# Patient Record
Sex: FEMALE | Race: WHITE | NOT HISPANIC OR LATINO | Employment: OTHER | URBAN - METROPOLITAN AREA
[De-identification: names, ages, dates, MRNs, and addresses within clinical notes are randomized per-mention and may not be internally consistent; named-entity substitution may affect disease eponyms.]

---

## 2017-01-04 ENCOUNTER — GENERIC CONVERSION - ENCOUNTER (OUTPATIENT)
Dept: OTHER | Facility: OTHER | Age: 57
End: 2017-01-04

## 2017-01-05 ENCOUNTER — HOSPITAL ENCOUNTER (OUTPATIENT)
Dept: NON INVASIVE DIAGNOSTICS | Facility: CLINIC | Age: 57
Discharge: HOME | End: 2017-01-05
Payer: COMMERCIAL

## 2017-01-05 ENCOUNTER — GENERIC CONVERSION - ENCOUNTER (OUTPATIENT)
Dept: OTHER | Facility: OTHER | Age: 57
End: 2017-01-05

## 2017-01-05 DIAGNOSIS — R01.1 UNDIAGNOSED CARDIAC MURMURS: ICD-10-CM

## 2017-01-05 PROCEDURE — 93306 TTE W/DOPPLER COMPLETE: CPT

## 2017-01-20 ENCOUNTER — ALLSCRIPTS OFFICE VISIT (OUTPATIENT)
Dept: OTHER | Facility: OTHER | Age: 57
End: 2017-01-20

## 2017-02-03 ENCOUNTER — TRANSCRIBE ORDERS (OUTPATIENT)
Dept: ADMINISTRATIVE | Age: 57
End: 2017-02-03

## 2017-02-03 ENCOUNTER — APPOINTMENT (OUTPATIENT)
Dept: LAB | Age: 57
End: 2017-02-03
Payer: COMMERCIAL

## 2017-02-03 DIAGNOSIS — E78.5 HYPERLIPIDEMIA: ICD-10-CM

## 2017-02-03 DIAGNOSIS — E55.9 VITAMIN D DEFICIENCY: ICD-10-CM

## 2017-02-03 DIAGNOSIS — E66.9 OBESITY: ICD-10-CM

## 2017-02-03 DIAGNOSIS — I10 ESSENTIAL (PRIMARY) HYPERTENSION: ICD-10-CM

## 2017-02-03 DIAGNOSIS — E11.65 TYPE 2 DIABETES MELLITUS WITH HYPERGLYCEMIA (HCC): ICD-10-CM

## 2017-02-03 LAB
ALBUMIN SERPL BCP-MCNC: 4.2 G/DL (ref 3.5–5)
ANION GAP SERPL CALCULATED.3IONS-SCNC: 12 MMOL/L (ref 4–13)
BUN SERPL-MCNC: 21 MG/DL (ref 5–25)
CALCIUM SERPL-MCNC: 9.9 MG/DL (ref 8.3–10.1)
CHLORIDE SERPL-SCNC: 99 MMOL/L (ref 100–108)
CO2 SERPL-SCNC: 26 MMOL/L (ref 21–32)
CREAT SERPL-MCNC: 0.84 MG/DL (ref 0.6–1.3)
CREAT UR-MCNC: 131 MG/DL
EST. AVERAGE GLUCOSE BLD GHB EST-MCNC: 148 MG/DL
GFR SERPL CREATININE-BSD FRML MDRD: >60 ML/MIN/1.73SQ M
GLUCOSE SERPL-MCNC: 96 MG/DL (ref 65–140)
HBA1C MFR BLD: 6.8 % (ref 4.2–6.3)
MICROALBUMIN UR-MCNC: 101 MG/L (ref 0–20)
MICROALBUMIN/CREAT 24H UR: 77 MG/G CREATININE (ref 0–30)
PHOSPHATE SERPL-MCNC: 2.5 MG/DL (ref 2.7–4.5)
POTASSIUM SERPL-SCNC: 4.6 MMOL/L (ref 3.5–5.3)
PTH-INTACT SERPL-MCNC: 26.2 PG/ML (ref 14–72)
SODIUM SERPL-SCNC: 137 MMOL/L (ref 136–145)
T4 FREE SERPL-MCNC: 1.01 NG/DL (ref 0.76–1.46)
TSH SERPL DL<=0.05 MIU/L-ACNC: 1.28 UIU/ML (ref 0.36–3.74)

## 2017-02-03 PROCEDURE — 82043 UR ALBUMIN QUANTITATIVE: CPT

## 2017-02-03 PROCEDURE — 80069 RENAL FUNCTION PANEL: CPT

## 2017-02-03 PROCEDURE — 84439 ASSAY OF FREE THYROXINE: CPT

## 2017-02-03 PROCEDURE — 83036 HEMOGLOBIN GLYCOSYLATED A1C: CPT

## 2017-02-03 PROCEDURE — 83970 ASSAY OF PARATHORMONE: CPT

## 2017-02-03 PROCEDURE — 84443 ASSAY THYROID STIM HORMONE: CPT

## 2017-02-03 PROCEDURE — 82570 ASSAY OF URINE CREATININE: CPT

## 2017-02-03 PROCEDURE — 36415 COLL VENOUS BLD VENIPUNCTURE: CPT

## 2017-02-09 ENCOUNTER — OFFICE VISIT (OUTPATIENT)
Dept: URGENT CARE | Age: 57
End: 2017-02-09
Payer: COMMERCIAL

## 2017-02-09 PROCEDURE — S9083 URGENT CARE CENTER GLOBAL: HCPCS

## 2017-02-09 PROCEDURE — G0382 LEV 3 HOSP TYPE B ED VISIT: HCPCS

## 2017-02-20 ENCOUNTER — TRANSCRIBE ORDERS (OUTPATIENT)
Dept: ADMINISTRATIVE | Facility: HOSPITAL | Age: 57
End: 2017-02-20

## 2017-02-20 ENCOUNTER — ALLSCRIPTS OFFICE VISIT (OUTPATIENT)
Dept: OTHER | Facility: OTHER | Age: 57
End: 2017-02-20

## 2017-02-20 DIAGNOSIS — E55.9 AVITAMINOSIS D: ICD-10-CM

## 2017-02-20 DIAGNOSIS — E78.5 HYPERLIPIDEMIA, UNSPECIFIED HYPERLIPIDEMIA TYPE: Primary | ICD-10-CM

## 2017-02-20 DIAGNOSIS — E04.1 THYROID NODULE: ICD-10-CM

## 2017-02-20 DIAGNOSIS — I10 BENIGN HYPERTENSION: ICD-10-CM

## 2017-03-16 ENCOUNTER — ALLSCRIPTS OFFICE VISIT (OUTPATIENT)
Dept: OTHER | Facility: OTHER | Age: 57
End: 2017-03-16

## 2017-03-27 ENCOUNTER — ALLSCRIPTS OFFICE VISIT (OUTPATIENT)
Dept: OTHER | Facility: OTHER | Age: 57
End: 2017-03-27

## 2017-03-27 LAB
FLUAV AG SPEC QL IA: NEGATIVE
INFLUENZA B AG (HISTORICAL): NEGATIVE

## 2017-03-31 ENCOUNTER — ALLSCRIPTS OFFICE VISIT (OUTPATIENT)
Dept: OTHER | Facility: OTHER | Age: 57
End: 2017-03-31

## 2017-03-31 DIAGNOSIS — M25.571 PAIN IN RIGHT ANKLE: ICD-10-CM

## 2017-03-31 DIAGNOSIS — M54.6 PAIN IN THORACIC SPINE: ICD-10-CM

## 2017-03-31 DIAGNOSIS — R53.83 OTHER FATIGUE: ICD-10-CM

## 2017-03-31 DIAGNOSIS — M21.70 ACQUIRED UNEQUAL LIMB LENGTH: ICD-10-CM

## 2017-04-04 ENCOUNTER — HOSPITAL ENCOUNTER (OUTPATIENT)
Dept: RADIOLOGY | Age: 57
Discharge: HOME | End: 2017-04-04
Payer: COMMERCIAL

## 2017-04-04 ENCOUNTER — GENERIC CONVERSION - ENCOUNTER (OUTPATIENT)
Dept: OTHER | Facility: OTHER | Age: 57
End: 2017-04-04

## 2017-04-04 ENCOUNTER — APPOINTMENT (OUTPATIENT)
Dept: LAB | Age: 57
End: 2017-04-04
Payer: COMMERCIAL

## 2017-04-04 ENCOUNTER — TRANSCRIBE ORDERS (OUTPATIENT)
Dept: ADMINISTRATIVE | Age: 57
End: 2017-04-04

## 2017-04-04 DIAGNOSIS — M54.6 PAIN IN THORACIC SPINE: ICD-10-CM

## 2017-04-04 DIAGNOSIS — M25.571 PAIN IN RIGHT ANKLE: ICD-10-CM

## 2017-04-04 DIAGNOSIS — R53.83 OTHER FATIGUE: ICD-10-CM

## 2017-04-04 LAB — CORTIS AM PEAK SERPL-MCNC: 9.1 UG/ML (ref 4.2–22.4)

## 2017-04-04 PROCEDURE — 73610 X-RAY EXAM OF ANKLE: CPT

## 2017-04-04 PROCEDURE — 36415 COLL VENOUS BLD VENIPUNCTURE: CPT

## 2017-04-04 PROCEDURE — 82533 TOTAL CORTISOL: CPT

## 2017-04-04 PROCEDURE — 72072 X-RAY EXAM THORAC SPINE 3VWS: CPT

## 2017-04-05 ENCOUNTER — GENERIC CONVERSION - ENCOUNTER (OUTPATIENT)
Dept: OTHER | Facility: OTHER | Age: 57
End: 2017-04-05

## 2017-04-10 ENCOUNTER — APPOINTMENT (OUTPATIENT)
Dept: PHYSICAL THERAPY | Facility: CLINIC | Age: 57
End: 2017-04-10
Payer: COMMERCIAL

## 2017-04-10 PROCEDURE — 97161 PT EVAL LOW COMPLEX 20 MIN: CPT

## 2017-04-12 ENCOUNTER — APPOINTMENT (OUTPATIENT)
Dept: PHYSICAL THERAPY | Facility: CLINIC | Age: 57
End: 2017-04-12
Payer: COMMERCIAL

## 2017-04-12 PROCEDURE — 97110 THERAPEUTIC EXERCISES: CPT

## 2017-04-12 PROCEDURE — 97140 MANUAL THERAPY 1/> REGIONS: CPT

## 2017-04-13 ENCOUNTER — APPOINTMENT (OUTPATIENT)
Dept: PHYSICAL THERAPY | Facility: CLINIC | Age: 57
End: 2017-04-13
Payer: COMMERCIAL

## 2017-04-13 PROCEDURE — 97116 GAIT TRAINING THERAPY: CPT

## 2017-04-13 PROCEDURE — 97140 MANUAL THERAPY 1/> REGIONS: CPT

## 2017-04-13 PROCEDURE — 97110 THERAPEUTIC EXERCISES: CPT

## 2017-04-18 ENCOUNTER — APPOINTMENT (OUTPATIENT)
Dept: PHYSICAL THERAPY | Facility: CLINIC | Age: 57
End: 2017-04-18
Payer: COMMERCIAL

## 2017-04-19 ENCOUNTER — APPOINTMENT (OUTPATIENT)
Dept: PHYSICAL THERAPY | Facility: CLINIC | Age: 57
End: 2017-04-19
Payer: COMMERCIAL

## 2017-04-19 PROCEDURE — 97116 GAIT TRAINING THERAPY: CPT

## 2017-04-19 PROCEDURE — 97140 MANUAL THERAPY 1/> REGIONS: CPT

## 2017-04-19 PROCEDURE — 97110 THERAPEUTIC EXERCISES: CPT

## 2017-04-20 ENCOUNTER — ALLSCRIPTS OFFICE VISIT (OUTPATIENT)
Dept: OTHER | Facility: OTHER | Age: 57
End: 2017-04-20

## 2017-04-20 LAB — GLUCOSE SERPL-MCNC: 153 MG/DL

## 2017-04-24 ENCOUNTER — APPOINTMENT (OUTPATIENT)
Dept: PHYSICAL THERAPY | Facility: CLINIC | Age: 57
End: 2017-04-24
Payer: COMMERCIAL

## 2017-04-24 PROCEDURE — 97140 MANUAL THERAPY 1/> REGIONS: CPT

## 2017-04-24 PROCEDURE — 97116 GAIT TRAINING THERAPY: CPT

## 2017-04-24 PROCEDURE — 97110 THERAPEUTIC EXERCISES: CPT

## 2017-04-27 ENCOUNTER — APPOINTMENT (OUTPATIENT)
Dept: PHYSICAL THERAPY | Facility: CLINIC | Age: 57
End: 2017-04-27
Payer: COMMERCIAL

## 2017-05-01 ENCOUNTER — APPOINTMENT (OUTPATIENT)
Dept: PHYSICAL THERAPY | Facility: CLINIC | Age: 57
End: 2017-05-01
Payer: COMMERCIAL

## 2017-05-01 DIAGNOSIS — E55.9 VITAMIN D DEFICIENCY: ICD-10-CM

## 2017-05-01 DIAGNOSIS — I10 ESSENTIAL (PRIMARY) HYPERTENSION: ICD-10-CM

## 2017-05-01 DIAGNOSIS — E04.1 NONTOXIC SINGLE THYROID NODULE: ICD-10-CM

## 2017-05-01 DIAGNOSIS — E11.65 TYPE 2 DIABETES MELLITUS WITH HYPERGLYCEMIA (HCC): ICD-10-CM

## 2017-05-01 DIAGNOSIS — E78.5 HYPERLIPIDEMIA: ICD-10-CM

## 2017-05-01 PROCEDURE — 97140 MANUAL THERAPY 1/> REGIONS: CPT

## 2017-05-01 PROCEDURE — 97116 GAIT TRAINING THERAPY: CPT

## 2017-05-01 PROCEDURE — 97110 THERAPEUTIC EXERCISES: CPT

## 2017-05-04 ENCOUNTER — APPOINTMENT (OUTPATIENT)
Dept: PHYSICAL THERAPY | Facility: CLINIC | Age: 57
End: 2017-05-04
Payer: COMMERCIAL

## 2017-05-04 PROCEDURE — 97110 THERAPEUTIC EXERCISES: CPT

## 2017-05-04 PROCEDURE — 97140 MANUAL THERAPY 1/> REGIONS: CPT

## 2017-05-08 ENCOUNTER — APPOINTMENT (OUTPATIENT)
Dept: PHYSICAL THERAPY | Facility: CLINIC | Age: 57
End: 2017-05-08
Payer: COMMERCIAL

## 2017-05-08 PROCEDURE — 97110 THERAPEUTIC EXERCISES: CPT

## 2017-05-08 PROCEDURE — 97140 MANUAL THERAPY 1/> REGIONS: CPT

## 2017-05-11 ENCOUNTER — APPOINTMENT (OUTPATIENT)
Dept: PHYSICAL THERAPY | Facility: CLINIC | Age: 57
End: 2017-05-11
Payer: COMMERCIAL

## 2017-05-15 ENCOUNTER — APPOINTMENT (OUTPATIENT)
Dept: PHYSICAL THERAPY | Facility: CLINIC | Age: 57
End: 2017-05-15
Payer: COMMERCIAL

## 2017-05-15 PROCEDURE — 97140 MANUAL THERAPY 1/> REGIONS: CPT

## 2017-05-15 PROCEDURE — 97110 THERAPEUTIC EXERCISES: CPT

## 2017-05-18 ENCOUNTER — HOSPITAL ENCOUNTER (OUTPATIENT)
Dept: ULTRASOUND IMAGING | Facility: HOSPITAL | Age: 57
Discharge: HOME | End: 2017-05-18
Payer: COMMERCIAL

## 2017-05-18 ENCOUNTER — APPOINTMENT (OUTPATIENT)
Dept: PHYSICAL THERAPY | Facility: CLINIC | Age: 57
End: 2017-05-18
Payer: COMMERCIAL

## 2017-05-18 DIAGNOSIS — E55.9 AVITAMINOSIS D: ICD-10-CM

## 2017-05-18 DIAGNOSIS — I10 BENIGN HYPERTENSION: ICD-10-CM

## 2017-05-18 DIAGNOSIS — E78.5 HYPERLIPIDEMIA, UNSPECIFIED HYPERLIPIDEMIA TYPE: ICD-10-CM

## 2017-05-18 DIAGNOSIS — E04.1 THYROID NODULE: ICD-10-CM

## 2017-05-18 PROCEDURE — 76536 US EXAM OF HEAD AND NECK: CPT

## 2017-05-18 PROCEDURE — 97140 MANUAL THERAPY 1/> REGIONS: CPT

## 2017-05-18 PROCEDURE — 97110 THERAPEUTIC EXERCISES: CPT

## 2017-05-20 DIAGNOSIS — E55.9 VITAMIN D DEFICIENCY: ICD-10-CM

## 2017-05-20 DIAGNOSIS — E11.65 TYPE 2 DIABETES MELLITUS WITH HYPERGLYCEMIA (HCC): ICD-10-CM

## 2017-05-20 DIAGNOSIS — E78.5 HYPERLIPIDEMIA: ICD-10-CM

## 2017-05-20 DIAGNOSIS — E04.1 NONTOXIC SINGLE THYROID NODULE: ICD-10-CM

## 2017-05-20 DIAGNOSIS — I10 ESSENTIAL (PRIMARY) HYPERTENSION: ICD-10-CM

## 2017-05-22 ENCOUNTER — APPOINTMENT (OUTPATIENT)
Dept: PHYSICAL THERAPY | Facility: CLINIC | Age: 57
End: 2017-05-22
Payer: COMMERCIAL

## 2017-05-22 PROCEDURE — 97140 MANUAL THERAPY 1/> REGIONS: CPT

## 2017-05-22 PROCEDURE — 97110 THERAPEUTIC EXERCISES: CPT

## 2017-05-23 ENCOUNTER — LAB REQUISITION (OUTPATIENT)
Dept: LAB | Facility: HOSPITAL | Age: 57
End: 2017-05-23
Payer: COMMERCIAL

## 2017-05-23 ENCOUNTER — ALLSCRIPTS OFFICE VISIT (OUTPATIENT)
Dept: OTHER | Facility: OTHER | Age: 57
End: 2017-05-23

## 2017-05-23 ENCOUNTER — TRANSCRIBE ORDERS (OUTPATIENT)
Dept: ADMINISTRATIVE | Facility: HOSPITAL | Age: 57
End: 2017-05-23

## 2017-05-23 DIAGNOSIS — Z01.419 ENCOUNTER FOR GYNECOLOGICAL EXAMINATION WITHOUT ABNORMAL FINDING: ICD-10-CM

## 2017-05-23 DIAGNOSIS — Z12.31 ENCOUNTER FOR MAMMOGRAM TO ESTABLISH BASELINE MAMMOGRAM: Primary | ICD-10-CM

## 2017-05-23 PROCEDURE — 87624 HPV HI-RISK TYP POOLED RSLT: CPT | Performed by: OBSTETRICS & GYNECOLOGY

## 2017-05-23 PROCEDURE — G0145 SCR C/V CYTO,THINLAYER,RESCR: HCPCS | Performed by: OBSTETRICS & GYNECOLOGY

## 2017-05-25 ENCOUNTER — APPOINTMENT (OUTPATIENT)
Dept: PHYSICAL THERAPY | Facility: CLINIC | Age: 57
End: 2017-05-25
Payer: COMMERCIAL

## 2017-05-25 LAB — HPV RRNA GENITAL QL NAA+PROBE: NORMAL

## 2017-05-25 PROCEDURE — 97110 THERAPEUTIC EXERCISES: CPT

## 2017-05-25 PROCEDURE — 97140 MANUAL THERAPY 1/> REGIONS: CPT

## 2017-05-26 ENCOUNTER — GENERIC CONVERSION - ENCOUNTER (OUTPATIENT)
Dept: OTHER | Facility: OTHER | Age: 57
End: 2017-05-26

## 2017-05-26 LAB
LAB AP GYN PRIMARY INTERPRETATION: NORMAL
Lab: NORMAL

## 2017-05-30 ENCOUNTER — APPOINTMENT (OUTPATIENT)
Dept: PHYSICAL THERAPY | Facility: CLINIC | Age: 57
End: 2017-05-30
Payer: COMMERCIAL

## 2017-05-30 PROCEDURE — 97110 THERAPEUTIC EXERCISES: CPT

## 2017-05-30 PROCEDURE — 97140 MANUAL THERAPY 1/> REGIONS: CPT

## 2017-06-01 ENCOUNTER — APPOINTMENT (OUTPATIENT)
Dept: PHYSICAL THERAPY | Facility: CLINIC | Age: 57
End: 2017-06-01
Payer: COMMERCIAL

## 2017-06-01 PROCEDURE — 97110 THERAPEUTIC EXERCISES: CPT

## 2017-06-01 PROCEDURE — 97140 MANUAL THERAPY 1/> REGIONS: CPT

## 2017-06-05 ENCOUNTER — APPOINTMENT (OUTPATIENT)
Dept: PHYSICAL THERAPY | Facility: CLINIC | Age: 57
End: 2017-06-05
Payer: COMMERCIAL

## 2017-06-05 PROCEDURE — 97110 THERAPEUTIC EXERCISES: CPT

## 2017-06-05 PROCEDURE — 97140 MANUAL THERAPY 1/> REGIONS: CPT

## 2017-06-08 ENCOUNTER — APPOINTMENT (OUTPATIENT)
Dept: PHYSICAL THERAPY | Facility: CLINIC | Age: 57
End: 2017-06-08
Payer: COMMERCIAL

## 2017-06-08 PROCEDURE — 97110 THERAPEUTIC EXERCISES: CPT

## 2017-06-08 PROCEDURE — 97140 MANUAL THERAPY 1/> REGIONS: CPT

## 2017-06-09 ENCOUNTER — APPOINTMENT (OUTPATIENT)
Dept: LAB | Age: 57
End: 2017-06-09
Payer: COMMERCIAL

## 2017-06-09 ENCOUNTER — TRANSCRIBE ORDERS (OUTPATIENT)
Dept: ADMINISTRATIVE | Age: 57
End: 2017-06-09

## 2017-06-09 DIAGNOSIS — E04.1 NONTOXIC SINGLE THYROID NODULE: ICD-10-CM

## 2017-06-09 DIAGNOSIS — E11.65 TYPE 2 DIABETES MELLITUS WITH HYPERGLYCEMIA (HCC): ICD-10-CM

## 2017-06-09 DIAGNOSIS — E55.9 VITAMIN D DEFICIENCY: ICD-10-CM

## 2017-06-09 DIAGNOSIS — E78.5 HYPERLIPIDEMIA: ICD-10-CM

## 2017-06-09 DIAGNOSIS — I10 ESSENTIAL (PRIMARY) HYPERTENSION: ICD-10-CM

## 2017-06-09 LAB
25(OH)D3 SERPL-MCNC: 31.9 NG/ML (ref 30–100)
ALBUMIN SERPL BCP-MCNC: 4.2 G/DL (ref 3.5–5)
ALBUMIN SERPL BCP-MCNC: 4.2 G/DL (ref 3.5–5)
ALP SERPL-CCNC: 145 U/L (ref 46–116)
ALT SERPL W P-5'-P-CCNC: 42 U/L (ref 12–78)
ANION GAP SERPL CALCULATED.3IONS-SCNC: 12 MMOL/L (ref 4–13)
AST SERPL W P-5'-P-CCNC: 25 U/L (ref 5–45)
BILIRUB DIRECT SERPL-MCNC: 0.11 MG/DL (ref 0–0.2)
BILIRUB SERPL-MCNC: 0.37 MG/DL (ref 0.2–1)
BUN SERPL-MCNC: 24 MG/DL (ref 5–25)
CALCIUM ALBUM COR SERPL-MCNC: 10.1 MG/DL (ref 8.3–10.1)
CALCIUM SERPL-MCNC: 10.3 MD/DL (ref 8.3–10.1)
CALCIUM SERPL-MCNC: 10.3 MG/DL (ref 8.3–10.1)
CHLORIDE SERPL-SCNC: 103 MMOL/L (ref 100–108)
CHOLEST SERPL-MCNC: 160 MG/DL (ref 50–200)
CO2 SERPL-SCNC: 24 MMOL/L (ref 21–32)
CREAT SERPL-MCNC: 0.88 MG/DL (ref 0.6–1.3)
CREAT UR-MCNC: 61.3 MG/DL
EST. AVERAGE GLUCOSE BLD GHB EST-MCNC: 148 MG/DL
GFR SERPL CREATININE-BSD FRML MDRD: >60 ML/MIN/1.73SQ M
GLUCOSE SERPL-MCNC: 118 MG/DL (ref 65–140)
HBA1C MFR BLD: 6.8 % (ref 4.2–6.3)
HDLC SERPL-MCNC: 44 MG/DL (ref 40–60)
LDLC SERPL CALC-MCNC: 56 MG/DL (ref 0–100)
MICROALBUMIN UR-MCNC: 10.8 MG/L (ref 0–20)
MICROALBUMIN/CREAT 24H UR: 18 MG/G CREATININE (ref 0–30)
PHOSPHATE SERPL-MCNC: 3.1 MG/DL (ref 2.7–4.5)
POTASSIUM SERPL-SCNC: 4.3 MMOL/L (ref 3.5–5.3)
PROT SERPL-MCNC: 8.6 G/DL (ref 6.4–8.2)
PTH-INTACT SERPL-MCNC: 26.1 PG/ML (ref 14–72)
SODIUM SERPL-SCNC: 139 MMOL/L (ref 136–145)
TRIGL SERPL-MCNC: 299 MG/DL

## 2017-06-09 PROCEDURE — 83970 ASSAY OF PARATHORMONE: CPT

## 2017-06-09 PROCEDURE — 82040 ASSAY OF SERUM ALBUMIN: CPT

## 2017-06-09 PROCEDURE — 36415 COLL VENOUS BLD VENIPUNCTURE: CPT

## 2017-06-09 PROCEDURE — 84100 ASSAY OF PHOSPHORUS: CPT

## 2017-06-09 PROCEDURE — 80061 LIPID PANEL: CPT

## 2017-06-09 PROCEDURE — 80048 BASIC METABOLIC PNL TOTAL CA: CPT

## 2017-06-09 PROCEDURE — 82043 UR ALBUMIN QUANTITATIVE: CPT

## 2017-06-09 PROCEDURE — 82310 ASSAY OF CALCIUM: CPT

## 2017-06-09 PROCEDURE — 83036 HEMOGLOBIN GLYCOSYLATED A1C: CPT

## 2017-06-09 PROCEDURE — 82570 ASSAY OF URINE CREATININE: CPT

## 2017-06-09 PROCEDURE — 80076 HEPATIC FUNCTION PANEL: CPT

## 2017-06-09 PROCEDURE — 82306 VITAMIN D 25 HYDROXY: CPT

## 2017-06-12 ENCOUNTER — APPOINTMENT (OUTPATIENT)
Dept: PHYSICAL THERAPY | Facility: CLINIC | Age: 57
End: 2017-06-12
Payer: COMMERCIAL

## 2017-06-15 ENCOUNTER — APPOINTMENT (OUTPATIENT)
Dept: PHYSICAL THERAPY | Facility: CLINIC | Age: 57
End: 2017-06-15
Payer: COMMERCIAL

## 2017-06-20 ENCOUNTER — ALLSCRIPTS OFFICE VISIT (OUTPATIENT)
Dept: OTHER | Facility: OTHER | Age: 57
End: 2017-06-20

## 2017-06-20 DIAGNOSIS — I10 ESSENTIAL (PRIMARY) HYPERTENSION: ICD-10-CM

## 2017-06-20 DIAGNOSIS — E11.65 TYPE 2 DIABETES MELLITUS WITH HYPERGLYCEMIA (HCC): ICD-10-CM

## 2017-06-23 ENCOUNTER — TRANSCRIBE ORDERS (OUTPATIENT)
Dept: ADMINISTRATIVE | Age: 57
End: 2017-06-23

## 2017-06-23 ENCOUNTER — APPOINTMENT (OUTPATIENT)
Dept: LAB | Age: 57
End: 2017-06-23
Payer: COMMERCIAL

## 2017-06-23 DIAGNOSIS — Z79.899 ENCOUNTER FOR LONG-TERM (CURRENT) USE OF OTHER MEDICATIONS: ICD-10-CM

## 2017-06-23 DIAGNOSIS — Z79.899 ENCOUNTER FOR LONG-TERM (CURRENT) USE OF OTHER MEDICATIONS: Primary | ICD-10-CM

## 2017-06-23 DIAGNOSIS — E11.65 TYPE 2 DIABETES MELLITUS WITH HYPERGLYCEMIA (HCC): ICD-10-CM

## 2017-06-23 DIAGNOSIS — D72.829 ELEVATED WHITE BLOOD CELL COUNT: ICD-10-CM

## 2017-06-23 DIAGNOSIS — I10 ESSENTIAL (PRIMARY) HYPERTENSION: ICD-10-CM

## 2017-06-23 LAB
ALBUMIN SERPL BCP-MCNC: 4 G/DL (ref 3.5–5)
ALP SERPL-CCNC: 125 U/L (ref 46–116)
ALT SERPL W P-5'-P-CCNC: 39 U/L (ref 12–78)
ANION GAP SERPL CALCULATED.3IONS-SCNC: 8 MMOL/L (ref 4–13)
AST SERPL W P-5'-P-CCNC: 21 U/L (ref 5–45)
BASOPHILS # BLD AUTO: 0.07 THOUSANDS/ΜL (ref 0–0.1)
BASOPHILS NFR BLD AUTO: 1 % (ref 0–1)
BILIRUB SERPL-MCNC: 0.37 MG/DL (ref 0.2–1)
BUN SERPL-MCNC: 21 MG/DL (ref 5–25)
CALCIUM SERPL-MCNC: 9.8 MG/DL (ref 8.3–10.1)
CHLORIDE SERPL-SCNC: 105 MMOL/L (ref 100–108)
CO2 SERPL-SCNC: 29 MMOL/L (ref 21–32)
CREAT SERPL-MCNC: 0.73 MG/DL (ref 0.6–1.3)
EOSINOPHIL # BLD AUTO: 0.22 THOUSAND/ΜL (ref 0–0.61)
EOSINOPHIL NFR BLD AUTO: 2 % (ref 0–6)
ERYTHROCYTE [DISTWIDTH] IN BLOOD BY AUTOMATED COUNT: 15.7 % (ref 11.6–15.1)
FERRITIN SERPL-MCNC: 45 NG/ML (ref 8–388)
GFR SERPL CREATININE-BSD FRML MDRD: >60 ML/MIN/1.73SQ M
GLUCOSE SERPL-MCNC: 79 MG/DL (ref 65–140)
HCT VFR BLD AUTO: 41.1 % (ref 34.8–46.1)
HGB BLD-MCNC: 12.2 G/DL (ref 11.5–15.4)
IRON SATN MFR SERPL: 8 %
IRON SERPL-MCNC: 38 UG/DL (ref 50–170)
LYMPHOCYTES # BLD AUTO: 3.98 THOUSANDS/ΜL (ref 0.6–4.47)
LYMPHOCYTES NFR BLD AUTO: 33 % (ref 14–44)
MAGNESIUM SERPL-MCNC: 2 MG/DL (ref 1.6–2.6)
MCH RBC QN AUTO: 24.3 PG (ref 26.8–34.3)
MCHC RBC AUTO-ENTMCNC: 29.7 G/DL (ref 31.4–37.4)
MCV RBC AUTO: 82 FL (ref 82–98)
MONOCYTES # BLD AUTO: 0.72 THOUSAND/ΜL (ref 0.17–1.22)
MONOCYTES NFR BLD AUTO: 6 % (ref 4–12)
NEUTROPHILS # BLD AUTO: 6.94 THOUSANDS/ΜL (ref 1.85–7.62)
NEUTS SEG NFR BLD AUTO: 58 % (ref 43–75)
NRBC BLD AUTO-RTO: 0 /100 WBCS
PLATELET # BLD AUTO: 435 THOUSANDS/UL (ref 149–390)
PMV BLD AUTO: 10.4 FL (ref 8.9–12.7)
POTASSIUM SERPL-SCNC: 4.6 MMOL/L (ref 3.5–5.3)
PROT SERPL-MCNC: 8.1 G/DL (ref 6.4–8.2)
RBC # BLD AUTO: 5.02 MILLION/UL (ref 3.81–5.12)
SODIUM SERPL-SCNC: 142 MMOL/L (ref 136–145)
TIBC SERPL-MCNC: 495 UG/DL (ref 250–450)
WBC # BLD AUTO: 12 THOUSAND/UL (ref 4.31–10.16)

## 2017-06-23 PROCEDURE — 83550 IRON BINDING TEST: CPT

## 2017-06-23 PROCEDURE — 85025 COMPLETE CBC W/AUTO DIFF WBC: CPT

## 2017-06-23 PROCEDURE — 83540 ASSAY OF IRON: CPT

## 2017-06-23 PROCEDURE — 82955 ASSAY OF G6PD ENZYME: CPT

## 2017-06-23 PROCEDURE — 82728 ASSAY OF FERRITIN: CPT

## 2017-06-23 PROCEDURE — 83735 ASSAY OF MAGNESIUM: CPT

## 2017-06-23 PROCEDURE — 80053 COMPREHEN METABOLIC PANEL: CPT

## 2017-06-23 PROCEDURE — 36415 COLL VENOUS BLD VENIPUNCTURE: CPT

## 2017-06-27 LAB
G6PD BLD QN: 271 U/10E12 RBC (ref 146–376)
RBC # BLD AUTO: 4.89 X10E6/UL (ref 3.77–5.28)

## 2017-07-04 ENCOUNTER — GENERIC CONVERSION - ENCOUNTER (OUTPATIENT)
Dept: OTHER | Facility: OTHER | Age: 57
End: 2017-07-04

## 2017-07-12 ENCOUNTER — GENERIC CONVERSION - ENCOUNTER (OUTPATIENT)
Dept: OTHER | Facility: OTHER | Age: 57
End: 2017-07-12

## 2017-07-12 ENCOUNTER — HOSPITAL ENCOUNTER (OUTPATIENT)
Dept: MAMMOGRAPHY | Facility: HOSPITAL | Age: 57
Discharge: HOME | End: 2017-07-12
Payer: COMMERCIAL

## 2017-07-12 DIAGNOSIS — Z12.31 ENCOUNTER FOR SCREENING MAMMOGRAM FOR MALIGNANT NEOPLASM OF BREAST: ICD-10-CM

## 2017-07-12 PROCEDURE — G0202 SCR MAMMO BI INCL CAD: HCPCS

## 2017-07-14 ENCOUNTER — ALLSCRIPTS OFFICE VISIT (OUTPATIENT)
Dept: OTHER | Facility: OTHER | Age: 57
End: 2017-07-14

## 2017-08-29 ENCOUNTER — GENERIC CONVERSION - ENCOUNTER (OUTPATIENT)
Dept: OTHER | Facility: OTHER | Age: 57
End: 2017-08-29

## 2017-09-15 ENCOUNTER — ALLSCRIPTS OFFICE VISIT (OUTPATIENT)
Dept: OTHER | Facility: OTHER | Age: 57
End: 2017-09-15

## 2017-09-15 DIAGNOSIS — F31.32 BIPOLAR I DISORDER, MOST RECENT EPISODE DEPRESSED, MODERATE (HCC): Primary | ICD-10-CM

## 2017-09-20 ENCOUNTER — TRANSCRIBE ORDERS (OUTPATIENT)
Dept: ADMINISTRATIVE | Age: 57
End: 2017-09-20

## 2017-09-20 ENCOUNTER — GENERIC CONVERSION - ENCOUNTER (OUTPATIENT)
Dept: OTHER | Facility: OTHER | Age: 57
End: 2017-09-20

## 2017-09-20 ENCOUNTER — APPOINTMENT (OUTPATIENT)
Dept: LAB | Age: 57
End: 2017-09-20
Payer: COMMERCIAL

## 2017-09-20 DIAGNOSIS — I10 ESSENTIAL (PRIMARY) HYPERTENSION: ICD-10-CM

## 2017-09-20 DIAGNOSIS — E11.65 TYPE 2 DIABETES MELLITUS WITH HYPERGLYCEMIA (HCC): ICD-10-CM

## 2017-09-20 DIAGNOSIS — E66.9 OBESITY: ICD-10-CM

## 2017-09-20 DIAGNOSIS — E55.9 VITAMIN D DEFICIENCY: ICD-10-CM

## 2017-09-20 DIAGNOSIS — E78.5 HYPERLIPIDEMIA: ICD-10-CM

## 2017-09-20 DIAGNOSIS — Z00.00 ENCOUNTER FOR GENERAL ADULT MEDICAL EXAMINATION WITHOUT ABNORMAL FINDINGS: ICD-10-CM

## 2017-09-20 LAB
ALBUMIN SERPL BCP-MCNC: 4 G/DL (ref 3.5–5)
ANION GAP SERPL CALCULATED.3IONS-SCNC: 8 MMOL/L (ref 4–13)
BUN SERPL-MCNC: 19 MG/DL (ref 5–25)
CALCIUM SERPL-MCNC: 9.6 MG/DL (ref 8.3–10.1)
CHLORIDE SERPL-SCNC: 102 MMOL/L (ref 100–108)
CHOLEST SERPL-MCNC: 157 MG/DL (ref 50–200)
CO2 SERPL-SCNC: 28 MMOL/L (ref 21–32)
CREAT SERPL-MCNC: 0.67 MG/DL (ref 0.6–1.3)
EST. AVERAGE GLUCOSE BLD GHB EST-MCNC: 163 MG/DL
GFR SERPL CREATININE-BSD FRML MDRD: 99 ML/MIN/1.73SQ M
GLUCOSE P FAST SERPL-MCNC: 134 MG/DL (ref 65–99)
HBA1C MFR BLD: 7.3 % (ref 4.2–6.3)
HCV AB SER QL: NORMAL
HDLC SERPL-MCNC: 43 MG/DL (ref 40–60)
LDLC SERPL CALC-MCNC: 49 MG/DL (ref 0–100)
PHOSPHATE SERPL-MCNC: 3.7 MG/DL (ref 2.7–4.5)
POTASSIUM SERPL-SCNC: 4.1 MMOL/L (ref 3.5–5.3)
PTH-INTACT SERPL-MCNC: 33.3 PG/ML (ref 14–72)
SODIUM SERPL-SCNC: 138 MMOL/L (ref 136–145)
T4 FREE SERPL-MCNC: 0.88 NG/DL (ref 0.76–1.46)
TRIGL SERPL-MCNC: 327 MG/DL
TSH SERPL DL<=0.05 MIU/L-ACNC: 0.69 UIU/ML (ref 0.36–3.74)

## 2017-09-20 PROCEDURE — 83970 ASSAY OF PARATHORMONE: CPT

## 2017-09-20 PROCEDURE — 80069 RENAL FUNCTION PANEL: CPT

## 2017-09-20 PROCEDURE — 84443 ASSAY THYROID STIM HORMONE: CPT

## 2017-09-20 PROCEDURE — 83036 HEMOGLOBIN GLYCOSYLATED A1C: CPT

## 2017-09-20 PROCEDURE — 84439 ASSAY OF FREE THYROXINE: CPT

## 2017-09-20 PROCEDURE — 86803 HEPATITIS C AB TEST: CPT

## 2017-09-20 PROCEDURE — 80061 LIPID PANEL: CPT

## 2017-09-20 PROCEDURE — 36415 COLL VENOUS BLD VENIPUNCTURE: CPT

## 2017-09-25 ENCOUNTER — GENERIC CONVERSION - ENCOUNTER (OUTPATIENT)
Dept: OTHER | Facility: OTHER | Age: 57
End: 2017-09-25

## 2017-10-05 ENCOUNTER — ALLSCRIPTS OFFICE VISIT (OUTPATIENT)
Dept: OTHER | Facility: OTHER | Age: 57
End: 2017-10-05

## 2017-10-12 ENCOUNTER — GENERIC CONVERSION - ENCOUNTER (OUTPATIENT)
Dept: OTHER | Facility: OTHER | Age: 57
End: 2017-10-12

## 2017-10-25 ENCOUNTER — GENERIC CONVERSION - ENCOUNTER (OUTPATIENT)
Dept: OTHER | Facility: OTHER | Age: 57
End: 2017-10-25

## 2017-10-27 ENCOUNTER — ALLSCRIPTS OFFICE VISIT (OUTPATIENT)
Dept: OTHER | Facility: OTHER | Age: 57
End: 2017-10-27

## 2017-10-27 NOTE — PROGRESS NOTES
Assessment  1  Type 2 diabetes mellitus with hyperglycemia (250 00) (E11 65)   2  Vitamin D deficiency (268 9) (E55 9)   3  Dyslipidemia (272 4) (E78 5)   4  Benign essential hypertension (401 1) (I10)   5  Left thyroid nodule (241 0) (E04 1)    Plan  Benign essential hypertension, Dyslipidemia, Left thyroid nodule, Type 2 diabetes  mellitus with hyperglycemia, Vitamin D deficiency    · (1) HEMOGLOBIN A1C; Status:Active; Requested TZF:[id number]; Perform:[facility]; YHO:[id number]; For:Benign essential hypertension, Dyslipidemia, Left thyroid nodule, Type 2 diabetes mellitus with hyperglycemia, Vitamin D deficiency; Ordered By:Maegan Castro;   · (1) RENAL FUNCTION PANEL; Status:Active; Requested KCF:[id number]; Perform:[facility]; YSB:[id number]; For:Benign essential hypertension, Dyslipidemia, Left thyroid nodule, Type 2 diabetes mellitus with hyperglycemia, Vitamin D deficiency; Ordered By:Maegan Castro;   · Follow-up visit in 3 months Evaluation and Treatment  Follow-up  Status: Complete   Done: 33ZHE4513   Ordered; For: Benign essential hypertension, Dyslipidemia, Left thyroid nodule, Type 2 diabetes mellitus with hyperglycemia, Vitamin D deficiency; Ordered By: Edil Mckeon Performed:  Due: 93XYS0782; Last Updated By: Gerry Turner; 10/5/2017 12:01:17 PM  Diabetes mellitus type 2, uncontrolled    · From  MetFORMIN HCl  MG Oral Tablet Extended Release 24 Hour  TAKE 1 TAB TWICE PER DAY WITH FOOD To MetFORMIN HCl  MG Oral Tablet  Extended Release 24 Hour take 2 tabs twice per day with food   Rx By: Edil Mckeon; Dispense: 30 Days ; #:120 Tablet Extended Release 24 Hour; Refill: 2;For: Diabetes mellitus type 2, uncontrolled; DAVID = N; Record    Discussion/Summary  Discussion Summary:   1   Type 2 diabetes with hyperglycemia- last A1c 7 3 not at goal, continue Natha Ornelas 42 units daily, continue Humalog via expert meter, continue Jardiance 25 mg daily, increase Metformin ER 500 mg 2 tabs day twice a day with meals  Check blood sugar before meals and at bedtime  Send download in 2 weeks  Always have glucose available for hypoglycemia, use 15 by 15 rule  Next A1c due in January  Eye exam up to date  Podiatry up to date  Hyperlipidemia- LDL at goal, triglycerides have improved, continue Atorvastatin and Fenofibrate  Continue with dietary modifications and improving glucose  Stay away from high carbs  Hypertension- BP not at goal, continue Lisinopril, stay away from added salt, avoid NSAIDs  Vitamin D deficiency- last level above 30, continue 50,000 iu to 1 capsule weekly  Calcium normal   thyroid nodules- recent thyroid US report several subcentimeter nodules with features favoring benign  Surveillance thyroid US due in 5/2018  in 3 months with Dr Fany Acosta  Counseling Documentation With Imm: The patient was counseled regarding diagnostic results,-- instructions for management,-- risk factor reductions,-- patient and family education,-- impressions,-- importance of compliance with treatment  Goals and Barriers: The patient has the current Goals: Labs before next visit  Patient's Capacity to Self-Care: Patient is able to Self-Care  Medication SE Review and Pt Understands Tx: Possible side effects of new medications were reviewed with the patient/guardian today  The treatment plan was reviewed with the patient/guardian  The patient/guardian understands and agrees with the treatment plan      Chief Complaint  Chief Complaint Free Text Note Form: f/u diabetes   Chief Complaint Chronic Condition [facility]: Patient is here today for follow up of chronic conditions described in HPI  History of Present Illness  HPI: Lynda Singh is a 66 yo female here for follow-up type 2 diabetes, hyperlipidemia, hypertension, Vitamin D deficiency and obesity  Currently dealing with seasonal depression, Latuda increased but all she wants to do is eat and sleep   on currently on Tresiba 42 units daily, Humalog via expert meter ICR 1:5, ISF 1:20, BS range 100-130, Metformin  mg 1 tab twice a day with meals and Jardiance 25 mg mg daily, denies dysuria, reports drinking enough fluids  Denies hypoglycemia  D deficiency- on D2 50K once a week  on Atorvastatin and Fenofibrate  on Lisinopril  Thyroid Nodule: The patient is being seen for a follow-up of a thyroid nodule  Recent results: TSH date 2/2017, TSH 1 280 mIU/L, free T4 1 01 ng/dl  Nodule characteristics: located in the left lobe, see thyroid US report 5/2017  Symptoms:  weight gain, but-- no neck pain,-- no dysphonia,-- no dysphagia,-- no dyspnea,-- no weight loss,-- no fatigue,-- no palpitations-- and-- no frequent stools  The patient is not currently being treated for this problem  Hyperlipidemia (Follow-Up): The patient states her hyperlipidemia has been stable since the last visit  Comorbid Illnesses: diabetes mellitus-- and-- hypertension  She has no significant interval events  Symptoms: denies chest pain  Medications: the patient is adherent with her medication regimen  Medication(s): a statin-- and-- a fibrate  the patient's last LDL was 49 mg/dL  Hypertension (Follow-Up): The patient presents for follow-up of primary hypertension  The patient states she has been stable with her blood pressure control since the last visit  She has no significant interval events  Symptoms: stable impaired vision,-- denies dyspnea-- and-- denies chest pain  Associated symptoms include no headache  Home monitoring: The patient is not checking blood pressure at home  Medications: the patient is adherent with her medication regimen  -- She denies medication side effects  Medication(s): an ACE inhibitor  Vitamin D Deficiency: The patient is being seen for follow-up of vitamin D deficiency  Disease type: vitamin D insufficiency  Recent laboratory results: date 6/2017, 25-hydroxyvitamin D 31  ng/mL, parathyroid hormone 26 1 pg/mL   Current treatment includes dietary calcium and vitamin D2 (ergocalciferol)  Symptoms:  fatigue, but-- no muscle pain,-- no muscle weakness-- and-- no muscle cramps  Diabetes: The patient is being seen for routine follow-up of Diabetes Mellitus 2  The HbA1c was 7 3% performed on 9/2017  Current treatment includes ACE inhibitor,-- Metformin HCl-- and-- Jardiance  See Medication List for current medication(s)  See Medication List for dosage(s)  Source of information reported by infromation from the patient's downloadable meter and indicates that the patient  range 108 to 207  Diabetes education performed   Topics covered in the education included diet counseling,-- exercise counseling-- and-- reviewed hyperglycemic and hypoglycemic signs and symptoms  By report, there is good compliance with treatment-- and-- good symptom control  Current pertinent lifestyle factors include obesity, but-- no tobacco use  Symptoms reported by the patient include fatigue,-- recent weight gain-- and-- polyphagia, but-- no abdominal pain,-- no extremity pain,-- no polydipsia,-- no extremity numbness,-- no polyuria,-- no extremity paresthesias,-- no blurred vision,-- no insomnia,-- no diarrhea,-- no vomiting,-- no nausea,-- no recent weight loss,-- no edema,-- no nocturia,-- no dyspnea-- and-- no chest pain  (denies) Pertinent Medical and Social History: hypertension,-- hyperglycemia-- and-- hyperlipidemia  Review of Systems  ROS Reviewed:   ROS reviewed  Endo Adult ROS Female Established v2 Update - [facility]:   Constitutional/General: recent weight gain,-- no recent weight loss,-- poor energy/fatigue,-- no increased energy level,-- insomnia/sleep problems,-- no fever-- and-- no feeling weak  Breasts: no nipple discharge  Heart: high blood pressure, but-- no chest pain/tightness,-- no rapid/racing heart rate-- and-- no palpitations  Genitourinary - Urinary: urinating during the night, but-- no frequent urination-- and-- no excess urination  Eyes: gritty/scratchy eyes, but-- no blurred vision,-- no double vision,-- no bulging eyes-- and-- no excessive tearing  Mouth / Throat: no hoarseness-- and-- no difficulty swallowing  Neck: no lumps,-- no swollen glands,-- no neck pain,-- neck stiffness-- and-- no enlarged thyroid  Respiratory: no wheezing,-- asthma-- and-- no persistent cough  Musculoskeletal: no muscle aches/pain,-- joint aches/pain-- and-- no muscle weakness  Skin & Hair: dry skin,-- no acne,-- the hair texture was not oily,-- hair loss-- and-- no excessive hair growth  Gastrointestinal: no constipation,-- diarrhea,-- wakes at night to drink-- and-- no stomach ache  Neurological: no blackouts,-- no weakness-- and-- no tremors  Reproductive:  frequency of period is not applicable  -- duration of period is not applicable  -- Date of last menstruation is not applicable  -- regular periods are not applicable  -- discomfort with periods is not applicable  -- excessive bleeding during period is not applicable  -- mood swings are not applicable  Endocrine: feeling hot frequently,-- no feeling cold frequently,-- no shifts between feeling hot and cold,-- cold hands or feet,-- no excessive sweating,-- no thyroid problems,-- blood sugar problems,-- excessive thirst,-- excessive hunger,-- no change in shoe size,-- no nausea or vomiting-- and-- no shaky hands  Active Problems  1  Abdominal pain, epigastric (789 06) (R10 13)   2  Abnormal CAT scan (793 99) (R93 8)   3  Abnormal electrocardiogram (794 31) (R94 31)   4  Acute respiratory infection (519 8) (J22)   5  Anemia (285 9) (D64 9)   6  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)   7  Asthma (493 90) (J45 909)   8  Back pain, thoracic (724 1) (M54 6)   9  Benign essential hypertension (401 1) (I10)   10  Family history of Benign Polyps Of The Large Intestine (V18 51)   11  Bilateral knee pain (719 46) (M25 561,M25 562)   12   Bipolar I disorder, most recent episode depressed, moderate (252 30) (F31 32)   13  Blood in stool (578 1) (K92 1)   14  Cardiac murmur (785 2) (R01 1)   15  Cervical disc disease (722 91) (M50 90)   16  Cramp of both lower extremities (729 82) (R25 2)   17  Degeneration of thoracic intervertebral disc (722 51) (M51 34)   18  Degenerative arthritis of knee, bilateral (715 96) (M17 0)   19  Diabetes mellitus type 2, uncontrolled (250 02) (E11 65)   20  Diarrhea (787 91) (R19 7)   21  Diverticulosis (562 10) (K57 90)   22  Dizziness (780 4) (R42)   23  Dyslipidemia (272 4) (E78 5)   24  Edema (782 3) (R60 9)   25  Encounter for routine gynecological examination (V72 31) (Z01 419)   26  Encounter for screening mammogram for malignant neoplasm of breast (V76 12)    (Z12 31)   27  Enteritis (558 9) (K52 9)   28  Esophagitis, reflux (530 11) (K21 0)   29  Essential hypertriglyceridemia (272 1) (E78 1)   30  Excessive sweating (780 8) (R61)   31  Fatigue (780 79) (R53 83)   32  Fatty liver (571 8) (K76 0)   33  Fecal occult blood test positive (792 1) (R19 5)   34  Gait disturbance (781 2) (R26 9)   35  Gastroesophageal reflux disease (530 81) (K21 9)   36  Granuloma annulare (695 89) (L92 0)   37  History of allergy (V15 09) (Z88 9)   38  Hypertensive heart disease (402 90) (I11 9)   39  Iron deficiency anemia, unspecified iron deficiency   40  Joint pain, knee (719 46) (M25 569)   41  Left thyroid nodule (241 0) (E04 1)   42  Leg length discrepancy (736 81) (M21 70)   43  Leukocytosis (288 60) (D72 829)   44  Long term current use of therapeutic drug (V58 69) (Z79 899)   45  Long-term insulin use (V58 67) (Z79 4)   46  Lost voice (784 41) (R49 1)   47  Multiple thyroid nodules (241 1) (E04 2)   48  Muscle Cramps In The Thigh (Upper Leg) (729 82)   49  Neck pain (723 1) (M54 2)   50  History of Need for pneumococcal vaccination (V03 82) (Z23)   51  Need for prophylactic vaccination and inoculation against influenza (V04 81) (Z23)   52  Need for revaccination (V05 9) (Z23)   53   Need for Tdap vaccination (V06 1) (Z23)   54  Neuropathy (355 9) (G62 9)   55  Obesity, Class II, BMI 35 0-39 9, with comorbidity (see actual BMI) (278 00) (E66 9)   56  Obstructive sleep apnea (327 23) (G47 33)   57  Otitis media (382 9) (H66 90)   58  Pain, dental (525 9) (K08 89)   59  Pap smear, as part of routine gynecological examination (V76 2) (Z01 419)   60  Paronychia of toe (681 11) (L03 039)   61  Patellofemoral Dysfunction (736 6)   62  Personality disorder (301 9) (F60 9)   63  Postmenopausal Atrophic Vaginitis (627 3)   64  Prepatellar bursitis (726 65) (M70 40)   65  Right ankle pain (719 47) (M25 571)   66  Right wrist pain (719 43) (M25 531)   67  Routine Gynecological Exam With Cervical Pap Smear (V72 31)   68  Symptomatic menopausal or female climacteric states (627 2) (N95 1)   69  Thoracic spine pain (724 1) (M54 6)   70  Tinea corporis (110 5) (B35 4)   71  Type 2 diabetes mellitus with hyperglycemia (250 00) (E11 65)   72  Vertigo (780 4) (R42)   73  Viral gastroenteritis (008 8) (A08 4)   74  Visit for screening mammogram (V76 12) (Z12 31)   75  Vitamin D deficiency (268 9) (E55 9)   76  Dia-Parkinson-White Syndrome (426 7)    Past Medical History  1  History of Acute frontal sinusitis (461 1) (J01 10)   2  History of Acute frontal sinusitis (461 1) (J01 10)   3  History of Cellulitis (682 9) (L03 90)   4  History of Contusion Of The Toe(S) With Intact Skin Surface (924 3)   5  History of Diabetes Mellitus (250 00)   6  History of Difficulty breathing (786 09) (R06 89)   7  History of Encounter for routine gynecological examination (V72 31) (Z01 419)   8  History of Esophagitis, reflux (530 11) (K21 0)   9  History of High cholesterol (272 0) (E78 00)   10  History of abdominal pain (V13 89) (Z87 898)   11  History of abnormal weight loss (V13 89) (Z87 898)   12  History of acute bronchitis (V12 69) (Z87 09)   13  History of acute otitis media (V12 49) (Z86 69)   14   History of acute pharyngitis (V12 69) (Z87 09)   15  History of acute sinusitis (V12 69) (Z87 09)   16  History of acute sinusitis (V12 69) (Z87 09)   17  History of allergic rhinitis (V12 69) (Z87 09)   18  History of candidiasis of mouth (V12 09) (Z86 19)   19  History of fatigue (V13 89) (Z87 898)   20  History of head injury (V15 59) (Z87 828)   21  History of headache (V13 89) (Z87 898)   22  History of hypoglycemia (V12 29) (Z86 39)   23  History of laryngitis (V12 69) (Z87 09)   24  History of obesity (V13 89) (Z86 39)   25  History of pharyngitis (V12 69) (Z87 09)   26  History of sebaceous cyst (V13 3) (Z87 2)   27  History of sore throat (V12 60) (Z87 09)   28  History of type 2 diabetes mellitus (V12 29) (Z86 39)   29  History of urinary frequency (V13 09) (Z87 898)   30  History of Hypoglycemia (251 2) (E16 2)   31  History of Mouth Sores (528 9)   32  History of Need for pneumococcal vaccination (V03 82) (Z23)   33  History of Otitis media, unspecified laterality   34  History of Pap smear, as part of routine gynecological examination (V76 2) (Z01 419)   35  Denied: History of Seizures   36  History of Suicide Attempt (E958 9)   37  History of Tinea corporis (110 5) (B35 4)   38  History of Type 2 diabetes mellitus with hyperglycemia, with long-term current use of    insulin (250 00,790 29,V58 67) (E11 65,Z79 4)   39  History of Type B influenza (487 1) (J10 1)   40  History of Visit for screening mammogram (W84 41) (Z12 31)  Active Problems And Past Medical History Reviewed: The active problems and past medical history were reviewed and updated today  Surgical History  1  History of Oral Surgery Tooth Extraction   2  History of Tonsillectomy With Adenoidectomy   3  History of Vaginal Pap smear (V76 47) (Z12 72)  Surgical History Reviewed: The surgical history was reviewed and updated today  Family History  Mother    1  Family history of CABG   2  Family history of Diabetes Mellitus (V18 0)   3   Family history of Lung Cancer (V16 1)   4  Family history of Stroke Syndrome (V17 1)   5  Family history of Thyroid Disorder (V18 19)   6  Family history of Tobacco Use  Father    7  Family history of Benign Polyps Of The Large Intestine (V18 51)   8  Family history of Bipolar illness   9  Family history of Heart Disease (V17 49)  Brother    8  Family history of depression (V17 0) (Z81 8)  Maternal Grandmother    11  Family history of Diabetes Mellitus (V18 0)   12  Family history of Thyroid Disorder (V18 19)  Family History    13  Denied: Family history of Colon Cancer   14  Denied: Family history of Crohn's Disease   15  Family history of Hypertension (V17 49)   16  Denied: Family history of Liver Cancer   17  Family history of Mother  At Age 52  Family History Reviewed: The family history was reviewed and updated today  Social History   · Caffeine Use   · Denied: History of Drug Use   · Denied: History of Home Environment Domestic Violence   · Job Physical Requirements Heavy Lifting   ·    · Never a smoker   · Non-smoker (V49 89) (Z78 9)   · Social drinker  Social History Reviewed: The social history was reviewed and updated today  Current Meds   1  Accu-Chek Louise Plus In Vitro Strip; test 4 times daily; Therapy: 46NMI5410 to (Last Rx:10Xev1225)  Requested for: 45PZJ7373 Ordered   2  Aspir-81 81 MG Oral Tablet Delayed Release; TAKE 1 TABLET DAILY; Therapy: 67Jti8497 to (Evaluate:66Zpc1601); Last Rx:18Mtz9154 Ordered   3  Atorvastatin Calcium 20 MG Oral Tablet; TAKE 1 TABLET DAILY AS DIRECTED; Therapy: 47MOP9084 to (Evaluate:39Bqi9047)  Requested for: 64Bmp9729; Last   Rx:35Qnj2892 Ordered   4  Azelastine HCl - 0 15 % Nasal Solution; USE 2 SPRAYS AT BEDTIME; Therapy: (Recorded:61Ixr2862) to Recorded   5  BD Pen Needle Susie U/F 32G X 4 MM Miscellaneous; USE 5 TIMES DAILY; Therapy: 99Csr2271 to (Evaluate:80Tro4155)  Requested for: 33Ovq7607; Last   Rx:27Hhh9797 Ordered   6   Clobetasol Propionate 0 05 % External Ointment; APPLY AND GENTLY MASSAGE INTO   AFFECTED AREA(S) TWICE DAILY; Therapy: 09NVL0793 to Recorded   7  Fenofibrate 145 MG Oral Tablet; Take 1 tablet daily; Therapy: 28BBT9718 to (Evaluate:60Uhf9234)  Requested for: 59Vej1477; Last   Rx:91Ukb7966 Ordered   8  Fish Oil 1000 MG Oral Capsule; take 1 capsule daily; Therapy: 06ICH7297 to Recorded   9  FLUoxetine HCl - 40 MG Oral Capsule; TAKE 2 CAPSULES DAILY; Therapy: 99IJB5780 to (Evaluate:2018)  Requested for: 18Qhx4790; Last   Rx:01Ajy9894 Ordered   10  HumaLOG KwikPen 200 UNIT/ML Subcutaneous Solution Pen-injector; TAKE 20 UNITS    BEFORE BREAKFAST, USE ICR 1:5 FOR LUNCH, 22 UNITS BEFORE DINNER    (ISF 1:20 ICR 1:5); Therapy: 10UJS9405 to ([phone])  Requested for: 37QJW0163; Last    Rx:20Nzm8551 Ordered   11  Hydroxychloroquine Sulfate 200 MG Oral Tablet; TAKE 1 TABLET TWICE DAILY; Therapy: (Recorded:57Vps8080) to Recorded   12  Jardiance 25 MG Oral Tablet; take 1 tablet every day; Therapy: 33USD0742 to (Evaluate:68Hwd7811)  Requested for: 12Sms7318; Last    Rx:65Pbq5403 Ordered   13  Lansoprazole 30 MG Oral Capsule Delayed Release; TAKE 1 CAPSULE EVERY    MORNING DAILY; Therapy: 70OLA5218 to (Evaluate:43Sot0999)  Requested for: 62Wzh1283; Last    Rx:96Gik5213 Ordered   14  Latuda 60 MG Oral Tablet; TAKE 1 TABLET IN THE EVENING WITH DINNER; Therapy: 45PGP4858 to ([id number])  Requested for: 19Kng2045; Last    Rx:81Ggg9556 Ordered   15  Lisinopril 20 MG Oral Tablet; TAKE 1 TABLET DAILY AS DIRECTED; Therapy: 21SMG6384 to (Evaluate:53Bvg6847)  Requested for: 87Dgk4931; Last    Rx:25Lds6896 Ordered   16  MetFORMIN HCl  MG Oral Tablet Extended Release 24 Hour; TAKE 1 TAB TWICE    PER DAY WITH FOOD; Therapy: 92MOP7826 to (Harini Rodriguez)  Requested for: 55Gbf9164; Last    Rx:28Mxx2197 Ordered   17  Mupirocin 2 % External Ointment; Apply twice daily;     Therapy: (Recorded:02Tzo2315) to Recorded   18  Analilia Nguyen FlexTouch 100 UNIT/ML Subcutaneous Solution Pen-injector; INJECT 42 UNITS    ONCE A DAY; Therapy: 14Apr2017 to (Evaluate:19Nov2017)  Requested for: 21Aug2017; Last    Rx:21Aug2017 Ordered   19  Vitamin D (Ergocalciferol) 41063 UNIT Oral Capsule; TAKE 1 CAPSULE WEEKLY; Therapy: 36GUY9986 to (Last Fara Fleet)  Requested for: 20Jun2017 Ordered  Medication List Reviewed: The medication list was reviewed and updated today  Allergies  1  Biaxin TABS   2  LaMICtal TABS   3  Tanzeum PEN   4  TEGretol TABS   5  Depakote TBEC  6  Seasonal   7  Bee sting   8  Shellfish    Vitals  Vital Signs    Recorded: 69RCT6102 11:12AM   Heart Rate 84   Systolic 079   Diastolic 70   Height 5 ft 4 in   Weight 227 lb 8 0 oz   BMI Calculated 39 05   BSA Calculated 2 08     Physical Exam    Constitutional   General appearance: No acute distress, well appearing and well nourished  Eyes   Conjunctiva and lids: No swelling, erythema, or discharge  Pupils: Equal, round and reactive to light  The sclera are anicteric  Extraocular movements are intact  Ears, Nose, Mouth, and Throat   External inspection of ears, nose and lips: Normal     Oropharynx: Normal with no erythema, edema, exudate or lesions  Exam of Head: The head is atraumatic and normocephalic  Neck: The neck is supple  The thyroid is normal in size with no palpable nodules  Pulmonary   Auscultation of lungs: Clear to auscultation bilaterally with normal chest expansion  Cardiovascular   Auscultation of heart: Normal rate and rhythm with no murmurs, gallops or rubs  -- systolic murmur  Examination of pulses: Dorsalis pedal pulses are +2 and equal bilaterally  Abdomen   Abdomen: Abnormal   The abdomen was rounded-- and-- obese  Bowel sounds were normal    Lymphatic   Palpation of lymph nodes: No supraclavicular or suboccipital lymphadenopathy      Musculoskeletal   Inspection/palpation of joints, bones, and muscles: Muscle bulk and tone is normal     Skin   Skin and subcutaneous tissue: Normal skin temperature and color  Neurologic   Reflexes: 2+ and symmetric  Motor Strength: Strength is 5/5 bilaterally  Psychiatric   Orientation to person, place and time: Normal     Mood and affect: Affect and attention span are normal        Results/Data  (1) HEMOGLOBIN A1C 20Sep2017 07:25AM Lita Sprinkle   [facility] Order Number: [id number]     Test Name Result Flag Reference   HEMOGLOBIN A1C 7 3 % H 4 2-6 3   EST  AVG  GLUCOSE 163 mg/dl       (1) LIPID PANEL, FASTING 87TDY8450 07:25AM Lita Sprinkle   [facility] Order Number: [id number]     Test Name Result Flag Reference   CHOLESTEROL 157 mg/dL     HDL,DIRECT 43 mg/dL  40-60   Specimen collection should occur prior to Metamizole administration due to the potential for falsley depressed results  LDL CHOLESTEROL CALCULATED 49 mg/dL  0-100   Triglyceride:        Normal <150 mg/dl   Borderline High 150-199 mg/dl   High 200-499 mg/dl   Very High >499 mg/dl      Cholesterol:       Desirable <200 mg/dl    Borderline High 200-239 mg/dl    High >239 mg/dl      HDL Cholesterol:       High>59 mg/dL    Low <41 mg/dL      This screening LDL is a calculated result  It does not have the accuracy of the Direct Measured LDL in the monitoring of patients with hyperlipidemia and/or statin therapy  Direct Measure LDL (HLK183) must be ordered separately in these patients  TRIGLYCERIDES 327 mg/dL H <=150   Specimen collection should occur prior to N-Acetylcysteine or Metamizole administration due to the potential for falsely depressed results  (1) T4, FREE 99YJZ3795 07:25AM Lita Sprinkle   [facility] Order Number: [id number]     Test Name Result Flag Reference   T4,FREE 0 88 ng/dL  0 76-1 46   Specimen collection should occur prior to Sulfasalazine administration due to the potential for falsely elevated results       (1) TSH 20Sep2017 07:25AM Lita Sprinkle   [facility] Order Number: [id number]     Test Name Result Flag Reference   TSH 0 691 uIU/mL  0 358-3 740   Patients undergoing fluorescein dye angiography may retain small amounts of fluorescein in the body for 48-72 hours post procedure  Samples containing fluorescein can produce falsely depressed TSH values  If the patient had this procedure,a specimen should be resubmitted post fluorescein clearance  The recommended reference ranges for TSH during pregnancy are as follows:  First trimester 0 1 to 2 5 uIU/mL  Second trimester  0 2 to 3 0 uIU/mL  Third trimester 0 3 to 3 0 uIU/m     (1) PTH N-TERMINAL (INTACT) 20Sep2017 07:25AM [facility] Order Number: [id number]     Test Name Result Flag Reference   PARATHYROID HORMONE INTACT 33 3 pg/mL  14 0-72 0     (1) RENAL FUNCTION PANEL 20Sep2017 07:25AM [employer] Order Number: [id number]     Test Name Result Flag Reference   ANION GAP (CALC) 8 mmol/L  4-13   ALBUMIN 4 0 g/dL  3 5-5 0   BLOOD UREA NITROGEN 19 mg/dL  5-25   CALCIUM 9 6 mg/dL  8 3-10 1   CHLORIDE 102 mmol/L  100-108   CARBON DIOXIDE 28 mmol/L  21-32   CREATININE 0 67 mg/dL  0 60-1 30   Standardized to IDMS reference method   POTASSIUM 4 1 mmol/L  3 5-5 3   SODIUM 138 mmol/L  136-145   PHOSPHORUS 3 7 mg/dL  2 7-4 5   eGFR 99 ml/min/1 73sq m     GLUCOSE FASTING 134 mg/dL H 65-99   Specimen collection should occur prior to Sulfasalazine administration due to the potential for falsely depressed results  Specimen collection should occur prior to Sulfapyridine administration due to the potential for falsely elevated results  Specimen collection should occur prior to Sulfasalazine administration due to the potential for falsely depressed results  Specimen collection should occur prior to Sulfapyridine administration due to the potential for falsely elevated results        National Kidney Disease Education Program recommendations are as follows:  GFR calculation is accurate only with a steady state creatinine  Chronic Kidney disease less than 60 ml/min/1 73 sq  meters  Kidney failure less than 15 ml/min/1 73 sq  meters  Health Management  Diabetes mellitus type 2, uncontrolled   (1) MICROALBUMIN CREATININE RATIO, RANDOM URINE; every 1 year; Last 69KMX9696; Next Due:  44YRE9972; Active  *VB - Eye Exam; every 1 year; Last 62LOS0879; Next Due: 09XMT8740; Active  History of Visit for screening mammogram   Digital Bilateral Screening Mammogram With CAD; every 1 year; Last 95SIC1941; Next Due:  20Ipi0361; Overdue    Future Appointments    Date/Time Provider Specialty Site   10/27/2017 03:00 PM MOLLY Brock  Psychiatry [facility]   01/08/2018 08:15 AM MOLLY Allison , [facility] Hematology [facility]   01/16/2018 08:00 AM Lesia Copeland DO Internal Medicine [facility]   01/19/2018 08:40 AM MOLLY Meraz   [facility]in[facility]     Signatures   Electronically signed by : Liban Ramos, [address] [place] [address]; Oct  5 2017 11:56AM EST                       (Author)    Electronically signed by : MOLLY Feng ; Oct  5 2017  3:24PM EST                       (Author)

## 2017-10-31 ENCOUNTER — GENERIC CONVERSION - ENCOUNTER (OUTPATIENT)
Dept: OTHER | Facility: OTHER | Age: 57
End: 2017-10-31

## 2017-11-02 ENCOUNTER — ALLSCRIPTS OFFICE VISIT (OUTPATIENT)
Dept: OTHER | Facility: OTHER | Age: 57
End: 2017-11-02

## 2017-11-26 NOTE — PROGRESS NOTES
Assessment    1  Acute sinusitis, recurrence not specified, unspecified location (461 9) (J01 90)    Plan  Acute sinusitis, recurrence not specified, unspecified location    · Amoxicillin-Pot Clavulanate 875-125 MG Oral Tablet (Augmentin); TAKE 1 TABLETEVERY 12 HOURS DAILY    Discussion/Summary    Start augmentinfluidsif worsening  Chief Complaint  The patient presents today with sore throat post nasal drip also headache about 4 days      History of Present Illness  HPI: since friday with post nasal drip, sore throat, cough, sinus pain, ear cloggedphlegm      Review of Systems   Constitutional: no fever,-- not feeling poorly,-- no chills-- and-- not feeling tired  ENT: sore throat-- and-- nasal discharge, but-- no earache,-- no nosebleeds-- and-- no hearing loss  Cardiovascular: the heart rate was not slow,-- no chest pain,-- the heart rate was not fast-- and-- no palpitations  Respiratory: no shortness of breath,-- no cough,-- no wheezing-- and-- no shortness of breath during exertion  Gastrointestinal: no abdominal pain,-- no nausea,-- no constipation-- and-- no diarrhea  Genitourinary: no dysuria  Musculoskeletal: no arthralgias,-- no joint swelling,-- no myalgias-- and-- no joint stiffness  Integumentary: no rashes,-- no itching,-- no skin lesions-- and-- no skin wound  Neurological: no headache,-- no numbness,-- no confusion-- and-- no dizziness  Active Problems    1  Abdominal pain, epigastric (789 06) (R10 13)   2  Abnormal CAT scan (793 99) (R93 8)   3  Abnormal electrocardiogram (794 31) (R94 31)   4  Acute respiratory infection (519 8) (J22)   5  Anemia (285 9) (D64 9)   6  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)   7  Asthma (493 90) (J45 909)   8  Back pain, thoracic (724 1) (M54 6)   9  Benign essential hypertension (401 1) (I10)   10  Family history of Benign Polyps Of The Large Intestine (V18 51)   11  Bilateral knee pain (719 46) (M25 561,M25 562)   12   Bipolar I disorder, most recent episode depressed, in full remission (296 56) (F31 76)   13  Blood in stool (578 1) (K92 1)   14  Cardiac murmur (785 2) (R01 1)   15  Cervical disc disease (722 91) (M50 90)   16  Cramp of both lower extremities (729 82) (R25 2)   17  Degeneration of thoracic intervertebral disc (722 51) (M51 34)   18  Degenerative arthritis of knee, bilateral (715 96) (M17 0)   19  Diabetes mellitus type 2, uncontrolled (250 02) (E11 65)   20  Diarrhea (787 91) (R19 7)   21  Diverticulosis (562 10) (K57 90)   22  Dizziness (780 4) (R42)   23  Dyslipidemia (272 4) (E78 5)   24  Edema (782 3) (R60 9)   25  Encounter for routine gynecological examination (V72 31) (Z01 419)   26  Encounter for screening mammogram for malignant neoplasm of breast (V76 12)  (Z12 31)   27  Enteritis (558 9) (K52 9)   28  Esophagitis, reflux (530 11) (K21 0)   29  Essential hypertriglyceridemia (272 1) (E78 1)   30  Excessive sweating (780 8) (R61)   31  Fatigue (780 79) (R53 83)   32  Fatty liver (571 8) (K76 0)   33  Fecal occult blood test positive (792 1) (R19 5)   34  Gait disturbance (781 2) (R26 9)   35  Gastroesophageal reflux disease (530 81) (K21 9)   36  Granuloma annulare (695 89) (L92 0)   37  History of allergy (V15 09) (Z88 9)   38  Hypertensive heart disease (402 90) (I11 9)   39  Iron deficiency anemia, unspecified iron deficiency   40  Joint pain, knee (719 46) (M25 569)   41  Left thyroid nodule (241 0) (E04 1)   42  Leg length discrepancy (736 81) (M21 70)   43  Leukocytosis (288 60) (D72 829)   44  Long term current use of therapeutic drug (V58 69) (Z79 899)   45  Long-term insulin use (V58 67) (Z79 4)   46  Lost voice (784 41) (R49 1)   47  Multiple thyroid nodules (241 1) (E04 2)   48  Muscle Cramps In The Thigh (Upper Leg) (729 82)   49  Neck pain (723 1) (M54 2)   50  History of Need for pneumococcal vaccination (V03 82) (Z23)   51  Need for prophylactic vaccination and inoculation against influenza (V04 81) (Z23)   52   Need for revaccination (V05 9) (Z23)   53  Need for Tdap vaccination (V06 1) (Z23)   54  Neuropathy (355 9) (G62 9)   55  Obesity, Class II, BMI 35 0-39 9, with comorbidity (see actual BMI) (278 00) (E66 9)   56  Obstructive sleep apnea (327 23) (G47 33)   57  Otitis media (382 9) (H66 90)   58  Pain, dental (525 9) (K08 89)   59  Pap smear, as part of routine gynecological examination (V76 2) (Z01 419)   60  Paronychia of toe (681 11) (L03 039)   61  Patellofemoral Dysfunction (736 6)   62  Personality disorder (301 9) (F60 9)   63  Postmenopausal Atrophic Vaginitis (627 3)   64  Prepatellar bursitis (726 65) (M70 40)   65  Right ankle pain (719 47) (M25 571)   66  Right wrist pain (719 43) (M25 531)   67  Routine Gynecological Exam With Cervical Pap Smear (V72 31)   68  Symptomatic menopausal or female climacteric states (627 2) (N95 1)   69  Thoracic spine pain (724 1) (M54 6)   70  Tinea corporis (110 5) (B35 4)   71  Type 2 diabetes mellitus with hyperglycemia (250 00) (E11 65)   72  Vertigo (780 4) (R42)   73  Viral gastroenteritis (008 8) (A08 4)   74  Visit for screening mammogram (V76 12) (Z12 31)   75  Vitamin D deficiency (268 9) (E55 9)   76  Dia-Parkinson-White Syndrome (426 7)    Past Medical History  Active Problems And Past Medical History Reviewed: The active problems and past medical history were reviewed and updated today  Surgical History  Surgical History Reviewed: The surgical history was reviewed and updated today  Social History     · Caffeine Use   · Denied: History of Drug Use   · Denied: History of Home Environment Domestic Violence   · Job Physical Requirements Heavy Lifting   ·    · Never a smoker   · Non-smoker (V49 89) (Z78 9)   · Social drinker  The social history was reviewed and updated today  The social history was reviewed and is unchanged  Family History  Family History Reviewed: The family history was reviewed and updated today  Current Meds   1  Accu-Chek Louise Plus In Vitro Strip; test 4 times daily; Therapy: 77JVA2329 to (Last Rx:78Idq5561)  Requested for: 50KOD6660 Ordered   2  Aspir-81 81 MG Oral Tablet Delayed Release; TAKE 1 TABLET DAILY; Therapy: 60Dub0954 to (Evaluate:21Div2045); Last Rx:15Jne6711 Ordered   3  Atorvastatin Calcium 20 MG Oral Tablet; TAKE 1 TABLET DAILY AS DIRECTED; Therapy: 14COD1150 to (Evaluate:02Avq7438)  Requested for: 37Neq4666; Last Rx:19Avv0631 Ordered   4  Azelastine HCl - 0 15 % Nasal Solution; USE 2 SPRAYS AT BEDTIME; Therapy: (Recorded:48Vvr3781) to Recorded   5  BD Pen Needle Susie U/F 32G X 4 MM Miscellaneous; USE 5 TIMES DAILY; Therapy: 57Rit1918 to (Evaluate:80Gjw9780)  Requested for: 11Akq2265; Last Rx:01Ieh4700 Ordered   6  Clobetasol Propionate 0 05 % External Ointment; APPLY AND GENTLY MASSAGE INTO AFFECTED AREA(S) TWICE DAILY; Therapy: 32QMM6562 to Recorded   7  Fenofibrate 145 MG Oral Tablet; Take 1 tablet daily; Therapy: 32APZ9061 to (Evaluate:48Aln2078)  Requested for: 60Wet4885; Last Rx:87Drp2544 Ordered   8  Fish Oil 1000 MG Oral Capsule; take 1 capsule daily; Therapy: 24XSH9849 to Recorded   9  FLUoxetine HCl - 40 MG Oral Capsule; take 1 capsule daily; Therapy: (Recorded:82Ehx3107) to Recorded   10  HumaLOG KwikPen 200 UNIT/ML Subcutaneous Solution Pen-injector; TAKE 20 UNITS  BEFORE BREAKFAST, USE ICR 1:5 FOR LUNCH, 22 UNITS BEFORE DINNER  (ISF 1:20 ICR 1:5); Therapy: 29PEN5372 to (Jameson Smaller)  Requested for: 03OSI9027; Last  Rx:59Vna1381 Ordered   11  Hydroxychloroquine Sulfate 200 MG Oral Tablet; TAKE 1 TABLET TWICE DAILY; Therapy: (Recorded:46Vwd3543) to Recorded   12  Jardiance 25 MG Oral Tablet; take 1 tablet every day; Therapy: 60NYI4076 to (Evaluate:81Ake0624)  Requested for: 93Dhb6452; Last  Rx:27Jhy4538 Ordered   13  Lansoprazole 30 MG Oral Capsule Delayed Release; TAKE 1 CAPSULE EVERY  MORNING DAILY;   Therapy: 67LXD2028 to [phone])  Requested for: [phone]; Last Rx: 32FLE2110 Ordered   14  Latuda 80 MG Oral Tablet; TAKE 1 TABLET IN THE EVENING WITH DINNER; Therapy: 20BWJ6166 to (Evaluate:13Ogh8743)  Requested for: 27Oct2017; Last  FT:10NGW4894 Ordered   15  Lisinopril 20 MG Oral Tablet; TAKE 1 TABLET DAILY AS DIRECTED; Therapy: 51MKJ9555 to (Evaluate:41Kur4644)  Requested for: 17Kul4664; Last  Rx:60Fjw0791 Ordered   16  MetFORMIN HCl  MG Oral Tablet Extended Release 24 Hour; take 2 tabs twice  per day with food; Therapy: 15GJT7520 to ([phone])  Requested for: 86IUL1860; Last  Rx:90Yrk3533 Ordered   17  Mupirocin 2 % External Ointment; Apply twice daily; Therapy: (Recorded:98Auv6914) to Recorded   18  Clista Choudhary FlexTouch 100 UNIT/ML Subcutaneous Solution Pen-injector; INJECT 42 UNITS  ONCE A DAY; Therapy: 71Gcr4649 to (Evaluate:19Nov2017)  Requested for: 35Cnz0177; Last  Rx:70Etv9345 Ordered   19  Vitamin D (Ergocalciferol) 72684 UNIT Oral Capsule; TAKE 1 CAPSULE WEEKLY; Therapy: 81AQY1682 to (Last Bolling Claude)  Requested for: 20Jun2017 Ordered    The medication list was reviewed and updated today  Allergies  1  Biaxin TABS   2  LaMICtal TABS   3  Tanzeum PEN   4  TEGretol TABS   5  Depakote TBEC  6  Seasonal   7  Bee sting   8  Shellfish    Vitals   Recorded: 65ZTU8819 02:39PM   Temperature 98 2 F, Oral   Heart Rate 68   Respiration 16   Systolic 437, Sitting   Diastolic 72, Sitting   Weight 226 lb 0 4 oz   BMI Calculated 38 8   BSA Calculated 2 06   O2 Saturation 97       Physical Exam   Constitutional  General appearance: No acute distress, well appearing and well nourished  Eyes  Conjunctiva and lids: No swelling, erythema or discharge  Pupils and irises: Equal, round and reactive to light  Ears, Nose, Mouth, and Throat  External inspection of ears and nose: Normal    Otoscopic examination: Tympanic membranes translucent with normal light reflex  Canals patent without erythema     Nasal mucosa, septum, and turbinates: Normal without edema or erythema  Oropharynx: Normal with no erythema, edema, exudate or lesions  Pulmonary  Respiratory effort: No increased work of breathing or signs of respiratory distress  Auscultation of lungs: Clear to auscultation  Cardiovascular  Auscultation of heart: Normal rate and rhythm, normal S1 and S2, without murmurs  Examination of extremities for edema and/or varicosities: Normal    Abdomen  Abdomen: Non-tender, no masses  Liver and spleen: No hepatomegaly or splenomegaly  Lymphatic  Palpation of lymph nodes in neck: No lymphadenopathy  Musculoskeletal  Gait and station: Normal    Digits and nails: Normal without clubbing or cyanosis  Inspection/palpation of joints, bones, and muscles: Normal    Skin  Skin and subcutaneous tissue: Normal without rashes or lesions  Psychiatric  Orientation to person, place, and time: Normal    Mood and affect: Normal          Future Appointments    Date/Time Provider Specialty Site   01/08/2018 08:15 AM MOLLY Clark , [facility] Hematology Oncology [address] [place] [address] ONCOLOGY   01/19/2018 08:40 AM MOLLY Odonnell  Endocrinology [facility]   01/05/2018 02:30 PM MOLLY Lilly   Psychiatry [facility]   01/16/2018 08:00 AM Blair Glover DO Internal Medicine [facility] [place]       Signatures   Electronically signed by : Selwyn Rojas; Nov 6 2017  2:21PM EST                       (Author)    Electronically signed by : MOLLY Toledo ; Nov 25 2017  9:06AM EST                       (Review)

## 2017-12-01 ENCOUNTER — ALLSCRIPTS OFFICE VISIT (OUTPATIENT)
Dept: OTHER | Facility: OTHER | Age: 57
End: 2017-12-01

## 2017-12-01 DIAGNOSIS — F31.32 BIPOLAR I DISORDER, MOST RECENT EPISODE DEPRESSED, MODERATE (HCC): Primary | ICD-10-CM

## 2017-12-23 ENCOUNTER — APPOINTMENT (OUTPATIENT)
Dept: LAB | Age: 57
End: 2017-12-23
Payer: COMMERCIAL

## 2017-12-23 ENCOUNTER — TRANSCRIBE ORDERS (OUTPATIENT)
Dept: ADMINISTRATIVE | Age: 57
End: 2017-12-23

## 2017-12-23 DIAGNOSIS — D72.822 PLASMACYTOSIS: Primary | ICD-10-CM

## 2017-12-23 DIAGNOSIS — Z79.899 NEED FOR PROPHYLACTIC CHEMOTHERAPY: ICD-10-CM

## 2017-12-23 DIAGNOSIS — L92.0 GRANULOMA ANNULARE: ICD-10-CM

## 2017-12-23 DIAGNOSIS — L92.0 GRANULOMA ANNULARE: Primary | ICD-10-CM

## 2017-12-23 DIAGNOSIS — D72.822 PLASMACYTOSIS: ICD-10-CM

## 2017-12-23 LAB
ALBUMIN SERPL BCP-MCNC: 3.9 G/DL (ref 3.5–5)
ALP SERPL-CCNC: 128 U/L (ref 46–116)
ALT SERPL W P-5'-P-CCNC: 32 U/L (ref 12–78)
ANION GAP SERPL CALCULATED.3IONS-SCNC: 9 MMOL/L (ref 4–13)
AST SERPL W P-5'-P-CCNC: 18 U/L (ref 5–45)
BASOPHILS # BLD AUTO: 0.09 THOUSANDS/ΜL (ref 0–0.1)
BASOPHILS NFR BLD AUTO: 1 % (ref 0–1)
BILIRUB SERPL-MCNC: 0.25 MG/DL (ref 0.2–1)
BUN SERPL-MCNC: 24 MG/DL (ref 5–25)
CALCIUM SERPL-MCNC: 9.7 MG/DL (ref 8.3–10.1)
CHLORIDE SERPL-SCNC: 104 MMOL/L (ref 100–108)
CO2 SERPL-SCNC: 25 MMOL/L (ref 21–32)
CREAT SERPL-MCNC: 0.73 MG/DL (ref 0.6–1.3)
EOSINOPHIL # BLD AUTO: 0.37 THOUSAND/ΜL (ref 0–0.61)
EOSINOPHIL NFR BLD AUTO: 3 % (ref 0–6)
ERYTHROCYTE [DISTWIDTH] IN BLOOD BY AUTOMATED COUNT: 15.8 % (ref 11.6–15.1)
FERRITIN SERPL-MCNC: 44 NG/ML (ref 8–388)
GFR SERPL CREATININE-BSD FRML MDRD: 92 ML/MIN/1.73SQ M
GLUCOSE P FAST SERPL-MCNC: 137 MG/DL (ref 65–99)
HCT VFR BLD AUTO: 39.9 % (ref 34.8–46.1)
HGB BLD-MCNC: 12.4 G/DL (ref 11.5–15.4)
IRON SATN MFR SERPL: 10 %
IRON SERPL-MCNC: 47 UG/DL (ref 50–170)
LYMPHOCYTES # BLD AUTO: 4.81 THOUSANDS/ΜL (ref 0.6–4.47)
LYMPHOCYTES NFR BLD AUTO: 33 % (ref 14–44)
MCH RBC QN AUTO: 24.6 PG (ref 26.8–34.3)
MCHC RBC AUTO-ENTMCNC: 31.1 G/DL (ref 31.4–37.4)
MCV RBC AUTO: 79 FL (ref 82–98)
MONOCYTES # BLD AUTO: 0.87 THOUSAND/ΜL (ref 0.17–1.22)
MONOCYTES NFR BLD AUTO: 6 % (ref 4–12)
NEUTROPHILS # BLD AUTO: 8.5 THOUSANDS/ΜL (ref 1.85–7.62)
NEUTS SEG NFR BLD AUTO: 57 % (ref 43–75)
NRBC BLD AUTO-RTO: 0 /100 WBCS
PLATELET # BLD AUTO: 457 THOUSANDS/UL (ref 149–390)
PMV BLD AUTO: 10.2 FL (ref 8.9–12.7)
POTASSIUM SERPL-SCNC: 4.2 MMOL/L (ref 3.5–5.3)
PROT SERPL-MCNC: 8.3 G/DL (ref 6.4–8.2)
RBC # BLD AUTO: 5.05 MILLION/UL (ref 3.81–5.12)
SODIUM SERPL-SCNC: 138 MMOL/L (ref 136–145)
TIBC SERPL-MCNC: 466 UG/DL (ref 250–450)
WBC # BLD AUTO: 14.79 THOUSAND/UL (ref 4.31–10.16)

## 2017-12-23 PROCEDURE — 83550 IRON BINDING TEST: CPT

## 2017-12-23 PROCEDURE — 82728 ASSAY OF FERRITIN: CPT

## 2017-12-23 PROCEDURE — 83540 ASSAY OF IRON: CPT

## 2017-12-23 PROCEDURE — 36415 COLL VENOUS BLD VENIPUNCTURE: CPT

## 2017-12-23 PROCEDURE — 85025 COMPLETE CBC W/AUTO DIFF WBC: CPT

## 2017-12-23 PROCEDURE — 80053 COMPREHEN METABOLIC PANEL: CPT

## 2018-01-05 ENCOUNTER — TRANSCRIBE ORDERS (OUTPATIENT)
Dept: ADMINISTRATIVE | Age: 58
End: 2018-01-05

## 2018-01-05 ENCOUNTER — ALLSCRIPTS OFFICE VISIT (OUTPATIENT)
Dept: OTHER | Facility: OTHER | Age: 58
End: 2018-01-05

## 2018-01-05 ENCOUNTER — APPOINTMENT (OUTPATIENT)
Dept: LAB | Age: 58
End: 2018-01-05
Payer: COMMERCIAL

## 2018-01-05 DIAGNOSIS — E11.65 TYPE 2 DIABETES MELLITUS WITH HYPERGLYCEMIA (HCC): ICD-10-CM

## 2018-01-05 DIAGNOSIS — E55.9 VITAMIN D DEFICIENCY: ICD-10-CM

## 2018-01-05 DIAGNOSIS — E04.1 NONTOXIC SINGLE THYROID NODULE: ICD-10-CM

## 2018-01-05 DIAGNOSIS — E78.5 HYPERLIPIDEMIA: ICD-10-CM

## 2018-01-05 DIAGNOSIS — I10 ESSENTIAL (PRIMARY) HYPERTENSION: ICD-10-CM

## 2018-01-05 LAB
ALBUMIN SERPL BCP-MCNC: 4 G/DL (ref 3.5–5)
ANION GAP SERPL CALCULATED.3IONS-SCNC: 10 MMOL/L (ref 4–13)
BUN SERPL-MCNC: 14 MG/DL (ref 5–25)
CALCIUM SERPL-MCNC: 9.5 MG/DL (ref 8.3–10.1)
CHLORIDE SERPL-SCNC: 102 MMOL/L (ref 100–108)
CO2 SERPL-SCNC: 27 MMOL/L (ref 21–32)
CREAT SERPL-MCNC: 0.71 MG/DL (ref 0.6–1.3)
EST. AVERAGE GLUCOSE BLD GHB EST-MCNC: 166 MG/DL
GFR SERPL CREATININE-BSD FRML MDRD: 96 ML/MIN/1.73SQ M
GLUCOSE P FAST SERPL-MCNC: 93 MG/DL (ref 65–99)
HBA1C MFR BLD: 7.4 % (ref 4.2–6.3)
PHOSPHATE SERPL-MCNC: 3.6 MG/DL (ref 2.7–4.5)
POTASSIUM SERPL-SCNC: 3.9 MMOL/L (ref 3.5–5.3)
SODIUM SERPL-SCNC: 139 MMOL/L (ref 136–145)

## 2018-01-05 PROCEDURE — 36415 COLL VENOUS BLD VENIPUNCTURE: CPT

## 2018-01-05 PROCEDURE — 80069 RENAL FUNCTION PANEL: CPT

## 2018-01-05 PROCEDURE — 83036 HEMOGLOBIN GLYCOSYLATED A1C: CPT

## 2018-01-08 ENCOUNTER — GENERIC CONVERSION - ENCOUNTER (OUTPATIENT)
Dept: OTHER | Facility: OTHER | Age: 58
End: 2018-01-08

## 2018-01-09 ENCOUNTER — GENERIC CONVERSION - ENCOUNTER (OUTPATIENT)
Dept: OTHER | Facility: OTHER | Age: 58
End: 2018-01-09

## 2018-01-09 NOTE — PSYCH
Psych Med Mgmt    Appearance: was calm and cooperative, adequate hygiene and grooming and good eye contact  Observed mood: depressed  Observed mood: affect was blunted  Speech: a normal rate and fluent  Thought processes: coherent/organized  Hallucinations: no hallucinations present  Thought Content: no delusions  Abnormal Thoughts: The patient has no suicidal thoughts and no homicidal thoughts  Orientation: The patient is oriented to person, place and time  Recent and Remote Memory: short term memory intact and long term memory intact  Attention Span And Concentration: concentration impaired  Insight: Insight intact  Judgment: Her judgment was intact  Muscle Strength And Tone  Muscle strength and tone were normal  Normal gait and station  Language: no difficulty naming common objects, no difficulty repeating a phrase and no difficulty writing a sentence  Fund of knowledge: Patient displays adequate knowledge of current events, adequate fund of knowledge regarding past history and adequate fund of knowledge regarding vocabulary  The patient is experiencing no localized pain  On a scale of 0 - 10 the pain severity is a 0  Treatment Recommendations: Increase Latuda to 60 mg in the evening to help with depression  Continue Prozac 80 mg per daily (takes 40 mg bid)  Follows with PCP for medical issues including lipids and glucose monitoring  Risks, Benefits And Possible Side Effects Of Medications: Risks, benefits, and possible side effects of medications explained to patient and patient verbalizes understanding  She reports increased appetite, decreased energy, recent 2 lbs weight gain  and increase in number of sleep hours 12      Maribell Saldana states has been feeling more depressed recently for the last month  She has low energy, has been sleeping more and does not feel motivated  Also reports increased appetite recently  Not able to identify any significant stressors   Reports anxiety is controlled otherwise  No suicidal or homicidal ideation  No psychotic symptoms  Sleep and appetite are increased  No side effects form medications reported  Vitals  Signs   Recorded: 15Sep2017 03:08PM   Heart Rate: 87  Systolic: 679  Diastolic: 72  BP Cuff Size: Large  Height: 5 ft 4 in  Weight: 224 lb   BMI Calculated: 38 45  BSA Calculated: 2 05    Assessment    1  Bipolar I disorder, most recent episode depressed, moderate (296 52) (F31 32)   2  Personality disorder (301 9) (F60 9)    Plan    1  FLUoxetine HCl - 40 MG Oral Capsule (PROzac); TAKE 2 CAPSULES DAILY   2  Latuda 60 MG Oral Tablet; TAKE 1 TABLET IN THE EVENING WITH DINNER    3  Follow-up visit in 6 weeks Evaluation and Treatment  Follow-up  Status: Hold For -   Scheduling  Requested for: 15Sep2017    Review of Systems    Constitutional: recent 2 lb weight gain and feeling tired  Cardiovascular: no complaints of slow or fast heart rate, no chest pain, no palpitations  Respiratory: no complaints of shortness of breath, no wheezing, no dyspnea on exertion  Gastrointestinal: no complaints of abdominal pain, no constipation, no nausea, no diarrhea, no vomiting  Genitourinary: no complaints of dysuria, no incontinence, no pelvic pain, no urinary frequency  Musculoskeletal: no complaints of arthralgia, no myalgias, no limb pain, no joint stiffness  Integumentary: no complaints of skin rash, no itching, no dry skin  Neurological: no complaints of headache, no confusion, no numbness, no dizziness  Past Psychiatric History    Past Psychiatric History: One prior inpatient psychiatric admission years ago at [facility]  2 prior suicide attempts by overdose years ago  Substance Abuse Hx    Substance Abuse History: No history or drug use  Only occasional social alcohol use  Active Problems    1  Abdominal pain, epigastric (789 06) (R10 13)   2  Abnormal CAT scan (793 99) (R93 8)   3   Abnormal electrocardiogram (794 31) (R94 31)   4  Acute respiratory infection (519 8) (J22)   5  Anemia (285 9) (D64 9)   6  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)   7  Asthma (493 90) (J45 909)   8  Back pain, thoracic (724 1) (M54 6)   9  Benign essential hypertension (401 1) (I10)   10  Family history of Benign Polyps Of The Large Intestine (V18 51)   11  Bilateral knee pain (719 46) (M25 561,M25 562)   12  Blood in stool (578 1) (K92 1)   13  Cardiac murmur (785 2) (R01 1)   14  Cervical disc disease (722 91) (M50 90)   15  Cramp of both lower extremities (729 82) (R25 2)   16  Degeneration of thoracic intervertebral disc (722 51) (M51 34)   17  Degenerative arthritis of knee, bilateral (715 96) (M17 0)   18  Diabetes mellitus type 2, uncontrolled (250 02) (E11 65)   19  Diarrhea (787 91) (R19 7)   20  Diverticulosis (562 10) (K57 90)   21  Dizziness (780 4) (R42)   22  Dyslipidemia (272 4) (E78 5)   23  Edema (782 3) (R60 9)   24  Encounter for routine gynecological examination (V72 31) (Z01 419)   25  Encounter for screening mammogram for malignant neoplasm of breast (V76 12)    (Z12 31)   26  Enteritis (558 9) (K52 9)   27  Esophagitis, reflux (530 11) (K21 0)   28  Essential hypertriglyceridemia (272 1) (E78 1)   29  Excessive sweating (780 8) (R61)   30  Fatigue (780 79) (R53 83)   31  Fatty liver (571 8) (K76 0)   32  Fecal occult blood test positive (792 1) (R19 5)   33  Gait disturbance (781 2) (R26 9)   34  Gastroesophageal reflux disease (530 81) (K21 9)   35  Granuloma annulare (695 89) (L92 0)   36  History of allergy (V15 09) (Z88 9)   37  Hypertensive heart disease (402 90) (I11 9)   38  Iron deficiency anemia, unspecified iron deficiency   39  Joint pain, knee (719 46) (M25 569)   40  Left thyroid nodule (241 0) (E04 1)   41  Leg length discrepancy (736 81) (M21 70)   42  Leukocytosis (288 60) (D72 829)   43  Long term current use of therapeutic drug (V58 69) (Z79 899)   44   Long-term insulin use (V58 67) (Z79 4)   45  Lost voice (784 41) (R49 1)   46  Multiple thyroid nodules (241 1) (E04 2)   47  Muscle Cramps In The Thigh (Upper Leg) (729 82)   48  Neck pain (723 1) (M54 2)   49  History of Need for pneumococcal vaccination (V03 82) (Z23)   50  Need for prophylactic vaccination and inoculation against influenza (V04 81) (Z23)   51  Need for revaccination (V05 9) (Z23)   52  Need for Tdap vaccination (V06 1) (Z23)   53  Neuropathy (355 9) (G62 9)   54  Obesity, Class II, BMI 35 0-39 9, with comorbidity (see actual BMI) (278 00) (E66 9)   55  Obstructive sleep apnea (327 23) (G47 33)   56  Otitis media (382 9) (H66 90)   57  Pain, dental (525 9) (K08 89)   58  Pap smear, as part of routine gynecological examination (V76 2) (Z01 419)   59  Paronychia of toe (681 11) (L03 039)   60  Patellofemoral Dysfunction (736 6)   61  Personality disorder (301 9) (F60 9)   62  Postmenopausal Atrophic Vaginitis (627 3)   63  Prepatellar bursitis (726 65) (M70 40)   64  Right ankle pain (719 47) (M25 571)   65  Right wrist pain (719 43) (M25 531)   66  Routine Gynecological Exam With Cervical Pap Smear (V72 31)   67  Symptomatic menopausal or female climacteric states (627 2) (N95 1)   68  Thoracic spine pain (724 1) (M54 6)   69  Tinea corporis (110 5) (B35 4)   70  Type 2 diabetes mellitus with hyperglycemia (250 00) (E11 65)   71  Vertigo (780 4) (R42)   72  Viral gastroenteritis (008 8) (A08 4)   73  Visit for screening mammogram (V76 12) (Z12 31)   74  Vitamin D deficiency (268 9) (E55 9)   75  Dia-Parkinson-White Syndrome (426 7)    Past Medical History    1  History of Acute frontal sinusitis (461 1) (J01 10)   2  History of Acute frontal sinusitis (461 1) (J01 10)   3  History of Cellulitis (682 9) (L03 90)   4  History of Contusion Of The Toe(S) With Intact Skin Surface (924 3)   5  History of Diabetes Mellitus (250 00)   6  History of Difficulty breathing (786 09) (R06 89)   7   History of Encounter for routine gynecological examination (V72 31) (Z01 419)   8  History of Esophagitis, reflux (530 11) (K21 0)   9  History of High cholesterol (272 0) (E78 00)   10  History of abdominal pain (V13 89) (Z87 898)   11  History of abnormal weight loss (V13 89) (Z87 898)   12  History of acute bronchitis (V12 69) (Z87 09)   13  History of acute otitis media (V12 49) (Z86 69)   14  History of acute pharyngitis (V12 69) (Z87 09)   15  History of acute sinusitis (V12 69) (Z87 09)   16  History of acute sinusitis (V12 69) (Z87 09)   17  History of allergic rhinitis (V12 69) (Z87 09)   18  History of candidiasis of mouth (V12 09) (Z86 19)   19  History of fatigue (V13 89) (Z87 898)   20  History of head injury (V15 59) (Z87 828)   21  History of headache (V13 89) (Z87 898)   22  History of hypoglycemia (V12 29) (Z86 39)   23  History of laryngitis (V12 69) (Z87 09)   24  History of obesity (V13 89) (Z86 39)   25  History of pharyngitis (V12 69) (Z87 09)   26  History of sebaceous cyst (V13 3) (Z87 2)   27  History of sore throat (V12 60) (Z87 09)   28  History of type 2 diabetes mellitus (V12 29) (Z86 39)   29  History of urinary frequency (V13 09) (Z87 898)   30  History of Hypoglycemia (251 2) (E16 2)   31  History of Mouth Sores (528 9)   32  History of Need for pneumococcal vaccination (V03 82) (Z23)   33  History of Otitis media, unspecified laterality   34  History of Pap smear, as part of routine gynecological examination (V76 2) (Z01 419)   35  Denied: History of Seizures   36  History of Suicide Attempt (E958 9)   37  History of Tinea corporis (110 5) (B35 4)   38  History of Type 2 diabetes mellitus with hyperglycemia, with long-term current use of    insulin (250 00,790 29,V58 67) (E11 65,Z79 4)   39  History of Type B influenza (487 1) (J10 1)   40  History of Visit for screening mammogram (V76 12) (Z12 31)    The active problems and past medical history were reviewed and updated today  Allergies    1  Biaxin TABS   2  LaMICtal TABS   3  Tanzeum PEN   4  TEGretol TABS   5  Depakote TBEC    6  Seasonal   7  Bee sting   8  Shellfish    Current Meds   1  Accu-Chek Louise Plus In Vitro Strip; test 4 times daily; Therapy: 27EZP6571 to (Last Rx:21Sxh5944)  Requested for: 63XBB5339 Ordered   2  Aspir-81 81 MG Oral Tablet Delayed Release; TAKE 1 TABLET DAILY; Therapy: 07Wlo9914 to (Evaluate:89Mot3353); Last Rx:63Vbt6089 Ordered   3  Atorvastatin Calcium 20 MG Oral Tablet; TAKE 1 TABLET DAILY AS DIRECTED; Therapy: 66BBV3549 to (Evaluate:58Dlx9869)  Requested for: 72Ieo7995; Last   Rx:15Fyb6239 Ordered   4  Azelastine HCl - 0 15 % Nasal Solution; USE 2 SPRAYS AT BEDTIME; Therapy: (Recorded:60Gwc7124) to Recorded   5  BD Pen Needle Susie U/F 32G X 4 MM Miscellaneous; USE 5 TIMES DAILY; Therapy: 14Wat1727 to (Evaluate:11Oct2017)  Requested for: 37Ztn1054; Last   Rx:97Bxb1303 Ordered   6  Clobetasol Propionate 0 05 % External Ointment; APPLY AND GENTLY MASSAGE INTO   AFFECTED AREA(S) TWICE DAILY; Therapy: 46CVI4595 to Recorded   7  Fenofibrate 145 MG Oral Tablet; Take 1 tablet daily; Therapy: 93XMQ8877 to (Evaluate:21Ebm3375)  Requested for: 97Scr7432; Last   Rx:09Tla4121 Ordered   8  Fish Oil 1000 MG Oral Capsule; take 1 capsule daily; Therapy: 99XIA3753 to Recorded   9  FLUoxetine HCl - 40 MG Oral Capsule; TAKE 2 CAPSULES DAILY; Therapy: 49RKH5245 to (Evaluate:88Slx4834)  Requested for: 38LZI0473; Last   Rx:16Mar2017 Ordered   10  HumaLOG KwikPen 200 UNIT/ML Subcutaneous Solution Pen-injector; TAKE 20 UNITS    BEFORE BREAKFAST, USE ICR 1:5 FOR LUNCH, 22 UNITS BEFORE DINNER    (ISF 1:20 ICR 1:5); Therapy: 49BUN2138 to ([phone])  Requested for: 54XPU0197; Last    Rx:03Aou6578 Ordered   11  Hydroxychloroquine Sulfate 200 MG Oral Tablet; TAKE 1 TABLET TWICE DAILY; Therapy: (Recorded:08Fhi7178) to Recorded   12  Jardiance 25 MG Oral Tablet; take 1 tablet every day;     Therapy: 46LIG8189 to (Evaluate:32Xfs4190) Requested for: 73Pak6401; Last    Rx:00Alj9589 Ordered   13  Lansoprazole 30 MG Oral Capsule Delayed Release; TAKE 1 CAPSULE EVERY    MORNING DAILY; Therapy: 66QNZ5752 to (Peter Solomon)  Requested for: 27Hkp5024; Last    Rx:11Nov2015 Ordered   14  Latuda 40 MG Oral Tablet; Take 1 tablet in the evening with food; Therapy: 10IOK4101 to (Evaluate:22Cex1751)  Requested for: 70FJU8712; Last    Rx:74Ply5374 Ordered   15  Lisinopril 20 MG Oral Tablet; TAKE 1 TABLET DAILY AS DIRECTED; Therapy: 67KVC7722 to (Evaluate:82Vnb9687)  Requested for: 60Xxm5045; Last    Rx:74Bwf7370 Ordered   16  MetFORMIN HCl  MG Oral Tablet Extended Release 24 Hour; TAKE 1 TAB TWICE    PER DAY WITH FOOD; Therapy: 26ONQ7689 to (Peter Solomon)  Requested for: 10Gfw8681; Last    Rx:13Ziv7840 Ordered   17  Mupirocin 2 % External Ointment; Apply twice daily; Therapy: (Recorded:04Ysr7462) to Recorded   18  [facility] FlexTouch 100 UNIT/ML Subcutaneous Solution Pen-injector; INJECT 42 UNITS    ONCE A DAY; Therapy: 13Mkc3818 to (Evaluate:19Nov2017)  Requested for: 36Arl8778; Last    Rx:63Tcn9716 Ordered   19  Vitamin D (Ergocalciferol) 54923 UNIT Oral Capsule; TAKE 1 CAPSULE WEEKLY; Therapy: 04AKG6024 to (Last Abraham Pod)  Requested for: 20Jun2017 Ordered    The medication list was reviewed and updated today  Family Psych History  Mother    1  Family history of CABG   2  Family history of Diabetes Mellitus (V18 0)   3  Family history of Lung Cancer (V16 1)   4  Family history of Stroke Syndrome (V17 1)   5  Family history of Thyroid Disorder (V18 19)   6  Family history of Tobacco Use  Father    7  Family history of Benign Polyps Of The Large Intestine (V18 51)   8  Family history of Bipolar illness   9  Family history of Heart Disease (V17 49)  Brother    8  Family history of depression (V17 0) (Z81 8)  Maternal Grandmother    11  Family history of Diabetes Mellitus (V18 0)   12   Family history of Thyroid Disorder (V18 19)  Family History    13  Denied: Family history of Colon Cancer   14  Denied: Family history of Crohn's Disease   15  Family history of Hypertension (V17 49)   16  Denied: Family history of Liver Cancer   17  Family history of Mother  At Age 52    The family history was reviewed and updated today  Social History    · Caffeine Use   · Denied: History of Drug Use   · Denied: History of Home Environment Domestic Violence   · Job Physical Requirements Heavy Lifting   ·    · Never a smoker   · Non-smoker (V49 89) (Z78 9)   · Social drinker  The social history was reviewed and updated today  The social history was reviewed and is unchanged  , lives with   No children  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  No history of legal problems  No  history  History Of Phys/Sex Abuse Or Perpetration    History Of Phys/Sex Abuse or Perpetration: No history of physical or sexual abuse  End of Encounter Meds    1  Lisinopril 20 MG Oral Tablet; TAKE 1 TABLET DAILY AS DIRECTED; Therapy: 05SMI2775 to (Evaluate:43Tkj4174)  Requested for: 84Gpt0609; Last   Rx:97Bas5165 Ordered    2  HumaLOG KwikPen 200 UNIT/ML Subcutaneous Solution Pen-injector; TAKE 20 UNITS   BEFORE BREAKFAST, USE ICR 1:5 FOR LUNCH, 22 UNITS BEFORE DINNER   (ISF 1:20 ICR 1:5); Therapy: 21IQJ2568 to ([phone])  Requested for: 76QXL4310; Last   Rx:88Pbe3209 Ordered    3  FLUoxetine HCl - 40 MG Oral Capsule (PROzac); TAKE 2 CAPSULES DAILY; Therapy: 32NOU8502 to (Evaluate:2018)  Requested for: 16Trm3907; Last   Rx:41Owk7986 Ordered   4  Latuda 60 MG Oral Tablet; TAKE 1 TABLET IN THE EVENING WITH DINNER; Therapy: 00IGJ5622 to (FPHJIFYC:30WYD8750)  Requested for: 10Fex7039; Last   Rx:88Lpj7616 Ordered    5  Aspir-81 81 MG Oral Tablet Delayed Release; TAKE 1 TABLET DAILY; Therapy: 61Hch0273 to (Evaluate:2018);  Last Rx:2017 Ordered   6  BD Pen Needle Susie U/F 32G X 4 MM Miscellaneous; USE 5 TIMES DAILY; Therapy: 34Vkj7471 to (Evaluate:11Oct2017)  Requested for: 83Hmf7286; Last   Rx:01Sep2017 Ordered   7  Jardiance 25 MG Oral Tablet; take 1 tablet every day; Therapy: 06BLT1702 to (Evaluate:95Ikp8923)  Requested for: 08Ydt3332; Last   Rx:46Hwr8997 Ordered   8  MetFORMIN HCl  MG Oral Tablet Extended Release 24 Hour; TAKE 1 TAB TWICE   PER DAY WITH FOOD; Therapy: 59EGK5026 to (Yosef Miller)  Requested for: 00Cpd2415; Last   Rx:96Qbp8772 Ordered   9  Jennie  FlexTouch 100 UNIT/ML Subcutaneous Solution Pen-injector; INJECT 42 UNITS   ONCE A DAY; Therapy: 66Sak8220 to (Evaluate:19Nov2017)  Requested for: 30Oab0889; Last   Rx:21Rcw5012 Ordered    10  Accu-Chek Louise Plus In Vitro Strip; test 4 times daily; Therapy: 05XQM9611 to (Last Rx:95Hnm4111)  Requested for: 12QXR3235 Ordered    11  Lansoprazole 30 MG Oral Capsule Delayed Release; TAKE 1 CAPSULE EVERY    MORNING DAILY; Therapy: 99ASM8831 to (Yosef Miller)  Requested for: 29Sde0222; Last    Rx:11Nov2015 Ordered    12  Atorvastatin Calcium 20 MG Oral Tablet; TAKE 1 TABLET DAILY AS DIRECTED; Therapy: 68OSK5265 to (Evaluate:90Jwn6080)  Requested for: 20Jun2017; Last    Rx:20Jun2017 Ordered    13  Fenofibrate 145 MG Oral Tablet (Tricor); Take 1 tablet daily; Therapy: 84QXO0648 to (Evaluate:41Xtm6388)  Requested for: 93Jrh1094; Last    Rx:19Kci5753 Ordered    14  Fish Oil 1000 MG Oral Capsule; take 1 capsule daily; Therapy: 31BEW3512 to Recorded   15  Hydroxychloroquine Sulfate 200 MG Oral Tablet; TAKE 1 TABLET TWICE DAILY; Therapy: (Recorded:03Zhl2760) to Recorded    16  Azelastine HCl - 0 15 % Nasal Solution; USE 2 SPRAYS AT BEDTIME; Therapy: (Recorded:65Rcv8859) to Recorded    17  Clobetasol Propionate 0 05 % External Ointment; APPLY AND GENTLY MASSAGE INTO    AFFECTED AREA(S) TWICE DAILY; Therapy: 90GGF8977 to Recorded    18   Vitamin D (Ergocalciferol) 58566 UNIT Oral Capsule; TAKE 1 CAPSULE WEEKLY; Therapy: 19LPW9039 to (Last Lorre Freeze)  Requested for: 20Jun2017 Ordered    19  Mupirocin 2 % External Ointment; Apply twice daily; Therapy: (Recorded:96Jif2012) to Recorded    Future Appointments    Date/Time Provider Specialty Site   01/04/2018 08:00 AM MOLLY Mendez , DO, [facility] Hematology Oncology [address] [place] [address] ONCOLOGY   10/02/2017 10:40 AM MOLLY Stokes   Endocrinology [facility] ENDOCRINOLOGY [facility]   01/16/2018 08:00 AM Juan Good DO Internal Medicine [facility]     Signatures   Electronically signed by : MOLLY Alvarez ; Sep 15 2017  3:21PM EST                       (Author)

## 2018-01-10 ENCOUNTER — ALLSCRIPTS OFFICE VISIT (OUTPATIENT)
Dept: OTHER | Facility: OTHER | Age: 58
End: 2018-01-10

## 2018-01-10 NOTE — RESULT NOTES
Message   let pt know the rest of the stool studies also came back normal, follow up if not improving, can continue the cholestyramine packets for now     Verified Results  (1) STOOL CULTURE [id number] 02:11PM Meme Abebe Order Number: [id number]     Test Name Result Flag Reference   CLINICAL REPORT (Report)     Test:        Stool culture  Specimen Source:  Rectum  Specimen Type:   Stool  Specimen Date:   11/23/2016 2:11 PM  Result Date:    11/25/2016 11:53 AM  Result Status:   Final result  Resulting Lab:   [place] [address]            Tel: [phone]      CULTURE                                       ------------------                                   No Salmonella, Shigella or Campylobacter isolated      Shiga Toxin 1 NOT detected, Shiga Toxin 2 NOT detected

## 2018-01-10 NOTE — PROGRESS NOTES
Assessment    1  Anemia (285 9) (D64 9)    Plan  Anemia    · Drink plenty of fluids ; Status:Complete;   Done: 25SVR5427   Ordered; Daryn Escobar; Ordered By:Ariadna Srivastava;   · Follow-up visit in 6 months Evaluation and Treatment  Follow-up  Status: Hold For -  Scheduling  Requested for: 20Jun2016   Ordered; For: Anemia; Ordered By: Ester Cochran Performed:  Due: 12MIJ0237   · (1) CBC/ PLT (NO DIFF); Status:Active; Requested for:52Ftj2378;    Perform:[facility]; [facility]; Daryn Escobar; Ordered By:Ariadna Srivastava;   · (1) FERRITIN; Status:Active; Requested for:47Vsn1937;    Perform:[facility]; [facility]; Daryn Escobar; Ordered By:Kodak Srivastava;   · (1) IRON SATURATION %, TIBC; Status:Active; Requested for:09Ndi5692;    Perform:[facility]; [facility]; Daryn Escobar; Ordered By:Ariadna Srivastava;    Discussion/Summary  Discussion Summary:   In summary, this is a 81-year-old female history of iron deficiency anemia as outlined  She states that she's been off oral iron for the past few months  Despite this, she been able to maintain a relatively normal hemoglobin  Iron studies are normal   I asked her to continue as is  I'll see her back in 6 months for review  If all is well we will probably discharge her at that time  The patient voiced understanding and agreement  Understands and agrees with treatment plan: The treatment plan was reviewed with the patient/guardian  The patient/guardian understands and agrees with the treatment plan   Counseling Documentation With Imm: The patient was counseled regarding diagnostic results, instructions for management, patient and family education, impressions  total time of encounter was 15 minutes  History of Present Illness  HPI: 2011 the patient was evaluated for anemia and leukocytosis  At that time   White blood cell count was 12, hemoglobin 11, platelet count 060, normal differential  Stow chromosome and Nitin-2 mutation were negative at that time  This was felt to represent a reflection of background chronic inflammatory state  2015- showed normal white count and differential  Hemoglobin 11 0  MCV 81  Platelet count normal  Iron saturation was 7%  Ferritin 12 6  September 2015- Venofer 300 mg iv x 3 doses  Interval History: Has been feeling more tired lately but attributes to recent work schedule change  Past 6 months- Has had problems with balance and falls, getting PT now  ENT eval ok  Review of Systems  Complete-Female:   Constitutional: + hot flashes  , but No fever, no chills, feels well, no tiredness, no recent weight gain or weight loss  Eyes: No complaints of eye pain, no red eyes, no eyesight problems, no discharge, no dry eyes, no itching of eyes  ENT: no complaints of earache, no loss of hearing, no nose bleeds, no nasal discharge, no sore throat, no hoarseness  Cardiovascular: No complaints of slow heart rate, no fast heart rate, no chest pain, no palpitations, no leg claudication, no lower extremity edema  Respiratory: no shortness of breath during exertion and no PND  Gastrointestinal: colonoscopy-march 2015- hemorrhoids  , but No complaints of abdominal pain, no constipation, no nausea or vomiting, no diarrhea, no bloody stools  Genitourinary: No complaints of dysuria, no incontinence, no pelvic pain, no dysmenorrhea, no vaginal discharge or bleeding  Musculoskeletal: No complaints of arthralgias, no myalgias, no joint swelling or stiffness, no limb pain or swelling  Integumentary: No complaints of skin rash or lesions, no itching, no skin wounds, no breast pain or lump  Neurological: as noted in HPI  Psychiatric: Not suicidal, no sleep disturbance, no anxiety or depression, no change in personality, no emotional problems  Endocrine: No complaints of proptosis, no hot flashes, no muscle weakness, no deepening of the voice, no feelings of weakness     Hematologic/Lymphatic: No complaints of swollen glands, no swollen glands in the neck, does not bleed easily, does not bruise easily  Active Problems    1  Abnormal blood chemistry (790 6) (R79 9)   2  Abnormal electrocardiogram (794 31) (R94 31)   3  Allergic rhinitis (477 9) (J30 9)   4  Anemia (285 9) (D64 9)   5  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)   6  Asthma (493 90) (J45 909)   7  Benign essential hypertension (401 1) (I10)   8  Family history of Benign Polyps Of The Large Intestine (V18 51)   9  Bilateral knee pain (719 46) (M25 561,M25 562)   10  Bipolar I disorder, most recent episode mixed, in full remission (296 66) (F31 78)   11  Blood in stool (578 1) (K92 1)   12  Cervical disc disease (722 91) (M50 90)   13  Cramp of both lower extremities (729 82) (R25 2)   14  Degeneration of thoracic intervertebral disc (722 51) (M51 34)   15  Degenerative arthritis of knee, bilateral (715 96) (M17 0)   16  Diabetes mellitus type 2, uncontrolled (250 02) (E11 65)   17  Diabetic peripheral neuropathy (250 60,357 2) (E11 42)   18  Diarrhea (787 91) (R19 7)   19  Dizziness (780 4) (R42)   20  Dyslipidemia (272 4) (E78 5)   21  Edema (782 3) (R60 9)   22  Encounter for routine gynecological examination (V72 31) (Z01 419)   23  Encounter for screening mammogram for malignant neoplasm of breast (V76 12)    (Z12 31)   24  Esophagitis, reflux (530 11) (K21 0)   25  Esophagitis, reflux (530 11) (K21 0)   26  Essential hypertriglyceridemia (272 1) (E78 1)   27  Fatigue (780 79) (R53 83)   28  Fatty liver (571 8) (K76 0)   29  Fecal occult blood test positive (792 1) (R19 5)   30  Gait disturbance (781 2) (R26 9)   31  Gastroesophageal reflux disease (530 81) (K21 9)   32  History of allergy (V15 09) (Z88 9)   33  Hypoglycemia (251 2) (E16 2)   34  Iron deficiency anemia, unspecified iron deficiency   35  Joint pain, knee (719 46) (M23 569)   36  Leukocytosis (288 60) (D79 938)   37   Long term current use of therapeutic drug (D33 82) (Z79 899)   38  Multiple thyroid nodules (241 1) (E04 2)   39  Muscle Cramps In The Thigh (Upper Leg) (729 82)   40  Neck pain (723 1) (M54 2)   41  History of Need for pneumococcal vaccination (V03 82) (Z23)   42  Need for prophylactic vaccination and inoculation against influenza (V04 81) (Z23)   43  Need for Tdap vaccination (V06 1) (Z23)   44  Neuropathy (355 9) (G62 9)   45  Obesity (278 00) (E66 9)   46  Obstructive sleep apnea (327 23) (G47 33)   47  Otitis media (382 9) (H66 90)   48  Pap smear, as part of routine gynecological examination (V76 2) (Z01 419)   49  Paronychia of toe (681 11) (L03 039)   50  Patellofemoral Dysfunction (736 6)   51  Personality disorder (301 9) (F60 9)   52  Postmenopausal Atrophic Vaginitis (627 3)   53  Prepatellar bursitis (726 65) (M70 40)   54  Right wrist pain (719 43) (M25 531)   55  Routine Gynecological Exam With Cervical Pap Smear (V72 31)   56  Symptomatic menopausal or female climacteric states (627 2) (N95 1)   57  Thoracic spine pain (724 1) (M54 6)   58  Tinea corporis (110 5) (B35 4)   59  Type 2 diabetes mellitus (250 00) (E11 9)   60  Type 2 diabetes mellitus with hyperglycemia (250 00) (E11 65)   61  Vertigo (780 4) (R42)   62  Viral gastroenteritis (008 8) (A08 4)   63  Visit for screening mammogram (V76 12) (Z12 31)   64  Vitamin D deficiency (268 9) (E55 9)   65  Dia-Parkinson-White Syndrome (426 7)    Past Medical History    1  History of Acute frontal sinusitis (461 1) (J01 10)   2  History of Acute sinusitis (461 9) (J01 90)   3  History of Cellulitis (682 9) (L03 90)   4  History of Contusion Of The Toe(S) With Intact Skin Surface (924 3)   5  History of Diabetes Mellitus (250 00)   6  History of Difficulty breathing (786 09) (R06 89)   7  History of abdominal pain (V13 89) (Z87 898)   8  History of abnormal weight loss (V13 89) (Z87 898)   9  History of acute bronchitis (V12 69) (Z87 09)   10  History of acute otitis media (V12 49) (Z86 69)   11  History of acute pharyngitis (V12 69) (Z87 09)   12  History of acute sinusitis (V12 69) (Z87 09)   13  History of candidiasis of mouth (V12 09) (Z86 19)   14  History of head injury (V15 59) (Z87 828)   15  History of headache (V13 89) (Z87 898)   16  History of laryngitis (V12 69) (Z87 09)   17  History of pharyngitis (V12 69) (Z87 09)   18  History of sebaceous cyst (V13 3) (Z87 2)   19  History of urinary frequency (V13 09) (Z87 898)   20  History of Hypoglycemia (251 2) (E16 2)   21  History of Mouth Sores (528 9)   22  History of Need for pneumococcal vaccination (V03 82) (Z23)   23  History of Otitis media, unspecified laterality   24  Denied: History of Seizures   25  History of Suicide Attempt (E958 9)   26  History of Tinea corporis (110 5) (B35 4)   27  History of Type B influenza (487 1) (J10 1)    Surgical History    1  History of Oral Surgery Tooth Extraction   2  History of Tonsillectomy With Adenoidectomy   3  History of Vaginal Pap smear (V76 47) (Z12 72)    Family History  Mother    1  Family history of CABG   2  Family history of Diabetes Mellitus (V18 0)   3  Family history of Lung Cancer (V16 1)   4  Family history of Stroke Syndrome (V17 1)   5  Family history of Thyroid Disorder (V18 19)   6  Family history of Tobacco Use  Father    7  Family history of Benign Polyps Of The Large Intestine (V18 51)   8  Family history of Bipolar illness   9  Family history of Heart Disease (V17 49)  Brother    8  Family history of depression (V17 0) (Z81 8)  Maternal Grandmother    11  Family history of Diabetes Mellitus (V18 0)   12  Family history of Thyroid Disorder (V18 19)  Family History    13  Denied: Family history of Colon Cancer   14  Denied: Family history of Crohn's Disease   15  Family history of Hypertension (V17 49)   16  Denied: Family history of Liver Cancer   17   Family history of Mother  At Age 52    Social History    · Being A Social Drinker   · Caffeine Use   · Denied: History of Drug Use   · Denied: History of Home Environment Domestic Violence   · Job Physical Requirements Heavy Lifting   · Non-smoker (V49 89) (Z78 9)    Current Meds   1  Aspir-81 81 MG Oral Tablet Delayed Release; TAKE 1 TABLET DAILY; Therapy: 89DUF4182 to (Evaluate:32Ndm8853); Last Rx:52Ewj7096 Ordered   2  Atorvastatin Calcium 40 MG Oral Tablet; TAKE 1 TABLET DAILY AS DIRECTED; Therapy: 05RSP1105 to (Evaluate:11Jef3897)  Requested for: 04ZSR9328; Last   Rx:33Mok5677 Ordered   3  Azelastine HCl - 0 15 % Nasal Solution; USE 2 SPRAYS AT BEDTIME; Therapy: (Recorded:67Tji1654) to Recorded   4  Divalproex Sodium  MG Oral Tablet Extended Release 24 Hour; Take 6 tablets at   bedtime; Therapy: 04VCW4605 to (Evaluate:89Cmo1965)  Requested for: 89Eta7913; Last   Rx:03Inl4062 Ordered   5  Fenofibrate 145 MG Oral Tablet; Take 1 tablet daily; Therapy: 31HDV9586 to (Evaluate:59Ley8800)  Requested for: 59RXM6467; Last   Rx:02Tww9535 Ordered   6  FLUoxetine HCl - 40 MG Oral Capsule; TAKE 2 CAPSULES DAILY; Therapy: 25ENJ0661 to (Evaluate:57Lgi3018)  Requested for: 43Zvp6546; Last   Rx:69Mqy6033 Ordered   7  Invokana 300 MG Oral Tablet; Take 1 tablet daily; Therapy: 07FRF1766 to (Evaluate:14Nov2016)  Requested for: 07IRB9204; Last   Rx:74Etl0343 Ordered   8  Lansoprazole 30 MG Oral Capsule Delayed Release; TAKE 1 CAPSULE EVERY   MORNING DAILY; Therapy: 74TWE7035 to (Julissa Jones)  Requested for: 50MVC8132; Last   Rx:11Nov2015 Ordered   9  Lisinopril 20 MG Oral Tablet; take 1/2 tablet daily; Therapy: 09Skf8991-(Evaluate:38Vxn3032)  Requested for: 92XSB6370 Ordered   10  MetFORMIN HCl  MG Oral Tablet Extended Release 24 Hour; 2 tabs twice per day    with food; Therapy: 50CVW8968 to (Evaluate:20Apc5530)  Requested for: 05ESP1879; Last    Rx:13Jun2016 Ordered   11  Natural Vitamin E 400 UNIT Oral Tablet; TAKE 2 5 TABLETS DAILY; Therapy: (Recorded:90Ueg8292) to Recorded   12   ProAir  (90 Base) MCG/ACT Inhalation Aerosol Solution; INHALE 1 TO 2 PUFFS    EVERY 4 TO 6 HOURS AS NEEDED; Therapy: 70JBV4883 to Recorded   13  Symbicort 80-4 5 MCG/ACT Inhalation Aerosol; use 1 puff twice daily and rinse mouth    after each use; Therapy: 38MED8630 to (Last Rx:38Hwz1916) Ordered   14  Tanzeum 50 MG Subcutaneous Pen-injector; Use 1 per week; Therapy: 96CZF3517 to (Evaluate:07Jun2016)  Requested for: 30ASO3455; Last    Rx:14Pdj5245 Ordered   15  Vitamin D 2000 UNIT Oral Capsule Recorded   16  Vitamin D3 1000 UNIT Oral Capsule; TAKE 7 CAPSULE Daily; Therapy: (Recorded:68Ndl1792) to Recorded    Allergies    1  Biaxin TABS   2  LaMICtal TABS   3  TEGretol TABS    4  Seasonal   5  Bee sting   6  Shellfish    Vitals  Vital Signs [Data Includes: Current Encounter]    Recorded: 20Jun2016 09:13AM   Temperature 98 3 F   Heart Rate 82   Respiration 16   Systolic 421   Diastolic 66   Height 5 ft 3 6 in   Weight 226 lb 8 0 oz   BMI Calculated 39 37   BSA Calculated 2 06   O2 Saturation 89   Pain Scale 0     Physical Exam    Constitutional   General appearance: No acute distress, well appearing and well nourished  Eyes   Conjunctiva and lids: No swelling, erythema or discharge  Ears, Nose, Mouth, and Throat   External inspection of ears and nose: Normal     Oropharynx: Normal with no erythema, edema, exudate or lesions  Pulmonary   Auscultation of lungs: Clear to auscultation  Cardiovascular   Auscultation of heart: Normal rate and rhythm, normal S1 and S2, without murmurs  Examination of extremities for edema and/or varicosities: Normal     Abdomen   Abdomen: Non-tender, no masses  Liver and spleen: No hepatomegaly or splenomegaly  Lymphatic   Palpation of lymph nodes in neck: No lymphadenopathy  Musculoskeletal   Gait and station: Normal     Skin   Skin and subcutaneous tissue: Normal without rashes or lesions  Neurologic   Cranial nerves: Cranial nerves 2-12 intact      Psychiatric Orientation to person, place, and time: Normal          Results/Data  Results   (1) CBC/ PLT (NO DIFF) 39XHV1279 07:57AM [place]     Test Name Result Flag Reference   HEMATOCRIT 38 5 %  34 8-46 1   HEMOGLOBIN 11 3 g/dL L 11 5-15 4   MCHC 29 4 g/dL L 31 4-37 4   MCH 26 3 pg L 26 8-34 3   MCV 90 fL  82-98   PLATELET COUNT 863 Thousands/uL  149-390   RBC COUNT 4 30 Million/uL  3 81-5 12   RDW 16 3 % H 11 6-15 1   WBC COUNT 9 54 Thousand/uL  4 31-10 16   MPV 10 0 fL  8 9-12 7     (1) FERRITIN 65VIW2231 07:57AM [place]     Test Name Result Flag Reference   FERRITIN 79 ng/mL  8-388     (1) IRON SATURATION %, TIBC 06YBJ8911 07:57AM [place]     Test Name Result Flag Reference   IRON SATURATION 13 %     TOTAL IRON BINDING CAPACITY 493 ug/dL H 250-450   IRON 63 ug/dL       Health Management  Diabetes mellitus type 2, uncontrolled   (1) MICROALBUMIN CREATININE RATIO, RANDOM URINE; every 1 year; Last 97Fne7509; Next Due:  11Xet0298; Active  *VB - Eye Exam; every 1 year; Last 37NDN0832; Next Due: 70EHI3816; Active  Visit for screening mammogram   Digital Bilateral Screening Mammogram With CAD; every 1 year; Last 97SBX9260; Next Due:  50Gqv3762; Overdue    Future Appointments    Date/Time Provider Specialty Site   07/21/2016 08:45 AM Nohemi Pham Endocrinology [facility] ENDOCRINOLOGY [facility]   07/26/2016 04:30 PM MOLLY Waldrop   Psychiatry [facility]   07/21/2016 06:30 PM Lashae Hendrickson DO Internal Medicine [facility]     Signatures   Electronically signed by : MOLLY Ratliff DOM D ,DO; Jun 20 2016  9:27AM EST                       (Author)

## 2018-01-10 NOTE — PROGRESS NOTES
Plan    1  DSMT/MNT Time Record; Status:Complete;   Done: 75ZLR1356 04:14PM    Discussion/Summary    PATIENT EDUCATION RECORD   Indication for Services: type 2 Diabetes Mellitus and obesity  She is ready to learn  She has no barriers to learning  Healthy Eating:   Discussed advanced carb counting/flexible insulin: Method: Instruction and Handout  Response: Verbalizes Understanding     Reviewed meter options: Method: Instruction, Handout and Demonstration  Her blood glucose targets are: Pre-meal target <120 and HS target 120-150  Monitoring: Response: Verbalizes Understanding and Returns Demonstration  Discussed meter : Method: Instruction, Handout and Demonstration  Response: Verbalizes Understanding  She was given a Accu [employer] Expert meter  She was instructed how to use the meter  Taking Medication: Response: Verbalizes Instruction   She uses Pen  She is taking   She is taking insulin Tresiba 42 U a m  Humalog ISF 1:20, ICR 1:5    Discussed onset, peak, and duration of insulin  Method: Instruction  Response: [facility] [email or web address]  Discussed side effects and precautions of insulin  Method: Instruction  Response: [facility] [email or web address] [facility]  Discussed basal-bolus concept  Method: Instruction  Response: [facility] [email or web address]  She was given Fast 15 List and Hypoglycemia Tear Sheet   Problem Solving: She is on medications that cause hypoglycemia, She is able to state the symptoms, prevention, and treatment of hypoglycemia   Hypoglycemia: Stated definition and causes: Method: Instruction and Handout  Response: Verbalizes Understanding   Described signs and symptoms: Method: Instruction and Handout  Response: Verbalizes Understanding   Discussed prevention: Method: Instruction and Handout  Response: Verbalizes Instruction   Discussed treatment: Method: Instruction and Handout  Response: [employer] when to notify physician and when to call EMS: Method: Instruction and Handout  Response: Verbalizes Instruction Will be follow up weekly or as needed until euglycemic Recommended patient see: RD   She was given the following educational materials: The 15/15 Rule for Treating Low Blood Sugar, Hyperglycemia/Hypoglycemia and Flexible Insulin Workbook   Chief Complaint  Patient is here today for flexible insulin therapy for T2DM      History of Present Illness  Patient continues to have uncontrolled BG  Fasting readings 184-200; preprandial dinner and bedtime mostly in 200's  Patient works 11 pm-7 am shift  She is currently only taking bolus insulin for 2 of her 3 meals  She was educated on basal/bolus insulin therapy and effect of food intake on BG  She was advised to bolus for each meal eaten and snacks >15-20 grams of carb  Reviewed hypoglycemia and treatment  We reviewed carb counting and use of computer/apps/book for carb counting  She was educated on FIT and use of bolus advise meter/control  She was able to successfully count carbs and demonstrated proper use of meter  Follow up will be conducted to ensure accuracy of meter settings  She was advised to call Dr  Immediately if hypoglycemia or >300 2 times  Active Problems    1  Abdominal pain, epigastric (789 06) (R10 13)   2  Abnormal blood chemistry (790 6) (R79 9)   3  Abnormal CAT scan (793 99) (R93 8)   4  Abnormal electrocardiogram (794 31) (R94 31)   5  Acute sinusitis (461 9) (J01 90)   6  Allergic rhinitis (477 9) (J30 9)   7  Anemia (285 9) (D64 9)   8  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)   9  Asthma (493 90) (J45 909)   10  Benign essential hypertension (401 1) (I10)   11  Family history of Benign Polyps Of The Large Intestine (V18 51)   12  Bilateral knee pain (719 46) (M25 561,M25 562)   13  Bipolar I disorder, most recent episode mixed, in full remission (296 66) (F31 78)   14  Blood in stool (578 1) (K92 1)   15  Cervical disc disease (722 91) (M50 90)   16   Cramp of both lower extremities (729 82) (R25 2) 17  Degeneration of thoracic intervertebral disc (722 51) (M51 34)   18  Degenerative arthritis of knee, bilateral (715 96) (M17 0)   19  Diabetes mellitus type 2, uncontrolled (250 02) (E11 65)   20  Diarrhea (787 91) (R19 7)   21  Dizziness (780 4) (R42)   22  Dyslipidemia (272 4) (E78 5)   23  Edema (782 3) (R60 9)   24  Encounter for routine gynecological examination (V72 31) (Z01 419)   25  Encounter for screening mammogram for malignant neoplasm of breast (V76 12)    (Z12 31)   26  Esophagitis, reflux (530 11) (K21 0)   27  Esophagitis, reflux (530 11) (K21 0)   28  Essential hypertriglyceridemia (272 1) (E78 1)   29  Excessive sweating (780 8) (R61)   30  Fatigue (780 79) (R53 83)   31  Fatty liver (571 8) (K76 0)   32  Fecal occult blood test positive (792 1) (R19 5)   33  Gait disturbance (781 2) (R26 9)   34  Gastroesophageal reflux disease (530 81) (K21 9)   35  History of allergy (V15 09) (Z88 9)   36  Hypoglycemia (251 2) (E16 2)   37  Iron deficiency anemia, unspecified iron deficiency   38  Joint pain, knee (719 46) (M25 569)   39  Leukocytosis (288 60) (D72 829)   40  Long term current use of therapeutic drug (V58 69) (Z79 899)   41  Long-term insulin use (V58 67) (Z79 4)   42  Lost voice (784 41) (R49 1)   43  Multiple thyroid nodules (241 1) (E04 2)   44  Muscle Cramps In The Thigh (Upper Leg) (729 82)   45  Neck pain (723 1) (M54 2)   46  History of Need for pneumococcal vaccination (V03 82) (Z23)   47  Need for prophylactic vaccination and inoculation against influenza (V04 81) (Z23)   48  Need for Tdap vaccination (V06 1) (Z23)   49  Neuropathy (355 9) (G62 9)   50  Obesity (278 00) (E66 9)   51  Obstructive sleep apnea (327 23) (G47 33)   52  Otitis media (382 9) (H66 90)   53  Pain, dental (525 9) (K08 89)   54  Pap smear, as part of routine gynecological examination (V76 2) (Z01 419)   55  Paronychia of toe (681 11) (L03 039)   56  Patellofemoral Dysfunction (736 6)   57   Personality disorder (301  9) (F60 9)   58  Postmenopausal Atrophic Vaginitis (627 3)   59  Prepatellar bursitis (726 65) (M70 40)   60  Right wrist pain (719 43) (M25 531)   61  Routine Gynecological Exam With Cervical Pap Smear (V72 31)   62  Sore throat (462) (J02 9)   63  Symptomatic menopausal or female climacteric states (627 2) (N95 1)   64  Thoracic spine pain (724 1) (M54 6)   65  Tinea corporis (110 5) (B35 4)   66  Type 2 diabetes mellitus (250 00) (E11 9)   67  Type 2 diabetes mellitus with hyperglycemia (250 00) (E11 65)   68  Vertigo (780 4) (R42)   69  Viral gastroenteritis (008 8) (A08 4)   70  Visit for screening mammogram (V76 12) (Z12 31)   71  Vitamin D deficiency (268 9) (E55 9)   72  Dia-Parkinson-White Syndrome (426 7)    Past Medical History    1  History of Acute frontal sinusitis (461 1) (J01 10)   2  History of Cellulitis (682 9) (L03 90)   3  History of Contusion Of The Toe(S) With Intact Skin Surface (924 3)   4  History of Diabetes Mellitus (250 00)   5  History of Difficulty breathing (786 09) (R06 89)   6  History of High cholesterol (272 0) (E78 00)   7  History of abdominal pain (V13 89) (Z87 898)   8  History of abnormal weight loss (V13 89) (Z87 898)   9  History of acute bronchitis (V12 69) (Z87 09)   10  History of acute otitis media (V12 49) (Z86 69)   11  History of acute pharyngitis (V12 69) (Z87 09)   12  History of acute sinusitis (V12 69) (Z87 09)   13  History of candidiasis of mouth (V12 09) (Z86 19)   14  History of head injury (V15 59) (Z87 828)   15  History of headache (V13 89) (Z87 898)   16  History of laryngitis (V12 69) (Z87 09)   17  History of pharyngitis (V12 69) (Z87 09)   18  History of sebaceous cyst (V13 3) (Z87 2)   19  History of urinary frequency (V13 09) (Z87 898)   20  History of Hypoglycemia (251 2) (E16 2)   21  History of Mouth Sores (528 9)   22  History of Need for pneumococcal vaccination (V03 82) (Z23)   23  History of Otitis media, unspecified laterality   24  Denied: History of Seizures   25  History of Suicide Attempt (E958 9)   26  History of Tinea corporis (110 5) (B35 4)   27  History of Type B influenza (487 1) (J10 1)    Surgical History    1  History of Oral Surgery Tooth Extraction   2  History of Tonsillectomy With Adenoidectomy   3  History of Vaginal Pap smear (V76 47) (Z12 72)    Family History  Mother    1  Family history of CABG   2  Family history of Diabetes Mellitus (V18 0)   3  Family history of Lung Cancer (V16 1)   4  Family history of Stroke Syndrome (V17 1)   5  Family history of Thyroid Disorder (V18 19)   6  Family history of Tobacco Use  Father    7  Family history of Benign Polyps Of The Large Intestine (V18 51)   8  Family history of Bipolar illness   9  Family history of Heart Disease (V17 49)  Brother    8  Family history of depression (V17 0) (Z81 8)  Maternal Grandmother    11  Family history of Diabetes Mellitus (V18 0)   12  Family history of Thyroid Disorder (V18 19)  Family History    13  Denied: Family history of Colon Cancer   14  Denied: Family history of Crohn's Disease   15  Family history of Hypertension (V17 49)   16  Denied: Family history of Liver Cancer   17  Family history of Mother  At Age 52    Social History    · Caffeine Use   · Denied: History of Drug Use   · Denied: History of Home Environment Domestic Violence   · Job Physical Requirements Heavy Lifting   · Non-smoker (V49 89) (Z78 9)   · Social drinker    Current Meds   1  Atorvastatin Calcium 20 MG Oral Tablet; TAKE 1 TABLET DAILY AS DIRECTED; Therapy: 14DNU6108 to (Héctor Song)  Requested for: 44WPD7555; Last   Rx:2016 Ordered   2  Azelastine HCl - 0 15 % Nasal Solution; USE 2 SPRAYS AT BEDTIME; Therapy: (Recorded:81Cki2722) to Recorded   3  BD Pen Needle Susie U/F 32G X 4 MM Miscellaneous; inject once daily; Therapy: 32CWE4717 to (Evaluate:68Rts7232)  Requested for: 56Hts4917; Last   Rx:07Crf4227 Ordered   4   Cholestyramine 4 GM Oral Packet (Questran); TAKE 1 PACKET 3 times daily PRN; Therapy: 91Xkv3978 to (Evaluate:77Rvs0311)  Requested for: 73Xhw4989; Last   Rx:57Ckb6979 Ordered   5  Ergocalciferol 33055 UNIT Oral Capsule; 1 CAPSULE 2 TIME A WEEK FOR 6 WEEKS; Therapy: 18JEO3257 to (Evaluate:89Dxy7156)  Requested for: 89ETR2106; Last   Rx:11Nov2016 Ordered   6  Fenofibrate 145 MG Oral Tablet (Tricor); Take 1 tablet daily; Therapy: 83APF9857 to (Evaluate:12Rfo7012)  Requested for: 41AFG4736; Last   Rx:70Jmy6489 Ordered   7  FLUoxetine HCl - 40 MG Oral Capsule (PROzac); TAKE 2 CAPSULES DAILY; Therapy: 90MVG2145 to (Evaluate:79Iiq9696)  Requested for: 37Mtc3194; Last   Rx:22Imn0224 Ordered   8  Fluticasone Propionate 50 MCG/ACT Nasal Suspension; USE 1 SPRAY IN EACH   NOSTRIL TWICE DAILY; Therapy: 74GIP6094 to (Last Aleta Peña)  Requested for: 54NLA1916 Ordered   9  HumaLOG KwikPen 200 UNIT/ML Subcutaneous Solution Pen-injector; Take 20 units   before breakfast, 20 units before dinner; Therapy: 52BYH0920 to (Abiel Trinidad)  Requested for: 72CRB3535; Last   Rx:08Nov2016 Ordered   10  Invokana 300 MG Oral Tablet; Take 1 tablet daily; Therapy: 52OVM3450 to (Abiel Trinidad)  Requested for: 27WXX6365; Last    Rx:08Nov2016 Ordered   11  Lansoprazole 30 MG Oral Capsule Delayed Release; TAKE 1 CAPSULE EVERY    MORNING DAILY; Therapy: 29HUJ9058 to (Ana Luisa Barnes)  Requested for: 55Kea5224; Last    Rx:11Nov2015 Ordered   12  Latuda 40 MG Oral Tablet; Take 1 tablet in the evening with food; Therapy: 75ICB3188 to (Evaluate:14Jan2017)  Requested for: 59DEM6293; Last    Rx:86Onm7199 Ordered   13  Lisinopril 20 MG Oral Tablet; TAKE 1 TABLET DAILY AS DIRECTED; Therapy: 42VKE2205 to (Evaluate:42Tgx9923)  Requested for: 39IEI1402; Last    Rx:18Oct2016 Ordered   14  MetFORMIN HCl  MG Oral Tablet Extended Release 24 Hour; 2 tabs twice per day    with food;     Therapy: 69YLU1219 to (Yonny Casas)  Requested for: 82YYK8668; Last    Rx:23Nov2016 Ordered   15  MetroNIDAZOLE 500 MG Oral Tablet; TAKE 1 TABLET 3 times daily; Therapy: 13XRG5244 to (Evaluate:84Hht5413)  Requested for: 78SLM1390; Last    Rx:23Nov2016 Ordered   16  Mupirocin 2 % External Ointment; Apply twice daily; Therapy: (Recorded:31Viy2706) to Recorded   17  Natural Vitamin E 400 UNIT Oral Tablet; TAKE 1 TABLET DAILY; Therapy: (Recorded:08Nov2016) to Recorded   18  ProAir  (90 Base) MCG/ACT Inhalation Aerosol Solution; INHALE 1 TO 2 PUFFS    EVERY 4 TO 6 HOURS AS NEEDED; Therapy: 64NHF6337 to Recorded   19  Symbicort 80-4 5 MCG/ACT Inhalation Aerosol; use 1 puff twice daily and rinse mouth    after each use; Therapy: 94XVD0954 to (Last Rx:18May2015) Ordered   20  Melissa Crate FlexTouch 100 UNIT/ML Subcutaneous Solution Pen-injector; Inject  30 units    once a day  Requested for: 94LDB7742; Last Rx:08Nov2016 Ordered    Allergies    1  Biaxin TABS   2  LaMICtal TABS   3  TEGretol TABS   4  Tanzeum PEN    5  Seasonal   6  Bee sting   7  Shellfish    End of Encounter Meds    1  Fluticasone Propionate 50 MCG/ACT Nasal Suspension; USE 1 SPRAY IN EACH   NOSTRIL TWICE DAILY; Therapy: 21VHA8879 to (Last Gonzalo Dawson)  Requested for: 47Qsw2733 Ordered    2  Azelastine HCl - 0 15 % Nasal Solution; USE 2 SPRAYS AT BEDTIME; Therapy: (Recorded:04Dvb4770) to Recorded    3  ProAir  (90 Base) MCG/ACT Inhalation Aerosol Solution; INHALE 1 TO 2 PUFFS   EVERY 4 TO 6 HOURS AS NEEDED; Therapy: 20QDM5044 to Recorded   4  Symbicort 80-4 5 MCG/ACT Inhalation Aerosol; use 1 puff twice daily and rinse mouth   after each use; Therapy: 94IPT2272 to (Last Rx:18May2015) Ordered    5  Lisinopril 20 MG Oral Tablet; TAKE 1 TABLET DAILY AS DIRECTED; Therapy: 49BUK4649 to (Evaluate:78Mss7307)  Requested for: 40GJN2187; Last   Rx:88Emh4935 Ordered    6  HumaLOG KwikPen 200 UNIT/ML Subcutaneous Solution Pen-injector;  Take 20 units   before breakfast, 20 units before dinner; Therapy: 66UIK2950 to (Rulon Cipro)  Requested for: 25VTC8412; Last   Rx:08Nov2016 Ordered    7  FLUoxetine HCl - 40 MG Oral Capsule (PROzac); TAKE 2 CAPSULES DAILY; Therapy: 08YAC4443 to (Evaluate:80Pah1558)  Requested for: 74Fei4225; Last   Rx:54Sbp7492 Ordered   8  Latuda 40 MG Oral Tablet; Take 1 tablet in the evening with food; Therapy: 39CBM4709 to (Evaluate:14Jan2017)  Requested for: 39RHB1770; Last   Rx:43Iaw6805 Ordered    9  BD Pen Needle Susie U/F 32G X 4 MM Miscellaneous; inject once daily; Therapy: 77AFX8204 to (Evaluate:76Hjq5655)  Requested for: 34Jyh2976; Last   Rx:36Ucw2423 Ordered   10  Invokana 300 MG Oral Tablet; Take 1 tablet daily; Therapy: 74FBB8097 to (Rulon Cipro)  Requested for: 11OXT7497; Last    Rx:08Nov2016 Ordered   11  MetFORMIN HCl  MG Oral Tablet Extended Release 24 Hour; 2 tabs twice per day    with food; Therapy: 44SWC5642 to (Amaryllis Res)  Requested for: 23Nov2016; Last    Rx:23Nov2016 Ordered   12  Velvet Clore FlexTouch 100 UNIT/ML Subcutaneous Solution Pen-injector; Inject  30 units    once a day  Requested for: 86AJQ3288; Last Rx:08Nov2016 Ordered    13  Cholestyramine 4 GM Oral Packet (Questran); TAKE 1 PACKET 3 times daily PRN; Therapy: 74Nth1886 to (Evaluate:65Eja3306)  Requested for: 42Vvb9955; Last    Rx:81Tti5185 Ordered   14  MetroNIDAZOLE 500 MG Oral Tablet; TAKE 1 TABLET 3 times daily; Therapy: 95NIW2058 to (Evaluate:41Ate0199)  Requested for: 29ZGJ1842; Last    Rx:23Nov2016 Ordered    15  Lansoprazole 30 MG Oral Capsule Delayed Release; TAKE 1 CAPSULE EVERY    MORNING DAILY; Therapy: 90NTG6320 to (Charyl Fresh)  Requested for: 55Mhp0518; Last    Rx:11Nov2015 Ordered    16  Atorvastatin Calcium 20 MG Oral Tablet; TAKE 1 TABLET DAILY AS DIRECTED; Therapy: 56ZDS8762 to (Frandy Gould)  Requested for: 80CZK1153; Last    Rx:08Nov2016 Ordered    17  Fenofibrate 145 MG Oral Tablet (Tricor);  Take 1 tablet daily; Therapy: 55CQU6202 to (Evaluate:85Msi7270)  Requested for: 83UDI6924; Last    Rx:04Kyi4028 Ordered    18  Natural Vitamin E 400 UNIT Oral Tablet; TAKE 1 TABLET DAILY; Therapy: (Recorded:08Nov2016) to Recorded    19  Ergocalciferol 26558 UNIT Oral Capsule; 1 CAPSULE 2 TIME A WEEK FOR 6 WEEKS; Therapy: 64PGP4362 to (Evaluate:22Dos2115)  Requested for: 41KVQ4628; Last    Rx:11Nov2016 Ordered    20  Mupirocin 2 % External Ointment; Apply twice daily; Therapy: (Recorded:69Dnn2921) to Recorded    Future Appointments    Date/Time Provider Specialty Site   12/22/2016 04:00 PM MOLLY Rascon , [facility] Hematology [facility]   12/20/2016 08:15 AM Faye Porter, [address] Endocrinology [facility] ENDOCRINOLOGY [facility]   12/19/2016 03:15 PM MOLLY Chung   [address] [facility]   12/22/2016 01:30 PM Gregory Leonard DO Internal Medicine [facility]     Signatures   Electronically signed by : Enrique Washington RD; Nov 29 2016  4:28PM EST                       (Author)    Electronically signed by : MOLLY Oneill ; Nov 30 2016  9:08AM EST

## 2018-01-10 NOTE — RESULT NOTES
Message   A1c 8 5 not at goal, please have her send in a log with current regimen  Please schedule her appointment sooner than May with Dr Esperanza Ortega      Verified Results  (1) RENAL FUNCTION PANEL 20Apr2016 09:05AM Troy Castro 40 Kidney Disease Education Program recommendations are as follows:  GFR calculation is accurate only with a steady state creatinine  Chronic Kidney disease less than 60 ml/min/1 73 sq  meters  Kidney failure less than 15 ml/min/1 73 sq  meters  Test Name Result Flag Reference   ANION GAP (CALC) 6 mmol/L  4-13   ALBUMIN 3 4 g/dL L 3 5-5 0   BLOOD UREA NITROGEN 13 mg/dL  5-25   CALCIUM 9 2 mg/dL  8 3-10 1   CHLORIDE 99 mmol/L L 100-108   CARBON DIOXIDE 32 mmol/L  21-32   CREATININE 0 52 mg/dL L 0 60-1 30   Standardized to IDMS reference method   GLUCOSE,RANDM 122 mg/dL     POTASSIUM 4 7 mmol/L  3 5-5 3   eGFR Non-African American      >60 0 ml/min/1 73sq m   SODIUM 137 mmol/L  136-145   PHOSPHORUS 3 4 mg/dL  2 7-4 5     (1) HEMOGLOBIN A1C 20Apr2016 09:00AM Ronny Valdez   TW Order Number: [id number]      5 7-6 4% impaired fasting glucose  >=6 5% diagnosis of diabetes    Falsely low levels are seen in conditions linked to short RBC life span-  hemolytic anemia, and splenomegaly  Falsely elevated levels are seen in situations where there is an increased production of RBC- receipt of erythropoietin or blood transfusions  Adopted from ADA-Clinical Practice Recommendations     Test Name Result Flag Reference   HEMOGLOBIN A1C 8 5 % H 4 0-5 6   EST  AVG   GLUCOSE 197 mg/dl       (1) LIPID PANEL, FASTING 20Apr2016 09:00AM Ronny GUTIÉRREZ Order Number: [id number]    TW Order Number: [id number] Order Number: [id number] Order Number: [id number] Order Number: [id number]  Triglyceride:         Normal              <150 mg/dl       Borderline High    150-199 mg/dl       High               200-499 mg/dl       Very High          >499 mg/dl  Cholesterol:         Desirable        <200 mg/dl      Borderline High  200-239 mg/dl      High             >239 mg/dl  HDL Cholesterol:        High    >59 mg/dL      Low     <41 mg/dL  LDL CALCULATED:    This screening LDL is a calculated result  It does not have the accuracy of the Direct Measured LDL in the monitoring of patients with hyperlipidemia and/or statin therapy  Direct Measure LDL (JIJ944) must be ordered separately in these patients  Test Name Result Flag Reference   CHOLESTEROL 122 mg/dL     HDL,DIRECT 29 mg/dL L 40-60   Specimen collection should occur prior to Metamizole administration due to the potential for falsely depressed results  LDL CHOLESTEROL CALCULATED 17 mg/dL  0-100   TRIGLYCERIDES 381 mg/dL H <=150   Specimen collection should occur prior to N-Acetylcysteine or Metamizole administration due to the potential for falsely depressed results  (1) MICROALBUMIN CREATININE RATIO, RANDOM URINE 20Apr2016 09:00AM [facility]    Order Number: [id number]    TW Order Number: [id number]     Test Name Result Flag Reference   MICROALBUMIN/ CREAT R 31 mg/g creatinine H 0-30   MICROALBUMIN,URINE 17 5 mg/L  0 0-20 0   CREATININE URINE 56 7 mg/dL       (1) RENAL FUNCTION PANEL 20Apr2016 09:00AM [facility]    Order Number: [id number]    TW Order Number: [id number] Order Number: [id number] Order Number: [id number] Order Number: [id number]  National Kidney Disease Education Program recommendations are as follows:  GFR calculation is accurate only with a steady state creatinine  Chronic Kidney disease less than 60 ml/min/1 73 sq  meters  Kidney failure less than 15 ml/min/1 73 sq  meters       Test Name Result Flag Reference   ANION GAP (CALC) 6 mmol/L  4-13   BLOOD UREA NITROGEN 14 mg/dL  5-25   CALCIUM 9 0 mg/dL  8 3-10 1   CHLORIDE 99 mmol/L L 100-108   CARBON DIOXIDE 32 mmol/L  21-32   CREATININE 0 49 mg/dL L 0 60-1 30   Standardized to IDMS reference method   GLUCOSE,RANDM 123 mg/dL     If the patient is fasting, the ADA then defines impaired fasting glucose as > 100 mg/dL and diabetes as > or equal to 123 mg/dL  POTASSIUM 4 6 mmol/L  3 5-5 3   eGFR Non-African American      >60 0 ml/min/1 73sq m   SODIUM 137 mmol/L  136-145   PHOSPHORUS 3 3 mg/dL  2 7-4 5     (1) HEPATIC FUNCTION PANEL 20Apr2016 09:00AM Isadora Linares    Order Number: [id number]    TW Order Number: [id number] Order Number: [id number] Order Number: [id number] Order Number: [id number]     Test Name Result Flag Reference   ALBUMIN 3 3 g/dL L 3 5-5 0   ALK PHOSPHATAS 92 U/L     ALT (SGPT) 21 U/L  12-78   AST(SGOT) 18 U/L  5-45   BILI, DIRECT 0 13 mg/dL  0 00-0 20   BILI, TOTAL 0 37 mg/dL  0 20-1 00   TOTAL PROTEIN 7 0 g/dL  6 4-8 2       Plan  Diabetes mellitus type 2, uncontrolled    · (1) MICROALBUMIN CREATININE RATIO, RANDOM URINE ; every 1 year;  Last  20Apr2016; Next 20Apr2017; Status:Active    Signatures   Electronically signed by : Landon Banks, ; Apr 21 2016  6:11PM EST                       (Author)

## 2018-01-10 NOTE — RESULT NOTES
Verified Results  (1) VALPROIC ACID (DEPAKOTE) 18NCP9972 07:57AM Quilla Anchors     Test Name Result Flag Reference   VALPROIC ACID 37 ug/mL L

## 2018-01-11 NOTE — RESULT NOTES
Message   Notify the patient the hepatitis C antibody nonreactive follow up as scheduled        Verified Results  (1) HEP C ANTIBODY 01Lfz8044 07:25AM [place]   TW Order Number: [id number]     Test Name Result Flag Reference   HEPATITIS C ANTIBODY Non-reactive  Non-reactive       Signatures   Electronically signed by : Dellia Gosselin, DO; Sep 20 2017 10:00PM EST                       (Author)

## 2018-01-11 NOTE — PROGRESS NOTES
Assessment    1  History of Never a smoker   2  Thoracic spine pain (724 1) (M54 6)   3  Degeneration of thoracic intervertebral disc (722 51) (M51 34)    Plan  Diabetes mellitus type 2, uncontrolled    · *VB - Eye Exam; Status:Complete;   Done: 69EDF7335 12:00AM   · *VB - Eye Exam ; every 1 year; Last 10Grl2465; Next 24PIC1994; Status:Active  Thoracic spine pain    · 1 - Marco ROWLAND, Andreas Grider  (Orthopedic Surgery) Physician Referral  Consult  Status: Hold For -  Scheduling  Requested for: 24DKK4555  Care Summary provided  : Yes     Assessment and plan #1 thoracic spine pain/x-ray does show degenerative disc disease review the report and also films with the patient  Patient's symptoms have improved she has been using over-the-counter ibuprofen as directed when necessary, stretching is advised and the patient will see orthopedics Dr Lara tomorrow and will bring to his attention these symptoms  #2 patient to have abnormal labs that were obtained and ordered by endocrinology I asked the patient to contact endocrinology   to review the results and further recommendations  Chief Complaint  Chief Complaint Free Text Note Form: Follow up x-rays  Chief Complaint Chronic Condition [facility]: Patient is here today for follow up of chronic conditions described in HPI  History of Present Illness  HPI: 60-year-old female who is coming in for a follow-up Examination regarding upper back pain, to review the x-ray of her upper thoracic spine this does show mild discogenic degenerative changes of the disc, this reported 2-3 out of 10 on today with times its maximal to point that she can hardly move his noticed it over the last several months weeks ago when she initially presented symptoms were at their maximal therefore we ordered the x-ray been using ibuprofen cool it down down  Patient does report a fall many years ago where she landed on her lateral back shoulders are scaly   She reports to me "it felt like my brain was shifted in this occurred in my early 25s" she did not seek medical attention at that point in time there is no loss of consciousness  Review of Systems  Complete-Female:   Constitutional: No fever, no chills, feels well, no tiredness, no recent weight gain or weight loss  Cardiovascular: No complaints of slow heart rate, no fast heart rate, no chest pain, no palpitations, no leg claudication, no lower extremity edema  Respiratory: no shortness of breath, no wheezing and no shortness of breath during exertion  Gastrointestinal: No complaints of abdominal pain, no constipation, no nausea or vomiting, no diarrhea, no bloody stools  Active Problems    1  Abnormal blood chemistry (790 6) (R79 9)   2  Abnormal electrocardiogram (794 31) (R94 31)   3  Allergic rhinitis (477 9) (J30 9)   4  Anemia (285 9) (D64 9)   5  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)   6  Asthma (493 90) (J45 909)   7  Back pain, thoracic (724 1) (M54 6)   8  Benign essential hypertension (401 1) (I10)   9  Family history of Benign Polyps Of The Large Intestine (V18 51)   10  Bipolar I disorder, most recent episode mixed, in full remission (296 66) (F31 78)   11  Blood in stool (578 1) (K92 1)   12  Diabetes mellitus type 2, uncontrolled (250 02) (E11 65)   13  Diarrhea (787 91) (R19 7)   14  Dyslipidemia (272 4) (E78 5)   15  Edema (782 3) (R60 9)   16  Encounter for routine gynecological examination (V72 31) (Z01 419)   17  Encounter for screening mammogram for malignant neoplasm of breast (V76 12) (Z12 31)   18  Esophagitis, reflux (530 11) (K21 0)   19  Esophagitis, reflux (530 11) (K21 0)   20  Essential hypertriglyceridemia (272 1) (E78 1)   21  Fatigue (780 79) (R53 83)   22  Fatty liver (571 8) (K76 0)   23  Fecal occult blood test positive (792 1) (R19 5)   24  Gastroesophageal reflux disease (530 81) (K21 9)   25  History of allergy (V15 09) (Z88 9)   26  Hypoglycemia (251 2) (E16 2)   27   Iron deficiency anemia, unspecified iron deficiency (280 9) (D50 9)   28  Joint pain, knee (719 46) (M25 569)   29  Leukocytosis (288 60) (D72 829)   30  Muscle Cramps In The Thigh (Upper Leg) (729 82)   31  History of Need for pneumococcal vaccination (V03 82) (Z23)   32  Need for Tdap vaccination (V06 1) (Z23)   33  Obesity (278 00) (E66 9)   34  Obstructive sleep apnea (327 23) (G47 33)   35  Otitis media (382 9) (H66 90)   36  Pap smear, as part of routine gynecological examination (V76 2) (Z12 4)   37  Paronychia of toe (681 11) (L03 039)   38  Patellofemoral Dysfunction (736 6)   39  Personality disorder (301 9) (F60 9)   40  Postmenopausal Atrophic Vaginitis (627 3)   41  Prepatellar bursitis (726 65) (M70 40)   42  Right wrist pain (719 43) (M25 531)   43  Routine Gynecological Exam With Cervical Pap Smear (V72 31)   44  Symptomatic menopausal or female climacteric states (627 2) (N95 1)   45  Tinea corporis (110 5) (B35 4)   46  Type 2 diabetes mellitus (250 00) (E11 9)   47  Type 2 diabetes mellitus with hyperglycemia (250 00) (E11 65)   48  Viral gastroenteritis (008 8) (A08 4)   49  Visit for screening mammogram (V76 12) (Z12 31)   50  Vitamin D deficiency (268 9) (E55 9)   51  Dia-Parkinson-White Syndrome (426 7)    Past Medical History    1  History of Acute frontal sinusitis (461 1) (J01 10)   2  History of Acute sinusitis (461 9) (J01 90)   3  History of Cellulitis (682 9) (L03 90)   4  History of Contusion Of The Toe(S) With Intact Skin Surface (924 3)   5  History of Diabetes Mellitus (250 00)   6  History of Difficulty breathing (786 09) (R06 89)   7  History of abdominal pain (V13 89) (Z87 898)   8  History of abnormal weight loss (V13 89) (Z87 898)   9  History of acute bronchitis (V12 69) (Z87 09)   10  History of acute otitis media (V12 49) (Z86 69)   11  History of acute pharyngitis (V12 69) (Z87 09)   12  History of acute sinusitis (V12 69) (Z87 09)   13  History of candidiasis of mouth (V12 09) (Z86 19)   14   History of head injury (V15 59) (Z87 828)   15  History of headache (V13 89) (Z87 898)   16  History of laryngitis (V12 69) (Z87 09)   17  History of pharyngitis (V12 69) (Z87 09)   18  History of sebaceous cyst (V13 3) (Z87 2)   19  History of urinary frequency (V13 09) (Z87 898)   20  History of Hypoglycemia (251 2) (E16 2)   21  History of Mouth Sores (528 9)   22  History of Need for pneumococcal vaccination (V03 82) (Z23)   23  History of Otitis media, unspecified laterality   24  Denied: History of Seizures   25  History of Suicide Attempt (E958 9)   26  History of Tinea corporis (110 5) (B35 4)   27  History of Type B influenza (487 1) (J10 1)  Active Problems And Past Medical History Reviewed: The active problems and past medical history were reviewed and updated today  Surgical History    1  History of Oral Surgery Tooth Extraction   2  History of Tonsillectomy With Adenoidectomy   3  History of Vaginal Pap smear (V76 47) (Z12 72)  Surgical History Reviewed: The surgical history was reviewed and updated today  Family History    1  Family history of CABG   2  Family history of Diabetes Mellitus (V18 0)   3  Family history of Lung Cancer (V16 1)   4  Family history of Stroke Syndrome (V17 1)   5  Family history of Thyroid Disorder (V18 19)   6  Family history of Tobacco Use    7  Family history of Benign Polyps Of The Large Intestine (V18 51)   8  Family history of Bipolar illness   9  Family history of Heart Disease (V17 49)    10  Family history of depression (V17 0) (Z81 8)    11  Family history of Diabetes Mellitus (V18 0)   12  Family history of Thyroid Disorder (V18 19)    13  Denied: Family history of Colon Cancer   14  Denied: Family history of Crohn's Disease   15  Family history of Hypertension (V17 49)   16  Denied: Family history of Liver Cancer   17  Family history of Mother  At Age 52  Family History Reviewed: The family history was reviewed and updated today         Social History    · Being A Social Drinker   · Caffeine Use   · Denied: History of Drug Use   · Denied: History of Home Environment Domestic Violence   · Job Physical Requirements Heavy Lifting  Social History Reviewed: The social history was reviewed and updated today  The social history was reviewed and is unchanged  Current Meds   1  Atorvastatin Calcium 20 MG Oral Tablet; TAKE 1 TABLET DAILY AS DIRECTED; Therapy: 30QGE1010 to (Evaluate:29Apr2016)  Requested for: 02Jan2016; Last Rx:51Pty6756   Ordered   2  Azelastine HCl - 0 15 % Nasal Solution; USE 2 SPRAYS AT BEDTIME; Therapy: (Recorded:48Rrv0673) to Recorded   3  [employer] D3 5000 UNIT Oral Capsule; total 6000 iu daily; Last Rx:65Hgj6954 Ordered   4  Divalproex Sodium  MG Oral Tablet Extended Release 24 Hour; Take 6 tablets at bedtime; Therapy: 76FWY3546 to (Evaluate:04Jun2016)  Requested for: 82RAZ9381; Last Rx:06Jan2016   Ordered   5  Fenofibrate 145 MG Oral Tablet; Take 1 tablet daily; Therapy: 43KED1754 to (Evaluate:43Iwh4326)  Requested for: 01CPF2908; Last Rx:68Gll1937   Ordered   6  FLUoxetine HCl - 40 MG Oral Capsule; TAKE 2 CAPSULES DAILY; Therapy: 35BHU3229 to (Evaluate:04Jun2016)  Requested for: 12ZVG7776; Last Rx:06Jan2016   Ordered   7  Invokana 100 MG Oral Tablet; 1 tablet daily with breakfast;   Therapy: 75CAD2694 to (Evaluate:13Apr2016)  Requested for: 64ERY0257; Last Rx:12Eqy2558   Ordered   8  Lansoprazole 30 MG Oral Capsule Delayed Release; TAKE 1 CAPSULE EVERY MORNING DAILY; Therapy: 05FUL9784 to (Magali Gold)  Requested for: 00QVY9966; Last Rx:11Nov2015   Ordered   9  Lisinopril 20 MG Oral Tablet; TAKE 1 TABLET DAILY AS DIRECTED; Therapy: 85ZVY1742 to (Evaluate:32Tan0930)  Requested for: 22LFI9251; Last Rx:08Xvr1202   Ordered   10  MetFORMIN HCl  MG Oral Tablet Extended Release 24 Hour; 2 tabs twice per day with food; Therapy: 36WOO4889 to (Evaluate:26Jun2016)  Requested for: 68Sif9245; Last Rx:53Evp5948    Ordered   11  Mupirocin 2 % External Ointment Recorded   12  Natural Vitamin E 400 UNIT Oral Tablet; TAKE 2 5 TABLETS DAILY; Therapy: (Recorded:18Zwy4468) to Recorded   13  ProAir  (90 Base) MCG/ACT Inhalation Aerosol Solution; INHALE 1 TO 2 PUFFS EVERY 4 TO    6 HOURS AS NEEDED; Therapy: 84PIX4858 to Recorded   14  Symbicort 80-4 5 MCG/ACT Inhalation Aerosol; use 1 puff twice daily and rinse mouth after each    use; Therapy: 19TOM1858 to (Last Rx:18May2015) Ordered   15  Tanzeum 30 MG Subcutaneous Pen-injector; Use once a week; Therapy: 52DYY3733 to (Evaluate:26Apr2016)  Requested for: 77Iwx8789 Recorded  Medication List Reviewed: The medication list was reviewed and updated today  Allergies    1  Biaxin TABS   2  LaMICtal TABS   3  TEGretol TABS    4  Seasonal   5  Bee sting   6  Shellfish    Vitals  Vital Signs [Data Includes: Current Encounter]    Recorded: 12Jan2016 03:46PM   Heart Rate 88   Respiration 14   Systolic 888, RUE, Sitting   Diastolic 58, RUE, Sitting   BP Cuff Size Large   Height 5 ft 4 in   Weight 228 lb    BMI Calculated 39 14   BSA Calculated 2 07     Physical Exam   Examination of the thoracic spine the patient does have exquisite tenderness between T1 and T2 disc space interspace, T4 and T5 interspace and also has significant paravertebral musculature tenderness with palpation  She does sit when she is sitting she is hunched over examination also does show a kyphosis of the thoracic spine     Constitutional   General appearance: No acute distress, well appearing and well nourished  well developed, appears healthy, comfortable, obese, clothing appropriate and well hydrated  Eyes   Conjunctiva and lids: No swelling, erythema or discharge  Pupils and irises: Equal, round and reactive to light  Ears, Nose, Mouth, and Throat   External inspection of ears and nose: Normal     Otoscopic examination: Tympanic membranes translucent with normal light reflex   Canals patent without erythema  Nasal mucosa, septum, and turbinates: Normal without edema or erythema  Oropharynx: Normal with no erythema, edema, exudate or lesions  Pulmonary   Respiratory effort: No increased work of breathing or signs of respiratory distress  Auscultation of lungs: Clear to auscultation  Cardiovascular   Auscultation of heart: Normal rate and rhythm, normal S1 and S2, without murmurs  Examination of extremities for edema and/or varicosities: Normal     Abdomen   Abdomen: Non-tender, no masses  Liver and spleen: No hepatomegaly or splenomegaly  Lymphatic   Palpation of lymph nodes in neck: No lymphadenopathy  Results/Data  Encounter Results   *VB - Eye Exam 32HUJ9799 12:00AM Carry [place]     Test Name Result Flag Reference   Eye Exam 93PKP9289       Summary / No summary entered :      No summary entered  Documents attached :      sOpthalmology JATINDER Alcantar; Enc: [id number] - Image Encounter - - Exie Hamblen)      (Result Document)  Results   Summary / No summary entered :      No summary entered  Documents attached :      sOpthalmology - AHSAdmin, AHS; Enc: [id number] - Image Encounter - - Exie Hamblen)      (Result Document)  * XR Thoracic Spine 3 View 17FZO5294 12:03PM Carry [place]     Test Name Result Flag Reference   XR TSpine 3 View (Report)     [address] [place];;[place];[address]   12/14/2015 1248   12/14/2015 1255   3 IMAGES     THORACIC SPINE     INDICATION- Upper back pain      COMPARISON- None     VIEWS- AP, lateral and coned-down projections& 3 images^ 3 images     FINDINGS-     Thoracic vertebrae demonstrate normal stature and alignment  There is no fracture or pathologic bone lesion  Mild discogenic degenerative changes of the thoracic spine are noted  Marginal endplate spurring is noted  The pedicles are intact  IMPRESSION-       1  Mild discogenic degenerative changes of the thoracic spine     2  No evidence of acute osseous abnormality       Transcribed on- [id number]     - SHELLEY Astudillo MD   Reading Radiologist- SHELLEY Astudillo MD   Electronically Signed- SHELLEY Astudillo MD   Released Date Time- 12/15/15 1230   ------------------------------------------------------------------------------   54126^MINESH VICKERS DAVID   50902^MINESH VICKERS DAVID     Health Management  Diabetes mellitus type 2, uncontrolled   (1) MICROALBUMIN CREATININE RATIO, RANDOM URINE; every 1 year; Last 08YSO0005; Next Due:  10LQH0103; Overdue  *VB - Eye Exam; every 1 year; Last 96Liz2342; Next Due: 30NOX2802; Active  Visit for screening mammogram   Digital Bilateral Screening Mammogram With CAD; every 1 year; Next Due: 50Lxn0230; Active    Future Appointments    Date/Time Provider Specialty Site   02/29/2016 02:15 PM MOLLY Redding ,  Hematology Oncology [address] MEDICAL ONCOLOGY   01/13/2016 01:15 PM MOLLY Tomlin  Orthopedic Surgery [facility]   02/18/2016 09:45 AM Amirah López Endocrinology [facility] ENDOCRINOLOGY   04/07/2016 03:00 PM MOLLY Contreras   Psychiatry [facility]   05/10/2016 03:45 PM Sandra Ha DO Internal Medicine [facility]   Electronically signed by : Valentín Espana DO; Jan 12 2016  8:23PM EST                       (Author)

## 2018-01-11 NOTE — RESULT NOTES
Message   Notify the patient x-ray of the thoracic spine does show mild endplate hypertrophic degenerative changes please have the patient go for physical therapy and follow up as scheduled        Verified Results  * XR SPINE THORACIC 3 VIEW [id number] 08:08AM Lavon Garcia Order Number: [id number]     Test Name Result Flag Reference   XR SPINE THORACIC 3 VW (Report)     THORACIC SPINE     INDICATION: Mid back pain  COMPARISON: 12/14/2015     VIEWS: AP, lateral and coned-down projections     IMAGES: 3     FINDINGS:     Thoracic vertebrae demonstrate normal stature and alignment  There is no fracture or pathologic bone lesion  Mild anterior lateral endplate osteophyte formation within the mid to lower thoracic spine  Paraspinal soft tissues are grossly unremarkable  The pedicles are intact  IMPRESSION:     Stable mild endplate hypertrophic degenerative change  Workstation performed: [id number]     Signed by:   Roxie Lyles DO   4/5/17       Signatures   Electronically signed by : Jessie Ram DO;  Apr 5 2017  9:13PM EST                       (Author)

## 2018-01-12 VITALS
HEART RATE: 84 BPM | DIASTOLIC BLOOD PRESSURE: 70 MMHG | BODY MASS INDEX: 38.84 KG/M2 | WEIGHT: 227.5 LBS | SYSTOLIC BLOOD PRESSURE: 158 MMHG | HEIGHT: 64 IN

## 2018-01-12 VITALS
RESPIRATION RATE: 20 BRPM | HEART RATE: 78 BPM | DIASTOLIC BLOOD PRESSURE: 68 MMHG | HEIGHT: 64 IN | BODY MASS INDEX: 35 KG/M2 | SYSTOLIC BLOOD PRESSURE: 146 MMHG | WEIGHT: 205 LBS

## 2018-01-12 VITALS
WEIGHT: 211 LBS | SYSTOLIC BLOOD PRESSURE: 168 MMHG | HEIGHT: 64 IN | BODY MASS INDEX: 36.02 KG/M2 | DIASTOLIC BLOOD PRESSURE: 60 MMHG

## 2018-01-12 NOTE — MISCELLANEOUS
Provider Comments  Provider Comments:   Patient called and rescheduled follow-up appointment        Signatures   Electronically signed by : Dilcia Daniel, ; Jul 24 2016 11:25AM EST                       (Author)

## 2018-01-12 NOTE — RESULT NOTES
Discussion/Summary   Stable subcentimeter nodules, repeat in 1 year  Verified Results  US THYROID [id number] 09:00AM Kenny Jones     Test Name Result Flag Reference   US THYROID (Report)     THYROID ULTRASOUND     INDICATION: Thyroid nodule, follow-up     COMPARISON: May 31, 2016     TECHNIQUE:  Ultrasound of the thyroid was performed with a high frequency linear transducer in transverse and sagittal planes including volumetric imaging sweeps as well as traditional still imaging technique  FINDINGS:   Normal homogeneous smooth echotexture  Right gland: 5 3 x 1 4 x 1 6 cm  No dominant nodules  5 x 3 x 5 mm oval intermediate echogenicity nonvascular nodule anterior upper pole although not individually measured on prior study shows no suspicious interval change  6 x 4 x 6 mm spongiform-appearing nodule in the mid posterior medial and, also without suspicious interval change compared to prior imaging  Left gland: 5 2 x 1 7 x 1 5 cm  No dominant nodules  9 x 7 x 7 mm round spongiform-appearing nodule in the LEFT mid upper gland without suspicious interval change  2 adjacent colloid cysts seen in the anterior upper and mid upper gland, 7 x 4 x 6 mm and 8 x 3 x 6 point/ML     Isthmus: The isthmus is 0 4 cm in AP dimension  IMPRESSION:      Several subcentimeter nodules are again seen bilaterally, with features strongly favoring benign etiology as above               Workstation performed: [id number]     Signed by:   Parker Nash MD   5/18/17

## 2018-01-12 NOTE — RESULT NOTES
Message   Notify the patient low vitamin D level, please let me know how much vitamin D the patient is currently taking        Verified Results  (1) VITAMIN D 25-HYDROXY 22Umr2218 07:58AM Kiah Bahena     Test Name Result Flag Reference   VIT D 25-HYDROX 20 6 ng/mL L 30 0-100 0   This assay is a certified procedure of the CDC Vitamin D Standardization Certification Program (VDSCP)     Deficiency <20ng/ml   Insufficiency 20-30ng/ml   Sufficient  ng/ml     *Patients undergoing fluorescein dye angiography may retain small amounts of fluorescein in the body for 48-72 hours post procedure  Samples containing fluorescein can produce falsely elevated Vitamin D values  If the patient had this procedure, a specimen should be resubmitted post fluorescein clearance         Signatures   Electronically signed by : Trudi Prader, DO; Oct 12 2016  8:12PM EST                       (Author)

## 2018-01-12 NOTE — PSYCH
Psych Med Mgmt    Appearance: was calm and cooperative, adequate hygiene and grooming and good eye contact  Observed mood: mood appropriate  Observed mood: affect appropriate  Speech: a normal rate  Thought processes: coherent/organized  Hallucinations: no hallucinations present  Thought Content: no delusions  Abnormal Thoughts: The patient has no suicidal thoughts and no homicidal thoughts  Orientation: The patient is oriented to person, place and time  Recent and Remote Memory: short term memory intact and long term memory intact  Attention Span And Concentration: concentration intact  Insight: Insight intact  Judgment: Her judgment was intact  Muscle Strength And Tone  Muscle strength and tone were normal  Normal gait and station  Language: no difficulty naming common objects  Fund of knowledge: Patient displays adequate knowledge of current events, adequate fund of knowledge regarding past history and adequate fund of knowledge regarding vocabulary  The patient is experiencing no localized pain  Treatment Recommendations: Taper off Depakote ER due to possible pancreatitis  Start Latuda 20 mg in the evening  Continue Prozac 80 mg daily  Follows with PCP for medical issues  Risks, Benefits And Possible Side Effects Of Medications: Risks, benefits, and possible side effects of medications explained to patient and patient verbalizes understanding  She reports normal appetite, decreased energy, recent 2 lbs weight gain  and decrease in number of sleep hours 7  Dafnerenny Duffy states has been doing well since last visit  able to handle night shift arrangements  Mood has been been stable, no significant depression  No suicidal or homicidal ideation  No psychotic symptoms  Sleep fluctuates  No side effects form medications reported, but states she was seen by gastroenterologist due to possible mild pancreatitis (had changes on abdominal ultrasound)          Vitals  Signs Recorded: 78OHF9015 95:96MA   Systolic: 164  Diastolic: 72  Heart Rate: 87  Height: 5 ft 4 in  Weight: 224 lb   BMI Calculated: 38 45  BSA Calculated: 2 05  BP Cuff Size: Large    Assessment    1  Bipolar I disorder, most recent episode mixed, in full remission (296 66) (F31 78)    Plan    1  Divalproex Sodium  MG Oral Tablet Extended Release 24 Hour   2  Latuda 20 MG Oral Tablet; Take 1 tablet in the evening with food   3  FLUoxetine HCl - 40 MG Oral Capsule (PROzac); TAKE 2 CAPSULES DAILY    4  Follow-up visit in 5 weeks Evaluation and Treatment  Follow-up  Status: Hold For -   Scheduling  Requested for: 58Frm8338    Review of Systems    Constitutional: recent 2 lb weight gain and feeling tired  Cardiovascular: no complaints of slow or fast heart rate, no chest pain, no palpitations  Respiratory: shortness of breath and 2 days ago  Gastrointestinal: abdominal pain  Genitourinary: no complaints of dysuria, no incontinence, no pelvic pain, no urinary frequency  Musculoskeletal: no complaints of arthralgia, no myalgias, no limb pain, no joint stiffness  Integumentary: no complaints of skin rash, no itching, no dry skin  Neurological: no complaints of headache, no confusion, no numbness, no dizziness  Past Psychiatric History    Past Psychiatric History: One prior inpatient psychiatric admission years ago at [facility]  2 prior suicide attempts by overdose years ago  Substance Abuse Hx    Substance Abuse History: No history or drug use  Only occasional social alcohol use  Active Problems    1  Abdominal pain, epigastric (789 06) (R10 13)   2  Abnormal blood chemistry (790 6) (R79 9)   3  Abnormal CAT scan (793 99) (R93 8)   4  Abnormal electrocardiogram (794 31) (R94 31)   5  Acute sinusitis (461 9) (J01 90)   6  Allergic rhinitis (477 9) (J30 9)   7  Anemia (285 9) (D64 9)   8  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)   9  Asthma (493 90) (J45 909)   10   Family history of Benign Polyps Of The Large Intestine (V18 51)   11  Bilateral knee pain (719 46) (M25 561,M25 562)   12  Bipolar I disorder, most recent episode mixed, in full remission (296 66) (F31 78)   13  Blood in stool (578 1) (K92 1)   14  Cervical disc disease (722 91) (M50 90)   15  Cramp of both lower extremities (729 82) (R25 2)   16  Degeneration of thoracic intervertebral disc (722 51) (M51 34)   17  Degenerative arthritis of knee, bilateral (715 96) (M17 0)   18  Diabetes mellitus type 2, uncontrolled (250 02) (E11 65)   19  Diarrhea (787 91) (R19 7)   20  Dizziness (780 4) (R42)   21  Dyslipidemia (272 4) (E78 5)   22  Edema (782 3) (R60 9)   23  Encounter for routine gynecological examination (V72 31) (Z01 419)   24  Encounter for screening mammogram for malignant neoplasm of breast (V76 12)    (Z12 31)   25  Esophagitis, reflux (530 11) (K21 0)   26  Esophagitis, reflux (530 11) (K21 0)   27  Essential hypertriglyceridemia (272 1) (E78 1)   28  Fatigue (780 79) (R53 83)   29  Fatty liver (571 8) (K76 0)   30  Fecal occult blood test positive (792 1) (R19 5)   31  Gait disturbance (781 2) (R26 9)   32  Gastroesophageal reflux disease (530 81) (K21 9)   33  History of allergy (V15 09) (Z88 9)   34  Hypoglycemia (251 2) (E16 2)   35  Iron deficiency anemia, unspecified iron deficiency   36  Joint pain, knee (719 46) (M25 569)   37  Leukocytosis (288 60) (D72 829)   38  Long term current use of therapeutic drug (V58 69) (Z79 899)   39  Lost voice (784 41) (R49 1)   40  Multiple thyroid nodules (241 1) (E04 2)   41  Muscle Cramps In The Thigh (Upper Leg) (729 82)   42  Neck pain (723 1) (M54 2)   43  History of Need for pneumococcal vaccination (V03 82) (Z23)   44  Need for prophylactic vaccination and inoculation against influenza (V04 81) (Z23)   45  Need for Tdap vaccination (V06 1) (Z23)   46  Neuropathy (355 9) (G62 9)   47  Obesity (278 00) (E66 9)   48  Obstructive sleep apnea (327 23) (G47 33)   49   Otitis media (382  9) (H66 90)   50  Pain, dental (525 9) (K08 8)   51  Pap smear, as part of routine gynecological examination (V76 2) (Z01 419)   52  Paronychia of toe (681 11) (L03 039)   53  Patellofemoral Dysfunction (736 6)   54  Personality disorder (301 9) (F60 9)   55  Postmenopausal Atrophic Vaginitis (627 3)   56  Prepatellar bursitis (726 65) (M70 40)   57  Right wrist pain (719 43) (M25 531)   58  Routine Gynecological Exam With Cervical Pap Smear (V72 31)   59  Sore throat (462) (J02 9)   60  Symptomatic menopausal or female climacteric states (627 2) (N95 1)   61  Thoracic spine pain (724 1) (M54 6)   62  Tinea corporis (110 5) (B35 4)   63  Type 2 diabetes mellitus (250 00) (E11 9)   64  Type 2 diabetes mellitus with hyperglycemia (250 00) (E11 65)   65  Vertigo (780 4) (R42)   66  Viral gastroenteritis (008 8) (A08 4)   67  Visit for screening mammogram (V76 12) (Z12 31)   68  Vitamin D deficiency (268 9) (E55 9)   69  Dia-Parkinson-White Syndrome (426 7)    Past Medical History    1  History of Acute frontal sinusitis (461 1) (J01 10)   2  History of Benign essential hypertension (401 1) (I10)   3  History of Cellulitis (682 9) (L03 90)   4  History of Contusion Of The Toe(S) With Intact Skin Surface (924 3)   5  History of Diabetes Mellitus (250 00)   6  History of Difficulty breathing (786 09) (R06 89)   7  History of High cholesterol (272 0) (E78 0)   8  History of abdominal pain (V13 89) (Z87 898)   9  History of abnormal weight loss (V13 89) (Z87 898)   10  History of acute bronchitis (V12 69) (Z87 09)   11  History of acute otitis media (V12 49) (Z86 69)   12  History of acute pharyngitis (V12 69) (Z87 09)   13  History of acute sinusitis (V12 69) (Z87 09)   14  History of candidiasis of mouth (V12 09) (Z86 19)   15  History of head injury (V15 59) (Z87 828)   16  History of headache (V13 89) (Z87 898)   17  History of laryngitis (V12 69) (Z87 09)   18  History of pharyngitis (V12 69) (Z87 09)   19  History of sebaceous cyst (V13 3) (Z87 2)   20  History of urinary frequency (V13 09) (Z87 898)   21  History of Hypoglycemia (251 2) (E16 2)   22  History of Mouth Sores (528 9)   23  History of Need for pneumococcal vaccination (V03 82) (Z23)   24  History of Otitis media, unspecified laterality   25  Denied: History of Seizures   26  History of Suicide Attempt (E958 9)   27  History of Tinea corporis (110 5) (B35 4)   28  History of Type B influenza (487 1) (J10 1)    The active problems and past medical history were reviewed and updated today  Allergies    1  Biaxin TABS   2  LaMICtal TABS   3  TEGretol TABS   4  Tanzeum PEN    5  Seasonal   6  Bee sting   7  Shellfish    Current Meds   1  Aspir-81 81 MG Oral Tablet Delayed Release; TAKE 1 TABLET DAILY; Therapy: 46LIZ0290 to (Evaluate:39Won2948); Last Rx:32Vmr7326 Ordered   2  Atorvastatin Calcium 40 MG Oral Tablet; TAKE 1 TABLET DAILY AS DIRECTED; Therapy: 30ZBY3100 to (Evaluate:50Kpu4156)  Requested for: 78NZP2812; Last   Rx:69Flr9838 Ordered   3  Azelastine HCl - 0 15 % Nasal Solution; USE 2 SPRAYS AT BEDTIME; Therapy: (Recorded:24Ggx3367) to Recorded   4  Cholestyramine 4 GM Oral Packet; TAKE 1 PACKET 3 times daily PRN; Therapy: 11Tpy3893 to (Evaluate:97Wyz9660)  Requested for: 67Bqi7886; Last   Rx:31Etd9398 Ordered   5  Divalproex Sodium  MG Oral Tablet Extended Release 24 Hour; Take 6 tablets at   bedtime; Therapy: 76OEY3393 to (Evaluate:84Jiu0210)  Requested for: 76Thh7920; Last   Rx:51Lon8568 Ordered   6  Fenofibrate 145 MG Oral Tablet; Take 1 tablet daily; Therapy: 44NVY4960 to (Evaluate:74Fmp4882)  Requested for: 11ULX2088; Last   Rx:80Apw1539 Ordered   7  FLUoxetine HCl - 40 MG Oral Capsule; TAKE 2 CAPSULES DAILY; Therapy: 97BTO4037 to (Evaluate:99Hkg1031)  Requested for: 56Zzr4397; Last   Rx:06Rzj3779 Ordered   8  Invokana 300 MG Oral Tablet; Take 1 tablet daily;    Therapy: 93GIQ4880 to (Evaluate:14Nov2016)  Requested for: 16IVI2587; Last   Rx:66Gav3448 Ordered   9  Lansoprazole 30 MG Oral Capsule Delayed Release; TAKE 1 CAPSULE EVERY   MORNING DAILY; Therapy: 75GRZ1481 to (Glenn Kidd)  Requested for: 01Hwx1478; Last   Rx:62Sfh9843 Ordered   10  Lisinopril 20 MG Oral Tablet; take 1/2 tablet daily; Therapy: 55Gdd3996-(Evaluate:22Nsp7786)  Requested for: 88EVY6464 Ordered   11  MetFORMIN HCl  MG Oral Tablet Extended Release 24 Hour; 2 tabs twice per day    with food; Therapy: 22YZM8630 to (Evaluate:58Keb8830)  Requested for: 24TCW1001; Last    Rx:26Ubb2343 Ordered   12  Natural Vitamin E 400 UNIT Oral Tablet; TAKE 2 5 TABLETS DAILY; Therapy: (Recorded:63Zrp1337) to Recorded   13  ProAir  (90 Base) MCG/ACT Inhalation Aerosol Solution; INHALE 1 TO 2 PUFFS    EVERY 4 TO 6 HOURS AS NEEDED; Therapy: 18TYT1516 to Recorded   14  Symbicort 80-4 5 MCG/ACT Inhalation Aerosol; use 1 puff twice daily and rinse mouth    after each use; Therapy: 23OWK1605 to (Last Rx:76Xsk6870) Ordered   15  Tanzeum 50 MG Subcutaneous Pen-injector; Use 1 per week; Therapy: 77WZI9557 to (Evaluate:07Jun2016)  Requested for: 26GGI1819; Last    Rx:60How4652 Ordered   16  Vitamin D 2000 UNIT Oral Capsule Recorded   17  Vitamin D3 1000 UNIT Oral Capsule; TAKE 7 CAPSULE Daily; Therapy: (Recorded:40Psm3996) to Recorded    The medication list was reviewed and updated today  Family Psych History  Mother    1  Family history of CABG   2  Family history of Diabetes Mellitus (V18 0)   3  Family history of Lung Cancer (V16 1)   4  Family history of Stroke Syndrome (V17 1)   5  Family history of Thyroid Disorder (V18 19)   6  Family history of Tobacco Use  Father    7  Family history of Benign Polyps Of The Large Intestine (V18 51)   8  Family history of Bipolar illness   9  Family history of Heart Disease (V17 49)  Brother    8  Family history of depression (V17 0) (Z81 8)  Maternal Grandmother    11   Family history of Diabetes Mellitus (V18 0)   12  Family history of Thyroid Disorder (V18 19)  Family History    13  Denied: Family history of Colon Cancer   14  Denied: Family history of Crohn's Disease   15  Family history of Hypertension (V17 49)   16  Denied: Family history of Liver Cancer   17  Family history of Mother  At Age 52    The family history was reviewed and updated today  Social History    · Caffeine Use   · Denied: History of Drug Use   · Denied: History of Home Environment Domestic Violence   · Job Physical Requirements Heavy Lifting   · Non-smoker (V49 89) (Z78 9)   · Social drinker  The social history was reviewed and updated today  The social history was reviewed and is unchanged  , lives with   No children  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  No history of legal problems  No  history  History Of Phys/Sex Abuse Or Perpetration    History Of Phys/Sex Abuse or Perpetration: No history of physical or sexual abuse  End of Encounter Meds    1  Azelastine HCl - 0 15 % Nasal Solution; USE 2 SPRAYS AT BEDTIME; Therapy: (Recorded:95Ktc3845) to Recorded    2  ProAir  (90 Base) MCG/ACT Inhalation Aerosol Solution; INHALE 1 TO 2 PUFFS   EVERY 4 TO 6 HOURS AS NEEDED; Therapy: 26BGV7840 to Recorded   3  Symbicort 80-4 5 MCG/ACT Inhalation Aerosol; use 1 puff twice daily and rinse mouth   after each use; Therapy: 42XKW6959 to (Last Rx:2015) Ordered    4  FLUoxetine HCl - 40 MG Oral Capsule (PROzac); TAKE 2 CAPSULES DAILY; Therapy: 56FXQ6100 to (Evaluate:08Xmy3106)  Requested for: 48Qrt1648; Last   Rx:81Rej7107 Ordered   5  Latuda 20 MG Oral Tablet; Take 1 tablet in the evening with food; Therapy: 87OLI2625 to (Evaluate:2016); Last Rx:30Oyb8778 Ordered    6  Invokana 300 MG Oral Tablet; Take 1 tablet daily; Therapy: 86GPF9531 to (Evaluate:2016)  Requested for: 99MDL5820; Last   Rx:50Xsc5596 Ordered   7  MetFORMIN HCl  MG Oral Tablet Extended Release 24 Hour; 2 tabs twice per day   with food; Therapy: 47WCI5817 to (Evaluate:52Bmf4011)  Requested for: 79WPJ8612; Last   Rx:13Jun2016 Ordered    8  Aspir-81 81 MG Oral Tablet Delayed Release; TAKE 1 TABLET DAILY; Therapy: 06GBR7108 to (Evaluate:17Qco5331); Last Rx:01Qsp2888 Ordered    9  Cholestyramine 4 GM Oral Packet (Questran); TAKE 1 PACKET 3 times daily PRN; Therapy: 13Hgi9762 to (Evaluate:65Jtj4896)  Requested for: 29Hny3165; Last   Rx:03Cpz2319 Ordered    10  Lansoprazole 30 MG Oral Capsule Delayed Release; TAKE 1 CAPSULE EVERY    MORNING DAILY; Therapy: 77XYW0018 to (Elissa Brendal)  Requested for: 80Ghd3009; Last    Rx:23Ore5589 Ordered    11  Atorvastatin Calcium 40 MG Oral Tablet; TAKE 1 TABLET DAILY AS DIRECTED; Therapy: 60HYE2412 to (Evaluate:92Xdd5355)  Requested for: 89SQX9505; Last    Rx:12Jlx4100 Ordered   12  Fenofibrate 145 MG Oral Tablet (Tricor); Take 1 tablet daily; Therapy: 23XKG7550 to (Evaluate:66Pua4324)  Requested for: 10OUQ0535; Last    Rx:89Jjh2918 Ordered    13  Natural Vitamin E 400 UNIT Oral Tablet; TAKE 2 5 TABLETS DAILY; Therapy: (Recorded:65Lpg3233) to Recorded    14  Lisinopril 20 MG Oral Tablet; take 1/2 tablet daily; Therapy: 51Fyx0739-(Evaluate:88Rds2179)  Requested for: 91TWK1937 Ordered    15  Tanzeum 50 MG Subcutaneous Pen-injector; Use 1 per week; Therapy: 16FCR1247 to (Evaluate:07Jun2016)  Requested for: 30MRO7817; Last    Rx:30Ped9296 Ordered    16  Vitamin D 2000 UNIT Oral Capsule Recorded   17  Vitamin D3 1000 UNIT Oral Capsule; TAKE 7 CAPSULE Daily;     Therapy: (Recorded:75Jpw7769) to Recorded    Future Appointments    Date/Time Provider Specialty Site   12/22/2016 04:00 PM MOLLY Huerta , DO, [facility] Hematology Oncology [facility]ER CARE [facility] MEDICAL ONCOLOGY   08/01/2016 02:45 PM Marco Gutierres Endocrinology [facility]   08/08/2016 02:00 PM MOLLY Martinez  Gastroenterology [facility]   08/25/2016 04:00 PM Corinne Gleason DO Internal Medicine [facility]     Signatures   Electronically signed by : MOLLY Her ; Jul 26 2016  5:00PM EST                       (Author)

## 2018-01-12 NOTE — MISCELLANEOUS
Message   Recorded as Task   Date: 10/12/2017 09:11 AM, Created By: Mitch Whitfield   Task Name: Medical Complaint Callback   Assigned To: Speedy Gillis   Regarding Patient: Nacho Espinoza, Status: Active   Sandra Avalos - 12 Oct 2017 9:11 AM     TASK CREATED  Caller: Self; Medical Complaint; [phone] (Home); [phone] x,,,,, (Work)  Glynn Amber called this morning to explain her meds were recently increased, but she's more irritated, crying more often, increased appetite, sleeping more in the day  The dose increase of her Jack Sanches is not working   Spoke with patient - she is more irritated, crying more often    Plan: increase Latuda to 80 mg in the evening (escript sent)  Decrease Prozac to 40 mg daily      Plan  Bipolar I disorder, most recent episode depressed, moderate    · Latuda 80 MG Oral Tablet; TAKE 1 TABLET IN THE EVENING WITH DINNER    Signatures   Electronically signed by : MOLLY Schwarz ; Oct 12 2017  2:54PM EST                       (Author)

## 2018-01-12 NOTE — PSYCH
Psych Med Mgmt    Appearance: was calm and cooperative, adequate hygiene and grooming and good eye contact  Observed mood: mood appropriate  Observed mood: affect appropriate  Speech: a normal rate  Thought processes: coherent/organized  Hallucinations: no hallucinations present  Thought Content: no delusions  Abnormal Thoughts: The patient has no suicidal thoughts and no homicidal thoughts  Orientation: The patient is oriented to person, place and time  Recent and Remote Memory: short term memory intact and long term memory intact  Attention Span And Concentration: concentration intact  Insight: Insight intact  Judgment: Her judgment was intact  Muscle Strength And Tone  Muscle strength and tone were normal  Normal gait and station  Language: no difficulty naming common objects, no difficulty repeating a phrase and no difficulty writing a sentence  Fund of knowledge: Patient displays adequate knowledge of current events, adequate fund of knowledge regarding past history and adequate fund of knowledge regarding vocabulary  The patient is experiencing no localized pain  Treatment Recommendations: Off Depakote ER  Increase Latuda to 40 mg in the evening  Continue Prozac 80 mg per daily (takes 40 mg bid)  Follows with PCP for medical issues  Risks, Benefits And Possible Side Effects Of Medications: Risks, benefits, and possible side effects of medications explained to patient and patient verbalizes understanding  She reports normal appetite, normal energy level, no weight change and normal number of sleep hours  Thanh Mckeon states has been doing well since last visit  Mood remains stable, no significant depression  Has more energy and more motivation  No suicidal or homicidal ideation  No psychotic symptoms  Sleep is improved  No side effects form medications reported          Vitals  Signs   Recorded: 18IHZ9963 20:23GQ   Systolic: 746  Diastolic: 85  Heart Rate: 89  Height: 5 ft 4 in  Weight: 217 lb   BMI Calculated: 37 25  BSA Calculated: 2 03  BP Cuff Size: Large    Assessment    1  Bipolar I disorder, most recent episode mixed, in full remission (296 66) (F31 78)   2  Personality disorder (301 9) (F60 9)    Plan    1  From  Latuda 20 MG Oral Tablet Take 1 tablet in the evening with food To   Latuda 40 MG Oral Tablet Take 1 tablet in the evening with food    2  Follow-up visit in 2 months Evaluation and Treatment  Follow-up  Status: Hold For -   Scheduling  Requested for: 04Gej0366    Review of Systems    Constitutional: No fever, no chills, feels well, no tiredness, no recent weight gain or loss  Cardiovascular: no complaints of slow or fast heart rate, no chest pain, no palpitations  Respiratory: no complaints of shortness of breath, no wheezing, no dyspnea on exertion  Gastrointestinal: no complaints of abdominal pain, no constipation, no nausea, no diarrhea, no vomiting  Genitourinary: no complaints of dysuria, no incontinence, no pelvic pain, no urinary frequency  Musculoskeletal: no complaints of arthralgia, no myalgias, no limb pain, no joint stiffness  Integumentary: no complaints of skin rash, no itching, no dry skin  Neurological: no complaints of headache, no confusion, no numbness, no dizziness  Past Psychiatric History    Past Psychiatric History: One prior inpatient psychiatric admission years ago at [facility]  2 prior suicide attempts by overdose years ago  Substance Abuse Hx    Substance Abuse History: No history or drug use  Only occasional social alcohol use  Active Problems    1  Abdominal pain, epigastric (789 06) (R10 13)   2  Abnormal blood chemistry (790 6) (R79 9)   3  Abnormal CAT scan (793 99) (R93 8)   4  Abnormal electrocardiogram (794 31) (R94 31)   5  Acute sinusitis (461 9) (J01 90)   6  Allergic rhinitis (477 9) (J30 9)   7  Anemia (285 9) (D64 9)   8  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)   9   Asthma (493 90) (J45 909)   10  Benign essential hypertension (401 1) (I10)   11  Family history of Benign Polyps Of The Large Intestine (V18 51)   12  Bilateral knee pain (719 46) (M25 561,M25 562)   13  Bipolar I disorder, most recent episode mixed, in full remission (296 66) (F31 78)   14  Blood in stool (578 1) (K92 1)   15  Cervical disc disease (722 91) (M50 90)   16  Cramp of both lower extremities (729 82) (R25 2)   17  Degeneration of thoracic intervertebral disc (722 51) (M51 34)   18  Degenerative arthritis of knee, bilateral (715 96) (M17 0)   19  Diabetes mellitus type 2, uncontrolled (250 02) (E11 65)   20  Diarrhea (787 91) (R19 7)   21  Dizziness (780 4) (R42)   22  Dyslipidemia (272 4) (E78 5)   23  Edema (782 3) (R60 9)   24  Encounter for routine gynecological examination (V72 31) (Z01 419)   25  Encounter for screening mammogram for malignant neoplasm of breast (V76 12)    (Z12 31)   26  Esophagitis, reflux (530 11) (K21 0)   27  Esophagitis, reflux (530 11) (K21 0)   28  Essential hypertriglyceridemia (272 1) (E78 1)   29  Excessive sweating (780 8) (R61)   30  Fatigue (780 79) (R53 83)   31  Fatty liver (571 8) (K76 0)   32  Fecal occult blood test positive (792 1) (R19 5)   33  Gait disturbance (781 2) (R26 9)   34  Gastroesophageal reflux disease (530 81) (K21 9)   35  History of allergy (V15 09) (Z88 9)   36  Hypoglycemia (251 2) (E16 2)   37  Iron deficiency anemia, unspecified iron deficiency   38  Joint pain, knee (719 46) (M25 569)   39  Leukocytosis (288 60) (D72 829)   40  Long term current use of therapeutic drug (V58 69) (Z79 899)   41  Lost voice (784 41) (R49 1)   42  Multiple thyroid nodules (241 1) (E04 2)   43  Muscle Cramps In The Thigh (Upper Leg) (729 82)   44  Neck pain (723 1) (M54 2)   45  History of Need for pneumococcal vaccination (V03 82) (Z23)   46  Need for prophylactic vaccination and inoculation against influenza (V04 81) (Z23)   47  Need for Tdap vaccination (V06 1) (Z23)   48  Neuropathy (355 9) (G62 9)   49  Obesity (278 00) (E66 9)   50  Obstructive sleep apnea (327 23) (G47 33)   51  Otitis media (382 9) (H66 90)   52  Pain, dental (525 9) (K08 8)   53  Pap smear, as part of routine gynecological examination (V76 2) (Z01 419)   54  Paronychia of toe (681 11) (L03 039)   55  Patellofemoral Dysfunction (736 6)   56  Personality disorder (301 9) (F60 9)   57  Postmenopausal Atrophic Vaginitis (627 3)   58  Prepatellar bursitis (726 65) (M70 40)   59  Right wrist pain (719 43) (M25 531)   60  Routine Gynecological Exam With Cervical Pap Smear (V72 31)   61  Sore throat (462) (J02 9)   62  Symptomatic menopausal or female climacteric states (627 2) (N95 1)   63  Thoracic spine pain (724 1) (M54 6)   64  Tinea corporis (110 5) (B35 4)   65  Type 2 diabetes mellitus (250 00) (E11 9)   66  Type 2 diabetes mellitus with hyperglycemia (250 00) (E11 65)   67  Vertigo (780 4) (R42)   68  Viral gastroenteritis (008 8) (A08 4)   69  Visit for screening mammogram (V76 12) (Z12 31)   70  Vitamin D deficiency (268 9) (E55 9)   71  Dia-Parkinson-White Syndrome (426 7)    Past Medical History    1  History of Acute frontal sinusitis (461 1) (J01 10)   2  History of Cellulitis (682 9) (L03 90)   3  History of Contusion Of The Toe(S) With Intact Skin Surface (924 3)   4  History of Diabetes Mellitus (250 00)   5  History of Difficulty breathing (786 09) (R06 89)   6  History of High cholesterol (272 0) (E78 0)   7  History of abdominal pain (V13 89) (Z87 898)   8  History of abnormal weight loss (V13 89) (Z87 898)   9  History of acute bronchitis (V12 69) (Z87 09)   10  History of acute otitis media (V12 49) (Z86 69)   11  History of acute pharyngitis (V12 69) (Z87 09)   12  History of acute sinusitis (V12 69) (Z87 09)   13  History of candidiasis of mouth (V12 09) (Z86 19)   14  History of head injury (V15 59) (Z87 828)   15  History of headache (V13 89) (Z87 898)   16   History of laryngitis (V12 69) (Z87 09)   17  History of pharyngitis (V12 69) (Z87 09)   18  History of sebaceous cyst (V13 3) (Z87 2)   19  History of urinary frequency (V13 09) (Z87 898)   20  History of Hypoglycemia (251 2) (E16 2)   21  History of Mouth Sores (528 9)   22  History of Need for pneumococcal vaccination (V03 82) (Z23)   23  History of Otitis media, unspecified laterality   24  Denied: History of Seizures   25  History of Suicide Attempt (E958 9)   26  History of Tinea corporis (110 5) (B35 4)   27  History of Type B influenza (487 1) (J10 1)    The active problems and past medical history were reviewed and updated today  Allergies    1  Biaxin TABS   2  LaMICtal TABS   3  TEGretol TABS   4  Tanzeum PEN    5  Seasonal   6  Bee sting   7  Shellfish    Current Meds   1  Atorvastatin Calcium 20 MG Oral Tablet; TAKE 1 TABLET DAILY; Therapy: 72ZTZ6842 to (Evaluate:32Qtl8003)  Requested for: 76Vky4137; Last   Rx:63Mwb0703 Ordered   2  Azelastine HCl - 0 15 % Nasal Solution; USE 2 SPRAYS AT BEDTIME; Therapy: (Recorded:44Zer9074) to Recorded   3  BD Pen Needle Susie U/F 32G X 4 MM Miscellaneous; inject once daily; Therapy: 24QLB0454 to (Evaluate:42Tsu2364)  Requested for: 41Rfp7165; Last   Rx:63Eag6656 Ordered   4  Cholestyramine 4 GM Oral Packet; TAKE 1 PACKET 3 times daily PRN; Therapy: 29Ddj9086 to (Evaluate:46Kvf8920)  Requested for: 28Xeb1113; Last   Rx:29Jlf4023 Ordered   5  Fenofibrate 145 MG Oral Tablet; Take 1 tablet daily; Therapy: 10GFA1930 to (Evaluate:26Giv7229)  Requested for: 51YCN9867; Last   Rx:51Uky3058 Ordered   6  FLUoxetine HCl - 40 MG Oral Capsule; TAKE 2 CAPSULES DAILY; Therapy: 06NMA9988 to (Evaluate:22Zxi0362)  Requested for: 85Oky3049; Last   Rx:50Npm4668 Ordered   7  Invokana 300 MG Oral Tablet; Take 1 tablet daily; Therapy: 71IQB3487 to (Richard Decker)  Requested for: 70Xyc6457; Last   Rx:79Xzz5955 Ordered   8   Lansoprazole 30 MG Oral Capsule Delayed Release; TAKE 1 CAPSULE EVERY MORNING DAILY; Therapy: 07XEO4360 to (Ermias Mark)  Requested for: 60Qlv9436; Last   Rx:11Nov2015 Ordered   9  Latuda 20 MG Oral Tablet; Take 1 tablet in the evening with food; Therapy: 86DNU0019 to (Alysha Corona)  Requested for: 10Wvs0854; Last   Rx:56Xek3423 Ordered   10  Lisinopril 20 MG Oral Tablet; Take 1 tablet daily; Therapy: 91EDN0607 to (468 6549)  Requested for: 84Eso4151; Last    Rx:01Aug2016 Ordered   11  MetFORMIN HCl  MG Oral Tablet Extended Release 24 Hour; 2 tabs twice per day    with food; Therapy: 41IKX2530 to (Evaluate:03Pua4746)  Requested for: 54HGT2446; Last    Rx:13Jun2016 Ordered   12  Mupirocin 2 % External Ointment; Apply twice daily; Therapy: (Recorded:73Eem0634) to Recorded   13  Natural Vitamin E 400 UNIT Oral Tablet; TAKE 2 5 TABLETS DAILY; Therapy: (Recorded:25Jfr3517) to Recorded   14  ProAir  (90 Base) MCG/ACT Inhalation Aerosol Solution; INHALE 1 TO 2 PUFFS    EVERY 4 TO 6 HOURS AS NEEDED; Therapy: 68ZYW7207 to Recorded   15  Symbicort 80-4 5 MCG/ACT Inhalation Aerosol; use 1 puff twice daily and rinse mouth    after each use; Therapy: 75GVN5766 to (Last Rx:93Esp8494) Ordered   16  Zuleyma Belts FlexTouch 100 UNIT/ML Subcutaneous Solution Pen-injector; inject 15 units at    bedtime; Therapy: (Recorded:24Lfs0291) to Recorded   17  Vitamin D3 1000 UNIT Oral Capsule; TAKE 7 CAPSULE Daily; Therapy: (Recorded:21Xaz6026) to Recorded    The medication list was reviewed and updated today  Family Psych History  Mother    1  Family history of CABG   2  Family history of Diabetes Mellitus (V18 0)   3  Family history of Lung Cancer (V16 1)   4  Family history of Stroke Syndrome (V17 1)   5  Family history of Thyroid Disorder (V18 19)   6  Family history of Tobacco Use  Father    7  Family history of Benign Polyps Of The Large Intestine (V18 51)   8  Family history of Bipolar illness   9   Family history of Heart Disease (V17 49)  Brother    10  Family history of depression (V17 0) (Z81 8)  Maternal Grandmother    11  Family history of Diabetes Mellitus (V18 0)   12  Family history of Thyroid Disorder (V18 19)  Family History    13  Denied: Family history of Colon Cancer   14  Denied: Family history of Crohn's Disease   15  Family history of Hypertension (V17 49)   16  Denied: Family history of Liver Cancer   17  Family history of Mother  At Age 52    The family history was reviewed and updated today  Social History    · Caffeine Use   · Denied: History of Drug Use   · Denied: History of Home Environment Domestic Violence   · Job Physical Requirements Heavy Lifting   · Non-smoker (V49 89) (Z78 9)   · Social drinker  The social history was reviewed and updated today  The social history was reviewed and is unchanged  , lives with   No children  Works as a Certified Nursing Assistant at [employer]  High School graduate  No history of legal problems  No  history  History Of Phys/Sex Abuse Or Perpetration    History Of Phys/Sex Abuse or Perpetration: No history of physical or sexual abuse  End of Encounter Meds    1  Azelastine HCl - 0 15 % Nasal Solution; USE 2 SPRAYS AT BEDTIME; Therapy: (Recorded:19Jox1545) to Recorded    2  ProAir  (90 Base) MCG/ACT Inhalation Aerosol Solution; INHALE 1 TO 2 PUFFS   EVERY 4 TO 6 HOURS AS NEEDED; Therapy: 50HUT0824 to Recorded   3  Symbicort 80-4 5 MCG/ACT Inhalation Aerosol; use 1 puff twice daily and rinse mouth   after each use; Therapy: 00BPY9481 to (Last Rx:24Ahv6780) Ordered    4  Lisinopril 20 MG Oral Tablet; Take 1 tablet daily; Therapy: 44APM0712 to (67 488 45 07)  Requested for: 62Ybf0952; Last   Rx:39Tih5959 Ordered    5  FLUoxetine HCl - 40 MG Oral Capsule (PROzac); TAKE 2 CAPSULES DAILY; Therapy: 64GIS7666 to (Evaluate:80Jon1285)  Requested for: 63Ekc7753; Last   Rx:58Wqr2927 Ordered   6   Latuda 40 MG Oral Tablet; Take 1 tablet in the evening with food; Therapy: 21INO9302 to (Evaluate:14Jan2017)  Requested for: 52Njw2650; Last   Rx:89Bia3173 Ordered    7  BD Pen Needle Susie U/F 32G X 4 MM Miscellaneous; inject once daily; Therapy: 77JAE9727 to (Evaluate:75Mre9663)  Requested for: 13Pck0950; Last   Rx:34Cxu0528 Ordered   8  Invokana 300 MG Oral Tablet; Take 1 tablet daily; Therapy: 76IKO5055 to ([place])  Requested for: 21Pii0032; Last   Rx:53Rsn9271 Ordered   9  MetFORMIN HCl  MG Oral Tablet Extended Release 24 Hour; 2 tabs twice per day   with food; Therapy: 71VXJ9353 to (Evaluate:68Voo1496)  Requested for: 56VXH7114; Last   Rx:13Jun2016 Ordered    10  Cholestyramine 4 GM Oral Packet (Questran); TAKE 1 PACKET 3 times daily PRN; Therapy: 23Yvy1486 to (Evaluate:47Ost8361)  Requested for: 24Ttm0872; Last    Rx:55Nkj4152 Ordered    11  Lansoprazole 30 MG Oral Capsule Delayed Release; TAKE 1 CAPSULE EVERY    MORNING DAILY; Therapy: 74OOG5618 to ([place])  Requested for: 16Aav1984; Last    Rx:11Nov2015 Ordered    12  Atorvastatin Calcium 20 MG Oral Tablet; TAKE 1 TABLET DAILY; Therapy: 60JOO5862 to (Evaluate:58Jap2389)  Requested for: 13Lzb1594; Last    Rx:01Aug2016 Ordered   13  Fenofibrate 145 MG Oral Tablet (Tricor); Take 1 tablet daily; Therapy: 74MRM1831 to (Evaluate:93Uao4014)  Requested for: 85HUI9974; Last    Rx:08Rwv2289 Ordered    14  Natural Vitamin E 400 UNIT Oral Tablet; TAKE 2 5 TABLETS DAILY; Therapy: (Recorded:72Pku1864) to Recorded    15  Mupirocin 2 % External Ointment; Apply twice daily; Therapy: (Recorded:76Foe8426) to Recorded   16  Sharon Butter FlexTouch 100 UNIT/ML Subcutaneous Solution Pen-injector; inject 15 units at    bedtime; Therapy: (Recorded:63Ogf2133) to Recorded   17  Vitamin D3 1000 UNIT Oral Capsule; TAKE 7 CAPSULE Daily;     Therapy: (Recorded:05Qur4980) to Recorded    Future Appointments    Date/Time Provider Specialty Site 11/08/2016 08:30 AM MOLLY Tadeo   Endocrinology [facility] ENDOCRINOLOGY [facility]   12/22/2016 04:00 PM MOLLY Mayorga , [facility] Hematology Oncology [facility]   12/22/2016 01:30 PM Oc Mason DO Internal Medicine [facility]     Signatures   Electronically signed by : MOLLY Cavazos ; Sep 16 2016  3:34PM EST                       (Author)

## 2018-01-12 NOTE — PROGRESS NOTES
Assessment   Assessed    1  Benign essential hypertension (401 1) (I10)   2  Hypertensive heart disease (402 90) (I11 9)   3  Dyslipidemia (272 4) (E78 5)   4  Diabetes mellitus type 2, uncontrolled (250 02) (E11 65)    Plan   Benign essential hypertension    · Follow-up visit in 1 year Evaluation and Treatment  Follow-up  Status: Hold For -    Scheduling  Requested for: 46XCA3630   Ordered; For: Benign essential hypertension; Ordered By: Pérez Melton Performed:  Due: 84BOO1327   · Continue with our present treatment plan ; Status:Complete;   Done: 12GTF6632   Ordered; For:Benign essential hypertension; Ordered By:Ken Wise;   · Restrict your sodium (salt) intake to 2 grams per day ; Status:Complete;   Done:    37HZK3832   Ordered; For:Benign essential hypertension; Ordered By:Steven Wise;   · Take your blood pressure twice a day  Record the numbers and bring them with you to    your appointments ; Status:Complete;   Done: 81NYT2185   Ordered; For:Benign essential hypertension; Ordered By:Steven Wise;  Obstructive sleep apnea    · EKG/ECG- POC; Status:Complete;   Done: 38NSD5173   Perform: In Office; Due:10Jan2019; Last Updated By:Bhavya Waters; 1/10/2018 12:08:32 PM;Ordered;For:Obstructive sleep apnea; Ordered By:Ken Wise;    Discussion/Summary   Cardiology Discussion Summary Free Text Note Form [facility]:    Hypertensive heart disease - Audi Simental has showed a mild change on her echocardiogram  She has normal LV systolic function and grade 2 diastolic dysfunction  If she were to show signs of CHF we would initiate diuretic therapy  At this point, she shows no signs of CHF, and therefore we will continue with blood pressure control  We will see her back in follow-up in 1 year  - This is in the high normal range  She has not been checking her blood pressure at home  I have asked her to start doing this more often  We will see her back in 1 year  - Her cholesterol is controlled   No changes were made  She will continue to get blood work periodically through her PCPs office  Counseling Documentation With Imm: The patient was counseled regarding diagnostic results,-- instructions for management,-- impressions  total time of encounter was 25 minutes  Chief Complaint   Chief Complaint Free Text Note Form: Patient here for follow up visit , c/o occasional s o b  History of Present Illness   Cardiology HPI Free Text Note Form [facility]: Mrs Les Brown comes in for follow-up given her risk factors for CAD, particularly hypertension and hypertensive heart disease  I saw Renae Burgos 4 years ago in consultation for exertional shortness of breath along with risk factors including hypertension, dyslipidemia and diabetes mellitus  She had undergone a exercise nuclear stress test that was unremarkable, but but this was not adequately perform stress test as she did not develop an adequate heart rate response  At that time I had her undergo a pharmacologic nuclear stress test and an echocardiogram  Both of these were unremarkable  She continued to have no perfusion defects  She followed with her PCP since then  came back to see me about a year after she had an echocardiogram performed because one of her physicians heard to murmur  This demonstrated normal LV systolic function, grade 2 diastolic dysfunction and mild left atrial enlargement, but no valvular heart disease  When I saw her last year she has no signs of CHF, therefore we just controlled her risk factors including her hypertension  She is now here for follow-up  has felt well in the last year  She does have some situational in periodic shortness of breath particularly around allergy season  She otherwise denies any cardiovascular symptoms  No symptoms of angina  No signs or symptoms of CHF  No lightheadedness, palpitations or any history of syncope  She tells me overall she feels well  Hyperlipidemia (Follow-Up):  The patient states her hyperlipidemia has been stable since the last visit  She has no comorbid illnesses  She has no significant interval events  Symptoms: The patient is currently asymptomatic  Associated symptoms include no focal neurologic deficits-- and-- no memory loss  Medications: the patient is adherent with her medication regimen  -- She denies medication side effects  the patient's last LDL was 75 mg/dL  Hypertension (Follow-Up): The patient presents for follow-up of essential hypertension  The patient states she has been stable with her blood pressure control since the last visit  She has no comorbid illnesses  She has no significant interval events  Symptoms: The patient is currently asymptomatic  Associated symptoms include no headache,-- no focal neurologic deficits-- and-- no memory loss  Medications: the patient is adherent with her medication regimen  -- She denies medication side effects  Review of Systems   Cardiology Female ROS:         Cardiac: has swelling in the , but-- as noted in HPI  Skin: No complaints of nonhealing sores or skin rash  Genitourinary: No complaints of recurrent urinary tract infections, frequent urination at night, difficult urination, blood in urine, kidney stones, loss of bladder control, kidney problems, denies any birth control or hormone replacement, is not post menopausal, not currently pregnant  Psychological: No complaints of feeling depressed, anxiety, panic attacks, or difficulty concentrating  General: No complaints of trouble sleeping, lack of energy, fatigue, appetite changes, weight changes, fever, frequent infections, or night sweats  Respiratory: shortness of breath, but-- as noted in HPI  HEENT: No complaints of serious problems, hearing problems, nose problems, throat problems, or snoring        Gastrointestinal: No complaints of liver problems, nausea, vomiting, heartburn, constipation, bloody stools, diarrhea, problems swallowing, adbominal pain, or rectal bleeding  Hematologic: No complaints of bleeding disorders, anemia, blood clots, or excessive brusing  Neurological: No complaints of numbness, tingling, dizziness, weakness, seizures, headaches, syncope or fainting, AM fatigue, daytime sleepiness, no witnessed apnea episodes  Musculoskeletal: arthritis    ROS Reviewed:    ROS reviewed  Active Problems   Problems    1  Abdominal pain, epigastric (789 06) (R10 13)   2  Abnormal CAT scan (793 99) (R93 8)   3  Abnormal electrocardiogram (794 31) (R94 31)   4  Acute respiratory infection (519 8) (J22)   5  Acute sinusitis, recurrence not specified, unspecified location (461 9) (J01 90)   6  Anemia (285 9) (D64 9)   7  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)   8  Asthma (493 90) (J45 909)   9  Back pain, thoracic (724 1) (M54 6)   10  Benign essential hypertension (401 1) (I10)   11  Family history of Benign Polyps Of The Large Intestine (V18 51)   12  Bilateral knee pain (719 46) (M25 561,M25 562)   13  Bipolar I disorder, most recent episode depressed, in full remission (296 56) (F31 76)   14  Blood in stool (578 1) (K92 1)   15  Cardiac murmur (785 2) (R01 1)   16  Cervical disc disease (722 91) (M50 90)   17  Cramp of both lower extremities (729 82) (R25 2)   18  Degeneration of thoracic intervertebral disc (722 51) (M51 34)   19  Degenerative arthritis of knee, bilateral (715 96) (M17 0)   20  Diabetes mellitus type 2, uncontrolled (250 02) (E11 65)   21  Diarrhea (787 91) (R19 7)   22  Diverticulosis (562 10) (K57 90)   23  Dizziness (780 4) (R42)   24  Dyslipidemia (272 4) (E78 5)   25  Edema (782 3) (R60 9)   26  Encounter for routine gynecological examination (V72 31) (Z01 419)   27  Encounter for screening mammogram for malignant neoplasm of breast (V76 12)      (Z12 31)   28  Enteritis (558 9) (K52 9)   29  Esophagitis, reflux (530 11) (K21 0)   30  Essential hypertriglyceridemia (272 1) (E78 1)   31  Excessive sweating (780 8) (R61)   32  Fatigue (780 79) (R53 83)   33  Fatty liver (571 8) (K76 0)   34  Fecal occult blood test positive (792 1) (R19 5)   35  Gait disturbance (781 2) (R26 9)   36  Gastroesophageal reflux disease (530 81) (K21 9)   37  Granuloma annulare (695 89) (L92 0)   38  History of allergy (V15 09) (Z88 9)   39  Hypertensive heart disease (402 90) (I11 9)   40  Iron deficiency anemia, unspecified (280 9) (D50 9)   41  Joint pain, knee (719 46) (M25 569)   42  Left thyroid nodule (241 0) (E04 1)   43  Leg length discrepancy (736 81) (M21 70)   44  Leukocytosis (288 60) (D72 829)   45  Long term current use of therapeutic drug (V58 69) (Z79 899)   46  Long-term insulin use (V58 67) (Z79 4)   47  Lost voice (784 41) (R49 1)   48  Multiple thyroid nodules (241 1) (E04 2)   49  Muscle Cramps In The Thigh (Upper Leg) (729 82)   50  Neck pain (723 1) (M54 2)   51  History of Need for pneumococcal vaccination (V03 82) (Z23)   52  Need for prophylactic vaccination and inoculation against influenza (V04 81) (Z23)   53  Need for revaccination (V05 9) (Z23)   54  Need for Tdap vaccination (V06 1) (Z23)   55  Neuropathy (355 9) (G62 9)   56  Obesity, Class II, BMI 35 0-39 9, with comorbidity (see actual BMI) (278 00) (E66 9)   57  Obstructive sleep apnea (327 23) (G47 33)   58  Otitis media (382 9) (H66 90)   59  Pain, dental (525 9) (K08 89)   60  Pap smear, as part of routine gynecological examination (V76 2) (Z01 419)   61  Paronychia of toe (681 11) (L03 039)   62  Patellofemoral Dysfunction (736 6)   63  Personality disorder (301 9) (F60 9)   64  Postmenopausal Atrophic Vaginitis (627 3)   65  Prepatellar bursitis (726 65) (M70 40)   66  Right ankle pain (719 47) (M25 571)   67  Right wrist pain (719 43) (M25 531)   68  Routine Gynecological Exam With Cervical Pap Smear (V72 31)   69  Symptomatic menopausal or female climacteric states (627 2) (N95 1)   70  Thoracic spine pain (724 1) (M54 6)   71  Tinea corporis (110 5) (B35 4)   72  Type 2 diabetes mellitus with hyperglycemia (250 00) (E11 65)   73  Vertigo (780 4) (R42)   74  Viral gastroenteritis (008 8) (A08 4)   75  Visit for screening mammogram (V76 12) (Z12 31)   76  Vitamin D deficiency (268 9) (E55 9)   77  Dia-Parkinson-White Syndrome (426 7)    Past Medical History   Problems    1  History of Acute frontal sinusitis (461 1) (J01 10)   2  History of Acute frontal sinusitis (461 1) (J01 10)   3  History of Cellulitis (682 9) (L03 90)   4  History of Contusion Of The Toe(S) With Intact Skin Surface (924 3)   5  History of Diabetes Mellitus (250 00)   6  History of Difficulty breathing (786 09) (R06 89)   7  History of Encounter for routine gynecological examination (V72 31) (Z01 419)   8  History of Esophagitis, reflux (530 11) (K21 0)   9  History of High cholesterol (272 0) (E78 00)   10  History of abdominal pain (V13 89) (Z87 898)   11  History of abnormal weight loss (V13 89) (Z87 898)   12  History of acute bronchitis (V12 69) (Z87 09)   13  History of acute otitis media (V12 49) (Z86 69)   14  History of acute pharyngitis (V12 69) (Z87 09)   15  History of acute sinusitis (V12 69) (Z87 09)   16  History of allergic rhinitis (V12 69) (Z87 09)   17  History of candidiasis of mouth (V12 09) (Z86 19)   18  History of fatigue (V13 89) (Z87 898)   19  History of head injury (V15 59) (Z87 828)   20  History of headache (V13 89) (Z87 898)   21  History of hypoglycemia (V12 29) (Z86 39)   22  History of laryngitis (V12 69) (Z87 09)   23  History of obesity (V12 29) (Z86 39)   24  History of pharyngitis (V12 69) (Z87 09)   25  History of sebaceous cyst (V13 3) (Z87 2)   26  History of sore throat (V12 60) (Z87 09)   27  History of type 2 diabetes mellitus (V12 29) (Z86 39)   28  History of urinary frequency (V13 09) (Z87 898)   29  History of Hypoglycemia (251 2) (E16 2)   30  History of Mouth Sores (528 9)   31   History of Need for pneumococcal vaccination (V03 82) (Z23)   32  History of Otitis media, unspecified laterality   33  History of Pap smear, as part of routine gynecological examination (V76 2) (Z01 419)   34  Denied: History of Seizures   35  History of Suicide Attempt (E958 9)   36  History of Tinea corporis (110 5) (B35 4)   37  History of Type 2 diabetes mellitus with hyperglycemia, with long-term current use of      insulin (250 00,790 29,V58 67) (E11 65,Z79 4)   38  History of Type B influenza (487 1) (J10 1)   39  History of Visit for screening mammogram (S76 19) (Z12 31)  Active Problems And Past Medical History Reviewed: The active problems and past medical history were reviewed and updated today  Surgical History   Problems    1  History of Oral Surgery Tooth Extraction   2  History of Tonsillectomy With Adenoidectomy   3  History of Vaginal Pap smear (V76 47) (Z12 72)  Surgical History Reviewed: The surgical history was reviewed and updated today  Family History   Mother    1  Family history of CABG   2  Family history of Diabetes Mellitus (V18 0)   3  Family history of Lung Cancer (V16 1)   4  Family history of Stroke Syndrome (V17 1)   5  Family history of Thyroid Disorder (V18 19)   6  Family history of Tobacco Use  Father    7  Family history of Benign Polyps Of The Large Intestine (V18 51)   8  Family history of Bipolar illness   9  Family history of Heart Disease (V17 49)  Brother    8  Family history of depression (V17 0) (Z81 8)  Maternal Grandmother    11  Family history of Diabetes Mellitus (V18 0)   12  Family history of Thyroid Disorder (V18 19)  Family History    13  Denied: Family history of Colon Cancer   14  Denied: Family history of Crohn's Disease   15  Family history of Hypertension (V17 49)   16  Denied: Family history of Liver Cancer   17  Family history of Mother  At Age 52  Family History Reviewed: The family history was reviewed and updated today         Social History   Problems    · Caffeine Use   · Denied: History of Drug Use   · Denied: History of Home Environment Domestic Violence   · Job Physical Requirements Heavy Lifting   ·    · Never a smoker   · Non-smoker (V49 89) (Z78 9)   · Social drinker  Social History Reviewed: The social history was reviewed and updated today  The social history was reviewed and is unchanged  Current Meds    1  Accu-Chek Louise Plus In Vitro Strip; test 4 times daily; Therapy: 99DKY4571 to (Last Rx:56Ara1173)  Requested for: 66BQM6643 Ordered   2  Atorvastatin Calcium 20 MG Oral Tablet; TAKE 1 TABLET DAILY AS DIRECTED; Therapy: 77EGU2728 to (Evaluate:25Prk2552)  Requested for: 20Jun2017; Last     Rx:20Jun2017 Ordered   3  Azelastine HCl - 0 15 % Nasal Solution; USE 2 SPRAYS AT BEDTIME; Therapy: (Recorded:69Awa7573) to Recorded   4  BD Pen Needle Susie U/F 32G X 4 MM Miscellaneous; use4 times per day; Therapy: 02HTS5519 to (Evaluate:99Hko1288)  Requested for: 26PVJ7779; Last     Rx:16Nov2017 Ordered   5  Clobetasol Propionate 0 05 % External Ointment; APPLY AND GENTLY MASSAGE INTO     AFFECTED AREA(S) TWICE DAILY; Therapy: 23GWJ4431 to Recorded   6  Fenofibrate 145 MG Oral Tablet; Take 1 tablet daily; Therapy: 47NGM7806 to (Evaluate:14Mar2018)  Requested for: 79SNH6950; Last     Rx:10Qhq8752 Ordered   7  Fish Oil 1000 MG Oral Capsule; take 1 capsule daily; Therapy: 82ZDR0097 to Recorded   8  FLUoxetine HCl - 20 MG Oral Capsule; TAKE 1 CAPSULE DAILY WITH 40 MG CAPSULE     TO TOTAL 60 MG DAILY; Therapy: 52EXW5346 to (Joi Ates)  Requested for: 72OHR0068; Last     Rx:05Jan2018 Ordered   9  FLUoxetine HCl - 40 MG Oral Capsule; TAKE 1 CAPSULE DAILY WITH  20 MG CAPSULE     FOR A TOTAL DOSE OF 60 MG DAILY; Therapy: 17TMZ8372 to (Joi Ates)  Requested for: 70ZNC2884; Last     Rx:05Jan2018 Ordered   10   HumaLOG KwikPen 200 UNIT/ML Subcutaneous Solution Pen-injector; TAKE 20 UNITS      BEFORE BREAKFAST, USE ICR 1:5 FOR LUNCH, 22 UNITS BEFORE DINNER      (ISF 1:20 ICR 1:5); Therapy: 63MHP1919 to [phone]  Requested for: 27JCZ8436; Last      Rx:12Hyv0709 Ordered   11  Hydroxychloroquine Sulfate 200 MG Oral Tablet; TAKE 1 TABLET TWICE DAILY; Therapy: (Recorded:08Szt8106) to Recorded   12  Jardiance 25 MG Oral Tablet; take 1 tablet every day; Therapy: 18PBI5599 to (Evaluate:11Mar2018)  Requested for: 79Wbq9917; Last      Rx:46Ypo3871 Ordered   13  Lansoprazole 30 MG Oral Capsule Delayed Release; TAKE 1 CAPSULE EVERY      MORNING DAILY; Therapy: 42GMJ6607 to (Evaluate:26Oct2018)  Requested for: 31Oct2017; Last      Rx:76Soy6470 Ordered   14  Latuda 80 MG Oral Tablet; TAKE 1 TABLET IN THE EVENING WITH DINNER; Therapy: 91KPP7784 to (Ilona Delbert)  Requested for: 41KDM4292; Last      Rx:05Jan2018 Ordered   15  Lisinopril 20 MG Oral Tablet; TAKE 1 TABLET DAILY AS DIRECTED; Therapy: 59QYV5321 to (Evaluate:14Mar2018)  Requested for: 94AEC6642; Last      Rx:90Rbd5761 Ordered   16  MetFORMIN HCl  MG Oral Tablet Extended Release 24 Hour; take 2 tabs twice      per day with food; Therapy: 69WNR5905 to [phone])  Requested for: 36BAG7981; Last      Rx:03Hzd7302 Ordered   17  Mupirocin 2 % External Ointment; Apply twice daily; Therapy: (Recorded:25Nny1371) to Recorded   18  Jennie Smith FlexTouch 100 UNIT/ML Subcutaneous Solution Pen-injector; INJECT 42 UNITS      ONCE A DAY; Therapy: 52Thf1393 to (Evaluate:18Mar2018)  Requested for: 21EAG4935; Last      Rx:41Nua1749 Ordered   19  Vitamin D (Ergocalciferol) 27610 UNIT Oral Capsule; TAKE 1 CAPSULE WEEKLY; Therapy: 24IUP5087 to (Last Iven Mass)  Requested for: 20Jun2017 Ordered  Medication List Reviewed: The medication list was reviewed and updated today  Allergies   Medication    1  Biaxin TABS   2  LaMICtal TABS   3  Tanzeum PEN   4  TEGretol TABS   5  Depakote TBEC  Non-Medication    6  Seasonal   7  Bee sting   8  Shellfish    Vitals   Vital Signs    Recorded: 11QJW5206 12:08PM   Heart Rate 80, Apical   Pulse Quality Normal, Apical   Systolic 468, RUE, Sitting   Diastolic 52, RUE, Sitting   Height 5 ft 4 in   Weight 227 lb    BMI Calculated 38 96   BSA Calculated 2 06     Physical Exam        Constitutional      General appearance: No acute distress, well appearing and well nourished  Eyes      Conjunctiva and Sclera examination: Conjunctiva pink, sclera anicteric  Ears, Nose, Mouth, and Throat - Oropharynx: Clear, nares are clear, mucous membranes are moist       Neck      Neck and thyroid: Normal, supple, trachea midline, no thyromegaly  Pulmonary      Respiratory effort: No increased work of breathing or signs of respiratory distress  Auscultation of lungs: Clear to auscultation, no rales, no rhonchi, no wheezing, good air movement  Cardiovascular      Auscultation of heart: Normal rate and rhythm, normal S1 and S2, no murmurs  Carotid pulses: Normal, 2+ bilaterally  Peripheral vascular exam: Normal pulses throughout, no tenderness, erythema or swelling  Pedal pulses: Normal, 2+ bilaterally  Examination of extremities for edema and/or varicosities: Normal        Abdomen      Abdomen: Non-tender and no distention  Liver and spleen: No hepatomegaly or splenomegaly  Musculoskeletal Gait and station: Normal gait  -- Digits and nails: Normal without clubbing or cyanosis  -- Inspection/palpation of joints, bones, and muscles: Normal, ROM normal        Skin - Skin and subcutaneous tissue: Normal without rashes or lesions  Skin is warm and well perfused, normal turgor  Neurologic - Cranial nerves: II - XII intact  -- Speech: Normal        Psychiatric - Orientation to person, place, and time: Normal -- Mood and affect: Normal       Results/Data   ECG Report:      Rhythm and rate:  normal sinus rhythm        Comparison to prior ECGs: no interval change  Health Management   Diabetes mellitus type 2, uncontrolled   (1) MICROALBUMIN CREATININE RATIO, RANDOM URINE; every 1 year; Last 38RDI4417; Next Due:    32LWZ5027; Active  *VB - Eye Exam; every 1 year; Last 72USD7376; Next Due: 47JGW9474; Overdue  History of Visit for screening mammogram   Digital Bilateral Screening Mammogram With CAD; every 1 year; Last 36YTD9113; Next Due:    98Cku4963; Overdue    Future Appointments      Date/Time Provider Specialty Site   07/17/2018 08:00 AM MOLLY Santos , [facility] Hematology Oncology [address] [place] [address] ONCOLOGY   01/19/2018 08:40 AM MOLLY Xie  Endocrinology [facility] ENDOCRINOLOGY [facility]   03/30/2018 02:00 PM MOLLY Weiss   Psychiatry [facility]   01/16/2018 08:00 AM Hector Tim DO Internal Medicine [facility]     Signatures    Electronically signed by : MOLLY Lewis ; Jam 10 2018 12:36PM EST                       (Author)

## 2018-01-12 NOTE — PROGRESS NOTES
Plan    1  DSMT/MNT Time Record; Status:Complete;   Done: 99QKM8768 04:44PM    Discussion/Summary    PATIENT EDUCATION RECORD   Indication for Services: type 2 Diabetes Mellitus and obesity  She is ready to learn  She has no barriers to learning  Healthy Eating:   Discussed importance of meal timing/consistency: Method: Instruction and Handout  Response: Verbalizes Understanding   Discussed nutrient types ( Cho/Fat/Protein): Method: Instruction and Handout  Response: Verbalizes Understanding   Discussed portion sizes: Method: Instruction and Handout  Response: Verbalizes Understanding   Discussed Eating Out: Method: Instruction  Response: Verbalizes Understanding   Discussed food label reading: Method: Instruction and Handout  Response: Verbalizes Understanding   Provided meal planning: Method: Instruction and Handout  Response: Verbalizes Understanding Her current weight is 228  Her keal needs are 1446  Her CHO's per meal are 163 g/day  He/She was provided a meal plan for: fixed carbohydrates and weight loss  Discussed weight management/weight loss: Method: Instruction  Response: Verbalizes Understanding   Her current BMI 39  Discussed fat intake and choices: Method: Instruction and Handout  Response: Verbalizes Understanding   Discussed basic carbohydrate counting: Method: Instruction and Handout  Response: Verbalizes Understanding   Being Active:   Stated the benefits of exercise: Method: Instruction  Response: Verbalizes Understanding   Discussed proper exercise program: Method: Instruction  Response: Verbalizes Understanding   Monitoring:   Discussed target blood glucose ranges: Method: Instruction and Handout  Response: Verbalizes Understanding  Discussed testing times: Method: Instruction and Handout  Response: Verbalizes Understanding  Healthy Coping Class:   Identified lifestyle behaviors that need to change: Method: Instruction   Response: Verbalizes Understanding   Discussed motivation to change: Method: Instruction  Response: Verbalizes Understanding   Identified goals for behavior change: Method: Instruction  Response: Verbalizes Understanding   Discussed strategies for change: Method: Instruction  Response: Verbalizes Understanding   She participated in goal setting  Will be available for follow up as needed She was given the following educational materials: portion book, Personal Meal Plan 1446 calories, Planning Healthy Meals and Calorie and Carbohydrate Tracking Books/Websites/Phone Apps   Chief Complaint  Patient is here today for annual follow up Medical Nutrition Therapy for T2DM      History of Present Illness  Patient is a 49 y/o female with pmhx uncontrolled T2DM, Obesity  A1c on 4/20/16 was 8 5  Patient has been SMBG 3 times per day  Readings are improving and are at target fasting and near target before meals (120-140's)  we discussed testing times, targets and using paired BG testing to find any patterns of hyperglycemia  Patient works 11 p m -7 a m  shift  From her food history she appears to be over-consuming calories and carbs at Meal 1 (7:30 am) and having inadequate intake at Meal 3  Patient was re-educated on meal planning and carb counting and given a personal meal plan  She was encouraged to exercise for weight loss and BG control  This is her follow-up assessment   Present at session: patient    She has no special learning needs  Her caloric needs are 1446  She has had no recent weight change  Patient  shops for food  Patient  cooks the food  Exercise routine:  None   She eats breakfast at  7:30 AM 2 egss, 2 pc  rye toast, 3 pc  ashley, 2 c  hash browns, unsweetened ice tea   She snacks at Cheese   She eats lunch at  6-6:30 pm PM Fish, 1 c  noodles, 1 c  corn, unsw  ice tea    She eats dinner at  12:30-1 a m  AM Oatmeal, water OR SF fruit cup      NUTRITION DIAGNOSES   Overweight Obesity   Overweight obesity related to: Physical inactivity     As evidenced by: BMI more than normative standard for age and sex (obesity-grade II 35-39 9  Medical Nutrition Therapy Intervention: Carbohydrate counting, Meal planning, Strategies to reduce fat intake, Individualized meal plan, Strategies to monitor portion control, Meal timing, Exercise Guidelines, Behavior modification strategies and Weight/BMI Goals  Her comprehension was good   Her motivation was good   Her compliance was good   Goals:  1  Follow meal plan/count carbs  2  Start exercise program      Active Problems    1  Abnormal blood chemistry (790 6) (R79 9)   2  Abnormal electrocardiogram (794 31) (R94 31)   3  Allergic rhinitis (477 9) (J30 9)   4  Anemia (285 9) (D64 9)   5  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)   6  Asthma (493 90) (J45 909)   7  Benign essential hypertension (401 1) (I10)   8  Family history of Benign Polyps Of The Large Intestine (V18 51)   9  Bilateral knee pain (719 46) (M25 561,M25 562)   10  Bipolar I disorder, most recent episode mixed, in full remission (296 66) (F31 78)   11  Blood in stool (578 1) (K92 1)   12  Cervical disc disease (722 91) (M50 90)   13  Cramp of both lower extremities (729 82) (R25 2)   14  Degeneration of thoracic intervertebral disc (722 51) (M51 34)   15  Degenerative arthritis of knee, bilateral (715 96) (M17 0)   16  Diabetes mellitus type 2, uncontrolled (250 02) (E11 65)   17  Diabetic peripheral neuropathy (250 60,357 2) (E11 42)   18  Diarrhea (787 91) (R19 7)   19  Dizziness (780 4) (R42)   20  Dyslipidemia (272 4) (E78 5)   21  Edema (782 3) (R60 9)   22  Encounter for routine gynecological examination (V72 31) (Z01 419)   23  Encounter for screening mammogram for malignant neoplasm of breast (V76 12)    (Z12 31)   24  Esophagitis, reflux (530 11) (K21 0)   25  Esophagitis, reflux (530 11) (K21 0)   26  Essential hypertriglyceridemia (272 1) (E78 1)   27  Fatigue (780 79) (R53 83)   28  Fatty liver (571 8) (K76 0)   29   Fecal occult blood test positive (792 1) (R19 5)   30  Gait disturbance (781 2) (R26 9)   31  Gastroesophageal reflux disease (530 81) (K21 9)   32  History of allergy (V15 09) (Z88 9)   33  Hypoglycemia (251 2) (E16 2)   34  Iron deficiency anemia, unspecified iron deficiency   35  Joint pain, knee (719 46) (M25 569)   36  Leukocytosis (288 60) (D72 829)   37  Long term current use of therapeutic drug (V58 69) (Z79 899)   38  Multiple thyroid nodules (241 1) (E04 2)   39  Muscle Cramps In The Thigh (Upper Leg) (729 82)   40  Neck pain (723 1) (M54 2)   41  History of Need for pneumococcal vaccination (V03 82) (Z23)   42  Need for prophylactic vaccination and inoculation against influenza (V04 81) (Z23)   43  Need for Tdap vaccination (V06 1) (Z23)   44  Neuropathy (355 9) (G62 9)   45  Obesity (278 00) (E66 9)   46  Obstructive sleep apnea (327 23) (G47 33)   47  Otitis media (382 9) (H66 90)   48  Pap smear, as part of routine gynecological examination (V76 2) (Z01 419)   49  Paronychia of toe (681 11) (L03 039)   50  Patellofemoral Dysfunction (736 6)   51  Personality disorder (301 9) (F60 9)   52  Postmenopausal Atrophic Vaginitis (627 3)   53  Prepatellar bursitis (726 65) (M70 40)   54  Right wrist pain (719 43) (M25 531)   55  Routine Gynecological Exam With Cervical Pap Smear (V72 31)   56  Symptomatic menopausal or female climacteric states (627 2) (N95 1)   57  Thoracic spine pain (724 1) (M54 6)   58  Tinea corporis (110 5) (B35 4)   59  Type 2 diabetes mellitus (250 00) (E11 9)   60  Type 2 diabetes mellitus with hyperglycemia (250 00) (E11 65)   61  Vertigo (780 4) (R42)   62  Viral gastroenteritis (008 8) (A08 4)   63  Visit for screening mammogram (V76 12) (Z12 31)   64  Vitamin D deficiency (268 9) (E55 9)   65  Dia-Parkinson-White Syndrome (426 7)    Past Medical History    1  History of Acute frontal sinusitis (461 1) (J01 10)   2  History of Acute sinusitis (461 9) (J01 90)   3  History of Cellulitis (682 9) (L03 90)   4   History of Contusion Of The Toe(S) With Intact Skin Surface (924 3)   5  History of Diabetes Mellitus (250 00)   6  History of Difficulty breathing (786 09) (R06 89)   7  History of abdominal pain (V13 89) (Z87 898)   8  History of abnormal weight loss (V13 89) (Z87 898)   9  History of acute bronchitis (V12 69) (Z87 09)   10  History of acute otitis media (V12 49) (Z86 69)   11  History of acute pharyngitis (V12 69) (Z87 09)   12  History of acute sinusitis (V12 69) (Z87 09)   13  History of candidiasis of mouth (V12 09) (Z86 19)   14  History of head injury (V15 59) (Z87 828)   15  History of headache (V13 89) (Z87 898)   16  History of laryngitis (V12 69) (Z87 09)   17  History of pharyngitis (V12 69) (Z87 09)   18  History of sebaceous cyst (V13 3) (Z87 2)   19  History of urinary frequency (V13 09) (Z87 898)   20  History of Hypoglycemia (251 2) (E16 2)   21  History of Mouth Sores (528 9)   22  History of Need for pneumococcal vaccination (V03 82) (Z23)   23  History of Otitis media, unspecified laterality   24  Denied: History of Seizures   25  History of Suicide Attempt (E958 9)   26  History of Tinea corporis (110 5) (B35 4)   27  History of Type B influenza (487 1) (J10 1)    Surgical History    1  History of Oral Surgery Tooth Extraction   2  History of Tonsillectomy With Adenoidectomy   3  History of Vaginal Pap smear (V76 47) (Z12 72)    Family History  Mother    1  Family history of CABG   2  Family history of Diabetes Mellitus (V18 0)   3  Family history of Lung Cancer (V16 1)   4  Family history of Stroke Syndrome (V17 1)   5  Family history of Thyroid Disorder (V18 19)   6  Family history of Tobacco Use  Father    7  Family history of Benign Polyps Of The Large Intestine (V18 51)   8  Family history of Bipolar illness   9  Family history of Heart Disease (V17 49)  Brother    8  Family history of depression (V17 0) (Z81 8)  Maternal Grandmother    11  Family history of Diabetes Mellitus (V18 0)   12   Family history of Thyroid Disorder (V18 19)  Family History    13  Denied: Family history of Colon Cancer   14  Denied: Family history of Crohn's Disease   15  Family history of Hypertension (V17 49)   16  Denied: Family history of Liver Cancer   17  Family history of Mother  At Age 52    Social History    · Being A Social Drinker   · Caffeine Use   · Denied: History of Drug Use   · Denied: History of Home Environment Domestic Violence   · Job Physical Requirements Heavy Lifting   · Non-smoker (V49 89) (Z78 9)    Current Meds   1  Aspir-81 81 MG Oral Tablet Delayed Release; TAKE 1 TABLET DAILY; Therapy: 60MMU3977 to (Evaluate:05Yay2459); Last Rx:2016 Ordered   2  Atorvastatin Calcium 20 MG Oral Tablet; TAKE 1 TABLET DAILY AS DIRECTED; Therapy: 02UVK5169 to (Evaluate:2016)  Requested for: 83KCS1165; Last   Rx:2016 Ordered   3  Atorvastatin Calcium 40 MG Oral Tablet; TAKE 1 TABLET DAILY AS DIRECTED; Therapy: 78JEF3386 to (Evaluate:18Yui9960)  Requested for: 66IKQ0649; Last   Rx:2016 Ordered   4  Azelastine HCl - 0 15 % Nasal Solution; USE 2 SPRAYS AT BEDTIME; Therapy: (Recorded:52Zzq6042) to Recorded   5  Divalproex Sodium  MG Oral Tablet Extended Release 24 Hour; Take 6 tablets at   bedtime; Therapy: 97LQV4853 to (Evaluate:25Qqn2130)  Requested for: 70Dbz5993; Last   Rx:71Cns3369 Ordered   6  Fenofibrate 145 MG Oral Tablet (Tricor); Take 1 tablet daily; Therapy: 09TGI8911 to (Evaluate:18Pvb2773)  Requested for: 97MLZ8269; Last   Rx:2016 Ordered   7  FLUoxetine HCl - 40 MG Oral Capsule (PROzac); TAKE 2 CAPSULES DAILY; Therapy: 04WXG4348 to (Evaluate:92Dvu8834)  Requested for: 28Xkm3369; Last   Rx:00Rqq8600 Ordered   8  Invokana 300 MG Oral Tablet; Take 1 tablet daily; Therapy: 25GMZ5291 to (Evaluate:2016)  Requested for: 73WWD8920; Last   Rx:80Gwi9904 Ordered   9  Lansoprazole 30 MG Oral Capsule Delayed Release; TAKE 1 CAPSULE EVERY   MORNING DAILY;    Therapy: 33EUR4221 to (Atiya Wu)  Requested for: 78ZWD7959; Last   Rx:11Nov2015 Ordered   10  Lisinopril 20 MG Oral Tablet; take 1/2 tablet daily; Therapy: 08Hnw2074-(Evaluate:56Nmo9976)  Requested for: 06BNZ4154 Ordered   11  MetFORMIN HCl  MG Oral Tablet Extended Release 24 Hour; 2 tabs twice per day    with food; Therapy: 91QZD9178 to (Evaluate:39Xfm9261)  Requested for: 59XQU9738; Last    Rx:32Xyz7148 Ordered   12  Natural Vitamin E 400 UNIT Oral Tablet; TAKE 2 5 TABLETS DAILY; Therapy: (Recorded:62Loa9313) to Recorded   13  ProAir  (90 Base) MCG/ACT Inhalation Aerosol Solution; INHALE 1 TO 2 PUFFS    EVERY 4 TO 6 HOURS AS NEEDED; Therapy: 71XPW3848 to Recorded   14  Symbicort 80-4 5 MCG/ACT Inhalation Aerosol; use 1 puff twice daily and rinse mouth    after each use; Therapy: 49KVW9707 to (Last Rx:32Zjs7995) Ordered   15  Tanzeum 50 MG Subcutaneous Pen-injector; Use 1 per week; Therapy: 21KKY4428 to (Evaluate:07Jun2016)  Requested for: 32JZH3362; Last    Rx:84Bcg2432 Ordered   16  Vitamin D3 1000 UNIT Oral Capsule; TAKE 7 CAPSULE Daily; Therapy: (Recorded:88Oiu7058) to Recorded    Allergies    1  Biaxin TABS   2  LaMICtal TABS   3  TEGretol TABS    4  Seasonal   5  Bee sting   6  Shellfish    Vitals  Signs [Data Includes: Current Encounter]   Recorded: [id number] 04:46PM   Weight: 228 lb   BMI Calculated: 39 63  BSA Calculated: 2 06    Future Appointments    Date/Time Provider Specialty Site   06[facility]20[facility]2016 09:00 [facility]   07/21/2016 08:45 AM Barb Gonsalez Endocrinology [facility] END[facility]INOLOGY [facility]   07/26/2016 04:30 PM MOLLY Werner   Psychiatry [facility]   07/21/2016 06:30 PM Roman Nails DO Internal Medicine [facility]     Signatures   Electronically signed by : Corrine Harris RD; Jun 16 2016  5:01PM EST                       (Author) Electronically signed by : MOLLY Macias ; Jun 17 2016  9:06AM EST

## 2018-01-12 NOTE — RESULT NOTES
Message   less than a cm nodules in left lobe of thyroid does not require any biopsy which is good  repeat thyroid US in 12 months     Verified Results  [address] [place] [address] [id number] 02:20PM Cem Castillo Order Number: [id number]     Test Name Result Flag Reference   US THYROID (Report)     THYROID ULTRASOUND     INDICATION: Goiter, family history of thyroid disease     COMPARISON: None     TECHNIQUE:  Ultrasound of the thyroid was performed with a high frequency linear transducer in transverse and sagittal planes including volumetric imaging sweeps as well as traditional still imaging technique  FINDINGS:   Thyroid parenchyma is diffusely heterogeneous in echotexture  Right gland: 4 9 x 1 8 x 1 7 cm  No dominant nodules  Left gland: 5 x 1 4 x 1 5 cm  There is a 7 x 5 x 6 mm hypoechoic nodule at the upper pole left lobe of the thyroid  There is a 6 x 3 x 5 mm hypoechoic nodule at the left upper pole  Isthmus: 0 5 cm in AP dimension  No dominant nodules  IMPRESSION:      Heterogeneous appearing thyroid gland with multiple subcentimeter left-sided thyroid nodules         Workstation performed: [id number]     Signed by:   Jennifer Rosales MD   6/1/16

## 2018-01-12 NOTE — RESULT NOTES
Message    Gastric polyps removed came back as benign  Continue lansoprazole  pt aware of results  CF         Verified Results  (1) TISSUE EXAM 62Vib8759 01:56PM Lise Leone     Test Name Result Flag Reference   LAB AP CASE REPORT (Report)     Surgical Pathology Report             Case: [id number]                   Authorizing Provider: Amadou Durand MD     Collected:      08/08/2016 [address]        Ordering Location:   [facility]  Received:      08/08/2016 [facility]                                     Pathologist:      Tatyana Rolle MD                           Specimens:  A) - Stomach, Gastric body cold bx                                   B) - Stomach, Gastric polyps x2 hot snare   LAB AP FINAL DIAGNOSIS (Report)     A  Stomach, gastric body biopsy:    - Inactive mild chronic oxyntic gastritis  - No H  pylori organisms are identified on H&E and immunohistochemical   stains     - Negative for intestinal metaplasia, dysplasia and malignancy  Comment: Appropriate results obtained with controls for ancillary H    pylori staining  B  Stomach, Gastric polyps x 2 hot snare:    - Fundic gland polyps  - Negative for dysplasia and malignancy  Electronically signed by Tatyana Rolle MD on 8/10/2016 at 10:37 AM   LAB AP NOTE      Interpretation performed at [facility], [address] [address]   LAB AP SURGICAL ADDITIONAL INFORMATION (Report)     These tests were developed and their performance characteristics   determined by [facility] or [facility]  They may not be cleared or approved by the [place]  Food and   Drug Administration  The FDA has determined that such clearance or   approval is not necessary  These tests are used for clinical purposes  They should not be regarded as investigational or for research   This   laboratory has been approved by CLIA 88, designated as a high-complexity laboratory and is qualified to perform these tests  LAB AP GROSS DESCRIPTION (Report)     A  The specimen is received in formalin, labeled with the patient's name   and hospital number, and is designated gastric body biopsy  The   specimen consists of 2 tan soft tissue fragments measuring 0 5 and 0 6 cm  Entirely submitted  One cassette  B  The specimen is received in formalin, labeled with the patient's name   and hospital number, and is designated gastric polyps ? ?2  The specimen   consists of multiple tan soft tissue/polypoid fragments measuring in   aggregate 1 x 0 8 x 0 1 cm  Entirely submitted  One cassette  Note: The estimated total formalin fixation time based upon information   provided by the submitting clinician and the standard processing schedule   is 24 0 hours      MAC

## 2018-01-13 VITALS
BODY MASS INDEX: 36.28 KG/M2 | HEART RATE: 76 BPM | SYSTOLIC BLOOD PRESSURE: 160 MMHG | HEIGHT: 64 IN | DIASTOLIC BLOOD PRESSURE: 72 MMHG | WEIGHT: 212.5 LBS

## 2018-01-13 VITALS
BODY MASS INDEX: 36.19 KG/M2 | HEART RATE: 89 BPM | WEIGHT: 212 LBS | HEIGHT: 64 IN | DIASTOLIC BLOOD PRESSURE: 60 MMHG | SYSTOLIC BLOOD PRESSURE: 138 MMHG

## 2018-01-13 NOTE — RESULT NOTES
Discussion/Summary   Normal       Verified Results  (1) MAGNESIUM 79WYU9579 08:31AM Lita Sprinkle   TW Order Number: [id number]     Test Name Result Flag Reference   MAGNESIUM 2 0 mg/dL  1 6-2 6

## 2018-01-13 NOTE — RESULT NOTES
Message   Notify the patient x-ray of the right ankle no acute bony abnormalities  It does show a small retrocalcaneal spur please have patient see podiatrist Dr Navarro Rico follow up as scheduled        Verified Results  * XR SPINE THORACIC 3 VIEW 04Apr2017 08:08AM [place] Order Number: [id number]     Test Name Result Flag Reference   XR SPINE THORACIC 3 VW (Report)     THORACIC SPINE     INDICATION: Mid back pain  COMPARISON: 12/14/2015     VIEWS: AP, lateral and coned-down projections     IMAGES: 3     FINDINGS:     Thoracic vertebrae demonstrate normal stature and alignment  There is no fracture or pathologic bone lesion  Mild anterior lateral endplate osteophyte formation within the mid to lower thoracic spine  Paraspinal soft tissues are grossly unremarkable  The pedicles are intact  IMPRESSION:     Stable mild endplate hypertrophic degenerative change  Workstation performed: [id number]     Signed by:   Kaye Hernandez DO   4/5/17     * XR ANKLE 3+ VIEW RIGHT 04Apr2017 08:08AM [place] Order Number: [id number]     Test Name Result Flag Reference   XR ANKLE 3+ VW RIGHT (Report)     RIGHT ANKLE     INDICATION: Ankle pain  COMPARISON: Foot dated 12/8/2012     VIEWS: 3     IMAGES: 3     FINDINGS:     There is no acute fracture or dislocation  No degenerative changes  Small retrocalcaneal spur  Soft tissues are unremarkable  IMPRESSION:     No acute osseous abnormality  Workstation performed: [id number]     Signed by:   Kaye Hernandez DO   4/5/17       Signatures   Electronically signed by : Bonny Atkinson DO;  Apr 5 2017  9:14PM EST                       (Author)

## 2018-01-13 NOTE — MISCELLANEOUS
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)   6  Asthma (493 90) (J45 909)   7  Benign essential hypertension (401 1) (I10)   8  Family history of Benign Polyps Of The Large Intestine (V18 51)   9  Bilateral knee pain (719 46) (M25 561,M25 562)   10  Bipolar I disorder, most recent episode mixed, in full remission (296 66) (F31 78)   11  Blood in stool (578 1) (K92 1)   12  Cervical disc disease (722 91) (M50 90)   13  Degeneration of thoracic intervertebral disc (722 51) (M51 34)   14  Degenerative arthritis of knee, bilateral (715 96) (M17 0)   15  Diabetes mellitus type 2, uncontrolled (250 02) (E11 65)   16  Diarrhea (787 91) (R19 7)   17  Dyslipidemia (272 4) (E78 5)   18  Edema (782 3) (R60 9)   19  Encounter for routine gynecological examination (V72 31) (Z01 419)   20  Encounter for screening mammogram for malignant neoplasm of breast (V76 12)    (Z12 31)   21  Esophagitis, reflux (530 11) (K21 0)   22  Esophagitis, reflux (530 11) (K21 0)   23  Essential hypertriglyceridemia (272 1) (E78 1)   24  Fatigue (780 79) (R53 83)   25  Fatty liver (571 8) (K76 0)   26  Fecal occult blood test positive (792 1) (R19 5)   27  Gastroesophageal reflux disease (530 81) (K21 9)   28  History of allergy (V15 09) (Z88 9)   29  Hypoglycemia (251 2) (E16 2)   30  Iron deficiency anemia, unspecified iron deficiency (280 9) (D50 9)   31  Joint pain, knee (719 46) (M25 569)   32  Leukocytosis (288 60) (D72 829)   33  Muscle Cramps In The Thigh (Upper Leg) (729 82)   34  Neck pain (723 1) (M54 2)   35  History of Need for pneumococcal vaccination (V03 82) (Z23)   36  Need for Tdap vaccination (V06 1) (Z23)   37  Obesity (278 00) (E66 9)   38   Obstructive sleep apnea (327 23) (G47 33)   39  Otitis media (382 9) (H66 90)   40  Pap smear, as part of routine gynecological examination (V76 2) (Z12 4)   41  Paronychia of toe (681 11) (L03 039)   42  Patellofemoral Dysfunction (736 6)   43  Personality disorder (301 9) (F60 9)   44  Postmenopausal Atrophic Vaginitis (627 3)   45  Prepatellar bursitis (726 65) (M70 40)   46  Right wrist pain (719 43) (M25 531)   47  Routine Gynecological Exam With Cervical Pap Smear (V72 31)   48  Symptomatic menopausal or female climacteric states (627 2) (N95 1)   49  Thoracic spine pain (724 1) (M54 6)   50  Tinea corporis (110 5) (B35 4)   51  Type 2 diabetes mellitus (250 00) (E11 9)   52  Type 2 diabetes mellitus with hyperglycemia (250 00) (E11 65)   53  Viral gastroenteritis (008 8) (A08 4)   54  Visit for screening mammogram (V76 12) (Z12 31)   55  Vitamin D deficiency (268 9) (E55 9)   56  Dia-Parkinson-White Syndrome (426 7)    Current Meds   1  Atorvastatin Calcium 20 MG Oral Tablet; TAKE 1 TABLET DAILY AS DIRECTED; Therapy: 68NVK7306 to (Evaluate:29Apr2016)  Requested for: 02Jan2016; Last   Rx:84Opz2831 Ordered   2  Azelastine HCl - 0 15 % Nasal Solution; USE 2 SPRAYS AT BEDTIME; Therapy: (Recorded:21Lzx9993) to Recorded   3  [employer] D3 5000 UNIT Oral Capsule; total 6000 iu daily; Last Rx:13Cez1323 Ordered   4  Divalproex Sodium  MG Oral Tablet Extended Release 24 Hour; Take 6 tablets at   bedtime; Therapy: 24TZR2743 to (Evaluate:04Jun2016)  Requested for: 39FRM6463; Last   Rx:06Jan2016 Ordered   5  Fenofibrate 145 MG Oral Tablet; Take 1 tablet daily; Therapy: 72AOM8372 to (Evaluate:45Ycb1011)  Requested for: 08RTD2746; Last   Rx:45Yar5797 Ordered   6  FLUoxetine HCl - 40 MG Oral Capsule; TAKE 2 CAPSULES DAILY; Therapy: 26NNV5111 to (Evaluate:04Jun2016)  Requested for: 46AXN2996; Last   Rx:06Jan2016 Ordered   7   Invokana 100 MG Oral Tablet; 1 tablet daily with breakfast;   Therapy: 29OIA7673 to (Evaluate:13Apr2016)  Requested for: 36QTE8030; Last   Rx:52Xim0911 Ordered   8  Lansoprazole 30 MG Oral Capsule Delayed Release; TAKE 1 CAPSULE EVERY   MORNING DAILY; Therapy: 68EVF2656 to (Wayne Bolton)  Requested for: 32EKR0103; Last   Rx:11Nov2015 Ordered   9  Lisinopril 20 MG Oral Tablet; TAKE 1 TABLET DAILY AS DIRECTED; Therapy: 28YKO1589 to (Evaluate:06Xcx1710)  Requested for: 15YGI0750; Last   Rx:82Pse0620 Ordered   10  MetFORMIN HCl  MG Oral Tablet Extended Release 24 Hour; 2 tabs twice per day    with food; Therapy: 22JNV9511 to (Evaluate:26Jun2016)  Requested for: 62Dyr7445; Last    Rx:02Inq1530 Ordered   11  Mupirocin 2 % External Ointment Recorded   12  Natural Vitamin E 400 UNIT Oral Tablet; TAKE 2 5 TABLETS DAILY; Therapy: (Recorded:40Esp4421) to Recorded   13  ProAir  (90 Base) MCG/ACT Inhalation Aerosol Solution; INHALE 1 TO 2 PUFFS    EVERY 4 TO 6 HOURS AS NEEDED; Therapy: 21ZHQ1928 to Recorded   14  Symbicort 80-4 5 MCG/ACT Inhalation Aerosol; use 1 puff twice daily and rinse mouth    after each use; Therapy: 28DEA3194 to (Last Rx:18May2015) Ordered   15  Tanzeum 30 MG Subcutaneous Pen-injector; Use once a week; Therapy: 25VAY1689 to (Evaluate:26Apr2016)  Requested for: 82Omx8190 Recorded    Allergies    1  Biaxin TABS   2  LaMICtal TABS   3  TEGretol TABS    4  Seasonal   5  Bee sting   6   Shellfish    Signatures   Electronically signed by : Radha Hernandes RN; Jan 21 2016 10:32AM EST                       (Author)

## 2018-01-13 NOTE — PSYCH
Psych Med Mgmt    Appearance: was calm and cooperative, adequate hygiene and grooming and good eye contact  Observed mood: mood appropriate  Observed mood: affect appropriate  Speech: a normal rate and fluent  Thought processes: coherent/organized  Hallucinations: no hallucinations present  Thought Content: no delusions  Abnormal Thoughts: The patient has no suicidal thoughts and no homicidal thoughts  Orientation: The patient is oriented to person, place and time  Recent and Remote Memory: short term memory intact and long term memory intact  Attention Span And Concentration: concentration intact  Insight: Insight intact  Judgment: Her judgment was intact  Muscle Strength And Tone  Muscle strength and tone were normal  Normal gait and station  Language: no difficulty naming common objects, no difficulty repeating a phrase and no difficulty writing a sentence  Fund of knowledge: Patient displays adequate knowledge of current events, adequate fund of knowledge regarding past history and adequate fund of knowledge regarding vocabulary  The patient is experiencing no localized pain  On a scale of 0 - 10 the pain severity is a 0  Treatment Recommendations: Continue Latuda 80 mg in the evening  Continue Prozac 40 mg daily  Follows with PCP for medical issues including lipids and glucose monitoring  Risks, Benefits And Possible Side Effects Of Medications: Risks, benefits, and possible side effects of medications explained to patient and patient verbalizes understanding  She reports normal appetite, normal energy level, recent 1 lbs weight loss and normal number of sleep hours  Raghav Hilario states has been doing better since last visit  Mood has improved on higher Latuda dose; Prozac was also decreased on the phone on October 12  Mood swings and irritability are improved  No significant depressive symptoms  Anxiety is controlled  No suicidal or homicidal ideation    No psychotic symptoms  Sleep and appetite are adequate  No side effects form medications reported  Vitals  Signs   Recorded: 21VHP8185 03:13PM   Heart Rate: 71  Systolic: 934  Diastolic: 71  BP Cuff Size: Large  Height: 5 ft 4 in  Weight: 226 lb   BMI Calculated: 38 79  BSA Calculated: 2 06    Assessment    1  Personality disorder (301 9) (F60 9)   2  Bipolar I disorder, most recent episode depressed, in full remission (296 56) (F31 76)    Plan    1  Latuda 80 MG Oral Tablet; TAKE 1 TABLET IN THE EVENING WITH DINNER   2  Follow-up visit in 2 months Evaluation and Treatment  Follow-up  Status: Hold For -   Scheduling  Requested for: 78VOP7619    Review of Systems    Constitutional: recent 1 lb weight loss  Cardiovascular: no complaints of slow or fast heart rate, no chest pain, no palpitations  Respiratory: no complaints of shortness of breath, no wheezing, no dyspnea on exertion  Gastrointestinal: no complaints of abdominal pain, no constipation, no nausea, no diarrhea, no vomiting  Genitourinary: no complaints of dysuria, no incontinence, no pelvic pain, no urinary frequency  Musculoskeletal: no complaints of arthralgia, no myalgias, no limb pain, no joint stiffness  Integumentary: no complaints of skin rash, no itching, no dry skin  Neurological: no complaints of headache, no confusion, no numbness, no dizziness  Past Psychiatric History    Past Psychiatric History: One prior inpatient psychiatric admission years ago at [facility]  2 prior suicide attempts by overdose years ago  Substance Abuse Hx    Substance Abuse History: No history or drug use  Only occasional social alcohol use  Active Problems    1  Abdominal pain, epigastric (789 06) (R10 13)   2  Abnormal CAT scan (793 99) (R93 8)   3  Abnormal electrocardiogram (794 31) (R94 31)   4  Acute respiratory infection (519 8) (J22)   5  Anemia (285 9) (D64 9)   6  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)   7   Asthma (493 90) (J45 909)   8  Back pain, thoracic (724 1) (M54 6)   9  Benign essential hypertension (401 1) (I10)   10  Family history of Benign Polyps Of The Large Intestine (V18 51)   11  Bilateral knee pain (719 46) (M25 561,M25 562)   12  Blood in stool (578 1) (K92 1)   13  Cardiac murmur (785 2) (R01 1)   14  Cervical disc disease (722 91) (M50 90)   15  Cramp of both lower extremities (729 82) (R25 2)   16  Degeneration of thoracic intervertebral disc (722 51) (M51 34)   17  Degenerative arthritis of knee, bilateral (715 96) (M17 0)   18  Diabetes mellitus type 2, uncontrolled (250 02) (E11 65)   19  Diarrhea (787 91) (R19 7)   20  Diverticulosis (562 10) (K57 90)   21  Dizziness (780 4) (R42)   22  Dyslipidemia (272 4) (E78 5)   23  Edema (782 3) (R60 9)   24  Encounter for routine gynecological examination (V72 31) (Z01 419)   25  Encounter for screening mammogram for malignant neoplasm of breast (V76 12)    (Z12 31)   26  Enteritis (558 9) (K52 9)   27  Esophagitis, reflux (530 11) (K21 0)   28  Essential hypertriglyceridemia (272 1) (E78 1)   29  Excessive sweating (780 8) (R61)   30  Fatigue (780 79) (R53 83)   31  Fatty liver (571 8) (K76 0)   32  Fecal occult blood test positive (792 1) (R19 5)   33  Gait disturbance (781 2) (R26 9)   34  Gastroesophageal reflux disease (530 81) (K21 9)   35  Granuloma annulare (695 89) (L92 0)   36  History of allergy (V15 09) (Z88 9)   37  Hypertensive heart disease (402 90) (I11 9)   38  Iron deficiency anemia, unspecified iron deficiency   39  Joint pain, knee (719 46) (M25 569)   40  Left thyroid nodule (241 0) (E04 1)   41  Leg length discrepancy (736 81) (M21 70)   42  Leukocytosis (288 60) (D72 829)   43  Long term current use of therapeutic drug (V58 69) (Z79 899)   44  Long-term insulin use (V58 67) (Z79 4)   45  Lost voice (784 41) (R49 1)   46  Multiple thyroid nodules (241 1) (E04 2)   47  Muscle Cramps In The Thigh (Upper Leg) (729 82)   48   Neck pain (723 1) (M54 2) 49  History of Need for pneumococcal vaccination (V03 82) (Z23)   50  Need for prophylactic vaccination and inoculation against influenza (V04 81) (Z23)   51  Need for revaccination (V05 9) (Z23)   52  Need for Tdap vaccination (V06 1) (Z23)   53  Neuropathy (355 9) (G62 9)   54  Obesity, Class II, BMI 35 0-39 9, with comorbidity (see actual BMI) (278 00) (E66 9)   55  Obstructive sleep apnea (327 23) (G47 33)   56  Otitis media (382 9) (H66 90)   57  Pain, dental (525 9) (K08 89)   58  Pap smear, as part of routine gynecological examination (V76 2) (Z01 419)   59  Paronychia of toe (681 11) (L03 039)   60  Patellofemoral Dysfunction (736 6)   61  Personality disorder (301 9) (F60 9)   62  Postmenopausal Atrophic Vaginitis (627 3)   63  Prepatellar bursitis (726 65) (M70 40)   64  Right ankle pain (719 47) (M25 571)   65  Right wrist pain (719 43) (M25 531)   66  Routine Gynecological Exam With Cervical Pap Smear (V72 31)   67  Symptomatic menopausal or female climacteric states (627 2) (N95 1)   68  Thoracic spine pain (724 1) (M54 6)   69  Tinea corporis (110 5) (B35 4)   70  Type 2 diabetes mellitus with hyperglycemia (250 00) (E11 65)   71  Vertigo (780 4) (R42)   72  Viral gastroenteritis (008 8) (A08 4)   73  Visit for screening mammogram (V76 12) (Z12 31)   74  Vitamin D deficiency (268 9) (E55 9)   75  Dia-Parkinson-White Syndrome (426 7)    Past Medical History    1  History of Acute frontal sinusitis (461 1) (J01 10)   2  History of Acute frontal sinusitis (461 1) (J01 10)   3  History of Cellulitis (682 9) (L03 90)   4  History of Contusion Of The Toe(S) With Intact Skin Surface (924 3)   5  History of Diabetes Mellitus (250 00)   6  History of Difficulty breathing (786 09) (R06 89)   7  History of Encounter for routine gynecological examination (V72 31) (Z01 419)   8  History of Esophagitis, reflux (530 11) (K21 0)   9  History of High cholesterol (272 0) (E78 00)   10   History of abdominal pain (V13 89) (Z87 898)   11  History of abnormal weight loss (V13 89) (Z87 898)   12  History of acute bronchitis (V12 69) (Z87 09)   13  History of acute otitis media (V12 49) (Z86 69)   14  History of acute pharyngitis (V12 69) (Z87 09)   15  History of acute sinusitis (V12 69) (Z87 09)   16  History of acute sinusitis (V12 69) (Z87 09)   17  History of allergic rhinitis (V12 69) (Z87 09)   18  History of candidiasis of mouth (V12 09) (Z86 19)   19  History of fatigue (V13 89) (Z87 898)   20  History of head injury (V15 59) (Z87 828)   21  History of headache (V13 89) (Z87 898)   22  History of hypoglycemia (V12 29) (Z86 39)   23  History of laryngitis (V12 69) (Z87 09)   24  History of obesity (V13 89) (Z86 39)   25  History of pharyngitis (V12 69) (Z87 09)   26  History of sebaceous cyst (V13 3) (Z87 2)   27  History of sore throat (V12 60) (Z87 09)   28  History of type 2 diabetes mellitus (V12 29) (Z86 39)   29  History of urinary frequency (V13 09) (Z87 898)   30  History of Hypoglycemia (251 2) (E16 2)   31  History of Mouth Sores (528 9)   32  History of Need for pneumococcal vaccination (V03 82) (Z23)   33  History of Otitis media, unspecified laterality   34  History of Pap smear, as part of routine gynecological examination (V76 2) (Z01 419)   35  Denied: History of Seizures   36  History of Suicide Attempt (E958 9)   37  History of Tinea corporis (110 5) (B35 4)   38  History of Type 2 diabetes mellitus with hyperglycemia, with long-term current use of    insulin (250 00,790 29,V58 67) (E11 65,Z79 4)   39  History of Type B influenza (487 1) (J10 1)   40  History of Visit for screening mammogram (V76 12) (Z12 31)    The active problems and past medical history were reviewed and updated today  Allergies    1  Biaxin TABS   2  LaMICtal TABS   3  Tanzeum PEN   4  TEGretol TABS   5  Depakote TBEC    6  Seasonal   7  Bee sting   8  Shellfish    Current Meds   1   Accu-Chek Louise Plus In Vitro Strip; test 4 times daily; Therapy: 61MPZ9822 to (Last Rx:13Osk9826)  Requested for: 92XEE6394 Ordered   2  Aspir-81 81 MG Oral Tablet Delayed Release; TAKE 1 TABLET DAILY; Therapy: 43Xjp8690 to (Evaluate:04Aia2130); Last Rx:15Nuj5816 Ordered   3  Atorvastatin Calcium 20 MG Oral Tablet; TAKE 1 TABLET DAILY AS DIRECTED; Therapy: 53JIG2553 to (Evaluate:69Ybg7166)  Requested for: 32Nrz2501; Last   Rx:92Cbt3802 Ordered   4  Azelastine HCl - 0 15 % Nasal Solution; USE 2 SPRAYS AT BEDTIME; Therapy: (Recorded:42Ead7160) to Recorded   5  BD Pen Needle Susie U/F 32G X 4 MM Miscellaneous; USE 5 TIMES DAILY; Therapy: 24Gup2673 to (Evaluate:11Oct2017)  Requested for: 98Gmr7362; Last   Rx:80Csm9893 Ordered   6  Clobetasol Propionate 0 05 % External Ointment; APPLY AND GENTLY MASSAGE INTO   AFFECTED AREA(S) TWICE DAILY; Therapy: 05MOA5791 to Recorded   7  Fenofibrate 145 MG Oral Tablet; Take 1 tablet daily; Therapy: 14LML5532 to (Evaluate:50Qpa6448)  Requested for: 31Cxi7240; Last   Rx:57Udd7446 Ordered   8  Fish Oil 1000 MG Oral Capsule; take 1 capsule daily; Therapy: 14OFH5469 to Recorded   9  FLUoxetine HCl - 40 MG Oral Capsule; TAKE 2 CAPSULES DAILY; Therapy: 80TQT0339 to (Evaluate:13Jan2018)  Requested for: 83Doy4162; Last   Rx:80Whb9873 Ordered   10  HumaLOG KwikPen 200 UNIT/ML Subcutaneous Solution Pen-injector; TAKE 20 UNITS    BEFORE BREAKFAST, USE ICR 1:5 FOR LUNCH, 22 UNITS BEFORE DINNER    (ISF 1:20 ICR 1:5); Therapy: 87GFK5921 to ([id number])  Requested for: 33XWU5611; Last    Rx:61Bka6270 Ordered   11  Hydroxychloroquine Sulfate 200 MG Oral Tablet; TAKE 1 TABLET TWICE DAILY; Therapy: (Recorded:24Cny7886) to Recorded   12  Jardiance 25 MG Oral Tablet; take 1 tablet every day; Therapy: 52ELK1350 to (Evaluate:20Jpo8459)  Requested for: 22Fga4360; Last    Rx:56Qjz1906 Ordered   13  Lansoprazole 30 MG Oral Capsule Delayed Release; TAKE 1 CAPSULE EVERY    MORNING DAILY;     Therapy: 28FMA1302 to (Evaluate:57Elw5563)  Requested for: 20RSQ8825; Last    Rx:78Fjw3047 Ordered   14  Latuda 80 MG Oral Tablet; TAKE 1 TABLET IN THE EVENING WITH DINNER; Therapy: 09DVO4295 to (Evaluate:67Ald2840)  Requested for: 17RLO2003; Last    Rx:37Opw2151 Ordered   15  Lisinopril 20 MG Oral Tablet; TAKE 1 TABLET DAILY AS DIRECTED; Therapy: 22CDO4556 to (Evaluate:84Mqk0065)  Requested for: 54Rpw8654; Last    Rx:91Smu4215 Ordered   16  MetFORMIN HCl  MG Oral Tablet Extended Release 24 Hour; take 2 tabs twice    per day with food; Therapy: 91QYU2440 to (22 184404)  Requested for: 37NLU0471; Last    Rx:86Dcz8873 Ordered   17  Mupirocin 2 % External Ointment; Apply twice daily; Therapy: (Recorded:26Xlv7206) to Recorded   18  Wynetta Abler FlexTouch 100 UNIT/ML Subcutaneous Solution Pen-injector; INJECT 42 UNITS    ONCE A DAY; Therapy: 09Drd0553 to (Evaluate:19Nov2017)  Requested for: 95Szk4344; Last    Rx:98Ccd2568 Ordered   19  Vitamin D (Ergocalciferol) 72086 UNIT Oral Capsule; TAKE 1 CAPSULE WEEKLY; Therapy: 57IXW2036 to (Last Emile Mitten)  Requested for: 20Jun2017 Ordered    The medication list was reviewed and updated today  Family Psych History  Mother    1  Family history of CABG   2  Family history of Diabetes Mellitus (V18 0)   3  Family history of Lung Cancer (V16 1)   4  Family history of Stroke Syndrome (V17 1)   5  Family history of Thyroid Disorder (V18 19)   6  Family history of Tobacco Use  Father    7  Family history of Benign Polyps Of The Large Intestine (V18 51)   8  Family history of Bipolar illness   9  Family history of Heart Disease (V17 49)  Brother    8  Family history of depression (V17 0) (Z81 8)  Maternal Grandmother    11  Family history of Diabetes Mellitus (V18 0)   12  Family history of Thyroid Disorder (V18 19)  Family History    13  Denied: Family history of Colon Cancer   14  Denied: Family history of Crohn's Disease   15   Family history of Hypertension (V17 49) 16  Denied: Family history of Liver Cancer   17  Family history of Mother  At Age 52    The family history was reviewed and updated today  Social History    · Caffeine Use   · Denied: History of Drug Use   · Denied: History of Home Environment Domestic Violence   · Job Physical Requirements Heavy Lifting   ·    · Never a smoker   · Non-smoker (V49 89) (Z78 9)   · Social drinker  The social history was reviewed and updated today  , lives with   No children  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  No history of legal problems  No  history  History Of Phys/Sex Abuse Or Perpetration    History Of Phys/Sex Abuse or Perpetration: No history of physical or sexual abuse  End of Encounter Meds    1  Lisinopril 20 MG Oral Tablet; TAKE 1 TABLET DAILY AS DIRECTED; Therapy: 09IQP4462 to (Evaluate:38Qee9107)  Requested for: 2017; Last   Rx:2017 Ordered    2  HumaLOG KwikPen 200 UNIT/ML Subcutaneous Solution Pen-injector; TAKE 20 UNITS   BEFORE BREAKFAST, USE ICR 1:5 FOR LUNCH, 22 UNITS BEFORE DINNER   (ISF 1:20 ICR 1:5); Therapy: 38TXY2296 to ([phone])  Requested for: 32VKV7425; Last   Rx:52Jso5004 Ordered    3  Latuda 80 MG Oral Tablet; TAKE 1 TABLET IN THE EVENING WITH DINNER; Therapy: 85EXH9356 to ([phone])  Requested for: 46AHJ3301; Last   TN:25LUN3490; Status: ACTIVE - Transmit to Pharmacy - Awaiting Verification Ordered    4  FLUoxetine HCl - 40 MG Oral Capsule (PROzac); TAKE 2 CAPSULES DAILY; Therapy: 89NHD5790 to (Evaluate:2018)  Requested for: 05Cwr8081; Last   Rx:23Oid9727 Ordered    5  Aspir-81 81 MG Oral Tablet Delayed Release; TAKE 1 TABLET DAILY; Therapy: 66Aby0030 to (Evaluate:2018); Last Rx:68Rdp8060 Ordered   6  BD Pen Needle Susie U/F 32G X 4 MM Miscellaneous; USE 5 TIMES DAILY;    Therapy: 79WFT7332 to (Evaluate:2017)  Requested for: 2017; Last Rx:47Kya8581 Ordered   7  Jardiance 25 MG Oral Tablet; take 1 tablet every day; Therapy: 92WAQ5175 to (Evaluate:54Jnp8867)  Requested for: 10Eky6541; Last   Rx:96Muf0662 Ordered   8  MetFORMIN HCl  MG Oral Tablet Extended Release 24 Hour; take 2 tabs twice   per day with food; Therapy: 64GKP4847 to (22 668194)  Requested for: 05Oct2017; Last   Rx:19Vvv3612 Ordered   9  Luisanaabdias Brice FlexTouch 100 UNIT/ML Subcutaneous Solution Pen-injector; INJECT 42 UNITS   ONCE A DAY; Therapy: 58Xhk0230 to (Evaluate:19Nov2017)  Requested for: 13Jmr6353; Last   Rx:68Thf7683 Ordered    10  Accu-Chek Louise Plus In Vitro Strip; test 4 times daily; Therapy: 13JEN5315 to (Last Rx:61Npa7807)  Requested for: 61DLF2798 Ordered    11  Atorvastatin Calcium 20 MG Oral Tablet; TAKE 1 TABLET DAILY AS DIRECTED; Therapy: 16TLS6084 to (Evaluate:05Rhk8232)  Requested for: 20Jun2017; Last    Rx:43Zad1630 Ordered    12  Fenofibrate 145 MG Oral Tablet (Tricor); Take 1 tablet daily; Therapy: 36EFG5331 to (Evaluate:97Mod4654)  Requested for: 69Mwg2262; Last    Rx:11Oob0596 Ordered    13  Lansoprazole 30 MG Oral Capsule Delayed Release; TAKE 1 CAPSULE EVERY    MORNING DAILY; Therapy: 20TXE1317 to (Evaluate:28Qul6072)  Requested for: 34Ydh9492; Last    Rx:10Yeb7513 Ordered    14  Fish Oil 1000 MG Oral Capsule; take 1 capsule daily; Therapy: 38GVO1562 to Recorded   15  Hydroxychloroquine Sulfate 200 MG Oral Tablet; TAKE 1 TABLET TWICE DAILY; Therapy: (Recorded:86Wfy8376) to Recorded    16  Azelastine HCl - 0 15 % Nasal Solution; USE 2 SPRAYS AT BEDTIME; Therapy: (Recorded:94Vhx9859) to Recorded    17  Clobetasol Propionate 0 05 % External Ointment; APPLY AND GENTLY MASSAGE INTO    AFFECTED AREA(S) TWICE DAILY; Therapy: 94MIS1145 to Recorded    18  Vitamin D (Ergocalciferol) 33139 UNIT Oral Capsule; TAKE 1 CAPSULE WEEKLY; Therapy: 88KDC5123 to (Last Job Serafin)  Requested for: 20Jun2017 Ordered    19   Mupirocin 2 % External Ointment; Apply twice daily; Therapy: (Recorded:46Yvh1118) to Recorded    Future Appointments    Date/Time Provider Specialty Site   01/08/2018 08:15 AM MOLLY Swift , DO, [facility] Hematology Oncology [address] [place] [address] ONCOLOGY   01/19/2018 08:40 AM MOLLY Morton  Endocrinology [facility] ENDOCRINOLOGY [facility]   10/31/2017 10:20 AM MOLLY Ramirez   Gastroenterology Adult [facility] GASTROENTEROLOGY [facility]ia [facility]   01/16/2018 08:00 AM Marcus Cortés DO Internal Medicine [facility]     Signatures   Electronically signed by : MOLLY Monroe ; Oct 27 2017  3:22PM EST                       (Author)

## 2018-01-13 NOTE — RESULT NOTES
Message   notify pt normal blood glucose level f/u as scheduled        Verified Results  Blood Glucose- POC [id number] 12:00AM Neftali Mast     Test Name Result Flag Reference   Glucose Finger Stick 80         Signatures   Electronically signed by : Angie Cowan DO; Sep 18 2016  8:19AM EST                       (Author)

## 2018-01-13 NOTE — RESULT NOTES
Message   notify the pt normal cortisol level f/u as scheduled        Verified Results  (1) CORTISOL AM SPECIMEN 04Apr2017 08:40AM Cristóbal Lucero    Order Number: [id number]     Test Name Result Flag Reference   CORTISO AM SPEC 9 1 ug/mL  4 2-22 4   Reference ranges established for specimens drawn between 7 and 9 am  Results may be inaccurate if timing is not correct  Signatures   Electronically signed by : Zee Christianson DO;  Apr 4 2017  6:08PM EST                       (Author)

## 2018-01-14 VITALS
RESPIRATION RATE: 16 BRPM | WEIGHT: 226.03 LBS | BODY MASS INDEX: 38.8 KG/M2 | SYSTOLIC BLOOD PRESSURE: 130 MMHG | OXYGEN SATURATION: 97 % | TEMPERATURE: 98.2 F | HEART RATE: 68 BPM | DIASTOLIC BLOOD PRESSURE: 72 MMHG

## 2018-01-14 VITALS
SYSTOLIC BLOOD PRESSURE: 138 MMHG | BODY MASS INDEX: 36.62 KG/M2 | DIASTOLIC BLOOD PRESSURE: 60 MMHG | HEIGHT: 64 IN | HEART RATE: 74 BPM | WEIGHT: 214.5 LBS

## 2018-01-14 VITALS
SYSTOLIC BLOOD PRESSURE: 152 MMHG | RESPIRATION RATE: 16 BRPM | OXYGEN SATURATION: 98 % | HEART RATE: 72 BPM | DIASTOLIC BLOOD PRESSURE: 76 MMHG | BODY MASS INDEX: 37.9 KG/M2 | WEIGHT: 222.01 LBS | HEIGHT: 64 IN

## 2018-01-14 VITALS
BODY MASS INDEX: 35.35 KG/M2 | DIASTOLIC BLOOD PRESSURE: 60 MMHG | HEIGHT: 64 IN | OXYGEN SATURATION: 94 % | RESPIRATION RATE: 18 BRPM | SYSTOLIC BLOOD PRESSURE: 164 MMHG | TEMPERATURE: 98.8 F | WEIGHT: 207.05 LBS | HEART RATE: 85 BPM

## 2018-01-14 VITALS
DIASTOLIC BLOOD PRESSURE: 72 MMHG | HEIGHT: 64 IN | BODY MASS INDEX: 38.24 KG/M2 | WEIGHT: 224 LBS | HEART RATE: 87 BPM | SYSTOLIC BLOOD PRESSURE: 157 MMHG

## 2018-01-14 NOTE — RESULT NOTES
Message   Potassium normal      Verified Results  (1) BASIC METABOLIC PROFILE [id number] 07:53AM Ryland Moreno    Order Number: [id number]      National Kidney Disease Education Program recommendations are as follows:  GFR calculation is accurate only with a steady state creatinine  Chronic Kidney disease less than 60 ml/min/1 73 sq  meters  Kidney failure less than 15 ml/min/1 73 sq  meters       Test Name Result Flag Reference   GLUCOSE,RANDM 203 mg/dL H    SODIUM 136 mmol/L  136-145   POTASSIUM 5 0 mmol/L  3 5-5 3   CHLORIDE 99 mmol/L L 100-108   CARBON DIOXIDE 29 mmol/L  21-32   ANION GAP (CALC) 8 mmol/L  4-13   BLOOD UREA NITROGEN 21 mg/dL  5-25   CREATININE 0 65 mg/dL  0 60-1 30   Standardized to IDMS reference method   CALCIUM 9 1 mg/dL  8 3-10 1   eGFR Non-African American      >60 0 ml/min/1 73sq m       Signatures   Electronically signed by : Saige Baptiste, ; Mar 11 2016  5:28PM EST                       (Author)

## 2018-01-14 NOTE — RESULT NOTES
Discussion/Summary   A1c 7 3 increased and not at goal, be compliant with regimen, remind patient to bring in glucose log to upcoming appt  Review rest of labs in office  Verified Results  (1) HEMOGLOBIN A1C 20Sep2017 07:25AM Marilu Longoria    Order Number: [id number]     Test Name Result Flag Reference   HEMOGLOBIN A1C 7 3 % H 4 2-6 3   EST  AVG  GLUCOSE 163 mg/dl       (1) LIPID PANEL, FASTING [id number] 07:25AM Marilu Longoria    Order Number: [id number]     Test Name Result Flag Reference   CHOLESTEROL 157 mg/dL     HDL,DIRECT 43 mg/dL  40-60   Specimen collection should occur prior to Metamizole administration due to the potential for falsley depressed results  LDL CHOLESTEROL CALCULATED 49 mg/dL  0-100   Triglyceride:        Normal <150 mg/dl   Borderline High 150-199 mg/dl   High 200-499 mg/dl   Very High >499 mg/dl      Cholesterol:       Desirable <200 mg/dl    Borderline High 200-239 mg/dl    High >239 mg/dl      HDL Cholesterol:       High>59 mg/dL    Low <41 mg/dL      This screening LDL is a calculated result  It does not have the accuracy of the Direct Measured LDL in the monitoring of patients with hyperlipidemia and/or statin therapy  Direct Measure LDL (SFX453) must be ordered separately in these patients  TRIGLYCERIDES 327 mg/dL H <=150   Specimen collection should occur prior to N-Acetylcysteine or Metamizole administration due to the potential for falsely depressed results  (1) T4, FREE [id number] 07:25AM Marilu Longoria    Order Number: [id number]     Test Name Result Flag Reference   T4,FREE 0 88 ng/dL  0 76-1 46   Specimen collection should occur prior to Sulfasalazine administration due to the potential for falsely elevated results       (1) TSH 20Sep2017 07:25AM Marilu Longoria    Order Number: [id number]     Test Name Result Flag Reference   TSH 0 691 uIU/mL  0 358-3 740   Patients undergoing fluorescein dye angiography may retain small amounts of fluorescein in the body for 48-72 hours post procedure  Samples containing fluorescein can produce falsely depressed TSH values  If the patient had this procedure,a specimen should be resubmitted post fluorescein clearance  The recommended reference ranges for TSH during pregnancy are as follows:  First trimester 0 1 to 2 5 uIU/mL  Second trimester  0 2 to 3 0 uIU/mL  Third trimester 0 3 to 3 0 uIU/m     (1) PTH N-TERMINAL (INTACT) 20Sep2017 07:25AM [facility] Number: [id number]     Test Name Result Flag Reference   PARATHYROID HORMONE INTACT 33 3 pg/mL  14 0-72 0     (1) RENAL FUNCTION PANEL 20Sep2017 07:25AM [facility] Order Number: [id number]     Test Name Result Flag Reference   ANION GAP (CALC) 8 mmol/L  4-13   ALBUMIN 4 0 g/dL  3 5-5 0   BLOOD UREA NITROGEN 19 mg/dL  5-25   CALCIUM 9 6 mg/dL  8 3-10 1   CHLORIDE 102 mmol/L  100-108   CARBON DIOXIDE 28 mmol/L  21-32   CREATININE 0 67 mg/dL  0 60-1 30   Standardized to IDMS reference method   POTASSIUM 4 1 mmol/L  3 5-5 3   SODIUM 138 mmol/L  136-145   PHOSPHORUS 3 7 mg/dL  2 7-4 5   eGFR 99 ml/min/1 73sq m     GLUCOSE FASTING 134 mg/dL H 65-99   Specimen collection should occur prior to Sulfasalazine administration due to the potential for falsely depressed results  Specimen collection should occur prior to Sulfapyridine administration due to the potential for falsely elevated results  Specimen collection should occur prior to Sulfasalazine administration due to the potential for falsely depressed results  Specimen collection should occur prior to Sulfapyridine administration due to the potential for falsely elevated results  National Kidney Disease Education Program recommendations are as follows:  GFR calculation is accurate only with a steady state creatinine  Chronic Kidney disease less than 60 ml/min/1 73 sq  meters  Kidney failure less than 15 ml/min/1 73 sq  meters

## 2018-01-14 NOTE — RESULT NOTES
Message   Notify the patient the echocardiogram does show diastolic dysfunction, mild increase of the systolic pressure in the right ventricle and also mild dilated left atrium and trace mitral valve regurgitation and trace tricuspid valve regurgitation because of this I would like the pt to see Card Dr Donis Chris and f/u to discuss as scheduled        Verified Results  ECHO COMPLETE WITH CONTRAST IF INDICATED [id number] 07:54AM Samuel Camara     Test Name Result Flag Reference   ECHO COMPLETE WITH CONTRAST IF INDICATED (Report)     [facility], [address] [place] [address]   [phone]     Transthoracic Echocardiogram   2D, M-mode, Doppler, and Color Doppler     Study date: 2017     Patient: Gage Paulino   MR number: [id number]   Account number: [de-identified]   : 1960   Age: 55 years   Gender: Female   Status: Outpatient   Location: [facility]   Height: 64 in   Weight: 216 5 lb   BP: 140/ 62 mmHg     Indications: Murmur  Diagnoses: R01 1 - Cardiac murmur, unspecified     Sonographer: NICOL Amos   Primary Physician: Se Restrepo DO   Group: [id number]   Interpreting Physician: Darcel Cranker, MD     SUMMARY     LEFT VENTRICLE:   Systolic function was vigorous by visual assessment  Ejection fraction was   estimated to be 70 %  There were no regional wall motion abnormalities  Features were consistent with a pseudonormal left ventricular filling pattern,   with concomitant abnormal relaxation and increased filling pressure (grade 2   diastolic dysfunction)  RIGHT VENTRICLE:   Systolic pressure was mildly increased  Estimated peak pressure was 37 mmHg  LEFT ATRIUM:   The atrium was mildly dilated  MITRAL VALVE:   There was trace regurgitation  TRICUSPID VALVE:   There was trace regurgitation       HISTORY: PRIOR HISTORY: Murmur, Hypertension, Dyslipidemia, DM2, KODY, Obesity,   WPW PROCEDURE: The study was performed in the [facility]  This was a routine study  The transthoracic approach was used  The study   included complete 2D imaging, M-mode, complete spectral Doppler, and color   Doppler  The heart rate was 72 bpm, at the start of the study  Images were   obtained from the parasternal, apical, subcostal, and suprasternal notch   acoustic windows  Image quality was adequate  LEFT VENTRICLE: Size was normal  Systolic function was vigorous by visual   assessment  Ejection fraction was estimated to be 70 %  There were no regional   wall motion abnormalities  Wall thickness was normal  DOPPLER: The ratio of   early ventricular filling to atrial contraction velocities was within the   normal range  Features were consistent with a pseudonormal left ventricular   filling pattern, with concomitant abnormal relaxation and increased filling   pressure (grade 2 diastolic dysfunction)  RIGHT VENTRICLE: The size was normal  Systolic function was normal  DOPPLER:   Systolic pressure was mildly increased  Estimated peak pressure was 37 mmHg  LEFT ATRIUM: The atrium was mildly dilated  RIGHT ATRIUM: Size was normal      MITRAL VALVE: Valve structure was normal  There was normal leaflet separation  DOPPLER: The transmitral velocity was within the normal range  There was no   evidence for stenosis  There was trace regurgitation  AORTIC VALVE: The valve was trileaflet  Leaflets exhibited normal thickness and   normal cuspal separation  DOPPLER: Transaortic velocity was minimally   increased  There was no evidence for stenosis  There was no regurgitation  TRICUSPID VALVE: The valve structure was normal  There was normal leaflet   separation  DOPPLER: The transtricuspid velocity was within the normal range  There was no evidence for stenosis  There was trace regurgitation       PULMONIC VALVE: Leaflets exhibited normal thickness, no calcification, and normal cuspal separation  DOPPLER: The transpulmonic velocity was within the   normal range  There was no regurgitation  PERICARDIUM: There was no pericardial effusion  AORTA: The root exhibited normal size  SYSTEMIC VEINS: IVC: The inferior vena cava was normal in size and course  Respirophasic changes were normal      SYSTEM MEASUREMENT TABLES     2D   %FS: 32 %   AV Diam: 2 4 cm   EDV(Teich): 92 67 ml   EF(Cube): 68 56 %   EF(Teich): 60 27 %   ESV(Cube): 28 74 ml   ESV(Teich): 36 82 ml   IVSd: 0 99 cm   LA Area: 20 97 cm2   LA Diam: 3 93 cm   LVEDV MOD A4C: 114 44 ml   LVEF MOD A4C: 67 8 %   LVESV MOD A4C: 36 85 ml   LVIDd: 4 5 cm   LVIDs: 3 06 cm   LVLd A4C: 9 06 cm   LVLs A4C: 7 39 cm   LVOT Diam: 1 97 cm   LVPWd: 0 99 cm   RA Area: 15 84 cm2   RV Diam: 3 17 cm   SI(Cube): 30 87 ml/m2   SI(Teich): 27 51 ml/m2   SV MOD A4C: 77 59 ml   SV(Cube): 62 67 ml   SV(Teich): 55 85 ml     CW   AV Env  Ti: 277 26 ms   AV MaxP 47 mmHg   AV SI: 104 58 ml/m2   AV SV: 212 3 ml   AV VTI: 46 97 cm   AV Vmax: 2 42 m/s   AV Vmean: 1 69 m/s   AV meanP 34 mmHg   TR MaxP 18 mmHg   TR Vmax: 2 79 m/s     MM   TAPSE: 2 49 cm     PW   RAYMON (VTI): 2 19 cm2   RAYMON Vmax: 1 99 cm2   E': 0 07 m/s   E/E': 17 58   LVOT Env  Ti: 325 32 ms   LVOT VTI: 33 64 cm   LVOT Vmax: 1 58 m/s   LVOT Vmean: 1 03 m/s   LVOT maxP 97 mmHg   LVOT meanP 99 mmHg   LVSI Dopp: 50 63 ml/m2   LVSV Dopp: 102 77 ml   MV A Mihir: 0 84 m/s   MV Dec Drew: 6 31 m/s2   MV DecT: 182 29 ms   MV E Mihir: 1 15 m/s   MV E/A Ratio: 1 37     [employer] Accredited Echocardiography Laboratory     Prepared and electronically signed by     Remington Donis MD   Signed 2017 12:56:22       Signatures   Electronically signed by : Princess Chavarria DO; 2017  7:23PM EST                       (Author)

## 2018-01-15 NOTE — RESULT NOTES
Verified Results  (1) VALPROIC ACID (DEPAKOTE) [id number] 12:12PM Ally Fields     Test Name Result Flag Reference   VALPROIC ACID 11 ug/mL L        Discussion/Summary   Called patient to discuss results of low Depakote level  Message left on voice mail to call us back

## 2018-01-15 NOTE — RESULT NOTES
Message   A1c 9 8 not at goal and has increased, what is going on? Any illness? Medication compliance? Elevated triglycerides, is she taking fenofibrate and statin? Low Vitamin D level, would she prefer weekly Vitamin D? Make sure follow-up is scheduled  Verified Results  (1) HEMOGLOBIN A1C 11Oct2016 07:58AM Sruthi [employer]    Order Number: [id number]     Test Name Result Flag Reference   HEMOGLOBIN A1C 9 8 % H 4 2-6 3   EST  AVG  GLUCOSE 235 mg/dl       (1) LIPID PANEL, FASTING 11Oct2016 07:58AM [facility] Number: [id number]     Test Name Result Flag Reference   CHOLESTEROL 224 mg/dL H    HDL,DIRECT 37 mg/dL L 40-60   Specimen collection should occur prior to Metamizole administration due to the potential for falsely depressed results  LDL CHOLESTEROL CALCULATED (Report)  0-100   Calculated LDL invalid, triglycerides >400 mg/dl   - Patient Instructions: This is a fasting blood test  Water,black tea or black coffee only after 9:00pm the night before test   Drink 2 glasses of water the morning of test     - Patient Instructions: This is a non fasting blood test  Please drink two glasses of water the morning of test - Patient Instructions: This is a fasting blood test  Water,black tea or black coffee only after 9:00pm the night before test Drink 2 glasses   of water the morning of test   Triglyceride:       Normal       <150 mg/dl     Borderline High  150-199 mg/dl     High        200-499 mg/dl     Very High     >499 mg/dl  Cholesterol:       Desirable    <200 mg/dl    Borderline High 200-239 mg/dl    High       >239 mg/dl  HDL Cholesterol:      High  >59 mg/dL    Low   <41 mg/dL   Calculated LDL invalid, triglycerides >400 mg/dl   - Patient Instructions: This is a fasting blood test  Water,black tea or black  coffee only after 9:00pm the night before test   Drink 2 glasses of water the morning of test     - Patient Instructions:  This is a non fasting blood test  Please drink two glasses of water the morning of test - Patient Instructions: This is a fasting blood test  Water,black tea or black  coffee only after 9:00pm the night before test Drink 2 glasses   of water the morning of test   Triglyceride:         Normal              <150 mg/dl       Borderline High    150-199 mg/dl       High               200-499 mg/dl       Very High          >499 mg/dl  Cholesterol:         Desirable        <200 mg/dl      Borderline High  200-239 mg/dl      High             >239 mg/dl  HDL Cholesterol:        High    >59 mg/dL      Low     <41 mg/dL   TRIGLYCERIDES 1110 mg/dL H <=150   Specimen collection should occur prior to N-Acetylcysteine or Metamizole administration due to the potential for falsely depressed results  (1) RENAL FUNCTION PANEL 11Oct2016 07:58AM Amirah López    Order Number: [id number]     Test Name Result Flag Reference   ANION GAP (CALC) 7 mmol/L  4-13   BLOOD UREA NITROGEN 19 mg/dL  5-25   CALCIUM 8 9 mg/dL  8 3-10 1   CHLORIDE 96 mmol/L L 100-108   CARBON DIOXIDE 28 mmol/L  21-32   CREATININE 0 72 mg/dL  0 60-1 30   Standardized to IDMS reference method   GLUCOSE,RANDM 168 mg/dL H    If the patient is fasting, the ADA then defines impaired fasting glucose as > 100 mg/dL and diabetes as > or equal to 123 mg/dL  POTASSIUM 3 6 mmol/L  3 5-5 3   eGFR Non-African American      >60 0 ml/min/1 73sq m   - Patient Instructions: This is a non fasting blood test  Please drink two glasses of water the morning of test - Patient Instructions: This is a fasting blood test  Water,black tea or black  coffee only after 9:00pm the night before test Drink 2 glasses   of water the morning of test   National Kidney Disease Education Program recommendations are as follows:  GFR calculation is accurate only with a steady state creatinine  Chronic Kidney disease less than 60 ml/min/1 73 sq  meters  Kidney failure less than 15 ml/min/1 73 sq  meters     SODIUM 131 mmol/L L 136-145 PHOSPHORUS 3 6 mg/dL  2 7-4 5   - Patient Instructions: This is a non fasting blood test  Please drink two glasses of water the morning of test - Patient Instructions: This is a fasting blood test  Water,black tea or black  coffee only after 9:00pm the night before test Drink 2 glasses   of water the morning of test      (1) HEPATIC FUNCTION PANEL [id number] 07:58AM Sruthi Garcia    Order Number: [id number]     Test Name Result Flag Reference   ALBUMIN 3 7 g/dL  3 5-5 0   - Patient Instructions: This is a non fasting blood test  Please drink two glasses of water the morning of test     - Patient Instructions: This is a non fasting blood test  Please drink two glasses of water the morning of test - Patient Instructions:  This is a fasting blood test  Water,black tea or black  coffee only after 9:00pm the night before test Drink 2 glasses   of water the morning of test    ALK PHOSPHATAS 127 U/L H    ALT (SGPT) 33 U/L  12-78   AST(SGOT) 22 U/L  5-45   BILI, DIRECT 0 11 mg/dL  0 00-0 20   BILI, TOTAL 0 47 mg/dL  0 20-1 00   TOTAL PROTEIN 7 6 g/dL  6 4-8 2

## 2018-01-15 NOTE — PSYCH
Psych Med Mgmt    Appearance: was calm and cooperative, adequate hygiene and grooming and good eye contact  Observed mood: mood appropriate  Observed mood: affect appropriate  Speech: a normal rate  Thought processes: coherent/organized  Hallucinations: no hallucinations present  Thought Content: no delusions  Abnormal Thoughts: The patient has no suicidal thoughts and no homicidal thoughts  Orientation: The patient is oriented to person, place and time  Recent and Remote Memory: short term memory intact and long term memory intact  Attention Span And Concentration: concentration intact  Insight: Insight intact  Judgment: Her judgment was intact  Muscle Strength And Tone  Muscle strength and tone were normal  Normal gait and station  Language: no difficulty naming common objects, no difficulty repeating a phrase and no difficulty writing a sentence  Fund of knowledge: Patient displays adequate knowledge of current events, adequate fund of knowledge regarding past history and adequate fund of knowledge regarding vocabulary  The patient is experiencing no localized pain  On a scale of 0 - 10 the pain severity is a 0  Treatment Recommendations: Continue Latuda 40 mg in the evening  Continue Prozac 80 mg per daily (takes 40 mg bid)  Follows with PCP for medical issues including lipids and glucose monitoring  Risks, Benefits And Possible Side Effects Of Medications: Risks, benefits, and possible side effects of medications explained to patient and patient verbalizes understanding  She reports normal appetite, normal energy level, recent 5 lbs weight loss and normal number of sleep hours  Claude Loh states has been doing well since last visit  Mood has been stable, no significant depressive symptoms  Mood swings are well controlled  No significant anxiety symptoms  Has been eating healthy, lost some weight recently  No suicidal or homicidal ideation    No psychotic symptoms  Sleep and appetite are adequate  No side effects form medications reported  Doing well at work, even she had to go back on night shift  Vitals  Signs   Recorded: 37HVJ4169 02:22PM   Heart Rate: 71  Systolic: 780  Diastolic: 74  BP Cuff Size: Large  Height: 5 ft 4 in  Weight: 209 lb   BMI Calculated: 35 87  BSA Calculated: 1 99    Assessment    1  Bipolar I disorder, most recent episode mixed, in full remission (296 66) (F31 78)   2  Personality disorder (301 9) (F60 9)    Plan    1  FLUoxetine HCl - 40 MG Oral Capsule (PROzac); TAKE 2 CAPSULES DAILY   2  Latuda 40 MG Oral Tablet; Take 1 tablet in the evening with food    3  Follow-up visit in 4 Months Evaluation and Treatment  Follow-up  Status: Hold For -   Scheduling  Requested for: 60VEX8803    Review of Systems    Constitutional: recent 5 lb weight loss  Cardiovascular: no complaints of slow or fast heart rate, no chest pain, no palpitations  Respiratory: no complaints of shortness of breath, no wheezing, no dyspnea on exertion  Gastrointestinal: no complaints of abdominal pain, no constipation, no nausea, no diarrhea, no vomiting  Genitourinary: no complaints of dysuria, no incontinence, no pelvic pain, no urinary frequency  Musculoskeletal: no complaints of arthralgia, no myalgias, no limb pain, no joint stiffness  Integumentary: no complaints of skin rash, no itching, no dry skin  Neurological: no complaints of headache, no confusion, no numbness, no dizziness  Past Psychiatric History    Past Psychiatric History: One prior inpatient psychiatric admission years ago at [facility]  2 prior suicide attempts by overdose years ago  Substance Abuse Hx    Substance Abuse History: No history or drug use  Only occasional social alcohol use  Active Problems    1  Abdominal pain, epigastric (789 06) (R10 13)   2  Abnormal blood chemistry (790 6) (R79 9)   3  Abnormal CAT scan (793 99) (R93 8)   4   Abnormal electrocardiogram (794 31) (R94 31)   5  Acute frontal sinusitis (461 1) (J01 10)   6  Acute sinusitis (461 9) (J01 90)   7  Allergic rhinitis (477 9) (J30 9)   8  Anemia (285 9) (D64 9)   9  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)   10  Asthma (493 90) (J45 909)   11  Benign essential hypertension (401 1) (I10)   12  Family history of Benign Polyps Of The Large Intestine (V18 51)   13  Bilateral knee pain (719 46) (M25 561,M25 562)   14  Bipolar I disorder, most recent episode mixed, in full remission (296 66) (F31 78)   15  Blood in stool (578 1) (K92 1)   16  Cardiac murmur (785 2) (R01 1)   17  Cervical disc disease (722 91) (M50 90)   18  Cramp of both lower extremities (729 82) (R25 2)   19  Degeneration of thoracic intervertebral disc (722 51) (M51 34)   20  Degenerative arthritis of knee, bilateral (715 96) (M17 0)   21  Diabetes mellitus type 2, uncontrolled (250 02) (E11 65)   22  Diarrhea (787 91) (R19 7)   23  Diverticulosis (562 10) (K57 90)   24  Dizziness (780 4) (R42)   25  Dyslipidemia (272 4) (E78 5)   26  Edema (782 3) (R60 9)   27  Encounter for routine gynecological examination (V72 31) (Z01 419)   28  Encounter for screening mammogram for malignant neoplasm of breast (V76 12)    (Z12 31)   29  Enteritis (558 9) (K52 9)   30  Esophagitis, reflux (530 11) (K21 0)   31  Esophagitis, reflux (530 11) (K21 0)   32  Essential hypertriglyceridemia (272 1) (E78 1)   33  Excessive sweating (780 8) (R61)   34  Fatigue (780 79) (R53 83)   35  Fatty liver (571 8) (K76 0)   36  Fecal occult blood test positive (792 1) (R19 5)   37  Gait disturbance (781 2) (R26 9)   38  Gastroesophageal reflux disease (530 81) (K21 9)   39  History of allergy (V15 09) (Z88 9)   40  Hypertensive heart disease (402 90) (I11 9)   41  Iron deficiency anemia, unspecified iron deficiency   42  Joint pain, knee (719 46) (M25 569)   43  Left thyroid nodule (241 0) (E04 1)   44  Leukocytosis (288 60) (D72 829)   45   Long term current use of therapeutic drug (V58 69) (Z79 899)   46  Long-term insulin use (V58 67) (Z79 4)   47  Lost voice (784 41) (R49 1)   48  Multiple thyroid nodules (241 1) (E04 2)   49  Muscle Cramps In The Thigh (Upper Leg) (729 82)   50  Neck pain (723 1) (M54 2)   51  History of Need for pneumococcal vaccination (V03 82) (Z23)   52  Need for prophylactic vaccination and inoculation against influenza (V04 81) (Z23)   53  Need for revaccination (V05 9) (Z23)   54  Need for Tdap vaccination (V06 1) (Z23)   55  Neuropathy (355 9) (G62 9)   56  Obesity (278 00) (E66 9)   57  Obesity, Class II, BMI 35 0-39 9, with comorbidity (see actual BMI) (278 00) (E66 9)   58  Obstructive sleep apnea (327 23) (G47 33)   59  Otitis media (382 9) (H66 90)   60  Pain, dental (525 9) (K08 89)   61  Pap smear, as part of routine gynecological examination (V76 2) (Z01 419)   62  Paronychia of toe (681 11) (L03 039)   63  Patellofemoral Dysfunction (736 6)   64  Personality disorder (301 9) (F60 9)   65  Postmenopausal Atrophic Vaginitis (627 3)   66  Prepatellar bursitis (726 65) (M70 40)   67  Right wrist pain (719 43) (M25 531)   68  Routine Gynecological Exam With Cervical Pap Smear (V72 31)   69  Sore throat (462) (J02 9)   70  Symptomatic menopausal or female climacteric states (627 2) (N95 1)   71  Thoracic spine pain (724 1) (M54 6)   72  Tinea corporis (110 5) (B35 4)   73  Type 2 diabetes mellitus (250 00) (E11 9)   74  Type 2 diabetes mellitus with hyperglycemia (250 00) (E11 65)   75  Type 2 diabetes mellitus with hyperglycemia, with long-term current use of insulin    (250 00,790 29,V58 67) (E11 65,Z79 4)   76  Vertigo (780 4) (R42)   77  Viral gastroenteritis (008 8) (A08 4)   78  Visit for screening mammogram (V76 12) (Z12 31)   79  Vitamin D deficiency (268 9) (E55 9)   80  Dia-Parkinson-White Syndrome (426 7)    Past Medical History    1  History of Acute frontal sinusitis (461 1) (J01 10)   2   History of Cellulitis (682 9) (L03 90)   3  History of Contusion Of The Toe(S) With Intact Skin Surface (924 3)   4  History of Diabetes Mellitus (250 00)   5  History of Difficulty breathing (786 09) (R06 89)   6  History of High cholesterol (272 0) (E78 00)   7  History of abdominal pain (V13 89) (Z87 898)   8  History of abnormal weight loss (V13 89) (Z87 898)   9  History of acute bronchitis (V12 69) (Z87 09)   10  History of acute otitis media (V12 49) (Z86 69)   11  History of acute pharyngitis (V12 69) (Z87 09)   12  History of acute sinusitis (V12 69) (Z87 09)   13  History of candidiasis of mouth (V12 09) (Z86 19)   14  History of head injury (V15 59) (Z87 828)   15  History of headache (V13 89) (Z87 898)   16  History of hypoglycemia (V12 29) (Z86 39)   17  History of laryngitis (V12 69) (Z87 09)   18  History of pharyngitis (V12 69) (Z87 09)   19  History of sebaceous cyst (V13 3) (Z87 2)   20  History of urinary frequency (V13 09) (Z87 898)   21  History of Hypoglycemia (251 2) (E16 2)   22  History of Mouth Sores (528 9)   23  History of Need for pneumococcal vaccination (V03 82) (Z23)   24  History of Otitis media, unspecified laterality   25  Denied: History of Seizures   26  History of Suicide Attempt (E958 9)   27  History of Tinea corporis (110 5) (B35 4)   28  History of Type B influenza (487 1) (J10 1)    The active problems and past medical history were reviewed and updated today  Allergies    1  Biaxin TABS   2  LaMICtal TABS   3  Tanzeum PEN   4  TEGretol TABS   5  Depakote TBEC    6  Seasonal   7  Bee sting   8  Shellfish    Current Meds   1  Accu-Chek Louise Plus In Vitro Strip; test 4 times daily; Therapy: 53AQG7055 to (Last Rx:25Dsh5560)  Requested for: 78RMT9905 Ordered   2  Atorvastatin Calcium 20 MG Oral Tablet; TAKE 1 TABLET DAILY AS DIRECTED; Therapy: 78KRQ9865 to (Jorden Lott)  Requested for: 93ULP2812; Last   Rx:08Nov2016 Ordered   3   Azelastine HCl - 0 15 % Nasal Solution; USE 2 SPRAYS AT BEDTIME; Therapy: (Recorded:11Xmw0208) to Recorded   4  BD Pen Needle Susie U/F 32G X 4 MM Miscellaneous; USE 5 TIMES DAILY; Therapy: 55PUT9200 to (Evaluate:29Mar2017)  Requested for: 02Jan2017; Last   Rx:79Ubq7309 Ordered   5  Dicyclomine HCl - 20 MG Oral Tablet; take 1 tablet every 6-8 hours as needed for   abdominal pain and loose stools; Therapy: 13ZCX6331 to (Last Rx:09Feb2017) Ordered   6  Fenofibrate 145 MG Oral Tablet; Take 1 tablet daily; Therapy: 41LXX3040 to (Evaluate:25May2017)  Requested for: 09RGI4725; Last   Rx:24Feb2017 Ordered   7  FLUoxetine HCl - 40 MG Oral Capsule; TAKE 2 CAPSULES DAILY; Therapy: 74FXP4237 to (Evaluate:18May2017)  Requested for: 22IXQ8450; Last   Rx:60Kof1004 Ordered   8  HumaLOG KwikPen 200 UNIT/ML Subcutaneous Solution Pen-injector; Take 20 units   before breakfast, Use ICR 1:5 for lunch, 22 units before dinner   (ISF 1:20; ICR 1:5); Therapy: 35CLS3745 to (Anita Pollock)  Requested for: 86VVV4493; Last   Rx:26Jan2017 Ordered   9  Invokana 300 MG Oral Tablet; Take 1 tablet daily; Therapy: 97USV6473 to (Evaluate:25May2017)  Requested for: 28Yln3159; Last   Rx:02Nfx0114 Ordered   10  Lansoprazole 30 MG Oral Capsule Delayed Release; TAKE 1 CAPSULE EVERY    MORNING DAILY; Therapy: 14YAF7787 to (Char Rhodshabbir)  Requested for: 65Tyw7637; Last    Rx:11Nov2015 Ordered   11  Latuda 40 MG Oral Tablet; Take 1 tablet in the evening with food; Therapy: 89VFA4131 to (Evaluate:18May2017)  Requested for: 24EUF4000; Last    Rx:83Pnk5756 Ordered   12  Lisinopril 20 MG Oral Tablet; TAKE 1 TABLET DAILY AS DIRECTED; Therapy: 39THQ3162 to (Evaluate:16May2017)  Requested for: 36JAZ8468; Last    Rx:47Xbc5283 Ordered   13  MetFORMIN HCl  MG Oral Tablet Extended Release 24 Hour; 1 tab twice per day    with food; Therapy: 35Xjw3031 to (Evaluate:30Ttp4748)  Requested for: 20Feb2017; Last    Rx:20Feb2017 Ordered   14  Mupirocin 2 % External Ointment;  Apply twice daily; Therapy: (Recorded:93Syo6577) to Recorded   15  Natural Vitamin E 400 UNIT Oral Tablet; TAKE 1 TABLET DAILY; Therapy: (Recorded:2016) to Recorded   16  Maribell Reynolds FlexTouch 100 UNIT/ML Subcutaneous Solution Pen-injector; Inject  42 units    once a day  Requested for: 39YTC3108; Last Rx:2016 Ordered   17  Vitamin D (Ergocalciferol) 71899 UNIT Oral Capsule; TAKE 1 CAPSULE WEEKLY; Therapy: 16NQU3727 to (Last Rx:59Nba8498)  Requested for: 05Qtd1495; Status:    ACTIVE - Renewal Denied Ordered    The medication list was reviewed and updated today  Family Psych History  Mother    1  Family history of CABG   2  Family history of Diabetes Mellitus (V18 0)   3  Family history of Lung Cancer (V16 1)   4  Family history of Stroke Syndrome (V17 1)   5  Family history of Thyroid Disorder (V18 19)   6  Family history of Tobacco Use  Father    7  Family history of Benign Polyps Of The Large Intestine (V18 51)   8  Family history of Bipolar illness   9  Family history of Heart Disease (V17 49)  Brother    8  Family history of depression (V17 0) (Z81 8)  Maternal Grandmother    11  Family history of Diabetes Mellitus (V18 0)   12  Family history of Thyroid Disorder (V18 19)  Family History    13  Denied: Family history of Colon Cancer   14  Denied: Family history of Crohn's Disease   15  Family history of Hypertension (V17 49)   16  Denied: Family history of Liver Cancer   17  Family history of Mother  At Age 52    The family history was reviewed and updated today  Social History    · Caffeine Use   · Denied: History of Drug Use   · Denied: History of Home Environment Domestic Violence   · Job Physical Requirements Heavy Lifting   ·    · Non-smoker (V49 89) (Z78 9)   · Social drinker  The social history was reviewed and updated today  The social history was reviewed and is unchanged  , lives with   No children   Works as a Certified Nursing Assistant at [employer]  High School graduate  No history of legal problems  No  history  History Of Phys/Sex Abuse Or Perpetration    History Of Phys/Sex Abuse or Perpetration: No history of physical or sexual abuse  End of Encounter Meds    1  Azelastine HCl - 0 15 % Nasal Solution; USE 2 SPRAYS AT BEDTIME; Therapy: (Recorded:95Idw9006) to Recorded    2  Lisinopril 20 MG Oral Tablet; TAKE 1 TABLET DAILY AS DIRECTED; Therapy: 97PVP4068 to (Evaluate:16May2017)  Requested for: 66UXX9640; Last   Rx:18Oct2016 Ordered    3  HumaLOG KwikPen 200 UNIT/ML Subcutaneous Solution Pen-injector; Take 20 units   before breakfast, Use ICR 1:5 for lunch, 22 units before dinner   (ISF 1:20; ICR 1:5); Therapy: 02FSC6763 to (Carla Mask)  Requested for: 84TDM0839; Last   Rx:26Jan2017 Ordered    4  FLUoxetine HCl - 40 MG Oral Capsule (PROzac); TAKE 2 CAPSULES DAILY; Therapy: 23LED8253 to (Evaluate:76Rex6564)  Requested for: 16KNT0115; Last   Rx:16Mar2017 Ordered   5  Latuda 40 MG Oral Tablet; Take 1 tablet in the evening with food; Therapy: 37CNQ0923 to (Evaluate:99Uzj9805)  Requested for: 69ZOU4422; Last   Rx:16Mar2017 Ordered    6  BD Pen Needle Susie U/F 32G X 4 MM Miscellaneous; USE 5 TIMES DAILY; Therapy: 32IOK8788 to (Evaluate:29Mar2017)  Requested for: 02Jan2017; Last   Rx:04Gid0269 Ordered   7  Invokana 300 MG Oral Tablet; Take 1 tablet daily; Therapy: 05YUB5845 to (Evaluate:27Qzv7073)  Requested for: 53Xhc6323; Last   Rx:81Tkv3529 Ordered   8  MetFORMIN HCl  MG Oral Tablet Extended Release 24 Hour; 1 tab twice per day   with food; Therapy: 26Xmx7137 to (Evaluate:78Sfd7340)  Requested for: 80Gye5653; Last   Rx:85Ksx2926 Ordered   9  Lorra Grow FlexTouch 100 UNIT/ML Subcutaneous Solution Pen-injector; Inject  42 units   once a day  Requested for: 98DAJ8049; Last Rx:30Nov2016 Ordered    10  Accu-Chek Louise Plus In Vitro Strip; test 4 times daily;     Therapy: 55FEV7828 to (Last Rx:85Apy0126) Requested for: 56UKG8996 Ordered    11  Lansoprazole 30 MG Oral Capsule Delayed Release; TAKE 1 CAPSULE EVERY    MORNING DAILY; Therapy: 72MNE5153 to (Alejandro Jewels)  Requested for: 81Joc1312; Last    Rx:11Nov2015 Ordered    12  Atorvastatin Calcium 20 MG Oral Tablet; TAKE 1 TABLET DAILY AS DIRECTED; Therapy: 52VSL6401 to (Vahid Meri)  Requested for: 83LOP0633; Last    Rx:08Nov2016 Ordered    13  Dicyclomine HCl - 20 MG Oral Tablet; take 1 tablet every 6-8 hours as needed for    abdominal pain and loose stools; Therapy: 89RTN7778 to (Last Rx:76Xxx4370) Ordered    14  Fenofibrate 145 MG Oral Tablet (Tricor); Take 1 tablet daily; Therapy: 15LTH9709 to (Evaluate:36Ftv7549)  Requested for: 99UKX7088; Last    Rx:92Wau6364 Ordered    15  Natural Vitamin E 400 UNIT Oral Tablet; TAKE 1 TABLET DAILY; Therapy: (Recorded:08Nov2016) to Recorded    16  Vitamin D (Ergocalciferol) 19222 UNIT Oral Capsule; TAKE 1 CAPSULE WEEKLY; Therapy: 84GYK4823 to (Last Rx:77Gut0325)  Requested for: 30Frj7602; Status:    ACTIVE - Renewal Denied Ordered    17  Mupirocin 2 % External Ointment; Apply twice daily; Therapy: (Recorded:96Mmy2438) to Recorded    Future Appointments    Date/Time Provider Specialty Site   04/20/2017 10:30 AM MOLLY [place]  [facility]in[facility]   01/04/2018 08:00 AM MOLLY Rascon , [facility] Hematology [facility]   05/22/2017 09:30 AM Gregory Leonard DO Internal Medicine [facility]   05/22/2017 09:15 AM MOLLY Coffey   Obstetrics/Gynecology [address]  OB/GYN     Signatures   Electronically signed by : MOLLY Mejia ; Mar 16 2017  2:29PM EST                       (Author)

## 2018-01-15 NOTE — PROGRESS NOTES
Assessment    1  Anemia (285 9) (D64 9)   2  Leukocytosis (288 60) (D72 829)    Plan  Leukocytosis    · Drink plenty of fluids ; Status:Complete;   Done: 31NHH7169   Ordered; For:Leukocytosis; Ordered By:Toni Srivastava;   · Follow-up visit in 1 year Evaluation and Treatment  Follow-up  Status: Hold For -  Scheduling  Requested for: 45Ani7677   Ordered; For: Leukocytosis; Ordered By: Opal Bowels Performed:  Due: 54OGZ5209   · (1) CBC/PLT/DIFF; Status:Active; Requested for:79Bmt5750;    Perform:[facility]; OEF:54KOL5221;QOKTGPP; For:Leukocytosis; Ordered By:Toni Srivastava;   · (1) CBC/PLT/DIFF; Status:Active; Requested HUG:33QZN8539;    Perform:[facility]; ZIU:17CHY8901; Last Updated By:Yuki Ortega; 12/22/2016 4:45:23 PM;Ordered; For:Leukocytosis; Ordered By:Toni Srivastava;   · (1) CBC/PLT/DIFF; Status: In Progress - Order Generated; Requested for:Recurring  Schedule: 12/22/2016; 6/22/2017; 12/22/2017 ;    Perform:[facility]; JCS:03MMT2642;BPXYCSF; For:Leukocytosis; Ordered By:Toni Srivastava;   · (1) FERRITIN; Status:Active; Requested for:65Cnk9784;    Perform:[facility]; RAB:00FYC1272;BLVZNGP; For:Leukocytosis; Ordered By:Toni Srivastava;   · (1) FERRITIN; Status:Active; Requested FGJ:98JLU3915;    Perform:[facility]; CMP:61SXL2332; Last Updated By:Yuki Ortega; 12/22/2016 4:45:23 PM;Ordered; For:Leukocytosis; Ordered By:Toni Srivastava;   · (1) FERRITIN; Status: In Progress - Order Generated; Requested for:Recurring Schedule:  12/22/2016; 6/22/2017; 12/22/2017 ;    Perform:[facility]; JPN:73FOU2440;YHYADTO; For:Leukocytosis; Ordered By:Kodak Srivastava;   · (1) IRON SATURATION %, TIBC; Status:Active; Requested for:25Udt7838;    Perform:[facility]; RPL:30FKC2644;GYGNWPM; For:Leukocytosis; Ordered By:Kodak Srivastava;   · (1) IRON SATURATION %, TIBC; Status:Active;  Requested ILD:30ZPQ1084;    Perform:[facility]; KDV:45MJC5138; Last Updated By:Moy Ortega; 12/22/2016 4:45:23 PM;Ordered; For:Leukocytosis; Ordered By:Kodak Srivastava;   · (1) IRON SATURATION %, TIBC; Status: In Progress - Order Generated; Requested  for:Recurring Schedule: 12/22/2016; 6/22/2017; 12/22/2017 ;    Perform:[facility]; OXD:37XOQ6796;RXWAOPB; For:Leukocytosis; Ordered By:Khurram Srivastava;    Discussion/Summary  Discussion Summary:   In summary, this is a 78-year-old female history of iron deficiency anemia as well as leukocytosis as outlined  Her blood counts are essentially stable  She shows no evidence of anemia or iron deficiency  At this time  Observation remains appropriate  I made arrangements to see her back in one year with bloodwork on a every 6 month basis  His noteworthy that she did not have blood work done prior to today's visit  As we had requested  Fortunately, she is entirely asymptomatic  In fact, feels somewhat better since making some dietary and medication changes  As outlined, observation remains appropriate  I reviewed the above with the patient  She voiced understanding and agreement  Counseling Documentation With Imm: The patient was counseled regarding diagnostic results, instructions for management, patient and family education, impressions  total time of encounter was 15 minutes  History of Present Illness  HPI: 2011 the patient was evaluated for anemia and leukocytosis  At that time  White blood cell count was 12, hemoglobin 11, platelet count 086, normal differential  [place] chromosome and Nitin-2 mutation were negative at that time  This was felt to represent a reflection of background chronic inflammatory state  2015- showed normal white count and differential  Hemoglobin 11 0  MCV 81  Platelet count normal  Iron saturation was 7%  Ferritin 12 6 September 2015- Venofer 300 mg iv x 3 doses  Interval History: Feeling better past few months  Cholesterol and sugar improved        Review of Systems  Complete-Female:   Constitutional: + hot flashes  , but No fever, no chills, feels well, no tiredness, no recent weight gain or weight loss  Eyes: No complaints of eye pain, no red eyes, no eyesight problems, no discharge, no dry eyes, no itching of eyes  ENT: no complaints of earache, no loss of hearing, no nose bleeds, no nasal discharge, no sore throat, no hoarseness  Cardiovascular: No complaints of slow heart rate, no fast heart rate, no chest pain, no palpitations, no leg claudication, no lower extremity edema  Respiratory: no shortness of breath during exertion and no PND  Gastrointestinal: colonoscopy-march 2015- hemorrhoids  , but No complaints of abdominal pain, no constipation, no nausea or vomiting, no diarrhea, no bloody stools  Genitourinary: No complaints of dysuria, no incontinence, no pelvic pain, no dysmenorrhea, no vaginal discharge or bleeding  Musculoskeletal: No complaints of arthralgias, no myalgias, no joint swelling or stiffness, no limb pain or swelling  Integumentary: No complaints of skin rash or lesions, no itching, no skin wounds, no breast pain or lump  Neurological: as noted in HPI  Psychiatric: Not suicidal, no sleep disturbance, no anxiety or depression, no change in personality, no emotional problems  Endocrine: No complaints of proptosis, no hot flashes, no muscle weakness, no deepening of the voice, no feelings of weakness  Hematologic/Lymphatic: No complaints of swollen glands, no swollen glands in the neck, does not bleed easily, does not bruise easily  Active Problems    1  Abdominal pain, epigastric (789 06) (R10 13)   2  Abnormal blood chemistry (790 6) (R79 9)   3  Abnormal CAT scan (793 99) (R93 8)   4  Abnormal electrocardiogram (794 31) (R94 31)   5  Acute frontal sinusitis (461 1) (J01 10)   6  Acute sinusitis (461 9) (J01 90)   7  Allergic rhinitis (477 9) (J30 9)   8  Anemia (285 9) (D64 9)   9   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) 10  Asthma (493 90) (J45 909)   11  Benign essential hypertension (401 1) (I10)   12  Family history of Benign Polyps Of The Large Intestine (V18 51)   13  Bilateral knee pain (719 46) (M25 561,M25 562)   14  Bipolar I disorder, most recent episode mixed, in full remission (296 66) (F31 78)   15  Blood in stool (578 1) (K92 1)   16  Cardiac murmur (785 2) (R01 1)   17  Cervical disc disease (722 91) (M50 90)   18  Cramp of both lower extremities (729 82) (R25 2)   19  Degeneration of thoracic intervertebral disc (722 51) (M51 34)   20  Degenerative arthritis of knee, bilateral (715 96) (M17 0)   21  Diabetes mellitus type 2, uncontrolled (250 02) (E11 65)   22  Diarrhea (787 91) (R19 7)   23  Dizziness (780 4) (R42)   24  Dyslipidemia (272 4) (E78 5)   25  Edema (782 3) (R60 9)   26  Encounter for routine gynecological examination (V72 31) (Z01 419)   27  Encounter for screening mammogram for malignant neoplasm of breast (V76 12)    (Z12 31)   28  Esophagitis, reflux (530 11) (K21 0)   29  Esophagitis, reflux (530 11) (K21 0)   30  Essential hypertriglyceridemia (272 1) (E78 1)   31  Excessive sweating (780 8) (R61)   32  Fatigue (780 79) (R53 83)   33  Fatty liver (571 8) (K76 0)   34  Fecal occult blood test positive (792 1) (R19 5)   35  Gait disturbance (781 2) (R26 9)   36  Gastroesophageal reflux disease (530 81) (K21 9)   37  History of allergy (V15 09) (Z88 9)   38  Hypoglycemia (251 2) (E16 2)   39  Iron deficiency anemia, unspecified iron deficiency   40  Joint pain, knee (719 46) (M25 569)   41  Leukocytosis (288 60) (D72 829)   42  Long term current use of therapeutic drug (V58 69) (Z79 899)   43  Long-term insulin use (V58 67) (Z79 4)   44  Lost voice (784 41) (R49 1)   45  Multiple thyroid nodules (241 1) (E04 2)   46  Muscle Cramps In The Thigh (Upper Leg) (729 82)   47  Neck pain (723 1) (M54 2)   48  History of Need for pneumococcal vaccination (V03 82) (Z23)   49   Need for prophylactic vaccination and inoculation against influenza (V04 81) (Z23)   50  Need for revaccination (V05 9) (Z23)   51  Need for Tdap vaccination (V06 1) (Z23)   52  Neuropathy (355 9) (G62 9)   53  Obesity (278 00) (E66 9)   54  Obesity, Class II, BMI 35 0-39 9, with comorbidity (see actual BMI) (278 00) (E66 9)   55  Obstructive sleep apnea (327 23) (G47 33)   56  Otitis media (382 9) (H66 90)   57  Pain, dental (525 9) (K08 89)   58  Pap smear, as part of routine gynecological examination (V76 2) (Z01 419)   59  Paronychia of toe (681 11) (L03 039)   60  Patellofemoral Dysfunction (736 6)   61  Personality disorder (301 9) (F60 9)   62  Postmenopausal Atrophic Vaginitis (627 3)   63  Prepatellar bursitis (726 65) (M70 40)   64  Right wrist pain (719 43) (M25 531)   65  Routine Gynecological Exam With Cervical Pap Smear (V72 31)   66  Sore throat (462) (J02 9)   67  Symptomatic menopausal or female climacteric states (627 2) (N95 1)   68  Thoracic spine pain (724 1) (M54 6)   69  Tinea corporis (110 5) (B35 4)   70  Type 2 diabetes mellitus (250 00) (E11 9)   71  Type 2 diabetes mellitus with hyperglycemia (250 00) (E11 65)   72  Vertigo (780 4) (R42)   73  Viral gastroenteritis (008 8) (A08 4)   74  Visit for screening mammogram (V76 12) (Z12 31)   75  Vitamin D deficiency (268 9) (E55 9)   76  Dia-Parkinson-White Syndrome (426 7)    Past Medical History    1  History of Acute frontal sinusitis (461 1) (J01 10)   2  History of Cellulitis (682 9) (L03 90)   3  History of Contusion Of The Toe(S) With Intact Skin Surface (924 3)   4  History of Diabetes Mellitus (250 00)   5  History of Difficulty breathing (786 09) (R06 89)   6  History of High cholesterol (272 0) (E78 00)   7  History of abdominal pain (V13 89) (Z87 898)   8  History of abnormal weight loss (V13 89) (Z87 898)   9  History of acute bronchitis (V12 69) (Z87 09)   10  History of acute otitis media (V12 49) (Z86 69)   11   History of acute pharyngitis (V12 69) (Z87 09) 12  History of acute sinusitis (V12 69) (Z87 09)   13  History of candidiasis of mouth (V12 09) (Z86 19)   14  History of head injury (V15 59) (Z87 828)   15  History of headache (V13 89) (Z87 898)   16  History of laryngitis (V12 69) (Z87 09)   17  History of pharyngitis (V12 69) (Z87 09)   18  History of sebaceous cyst (V13 3) (Z87 2)   19  History of urinary frequency (V13 09) (Z87 898)   20  History of Hypoglycemia (251 2) (E16 2)   21  History of Mouth Sores (528 9)   22  History of Need for pneumococcal vaccination (V03 82) (Z23)   23  History of Otitis media, unspecified laterality   24  Denied: History of Seizures   25  History of Suicide Attempt (E958 9)   26  History of Tinea corporis (110 5) (B35 4)   27  History of Type B influenza (487 1) (J10 1)    Surgical History    1  History of Oral Surgery Tooth Extraction   2  History of Tonsillectomy With Adenoidectomy   3  History of Vaginal Pap smear (V76 47) (Z12 72)    Family History  Mother    1  Family history of CABG   2  Family history of Diabetes Mellitus (V18 0)   3  Family history of Lung Cancer (V16 1)   4  Family history of Stroke Syndrome (V17 1)   5  Family history of Thyroid Disorder (V18 19)   6  Family history of Tobacco Use  Father    7  Family history of Benign Polyps Of The Large Intestine (V18 51)   8  Family history of Bipolar illness   9  Family history of Heart Disease (V17 49)  Brother    8  Family history of depression (V17 0) (Z81 8)  Maternal Grandmother    11  Family history of Diabetes Mellitus (V18 0)   12  Family history of Thyroid Disorder (V18 19)  Family History    13  Denied: Family history of Colon Cancer   14  Denied: Family history of Crohn's Disease   15  Family history of Hypertension (V17 49)   16  Denied: Family history of Liver Cancer   17   Family history of Mother  At Age 52    Social History    · Caffeine Use   · Denied: History of Drug Use   · Denied: History of Home Environment Domestic Violence   · Job Physical Requirements Heavy Lifting   · Non-smoker (V49 89) (Z78 9)   · Social drinker    Current Meds   1  Accu-Chek Louise Plus In Vitro Strip; test 4 times daily; Therapy: 38AFB4445 to (Last Rx:72Zjr2432)  Requested for: 24FQQ0163 Ordered   2  Amoxicillin 500 MG Oral Capsule; TAKE 1 CAPSULE 3 TIMES DAILY UNTIL GONE;   Therapy: 03JXL0234 to (Evaluate:01Jan2017)  Requested for: 07Hsm9731; Last   Rx:85Nig9434 Ordered   3  Atorvastatin Calcium 20 MG Oral Tablet; TAKE 1 TABLET DAILY AS DIRECTED; Therapy: 53WDW6938 to (Helayne Dec)  Requested for: 35KBB2877; Last   Rx:08Nov2016 Ordered   4  Azelastine HCl - 0 15 % Nasal Solution; USE 2 SPRAYS AT BEDTIME; Therapy: (Recorded:42Foa0261) to Recorded   5  BD Pen Needle Susie U/F 32G X 4 MM Miscellaneous; inject once daily; Therapy: 71MQA0384 to (Evaluate:77Rqr3899)  Requested for: 36Tjh5336; Last   Rx:11Hzy1254 Ordered   6  Cholestyramine 4 GM Oral Packet; TAKE 1 PACKET 3 times daily PRN; Therapy: 44Kxb6527 to (Evaluate:57Fbw7179)  Requested for: 35Oqf1034; Last   Rx:01Wei6447 Ordered   7  Fenofibrate 145 MG Oral Tablet; Take 1 tablet daily; Therapy: 19ZHY1470 to (Evaluate:72Yof6612)  Requested for: 37BSE2474; Last   Rx:39Qsr3556 Ordered   8  FLUoxetine HCl - 40 MG Oral Capsule; TAKE 2 CAPSULES DAILY; Therapy: 08IDB3186 to (Evaluate:90Rpk3436)  Requested for: 04LYY4813; Last   Rx:70Usc6080 Ordered   9  Fluticasone Propionate 50 MCG/ACT Nasal Suspension; USE 1 SPRAY IN EACH   NOSTRIL TWICE DAILY; Therapy: 29AAY9390 to (Last Nils Pillow)  Requested for: 13AGC2509 Ordered   10  HumaLOG KwikPen 200 UNIT/ML Subcutaneous Solution Pen-injector; Take 20 units    before breakfast, 22 units before dinner (ISF 1:20; ICR 1:5); Therapy: 03GAW7290 to (Patrick Mage)  Requested for: 50AEN2188; Last    Rx:30Nov2016 Ordered   11  Invokana 300 MG Oral Tablet; Take 1 tablet daily;     Therapy: 63WOX5702 to (Dee Quinn)  Requested for: 04UAU5493; Last Rx:72Fah9611 Ordered   12  Lansoprazole 30 MG Oral Capsule Delayed Release; TAKE 1 CAPSULE EVERY    MORNING DAILY; Therapy: 27EOI7241 to (Mitchell Luke)  Requested for: 59Ptr1636; Last    Rx:11Nov2015 Ordered   13  Latuda 40 MG Oral Tablet; Take 1 tablet in the evening with food; Therapy: 64UTP7625 to (Evaluate:40Npn7496)  Requested for: 50EAZ7520; Last    Rx:63Svk8817 Ordered   14  Lisinopril 20 MG Oral Tablet; TAKE 1 TABLET DAILY AS DIRECTED; Therapy: 90SBX1473 to (Evaluate:12Pat7125)  Requested for: 05ZGO5636; Last    Rx:22Fjg2334 Ordered   15  MetFORMIN HCl  MG Oral Tablet Extended Release 24 Hour; 2 tabs twice per day    with food; Therapy: 12GTM8321 to (Alexsander Ha)  Requested for: 23Nov2016; Last    Rx:23Nov2016 Ordered   16  Mupirocin 2 % External Ointment; Apply twice daily; Therapy: (Recorded:76Uwm1486) to Recorded   17  Natural Vitamin E 400 UNIT Oral Tablet; TAKE 1 TABLET DAILY; Therapy: (Recorded:08Nov2016) to Recorded   18  ProAir  (90 Base) MCG/ACT Inhalation Aerosol Solution; INHALE 1 TO 2 PUFFS    EVERY 4 TO 6 HOURS AS NEEDED; Therapy: 32RZN6967 to Recorded   19  Symbicort 80-4 5 MCG/ACT Inhalation Aerosol; use 1 puff twice daily and rinse mouth    after each use; Therapy: 59IFK5970 to (Last Rx:18May2015) Ordered   20  [place] FlexTouch 100 UNIT/ML Subcutaneous Solution Pen-injector; Inject  42 units    once a day  Requested for: 96VXN6762; Last Rx:30Nov2016 Ordered   21  Vitamin D (Ergocalciferol) 47847 UNIT Oral Capsule; TAKE 1 CAPSULE WEEKLY; Therapy: 52HEB4024 to (Last Rx:21Jkc9741)  Requested for: 55Jng4958; Status:    ACTIVE - Renewal Denied Ordered    Allergies    1  Biaxin TABS   2  LaMICtal TABS   3  TEGretol TABS   4  Tanzeum PEN    5  Seasonal   6  Bee sting   7   Shellfish    Vitals  Vital Signs    Recorded: 22Dec2016 04:30PM   Temperature 98 F   Heart Rate 70   Respiration 18   Systolic 967   Diastolic 72   Height 5 ft 4 in   Weight 112 lb 6 oz   BMI Calculated 19 29   BSA Calculated 1 53   O2 Saturation 96   Pain Scale 0     Physical Exam    Constitutional   General appearance: No acute distress, well appearing and well nourished  Eyes   Conjunctiva and lids: No swelling, erythema or discharge  Ears, Nose, Mouth, and Throat   External inspection of ears and nose: Normal     Oropharynx: Normal with no erythema, edema, exudate or lesions  Pulmonary   Auscultation of lungs: Clear to auscultation  Cardiovascular   Auscultation of heart: Normal rate and rhythm, normal S1 and S2, without murmurs  Examination of extremities for edema and/or varicosities: Normal     Abdomen   Abdomen: Non-tender, no masses  Liver and spleen: No hepatomegaly or splenomegaly  Lymphatic   Palpation of lymph nodes in neck: No lymphadenopathy  Musculoskeletal   Gait and station: Normal     Skin   Skin and subcutaneous tissue: Normal without rashes or lesions  Neurologic   Cranial nerves: Cranial nerves 2-12 intact  Psychiatric   Orientation to person, place, and time: Normal          Health Management  Diabetes mellitus type 2, uncontrolled   (1) MICROALBUMIN CREATININE RATIO, RANDOM URINE; every 1 year; Last 56Dmg1658; Next Due:  44Sfv8603; Active  *VB - Eye Exam; every 1 year; Last 16Sua6272; Next Due: 60ILI5223; Near Due  Visit for screening mammogram   Digital Bilateral Screening Mammogram With CAD; every 1 year; Last 94ACP3572; Next Due:  70Asw2142; Overdue    Future Appointments    Date/Time Provider Specialty Site   02/20/2017 09:15 AM Jemima Estrella, [address] Endocrinology [facility] ENDOCRINOLOG[facility]   03/21/2017 03:00 PM MOLLY Adams   Psychiatry [address] [facility]   05/22/2017 09:30 AM Eileen Graham DO Internal Medicine [facility]     Signatures   Electronically signed by : MOLLY Jules ,; Dec 22 2016  4:46PM EST                       (Author)

## 2018-01-15 NOTE — RESULT NOTES
Message   Notify the patient with elevation of the white blood cell count 14 3; please let me know if the patient currently has a signs or symptoms of an active infection        Verified Results  (1) CBC/ PLT (NO DIFF) 11Oct2016 07:58AM Lesia Dinorah    Order Number: [id number]     Test Name Result Flag Reference   HEMATOCRIT 38 2 %  34 8-46 1   HEMOGLOBIN 11 9 g/dL  11 5-15 4   MCHC 31 2 g/dL L 31 4-37 4   MCH 25 9 pg L 26 8-34 3   MCV 83 fL  82-98   PLATELET COUNT 925 Thousands/uL  149-390   RBC COUNT 4 60 Million/uL  3 81-5 12   RDW 15 6 % H 11 6-15 1   WBC COUNT 14 31 Thousand/uL H 4 31-10 16   MPV 10 0 fL  8 9-12 7       Signatures   Electronically signed by : Asim Lewis DO; Oct 11 2016  6:03PM EST                       (Author)

## 2018-01-15 NOTE — PSYCH
Psych Med Mgmt    Appearance: was calm and cooperative, adequate hygiene and grooming and good eye contact  Observed mood: mood appropriate  Observed mood: affect appropriate  Speech: a normal rate  Thought processes: coherent/organized  Hallucinations: no hallucinations present  Thought Content: no delusions  Abnormal Thoughts: The patient has no suicidal thoughts and no homicidal thoughts  Orientation: The patient is oriented to person, place and time  Recent and Remote Memory: short term memory intact and long term memory intact  Attention Span And Concentration: concentration intact  Insight: Insight intact  Judgment: Her judgment was intact  Muscle Strength And Tone  Muscle strength and tone were normal  Normal gait and station  Language: no difficulty naming common objects, no difficulty repeating a phrase and no difficulty writing a sentence  Fund of knowledge: Patient displays adequate knowledge of current events, adequate fund of knowledge regarding past history and adequate fund of knowledge regarding vocabulary  The patient is experiencing moderate to severe pain  On a scale of 0 - 10 the pain severity is a 2  Treatment Recommendations: Continue Latuda 40 mg in the evening  Continue Prozac 80 mg per daily (takes 40 mg bid)  Follows with PCP for medical issues including lipids and glucose monitoring  Risks, Benefits And Possible Side Effects Of Medications: Risks, benefits, and possible side effects of medications explained to patient and patient verbalizes understanding  She reports normal appetite, normal energy level, recent 2 lbs weight gain  and normal number of sleep hours  Sisi Oliver states continues to do well since last visit  Mood remains stable, no significant depressive symptoms  Mood swings are controlled  Anxiety is also controlled well  No suicidal or homicidal ideation  No psychotic symptoms  Sleep and appetite are adequate    No side effects form medications reported  Vitals  Signs   Recorded: 14Qgw3191 03:22PM   Heart Rate: 86  Systolic: 084  Diastolic: 76  BP Cuff Size: Large  Height: 5 ft 4 in  Weight: 213 lb   BMI Calculated: 36 56  BSA Calculated: 2 01    Assessment    1  Bipolar I disorder, most recent episode mixed, in full remission (296 66) (F31 78)   2  Personality disorder (301 9) (F60 9)    Plan    1  FLUoxetine HCl - 40 MG Oral Capsule (PROzac); TAKE 2 CAPSULES DAILY   2  Latuda 40 MG Oral Tablet; Take 1 tablet in the evening with food    3  Follow-up visit in 3 months Evaluation and Treatment  Follow-up  Status: Hold For -   Scheduling  Requested for: 72DEO6779    Review of Systems    Constitutional: recent 2 lb weight gain  Cardiovascular: no complaints of slow or fast heart rate, no chest pain, no palpitations  Respiratory: no complaints of shortness of breath, no wheezing, no dyspnea on exertion  Gastrointestinal: no complaints of abdominal pain, no constipation, no nausea, no diarrhea, no vomiting  Genitourinary: no complaints of dysuria, no incontinence, no pelvic pain, no urinary frequency  Musculoskeletal: no complaints of arthralgia, no myalgias, no limb pain, no joint stiffness  Integumentary: no complaints of skin rash, no itching, no dry skin  Neurological: headache  Past Psychiatric History    Past Psychiatric History: One prior inpatient psychiatric admission years ago at [facility]  2 prior suicide attempts by overdose years ago  Substance Abuse Hx    Substance Abuse History: No history or drug use  Only occasional social alcohol use  Active Problems    1  Abdominal pain, epigastric (789 06) (R10 13)   2  Abnormal blood chemistry (790 6) (R79 9)   3  Abnormal CAT scan (793 99) (R93 8)   4  Abnormal electrocardiogram (794 31) (R94 31)   5  Acute sinusitis (461 9) (J01 90)   6  Allergic rhinitis (477 9) (J30 9)   7  Anemia (285 9) (D64 9)   8   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)   9  Asthma (493 90) (J45 909)   10  Benign essential hypertension (401 1) (I10)   11  Family history of Benign Polyps Of The Large Intestine (V18 51)   12  Bilateral knee pain (719 46) (M25 561,M25 562)   13  Bipolar I disorder, most recent episode mixed, in full remission (296 66) (F31 78)   14  Blood in stool (578 1) (K92 1)   15  Cervical disc disease (722 91) (M50 90)   16  Cramp of both lower extremities (729 82) (R25 2)   17  Degeneration of thoracic intervertebral disc (722 51) (M51 34)   18  Degenerative arthritis of knee, bilateral (715 96) (M17 0)   19  Diabetes mellitus type 2, uncontrolled (250 02) (E11 65)   20  Diarrhea (787 91) (R19 7)   21  Dizziness (780 4) (R42)   22  Dyslipidemia (272 4) (E78 5)   23  Edema (782 3) (R60 9)   24  Encounter for routine gynecological examination (V72 31) (Z01 419)   25  Encounter for screening mammogram for malignant neoplasm of breast (V76 12)    (Z12 31)   26  Esophagitis, reflux (530 11) (K21 0)   27  Esophagitis, reflux (530 11) (K21 0)   28  Essential hypertriglyceridemia (272 1) (E78 1)   29  Excessive sweating (780 8) (R61)   30  Fatigue (780 79) (R53 83)   31  Fatty liver (571 8) (K76 0)   32  Fecal occult blood test positive (792 1) (R19 5)   33  Gait disturbance (781 2) (R26 9)   34  Gastroesophageal reflux disease (530 81) (K21 9)   35  History of allergy (V15 09) (Z88 9)   36  Hypoglycemia (251 2) (E16 2)   37  Iron deficiency anemia, unspecified iron deficiency   38  Joint pain, knee (719 46) (M25 569)   39  Leukocytosis (288 60) (D72 829)   40  Long term current use of therapeutic drug (V58 69) (Z79 899)   41  Long-term insulin use (V58 67) (Z79 4)   42  Lost voice (784 41) (R49 1)   43  Multiple thyroid nodules (241 1) (E04 2)   44  Muscle Cramps In The Thigh (Upper Leg) (729 82)   45  Neck pain (723 1) (M54 2)   46  History of Need for pneumococcal vaccination (V03 82) (Z23)   47   Need for prophylactic vaccination and inoculation against influenza (V04 81) (Z23)   48  Need for revaccination (V05 9) (Z23)   49  Need for Tdap vaccination (V06 1) (Z23)   50  Neuropathy (355 9) (G62 9)   51  Obesity (278 00) (E66 9)   52  Obstructive sleep apnea (327 23) (G47 33)   53  Otitis media (382 9) (H66 90)   54  Pain, dental (525 9) (K08 89)   55  Pap smear, as part of routine gynecological examination (V76 2) (Z01 419)   56  Paronychia of toe (681 11) (L03 039)   57  Patellofemoral Dysfunction (736 6)   58  Personality disorder (301 9) (F60 9)   59  Postmenopausal Atrophic Vaginitis (627 3)   60  Prepatellar bursitis (726 65) (M70 40)   61  Right wrist pain (719 43) (M25 531)   62  Routine Gynecological Exam With Cervical Pap Smear (V72 31)   63  Sore throat (462) (J02 9)   64  Symptomatic menopausal or female climacteric states (627 2) (N95 1)   65  Thoracic spine pain (724 1) (M54 6)   66  Tinea corporis (110 5) (B35 4)   67  Type 2 diabetes mellitus (250 00) (E11 9)   68  Type 2 diabetes mellitus with hyperglycemia (250 00) (E11 65)   69  Vertigo (780 4) (R42)   70  Viral gastroenteritis (008 8) (A08 4)   71  Visit for screening mammogram (V76 12) (Z12 31)   72  Vitamin D deficiency (268 9) (E55 9)   73  Dia-Parkinson-White Syndrome (426 7)    Past Medical History    1  History of Acute frontal sinusitis (461 1) (J01 10)   2  History of Cellulitis (682 9) (L03 90)   3  History of Contusion Of The Toe(S) With Intact Skin Surface (924 3)   4  History of Diabetes Mellitus (250 00)   5  History of Difficulty breathing (786 09) (R06 89)   6  History of High cholesterol (272 0) (E78 00)   7  History of abdominal pain (V13 89) (Z87 898)   8  History of abnormal weight loss (V13 89) (Z87 898)   9  History of acute bronchitis (V12 69) (Z87 09)   10  History of acute otitis media (V12 49) (Z86 69)   11  History of acute pharyngitis (V12 69) (Z87 09)   12  History of acute sinusitis (V12 69) (Z87 09)   13  History of candidiasis of mouth (V12 09) (Z86 19)   14  History of head injury (V15 59) (Z87 828)   15  History of headache (V13 89) (Z87 898)   16  History of laryngitis (V12 69) (Z87 09)   17  History of pharyngitis (V12 69) (Z87 09)   18  History of sebaceous cyst (V13 3) (Z87 2)   19  History of urinary frequency (V13 09) (Z87 898)   20  History of Hypoglycemia (251 2) (E16 2)   21  History of Mouth Sores (528 9)   22  History of Need for pneumococcal vaccination (V03 82) (Z23)   23  History of Otitis media, unspecified laterality   24  Denied: History of Seizures   25  History of Suicide Attempt (E958 9)   26  History of Tinea corporis (110 5) (B35 4)   27  History of Type B influenza (487 1) (J10 1)    The active problems and past medical history were reviewed and updated today  Allergies    1  Biaxin TABS   2  LaMICtal TABS   3  TEGretol TABS   4  Tanzeum PEN    5  Seasonal   6  Bee sting   7  Shellfish    Current Meds   1  Accu-Chek Louise Plus In Vitro Strip; test 4 times daily; Therapy: 50PXL4187 to (Last Rx:94Rto0233)  Requested for: 70JLN6866 Ordered   2  Atorvastatin Calcium 20 MG Oral Tablet; TAKE 1 TABLET DAILY AS DIRECTED; Therapy: 62DLW6705 to (Uzair Huber)  Requested for: 04GJP7888; Last   Rx:08Nov2016 Ordered   3  Azelastine HCl - 0 15 % Nasal Solution; USE 2 SPRAYS AT BEDTIME; Therapy: (Recorded:73Cft1841) to Recorded   4  BD Pen Needle Susie U/F 32G X 4 MM Miscellaneous; inject once daily; Therapy: 99WVJ0382 to (Evaluate:80Apn6684)  Requested for: 23Miv6498; Last   Rx:05Pxv2455 Ordered   5  Cholestyramine 4 GM Oral Packet; TAKE 1 PACKET 3 times daily PRN; Therapy: 11Vfx9264 to (Evaluate:07Fic2200)  Requested for: 81Ckr5761; Last   Rx:73Ule6579 Ordered   6  Ergocalciferol 15670 UNIT CAPS; 1 CAPSULE 2 TIME A WEEK FOR 6 WEEKS; Therapy: 00STW5947 to (Evaluate:83Rsk4524)  Requested for: 42ZKY1480; Last   Rx:11Nov2016 Ordered   7  Fenofibrate 145 MG Oral Tablet; Take 1 tablet daily;    Therapy: 39BQU6085 to (Evaluate:19Knc5342) Requested for: 68BOQ3421; Last   Rx:78Ajs3786 Ordered   8  FLUoxetine HCl - 40 MG Oral Capsule; TAKE 2 CAPSULES DAILY; Therapy: 49DAA1647 to (Evaluate:51Vvl0938)  Requested for: 80Sgp9955; Last   Rx:78Dwl3288 Ordered   9  Fluticasone Propionate 50 MCG/ACT Nasal Suspension; USE 1 SPRAY IN EACH   NOSTRIL TWICE DAILY; Therapy: 21JXP0128 to (Last Darek Wei)  Requested for: 11ICW3818 Ordered   10  HumaLOG KwikPen 200 UNIT/ML Subcutaneous Solution Pen-injector; Take 20 units    before breakfast, 22 units before dinner (ISF 1:20; ICR 1:5); Therapy: 63MOP3046 to (Morro Martinez)  Requested for: 50DSD1211; Last    Rx:30Nov2016 Ordered   11  Invokana 300 MG Oral Tablet; Take 1 tablet daily; Therapy: 70VBN9011 to (Martha Han)  Requested for: 58XIW8443; Last    Rx:27Aed3965 Ordered   12  Lansoprazole 30 MG Oral Capsule Delayed Release; TAKE 1 CAPSULE EVERY    MORNING DAILY; Therapy: 81LMZ0158 to (Manlius Rich)  Requested for: 74Icx8064; Last    Rx:11Nov2015 Ordered   13  Latuda 40 MG Oral Tablet; Take 1 tablet in the evening with food; Therapy: 50CUJ1731 to (Evaluate:14Jan2017)  Requested for: 49QFO9892; Last    Rx:69Kci1801 Ordered   14  Lisinopril 20 MG Oral Tablet; TAKE 1 TABLET DAILY AS DIRECTED; Therapy: 46HKZ7828 to (Evaluate:74Ikx1102)  Requested for: 93EKX0950; Last    Rx:18Pyj6545 Ordered   15  MetFORMIN HCl  MG Oral Tablet Extended Release 24 Hour; 2 tabs twice per day    with food; Therapy: 99AXD5014 to (Morro Martinez)  Requested for: 23Nov2016; Last    Rx:23Nov2016 Ordered   16  MetroNIDAZOLE 500 MG Oral Tablet; TAKE 1 TABLET 3 times daily; Therapy: 62UIQ9150 to (Evaluate:37Ion1467)  Requested for: 14WIH3106; Last    Rx:23Nov2016 Ordered   17  Mupirocin 2 % External Ointment; Apply twice daily; Therapy: (Recorded:73Tdx2148) to Recorded   18  Natural Vitamin E 400 UNIT Oral Tablet; TAKE 1 TABLET DAILY; Therapy: (Recorded:08Nov2016) to Recorded   19  ProAir  (90 Base) MCG/ACT Inhalation Aerosol Solution; INHALE 1 TO 2 PUFFS    EVERY 4 TO 6 HOURS AS NEEDED; Therapy: 95CWF9041 to Recorded   20  Symbicort 80-4 5 MCG/ACT Inhalation Aerosol; use 1 puff twice daily and rinse mouth    after each use; Therapy: 28YHX4220 to (Last Rx:65Ssk5410) Ordered   21  Geralene Weakley FlexTouch 100 UNIT/ML Subcutaneous Solution Pen-injector; Inject  42 units    once a day  Requested for: 85VAS7469; Last Rx:13Mro7312 Ordered    The medication list was reviewed and updated today  Family Psych History  Mother    1  Family history of CABG   2  Family history of Diabetes Mellitus (V18 0)   3  Family history of Lung Cancer (V16 1)   4  Family history of Stroke Syndrome (V17 1)   5  Family history of Thyroid Disorder (V18 19)   6  Family history of Tobacco Use  Father    7  Family history of Benign Polyps Of The Large Intestine (V18 51)   8  Family history of Bipolar illness   9  Family history of Heart Disease (V17 49)  Brother    8  Family history of depression (V17 0) (Z81 8)  Maternal Grandmother    11  Family history of Diabetes Mellitus (V18 0)   12  Family history of Thyroid Disorder (V18 19)  Family History    13  Denied: Family history of Colon Cancer   14  Denied: Family history of Crohn's Disease   15  Family history of Hypertension (V17 49)   16  Denied: Family history of Liver Cancer   17  Family history of Mother  At Age 52    The family history was reviewed and updated today  Social History    · Caffeine Use   · Denied: History of Drug Use   · Denied: History of Home Environment Domestic Violence   · Job Physical Requirements Heavy Lifting   · Non-smoker (V49 89) (Z78 9)   · Social drinker  The social history was reviewed and updated today  The social history was reviewed and is unchanged  , lives with   No children  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   No history of legal problems  No  history  History Of Phys/Sex Abuse Or Perpetration    History Of Phys/Sex Abuse or Perpetration: No history of physical or sexual abuse  End of Encounter Meds    1  Fluticasone Propionate 50 MCG/ACT Nasal Suspension; USE 1 SPRAY IN EACH   NOSTRIL TWICE DAILY; Therapy: 78KLG0111 to (Last Kathe Cano)  Requested for: 62Pdm3082 Ordered    2  Azelastine HCl - 0 15 % Nasal Solution; USE 2 SPRAYS AT BEDTIME; Therapy: (Recorded:77Hdp6166) to Recorded    3  ProAir  (90 Base) MCG/ACT Inhalation Aerosol Solution; INHALE 1 TO 2 PUFFS   EVERY 4 TO 6 HOURS AS NEEDED; Therapy: 99TUH9283 to Recorded   4  Symbicort 80-4 5 MCG/ACT Inhalation Aerosol; use 1 puff twice daily and rinse mouth   after each use; Therapy: 62WZZ9111 to (Last Rx:18May2015) Ordered    5  Lisinopril 20 MG Oral Tablet; TAKE 1 TABLET DAILY AS DIRECTED; Therapy: 41JTP8192 to (Evaluate:14Swe1922)  Requested for: 27XRQ0372; Last   Rx:75Mge7417 Ordered    6  HumaLOG KwikPen 200 UNIT/ML Subcutaneous Solution Pen-injector; Take 20 units   before breakfast, 22 units before dinner (ISF 1:20; ICR 1:5); Therapy: 52LHZ1430 to (Chen Clinton)  Requested for: 75UDD3397; Last   Rx:30Cjc3095 Ordered    7  FLUoxetine HCl - 40 MG Oral Capsule (PROzac); TAKE 2 CAPSULES DAILY; Therapy: 25XJK9060 to (Evaluate:68Hfv9603)  Requested for: 42WII6519; Last   Rx:13Hig7813 Ordered   8  Latuda 40 MG Oral Tablet; Take 1 tablet in the evening with food; Therapy: 87DSR7321 to (Evaluate:79Hvc8705)  Requested for: 05UDI0440; Last   Rx:31Xrk4930 Ordered    9  BD Pen Needle Susie U/F 32G X 4 MM Miscellaneous; inject once daily; Therapy: 00DMK1538 to (Evaluate:26Wrc2673)  Requested for: 86Yee4680; Last   Rx:86Iob1771 Ordered   10  Invokana 300 MG Oral Tablet; Take 1 tablet daily; Therapy: 32NTJ2778 to (Durga Lopez)  Requested for: 76ZPH6059; Last    Rx:34Bev5501 Ordered   11   MetFORMIN HCl  MG Oral Tablet Extended Release 24 Hour; 2 tabs twice per day    with food; Therapy: 45ENY0560 to (Vaishnavi Vega)  Requested for: 23Nov2016; Last    Rx:23Nov2016 Ordered   12  [place] FlexTouch 100 UNIT/ML Subcutaneous Solution Pen-injector; Inject  42 units    once a day  Requested for: 95JRM0560; Last Rx:30Nov2016 Ordered    13  Accu-Chek Louise Plus In Vitro Strip; test 4 times daily; Therapy: 14GMG6328 to (Last Rx:83Pjl6773)  Requested for: 49TRN8552 Ordered    14  Cholestyramine 4 GM Oral Packet (Questran); TAKE 1 PACKET 3 times daily PRN; Therapy: 80Uth3766 to (Evaluate:76Xyc0340)  Requested for: 17Qqm5622; Last    Rx:96Yvp5469 Ordered   15  MetroNIDAZOLE 500 MG Oral Tablet; TAKE 1 TABLET 3 times daily; Therapy: 92PWA9202 to (Evaluate:57Mip2634)  Requested for: 21KPS7810; Last    Rx:23Nov2016 Ordered    16  Lansoprazole 30 MG Oral Capsule Delayed Release; TAKE 1 CAPSULE EVERY    MORNING DAILY; Therapy: 93BVG9003 to (Tahir Calix)  Requested for: 92Vge2057; Last    Rx:11Nov2015 Ordered    17  Atorvastatin Calcium 20 MG Oral Tablet; TAKE 1 TABLET DAILY AS DIRECTED; Therapy: 76EFS9741 to (Valerie Roy)  Requested for: 34OKO8785; Last    Rx:08Nov2016 Ordered    18  Fenofibrate 145 MG Oral Tablet (Tricor); Take 1 tablet daily; Therapy: 77ORR9597 to (Evaluate:74Jnl8685)  Requested for: 04XCE5015; Last    Rx:89Qrv5786 Ordered    19  Natural Vitamin E 400 UNIT Oral Tablet; TAKE 1 TABLET DAILY; Therapy: (Recorded:08Nov2016) to Recorded    20  Ergocalciferol 49874 UNIT CAPS; 1 CAPSULE 2 TIME A WEEK FOR 6 WEEKS; Therapy: 84ZJA2639 to (Evaluate:38Cfp1616)  Requested for: 90AJP6299; Last    Rx:11Nov2016 Ordered    21  Mupirocin 2 % External Ointment; Apply twice daily;     Therapy: (Recorded:03Qcc8355) to Recorded    Future Appointments    Date/Time Provider Specialty Site   12/22/2016 04:00 PM MOLLY Estrada , DO, [facility] Hematology Oncology [facility] MEDICAL ONCOLOGY   12/20/2016 08:15 AM Jemima Estrella, [address] Endocrinology [facility] ENDOCRINOLOGY [facility]   12/22/2016 01:30 PM Eileen Graham DO Internal Medicine [facility]     Signatures   Electronically signed by : Myrene Fothergill, M D ; Dec 19 2016  3:28PM EST                       (Author)

## 2018-01-15 NOTE — RESULT NOTES
Message   possible pancreatitis while on Tanzeum  Patient informed, stopped drug and filed Adverse drug reaction with Rory Carlosp  Will not use GLP-1 or DPPIV in future  discuss results in office     Verified Results  (1) HEMOGLOBIN A1C 10Jul2016 09:15AM Cher Wade    Order Number: [id number]  TW Order Number: [id number]     Test Name Result Flag Reference   HEMOGLOBIN A1C 7 3 % H 4 2-6 3   EST  AVG   GLUCOSE 163 mg/dl       (1) HEPATIC FUNCTION PANEL 10Jul2016 09:15AM Cher Wade    Order Number: [id number]  TW Order Number: [id number] Order Number: [id number] Order Number: [id number] Order Number: [id number] Order Number: [id number] Order Number: [id number] Order Number:     Test Name Result Flag Reference   ALBUMIN 3 4 g/dL L 3 5-5 0   ALK PHOSPHATAS 83 U/L     ALT (SGPT) 29 U/L  12-78   AST(SGOT) 28 U/L  5-45   BILI, DIRECT 0 11 mg/dL  0 00-0 20   BILI, TOTAL 0 39 mg/dL  0 20-1 00   TOTAL PROTEIN 7 7 g/dL  6 4-8 2     (1) T4, FREE 10Jul2016 09:15AM Cher Wade    Order Number: [id number]  TW Order Number: [id number] Order Number: [id number] Order Number: [id number] Order Number: [id number] Order Number: [id number] Order Number: [id number] Order Number:     Test Name Result Flag Reference   T4,FREE 0 93 ng/dL  0 76-1 46     (1) TSH 10Jul2016 09:15AM Cher Wade    Order Number: [id number]  TW Order Number: [id number] Order Number: [id number] Order Number: [id number] Order Number: [id number] Order Number: [id number] Order Number: [id number] Order Number:     Test Name Result Flag Reference   TSH 1 670 uIU/mL  0 358-3 740   Patients undergoing fluorescein dye angiography may retain small amounts of fluorescein in the body for 48-72 hours post procedure  Samples containing fluorescein can produce falsely depressed TSH values  If the patient had this procedure,a specimen should be resubmitted post fluorescein clearance  The recommended reference ranges for TSH during pregnancy are as follows:  First trimester 0 1 to 2 5 uIU/mL  Second trimester  0 2 to 3 0 uIU/mL  Third trimester 0 3 to 3 0 uIU/m     (1) LIPID PANEL, FASTING [id number] 09:15AM [facility] Order Number: [id number]  TW Order Number: [id number] Order Number: [id number] Order Number: [id number] Order Number: [id number] Order Number: [id number] Order Number: [id number] Order Number:     Test Name Result Flag Reference   CHOLESTEROL 184 mg/dL     HDL,DIRECT 34 mg/dL L 40-60   Specimen collection should occur prior to Metamizole administration due to the potential for falsely depressed results  LDL CHOLESTEROL CALCULATED (Report)  0-100   Calculated LDL invalid, triglycerides >400 mg/dl    Triglyceride:       Normal       <150 mg/dl     Borderline High  150-199 mg/dl     High        200-499 mg/dl     Very High     >499 mg/dl  Cholesterol:       Desirable    <200 mg/dl    Borderline High 200-239 mg/dl    High       >239 mg/dl  HDL Cholesterol:      High  >59 mg/dL    Low   <41 mg/dL   Calculated LDL invalid, triglycerides >400 mg/dl    Triglyceride:         Normal              <150 mg/dl       Borderline High    150-199 mg/dl       High               200-499 mg/dl       Very High          >499 mg/dl  Cholesterol:         Desirable        <200 mg/dl      Borderline High  200-239 mg/dl      High             >239 mg/dl  HDL Cholesterol:        High    >59 mg/dL      Low     <41 mg/dL   TRIGLYCERIDES 691 mg/dL H <=150   Specimen collection should occur prior to N-Acetylcysteine or Metamizole administration due to the potential for falsely depressed results       (1) RENAL FUNCTION PANEL [id number] 09:15AM Sergio Olguin    Order Number: [id number]  TW Order Number: [id number] Order Number: [id number] Order Number: [id number] Order Number: [id number] Order Number: [id number] Order Number: [id number] Order Number:     Test Name Result Flag Reference   ANION GAP (CALC) 7 mmol/L  4-13   BLOOD UREA NITROGEN 14 mg/dL  5-25   CALCIUM 9 0 mg/dL  8 3-10 1   CHLORIDE 98 mmol/L L 100-108   CARBON DIOXIDE 31 mmol/L  21-32   CREATININE 0 51 mg/dL L 0 60-1 30   Standardized to IDMS reference method   GLUCOSE,RANDM 148 mg/dL H    If the patient is fasting, the ADA then defines impaired fasting glucose as > 100 mg/dL and diabetes as > or equal to 123 mg/dL  POTASSIUM 4 5 mmol/L  3 5-5 3   eGFR Non-African American      >60 0 ml/min/1 73sq [facility] [employer] Disease Education Program recommendations are as follows:  GFR calculation is accurate only with a steady state creatinine  Chronic Kidney disease less than 60 ml/min/1 73 sq  meters  Kidney failure less than 15 ml/min/1 73 sq  meters  SODIUM 136 mmol/L  136-145   PHOSPHORUS 2 7 mg/dL  2 7-4 5     (1) PTH N-TERMINAL (INTACT) [id number] 09:15AM Sergoi Olguin    Order Number: [id number]  TW Order Number: [id number]     Test Name Result Flag Reference   PARATHYROID HORMONE INTACT 46 0 pg/mL  14 0-72 0     (1) VITAMIN D 25-HYDROXY [id number] 09:15AM Jam Herb Order Number: [id number]  TW Order Number: [id number]     Test Name Result Flag Reference   VIT D 25-HYDROX 28 5 ng/mL L 30 0-100 0   This assay is a certified procedure of the CDC Vitamin D Standardization Certification Program (VDSCP)     Deficiency <20ng/ml   Insufficiency 20-30ng/ml   Sufficient  ng/ml     *Patients undergoing fluorescein dye angiography may retain small amounts of fluorescein in the body for 48-72 hours post procedure   Samples containing fluorescein can produce falsely elevated Vitamin D values  If the patient had this procedure, a specimen should be resubmitted post fluorescein clearance       (1) CK (CPK) 10Jul2016 09:15AM Bibi Speed   TW Order Number: [id number]  TW Order Number: [id number]     Test Name Result Flag Reference   CK (CPK) 239 U/L H    CK MB FRACTION 2 3 ng/mL  0 0-5 0   CK-MB INDEX 1 0 %  0 0-2 5     (1) VITAMIN B12 [id number] 09:15AM Bibi Speed   TW Order Number: [id number]  TW Order Number: [id number] Order Number: [id number] Order Number: [id number] Order Number: [id number] Order Number: [id number] Order Number: [id number] Order Number:     Test Name Result Flag Reference   VITAMIN B12 344 pg/mL  100-900

## 2018-01-16 ENCOUNTER — ALLSCRIPTS OFFICE VISIT (OUTPATIENT)
Dept: OTHER | Facility: OTHER | Age: 58
End: 2018-01-16

## 2018-01-16 NOTE — PROCEDURES
Procedures signed by  Alize Gilmore DO at 6/9/2016  8:49 AM       Author:  Alize Gilmore DO Service:  (none) Author Type:  Physician     Filed:  6/9/2016  8:49 AM Date of Service:  6/8/2016  5:00 PM Status:  Signed     :  Alize Gilmore DO (Physician)            Edilia Peng  [id number]  NEUROLOGY REPORT  06/08/2016    ELECTRODIAGNOSTIC STUDY     REFERRING PHYSICIAN   Marco Cloud DO    HISTORY OF PRESENT ILLNESS   The patient is a 60-year-old female who complains of constant cramping   in the calves, variable in intensity and frequency, only present for   about 1 to 2 months  She has no associated low back pain or radicular   symptoms  No history of any trauma  No bowel or bladder incontinence  She denies numbness or tingling in the feet or toes  She does have a   history of type 2 diabetes, but is only on p o  medications  She is   also having problems with loss of balance and has had 2 falls, but she   really does not describe any vertiginous symptoms  ENT consultation is   pending  She presents today for an electrodiagnostic study of the   lower extremities  She has never had any spinal surgery  MEDICATIONS  Noted  PAST MEDICAL HISTORY   1  Bipolar disorder  2  Type 2 diabetes  3  Hypertension  4  Obesity  PHYSICAL EXAMINATION  The knee jerks are diminished  Ankle jerks are absent  Mild dystrophic   changes are noted in both feet, however there are no signs of any   motor weakness  No focal or lateralizing sensory deficits  No long   tract signs  No root tension signs  There are no tremors or   fasciculations noted  ELECTRODIAGNOSTIC FINDINGS     ELECTRONEUROGRAPHY   Bilateral sural sensory responses are in the low normal range, but   easily obtained  The bilateral peroneal motor responses were entirely   normal for distal latency, amplitude response and proximal conduction          The bilateral tibial H reflexes were absent, but this can be a normal finding  ELECTROMYOGRAPHY  Monopolar needle exploration failed to reveal any abnormal spontaneous   potentials in the bilateral lumbar paraspinal muscles, as well as the   following muscles in the right, most symptomatic, limb: Gluteus   medius, gluteus modesta, vastus lateralis, biceps femoris--long head,   tibialis anterior and gastrocnemius  In all muscles, motor units were   normal for amplitude, duration and configuration, and recruitment   patterns were normal as well  At no time were any cramp potentials   noted nor any fasciculations  IMPRESSION  1  There is no electrical evidence to indicate or suggest the presence   of an underlying axonal peripheral polyneuropathy  The bilateral   tibial H reflexes can be absent in normal individuals  2  There is no evidence of any lumbosacral radiculopathy or plexopathy  RECOMMENDATION  If symptoms persist or progress in any way, then a repeat study could   be undertaken in about 3 to 6 months to assess for any interval   change  Respectfully Wm morales  [id number]  D:  06/08/2016 16:05:00  Dictated by:  Josiah Worrell DO, [address], [address] [facility] [employer]    T:  06/08/2016 17:00:12             Received for:Stevie Worrell DO  Jun 9 2016  8:49AM Eastern Standard Time

## 2018-01-16 NOTE — RESULT NOTES
Message   stool test for C diff was normal     Verified Results  (1) C  DIFFICILE TOXIN BY PCR [id number] 02:11PM [facility] Order Number: [id number]     Test Name Result Flag Reference   C  DIFFICILE TOXIN BY PCR   NEGATIVE for C difficle toxin by PCR  NEGATIVE for C difficle toxin by PCR

## 2018-01-17 NOTE — PSYCH
Psych Med Mgmt    Appearance: was calm and cooperative, adequate hygiene and grooming and good eye contact  Observed mood: mood appropriate  Observed mood: affect appropriate  Speech: a normal rate  Thought processes: coherent/organized  Hallucinations: no hallucinations present  Thought Content: no delusions  Abnormal Thoughts: The patient has no suicidal thoughts and no homicidal thoughts  Orientation: The patient is oriented to person, place and time  Recent and Remote Memory: short term memory intact and long term memory intact  Attention Span And Concentration: concentration intact  Insight: Insight intact  Judgment: Her judgment was intact  Muscle Strength And Tone  Muscle strength and tone were normal  Normal gait and station  Language: no difficulty naming common objects  Fund of knowledge: Patient displays adequate knowledge of current events, adequate fund of knowledge regarding past history and adequate fund of knowledge regarding vocabulary  On a scale of 0 - 10 the pain severity is an 8  Treatment Recommendations: Continue Depakote ER 3000 mg at bedtime; Depakote level was low at 42 on February 6, 2016  Will repeat level  Continue Prozac 80 mg daily  Follows with PCP for medical issues  Risks, Benefits And Possible Side Effects Of Medications: Risks, benefits, and possible side effects of medications explained to patient and patient verbalizes understanding  She reports normal appetite, decreased energy, recent 1 lbs weight gain  and decrease in number of sleep hours 7  Patient states she still has been doing well since last visit despite being back at night sift at work  Mood remains been stable, no significant depression  Able to handle stress at work at present  No suicidality or homicidality  No psychotic symptoms  Some difficulty sleeping due to work schedule  No side effects form medications reported           Vitals  Signs [Data Includes: Current Encounter]   Recorded: 20Apr2016 03:36PM   Heart Rate: 78  Systolic: 596  Diastolic: 73  BP Cuff Size: Large  Height: 5 ft 4 in  Weight: 225 lb   BMI Calculated: 38 62  BSA Calculated: 2 06    Assessment    1  Bipolar I disorder, most recent episode mixed, in full remission (296 66) (F31 78)    Plan    1  Divalproex Sodium  MG Oral Tablet Extended Release 24 Hour; Take 6   tablets at bedtime   2  FLUoxetine HCl - 40 MG Oral Capsule (PROzac); TAKE 2 CAPSULES DAILY   3  Follow-up visit in 3 months Evaluation and Treatment  Follow-up  Status: Hold For -   Scheduling  Requested for: 20Apr2016    4  (1) VALPROIC ACID (DEPAKOTE); Status:Active; Requested for:20Apr2016;     Review of Systems    Constitutional: recent 1 lb weight gain  Cardiovascular: no complaints of slow or fast heart rate, no chest pain, no palpitations  Respiratory: no complaints of shortness of breath, no wheezing, no dyspnea on exertion  Gastrointestinal: no complaints of abdominal pain, no constipation, no nausea, no diarrhea, no vomiting  Genitourinary: no complaints of dysuria, no incontinence, no pelvic pain, no urinary frequency  Musculoskeletal: myalgias  Integumentary: no complaints of skin rash, no itching, no dry skin  Neurological: no complaints of headache, no confusion, no numbness, no dizziness  Past Psychiatric History    Past Psychiatric History: One prior inpatient psychiatric admission years ago at [facility]  2 prior suicide attempts by overdose years ago  Substance Abuse Hx    Substance Abuse History: No history or drug use  Only occasional social alcohol use  Active Problems    1  Abnormal blood chemistry (790 6) (R79 9)   2  Abnormal electrocardiogram (794 31) (R94 31)   3  Allergic rhinitis (477 9) (J30 9)   4  Anemia (285 9) (D64 9)   5  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)   6  Asthma (493 90) (J45 909)   7  Benign essential hypertension (401 1) (I10)   8   Family history of Benign Polyps Of The Large Intestine (V18 51)   9  Bilateral knee pain (719 46) (M25 561,M25 562)   10  Bipolar I disorder, most recent episode mixed, in full remission (296 66) (F31 78)   11  Blood in stool (578 1) (K92 1)   12  Cervical disc disease (722 91) (M50 90)   13  Degeneration of thoracic intervertebral disc (722 51) (M51 34)   14  Degenerative arthritis of knee, bilateral (715 96) (M17 0)   15  Diabetes mellitus type 2, uncontrolled (250 02) (E11 65)   16  Diarrhea (787 91) (R19 7)   17  Dyslipidemia (272 4) (E78 5)   18  Edema (782 3) (R60 9)   19  Encounter for routine gynecological examination (V72 31) (Z01 419)   20  Encounter for screening mammogram for malignant neoplasm of breast (V76 12)    (Z12 31)   21  Esophagitis, reflux (530 11) (K21 0)   22  Esophagitis, reflux (530 11) (K21 0)   23  Essential hypertriglyceridemia (272 1) (E78 1)   24  Fatigue (780 79) (R53 83)   25  Fatty liver (571 8) (K76 0)   26  Fecal occult blood test positive (792 1) (R19 5)   27  Gastroesophageal reflux disease (530 81) (K21 9)   28  History of allergy (V15 09) (Z88 9)   29  Hypoglycemia (251 2) (E16 2)   30  Iron deficiency anemia, unspecified iron deficiency   31  Joint pain, knee (719 46) (M25 569)   32  Leukocytosis (288 60) (D72 829)   33  Muscle Cramps In The Thigh (Upper Leg) (729 82)   34  Neck pain (723 1) (M54 2)   35  History of Need for pneumococcal vaccination (V03 82) (Z23)   36  Need for Tdap vaccination (V06 1) (Z23)   37  Obesity (278 00) (E66 9)   38  Obstructive sleep apnea (327 23) (G47 33)   39  Otitis media (382 9) (H66 90)   40  Pap smear, as part of routine gynecological examination (V76 2) (Z01 419)   41  Paronychia of toe (681 11) (L03 039)   42  Patellofemoral Dysfunction (736 6)   43  Personality disorder (301 9) (F60 9)   44  Postmenopausal Atrophic Vaginitis (627 3)   45  Prepatellar bursitis (726 65) (M70 40)   46  Right wrist pain (719 43) (M27 531)   47   Routine Gynecological Exam With Cervical Pap Smear (V72 31)   48  Symptomatic menopausal or female climacteric states (627 2) (N95 1)   49  Thoracic spine pain (724 1) (M54 6)   50  Tinea corporis (110 5) (B35 4)   51  Type 2 diabetes mellitus (250 00) (E11 9)   52  Type 2 diabetes mellitus with hyperglycemia (250 00) (E11 65)   53  Viral gastroenteritis (008 8) (A08 4)   54  Visit for screening mammogram (V76 12) (Z12 31)   55  Vitamin D deficiency (268 9) (E55 9)   56  Dia-Parkinson-White Syndrome (426 7)    Past Medical History    1  History of Acute frontal sinusitis (461 1) (J01 10)   2  History of Acute sinusitis (461 9) (J01 90)   3  History of Cellulitis (682 9) (L03 90)   4  History of Contusion Of The Toe(S) With Intact Skin Surface (924 3)   5  History of Diabetes Mellitus (250 00)   6  History of Difficulty breathing (786 09) (R06 89)   7  History of abdominal pain (V13 89) (Z87 898)   8  History of abnormal weight loss (V13 89) (Z87 898)   9  History of acute bronchitis (V12 69) (Z87 09)   10  History of acute otitis media (V12 49) (Z86 69)   11  History of acute pharyngitis (V12 69) (Z87 09)   12  History of acute sinusitis (V12 69) (Z87 09)   13  History of candidiasis of mouth (V12 09) (Z86 19)   14  History of head injury (V15 59) (Z87 828)   15  History of headache (V13 89) (Z87 898)   16  History of laryngitis (V12 69) (Z87 09)   17  History of pharyngitis (V12 69) (Z87 09)   18  History of sebaceous cyst (V13 3) (Z87 2)   19  History of urinary frequency (V13 09) (Z87 898)   20  History of Hypoglycemia (251 2) (E16 2)   21  History of Mouth Sores (528 9)   22  History of Need for pneumococcal vaccination (V03 82) (Z23)   23  History of Otitis media, unspecified laterality   24  Denied: History of Seizures   25  History of Suicide Attempt (E958 9)   26  History of Tinea corporis (110 5) (B35 4)   27   History of Type B influenza (487 1) (J10 1)    The active problems and past medical history were reviewed and updated today  Allergies    1  Biaxin TABS   2  LaMICtal TABS   3  TEGretol TABS    4  Seasonal   5  Bee sting   6  Shellfish    Current Meds   1  Atorvastatin Calcium 20 MG Oral Tablet; TAKE 1 TABLET DAILY AS DIRECTED; Therapy: 54ZYA1958 to (Evaluate:29Apr2016)  Requested for: 02Jan2016; Last   Rx:91Lwx8341 Ordered   2  Azelastine HCl - 0 15 % Nasal Solution; USE 2 SPRAYS AT BEDTIME; Therapy: (Recorded:95Trl2113) to Recorded   3  [employer] D3 5000 UNIT Oral Capsule; total 6000 iu daily; Last Rx:03Waq8179 Ordered   4  Divalproex Sodium  MG Oral Tablet Extended Release 24 Hour; Take 6 tablets at   bedtime; Therapy: 46AHS8706 to (Evaluate:04Jun2016)  Requested for: 24YRW4143; Last   Rx:06Jan2016 Ordered   5  Fenofibrate 145 MG Oral Tablet; Take 1 tablet daily; Therapy: 65ZNH3483 to (Evaluate:46Lez8930)  Requested for: 46FHL1818; Last   Rx:04Ybu4286 Ordered   6  FLUoxetine HCl - 40 MG Oral Capsule; TAKE 2 CAPSULES DAILY; Therapy: 16LMK5212 to (Evaluate:04Jun2016)  Requested for: 26EYV7441; Last   Rx:06Jan2016 Ordered   7  Invokana 100 MG Oral Tablet; 1 tablet daily with breakfast;   Therapy: 71YIU3277 to (Evaluate:11Oct2016)  Requested for: 14Apr2016; Last   Rx:14Apr2016 Ordered   8  Lansoprazole 30 MG Oral Capsule Delayed Release; TAKE 1 CAPSULE EVERY   MORNING DAILY; Therapy: 07TTD4748 to (Rick Trejo)  Requested for: 06ATM4278; Last   Rx:11Nov2015 Ordered   9  Lisinopril 20 MG Oral Tablet; take 1/2 tablet daily; Therapy: 81Xmc6143-(Evaluate:25Rio5557)  Requested for: 64IYL5687 Ordered   10  MetFORMIN HCl  MG Oral Tablet Extended Release 24 Hour; 2 tabs twice per day    with food; Therapy: 80LST6584 to (Evaluate:26Jun2016)  Requested for: 86Eyq5836; Last    Rx:75Xtk7388 Ordered   11  Mupirocin 2 % External Ointment Recorded   12  Natural Vitamin E 400 UNIT Oral Tablet; TAKE 2 5 TABLETS DAILY; Therapy: (Recorded:90Yuu4782) to Recorded   13   ProAir  (90 Base) MCG/ACT Inhalation Aerosol Solution; INHALE 1 TO 2 PUFFS    EVERY 4 TO 6 HOURS AS NEEDED; Therapy: 97YYU8595 to Recorded   14  Symbicort 80-4 5 MCG/ACT Inhalation Aerosol; use 1 puff twice daily and rinse mouth    after each use; Therapy: 11DXG6358 to (Last Rx:22Dgm6006) Ordered   15  Tanzeum 50 MG Subcutaneous Pen-injector; Use 1 per week; Therapy: 38DHF7867 to (Evaluate:2016)  Requested for: 42WDY0787; Last    Rx:94Hta9977 Ordered   16  Vitamin D3 1000 UNIT Oral Capsule; TAKE 7 CAPSULE Daily; Therapy: (Recorded:26Fsu2459) to Recorded    The medication list was reviewed and updated today  Family Psych History    1  Family history of CABG   2  Family history of Diabetes Mellitus (V18 0)   3  Family history of Lung Cancer (V16 1)   4  Family history of Stroke Syndrome (V17 1)   5  Family history of Thyroid Disorder (V18 19)   6  Family history of Tobacco Use    7  Family history of Benign Polyps Of The Large Intestine (V18 51)   8  Family history of Bipolar illness   9  Family history of Heart Disease (V17 49)    10  Family history of depression (V17 0) (Z81 8)    11  Family history of Diabetes Mellitus (V18 0)   12  Family history of Thyroid Disorder (V18 19)    13  Denied: Family history of Colon Cancer   14  Denied: Family history of Crohn's Disease   15  Family history of Hypertension (V17 49)   16  Denied: Family history of Liver Cancer   17  Family history of Mother  At Age 52    The family history was reviewed and updated today  Social History    · Being A Social Drinker   · Caffeine Use   · Denied: History of Drug Use   · Denied: History of Home Environment Domestic Violence   · Job Physical Requirements Heavy Lifting   · Non-smoker (V49 89) (Z78 9)  The social history was reviewed and updated today  The social history was reviewed and is unchanged  , lives with   No children  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   No history of legal problems  No  history  History Of Phys/Sex Abuse Or Perpetration    History Of Phys/Sex Abuse or Perpetration: No history of physical or sexual abuse  End of Encounter Meds    1  Azelastine HCl - 0 15 % Nasal Solution; USE 2 SPRAYS AT BEDTIME; Therapy: (Recorded:37Vax1313) to Recorded    2  ProAir  (90 Base) MCG/ACT Inhalation Aerosol Solution; INHALE 1 TO 2 PUFFS   EVERY 4 TO 6 HOURS AS NEEDED; Therapy: 30ANK2109 to Recorded   3  Symbicort 80-4 5 MCG/ACT Inhalation Aerosol; use 1 puff twice daily and rinse mouth   after each use; Therapy: 72IKS1309 to (Last Rx:10Gos7160) Ordered    4  Lisinopril 20 MG Oral Tablet; take 1/2 tablet daily; Therapy: 77Rku2237-(Evaluate:10Kek9736)  Requested for: 48QFF6251 Ordered    5  Divalproex Sodium  MG Oral Tablet Extended Release 24 Hour; Take 6 tablets at   bedtime; Therapy: 07WVU3935 to (Evaluate:14Sra0174)  Requested for: 21Zkv9824; Last   Rx:09Yge2118 Ordered   6  FLUoxetine HCl - 40 MG Oral Capsule (PROzac); TAKE 2 CAPSULES DAILY; Therapy: 06DZT6596 to (Evaluate:08Dmp1639)  Requested for: 89Klh8860; Last   Rx:20Yuo1635 Ordered    7  Invokana 100 MG Oral Tablet; 1 tablet daily with breakfast;   Therapy: 30WRU5647 to (Evaluate:11Oct2016)  Requested for: 90Iko8282; Last   Rx:58Bwq8807 Ordered   8  MetFORMIN HCl  MG Oral Tablet Extended Release 24 Hour; 2 tabs twice per day   with food; Therapy: 25JNQ5140 to (Evaluate:26Jun2016)  Requested for: 27Hay1947; Last   Rx:22Uxg6063 Ordered    9  Lansoprazole 30 MG Oral Capsule Delayed Release; TAKE 1 CAPSULE EVERY   MORNING DAILY; Therapy: 21JGL0775 to (Malcolm Rich)  Requested for: 61IPT1584; Last   Rx:11Nov2015 Ordered    10  Atorvastatin Calcium 20 MG Oral Tablet (Lipitor); TAKE 1 TABLET DAILY AS DIRECTED; Therapy: 28PXQ4816 to (Evaluate:29Apr2016)  Requested for: 47TWG9330; Last    Rx:23Ptv6665 Ordered   11  Fenofibrate 145 MG Oral Tablet (Tricor);  Take 1 tablet daily; Therapy: 78QVR9655 to (Evaluate:86Vgm8342)  Requested for: 71VHV1589; Last    Rx:53Wvg1119 Ordered    12  [employer]S D3 5000 UNIT Oral Capsule; total 6000 iu daily; Last Rx:71Azx6634 Ordered   13  Natural Vitamin E 400 UNIT Oral Tablet; TAKE 2 5 TABLETS DAILY; Therapy: (Recorded:81Lfh2901) to Recorded    14  Tanzeum 50 MG Subcutaneous Pen-injector; Use 1 per week; Therapy: 27MJO8998 to (Evaluate:07Jun2016)  Requested for: 26PBU1952; Last    Rx:29Qoi6480 Ordered    15  Mupirocin 2 % External Ointment Recorded   16  Vitamin D3 1000 UNIT Oral Capsule; TAKE 7 CAPSULE Daily; Therapy: (Recorded:79Jxg6865) to Recorded    Future Appointments    Date/Time Provider Specialty Site   05/18/2016 12:50 PM MOLLY Calderon  Endocrinology [facility] ENDOCRINOLOGY [facility]   06/20/2016 09:00 AM MOLLY Lnag Sa , [facility] Hematology On[facility]   05/10/2016 03:45 PM Simran Estrella DO Internal Medicine [facility]   05/19/2016 10:30 AM MOLLY Dorado   Obstetrics/Gynecology [address]  OB/GYN     Signatures   Electronically signed by : MOLLY Currie ; Apr 20 2016  3:47PM EST                       (Author)

## 2018-01-17 NOTE — PROGRESS NOTES
Assessment   1  Benign essential hypertension (401 1) (I10)   2  Diabetes mellitus type 2, uncontrolled (250 02) (E11 65)   3  Bipolar I disorder, most recent episode depressed, in full remission (296 56) (F31 76)   4  Obesity, Class II, BMI 35 0-39 9, with comorbidity (see actual BMI) (278 00) (E66 9)      Assessment and plan 1  Hypertension - controlled, I have counseled patient following healthy imbalance diet, I would like the patient reduce sodium, exercise routinely, I would like the patient continued the med current medical regiment and we will continue to monitor the progress  2   Type 2 diabetes suboptimal control I have counselled the pt to follow a healthy and balanced diet ,and recommend routine exercise     Counseled the patient to take medications routinely patient understands and states that she will  I will be ordering diabetic laboratories including comprehensive metabolic panel, hemoglobin A1c, urine microalbumin, lipid panel     Patient follow up with Endocrinology  3   Bipolar disorder currently stable currently seeing Psychiatry routinely 4  Obesity -I have counseled patient following healthy and balanced diet, I would like the patient to lose weight, I would like the patient exercise routinely; we will continue monitor the patient's progress  Return to office 6  months  call if any problems  I did ask the patient to check the [employer] travel web site to check on the destination that they are going to in if there are any vaccines that would be required to notify me for consultation to the travel clinic  Plan   Diabetes mellitus type 2, uncontrolled    · (1) COMPREHENSIVE METABOLIC PANEL; Status:Active; Requested for:[id number];    · (1) HEMOGLOBIN A1C; Status:Active; Requested for:12Lhk9395;    · (1) LIPID PANEL, FASTING; Status:Active; Requested for:27Zwg1256;    · (1) MICROALBUMIN CREATININE RATIO, RANDOM URINE; Status:Active;  Requested for:94Qcb1999;     Chief Complaint   Chief Complaint Chronic Condition [place]: Patient is here today for follow up of chronic conditions described in HPI  History of Present Illness   HPI: [de-identified] -year old female coming in for a follow up office visit regarding benign essential hypertension, type 2 diabetes, bipolar type 1, obesity; The patient reports me compliant taking medications without untoward side effects the  The patient is here to review his medical condition, update me on the medical condition and the patient reports me no hospitalizations and no ER visits  Patient does report me her diet has not been good and that this is secondary to her mood not being well secondary to her bipolar disorder; she reports me that she subsequently had been to Psychiatry who is made adjustments of her medications therefore her mood has improved  She is trying to make adjustments in the diet and trying to get back to her usual diet  She also reports not missing doses of her insulin and she has tried to get back into her usual patterns taking insulin she does understand the importance  Patient also reports me she is nearing FDC and may start a part-time job in the future this will allow her to work during the day and not have med times shift  Patient also reports to me will be going on a cruise in the near future  Review of Systems   Complete-Female:      Constitutional: No fever, no chills, feels well, no tiredness, no recent weight gain or weight loss  Cardiovascular: No complaints of slow heart rate, no fast heart rate, no chest pain, no palpitations, no leg claudication, no lower extremity edema  Respiratory: No complaints of shortness of breath, no wheezing, no cough, no SOB on exertion, no orthopnea, no PND  Gastrointestinal: No complaints of abdominal pain, no constipation, no nausea or vomiting, no diarrhea, no bloody stools        Genitourinary: No complaints of dysuria, no incontinence, no pelvic pain, no dysmenorrhea, no vaginal discharge or bleeding  ROS Reviewed:    ROS reviewed  Active Problems   1  Abdominal pain, epigastric (789 06) (R10 13)   2  Abnormal CAT scan (793 99) (R93 8)   3  Abnormal electrocardiogram (794 31) (R94 31)   4  Acute respiratory infection (519 8) (J22)   5  Acute sinusitis, recurrence not specified, unspecified location (461 9) (J01 90)   6  Anemia (285 9) (D64 9)   7  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)   8  Asthma (493 90) (J45 909)   9  Back pain, thoracic (724 1) (M54 6)   10  Benign essential hypertension (401 1) (I10)   11  Family history of Benign Polyps Of The Large Intestine (V18 51)   12  Bilateral knee pain (719 46) (M25 561,M25 562)   13  Bipolar I disorder, most recent episode depressed, in full remission (296 56) (F31 76)   14  Blood in stool (578 1) (K92 1)   15  Cardiac murmur (785 2) (R01 1)   16  Cervical disc disease (722 91) (M50 90)   17  Cramp of both lower extremities (729 82) (R25 2)   18  Degeneration of thoracic intervertebral disc (722 51) (M51 34)   19  Degenerative arthritis of knee, bilateral (715 96) (M17 0)   20  Diabetes mellitus type 2, uncontrolled (250 02) (E11 65)   21  Diarrhea (787 91) (R19 7)   22  Diverticulosis (562 10) (K57 90)   23  Dizziness (780 4) (R42)   24  Dyslipidemia (272 4) (E78 5)   25  Edema (782 3) (R60 9)   26  Encounter for routine gynecological examination (V72 31) (Z01 419)   27  Encounter for screening mammogram for malignant neoplasm of breast (V76 12) (Z12 31)   28  Enteritis (558 9) (K52 9)   29  Esophagitis, reflux (530 11) (K21 0)   30  Essential hypertriglyceridemia (272 1) (E78 1)   31  Excessive sweating (780 8) (R61)   32  Fatigue (780 79) (R53 83)   33  Fatty liver (571 8) (K76 0)   34  Fecal occult blood test positive (792 1) (R19 5)   35  Gait disturbance (781 2) (R26 9)   36  Gastroesophageal reflux disease (530 81) (K21 9)   37  Granuloma annulare (695 89) (L92 0)   38  History of allergy (V15 09) (Z88 9)   39   Hypertensive heart disease (402 90) (I11 9)   40  Iron deficiency anemia, unspecified (280 9) (D50 9)   41  Joint pain, knee (719 46) (M25 569)   42  Left thyroid nodule (241 0) (E04 1)   43  Leg length discrepancy (736 81) (M21 70)   44  Leukocytosis (288 60) (D72 829)   45  Long term current use of therapeutic drug (V58 69) (Z79 899)   46  Long-term insulin use (V58 67) (Z79 4)   47  Lost voice (784 41) (R49 1)   48  Multiple thyroid nodules (241 1) (E04 2)   49  Muscle Cramps In The Thigh (Upper Leg) (729 82)   50  Neck pain (723 1) (M54 2)   51  History of Need for pneumococcal vaccination (V03 82) (Z23)   52  Need for prophylactic vaccination and inoculation against influenza (V04 81) (Z23)   53  Need for revaccination (V05 9) (Z23)   54  Need for Tdap vaccination (V06 1) (Z23)   55  Neuropathy (355 9) (G62 9)   56  Obesity, Class II, BMI 35 0-39 9, with comorbidity (see actual BMI) (278 00) (E66 9)   57  Obstructive sleep apnea (327 23) (G47 33)   58  Otitis media (382 9) (H66 90)   59  Pain, dental (525 9) (K08 89)   60  Pap smear, as part of routine gynecological examination (V76 2) (Z01 419)   61  Paronychia of toe (681 11) (L03 039)   62  Patellofemoral Dysfunction (736 6)   63  Personality disorder (301 9) (F60 9)   64  Postmenopausal Atrophic Vaginitis (627 3)   65  Prepatellar bursitis (726 65) (M70 40)   66  Right ankle pain (719 47) (M25 571)   67  Right wrist pain (719 43) (M25 531)   68  Routine Gynecological Exam With Cervical Pap Smear (V72 31)   69  Symptomatic menopausal or female climacteric states (627 2) (N95 1)   70  Thoracic spine pain (724 1) (M54 6)   71  Tinea corporis (110 5) (B35 4)   72  Type 2 diabetes mellitus with hyperglycemia (250 00) (E11 65)   73  Vertigo (780 4) (R42)   74  Viral gastroenteritis (008 8) (A08 4)   75  Visit for screening mammogram (V76 12) (Z12 31)   76  Vitamin D deficiency (268 9) (E55 9)   77  Dia-Parkinson-White Syndrome (426 7)    Past Medical History   1   History of Acute frontal sinusitis (461 1) (J01 10)   2  History of Acute frontal sinusitis (461 1) (J01 10)   3  History of Cellulitis (682 9) (L03 90)   4  History of Contusion Of The Toe(S) With Intact Skin Surface (924 3)   5  History of Diabetes Mellitus (250 00)   6  History of Difficulty breathing (786 09) (R06 89)   7  History of Encounter for routine gynecological examination (V72 31) (Z01 419)   8  History of Esophagitis, reflux (530 11) (K21 0)   9  History of High cholesterol (272 0) (E78 00)   10  History of abdominal pain (V13 89) (Z87 898)   11  History of abnormal weight loss (V13 89) (Z87 898)   12  History of acute bronchitis (V12 69) (Z87 09)   13  History of acute otitis media (V12 49) (Z86 69)   14  History of acute pharyngitis (V12 69) (Z87 09)   15  History of acute sinusitis (V12 69) (Z87 09)   16  History of allergic rhinitis (V12 69) (Z87 09)   17  History of candidiasis of mouth (V12 09) (Z86 19)   18  History of fatigue (V13 89) (Z87 898)   19  History of head injury (V15 59) (Z87 828)   20  History of headache (V13 89) (Z87 898)   21  History of hypoglycemia (V12 29) (Z86 39)   22  History of laryngitis (V12 69) (Z87 09)   23  History of obesity (V12 29) (Z86 39)   24  History of pharyngitis (V12 69) (Z87 09)   25  History of sebaceous cyst (V13 3) (Z87 2)   26  History of sore throat (V12 60) (Z87 09)   27  History of type 2 diabetes mellitus (V12 29) (Z86 39)   28  History of urinary frequency (V13 09) (Z87 898)   29  History of Hypoglycemia (251 2) (E16 2)   30  History of Mouth Sores (528 9)   31  History of Need for pneumococcal vaccination (V03 82) (Z23)   32  History of Otitis media, unspecified laterality   33  History of Pap smear, as part of routine gynecological examination (V76 2) (Z01 419)   34  Denied: History of Seizures   35  History of Suicide Attempt (E958 9)   36  History of Tinea corporis (110 5) (B35 4)   37   History of Type 2 diabetes mellitus with hyperglycemia, with long-term current use of insulin      (250 00,790 29,V58 67) (E11 65,Z79 4)   38  History of Type B influenza (487 1) (J10 1)   39  History of Visit for screening mammogram (J94 73) (Z12 31)  Active Problems And Past Medical History Reviewed: The active problems and past medical history were reviewed and updated today  Surgical History   1  History of Oral Surgery Tooth Extraction   2  History of Tonsillectomy With Adenoidectomy   3  History of Vaginal Pap smear (V76 47) (Z12 72)  Surgical History Reviewed: The surgical history was reviewed and updated today  Family History   Mother    1  Family history of CABG   2  Family history of Diabetes Mellitus (V18 0)   3  Family history of Lung Cancer (V16 1)   4  Family history of Stroke Syndrome (V17 1)   5  Family history of Thyroid Disorder (V18 19)   6  Family history of Tobacco Use  Father    7  Family history of Benign Polyps Of The Large Intestine (V18 51)   8  Family history of Bipolar illness   9  Family history of Heart Disease (V17 49)  Brother    8  Family history of depression (V17 0) (Z81 8)  Maternal Grandmother    11  Family history of Diabetes Mellitus (V18 0)   12  Family history of Thyroid Disorder (V18 19)  Family History    13  Denied: Family history of Colon Cancer   14  Denied: Family history of Crohn's Disease   15  Family history of Hypertension (V17 49)   16  Denied: Family history of Liver Cancer   17  Family history of Mother  At Age 52  Family History Reviewed: The family history was reviewed and updated today  Social History    · Caffeine Use   · Denied: History of Drug Use   · Denied: History of Home Environment Domestic Violence   · Job Physical Requirements Heavy Lifting   ·    · Never a smoker   · Non-smoker (V49 89) (Z78 9)   · Social drinker  Social History Reviewed: The social history was reviewed and updated today  The social history was reviewed and is unchanged  Current Meds    1   Ericu-Roland Gil Plus In Vitro Strip; test 4 times daily; Therapy: 86LCJ7246 to (Last Rx:16Nvz0575)  Requested for: 86XQH8917 Ordered   2  Atorvastatin Calcium 20 MG Oral Tablet; TAKE 1 TABLET DAILY AS DIRECTED; Therapy: 77JTZ3774 to (Evaluate:83Fiy1037)  Requested for: 20Jun2017; Last Rx:20Jun2017     Ordered   3  Azelastine HCl - 0 15 % Nasal Solution; USE 2 SPRAYS AT BEDTIME; Therapy: (Recorded:41Zft8663) to Recorded   4  BD Pen Needle Susie U/F 32G X 4 MM Miscellaneous; use4 times per day; Therapy: 14LNT7942 to (Evaluate:43Fnd0115)  Requested for: 72VIB0829; Last Rx:16Nov2017     Ordered   5  Clobetasol Propionate 0 05 % External Ointment; APPLY AND GENTLY MASSAGE INTO AFFECTED     AREA(S) TWICE DAILY; Therapy: 41EBT0788 to Recorded   6  Fenofibrate 145 MG Oral Tablet; Take 1 tablet daily; Therapy: 65AKJ7969 to (Evaluate:14Mar2018)  Requested for: 61JXZ9134; Last Rx:73Tpp1080     Ordered   7  Fish Oil 1000 MG Oral Capsule; take 1 capsule daily; Therapy: 64WQX2933 to Recorded   8  FLUoxetine HCl - 20 MG Oral Capsule; TAKE 1 CAPSULE DAILY WITH 40 MG CAPSULE TO TOTAL 60     MG DAILY; Therapy: 02ICY8251 to ([facility])  Requested for: 79NTB6277; Last Rx:05Jan2018     Ordered   9  FLUoxetine HCl - 40 MG Oral Capsule; TAKE 1 CAPSULE DAILY WITH  20 MG CAPSULE FOR A     TOTAL DOSE OF 60 MG DAILY; Therapy: 24GYK6218 to ([facility])  Requested for: 32ILB6284; Last Rx:05Jan2018     Ordered   10  HumaLOG KwikPen 200 UNIT/ML Subcutaneous Solution Pen-injector; TAKE 20 UNITS BEFORE      BREAKFAST, USE ICR 1:5 FOR LUNCH, 22 UNITS BEFORE DINNER (ISF 1:20 ICR      1:5); Therapy: 37USJ7930 to ([phone])  Requested for: 97HHC1862; Last Rx:99Vjj8184      Ordered   11  Hydroxychloroquine Sulfate 200 MG Oral Tablet; TAKE 1 TABLET TWICE DAILY; Therapy: (Recorded:06Vnb5891) to Recorded   12  Jardiance 25 MG Oral Tablet; take 1 tablet every day;       Therapy: 20Jun2017 to (Evaluate:11Mar2018)  Requested for: 08BNA9957; Last Rx:97Ymf3910      Ordered   13  Lansoprazole 30 MG Oral Capsule Delayed Release; TAKE 1 CAPSULE EVERY MORNING DAILY; Therapy: 94KXN7376 to (Evaluate:26Oct2018)  Requested for: 31Oct2017; Last Rx:31Oct2017      Ordered   14  Latuda 80 MG Oral Tablet; TAKE 1 TABLET IN THE EVENING WITH DINNER; Therapy: 95WSX6022 to (Mardel Fails)  Requested for: 94QBM7045; Last Rx:05Jan2018      Ordered   15  Lisinopril 20 MG Oral Tablet; TAKE 1 TABLET DAILY AS DIRECTED; Therapy: 54YNI1328 to (Evaluate:14Mar2018)  Requested for: 30STH9804; Last Rx:53Iag2432      Ordered   16  MetFORMIN HCl  MG Oral Tablet Extended Release 24 Hour; take 2 tabs twice per day with      food; Therapy: 85WXB1402 to ([phone])  Requested for: 60ACK1576; Last Rx:05Oct2017      Ordered   17  Mupirocin 2 % External Ointment; Apply twice daily; Therapy: (Recorded:48Nie3469) to Recorded   18  Frio [place] FlexTouch 100 UNIT/ML Subcutaneous Solution Pen-injector; INJECT 42 UNITS ONCE A DAY; Therapy: 14Apr2017 to (Evaluate:18Mar2018)  Requested for: 76HFY9636; Last Rx:98Qol8022      Ordered   19  Vitamin D (Ergocalciferol) 96048 UNIT Oral Capsule; TAKE 1 CAPSULE WEEKLY; Therapy: 76NEX6179 to (Last Abbee Farrell)  Requested for: 20Jun2017 Ordered  Medication List Reviewed: The medication list was reviewed and updated today  Allergies   1  Biaxin TABS   2  LaMICtal TABS   3  Tanzeum PEN   4  TEGretol TABS   5  Depakote TBEC  6  Seasonal   7  Bee sting   8  Shellfish    Vitals   Vital Signs    Recorded: 96VAH2283 07:58AM   Heart Rate 74   Respiration 16   Systolic 635, LUE, Sitting   Diastolic 78, LUE, Sitting   Height 5 ft 4 in   Weight 227 lb 0 2 oz   BMI Calculated 38 97   BSA Calculated 2 06   O2 Saturation 99     Physical Exam        Constitutional      General appearance: No acute distress, well appearing and well nourished    well developed,-- appears healthy,-- comfortable,-- obese,-- clothing appropriate-- and-- well hydrated  Eyes      Conjunctiva and lids: No swelling, erythema or discharge  Pupils and irises: Equal, round and reactive to light  Ears, Nose, Mouth, and Throat      External inspection of ears and nose: Normal        Otoscopic examination: Tympanic membranes translucent with normal light reflex  Canals patent without erythema  Nasal mucosa, septum, and turbinates: Normal without edema or erythema  Oropharynx: Normal with no erythema, edema, exudate or lesions  Pulmonary      Respiratory effort: No increased work of breathing or signs of respiratory distress  Auscultation of lungs: Clear to auscultation  Cardiovascular      Auscultation of heart: Normal rate and rhythm, normal S1 and S2, without murmurs  Examination of extremities for edema and/or varicosities: Normal        Abdomen      Abdomen: Non-tender, no masses  Liver and spleen: No hepatomegaly or splenomegaly  Lymphatic      Palpation of lymph nodes in neck: No lymphadenopathy  Psychiatric      Mood and affect: Normal   Mood and Affect: appropriate mood,-- not anxious,-- calm-- and-- not depressed  Health Management   Diabetes mellitus type 2, uncontrolled   (1) MICROALBUMIN CREATININE RATIO, RANDOM URINE; every 1 year; Last 85YVZ1344; Next Due:    88MZV1921; Active  *VB - Eye Exam; every 1 year; Last 58IPA8191; Next Due: 93GDZ1127; Overdue  History of Visit for screening mammogram   Digital Bilateral Screening Mammogram With CAD; every 1 year; Last 13YYU5997; Next Due:    90Lxt5162; Overdue    Future Appointments      Date/Time Provider Specialty Site   07/17/2018 08:00 AM MOLLY Devlin , DO, [facility] Hematology Oncology [address] [place] [address] ONCOLOGY   01/19/2018 08:40 AM MOLLY Weir   Endocrinology [facility] ENDOCRINOLOGY [facility]   03/30/2018 02:00 PM MOLLY Steward  Psychiatry [facility] [address]     Signatures    Electronically signed by : Orlando Fernando DO; Jan 16 2018  9:33PM EST                       (Author)

## 2018-01-17 NOTE — RESULT NOTES
Message   A1C slightly higher - 7 5 , keep f s log and bring in next visit , potassium slightly high - decrease lisinopril to 1/2 tab daily , repeat labs next visit   low vit d- increase vitamind 3 by 1000 iu daily in addition to what she is taking      Verified Results  (1) HEMOGLOBIN A1C 40HNG6346 01:44PM Miriam Muñiz   5 7-6 4% impaired fasting glucose  >=6 5% diagnosis of diabetes    Falsely low levels are seen in conditions linked to short RBC life span-  hemolytic anemia, and splenomegaly  Falsely elevated levels are seen in situations where there is an increased production of RBC- receipt of erythropoietin or blood transfusions  Adopted from ADA-Clinical Practice Recommendations     Test Name Result Flag Reference   HEMOGLOBIN A1C 7 5 % H 4 0-5 6   EST  AVG  GLUCOSE 169 mg/dl       (1) VITAMIN D 25-HYDROXY 24ATI0891 12:13PM Budd Rash     Test Name Result Flag Reference   VIT D 25-HYDROX 28 3 ng/mL L 30 0-100 0     (1) COMPREHENSIVE METABOLIC PANEL 96NWP1638 96:81XH Antonino, [address] [facility] Kidney Disease Education Program recommendations are as follows:  GFR calculation is accurate only with a steady state creatinine  Chronic Kidney disease less than 60 ml/min/1 73 sq  meters  Kidney failure less than 15 ml/min/1 73 sq  meters       Test Name Result Flag Reference   GLUCOSE,RANDM 129 mg/dL     SODIUM 134 mmol/L L 136-145   POTASSIUM 5 5 mmol/L H 3 5-5 3   CHLORIDE 96 mmol/L L 100-108   CARBON DIOXIDE 30 mmol/L  21-32   ANION GAP (CALC) 8 mmol/L  4-13   BLOOD UREA NITROGEN 25 mg/dL  5-25   CREATININE 0 62 mg/dL  0 60-1 30   Standardized to IDMS reference method   CALCIUM 9 5 mg/dL  8 3-10 1   BILI, TOTAL 0 38 mg/dL  0 20-1 00   ALK PHOSPHATAS 105 U/L     ALT (SGPT) 25 U/L  12-78   AST(SGOT) 32 U/L  5-45   ALBUMIN 3 7 g/dL  3 5-5 0   TOTAL PROTEIN 8 0 g/dL  6 4-8 2   eGFR Non-African American > ml/min/1 73sq m

## 2018-01-19 ENCOUNTER — ALLSCRIPTS OFFICE VISIT (OUTPATIENT)
Dept: OTHER | Facility: OTHER | Age: 58
End: 2018-01-19

## 2018-01-19 RX ORDER — SODIUM CHLORIDE 9 MG/ML
20 INJECTION, SOLUTION INTRAVENOUS ONCE
Status: COMPLETED | OUTPATIENT
Start: 2018-01-22 | End: 2018-01-22

## 2018-01-20 NOTE — PROGRESS NOTES
Assessment   1  Diabetes mellitus type 2, uncontrolled (250 02) (E11 65)   2  Dyslipidemia (272 4) (E78 5)   3  Multiple thyroid nodules (241 1) (E04 2)   4  Long term current use of therapeutic drug (V58 69) (Z79 899)   5  Vitamin D deficiency (268 9) (E55 9)    Plan   Benign essential hypertension    · Lisinopril 20 MG Oral Tablet; TAKE 1 TABLET DAILY AS DIRECTED   Rx By: Stacy Diaz; Dispense: 90 Days ; #:90 Tablet; Refill: 1;For: Benign essential hypertension; DAVDI = N; Verified Transmission to [facility]/PHARMACY #[id number] Last Updated By: SystemChargeback; 1/19/2018 9:20:13 AM  Benign essential hypertension, Bipolar I disorder, most recent episode depressed, in full    remission, Diabetes mellitus type 2, uncontrolled, Dyslipidemia, Essential    hypertriglyceridemia, Multiple thyroid nodules, Type 2 diabetes mellitus with    hyperglycemia, Vitamin D deficiency    · From  HumaLOG KwikPen 200 UNIT/ML Subcutaneous Solution Pen-injector    TAKE 20 UNITS BEFORE BREAKFAST, USE ICR 1:5 FOR LUNCH, 22    UNITS BEFORE DINNER (ISF 1:20 ICR 1:5) To HumaLOG KwikPen 200 UNIT/ML    Subcutaneous Solution Pen-injector Use  (ISF 1:20 ICR 1:5) with meals ( via expert    meter)   Rx By: Stacy Diaz; Dispense: 30 Days ; #:1 X 3 ML Pen (2 Pens); Refill: 6;For: Benign essential hypertension, Bipolar I disorder, most recent episode depressed, in full remission, Diabetes mellitus type 2, uncontrolled, Dyslipidemia, Essential hypertriglyceridemia, Multiple thyroid nodules, Type 2 diabetes mellitus with hyperglycemia, Vitamin D deficiency; DAVID = N; Record  Diabetes mellitus type 2, uncontrolled    · From  Tresiba FlexTouch 100 UNIT/ML Subcutaneous Solution Pen-injector    INJECT 42 UNITS ONCE A DAY To Tresiba FlexTouch 100 UNIT/ML    Subcutaneous Solution Pen-injector INJECT 44  UNITS ONCE A DAY   Rx By: Stacy Diaz; Dispense: 30 Days ; #:1 X 3 ML Pen (5 Pens);  Refill: 2;For: Diabetes mellitus type 2, uncontrolled; DAVID = N; Record   · BD Pen Needle Susie U/F 32G X 4 MM Miscellaneous; use4 times per day   Rx By: Chema Mueller; Dispense: 90 Days ; #:4 X 90 Miscellaneous Box; Refill: 1;For: Diabetes mellitus type 2, uncontrolled; DAVID = N; Verified Transmission to [facility]/PHARMACY #[id number] Msg to Pharmacy: Can break down into 30 day supply if requested ; Last Updated By: System, SureScripts; 1/19/2018 9:20:15 AM   · Jardiance 25 MG Oral Tablet; take 1 tablet every day   Rx By: Chema Mueller; Dispense: 30 Days ; #:1 X 90 Tablet Bottle; Refill: 1;For: Diabetes mellitus type 2, uncontrolled; DAVID = N; Verified Transmission to [facility]/PHARMACY #[id number] Last Updated By: System Gozent; 1/19/2018 9:20:13 AM   · MetFORMIN HCl  MG Oral Tablet Extended Release 24 Hour; take 2 tabs    twice per day with food   Rx By: Chema Mueller; Dispense: 90 Days ; #:1 X 360 Tablet Extended Release 24 Hour Bottle; Refill: 1;For: Diabetes mellitus type 2, uncontrolled; DAVID = N; Record   · (1) BASIC METABOLIC PROFILE; Status:Active; Requested for:21May2018; Perform:[facility]; TBY:[id number];CFUZIHY; For:Diabetes mellitus type 2, uncontrolled; Ordered By:Ramiro Shields;   · (1) HEMOGLOBIN A1C; Status:Active; Requested for:21May2018; Perform:[facility]; JPQ:[id number];ABXCAFZ; For:Diabetes mellitus type 2, uncontrolled; Ordered By:Ramiro Shields;   · (1) MICROALBUMIN CREATININE RATIO, RANDOM URINE; Status:Active; Requested    for:21May2018; Perform:[facility]; WKL:[id number];IDWCLHL; For:Diabetes mellitus type 2, uncontrolled; Ordered By:Ramiro Shields;  Dyslipidemia    · (1) LIPID PANEL, FASTING; Status:Active; Requested for:21May2018; Perform:[facility]; LIR:[id number];PZBNKAP; For:Dyslipidemia; Ordered By:Ramiro Shields;  Essential hypertriglyceridemia    · Fenofibrate 145 MG Oral Tablet (Tricor);  Take 1 tablet daily   Rx By: Chema Mueller; Dispense: 90 Days ; #:90 Tablet; Refill: 1;For: Essential hypertriglyceridemia; DAVID = N; Verified Transmission to [facility]/PHARMACY #[id number] Last Updated By: System, SureScripts; 1/19/2018 9:20:13 AM  Multiple thyroid nodules    · (1) TSH; Status:Active; Requested for:21May2018; Perform:[facility]; KLA:[id number];ETPROYC; For:Multiple thyroid nodules; Ordered By:Ramiro Shields;   · US THYROID; Status:Hold For - Scheduling; Requested GLA:[id number]; Perform:[facility]; YIE:[id number];JKIXUQI; For:Multiple thyroid nodules; Ordered By:Ramiro Shields;   · Follow-up visit in 4 Months Evaluation and Treatment  Follow-up  Status: Hold For -    Scheduling  Requested for: 33ARO1258   Ordered; For: Multiple thyroid nodules; Ordered By: Ivet Varela Performed:  Due: 60TWS1730  Vitamin D deficiency    · (1) VITAMIN D 25-HYDROXY; Status:Active; Requested for:21May2018; Perform:[facility]; AKZ:[id number];BOZIDLK; For:Vitamin D deficiency; Ordered By:Ramiro Shields;    Discussion/Summary   Discussion Summary:    1  Type 2 diabetes uncontrolled-A1c 7 4%, not at goal, goal is 6 5%  basal insulin to 44 units at bedtime( tresiba )  Humalog with insulin to carb ratio of 1 to 5 and ISF of 1:20 to cut down on the snacks in between meals, which could be causing fluctuation in blood sugar importance of lifestyle modification exercise and diet to call if blood sugar less than 70 or more than 300 in 4 months appointment and podiatry appointment up-to-date, continue to follow-up  Dyslipidemia-triglycerides elevated  Discussed importance of cutting down on the carbohydrates  fenofibrate and statins lipid profile, fasting before next appointment low fat diet     Multiple thyroid nodules-thyroid ultrasound in May showed subcentimeter stable nodules, we will order thyroid ultrasound, before next appointment in May 2018  is clinically euthyroid TSH before next appointment   Vitamin-D deficiency-continue vitamin-D, 65686 International Units once a week  vitamin-D before next appointment      Counseling Documentation With Imm: The patient was counseled regarding diagnostic results,-- instructions for management,-- risk factor reductions,-- prognosis,-- impressions,-- risks and benefits of treatment options,-- importance of compliance with treatment  History of Present Illness   Diabetes: The patient is being seen for Diabetes Mellitus 2  The HbA1c was 7 4% performed on January 2018  See Medication List for current medication(s)  See Medication List for dosage(s)  Source of information reported and indicates that the patient checks her blood sugar 3-4 time(s) per day  Fasting blood sugars: generally 120-150  Pre-prandial blood sugars: generally 120-150  Post-prandial blood sugars: generally 120-150  Bedtime blood sugars: generally 120-150  Glycemic Excursions: within the normal limits at breakfast time,-- within the normal limits at lunch time,-- within the normal limits at dinner time-- and-- within the normal limits at bedtime  By report, there is good compliance with treatment,-- good tolerance of treatment-- and-- good symptom control  Current pertinent lifestyle factors include obesity-- and-- inactivity  Symptoms reported by the patient include no polydipsia,-- no extremity numbness,-- no polyuria,-- no extremity paresthesias,-- no change in vision,-- no polyphagia,-- no dyspnea-- and-- no chest pain  Disease Course and Complications:  there have been no previous episodes of diabetic ketoacidosis  -- there have been no previous hospitalizations  Goiter: The patient is being seen for a follow-up of a goiter  The patient is currently asymptomatic  The patient is not currently being treated for this problem  Hyperlipidemia (Follow-Up): The patient states her hyperlipidemia has been stable since the last visit  Comorbid Illnesses: diabetes mellitus-- and-- hypertension  She has no significant interval events  Symptoms: The patient is currently asymptomatic  Medications: the patient is adherent with her medication regimen  -- She denies medication side effects  Medication(s): a statin-- and-- a fibrate  The patient is doing well with her hyperlipidemia goals  the patient's LDL goal is Less than 100 mg/dL  -- the patient's last LDL was 49 mg/dL  Vitamin D Deficiency: The patient is being seen for follow-up of vitamin D deficiency  Disease type: vitamin D insufficiency  Recent laboratory results: date June 2017, 25-hydroxyvitamin D 31 ng/mL  Current treatment includes vitamin D2 (ergocalciferol) and 78365 International Units once a week  Symptoms:  fatigue-- and-- muscle pain  Review of Systems   ROS Reviewed:    ROS reviewed  Endo Adult ROS Female Established v2 Update - [facility]:      Constitutional/General: no recent weight gain,-- no recent weight loss,-- poor energy/fatigue,-- no increased energy level,-- no insomnia/sleep problems,-- no fever-- and-- no feeling weak  Breasts: no nipple discharge  Heart: high blood pressure, but-- no chest pain/tightness,-- no rapid/racing heart rate-- and-- no palpitations  Genitourinary - Urinary: frequent urination-- and-- urinating during the night, but-- no excess urination  Eyes: gritty/scratchy eyes, but-- no blurred vision,-- no double vision,-- no bulging eyes-- and-- no excessive tearing  Mouth / Throat: no hoarseness-- and-- no difficulty swallowing  Neck: no lumps,-- no swollen glands,-- no neck pain,-- no neck stiffness-- and-- no enlarged thyroid  Respiratory: no wheezing,-- no asthma-- and-- no persistent cough  Musculoskeletal: no muscle aches/pain,-- no joint aches/pain-- and-- no muscle weakness  Skin & Hair: dry skin,-- no acne,-- the hair texture was not oily,-- no hair loss-- and-- no excessive hair growth        Gastrointestinal: no constipation,-- diarrhea,-- wakes at night to drink-- and-- no stomach ache       Neurological: no blackouts,-- no weakness-- and-- no tremors  Reproductive:  frequency of period is not applicable  -- duration of period is not applicable  -- Date of last menstruation is not applicable  -- regular periods are not applicable  -- discomfort with periods is not applicable  -- excessive bleeding during period is not applicable  -- mood swings are not applicable  Endocrine: feeling hot frequently,-- no feeling cold frequently,-- no shifts between feeling hot and cold,-- cold hands or feet,-- excessive sweating,-- no thyroid problems,-- blood sugar problems,-- excessive thirst,-- excessive hunger,-- no change in shoe size,-- no nausea or vomiting-- and-- no shaky hands  Active Problems   1  Abdominal pain, epigastric (789 06) (R10 13)   2  Abnormal CAT scan (793 99) (R93 8)   3  Abnormal electrocardiogram (794 31) (R94 31)   4  Acute respiratory infection (519 8) (J22)   5  Acute sinusitis, recurrence not specified, unspecified location (461 9) (J01 90)   6  Anemia (285 9) (D64 9)   7  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)   8  Asthma (493 90) (J45 909)   9  Back pain, thoracic (724 1) (M54 6)   10  Benign essential hypertension (401 1) (I10)   11  Family history of Benign Polyps Of The Large Intestine (V18 51)   12  Bilateral knee pain (719 46) (M25 561,M25 562)   13  Bipolar I disorder, most recent episode depressed, in full remission (296 56) (F31 76)   14  Blood in stool (578 1) (K92 1)   15  Cardiac murmur (785 2) (R01 1)   16  Cervical disc disease (722 91) (M50 90)   17  Cramp of both lower extremities (729 82) (R25 2)   18  Degeneration of thoracic intervertebral disc (722 51) (M51 34)   19  Degenerative arthritis of knee, bilateral (715 96) (M17 0)   20  Diabetes mellitus type 2, uncontrolled (250 02) (E11 65)   21  Diarrhea (787 91) (R19 7)   22  Diverticulosis (562 10) (K57 90)   23  Dizziness (780 4) (R42)   24  Dyslipidemia (272 4) (E78 5)   25  Edema (782 3) (R60 9)   26  Encounter for routine gynecological examination (V72 31) (Z01 419)   27  Encounter for screening mammogram for malignant neoplasm of breast (V76 12)      (Z12 31)   28  Enteritis (558 9) (K52 9)   29  Esophagitis, reflux (530 11) (K21 0)   30  Essential hypertriglyceridemia (272 1) (E78 1)   31  Excessive sweating (780 8) (R61)   32  Fatigue (780 79) (R53 83)   33  Fatty liver (571 8) (K76 0)   34  Fecal occult blood test positive (792 1) (R19 5)   35  Gait disturbance (781 2) (R26 9)   36  Gastroesophageal reflux disease (530 81) (K21 9)   37  Granuloma annulare (695 89) (L92 0)   38  History of allergy (V15 09) (Z88 9)   39  Hypertensive heart disease (402 90) (I11 9)   40  Iron deficiency anemia, unspecified (280 9) (D50 9)   41  Joint pain, knee (719 46) (M25 569)   42  Left thyroid nodule (241 0) (E04 1)   43  Leg length discrepancy (736 81) (M21 70)   44  Leukocytosis (288 60) (D72 829)   45  Long term current use of therapeutic drug (V58 69) (Z79 899)   46  Long-term insulin use (V58 67) (Z79 4)   47  Lost voice (784 41) (R49 1)   48  Multiple thyroid nodules (241 1) (E04 2)   49  Muscle Cramps In The Thigh (Upper Leg) (729 82)   50  Neck pain (723 1) (M54 2)   51  History of Need for pneumococcal vaccination (V03 82) (Z23)   52  Need for prophylactic vaccination and inoculation against influenza (V04 81) (Z23)   53  Need for revaccination (V05 9) (Z23)   54  Need for Tdap vaccination (V06 1) (Z23)   55  Neuropathy (355 9) (G62 9)   56  Obesity, Class II, BMI 35 0-39 9, with comorbidity (see actual BMI) (278 00) (E66 9)   57  Obstructive sleep apnea (327 23) (G47 33)   58  Otitis media (382 9) (H66 90)   59  Pain, dental (525 9) (K08 89)   60  Pap smear, as part of routine gynecological examination (V76 2) (Z01 419)   61  Paronychia of toe (681 11) (L03 039)   62  Patellofemoral Dysfunction (736 6)   63  Personality disorder (301 9) (F60 9)   64  Postmenopausal Atrophic Vaginitis (627 3)   65   Prepatellar bursitis (726 65) (M70 40)   66  Right ankle pain (719 47) (M25 571)   67  Right wrist pain (719 43) (M25 531)   68  Routine Gynecological Exam With Cervical Pap Smear (V72 31)   69  Symptomatic menopausal or female climacteric states (627 2) (N95 1)   70  Thoracic spine pain (724 1) (M54 6)   71  Tinea corporis (110 5) (B35 4)   72  Type 2 diabetes mellitus with hyperglycemia (250 00) (E11 65)   73  Vertigo (780 4) (R42)   74  Viral gastroenteritis (008 8) (A08 4)   75  Visit for screening mammogram (V76 12) (Z12 31)   76  Vitamin D deficiency (268 9) (E55 9)   77  Dia-Parkinson-White Syndrome (426 7)    Past Medical History   1  History of Acute frontal sinusitis (461 1) (J01 10)   2  History of Acute frontal sinusitis (461 1) (J01 10)   3  History of Cellulitis (682 9) (L03 90)   4  History of Contusion Of The Toe(S) With Intact Skin Surface (924 3)   5  History of Diabetes Mellitus (250 00)   6  History of Difficulty breathing (786 09) (R06 89)   7  History of Encounter for routine gynecological examination (V72 31) (Z01 419)   8  History of Esophagitis, reflux (530 11) (K21 0)   9  History of High cholesterol (272 0) (E78 00)   10  History of abdominal pain (V13 89) (Z87 898)   11  History of abnormal weight loss (V13 89) (Z87 898)   12  History of acute bronchitis (V12 69) (Z87 09)   13  History of acute otitis media (V12 49) (Z86 69)   14  History of acute pharyngitis (V12 69) (Z87 09)   15  History of acute sinusitis (V12 69) (Z87 09)   16  History of allergic rhinitis (V12 69) (Z87 09)   17  History of candidiasis of mouth (V12 09) (Z86 19)   18  History of fatigue (V13 89) (Z87 898)   19  History of head injury (V15 59) (Z87 828)   20  History of headache (V13 89) (Z87 898)   21  History of hypoglycemia (V12 29) (Z86 39)   22  History of laryngitis (V12 69) (Z87 09)   23  History of obesity (V12 29) (Z86 39)   24  History of pharyngitis (V12 69) (Z87 09)   25   History of sebaceous cyst (V13 3) (Z87 2) 26  History of sore throat (V12 60) (Z87 09)   27  History of type 2 diabetes mellitus (V12 29) (Z86 39)   28  History of urinary frequency (V13 09) (Z87 898)   29  History of Hypoglycemia (251 2) (E16 2)   30  History of Mouth Sores (528 9)   31  History of Need for pneumococcal vaccination (V03 82) (Z23)   32  History of Otitis media, unspecified laterality   33  History of Pap smear, as part of routine gynecological examination (V76 2) (Z01 419)   34  Denied: History of Seizures   35  History of Suicide Attempt (E958 9)   36  History of Tinea corporis (110 5) (B35 4)   37  History of Type 2 diabetes mellitus with hyperglycemia, with long-term current use of      insulin (250 00,790 29,V58 67) (E11 65,Z79 4)   38  History of Type B influenza (487 1) (J10 1)   39  History of Visit for screening mammogram (O89 52) (Z12 31)  Active Problems And Past Medical History Reviewed: The active problems and past medical history were reviewed and updated today  Surgical History   1  History of Oral Surgery Tooth Extraction   2  History of Tonsillectomy With Adenoidectomy   3  History of Vaginal Pap smear (V76 47) (Z12 72)  Surgical History Reviewed: The surgical history was reviewed and updated today  Family History   Mother    1  Family history of CABG   2  Family history of Diabetes Mellitus (V18 0)   3  Family history of Lung Cancer (V16 1)   4  Family history of Stroke Syndrome (V17 1)   5  Family history of Thyroid Disorder (V18 19)   6  Family history of Tobacco Use  Father    7  Family history of Benign Polyps Of The Large Intestine (V18 51)   8  Family history of Bipolar illness   9  Family history of Heart Disease (V17 49)  Brother    8  Family history of depression (V17 0) (Z81 8)  Maternal Grandmother    11  Family history of Diabetes Mellitus (V18 0)   12  Family history of Thyroid Disorder (V18 19)  Family History    13  Denied: Family history of Colon Cancer   14   Denied: Family history of Crohn's Disease   15  Family history of Hypertension (V17 49)   16  Denied: Family history of Liver Cancer   17  Family history of Mother  At Age 52  Family History Reviewed: The family history was reviewed and updated today  Social History    · Caffeine Use   · Denied: History of Drug Use   · Denied: History of Home Environment Domestic Violence   · Job Physical Requirements Heavy Lifting   ·    · Never a smoker   · Non-smoker (V49 89) (Z78 9)   · Social drinker  Social History Reviewed: The social history was reviewed and updated today  Current Meds    1  Accu-Chek Louise Plus In Vitro Strip; test 4 times daily; Therapy: 22DAY8645 to (Last Rx:98Tqp8147)  Requested for: 48AWD6356 Ordered   2  Atorvastatin Calcium 20 MG Oral Tablet; TAKE 1 TABLET DAILY AS DIRECTED; Therapy: 00SYU2857 to (Evaluate:17Bie1433)  Requested for: 2017; Last     Rx:2017 Ordered   3  Azelastine HCl - 0 15 % Nasal Solution; USE 2 SPRAYS AT BEDTIME; Therapy: (Recorded:64Twx6763) to Recorded   4  BD Pen Needle Susie U/F 32G X 4 MM Miscellaneous; use4 times per day; Therapy: 60LJG2137 to (Evaluate:60Aad3410)  Requested for: 25CTI7639; Last     Rx:2017 Ordered   5  Clobetasol Propionate 0 05 % External Ointment; APPLY AND GENTLY MASSAGE INTO     AFFECTED AREA(S) TWICE DAILY; Therapy: 67JTS9864 to Recorded   6  Fenofibrate 145 MG Oral Tablet; Take 1 tablet daily; Therapy: 20OTZ7703 to (Evaluate:2018)  Requested for: 45QXX7948; Last     Rx:51Bzp1617 Ordered   7  Fish Oil 1000 MG Oral Capsule; take 1 capsule daily; Therapy: 00GMP7832 to Recorded   8  FLUoxetine HCl - 20 MG Oral Capsule; TAKE 1 CAPSULE DAILY WITH 40 MG CAPSULE     TO TOTAL 60 MG DAILY; Therapy: 03LYZ8673 to (Alison Cushing)  Requested for: 48ZEI9222; Last     Rx:2018 Ordered   9   FLUoxetine HCl - 40 MG Oral Capsule; TAKE 1 CAPSULE DAILY WITH  20 MG CAPSULE     FOR A TOTAL DOSE OF 60 MG DAILY; Therapy: 19PWX8793 to ([facility])  Requested for: 97EFJ9692; Last     Rx:05Jan2018 Ordered   10  HumaLOG KwikPen 200 UNIT/ML Subcutaneous Solution Pen-injector; TAKE 20 UNITS      BEFORE BREAKFAST, USE ICR 1:5 FOR LUNCH, 22 UNITS BEFORE DINNER      (ISF 1:20 ICR 1:5); Therapy: 87RIF8438 to ([phone])  Requested for: 04WNZ4873; Last      Rx:89Dsr1727 Ordered   11  Hydroxychloroquine Sulfate 200 MG Oral Tablet; TAKE 1 TABLET TWICE DAILY; Therapy: (Recorded:99Hcr3897) to Recorded   12  Jardiance 25 MG Oral Tablet; take 1 tablet every day; Therapy: 98DON1666 to (Evaluate:11Mar2018)  Requested for: 70Bdx8556; Last      Rx:08Vwq8921 Ordered   13  Lansoprazole 30 MG Oral Capsule Delayed Release; TAKE 1 CAPSULE EVERY      MORNING DAILY; Therapy: 98ZNT1741 to (Evaluate:26Oct2018)  Requested for: 31Oct2017; Last      Rx:31Oct2017 Ordered   14  Latuda 80 MG Oral Tablet; TAKE 1 TABLET IN THE EVENING WITH DINNER; Therapy: 57OSA3059 to ([facility])  Requested for: 86IFV3594; Last      Rx:05Jan2018 Ordered   15  Lisinopril 20 MG Oral Tablet; TAKE 1 TABLET DAILY AS DIRECTED; Therapy: 97JLC6275 to (Evaluate:14Mar2018)  Requested for: 86UWW7953; Last      Rx:18Rtq4192 Ordered   16  MetFORMIN HCl  MG Oral Tablet Extended Release 24 Hour; take 2 tabs twice      per day with food; Therapy: 42PMZ1450 to ([phone])  Requested for: 28ZBK2102; Last      Rx:65Yes3636 Ordered   17  Mupirocin 2 % External Ointment; Apply twice daily; Therapy: (Recorded:00Aie9026) to Recorded   18  Placido [place]ez FlexTouch 100 UNIT/ML Subcutaneous Solution Pen-injector; INJECT 42 UNITS      ONCE A DAY; Therapy: 76Rln5102 to (Evaluate:18Mar2018)  Requested for: 54VKT2636; Last      Rx:88Jix1920 Ordered   19  Vitamin D (Ergocalciferol) 34434 UNIT Oral Capsule; TAKE 1 CAPSULE WEEKLY;       Therapy: 35QKG9392 to (Last Cedric Fleischer)  Requested for: 20Jun2017 Ordered  Medication List Reviewed: The medication list was reviewed and updated today  Allergies   1  Biaxin TABS   2  LaMICtal TABS   3  Tanzeum PEN   4  TEGretol TABS   5  Depakote TBEC  6  Seasonal   7  Bee sting   8  Shellfish    Vitals   Vital Signs    Recorded: 48KXC8655 08:37AM   Heart Rate 72   Systolic 800   Diastolic 72   Height 5 ft 4 in   Weight 226 lb    BMI Calculated 38 79   BSA Calculated 2 06     Physical Exam        Constitutional      General appearance: Abnormal   well developed,-- well nourished-- and-- overweight  Eyes      Conjunctiva and lids: No swelling, erythema, or discharge  Pupils: Equal, round and reactive to light  The sclera are anicteric  Extraocular movements are intact  Ears, Nose, Mouth, and Throat      Oropharynx: Normal with no erythema, edema, exudate or lesions  Exam of Head: The head is atraumatic and normocephalic  Neck: The neck is supple  The thyroid is normal in size with no palpable nodules  Pulmonary      Auscultation of lungs: Clear to auscultation bilaterally with normal chest expansion  no rales or crackles were heard bilaterally  no rhonchi  Cardiovascular      Auscultation of heart: Normal rate and rhythm with no murmurs, gallops or rubs  The heart rate was normal  The rhythm was regular  Heart sounds: normal S1-- and-- normal S2  Examination of extremities for edema and/or varicosities: Normal        Examination of pulses: Dorsalis pedal pulses are +2 and equal bilaterally  Abdomen      Abdomen: Abdomen is soft, non-tender with normal bowel sounds  Neurologic      Reflexes: 2+ and symmetric  Deep tendon reflexes: 2+ right biceps,-- 2+ left biceps,-- 2+ right patella-- and-- 2+ left patella  Sensation: No sensory loss  Motor Strength: Strength is 5/5 bilaterally         Psychiatric      Orientation to person, place and time: Normal        Mood and affect: Affect and attention span are normal        Results/Data (1) HEMOGLOBIN A1C 26JXA9821 07:24AM [facility] Order Number: [id number]      Test Name Result Flag Reference   HEMOGLOBIN A1C 7 4 % H 4 2-6 3   EST  AVG  GLUCOSE 166 mg/dl        (1) RENAL FUNCTION PANEL 70FJI8869 07:24AM [facility] Order Number: [id number]      Test Name Result Flag Reference   ANION GAP (CALC) 10 mmol/L  4-13   ALBUMIN 4 0 g/dL  3 5-5 0   BLOOD UREA NITROGEN 14 mg/dL  5-25   CALCIUM 9 5 mg/dL  8 3-10 1   CHLORIDE 102 mmol/L  100-108   CARBON DIOXIDE 27 mmol/L  21-32   CREATININE 0 71 mg/dL  0 60-1 30   Standardized to IDMS reference method   POTASSIUM 3 9 mmol/L  3 5-5 3   SODIUM 139 mmol/L  136-145   PHOSPHORUS 3 6 mg/dL  2 7-4 5   eGFR 96 ml/min/1 73sq m     GLUCOSE FASTING 93 mg/dL  65-99   Specimen collection should occur prior to Sulfasalazine administration due to the potential for falsely depressed results  Specimen collection should occur prior to Sulfapyridine administration due to the potential for falsely elevated results  Specimen collection should occur prior to Sulfasalazine administration due to the potential for falsely depressed results  Specimen collection should occur prior to Sulfapyridine administration due to the potential for falsely elevated results  National Kidney Disease Education Program recommendations are as follows:     GFR calculation is accurate only with a steady state creatinine     Chronic Kidney disease less than 60 ml/min/1 73 sq  meters     Kidney failure less than 15 ml/min/1 73 sq  meters        (1) COMPREHENSIVE METABOLIC PANEL [id number] 68:43LY EPIC, Provider   Test ordered by: Yolis Farrell      Test Name Result Flag Reference   SODIUM 138 mmol/L  136-145   POTASSIUM 4 2 mmol/L  3 5-5 3   CHLORIDE 104 mmol/L  100-108   CARBON DIOXIDE 25 mmol/L  21-32   ANION GAP (CALC) 9 mmol/L  4-13   BLOOD UREA NITROGEN 24 mg/dL  5-25   CREATININE 0 73 mg/dL  0 60-1 30   Standardized to IDMS reference method   CALCIUM 9 7 mg/dL  8 3-10 1   BILI, TOTAL 0 25 mg/dL  0 20-1 00   ALK PHOSPHATAS 128 U/L H    ALT (SGPT) 32 U/L  12-78   Specimen collection should occur prior to Sulfasalazine and/or Sulfapyridine administration due to the potential for falsely depressed results  AST(SGOT) 18 U/L  5-45   Specimen collection should occur prior to Sulfasalazine administration due to the potential for falsely depressed results  ALBUMIN 3 9 g/dL  3 5-5 0   TOTAL PROTEIN 8 3 g/dL H 6 4-8 2   eGFR 92 ml/min/1 73sq m     This is a patient instruction: Patient fasting for 8 hours or longer recommended  National Kidney Disease Education Program recommendations are as follows:     GFR calculation is accurate only with a steady state creatinine     Chronic Kidney disease less than 60 ml/min/1 73 sq  meters     Kidney failure less than 15 ml/min/1 73 sq  meters  GLUCOSE FASTING 137 mg/dL H 65-99   Specimen collection should occur prior to Sulfasalazine administration due to the potential for falsely depressed results  Specimen collection should occur prior to Sulfapyridine administration due to the potential for falsely elevated results  (1) HEMOGLOBIN A1C [id number] 07:25AM Christine Morel    Order Number: [id number]      Test Name Result Flag Reference   HEMOGLOBIN A1C 7 3 % H 4 2-6 3   EST  AVG  GLUCOSE 163 mg/dl        Health Management   Diabetes mellitus type 2, uncontrolled   (1) MICROALBUMIN CREATININE RATIO, RANDOM URINE; every 1 year; Last [id number]; Next Due:    [id number]; Active  *VB - Eye Exam; every 1 year; Last [id number]; Next Due: [id number]; Overdue  History of Visit for screening mammogram   Digital Bilateral Screening Mammogram With CAD; every 1 year; Last [id number]; Next Due:    [id number]; Overdue    Future Appointments      Date/Time Provider Specialty Site   03/30/2018 02:00 PM MOLLY Weiss   Psychiatry [facility]   07/17/2018 08:00 AM MOLLY Suarez , DO, [facility] [address] [place] [address] Rd     Signatures    Electronically signed by : MOLLY Luz ; Jan 19 2018  9:37AM EST                       (Author)

## 2018-01-22 ENCOUNTER — HOSPITAL ENCOUNTER (OUTPATIENT)
Dept: INFUSION CENTER | Facility: HOSPITAL | Age: 58
Discharge: HOME | End: 2018-01-22
Payer: COMMERCIAL

## 2018-01-22 VITALS
WEIGHT: 226 LBS | SYSTOLIC BLOOD PRESSURE: 136 MMHG | HEART RATE: 71 BPM | DIASTOLIC BLOOD PRESSURE: 71 MMHG | HEIGHT: 64 IN | BODY MASS INDEX: 38.58 KG/M2

## 2018-01-22 VITALS
HEART RATE: 74 BPM | DIASTOLIC BLOOD PRESSURE: 78 MMHG | WEIGHT: 227.01 LBS | OXYGEN SATURATION: 99 % | HEIGHT: 64 IN | RESPIRATION RATE: 16 BRPM | SYSTOLIC BLOOD PRESSURE: 148 MMHG | BODY MASS INDEX: 38.76 KG/M2

## 2018-01-22 VITALS
TEMPERATURE: 98.4 F | SYSTOLIC BLOOD PRESSURE: 162 MMHG | HEART RATE: 83 BPM | RESPIRATION RATE: 18 BRPM | DIASTOLIC BLOOD PRESSURE: 70 MMHG

## 2018-01-22 VITALS
HEIGHT: 64 IN | RESPIRATION RATE: 16 BRPM | TEMPERATURE: 98.5 F | WEIGHT: 230.38 LBS | DIASTOLIC BLOOD PRESSURE: 60 MMHG | HEART RATE: 77 BPM | SYSTOLIC BLOOD PRESSURE: 160 MMHG | OXYGEN SATURATION: 95 % | BODY MASS INDEX: 39.33 KG/M2

## 2018-01-22 VITALS
HEIGHT: 64 IN | BODY MASS INDEX: 35.68 KG/M2 | SYSTOLIC BLOOD PRESSURE: 139 MMHG | DIASTOLIC BLOOD PRESSURE: 74 MMHG | WEIGHT: 209 LBS | HEART RATE: 71 BPM

## 2018-01-22 VITALS
SYSTOLIC BLOOD PRESSURE: 138 MMHG | WEIGHT: 210.38 LBS | HEART RATE: 72 BPM | BODY MASS INDEX: 35.92 KG/M2 | DIASTOLIC BLOOD PRESSURE: 70 MMHG | HEIGHT: 64 IN

## 2018-01-22 VITALS
SYSTOLIC BLOOD PRESSURE: 120 MMHG | WEIGHT: 225 LBS | DIASTOLIC BLOOD PRESSURE: 61 MMHG | HEART RATE: 67 BPM | BODY MASS INDEX: 38.41 KG/M2 | HEIGHT: 64 IN

## 2018-01-22 PROCEDURE — 96365 THER/PROPH/DIAG IV INF INIT: CPT

## 2018-01-22 RX ADMIN — IRON SUCROSE 300 MG: 20 INJECTION, SOLUTION INTRAVENOUS at 13:22

## 2018-01-22 RX ADMIN — SODIUM CHLORIDE 20 ML/HR: 0.9 INJECTION, SOLUTION INTRAVENOUS at 13:10

## 2018-01-22 NOTE — PROGRESS NOTES
Pt tolerated venofer infusion well without complications  Pt discharged in stable condition and aware of next appointment  AVS provided

## 2018-01-22 NOTE — PLAN OF CARE
Problem: Potential for Falls  Goal: Patient will remain free of falls  INTERVENTIONS:  - Assess patient frequently for physical needs  -  Identify cognitive and physical deficits and behaviors that affect risk of falls    -  [place] fall precautions as indicated by assessment   - Educate patient/family on patient safety including physical limitations  - Instruct patient to call for assistance with activity based on assessment  - Modify environment to reduce risk of injury  - Consider OT/PT consult to assist with strengthening/mobility   Outcome: Progressing

## 2018-01-23 VITALS
HEIGHT: 64 IN | SYSTOLIC BLOOD PRESSURE: 150 MMHG | BODY MASS INDEX: 38.58 KG/M2 | WEIGHT: 226 LBS | DIASTOLIC BLOOD PRESSURE: 72 MMHG | HEART RATE: 72 BPM

## 2018-01-23 VITALS
HEART RATE: 73 BPM | WEIGHT: 227 LBS | BODY MASS INDEX: 38.76 KG/M2 | DIASTOLIC BLOOD PRESSURE: 76 MMHG | SYSTOLIC BLOOD PRESSURE: 137 MMHG | HEIGHT: 64 IN

## 2018-01-23 VITALS
HEIGHT: 64 IN | SYSTOLIC BLOOD PRESSURE: 140 MMHG | HEART RATE: 80 BPM | WEIGHT: 227 LBS | BODY MASS INDEX: 38.76 KG/M2 | DIASTOLIC BLOOD PRESSURE: 52 MMHG

## 2018-01-23 NOTE — RESULT NOTES
Discussion/Summary   Patient has follow-up appointment, will review at that time     Verified Results  (1) HEMOGLOBIN A1C [id number] 07:24AM [employer] Order Number: [id number]     Test Name Result Flag Reference   HEMOGLOBIN A1C 7 4 % H 4 2-6 3   EST  AVG  GLUCOSE 166 mg/dl       (1) RENAL FUNCTION PANEL [id number] 07:24AM [employer] Order Number: [id number]     Test Name Result Flag Reference   ANION GAP (CALC) 10 mmol/L  4-13   ALBUMIN 4 0 g/dL  3 5-5 0   BLOOD UREA NITROGEN 14 mg/dL  5-25   CALCIUM 9 5 mg/dL  8 3-10 1   CHLORIDE 102 mmol/L  100-108   CARBON DIOXIDE 27 mmol/L  21-32   CREATININE 0 71 mg/dL  0 60-1 30   Standardized to IDMS reference method   POTASSIUM 3 9 mmol/L  3 5-5 3   SODIUM 139 mmol/L  136-145   PHOSPHORUS 3 6 mg/dL  2 7-4 5   eGFR 96 ml/min/1 73sq m     GLUCOSE FASTING 93 mg/dL  65-99   Specimen collection should occur prior to Sulfasalazine administration due to the potential for falsely depressed results  Specimen collection should occur prior to Sulfapyridine administration due to the potential for falsely elevated results  Specimen collection should occur prior to Sulfasalazine administration due to the potential for falsely depressed results  Specimen collection should occur prior to Sulfapyridine administration due to the potential for falsely elevated results  National Kidney Disease Education Program recommendations are as follows:  GFR calculation is accurate only with a steady state creatinine  Chronic Kidney disease less than 60 ml/min/1 73 sq  meters  Kidney failure less than 15 ml/min/1 73 sq  meters

## 2018-01-23 NOTE — PROGRESS NOTES
Assessment    1  Type 2 diabetes mellitus with hyperglycemia (250 00) (E11 65)   2  Diabetes mellitus type 2, uncontrolled (250 02) (E11 65)   3  Bipolar I disorder, most recent episode mixed, in full remission (296 66) (F31 78)   4  Dyslipidemia (272 4) (E78 5)   5  Essential hypertriglyceridemia (272 1) (E78 1)   6  Hypoglycemia (251 2) (E16 2)   7  Benign essential hypertension (401 1) (I10)   8  Obesity (278 00) (E66 9)   9  Vitamin D deficiency (268 9) (E55 9)   10  Long-term insulin use (V58 67) (Z79 4)      #1 Obese uncontrolled type 2 diabetes mellitus: A1c was 9 3%  -Blood sugar logs reviewed, no hypoglycemia, 260-340,   -*Hyperglycemia due to latuda and carbohydrates  -Add Humalog 15 units at breakfast and dinner along with correction scale  -Increase Tresiba 30 units once a day  -Continue metformin and Invokana  -Cannot use GLP-1  and DPPIV inhibitor because of prev pancreatitis with tanzeum  -Review blood sugar levels every week  -Please make sure you have heard back from us after sending blood sugar logs  -Please learn flexible insulin therapy with diabetes educator  -Hypoglycemia protocol reviewed  -Up-to-date on eye and Foot exams    #2 hypertension, hyperlipidemia/hypertriglyceridemia:  -Continue atorvastatin and fenofibrate  -Blood sugar control should improve triglyceride levels  -Monitor lipid panel every 6-12 weeks  -Please decrease carbohydrate content in the diet    #3 vitamin D deficiency:  -She needs higher dose because of obesity  -Use 28030 units twice a week for 6 weeks and then recheck levels    #4 morbid obesity:  -Work on lifestyle intervention and decrease carbohydrates in the diet    #5 bipolar disorder / depression:  -Managed by psychiatry  -*Latuda can increase both triglycerides as well as blood sugar levels     Plan  Acute sinusitis    · Sulfamethoxazole-Trimethoprim 800-160 MG Oral Tablet   Rx By: Kiesha Moon; Dispense: 7 Days ; #:14 Tablet;  Refill: 0; For: Acute sinusitis; DAVID = N; Print Rx; Last Updated By: González Sotelo; 11/8/2016 8:34:28 AM  Benign essential hypertension, Bipolar I disorder, most recent episode mixed, in full  remission, Diabetes mellitus type 2, uncontrolled, Dyslipidemia, Essential  hypertriglyceridemia, Multiple thyroid nodules, Type 2 diabetes mellitus, Type 2  diabetes mellitus with hyperglycemia, Vitamin D deficiency    · HumaLOG KwikPen 200 UNIT/ML Subcutaneous Solution Pen-injector; Take 20  units before breakfast, 20 units before dinner   Rx By: Effie Egan; Dispense: 90 Days ; #:4 X 3 ML Pen (2 Pens); Refill: 1; For: Benign essential hypertension, Bipolar I disorder, most recent episode mixed, in full remission, Diabetes mellitus type 2, uncontrolled, Dyslipidemia, Essential hypertriglyceridemia, Multiple thyroid nodules, Type 2 diabetes mellitus, Type 2 diabetes mellitus with hyperglycemia, Vitamin D deficiency; DAVID = N; Verified Transmission to [facility]/PHARMACY #[id number] Last Updated By: System, [employer]; 11/8/2016 9:05:47 AM   · (1) RENAL FUNCTION PANEL; Status:Active; Requested for:61Cye9781;    Perform:[facility]; [facility]; For:Benign essential hypertension, Bipolar I disorder, most recent episode mixed, in full remission, Diabetes mellitus type 2, uncontrolled, Dyslipidemia, Essential hypertriglyceridemia, Multiple thyroid nodules, Type 2 diabetes mellitus, Type 2 diabetes mellitus with hyperglycemia, Vitamin D deficiency; Ordered By:Gumaro Hobbs;   · Follow-up visit in 6 weeks Evaluation and Treatment  Follow-up  Status: Hold For -  Scheduling  Requested for: 65EKH7018   Ordered; For: Benign essential hypertension, Bipolar I disorder, most recent episode mixed, in full remission, Diabetes mellitus type 2, uncontrolled, Dyslipidemia, Essential hypertriglyceridemia, Multiple thyroid nodules, Type 2 diabetes mellitus, Type 2 diabetes mellitus with hyperglycemia, Vitamin D deficiency;  Ordered By: Effie Egan Performed: Due: 32VKR8845   · Follow-Up With Advanced Practitioner Evaluation and Treatment  Follow-up  Status: Hold  For - Scheduling  Requested for: 48LMV2790   Ordered; For: Benign essential hypertension, Bipolar I disorder, most recent episode mixed, in full remission, Diabetes mellitus type 2, uncontrolled, Dyslipidemia, Essential hypertriglyceridemia, Multiple thyroid nodules, Type 2 diabetes mellitus, Type 2 diabetes mellitus with hyperglycemia, Vitamin D deficiency; Ordered By: Gin Wang Performed:  Due: 07GMU9831   · *1 - [facility] SELF MANAGEMENT TRAINING OUTPATIENT Physician Referral   Consult  Status: Hold For - Scheduling  Requested for: 95OYF3175   Ordered; For: Benign essential hypertension, Bipolar I disorder, most recent episode mixed, in full remission, Diabetes mellitus type 2, uncontrolled, Dyslipidemia, Essential hypertriglyceridemia, Multiple thyroid nodules, Type 2 diabetes mellitus, Type 2 diabetes mellitus with hyperglycemia, Vitamin D deficiency; Ordered By: Gin Wang Performed:  Due: 23BMU6312  Insulin Therapy : Yes  requires instruction : Yes  Needs requiring Individual DSMT? : No  Self-Management Education/Trainng : Individual Education  Care Summary provided  : Yes  Diabetes mellitus type 2, uncontrolled    · Invokana 300 MG Oral Tablet; Take 1 tablet daily   Rx By: Gin Wang; Dispense: 90 Days ; #:90 Tablet; Refill: 1; For: Diabetes mellitus type 2, uncontrolled; DAVID = N; Verified Transmission to [facility]/PHARMACY #[id number] Last Updated By: System, [employer]; 11/8/2016 9:05:33 AM   · Cassy Oldzach FlexTouch 100 UNIT/ML Subcutaneous Solution Pen-injector; Inject  30  units once a day   Rx By: Gin Wang; Dispense: 90 Days ; #:2 X 3 ML Pen (5 Pens);  Refill: 1; For: Diabetes mellitus type 2, uncontrolled; DAVID = N; Verified Transmission to [facility]/PHARMACY #[id number] Last Updated By: System, [employer]; 11/8/2016 9:04:48 AM  Diabetes mellitus type 2, uncontrolled, Type 2 diabetes mellitus, Type 2 diabetes  mellitus with hyperglycemia    · Fingerstick - POC; Status:Complete - Retrospective By Protocol Authorization;   Done:  25SRF0879 08:42AM   Performed: In Office; DBV:[id number]; Last Updated Jose Roberto Juanjose; 11/8/2016 8:43:07 AM;Ordered; For:Diabetes mellitus type 2, uncontrolled, Type 2 diabetes mellitus, Type 2 diabetes mellitus with hyperglycemia; Ordered By:Gumaro Hobbs; Dyslipidemia    · Atorvastatin Calcium 20 MG Oral Tablet; TAKE 1 TABLET DAILY AS DIRECTED   Rx By: Brissa Lanza; Dispense: 90 Days ; #:90 Tablet; Refill: 1; For: Dyslipidemia; DAVID = N; Verified Transmission to [facility]/PHARMACY #[id number] Last Updated By: [employer]; 11/8/2016 9:04:55 AM  Essential hypertriglyceridemia    · (1) LIPID PANEL, FASTING; Status:Active; Requested for:17Spp4117;    Perform:[facility]; Due:05Ljf9190;Ordered;  For:Essential hypertriglyceridemia; Ordered By:Gumaro Hobbs; Discussion/Summary  Discussion Summary:   1  Please check her blood sugars 4 times a day and send us logs every week  2  please carry glucose tablets with you all the time  3  Please call us probably the blood sugar is less than 70 or more than 300 more than twice  4  Please followed regularly with diabetes educator, podiatrist and ophthalmologist  5  Please perform lab work as advised and make sure we have discussed it in the office visit or on the phone within 7-14 days  6  If you are on insulin therapy, please make sure you rotate insulin injection sites to avoid any hard areas  Counseling Documentation With Imm: The patient was counseled regarding diagnostic results, instructions for management, risk factor reductions, impressions, risks and benefits of treatment options, importance of compliance with treatment  Medication SE Review and Pt Understands Tx: Possible side effects of new medications were reviewed with the patient/guardian today  The treatment plan was reviewed with the patient/guardian   The patient/guardian understands and agrees with the treatment plan      Chief Complaint  Chief Complaint Free Text Note Form: Follow up      History of Present Illness  HPI: We have not received any blood sugar log since September 8  Patient reports sending them to us  She has a prior history of 100 ideas with tentative  She is currently on metformin, and will And Placebo Placebo 15 Units  Her Blood Sugars Are between 2   She Works Night Shifts Also  She Loves to Eat Carbohydrates  She Has Followed with a Pulmonologist and Podiatrist  No Hypoglycemia  Diabetes: The patient is being seen for routine follow-up of and 2005 Diabetes Mellitus 2  The HbA1c was   Current treatment includes Metformin HCl  See Medication List for current medication(s)  Source of information reported and indicates that the patient checks her blood sugar two times per day  No particular diet followed  Her exercise regimen is composed of fewer than three times a week  Diabetes education not performed   By report, there is poor compliance with treatment, fair tolerance of treatment, fair symptom control and History of pancreatitis with tanzeum  Current pertinent lifestyle factors include obesity, disordered eating and inactivity, but no frequent corticosteroid use  The patient is currently asymptomatic   Disease Course and Complications:  there have been no previous episodes of diabetic ketoacidosis  there have been no previous hospitalizations  The last dilated fundus exam showed no evidence of retinopathy  Cardiovascular: no coronary artery disease and no prior myocardial infarction  Renal: no nephropathy and no proteinuria  Neurologic: peripheral neuropathy  Other complications: no stroke history  (none)   Hyperlipidemia (Follow-Up): The patient states her hyperlipidemia has been poorly controlled since the last visit  Comorbid Illnesses: diabetes mellitus and hypertension  She has no significant interval events  Symptoms:  The patient is currently asymptomatic  Medications: the patient is adherent with her medication regimen  She denies medication side effects  The patient is not doing well with her hyperlipidemia goals  Hypertension (Follow-Up): The patient presents for follow-up of essential hypertension  The patient states she has been doing well with her blood pressure control since the last visit  She has no significant interval events  Symptoms: The patient is currently asymptomatic  Disease Management: the patient is doing well with her blood pressure goals  Review of Systems  ROS Reviewed:   ROS reviewed  Endo Adult ROS Female Established v2 Update - [facility]:   Constitutional/General: no recent weight gain, no recent weight loss, no poor energy/fatigue, no increased energy level, no insomnia/sleep problems, no fever and no feeling weak  Breasts: no nipple discharge  Heart: high blood pressure, but no chest pain/tightness, no rapid/racing heart rate and no palpitations  Genitourinary - Urinary: no frequent urination, no excess urination and no urinating during the night  Eyes: gritty/scratchy eyes, but no blurred vision, no double vision, no bulging eyes and no excessive tearing  Mouth / Throat: no hoarseness and no difficulty swallowing  Neck: no lumps, no swollen glands, neck pain, neck stiffness and no enlarged thyroid  Respiratory: no wheezing, no asthma and persistent cough  Musculoskeletal: no muscle aches/pain, joint aches/pain and no muscle weakness  Skin & Hair: dry skin, no acne, the hair texture was not oily, no hair loss and no excessive hair growth  Gastrointestinal: no constipation, diarrhea, wakes at night to drink and no stomach ache  Neurological: no blackouts, no weakness and no tremors  Reproductive:  frequency of period is not applicable  duration of period is not applicable  Date of last menstruation is not applicable  regular periods are not applicable   discomfort with periods is not applicable  excessive bleeding during period is not applicable  mood swings are not applicable  Endocrine: feeling hot frequently, no feeling cold frequently, no shifts between feeling hot and cold, cold hands or feet, excessive sweating, no thyroid problems, blood sugar problems, excessive thirst, excessive hunger, no change in shoe size, no nausea or vomiting and no shaky hands  Active Problems    1  Abdominal pain, epigastric (789 06) (R10 13)   2  Abnormal blood chemistry (790 6) (R79 9)   3  Abnormal CAT scan (793 99) (R93 8)   4  Abnormal electrocardiogram (794 31) (R94 31)   5  Acute sinusitis (461 9) (J01 90)   6  Allergic rhinitis (477 9) (J30 9)   7  Anemia (285 9) (D64 9)   8  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)   9  Asthma (493 90) (J45 909)   10  Benign essential hypertension (401 1) (I10)   11  Family history of Benign Polyps Of The Large Intestine (V18 51)   12  Bilateral knee pain (719 46) (M25 561,M25 562)   13  Bipolar I disorder, most recent episode mixed, in full remission (296 66) (F31 78)   14  Blood in stool (578 1) (K92 1)   15  Cervical disc disease (722 91) (M50 90)   16  Cramp of both lower extremities (729 82) (R25 2)   17  Degeneration of thoracic intervertebral disc (722 51) (M51 34)   18  Degenerative arthritis of knee, bilateral (715 96) (M17 0)   19  Diabetes mellitus type 2, uncontrolled (250 02) (E11 65)   20  Diarrhea (787 91) (R19 7)   21  Dizziness (780 4) (R42)   22  Dyslipidemia (272 4) (E78 5)   23  Edema (782 3) (R60 9)   24  Encounter for routine gynecological examination (V72 31) (Z01 419)   25  Encounter for screening mammogram for malignant neoplasm of breast (V76 12)    (Z12 31)   26  Esophagitis, reflux (530 11) (K21 0)   27  Esophagitis, reflux (530 11) (K21 0)   28  Essential hypertriglyceridemia (272 1) (E78 1)   29  Excessive sweating (780 8) (R61)   30  Fatigue (780 79) (R53 83)   31  Fatty liver (571 8) (K76 0)   32   Fecal occult blood test positive (792 1) (R19 5)   33  Gait disturbance (781 2) (R26 9)   34  Gastroesophageal reflux disease (530 81) (K21 9)   35  History of allergy (V15 09) (Z88 9)   36  Hypoglycemia (251 2) (E16 2)   37  Iron deficiency anemia, unspecified iron deficiency   38  Joint pain, knee (719 46) (M25 569)   39  Leukocytosis (288 60) (D72 829)   40  Long term current use of therapeutic drug (V58 69) (Z79 899)   41  Lost voice (784 41) (R49 1)   42  Multiple thyroid nodules (241 1) (E04 2)   43  Muscle Cramps In The Thigh (Upper Leg) (729 82)   44  Neck pain (723 1) (M54 2)   45  History of Need for pneumococcal vaccination (V03 82) (Z23)   46  Need for prophylactic vaccination and inoculation against influenza (V04 81) (Z23)   47  Need for Tdap vaccination (V06 1) (Z23)   48  Neuropathy (355 9) (G62 9)   49  Obesity (278 00) (E66 9)   50  Obstructive sleep apnea (327 23) (G47 33)   51  Otitis media (382 9) (H66 90)   52  Pain, dental (525 9) (K08 89)   53  Pap smear, as part of routine gynecological examination (V76 2) (Z01 419)   54  Paronychia of toe (681 11) (L03 039)   55  Patellofemoral Dysfunction (736 6)   56  Personality disorder (301 9) (F60 9)   57  Postmenopausal Atrophic Vaginitis (627 3)   58  Prepatellar bursitis (726 65) (M70 40)   59  Right wrist pain (719 43) (M25 531)   60  Routine Gynecological Exam With Cervical Pap Smear (V72 31)   61  Sore throat (462) (J02 9)   62  Symptomatic menopausal or female climacteric states (627 2) (N95 1)   63  Thoracic spine pain (724 1) (M54 6)   64  Tinea corporis (110 5) (B35 4)   65  Type 2 diabetes mellitus (250 00) (E11 9)   66  Type 2 diabetes mellitus with hyperglycemia (250 00) (E11 65)   67  Vertigo (780 4) (R42)   68  Viral gastroenteritis (008 8) (A08 4)   69  Visit for screening mammogram (V76 12) (Z12 31)   70  Vitamin D deficiency (268 9) (E55 9)   71  Dia-Parkinson-White Syndrome (426 7)    Past Medical History    1   History of Acute frontal sinusitis (461 1) (J01 10)   2  History of Cellulitis (682 9) (L03 90)   3  History of Contusion Of The Toe(S) With Intact Skin Surface (924 3)   4  History of Diabetes Mellitus (250 00)   5  History of Difficulty breathing (786 09) (R06 89)   6  History of High cholesterol (272 0) (E78 00)   7  History of abdominal pain (V13 89) (Z87 898)   8  History of abnormal weight loss (V13 89) (Z87 898)   9  History of acute bronchitis (V12 69) (Z87 09)   10  History of acute otitis media (V12 49) (Z86 69)   11  History of acute pharyngitis (V12 69) (Z87 09)   12  History of acute sinusitis (V12 69) (Z87 09)   13  History of candidiasis of mouth (V12 09) (Z86 19)   14  History of head injury (V15 59) (Z87 828)   15  History of headache (V13 89) (Z87 898)   16  History of laryngitis (V12 69) (Z87 09)   17  History of pharyngitis (V12 69) (Z87 09)   18  History of sebaceous cyst (V13 3) (Z87 2)   19  History of urinary frequency (V13 09) (Z87 898)   20  History of Hypoglycemia (251 2) (E16 2)   21  History of Mouth Sores (528 9)   22  History of Need for pneumococcal vaccination (V03 82) (Z23)   23  History of Otitis media, unspecified laterality   24  Denied: History of Seizures   25  History of Suicide Attempt (E958 9)   26  History of Tinea corporis (110 5) (B35 4)   27  History of Type B influenza (487 1) (J10 1)  Active Problems And Past Medical History Reviewed: The active problems and past medical history were reviewed and updated today  Surgical History    1  History of Oral Surgery Tooth Extraction   2  History of Tonsillectomy With Adenoidectomy   3  History of Vaginal Pap smear (V76 47) (Z12 72)  Surgical History Reviewed: The surgical history was reviewed and updated today  Family History  Mother    1  Family history of CABG   2  Family history of Diabetes Mellitus (V18 0)   3  Family history of Lung Cancer (V16 1)   4  Family history of Stroke Syndrome (V17 1)   5  Family history of Thyroid Disorder (V18 19)   6  Family history of Tobacco Use  Father    7  Family history of Benign Polyps Of The Large Intestine (V18 51)   8  Family history of Bipolar illness   9  Family history of Heart Disease (V17 49)  Brother    8  Family history of depression (V17 0) (Z81 8)  Maternal Grandmother    11  Family history of Diabetes Mellitus (V18 0)   12  Family history of Thyroid Disorder (V18 19)  Family History    13  Denied: Family history of Colon Cancer   14  Denied: Family history of Crohn's Disease   15  Family history of Hypertension (V17 49)   16  Denied: Family history of Liver Cancer   17  Family history of Mother  At Age 52  Family History Reviewed: The family history was reviewed and updated today  Social History    · Caffeine Use   · Denied: History of Drug Use   · Denied: History of Home Environment Domestic Violence   · Job Physical Requirements Heavy Lifting   · Non-smoker (V49 89) (Z78 9)   · Social drinker  Social History Reviewed: The social history was reviewed and updated today  Current Meds   1  Atorvastatin Calcium 20 MG Oral Tablet; TAKE 1 TABLET DAILY AS DIRECTED; Therapy: 10ZNS9980 to (Evaluate:2017)  Requested for: 59AVW4292; Last   Rx:32Stq9508 Ordered   2  Azelastine HCl - 0 15 % Nasal Solution; USE 2 SPRAYS AT BEDTIME; Therapy: (Recorded:97Qmu4392) to Recorded   3  BD Pen Needle Susie U/F 32G X 4 MM Miscellaneous; inject once daily; Therapy: 24UXA0067 to (Evaluate:05Jic8508)  Requested for: 37Biw8464; Last   Rx:76Pkn2138 Ordered   4  Cholestyramine 4 GM Oral Packet; TAKE 1 PACKET 3 times daily PRN; Therapy: 82Dmx1290 to (Evaluate:66Tao7580)  Requested for: 44Trd8954; Last   Rx:51Ntt7557 Ordered   5  Fenofibrate 145 MG Oral Tablet; Take 1 tablet daily; Therapy: 53ADO1288 to (Evaluate:95Msx1478)  Requested for: 62BYJ8189; Last   Rx:47Sdh7790 Ordered   6  FLUoxetine HCl - 40 MG Oral Capsule; TAKE 2 CAPSULES DAILY;    Therapy: 22TKP0134 to (Evaluate:23Uad2746)  Requested for: 58IQF9746; Last   Rx:14Rhc1513 Ordered   7  Fluticasone Propionate 50 MCG/ACT Nasal Suspension; USE 1 SPRAY IN EACH   NOSTRIL TWICE DAILY; Therapy: 21NGO6197 to (Last Evaristo Spittle)  Requested for: 71YBC8511 Ordered   8  Invokana 300 MG Oral Tablet; Take 1 tablet daily; Therapy: 57TON0471 to (Pasty Opitz)  Requested for: 64Hai6873; Last   Rx:63Iwt8238 Ordered   9  Lansoprazole 30 MG Oral Capsule Delayed Release; TAKE 1 CAPSULE EVERY   MORNING DAILY; Therapy: 88FHD7503 to (Pasty Opitz)  Requested for: 14Vco0141; Last   Rx:11Nov2015 Ordered   10  Latuda 40 MG Oral Tablet; Take 1 tablet in the evening with food; Therapy: 96PBN7693 to (Evaluate:14Jan2017)  Requested for: 93URE4651; Last    Rx:80Skj9334 Ordered   11  Lisinopril 20 MG Oral Tablet; TAKE 1 TABLET DAILY AS DIRECTED; Therapy: 55VAS0602 to (Evaluate:48Jmk5097)  Requested for: 14QEB5741; Last    Rx:18Oct2016 Ordered   12  MetFORMIN HCl  MG Oral Tablet Extended Release 24 Hour; 2 tabs twice per day    with food; Therapy: 76PHP2301 to (Evaluate:13Kuw7373)  Requested for: 37JGC7250; Last    Rx:13Jun2016 Ordered   13  Mupirocin 2 % External Ointment; Apply twice daily; Therapy: (Recorded:35Ykv9806) to Recorded   14  Natural Vitamin E 400 UNIT Oral Tablet; TAKE 1 TABLET DAILY; Therapy: (Recorded:08Nov2016) to Recorded   15  ProAir  (90 Base) MCG/ACT Inhalation Aerosol Solution; INHALE 1 TO 2 PUFFS    EVERY 4 TO 6 HOURS AS NEEDED; Therapy: 34LEF5120 to Recorded   16  Sulfamethoxazole-Trimethoprim 800-160 MG Oral Tablet; TAKE 1 TABLET TWICE DAILY    UNTIL FINISHED; Therapy: 81CDP3038 to (Evaluate:05Oct2016); Last Rx:46Wya0961 Ordered   17  Symbicort 80-4 5 MCG/ACT Inhalation Aerosol; use 1 puff twice daily and rinse mouth    after each use; Therapy: 27WRM4453 to (Last Rx:18May2015) Ordered   18  [place] FlexTouch 100 UNIT/ML Subcutaneous Solution Pen-injector; inject 15 units at    bedtime;     Therapy: (Recorded:95Tfk7288) to Recorded  Medication List Reviewed: The medication list was reviewed and updated today  Allergies    1  Biaxin TABS   2  LaMICtal TABS   3  TEGretol TABS   4  Tanzeum PEN    5  Seasonal   6  Bee sting   7  Shellfish    Vitals  Vital Signs    Recorded: 87OQU3737 08:40AM Recorded: 34IFZ5520 05:87ZX   Systolic 139    Diastolic 58    Heart Rate 76    Height  5 ft 4 in   Weight  213 lb 2 00 oz   BMI Calculated  36 58   BSA Calculated  2 02     Physical Exam    Constitutional   General appearance: No acute distress, well appearing and well nourished  Eyes   Conjunctiva and lids: No swelling, erythema, or discharge  Pupils: Equal, round and reactive to light  The sclera are anicteric  Extraocular movements are intact  Ears, Nose, Mouth, and Throat   External inspection of ears, nose and lips: Normal     Oropharynx: Normal with no erythema, edema, exudate or lesions  Exam of Head: The head is atraumatic and normocephalic  Neck: Abnormal   nodular  Pulmonary   Auscultation of lungs: Clear to auscultation bilaterally with normal chest expansion  Cardiovascular   Auscultation of heart: Normal rate and rhythm with no murmurs, gallops or rubs  Examination of extremities for edema and/or varicosities: Abnormal     Examination of pulses: Dorsalis pedal pulses are +2 and equal bilaterally  Abdomen   Abdomen: Abdomen is soft, non-tender with normal bowel sounds  Lymphatic   Palpation of lymph nodes: No supraclavicular or suboccipital lymphadenopathy  Musculoskeletal   Inspection/palpation of joints, bones, and muscles: Muscle bulk and tone is normal     Skin   Skin and subcutaneous tissue: Normal skin temperature and color  Neurologic   Reflexes: 2+ and symmetric  Motor Strength: Strength is 5/5 bilaterally      Psychiatric   Orientation to person, place and time: Normal     Mood and affect: Affect and attention span are normal        Results/Data  Fingerstick - POC 02MFM8592 08:42AM Knute Lanes     Test Name Result Flag Reference   Glucose Finger Stick 222       Diagnostic Studies Reviewed: I personally reviewed the films/images/results in the office today  My interpretation follows  (1) HEMOGLOBIN A1C 11Oct2016 07:58AM Dora Pinzon   TW Order Number: [id number]     Test Name Result Flag Reference   HEMOGLOBIN A1C 9 8 % H 4 2-6 3   EST  AVG  GLUCOSE 235 mg/dl       (1) LIPID PANEL, FASTING 11Oct2016 07:58AM Dora Pinzon   TW Order Number: [id number]     Test Name Result Flag Reference   CHOLESTEROL 224 mg/dL H    HDL,DIRECT 37 mg/dL L 40-60   Specimen collection should occur prior to Metamizole administration due to the potential for falsely depressed results  LDL CHOLESTEROL CALCULATED (Report)  0-100   Calculated LDL invalid, triglycerides >400 mg/dl   - Patient Instructions: This is a fasting blood test  Water,black tea or black coffee only after 9:00pm the night before test   Drink 2 glasses of water the morning of test     - Patient Instructions: This is a non fasting blood test  Please drink two glasses of water the morning of test - Patient Instructions: This is a fasting blood test  Water,black tea or black coffee only after 9:00pm the night before test Drink 2 glasses   of water the morning of test   Triglyceride:       Normal       <150 mg/dl     Borderline High  150-199 mg/dl     High        200-499 mg/dl     Very High     >499 mg/dl  Cholesterol:       Desirable    <200 mg/dl    Borderline High 200-239 mg/dl    High       >239 mg/dl  HDL Cholesterol:      High  >59 mg/dL    Low   <41 mg/dL   Calculated LDL invalid, triglycerides >400 mg/dl   - Patient Instructions: This is a fasting blood test  Water,black tea or black  coffee only after 9:00pm the night before test   Drink 2 glasses of water the morning of test     - Patient Instructions:  This is a non fasting blood test  Please drink two glasses of water the morning of test - Patient Instructions: This is a fasting blood test  Water,black tea or black  coffee only after 9:00pm the night before test Drink 2 glasses   of water the morning of test   Triglyceride:         Normal              <150 mg/dl       Borderline High    150-199 mg/dl       High               200-499 mg/dl       Very High          >499 mg/dl  Cholesterol:         Desirable        <200 mg/dl      Borderline High  200-239 mg/dl      High             >239 mg/dl  HDL Cholesterol:        High    >59 mg/dL      Low     <41 mg/dL   TRIGLYCERIDES 1110 mg/dL H <=150   Specimen collection should occur prior to N-Acetylcysteine or Metamizole administration due to the potential for falsely depressed results  (1) RENAL FUNCTION PANEL 11Oct2016 07:58AM Myra Juarez    Order Number: [id number]     Test Name Result Flag Reference   ANION GAP (CALC) 7 mmol/L  4-13   BLOOD UREA NITROGEN 19 mg/dL  5-25   CALCIUM 8 9 mg/dL  8 3-10 1   CHLORIDE 96 mmol/L L 100-108   CARBON DIOXIDE 28 mmol/L  21-32   CREATININE 0 72 mg/dL  0 60-1 30   Standardized to IDMS reference method   GLUCOSE,RANDM 168 mg/dL H    If the patient is fasting, the ADA then defines impaired fasting glucose as > 100 mg/dL and diabetes as > or equal to 123 mg/dL  POTASSIUM 3 6 mmol/L  3 5-5 3   eGFR Non-African American      >60 0 ml/min/1 73sq m   - Patient Instructions: This is a non fasting blood test  Please drink two glasses of water the morning of test - Patient Instructions: This is a fasting blood test  Water,black tea or black  coffee only after 9:00pm the night before test Drink 2 glasses   of water the morning of test   National Kidney Disease Education Program recommendations are as follows:  GFR calculation is accurate only with a steady state creatinine  Chronic Kidney disease less than 60 ml/min/1 73 sq  meters  Kidney failure less than 15 ml/min/1 73 sq  meters     SODIUM 131 mmol/L L 136-145   PHOSPHORUS 3 6 mg/dL  2 7-4 5   - Patient Instructions: This is a non fasting blood test  Please drink two glasses of water the morning of test - Patient Instructions: This is a fasting blood test  Water,black tea or black  coffee only after 9:00pm the night before test Drink 2 glasses   of water the morning of test      (1) HEPATIC FUNCTION PANEL 11Oct2016 07:58AM Jewel Freedman    Order Number: [id number]     Test Name Result Flag Reference   ALBUMIN 3 7 g/dL  3 5-5 0   - Patient Instructions: This is a non fasting blood test  Please drink two glasses of water the morning of test     - Patient Instructions: This is a non fasting blood test  Please drink two glasses of water the morning of test - Patient Instructions:  This is a fasting blood test  Water,black tea or black  coffee only after 9:00pm the night before test Drink 2 glasses   of water the morning of test    ALK PHOSPHATAS 127 U/L H    ALT (SGPT) 33 U/L  12-78   AST(SGOT) 22 U/L  5-45   BILI, DIRECT 0 11 mg/dL  0 00-0 20   BILI, TOTAL 0 47 mg/dL  0 20-1 00   TOTAL PROTEIN 7 6 g/dL  6 4-8 2     (1) CBC/ PLT (NO DIFF) 11Oct2016 07:58AM Jonathan Pineda    Order Number: [id number]     Test Name Result Flag Reference   HEMATOCRIT 38 2 %  34 8-46 1   HEMOGLOBIN 11 9 g/dL  11 5-15 4   MCHC 31 2 g/dL L 31 4-37 4   MCH 25 9 pg L 26 8-34 3   MCV 83 fL  82-98   PLATELET COUNT 248 Thousands/uL  149-390   RBC COUNT 4 60 Million/uL  3 81-5 12   RDW 15 6 % H 11 6-15 1   WBC COUNT 14 31 Thousand/uL H 4 31-10 16   MPV 10 0 fL  8 9-12 7     (1) VITAMIN D 25-HYDROXY 11Oct2016 07:58AM Jonathan Pineda     Test Name Result Flag Reference   VIT D 25-HYDROX 20 6 ng/mL L 30 0-100 0   This assay is a certified procedure of the CDC Vitamin D Standardization Certification Program (VDSCP)     Deficiency <20ng/ml   Insufficiency 20-30ng/ml   Sufficient  ng/ml     *Patients undergoing fluorescein dye angiography may retain small amounts of fluorescein in the body for 48-72 hours post procedure  Samples containing fluorescein can produce falsely elevated Vitamin D values  If the patient had this procedure, a specimen should be resubmitted post fluorescein clearance  Health Management  Diabetes mellitus type 2, uncontrolled   (1) MICROALBUMIN CREATININE RATIO, RANDOM URINE; every 1 year; Last 20Apr2016; Next Due:  20Apr2017; Active  *VB - Eye Exam; every 1 year; Last [id number]; Next Due: [id number]; Active  Visit for screening mammogram   Digital Bilateral Screening Mammogram With CAD; every 1 year; Last [id number]; Next Due:  [id number]; Overdue    Future Appointments    Date/Time Provider Specialty Site   11/14/2016 04:00 PM MOLLY Duran   Psychiatry [facility]   12/20/2016 08:15 AM Guera Root [facility] Endocrinology [facility]   12/22/2016 01:30 PM Minda Rhodes DO Internal Medicine [facility]   12/22/2016 04:00 PM MOLLY Hennessy , [facility] Hematology [facility] [address] [place] [address]     Signatures   Electronically signed by : MOLLY Roth ; Nov 8 2016  9:40AM EST                       (Author)

## 2018-01-23 NOTE — PSYCH
Psych Med Mgmt    Appearance: adequate hygiene and grooming, demonstrated behavior psychomotor retardation and poor eye contact  Observed mood: depressed  Observed mood: affect was blunted  Speech: speech soft and fluent  Thought processes: coherent/organized  Hallucinations: no hallucinations present  Thought Content: no delusions  Abnormal Thoughts: The patient has no suicidal thoughts and no homicidal thoughts  Orientation: The patient is oriented to person, place and time  Recent and Remote Memory: short term memory impaired and long term memory intact  Attention Span And Concentration: concentration impaired  Insight: Insight intact  Judgment: Her judgment was intact  Muscle Strength And Tone  Muscle strength and tone were normal  Normal gait and station  Language: no difficulty naming common objects, no difficulty repeating a phrase and no difficulty writing a sentence  Fund of knowledge: Patient displays adequate knowledge of current events, adequate fund of knowledge regarding past history and adequate fund of knowledge regarding vocabulary  The patient is experiencing no localized pain  On a scale of 0 - 10 the pain severity is a 0  Individual Psychotherapy 20 minutes provided today  Goals addressed in session: Counseling provided during the session today  Discussed with Janelle Gonzales coping with work changes  Coping skill reviewed, including speaking up when she has concerns  Support provided  Treatment Recommendations: Continue Latuda 80 mg in the evening  Increase Prozac to 60 mg daily  Follows with PCP for medical issues including lipids and glucose monitoring  Risks, Benefits And Possible Side Effects Of Medications: Risks, benefits, and possible side effects of medications explained to patient and patient verbalizes understanding       She reports normal appetite, decreased energy, recent 1 lbs weight gain  and increase in number of sleep hours 12      Janelle Gonzales states has been feeling more depressed again over the last few weeks  She has been isolating herself more, has low energy and low motivation, feels sad and also sometimes more irritable  No significant anxiety symptoms reported  States she has been able to function at work despite feeling depressed, but was frustrated with change in vacation policy at work  Also concerned about her sister  No suicidal or homicidal ideation  No psychotic symptoms  Reports hypersomnia; appetite is adequate  No side effects form medications reported  Vitals  Signs   Recorded: 09RRN9734 03:15PM   Heart Rate: 73  Systolic: 798  Diastolic: 76  BP Cuff Size: Large  Height: 5 ft 4 in  Weight: 227 lb   BMI Calculated: 38 96  BSA Calculated: 2 06    Assessment    1  Bipolar I disorder, most recent episode depressed, moderate (296 52) (F31 32)   2  Personality disorder (301 9) (F60 9)    Plan    1  FLUoxetine HCl - 20 MG Oral Capsule; TAKE 1 CAPSULE DAILY WITH  40MG   CAPSULE TO TOTAL 60MG DAILY    2  Follow-up visit in 6 weeks Evaluation and Treatment  Follow-up  Status: Hold For -   Scheduling  Requested for: 00LJH0890    Review of Systems    Constitutional: recent 1 lb weight gain and feeling tired  Cardiovascular: no complaints of slow or fast heart rate, no chest pain, no palpitations  Respiratory: no complaints of shortness of breath, no wheezing, no dyspnea on exertion  Gastrointestinal: no complaints of abdominal pain, no constipation, no nausea, no diarrhea, no vomiting  Genitourinary: no complaints of dysuria, no incontinence, no pelvic pain, no urinary frequency  Musculoskeletal: no complaints of arthralgia, no myalgias, no limb pain, no joint stiffness  Integumentary: no complaints of skin rash, no itching, no dry skin  Neurological: no complaints of headache, no confusion, no numbness, no dizziness        Past Psychiatric History    Past Psychiatric History: One prior inpatient psychiatric admission years ago at [facility]  2 prior suicide attempts by overdose years ago  Substance Abuse Hx    Substance Abuse History: No history or drug use  Only occasional social alcohol use  Active Problems    1  Abdominal pain, epigastric (789 06) (R10 13)   2  Abnormal CAT scan (793 99) (R93 8)   3  Abnormal electrocardiogram (794 31) (R94 31)   4  Acute respiratory infection (519 8) (J22)   5  Acute sinusitis, recurrence not specified, unspecified location (461 9) (J01 90)   6  Anemia (285 9) (D64 9)   7  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)   8  Asthma (493 90) (J45 909)   9  Back pain, thoracic (724 1) (M54 6)   10  Benign essential hypertension (401 1) (I10)   11  Family history of Benign Polyps Of The Large Intestine (V18 51)   12  Bilateral knee pain (719 46) (M25 561,M25 562)   13  Blood in stool (578 1) (K92 1)   14  Cardiac murmur (785 2) (R01 1)   15  Cervical disc disease (722 91) (M50 90)   16  Cramp of both lower extremities (729 82) (R25 2)   17  Degeneration of thoracic intervertebral disc (722 51) (M51 34)   18  Degenerative arthritis of knee, bilateral (715 96) (M17 0)   19  Diabetes mellitus type 2, uncontrolled (250 02) (E11 65)   20  Diarrhea (787 91) (R19 7)   21  Diverticulosis (562 10) (K57 90)   22  Dizziness (780 4) (R42)   23  Dyslipidemia (272 4) (E78 5)   24  Edema (782 3) (R60 9)   25  Encounter for routine gynecological examination (V72 31) (Z01 419)   26  Encounter for screening mammogram for malignant neoplasm of breast (V76 12)    (Z12 31)   27  Enteritis (558 9) (K52 9)   28  Esophagitis, reflux (530 11) (K21 0)   29  Essential hypertriglyceridemia (272 1) (E78 1)   30  Excessive sweating (780 8) (R61)   31  Fatigue (780 79) (R53 83)   32  Fatty liver (571 8) (K76 0)   33  Fecal occult blood test positive (792 1) (R19 5)   34  Gait disturbance (781 2) (R26 9)   35  Gastroesophageal reflux disease (530 81) (K21 9)   36  Granuloma annulare (719 89) (L92 0)   37   History of allergy (V15 09) (Z88 9)   38  Hypertensive heart disease (402 90) (I11 9)   39  Iron deficiency anemia, unspecified iron deficiency   40  Joint pain, knee (719 46) (M25 569)   41  Left thyroid nodule (241 0) (E04 1)   42  Leg length discrepancy (736 81) (M21 70)   43  Leukocytosis (288 60) (D72 829)   44  Long term current use of therapeutic drug (V58 69) (Z79 899)   45  Long-term insulin use (V58 67) (Z79 4)   46  Lost voice (784 41) (R49 1)   47  Multiple thyroid nodules (241 1) (E04 2)   48  Muscle Cramps In The Thigh (Upper Leg) (729 82)   49  Neck pain (723 1) (M54 2)   50  History of Need for pneumococcal vaccination (V03 82) (Z23)   51  Need for prophylactic vaccination and inoculation against influenza (V04 81) (Z23)   52  Need for revaccination (V05 9) (Z23)   53  Need for Tdap vaccination (V06 1) (Z23)   54  Neuropathy (355 9) (G62 9)   55  Obesity, Class II, BMI 35 0-39 9, with comorbidity (see actual BMI) (278 00) (E66 9)   56  Obstructive sleep apnea (327 23) (G47 33)   57  Otitis media (382 9) (H66 90)   58  Pain, dental (525 9) (K08 89)   59  Pap smear, as part of routine gynecological examination (V76 2) (Z01 419)   60  Paronychia of toe (681 11) (L03 039)   61  Patellofemoral Dysfunction (736 6)   62  Personality disorder (301 9) (F60 9)   63  Postmenopausal Atrophic Vaginitis (627 3)   64  Prepatellar bursitis (726 65) (M70 40)   65  Right ankle pain (719 47) (M25 571)   66  Right wrist pain (719 43) (M25 531)   67  Routine Gynecological Exam With Cervical Pap Smear (V72 31)   68  Symptomatic menopausal or female climacteric states (627 2) (N95 1)   69  Thoracic spine pain (724 1) (M54 6)   70  Tinea corporis (110 5) (B35 4)   71  Type 2 diabetes mellitus with hyperglycemia (250 00) (E11 65)   72  Vertigo (780 4) (R42)   73  Viral gastroenteritis (008 8) (A08 4)   74  Visit for screening mammogram (V76 12) (Z12 31)   75  Vitamin D deficiency (268 9) (E55 9)   76   Dia-Parkinson-White Syndrome (426 7)    Past Medical History    1  History of Acute frontal sinusitis (461 1) (J01 10)   2  History of Acute frontal sinusitis (461 1) (J01 10)   3  History of Cellulitis (682 9) (L03 90)   4  History of Contusion Of The Toe(S) With Intact Skin Surface (924 3)   5  History of Diabetes Mellitus (250 00)   6  History of Difficulty breathing (786 09) (R06 89)   7  History of Encounter for routine gynecological examination (V72 31) (Z01 419)   8  History of Esophagitis, reflux (530 11) (K21 0)   9  History of High cholesterol (272 0) (E78 00)   10  History of abdominal pain (V13 89) (Z87 898)   11  History of abnormal weight loss (V13 89) (Z87 898)   12  History of acute bronchitis (V12 69) (Z87 09)   13  History of acute otitis media (V12 49) (Z86 69)   14  History of acute pharyngitis (V12 69) (Z87 09)   15  History of acute sinusitis (V12 69) (Z87 09)   16  History of allergic rhinitis (V12 69) (Z87 09)   17  History of candidiasis of mouth (V12 09) (Z86 19)   18  History of fatigue (V13 89) (Z87 898)   19  History of head injury (V15 59) (Z87 828)   20  History of headache (V13 89) (Z87 898)   21  History of hypoglycemia (V12 29) (Z86 39)   22  History of laryngitis (V12 69) (Z87 09)   23  History of obesity (V13 89) (Z86 39)   24  History of pharyngitis (V12 69) (Z87 09)   25  History of sebaceous cyst (V13 3) (Z87 2)   26  History of sore throat (V12 60) (Z87 09)   27  History of type 2 diabetes mellitus (V12 29) (Z86 39)   28  History of urinary frequency (V13 09) (Z87 898)   29  History of Hypoglycemia (251 2) (E16 2)   30  History of Mouth Sores (528 9)   31  History of Need for pneumococcal vaccination (V03 82) (Z23)   32  History of Otitis media, unspecified laterality   33  History of Pap smear, as part of routine gynecological examination (V76 2) (Z01 419)   34  Denied: History of Seizures   35  History of Suicide Attempt (E958 9)   36  History of Tinea corporis (110 5) (B35 4)   37   History of Type 2 diabetes mellitus with hyperglycemia, with long-term current use of    insulin (250 00,790 29,V58 67) (E11 65,Z79 4)   38  History of Type B influenza (487 1) (J10 1)   39  History of Visit for screening mammogram (V76 12) (Z12 31)    The active problems and past medical history were reviewed and updated today  Allergies    1  Biaxin TABS   2  LaMICtal TABS   3  Tanzeum PEN   4  TEGretol TABS   5  Depakote TBEC    6  Seasonal   7  Bee sting   8  Shellfish    Current Meds   1  Accu-Chek Louise Plus In Vitro Strip; test 4 times daily; Therapy: 37STL9140 to (Last Rx:13Dol3891)  Requested for: 48GRP4246 Ordered   2  Amoxicillin-Pot Clavulanate 875-125 MG Oral Tablet; TAKE 1 TABLET EVERY 12 HOURS   DAILY; Therapy: 58VKT5569 to (Malena Fragoso)  Requested for: 75GDT6223; Last   Rx:02Nov2017 Ordered   3  Aspir-81 81 MG Oral Tablet Delayed Release; TAKE 1 TABLET DAILY; Therapy: 93Kft5961 to (Evaluate:64Yrs4568); Last Rx:97Mcn3323 Ordered   4  Atorvastatin Calcium 20 MG Oral Tablet; TAKE 1 TABLET DAILY AS DIRECTED; Therapy: 88TVL2167 to (Evaluate:66Tyz0750)  Requested for: 20Jun2017; Last   Rx:20Jun2017 Ordered   5  Azelastine HCl - 0 15 % Nasal Solution; USE 2 SPRAYS AT BEDTIME; Therapy: (Recorded:60Kfv3461) to Recorded   6  BD Pen Needle Susie U/F 32G X 4 MM Miscellaneous; use4 times per day; Therapy: 74QMV2111 to (Evaluate:23Sho6212)  Requested for: 46IGF9488; Last   Rx:16Nov2017 Ordered   7  Clobetasol Propionate 0 05 % External Ointment; APPLY AND GENTLY MASSAGE INTO   AFFECTED AREA(S) TWICE DAILY; Therapy: 79RBZ4405 to Recorded   8  Fenofibrate 145 MG Oral Tablet; Take 1 tablet daily; Therapy: 56JIL9445 to (Evaluate:53Zuh8600)  Requested for: 80Mvn5381; Last   Rx:84Itv5322 Ordered   9  Fish Oil 1000 MG Oral Capsule; take 1 capsule daily; Therapy: 22DEQ3686 to Recorded   10  FLUoxetine HCl - 40 MG Oral Capsule; take 1 capsule daily; Therapy: (Recorded:31Oct2017) to Recorded   11   HumaLOG KwikPen 200 UNIT/ML Subcutaneous Solution Pen-injector; TAKE 20 UNITS    BEFORE BREAKFAST, USE ICR 1:5 FOR LUNCH, 22 UNITS BEFORE DINNER    (ISF 1:20 ICR 1:5); Therapy: 45FVH9998 to (Kilo Rumpf)  Requested for: 00JAS2884; Last    Rx:44Eom5693 Ordered   12  Hydroxychloroquine Sulfate 200 MG Oral Tablet; TAKE 1 TABLET TWICE DAILY; Therapy: (Recorded:58Iyw6667) to Recorded   13  Jardiance 25 MG Oral Tablet; take 1 tablet every day; Therapy: 46JVQ7327 to (Evaluate:34Dlj2017)  Requested for: 13Abc9085; Last    Rx:49Fzu2479 Ordered   14  Lansoprazole 30 MG Oral Capsule Delayed Release; TAKE 1 CAPSULE EVERY    MORNING DAILY; Therapy: 43AGK4073 to (Evaluate:05Dym3639)  Requested for: 31Oct2017; Last    Rx:96Ujm1636 Ordered   15  Latuda 80 MG Oral Tablet; TAKE 1 TABLET IN THE EVENING WITH DINNER; Therapy: 48IFL1129 to (Evaluate:04Cek1121)  Requested for: 27Oct2017; Last    ME:04MFG2658 Ordered   16  Lisinopril 20 MG Oral Tablet; TAKE 1 TABLET DAILY AS DIRECTED; Therapy: 83FPI8506 to (Evaluate:14Ikc3685)  Requested for: 23Dxp7352; Last    Rx:98Flk3896 Ordered   17  MetFORMIN HCl  MG Oral Tablet Extended Release 24 Hour; take 2 tabs twice    per day with food; Therapy: 24PRT0496 to ([phone])  Requested for: 05Oct2017; Last    Rx:20Tzd0322 Ordered   18  Mupirocin 2 % External Ointment; Apply twice daily; Therapy: (Recorded:82Ier8786) to Recorded   19  Jennie Smith FlexTouch 100 UNIT/ML Subcutaneous Solution Pen-injector; INJECT 42 UNITS    ONCE A DAY; Therapy: 88Lko4190 to (Evaluate:19Nov2017)  Requested for: 60Uaa8516; Last    Rx:15Ugh7897 Ordered   20  Vitamin D (Ergocalciferol) 95888 UNIT Oral Capsule; TAKE 1 CAPSULE WEEKLY; Therapy: 88OWH0458 to (Last Cozette Dural)  Requested for: 20Jun2017 Ordered    The medication list was reviewed and updated today  Family Psych History  Mother    1  Family history of CABG   2  Family history of Diabetes Mellitus (V18 0)   3   Family history of Lung Cancer (V16 1)   4  Family history of Stroke Syndrome (V17 1)   5  Family history of Thyroid Disorder (V18 19)   6  Family history of Tobacco Use  Father    7  Family history of Benign Polyps Of The Large Intestine (V18 51)   8  Family history of Bipolar illness   9  Family history of Heart Disease (V17 49)  Brother    8  Family history of depression (V17 0) (Z81 8)  Maternal Grandmother    11  Family history of Diabetes Mellitus (V18 0)   12  Family history of Thyroid Disorder (V18 19)  Family History    13  Denied: Family history of Colon Cancer   14  Denied: Family history of Crohn's Disease   15  Family history of Hypertension (V17 49)   16  Denied: Family history of Liver Cancer   17  Family history of Mother  At Age 52    The family history was reviewed and updated today  Social History    · Caffeine Use   · Denied: History of Drug Use   · Denied: History of Home Environment Domestic Violence   · Job Physical Requirements Heavy Lifting   ·    · Never a smoker   · Non-smoker (V49 89) (Z78 9)   · Social drinker  The social history was reviewed and updated today  The social history was reviewed and is unchanged  , lives with   No children  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  No history of legal problems  No  history  History Of Phys/Sex Abuse Or Perpetration    History Of Phys/Sex Abuse or Perpetration: No history of physical or sexual abuse  End of Encounter Meds    1  Amoxicillin-Pot Clavulanate 875-125 MG Oral Tablet (Augmentin); TAKE 1 TABLET EVERY   12 HOURS DAILY; Therapy: 29UEN7175 to (Sindi Cui)  Requested for: 89COY2956; Last   Rx:2017 Ordered    2  Lisinopril 20 MG Oral Tablet; TAKE 1 TABLET DAILY AS DIRECTED; Therapy: 87PFM8749 to (Evaluate:83Onk0185)  Requested for: 68Oan7613; Last   Rx:95Ziv1404 Ordered    3   HumaLOG KwikPen 200 UNIT/ML Subcutaneous Solution Pen-injector; TAKE 20 UNITS   BEFORE BREAKFAST, USE ICR 1:5 FOR LUNCH, 22 UNITS BEFORE DINNER   (ISF 1:20 ICR 1:5); Therapy: 40THK9186 to (917 06 104)  Requested for: 49SET8329; Last   Rx:43Bxn9709 Ordered    4  Latuda 80 MG Oral Tablet; TAKE 1 TABLET IN THE EVENING WITH DINNER; Therapy: 27NCP5720 to (Evaluate:75Vic8364)  Requested for: 85Hql3464; Last   YO:38HQK1731 Ordered    5  FLUoxetine HCl - 20 MG Oral Capsule; TAKE 1 CAPSULE DAILY WITH  40MG CAPSULE   TO TOTAL 60MG DAILY; Therapy: 66LMZ0570 to (Evaluate:31Mar2018)  Requested for: 90KKV4911; Last   Rx:77Ejh5977 Ordered    6  Aspir-81 81 MG Oral Tablet Delayed Release; TAKE 1 TABLET DAILY; Therapy: 83Ebh0275 to (Evaluate:71Yop9297); Last Rx:26Vsc5335 Ordered   7  BD Pen Needle Susie U/F 32G X 4 MM Miscellaneous; use4 times per day; Therapy: 79SXF7602 to (Evaluate:74Afa7257)  Requested for: 82GNX1002; Last   Rx:16Nov2017 Ordered   8  Jardiance 25 MG Oral Tablet; take 1 tablet every day; Therapy: 79THO6798 to (Evaluate:02Pog3953)  Requested for: 31Knb5027; Last   Rx:99Ysw1756 Ordered   9  MetFORMIN HCl  MG Oral Tablet Extended Release 24 Hour; take 2 tabs twice   per day with food; Therapy: 83QBS2259 to (22 453341)  Requested for: 05Oct2017; Last   Rx:86Mpu8957 Ordered   10  Yaquelin Coke FlexTouch 100 UNIT/ML Subcutaneous Solution Pen-injector; INJECT 42 UNITS    ONCE A DAY; Therapy: 23Avl2578 to (Evaluate:19Nov2017)  Requested for: 79Uze1261; Last    Rx:62Iuj2241 Ordered    11  Accu-Chek Louise Plus In Vitro Strip; test 4 times daily; Therapy: 44LRQ1097 to (Last Rx:52Xbi1126)  Requested for: 45LTG6120 Ordered    12  Atorvastatin Calcium 20 MG Oral Tablet; TAKE 1 TABLET DAILY AS DIRECTED; Therapy: 57ZLI8195 to (Evaluate:62Vzz7863)  Requested for: 20Jun2017; Last    Rx:20Jun2017 Ordered    13  Fenofibrate 145 MG Oral Tablet (Tricor); Take 1 tablet daily;     Therapy: 79NTG2217 to (Evaluate:36Bcy6433)  Requested for: 26Uxm1100; Last    Rx:87Ejn0034 Ordered    14  Lansoprazole 30 MG Oral Capsule Delayed Release; TAKE 1 CAPSULE EVERY    MORNING DAILY; Therapy: 35BZG9687 to (Evaluate:24Fya9417)  Requested for: 31Oct2017; Last    Rx:20Kvh2414 Ordered    15  Fish Oil 1000 MG Oral Capsule; take 1 capsule daily; Therapy: 71LNZ8606 to Recorded   16  Hydroxychloroquine Sulfate 200 MG Oral Tablet; TAKE 1 TABLET TWICE DAILY; Therapy: (Recorded:14Kly2054) to Recorded    17  Azelastine HCl - 0 15 % Nasal Solution; USE 2 SPRAYS AT BEDTIME; Therapy: (Recorded:67Zfx3320) to Recorded    18  Clobetasol Propionate 0 05 % External Ointment; APPLY AND GENTLY MASSAGE INTO    AFFECTED AREA(S) TWICE DAILY; Therapy: 27REX3450 to Recorded    19  Vitamin D (Ergocalciferol) 89417 UNIT Oral Capsule; TAKE 1 CAPSULE WEEKLY; Therapy: 68FFW0509 to (Last Segun Goldstein)  Requested for: 20Jun2017 Ordered    20  FLUoxetine HCl - 40 MG Oral Capsule; take 1 capsule daily; Therapy: (Recorded:70Zdh6702) to Recorded   21  Mupirocin 2 % External Ointment; Apply twice daily; Therapy: (Recorded:99Hfh6750) to Recorded    Future Appointments    Date/Time Provider Specialty Site   01/08/2018 08:15 AM MOLLY Rahman , [facility] Hematology Oncology [address] [place] [address] ONCOLOGY   01/19/2018 08:40 AM MOLLY Peters  Endocrinology [facility] ENDOCRINOLOGY [facility]   01/10/2018 12:00 PM MOLLY Hernandez  Cardiology [facility]   01/05/2018 02:30 PM MOLLY Spivey   Psychiatry [facility]   01/16/2018 08:00 AM Joshua Hernandez DO Internal Medicine [facility]     Signatures   Electronically signed by : MOLLY Hanna ; Dec  1 2017  3:34PM EST                       (Author)

## 2018-01-23 NOTE — PSYCH
Psych Med Mgmt    Appearance: was calm and cooperative, adequate hygiene and grooming and good eye contact  Observed mood: mood appropriate  Observed mood: affect appropriate  Speech: a normal rate and fluent  Thought processes: coherent/organized  Hallucinations: no hallucinations present  Thought Content: no delusions  Abnormal Thoughts: The patient has no suicidal thoughts and no homicidal thoughts  Orientation: The patient is oriented to person, place and time  Recent and Remote Memory: short term memory intact and long term memory intact  Attention Span And Concentration: concentration intact  Insight: Insight intact  Judgment: Her judgment was intact  Muscle Strength And Tone  Muscle strength and tone were normal  Normal gait and station  Language: no difficulty naming common objects, no difficulty repeating a phrase and no difficulty writing a sentence  Fund of knowledge: Patient displays adequate knowledge of current events, adequate fund of knowledge regarding past history and adequate fund of knowledge regarding vocabulary  The patient is experiencing moderate to severe pain  On a scale of 0 - 10 the pain severity is a 1  Treatment Recommendations: Continue Latuda 80 mg in the evening  Continue Prozac 60 mg daily  Follows with PCP for medical issues including lipids and glucose monitoring  Risks, Benefits And Possible Side Effects Of Medications: Risks, benefits, and possible side effects of medications explained to patient and patient verbalizes understanding  She reports normal appetite, normal energy level, recent 2 lbs weight loss and normal number of sleep hours  Lidya Zeng states has been feeling better since last  She feels less depressed and is more optimistic  Also has now more energy  Anxiety is controlled  She is worrying less about vacation policy at work and plans to retire later this year  No suicidal or homicidal ideation    No psychotic symptoms  Sleep is now adequate, appetite is good  No side effects form medications reported  Vitals  Signs   Recorded: 67OMA1835 02:46PM   Heart Rate: 67  Systolic: 712  Diastolic: 61  BP Cuff Size: Large  Height: 5 ft 4 in  Weight: 225 lb   BMI Calculated: 38 62  BSA Calculated: 2 06    Assessment    1  Bipolar I disorder, most recent episode depressed, in full remission (296 56) (F31 76)   2  Personality disorder (301 9) (F60 9)    Plan    1  FLUoxetine HCl - 40 MG Oral Capsule; TAKE 1 CAPSULE DAILY WITH  20 MG   CAPSULE FOR A TOTAL DOSE OF 60 MG DAILY   2  FLUoxetine HCl - 20 MG Oral Capsule; TAKE 1 CAPSULE DAILY WITH 40 MG   CAPSULE TO TOTAL 60 MG DAILY   3  Latuda 80 MG Oral Tablet; TAKE 1 TABLET IN THE EVENING WITH DINNER    4  Follow-up visit in 3 months Evaluation and Treatment  Follow-up  Status: Hold For -   Scheduling  Requested for: 35YYH0446    Review of Systems    Constitutional: recent 2 lb weight loss  Cardiovascular: no complaints of slow or fast heart rate, no chest pain, no palpitations  Respiratory: no complaints of shortness of breath, no wheezing, no dyspnea on exertion  Gastrointestinal: no complaints of abdominal pain, no constipation, no nausea, no diarrhea, no vomiting  Genitourinary: no complaints of dysuria, no incontinence, no pelvic pain, no urinary frequency  Musculoskeletal: ankle pain  Integumentary: no complaints of skin rash, no itching, no dry skin  Neurological: no complaints of headache, no confusion, no numbness, no dizziness  Past Psychiatric History    Past Psychiatric History: One prior inpatient psychiatric admission years ago at [facility]  2 prior suicide attempts by overdose years ago  Substance Abuse Hx    Substance Abuse History: No history or drug use  Only occasional social alcohol use  Active Problems    1  Abdominal pain, epigastric (789 06) (R10 13)   2  Abnormal CAT scan (793 99) (R93 8)   3   Abnormal electrocardiogram (794 31) (R94 31)   4  Acute respiratory infection (519 8) (J22)   5  Acute sinusitis, recurrence not specified, unspecified location (461 9) (J01 90)   6  Anemia (285 9) (D64 9)   7  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)   8  Asthma (493 90) (J45 909)   9  Back pain, thoracic (724 1) (M54 6)   10  Benign essential hypertension (401 1) (I10)   11  Family history of Benign Polyps Of The Large Intestine (V18 51)   12  Bilateral knee pain (719 46) (M25 561,M25 562)   13  Blood in stool (578 1) (K92 1)   14  Cardiac murmur (785 2) (R01 1)   15  Cervical disc disease (722 91) (M50 90)   16  Cramp of both lower extremities (729 82) (R25 2)   17  Degeneration of thoracic intervertebral disc (722 51) (M51 34)   18  Degenerative arthritis of knee, bilateral (715 96) (M17 0)   19  Diabetes mellitus type 2, uncontrolled (250 02) (E11 65)   20  Diarrhea (787 91) (R19 7)   21  Diverticulosis (562 10) (K57 90)   22  Dizziness (780 4) (R42)   23  Dyslipidemia (272 4) (E78 5)   24  Edema (782 3) (R60 9)   25  Encounter for routine gynecological examination (V72 31) (Z01 419)   26  Encounter for screening mammogram for malignant neoplasm of breast (V76 12)    (Z12 31)   27  Enteritis (558 9) (K52 9)   28  Esophagitis, reflux (530 11) (K21 0)   29  Essential hypertriglyceridemia (272 1) (E78 1)   30  Excessive sweating (780 8) (R61)   31  Fatigue (780 79) (R53 83)   32  Fatty liver (571 8) (K76 0)   33  Fecal occult blood test positive (792 1) (R19 5)   34  Gait disturbance (781 2) (R26 9)   35  Gastroesophageal reflux disease (530 81) (K21 9)   36  Granuloma annulare (695 89) (L92 0)   37  History of allergy (V15 09) (Z88 9)   38  Hypertensive heart disease (402 90) (I11 9)   39  Iron deficiency anemia, unspecified iron deficiency   40  Joint pain, knee (719 46) (M25 569)   41  Left thyroid nodule (241 0) (E04 1)   42  Leg length discrepancy (736 81) (M21 70)   43  Leukocytosis (288 60) (D72 829)   44   Long term current use of therapeutic drug (V58 69) (Z79 899)   45  Long-term insulin use (V58 67) (Z79 4)   46  Lost voice (784 41) (R49 1)   47  Multiple thyroid nodules (241 1) (E04 2)   48  Muscle Cramps In The Thigh (Upper Leg) (729 82)   49  Neck pain (723 1) (M54 2)   50  History of Need for pneumococcal vaccination (V03 82) (Z23)   51  Need for prophylactic vaccination and inoculation against influenza (V04 81) (Z23)   52  Need for revaccination (V05 9) (Z23)   53  Need for Tdap vaccination (V06 1) (Z23)   54  Neuropathy (355 9) (G62 9)   55  Obesity, Class II, BMI 35 0-39 9, with comorbidity (see actual BMI) (278 00) (E66 9)   56  Obstructive sleep apnea (327 23) (G47 33)   57  Otitis media (382 9) (H66 90)   58  Pain, dental (525 9) (K08 89)   59  Pap smear, as part of routine gynecological examination (V76 2) (Z01 419)   60  Paronychia of toe (681 11) (L03 039)   61  Patellofemoral Dysfunction (736 6)   62  Personality disorder (301 9) (F60 9)   63  Postmenopausal Atrophic Vaginitis (627 3)   64  Prepatellar bursitis (726 65) (M70 40)   65  Right ankle pain (719 47) (M25 571)   66  Right wrist pain (719 43) (M25 531)   67  Routine Gynecological Exam With Cervical Pap Smear (V72 31)   68  Symptomatic menopausal or female climacteric states (627 2) (N95 1)   69  Thoracic spine pain (724 1) (M54 6)   70  Tinea corporis (110 5) (B35 4)   71  Type 2 diabetes mellitus with hyperglycemia (250 00) (E11 65)   72  Vertigo (780 4) (R42)   73  Viral gastroenteritis (008 8) (A08 4)   74  Visit for screening mammogram (V76 12) (Z12 31)   75  Vitamin D deficiency (268 9) (E55 9)   76  Dia-Parkinson-White Syndrome (426 7)    Past Medical History    1  History of Acute frontal sinusitis (461 1) (J01 10)   2  History of Acute frontal sinusitis (461 1) (J01 10)   3  History of Cellulitis (682 9) (L03 90)   4  History of Contusion Of The Toe(S) With Intact Skin Surface (924 3)   5  History of Diabetes Mellitus (250 00)   6   History of Difficulty breathing (786 09) (R06 89)   7  History of Encounter for routine gynecological examination (V72 31) (Z01 419)   8  History of Esophagitis, reflux (530 11) (K21 0)   9  History of High cholesterol (272 0) (E78 00)   10  History of abdominal pain (V13 89) (Z87 898)   11  History of abnormal weight loss (V13 89) (Z87 898)   12  History of acute bronchitis (V12 69) (Z87 09)   13  History of acute otitis media (V12 49) (Z86 69)   14  History of acute pharyngitis (V12 69) (Z87 09)   15  History of acute sinusitis (V12 69) (Z87 09)   16  History of allergic rhinitis (V12 69) (Z87 09)   17  History of candidiasis of mouth (V12 09) (Z86 19)   18  History of fatigue (V13 89) (Z87 898)   19  History of head injury (V15 59) (Z87 828)   20  History of headache (V13 89) (Z87 898)   21  History of hypoglycemia (V12 29) (Z86 39)   22  History of laryngitis (V12 69) (Z87 09)   23  History of obesity (V12 29) (Z86 39)   24  History of pharyngitis (V12 69) (Z87 09)   25  History of sebaceous cyst (V13 3) (Z87 2)   26  History of sore throat (V12 60) (Z87 09)   27  History of type 2 diabetes mellitus (V12 29) (Z86 39)   28  History of urinary frequency (V13 09) (Z87 898)   29  History of Hypoglycemia (251 2) (E16 2)   30  History of Mouth Sores (528 9)   31  History of Need for pneumococcal vaccination (V03 82) (Z23)   32  History of Otitis media, unspecified laterality   33  History of Pap smear, as part of routine gynecological examination (V76 2) (Z01 419)   34  Denied: History of Seizures   35  History of Suicide Attempt (E958 9)   36  History of Tinea corporis (110 5) (B35 4)   37  History of Type 2 diabetes mellitus with hyperglycemia, with long-term current use of    insulin (250 00,790 29,V58 67) (E11 65,Z79 4)   38  History of Type B influenza (487 1) (J10 1)   39  History of Visit for screening mammogram (V76 12) (Z12 31)    The active problems and past medical history were reviewed and updated today  Allergies    1  Biaxin TABS   2  LaMICtal TABS   3  Tanzeum PEN   4  TEGretol TABS   5  Depakote TBEC    6  Seasonal   7  Bee sting   8  Shellfish    Current Meds   1  Accu-Chek Louise Plus In Vitro Strip; test 4 times daily; Therapy: 68YBH2927 to (Last Rx:03Loh3907)  Requested for: 19PZM4322 Ordered   2  Atorvastatin Calcium 20 MG Oral Tablet; TAKE 1 TABLET DAILY AS DIRECTED; Therapy: 01HAY4026 to (Evaluate:99Smm6497)  Requested for: 20Jun2017; Last   Rx:20Jun2017 Ordered   3  Azelastine HCl - 0 15 % Nasal Solution; USE 2 SPRAYS AT BEDTIME; Therapy: (Recorded:36Dif8473) to Recorded   4  BD Pen Needle Susie U/F 32G X 4 MM Miscellaneous; use4 times per day; Therapy: 08AVA3459 to (Evaluate:61Bud9498)  Requested for: 69UVJ9354; Last   Rx:16Nov2017 Ordered   5  Clobetasol Propionate 0 05 % External Ointment; APPLY AND GENTLY MASSAGE INTO   AFFECTED AREA(S) TWICE DAILY; Therapy: 68FJX4743 to Recorded   6  Fenofibrate 145 MG Oral Tablet; Take 1 tablet daily; Therapy: 36NEF7190 to (Evaluate:14Mar2018)  Requested for: 29OWB3090; Last   Rx:46Cnm9652 Ordered   7  Fish Oil 1000 MG Oral Capsule; take 1 capsule daily; Therapy: 38JIO9855 to Recorded   8  FLUoxetine HCl - 20 MG Oral Capsule; TAKE 1 CAPSULE DAILY WITH  40MG CAPSULE   TO TOTAL 60MG DAILY; Therapy: 09UEH5773 to (Evaluate:31Mar2018)  Requested for: 97HPQ3009; Last   Rx:37Gkf0420 Ordered   9  HumaLOG KwikPen 200 UNIT/ML Subcutaneous Solution Pen-injector; TAKE 20 UNITS   BEFORE BREAKFAST, USE ICR 1:5 FOR LUNCH, 22 UNITS BEFORE DINNER   (ISF 1:20 ICR 1:5); Therapy: 19KNS9334 to [phone])  Requested for: 93IXW2761; Last   Rx:22Tyk9271 Ordered   10  Hydroxychloroquine Sulfate 200 MG Oral Tablet; TAKE 1 TABLET TWICE DAILY; Therapy: (Recorded:90Rpt0277) to Recorded   11  Jardiance 25 MG Oral Tablet; take 1 tablet every day; Therapy: 44RLB9330 to (Evaluate:11Mar2018)  Requested for: 54Ubl0159; Last    Rx:27Cdp9538 Ordered   12   Lansoprazole 30 MG Oral Capsule Delayed Release; TAKE 1 CAPSULE EVERY    MORNING DAILY; Therapy: 67DCX9032 to (Evaluate:26Oct2018)  Requested for: 31Oct2017; Last    Rx:02Udu8628 Ordered   13  Latuda 80 MG Oral Tablet; TAKE 1 TABLET IN THE EVENING WITH DINNER; Therapy: 36FHJ1265 to (Evaluate:59Ncz3467)  Requested for: 27Oct2017; Last    LF:13TNJ3673 Ordered   14  Lisinopril 20 MG Oral Tablet; TAKE 1 TABLET DAILY AS DIRECTED; Therapy: 26ULE5862 to (Evaluate:14Mar2018)  Requested for: 45XGI2799; Last    Rx:97Hqh2728 Ordered   15  MetFORMIN HCl  MG Oral Tablet Extended Release 24 Hour; take 2 tabs twice    per day with food; Therapy: 04ULK8716 to ([phone])  Requested for: 05Oct2017; Last    Rx:06Slg2812 Ordered   16  Mupirocin 2 % External Ointment; Apply twice daily; Therapy: (Recorded:05Pnh0178) to Recorded   17  Deb Gums FlexTouch 100 UNIT/ML Subcutaneous Solution Pen-injector; INJECT 42 UNITS    ONCE A DAY; Therapy: 99Rti2417 to (Evaluate:18Mar2018)  Requested for: 92VGK0094; Last    Rx:47Crf0299 Ordered   18  Vitamin D (Ergocalciferol) 82836 UNIT Oral Capsule; TAKE 1 CAPSULE WEEKLY; Therapy: 37FPZ1928 to (Last Gillermina Sensing)  Requested for: 20Jun2017 Ordered    The medication list was reviewed and updated today  Family Psych History  Mother    1  Family history of CABG   2  Family history of Diabetes Mellitus (V18 0)   3  Family history of Lung Cancer (V16 1)   4  Family history of Stroke Syndrome (V17 1)   5  Family history of Thyroid Disorder (V18 19)   6  Family history of Tobacco Use  Father    7  Family history of Benign Polyps Of The Large Intestine (V18 51)   8  Family history of Bipolar illness   9  Family history of Heart Disease (V17 49)  Brother    8  Family history of depression (V17 0) (Z81 8)  Maternal Grandmother    11  Family history of Diabetes Mellitus (V18 0)   12  Family history of Thyroid Disorder (V18 19)  Family History    13   Denied: Family history of Colon Cancer   14  Denied: Family history of Crohn's Disease   15  Family history of Hypertension (V17 49)   16  Denied: Family history of Liver Cancer   17  Family history of Mother  At Age 52    The family history was reviewed and updated today  Social History    · Caffeine Use   · Denied: History of Drug Use   · Denied: History of Home Environment Domestic Violence   · Job Physical Requirements Heavy Lifting   ·    · Never a smoker   · Non-smoker (V49 89) (Z78 9)   · Social drinker  The social history was reviewed and updated today  The social history was reviewed and is unchanged  , lives with   No children  Works as a Certified Nursing Assistant at [employer]  No history of legal problems  No  history  History Of Phys/Sex Abuse Or Perpetration    History Of Phys/Sex Abuse or Perpetration: No history of physical or sexual abuse  End of Encounter Meds    1  Lisinopril 20 MG Oral Tablet; TAKE 1 TABLET DAILY AS DIRECTED; Therapy: 89RRE5871 to (Evaluate:2018)  Requested for: 24HQL6324; Last   Rx:31Fdj2982 Ordered    2  HumaLOG KwikPen 200 UNIT/ML Subcutaneous Solution Pen-injector; TAKE 20 UNITS   BEFORE BREAKFAST, USE ICR 1:5 FOR LUNCH, 22 UNITS BEFORE DINNER   (ISF 1:20 ICR 1:5); Therapy: 61PYY6846 to [phone])  Requested for: 69UCR7172; Last   Rx:47Sjq6137 Ordered    3  FLUoxetine HCl - 20 MG Oral Capsule; TAKE 1 CAPSULE DAILY WITH 40 MG CAPSULE   TO TOTAL 60 MG DAILY; Therapy: 16IDB6944 to (Vickie Daily)  Requested for: 41DNT4161; Last   Rx:2018; Status: ACTIVE - Transmit to Pharmacy - Awaiting Verification Ordered   4  FLUoxetine HCl - 40 MG Oral Capsule; TAKE 1 CAPSULE DAILY WITH  20 MG CAPSULE   FOR A TOTAL DOSE OF 60 MG DAILY; Therapy: 42UBN1651 to (Vcikie Daily)  Requested for: 34CTL1653; Last   Rx:2018; Status: ACTIVE - Transmit to Pharmacy - Awaiting Verification Ordered   5  Latuda 80 MG Oral Tablet; TAKE 1 TABLET IN THE EVENING WITH DINNER; Therapy: 72ZVW6963 to (Hiren Giron)  Requested for: 71PXW0150; Last   Rx:05Jan2018; Status: ACTIVE - Transmit to Pharmacy - Awaiting Verification Ordered    6  BD Pen Needle Susie U/F 32G X 4 MM Miscellaneous; use4 times per day; Therapy: 97HBE0210 to (Evaluate:93Fuv4870)  Requested for: 95TBG0315; Last   Rx:16Nov2017 Ordered   7  Jardiance 25 MG Oral Tablet; take 1 tablet every day; Therapy: 25XIW8490 to (Evaluate:11Mar2018)  Requested for: 46Ibq8012; Last   Rx:50Hbt8052 Ordered   8  MetFORMIN HCl  MG Oral Tablet Extended Release 24 Hour; take 2 tabs twice   per day with food; Therapy: 27QDJ3674 to ([phone])  Requested for: 05Oct2017; Last   Rx:05Oct2017 Ordered   9  Zuleyma Belts FlexTouch 100 UNIT/ML Subcutaneous Solution Pen-injector; INJECT 42 UNITS   ONCE A DAY; Therapy: 01Uqy9051 to (Evaluate:18Mar2018)  Requested for: 29BDX9273; Last   Rx:80Qlf1090 Ordered    10  Accu-Chek Louise Plus In Vitro Strip; test 4 times daily; Therapy: 12HUS8278 to (Last Rx:23Hmh9671)  Requested for: 24CKM7933 Ordered    11  Atorvastatin Calcium 20 MG Oral Tablet; TAKE 1 TABLET DAILY AS DIRECTED; Therapy: 95SVW4750 to (Evaluate:72Ubi9622)  Requested for: 20Jun2017; Last    Rx:55Msn8955 Ordered    12  Fenofibrate 145 MG Oral Tablet (Tricor); Take 1 tablet daily; Therapy: 53PDM2662 to (Evaluate:14Mar2018)  Requested for: 21QNS1845; Last    Rx:48Maz2399 Ordered    13  Lansoprazole 30 MG Oral Capsule Delayed Release; TAKE 1 CAPSULE EVERY    MORNING DAILY; Therapy: 56KNZ2642 to (Evaluate:26Oct2018)  Requested for: 31Oct2017; Last    Rx:03Mpq5412 Ordered    14  Fish Oil 1000 MG Oral Capsule; take 1 capsule daily; Therapy: 28JJN8520 to Recorded   15  Hydroxychloroquine Sulfate 200 MG Oral Tablet; TAKE 1 TABLET TWICE DAILY; Therapy: (Recorded:18Yeu7010) to Recorded    16   Azelastine HCl - 0 15 % Nasal Solution; USE 2 SPRAYS AT BEDTIME; Therapy: (Recorded:69Nzk8351) to Recorded    17  Clobetasol Propionate 0 05 % External Ointment; APPLY AND GENTLY MASSAGE INTO    AFFECTED AREA(S) TWICE DAILY; Therapy: 73OYI0109 to Recorded    18  Vitamin D (Ergocalciferol) 96576 UNIT Oral Capsule; TAKE 1 CAPSULE WEEKLY; Therapy: 98RBD5452 to (Last Stephanie Gagnon)  Requested for: 20Jun2017 Ordered    19  Mupirocin 2 % External Ointment; Apply twice daily; Therapy: (Recorded:02Kyy3152) to Recorded    Future Appointments    Date/Time Provider Specialty Site   01/08/2018 08:15 AM MOLLY Jenkins , , [facility] Hematology Oncology [address] [place] [address] ONCOLOGY   01/19/2018 08:40 AM MOLLY Reinoso  Endocrinology [facility] ENDOCRINOLOGY [facility]   01/10/2018 12:00 PM MOLLY Delaney   Cardiology [facility]   01/16/2018 08:00 AM Jamar Bass DO Internal Medicine [facility]     Signatures   Electronically signed by : MOLLY Rae ; Jan 5 2018  2:58PM EST                       (Author)

## 2018-01-23 NOTE — PROGRESS NOTES
Assessment    1  Type 2 diabetes mellitus with hyperglycemia, with long-term current use of insulin   (250 00,790 29,V58 67) (E11 65,Z79 4)   2  Vitamin D deficiency (268 9) (E55 9)   3  Obesity, Class II, BMI 35 0-39 9, with comorbidity (see actual BMI) (278 00) (E66 9)   4  Multiple thyroid nodules (241 1) (E04 2)   5  Dyslipidemia (272 4) (E78 5)   6  Bipolar I disorder, most recent episode mixed, in full remission (296 66) (F31 78)   7  Benign essential hypertension (401 1) (I10)         #1 Obese uncontrolled type 2 diabetes mellitus: A1c was 6 8%  -Blood sugar logs reviewed, no hypoglycemia, much improved  -keep Tresiba 42 units once a day, metformin and Invokana as such  -Cannot use GLP-1  and DPPIV inhibitor because of prev pancreatitis with tanzeum  -Up-to-date on eye and Foot exams  -DM education has helped    #2 hypertension, hyperlipidemia/hypertriglyceridemia:  -Continue atorvastatin and fenofibrate, add baby asa  -Monitor lipid panel every 6-12 weeks  -has MALBURIA and is on ACEI, BP near goal  -fu PCP     #3 vitamin D deficiency:  -keep 04218 units  a week   -monitor levels, labs revd    #4 morbid obesity:  -decrease carbohydrates in the diet  - lost 10 lbs    #5 bipolar disorder / depression:  -Managed by psychiatry  -*Latuda can increase both triglycerides as well as blood sugar levels    6  Euthyroid MN  -sub cm nodules on left   -need fu US in May 2017     Plan  Diabetes mellitus type 2, uncontrolled    · Aspir-81 81 MG Oral Tablet Delayed Release; TAKE 1 TABLET DAILY   Rx By: Knute Lanes; Dispense: 90 Days ; #:90 Tablet Delayed Release; Refill: 3; For: Diabetes mellitus type 2, uncontrolled; DAVID = N; Record   · Blood Glucose- POC; Status:Active - Perform Order; Requested for:2017;    Performed: In Office; Due:2018; Ordered; For:Diabetes mellitus type 2, uncontrolled; Ordered By:Gumaro Hobbs;   Fasting (Y/N) : No - N   · Follow-up visit in 2 months Evaluation and Treatment Follow-up  Status: Complete   Done: 20Apr2017   Ordered; For: Diabetes mellitus type 2, uncontrolled; Ordered By: Derrick De La O Performed:  Due: 25Apr2017; Last Updated By: Nate Wu; 4/20/2017 11:02:39 AM   · Follow-Up With Advanced Practitioner Evaluation and Treatment  Follow-up  Status:  Complete  Done: 20Apr2017   Ordered; For: Diabetes mellitus type 2, uncontrolled; Ordered By: Derrick De La O Performed:  Due: 20Apr2018; Last Updated By: Nate Wu; 4/20/2017 11:02:39 AM  Vitamin D deficiency    · Vitamin D (Ergocalciferol) 75511 UNIT Oral Capsule; TAKE 1 CAPSULE  WEEKLY   Rx By: Derrick De La O; Dispense: 0 Days ; #:12 Capsule; Refill: 1; For: Vitamin D deficiency; DAVID = N; Verified Transmission to [facility] #[id number] Last Updated By: System, Hubei [employer]; 4/20/2017 11:01:50 AM    Discussion/Summary  Discussion Summary:   1  Please check blood sugars 2-4 times a day and send us logs  2  please carry glucose tablets with you all the time  3  Please call us probably the blood sugar is less than 70 or more than 300 more than twice  4  Please followed regularly with diabetes educator, podiatrist and ophthalmologist  5  Please perform lab work as advised and make sure we have discussed it in the office visit or on the phone within 7-14 days  6  If you are on insulin therapy, please make sure you rotate insulin injection sites to avoid any hard areas  Counseling Documentation With Imm: The patient was counseled regarding diagnostic results, instructions for management, risk factor reductions, impressions, risks and benefits of treatment options, importance of compliance with treatment  Medication SE Review and Pt Understands Tx: Possible side effects of new medications were reviewed with the patient/guardian today  The treatment plan was reviewed with the patient/guardian   The patient/guardian understands and agrees with the treatment plan      Chief Complaint  Chief Complaint Free Text Note Form: follow up      History of Present Illness  HPI: Extremely well from the standpoint of diabetes  Checks 3-4 times a day  Using carbohydrate ratio and flexible insulin therapy  No hypoglycemia  Average blood sugar 128  Foot exam done by PCP  No eye complaints  Remains on all other medications  Follows advice  Goiter: The patient is being seen for a follow-up of a goiter  Past evaluation has included thyroid ultrasound  The patient is currently asymptomatic  The patient is not currently being treated for this problem  Hyperlipidemia (Follow-Up): The patient states her hyperlipidemia has been stable since the last visit  Comorbid Illnesses: diabetes mellitus and hypertension  She has no significant interval events  Symptoms: The patient is currently asymptomatic  Medications: the patient is adherent with her medication regimen  She denies medication side effects  The patient is doing well with her hyperlipidemia goals  Vitamin D Deficiency: The patient is being seen for follow-up of vitamin D deficiency  Disease type: vitamin D deficiency  Recent laboratory results: date, 25-hydroxyvitamin D ng/mL  Current treatment includes vitamin D3 (cholecalciferol)  The patient is currently asymptomatic  Diabetes: The patient is being seen for routine follow-up of Diabetes Mellitus 2  The HbA1c was   Current treatment includes Metformin HCl  See Medication List for current medication(s)  Source of information reported and indicates that the patient checks her blood sugar three times per day  No particular diet followed  Her exercise regimen is composed of fewer than three times a week  Diabetes education performed   By report, there is good compliance with treatment, good tolerance of treatment, good symptom control and no hx pf pancreatic cancer in family, no hx of pancreatitis   Current pertinent lifestyle factors include obesity and inactivity, but no past or present history of disordered eating and no frequent corticosteroid use  The patient is currently asymptomatic   Disease Course and Complications:  there have been no previous episodes of diabetic ketoacidosis  there have been no previous hospitalizations  The last dilated fundus exam showed no evidence of retinopathy  Cardiovascular: no coronary artery disease and no prior myocardial infarction  Renal: no nephropathy and no proteinuria  Neurologic: peripheral neuropathy  Other complications: no stroke history  (none)      Review of Systems  ROS Reviewed:   ROS reviewed  Endo Adult ROS Female Established v2 Update - [facility]:   Constitutional/General: no recent weight gain, recent weight loss, poor energy/fatigue, no increased energy level, no insomnia/sleep problems, no fever and no feeling weak  Breasts: no nipple discharge  Heart: high blood pressure, but no chest pain/tightness, no rapid/racing heart rate and no palpitations  Genitourinary - Urinary: frequent urination and urinating during the night, but no excess urination  Eyes: gritty/scratchy eyes, but no blurred vision, no double vision, no bulging eyes and no excessive tearing  Mouth / Throat: no hoarseness and no difficulty swallowing  Neck: no lumps, no swollen glands, neck pain, neck stiffness and no enlarged thyroid  Respiratory: no wheezing, asthma and persistent cough  Musculoskeletal: no muscle aches/pain, no joint aches/pain and no muscle weakness  Skin & Hair: dry skin, no acne, the hair texture was not oily, no hair loss and no excessive hair growth  Gastrointestinal: no constipation, no diarrhea, wakes at night to drink and no stomach ache  Neurological: no blackouts, no weakness and no tremors  Reproductive:  frequency of period is not applicable  duration of period is not applicable  Date of last menstruation is not applicable  regular periods are not applicable  discomfort with periods is not applicable  excessive bleeding during period is not applicable   mood swings are not applicable  Endocrine: feeling hot frequently, no feeling cold frequently, no shifts between feeling hot and cold, cold hands or feet, excessive sweating, no thyroid problems, blood sugar problems, excessive thirst, no excessive hunger, no change in shoe size, no nausea or vomiting and no shaky hands  Active Problems    1  Abdominal pain, epigastric (789 06) (R10 13)   2  Abnormal blood chemistry (790 6) (R79 9)   3  Abnormal CAT scan (793 99) (R93 8)   4  Abnormal electrocardiogram (794 31) (R94 31)   5  Acute frontal sinusitis (461 1) (J01 10)   6  Acute respiratory infection (519 8) (J22)   7  Acute sinusitis (461 9) (J01 90)   8  Allergic rhinitis (477 9) (J30 9)   9  Anemia (285 9) (D64 9)   10  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)   11  Asthma (493 90) (J45 909)   12  Back pain, thoracic (724 1) (M54 6)   13  Benign essential hypertension (401 1) (I10)   14  Family history of Benign Polyps Of The Large Intestine (V18 51)   15  Bilateral knee pain (719 46) (M25 561,M25 562)   16  Bipolar I disorder, most recent episode mixed, in full remission (296 66) (F31 78)   17  Blood in stool (578 1) (K92 1)   18  Cardiac murmur (785 2) (R01 1)   19  Cervical disc disease (722 91) (M50 90)   20  Cramp of both lower extremities (729 82) (R25 2)   21  Degeneration of thoracic intervertebral disc (722 51) (M51 34)   22  Degenerative arthritis of knee, bilateral (715 96) (M17 0)   23  Diabetes mellitus type 2, uncontrolled (250 02) (E11 65)   24  Diarrhea (787 91) (R19 7)   25  Diverticulosis (562 10) (K57 90)   26  Dizziness (780 4) (R42)   27  Dyslipidemia (272 4) (E78 5)   28  Edema (782 3) (R60 9)   29  Encounter for routine gynecological examination (V72 31) (Z01 419)   30  Encounter for screening mammogram for malignant neoplasm of breast (V76 12)    (Z12 31)   31  Enteritis (558 9) (K52 9)   32  Esophagitis, reflux (530 11) (K21 0)   33  Esophagitis, reflux (530 11) (K21 0)   34   Essential hypertriglyceridemia (272 1) (E78 1)   35  Excessive sweating (780 8) (R61)   36  Fatigue (780 79) (R53 83)   37  Fatty liver (571 8) (K76 0)   38  Fecal occult blood test positive (792 1) (R19 5)   39  Gait disturbance (781 2) (R26 9)   40  Gastroesophageal reflux disease (530 81) (K21 9)   41  History of allergy (V15 09) (Z88 9)   42  Hypertensive heart disease (402 90) (I11 9)   43  Iron deficiency anemia, unspecified iron deficiency   44  Joint pain, knee (719 46) (M25 569)   45  Left thyroid nodule (241 0) (E04 1)   46  Leg length discrepancy (736 81) (M21 70)   47  Leukocytosis (288 60) (D72 829)   48  Long term current use of therapeutic drug (V58 69) (Z79 899)   49  Long-term insulin use (V58 67) (Z79 4)   50  Lost voice (784 41) (R49 1)   51  Multiple thyroid nodules (241 1) (E04 2)   52  Muscle Cramps In The Thigh (Upper Leg) (729 82)   53  Neck pain (723 1) (M54 2)   54  History of Need for pneumococcal vaccination (V03 82) (Z23)   55  Need for prophylactic vaccination and inoculation against influenza (V04 81) (Z23)   56  Need for revaccination (V05 9) (Z23)   57  Need for Tdap vaccination (V06 1) (Z23)   58  Neuropathy (355 9) (G62 9)   59  Obesity (278 00) (E66 9)   60  Obesity, Class II, BMI 35 0-39 9, with comorbidity (see actual BMI) (278 00) (E66 9)   61  Obstructive sleep apnea (327 23) (G47 33)   62  Otitis media (382 9) (H66 90)   63  Pain, dental (525 9) (K08 89)   64  Pap smear, as part of routine gynecological examination (V76 2) (Z01 419)   65  Paronychia of toe (681 11) (L03 039)   66  Patellofemoral Dysfunction (736 6)   67  Personality disorder (301 9) (F60 9)   68  Postmenopausal Atrophic Vaginitis (627 3)   69  Prepatellar bursitis (726 65) (M70 40)   70  Right ankle pain (719 47) (M25 571)   71  Right wrist pain (719 43) (M25 531)   72  Routine Gynecological Exam With Cervical Pap Smear (V72 31)   73  Sore throat (462) (J02 9)   74   Symptomatic menopausal or female climacteric states (627  2) (N95 1)   75  Thoracic spine pain (724 1) (M54 6)   76  Tinea corporis (110 5) (B35 4)   77  Type 2 diabetes mellitus (250 00) (E11 9)   78  Type 2 diabetes mellitus with hyperglycemia (250 00) (E11 65)   79  Type 2 diabetes mellitus with hyperglycemia, with long-term current use of insulin    (250 00,790 29,V58 67) (E11 65,Z79 4)   80  Vertigo (780 4) (R42)   81  Viral gastroenteritis (008 8) (A08 4)   82  Visit for screening mammogram (V76 12) (Z12 31)   83  Vitamin D deficiency (268 9) (E55 9)   84  Dia-Parkinson-White Syndrome (426 7)    Past Medical History    1  History of Acute frontal sinusitis (461 1) (J01 10)   2  History of Cellulitis (682 9) (L03 90)   3  History of Contusion Of The Toe(S) With Intact Skin Surface (924 3)   4  History of Diabetes Mellitus (250 00)   5  History of Difficulty breathing (786 09) (R06 89)   6  History of High cholesterol (272 0) (E78 00)   7  History of abdominal pain (V13 89) (Z87 898)   8  History of abnormal weight loss (V13 89) (Z87 898)   9  History of acute bronchitis (V12 69) (Z87 09)   10  History of acute otitis media (V12 49) (Z86 69)   11  History of acute pharyngitis (V12 69) (Z87 09)   12  History of acute sinusitis (V12 69) (Z87 09)   13  History of candidiasis of mouth (V12 09) (Z86 19)   14  History of fatigue (V13 89) (Z87 898)   15  History of head injury (V15 59) (Z87 828)   16  History of headache (V13 89) (Z87 898)   17  History of hypoglycemia (V12 29) (Z86 39)   18  History of laryngitis (V12 69) (Z87 09)   19  History of pharyngitis (V12 69) (Z87 09)   20  History of sebaceous cyst (V13 3) (Z87 2)   21  History of urinary frequency (V13 09) (Z87 898)   22  History of Hypoglycemia (251 2) (E16 2)   23  History of Mouth Sores (528 9)   24  History of Need for pneumococcal vaccination (V03 82) (Z23)   25  History of Otitis media, unspecified laterality   26  Denied: History of Seizures   27  History of Suicide Attempt (E958 9)   28   History of Tinea corporis (110 5) (B35 4)   29  History of Type B influenza (487 1) (J10 1)  Active Problems And Past Medical History Reviewed: The active problems and past medical history were reviewed and updated today  Surgical History    1  History of Oral Surgery Tooth Extraction   2  History of Tonsillectomy With Adenoidectomy   3  History of Vaginal Pap smear (V76 47) (Z12 72)  Surgical History Reviewed: The surgical history was reviewed and updated today  Family History  Mother    1  Family history of CABG   2  Family history of Diabetes Mellitus (V18 0)   3  Family history of Lung Cancer (V16 1)   4  Family history of Stroke Syndrome (V17 1)   5  Family history of Thyroid Disorder (V18 19)   6  Family history of Tobacco Use  Father    7  Family history of Benign Polyps Of The Large Intestine (V18 51)   8  Family history of Bipolar illness   9  Family history of Heart Disease (V17 49)  Brother    8  Family history of depression (V17 0) (Z81 8)  Maternal Grandmother    11  Family history of Diabetes Mellitus (V18 0)   12  Family history of Thyroid Disorder (V18 19)  Family History    13  Denied: Family history of Colon Cancer   14  Denied: Family history of Crohn's Disease   15  Family history of Hypertension (V17 49)   16  Denied: Family history of Liver Cancer   17  Family history of Mother  At Age 52  Family History Reviewed: The family history was reviewed and updated today  Social History    · Caffeine Use   · Denied: History of Drug Use   · Denied: History of Home Environment Domestic Violence   · Job Physical Requirements Heavy Lifting   ·    · Non-smoker (V49 89) (Z78 9)   · Social drinker  Social History Reviewed: The social history was reviewed and updated today  Current Meds   1  Accu-Chek Louise Plus In Vitro Strip; test 4 times daily; Therapy: 94OTD2772 to (Last Rx:96Rab4461)  Requested for: 98YTH1863 Ordered   2   Atorvastatin Calcium 20 MG Oral Tablet; TAKE 1 TABLET DAILY AS DIRECTED; Therapy: 74QQG0492 to (Evaluate:08Oct2017)  Requested for: 69Wto1427; Last   Rx:83Bnj3171 Ordered   3  Azelastine HCl - 0 15 % Nasal Solution; USE 2 SPRAYS AT BEDTIME; Therapy: (Recorded:14Opq0681) to Recorded   4  BD Pen Needle Susie U/F 32G X 4 MM Miscellaneous; USE 5 TIMES DAILY; Therapy: 55FTX4546 to (Evaluate:29Mar2017)  Requested for: 02Jan2017; Last   Rx:15Ukv3834 Ordered   5  Fenofibrate 145 MG Oral Tablet; Take 1 tablet daily; Therapy: 28BJW8126 to (Evaluate:25May2017)  Requested for: 09ABH0615; Last   Rx:01Hlk5270 Ordered   6  FLUoxetine HCl - 40 MG Oral Capsule; TAKE 2 CAPSULES DAILY; Therapy: 97IOZ6718 to (Evaluate:69Thp6082)  Requested for: 38EYN1234; Last   Rx:16Mar2017 Ordered   7  HumaLOG KwikPen 200 UNIT/ML Subcutaneous Solution Pen-injector; Take 20 units   before breakfast, Use ICR 1:5 for lunch, 22 units before dinner   (ISF 1:20; ICR 1:5) Bolus per meter; Therapy: 87WPC9453 to (96 593822)  Requested for: 20Apr2017 Recorded   8  Invokana 300 MG Oral Tablet; Take 1 tablet daily; Therapy: 43NDY9263 to (Evaluate:25May2017)  Requested for: 53Sdi9867; Last   Rx:80Pab8272 Ordered   9  Lansoprazole 30 MG Oral Capsule Delayed Release; TAKE 1 CAPSULE EVERY   MORNING DAILY; Therapy: 74WRK7994 to (Elissa Hurl)  Requested for: 90Lde8630; Last   Rx:11Nov2015 Ordered   10  Latuda 40 MG Oral Tablet; Take 1 tablet in the evening with food; Therapy: 10FZG7879 to (Evaluate:28Rjj0390)  Requested for: 85ICA0912; Last    Rx:16Mar2017 Ordered   11  Lisinopril 20 MG Oral Tablet; TAKE 1 TABLET DAILY AS DIRECTED; Therapy: 05FGF1376 to (Evaluate:16May2017)  Requested for: 70SYZ4660; Last    Rx:18Oct2016 Ordered   12  MetFORMIN HCl  MG Oral Tablet Extended Release 24 Hour; 1 tab twice per day    with food; Therapy: 18Mvb1549 to (Evaluate:96Nzf6078)  Requested for: 20Feb2017; Last    Rx:20Feb2017 Ordered   13  Mupirocin 2 % External Ointment;  Apply twice daily; Therapy: (Recorded:72Vjf3870) to Recorded   14  Germichoacano Donley FlexTouch 100 UNIT/ML Subcutaneous Solution Pen-injector; INJECT 42 UNITS    ONCE A DAY; Therapy: 14Apr2017 to (Evaluate:13Jun2017)  Requested for: 14Apr2017; Last    Rx:14Apr2017 Ordered   15  Vitamin D (Ergocalciferol) 11196 UNIT Oral Capsule; TAKE 1 CAPSULE WEEKLY; Therapy: 84XDB1413 to (Last Rx:78Xcd7182)  Requested for: 74Pnf8269; Status:    ACTIVE - Renewal Denied Ordered  Medication List Reviewed: The medication list was reviewed and updated today  Allergies    1  Biaxin TABS   2  LaMICtal TABS   3  Tanzeum PEN   4  TEGretol TABS   5  Depakote TBEC    6  Seasonal   7  Bee sting   8  Shellfish    Vitals  Vital Signs    Recorded: 20Apr2017 10:34AM   Heart Rate 72   Systolic 689   Diastolic 70   Height 5 ft 4 in   Weight 210 lb 6 00 oz   BMI Calculated 36 11   BSA Calculated 2 01     Physical Exam    Constitutional   General appearance: No acute distress, well appearing and well nourished   sweaty  Eyes   Conjunctiva and lids: No swelling, erythema, or discharge  Pupils: Equal, round and reactive to light  The sclera are anicteric  Extraocular movements are intact  Ears, Nose, Mouth, and Throat   External inspection of ears, nose and lips: Normal     Oropharynx: Normal with no erythema, edema, exudate or lesions  Exam of Head: The head is atraumatic and normocephalic  Neck: Abnormal   nodular gland 15 grams left lobe  Pulmonary   Auscultation of lungs: Clear to auscultation bilaterally with normal chest expansion  Cardiovascular   Auscultation of heart: Normal rate and rhythm with no murmurs, gallops or rubs  Examination of pulses: Dorsalis pedal pulses are +2 and equal bilaterally  Abdomen   Abdomen: Abdomen is soft, non-tender with normal bowel sounds  Lymphatic   Palpation of lymph nodes: No supraclavicular or suboccipital lymphadenopathy      Musculoskeletal   Inspection/palpation of joints, bones, and muscles: Muscle bulk and tone is normal     Skin   Skin and subcutaneous tissue: Normal skin temperature and color  Neurologic   Reflexes: 2+ and symmetric  Motor Strength: Strength is 5/5 bilaterally  Psychiatric   Orientation to person, place and time: Normal     Mood and affect: Affect and attention span are normal        Results/Data  Diagnostic Studies Reviewed: I personally reviewed the films/images/results in the office today  My interpretation follows  (1) CORTISOL AM SPECIMEN 68NCV0619 08:40AM Corinne Gleason   TW Order Number: [id number]     Test Name Result Flag Reference   CORTISO AM SPEC 9 1 ug/mL  4 2-22 4   Reference ranges established for specimens drawn between 7 and 9 am  Results may be inaccurate if timing is not correct  (1) HEMOGLOBIN A1C 74Ver2780 08:18AM [employer]   TW Order Number: [id number]     Test Name Result Flag Reference   HEMOGLOBIN A1C 6 8 % H 4 2-6 3   EST  AVG  GLUCOSE 148 mg/dl       (1) T4, FREE 00Bvm3870 08:18AM [employer]   TW Order Number: [id number]     Test Name Result Flag Reference   T4,FREE 1 01 ng/dL  0 76-1 46   - Patient Instructions: This bloodwork is non-fasting  Please drink two glasses of water morning of bloodwork  (1) TSH 05YCS4633 08:18AM [employer]   TW Order Number: [id number]     Test Name Result Flag Reference   TSH 1 280 uIU/mL  0 358-3 740   - Patient Instructions: This bloodwork is non-fasting  Please drink two glasses of water morning of bloodwork  - Patient Instructions: This bloodwork is non-fasting  Please drink two glasses of water morning of bloodwork  Patients undergoing fluorescein dye angiography may retain small amounts of fluorescein in the body for 48-72 hours post procedure  Samples containing fluorescein can produce falsely depressed TSH values  If the patient had this procedure,a specimen should be resubmitted post fluorescein clearance            The recommended reference ranges for TSH during pregnancy are as follows:  First trimester 0 1 to 2 5 uIU/mL  Second trimester  0 2 to 3 0 uIU/mL  Third trimester 0 3 to 3 0 uIU/m     (1) RENAL FUNCTION PANEL 03Feb2017 08:18AM Cherelle Handshabbir   [employer] Order Number: [id number]     Test Name Result Flag Reference   ANION GAP (CALC) 12 mmol/L  4-13   ALBUMIN 4 2 g/dL  3 5-5 0   BLOOD UREA NITROGEN 21 mg/dL  5-25   CALCIUM 9 9 mg/dL  8 3-10 1   CHLORIDE 99 mmol/L L 100-108   CARBON DIOXIDE 26 mmol/L  21-32   CREATININE 0 84 mg/dL  0 60-1 30   Standardized to IDMS reference method   GLUCOSE,RANDM 96 mg/dL     POTASSIUM 4 6 mmol/L  3 5-5 3   eGFR Non-African American      >60 0 ml/min/1 73sq m   [facility] Disease Education Program recommendations are as follows:  GFR calculation is accurate only with a steady state creatinine  Chronic Kidney disease less than 60 ml/min/1 73 sq  meters  Kidney failure less than 15 ml/min/1 73 sq  meters  SODIUM 137 mmol/L  136-145   PHOSPHORUS 2 5 mg/dL L 2 7-4 5     (1) PTH N-TERMINAL (INTACT) 51KBD3359 08:18AM Leona Handshabbir   [employer] Order Number: [id number]     Test Name Result Flag Reference   PARATHYROID HORMONE INTACT 26 2 pg/mL  14 0-72 0     (1) MICROALBUMIN CREATININE RATIO, RANDOM URINE 03Feb2017 08:18AM Cherelle Handy   TW Order Number: [id number]     Test Name Result Flag Reference   MICROALBUMIN/ CREAT R 77 mg/g creatinine H 0-30   MICROALBUMIN,URINE 101 0 mg/L H 0 0-20 0   CREATININE URINE 131 0 mg/dL       Health Management  Diabetes mellitus type 2, uncontrolled   (1) MICROALBUMIN CREATININE RATIO, RANDOM URINE; every 1 year; Last 85PEP6295; Next Due:  93Xoo3601; Active  *VB - Eye Exam; every 1 year; Last 17Bnl5652; Next Due: 36OUT1448; Overdue  Visit for screening mammogram   Digital Bilateral Screening Mammogram With CAD; every 1 year; Last 13STK1067; Next Due:  92Whv5947;  Overdue    Future Appointments    Date/Time Provider Specialty Site   05/23/2017 08:45 AM MOLLY Coffey  [address] OB/GYN   07/07/2017 02:00 PM MOLLY Chung   Psychiatry [facility]   07/14/2017 09:00 AM Gregory Leonard DO Internal Medicine [facility]   01/04/2018 08:00 AM MOLLY Rascon , [facility] Hematology [facility]     Signatures   Electronically signed by : MOLLY Talley ; Apr 20 2017 11:07AM EST                       (Author)

## 2018-01-24 VITALS
SYSTOLIC BLOOD PRESSURE: 140 MMHG | HEART RATE: 75 BPM | HEIGHT: 64 IN | BODY MASS INDEX: 38.76 KG/M2 | OXYGEN SATURATION: 96 % | WEIGHT: 227 LBS | TEMPERATURE: 98.8 F | DIASTOLIC BLOOD PRESSURE: 62 MMHG | RESPIRATION RATE: 16 BRPM

## 2018-01-26 RX ORDER — SODIUM CHLORIDE 9 MG/ML
20 INJECTION, SOLUTION INTRAVENOUS ONCE
Status: COMPLETED | OUTPATIENT
Start: 2018-01-29 | End: 2018-01-29

## 2018-01-29 ENCOUNTER — HOSPITAL ENCOUNTER (OUTPATIENT)
Dept: INFUSION CENTER | Facility: HOSPITAL | Age: 58
Discharge: HOME | End: 2018-01-29
Payer: COMMERCIAL

## 2018-01-29 VITALS
DIASTOLIC BLOOD PRESSURE: 80 MMHG | TEMPERATURE: 99.1 F | HEART RATE: 76 BPM | RESPIRATION RATE: 18 BRPM | SYSTOLIC BLOOD PRESSURE: 130 MMHG

## 2018-01-29 PROCEDURE — 96366 THER/PROPH/DIAG IV INF ADDON: CPT

## 2018-01-29 PROCEDURE — 96365 THER/PROPH/DIAG IV INF INIT: CPT

## 2018-01-29 RX ADMIN — IRON SUCROSE 300 MG: 20 INJECTION, SOLUTION INTRAVENOUS at 08:57

## 2018-01-29 RX ADMIN — SODIUM CHLORIDE 20 ML/HR: 0.9 INJECTION, SOLUTION INTRAVENOUS at 08:57

## 2018-01-29 NOTE — PLAN OF CARE
Problem: Potential for Falls  Goal: Patient will remain free of falls  INTERVENTIONS:  - Assess patient frequently for physical needs  -  Identify cognitive and physical deficits and behaviors that affect risk of falls    -  [place] fall precautions as indicated by assessment   - Educate patient/family on patient safety including physical limitations  - Instruct patient to call for assistance with activity based on assessment  - Modify environment to reduce risk of injury  - Consider OT/PT consult to assist with strengthening/mobility   Outcome: Progressing

## 2018-01-31 DIAGNOSIS — E11.8 TYPE 2 DIABETES MELLITUS WITH COMPLICATION, UNSPECIFIED LONG TERM INSULIN USE STATUS: Primary | ICD-10-CM

## 2018-02-09 RX ORDER — SODIUM CHLORIDE 9 MG/ML
20 INJECTION, SOLUTION INTRAVENOUS ONCE
Status: COMPLETED | OUTPATIENT
Start: 2018-02-12 | End: 2018-02-12

## 2018-02-12 ENCOUNTER — HOSPITAL ENCOUNTER (OUTPATIENT)
Dept: INFUSION CENTER | Facility: HOSPITAL | Age: 58
Discharge: HOME | End: 2018-02-12
Payer: COMMERCIAL

## 2018-02-12 VITALS
TEMPERATURE: 96.7 F | DIASTOLIC BLOOD PRESSURE: 73 MMHG | SYSTOLIC BLOOD PRESSURE: 156 MMHG | HEART RATE: 68 BPM | RESPIRATION RATE: 18 BRPM

## 2018-02-12 PROCEDURE — 96365 THER/PROPH/DIAG IV INF INIT: CPT

## 2018-02-12 RX ADMIN — IRON SUCROSE 300 MG: 20 INJECTION, SOLUTION INTRAVENOUS at 13:29

## 2018-02-12 RX ADMIN — SODIUM CHLORIDE 20 ML/HR: 0.9 INJECTION, SOLUTION INTRAVENOUS at 13:29

## 2018-02-12 NOTE — PLAN OF CARE
Problem: Potential for Falls  Goal: Patient will remain free of falls  INTERVENTIONS:  - Assess patient frequently for physical needs  -  Identify cognitive and physical deficits and behaviors that affect risk of falls    -  [place] fall precautions as indicated by assessment   - Educate patient/family on patient safety including physical limitations  - Instruct patient to call for assistance with activity based on assessment  - Modify environment to reduce risk of injury  - Consider OT/PT consult to assist with strengthening/mobility   Outcome: Progressing

## 2018-02-26 ENCOUNTER — TELEPHONE (OUTPATIENT)
Dept: ENDOCRINOLOGY | Facility: CLINIC | Age: 58
End: 2018-02-26

## 2018-02-26 DIAGNOSIS — E11.8 TYPE 2 DIABETES MELLITUS WITH COMPLICATION, UNSPECIFIED LONG TERM INSULIN USE STATUS: ICD-10-CM

## 2018-02-26 NOTE — TELEPHONE ENCOUNTER
Download dated 2/26/18 placed in bin for provider to review  Current Medications pt taking-     Metformin 500 mg 2 tabs twice daily  Jardiance 25 mg 1 daily  Humalog - bolus before meals    Please review and recommend

## 2018-02-26 NOTE — TELEPHONE ENCOUNTER
Samples of this drug were given to the patient, quantity 2     1- Lot [id number], 05/2019  1- Lot [id number], 07/2018

## 2018-02-27 NOTE — TELEPHONE ENCOUNTER
Metformin 500 mg 2 tabs twice daily  Jardiance 25 mg 1 daily  Humalog - bolus before meals     Reviewed blood sugars , they are in 140-200 range  Currently off the [place]   MA left message to find out how much [place] she was taking before, will wait for pt to call back as she will need reduction in dose of Loni Sam MD

## 2018-03-07 NOTE — PSYCH
Message  Patient No Show Letter - Behavioral Health:     Date: 11/15/2016     Dear Vonnie Estrada,     We missed seeing you for a scheduled appointment at [facility] on 11-14-16 at 4pm with Dr Rush Runner  Our goal is to offer the best possible care to our patients, so we are concerned when you are unable to keep a scheduled appointment  Please call us at [phone] so that we can reschedule the appointment for a date and time that will work for you  We understand that circumstances may arise which make it impossible for you to keep a scheduled appointment  Should this happen in the future, please call us as soon as you know the appointment will be missed  The earlier you let us know, the more likely we can offer your scheduled appointment time to another patient  We hope to hear from you soon       Sincerely,   Dr Winter Ryan      Signatures   Electronically signed by : MOLLY Foster ; Nov 15 2016 12:35PM EST                       (Author)

## 2018-03-07 NOTE — PROGRESS NOTES
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)   9  Asthma (493 90) (J45 909)   10  Benign essential hypertension (401 1) (I10)   11  Family history of Benign Polyps Of The Large Intestine (V18 51)   12  Bilateral knee pain (719 46) (M25 561,M25 562)   13  Bipolar I disorder, most recent episode mixed, in full remission (296 66) (F31 78)   14  Blood in stool (578 1) (K92 1)   15  Cervical disc disease (722 91) (M50 90)   16  Cramp of both lower extremities (729 82) (R25 2)   17  Degeneration of thoracic intervertebral disc (722 51) (M51 34)   18  Degenerative arthritis of knee, bilateral (715 96) (M17 0)   19  Diabetes mellitus type 2, uncontrolled (250 02) (E11 65)   20  Diarrhea (787 91) (R19 7)   21  Dizziness (780 4) (R42)   22  Dyslipidemia (272 4) (E78 5)   23  Edema (782 3) (R60 9)   24  Encounter for routine gynecological examination (V72 31) (Z01 419)   25  Encounter for screening mammogram for malignant neoplasm of breast (V76 12)    (Z12 31)   26  Esophagitis, reflux (530 11) (K21 0)   27  Esophagitis, reflux (530 11) (K21 0)   28  Essential hypertriglyceridemia (272 1) (E78 1)   29  Excessive sweating (780 8) (R61)   30  Fatigue (780 79) (R53 83)   31  Fatty liver (571 8) (K76 0)   32  Fecal occult blood test positive (792 1) (R19 5)   33  Gait disturbance (781 2) (R26 9)   34   Gastroesophageal reflux disease (530 81) (K21 9)   35  History of allergy (V15 09) (Z88 9)   36  Hypoglycemia (251 2) (E16 2)   37  Iron deficiency anemia, unspecified iron deficiency   38  Joint pain, knee (719 46) (M25 569)   39  Leukocytosis (288 60) (D72 829)   40  Long term current use of therapeutic drug (V58 69) (Z79 899)   41  Long-term insulin use (V58 67) (Z79 4)   42  Lost voice (784 41) (R49 1)   43  Multiple thyroid nodules (241 1) (E04 2)   44  Muscle Cramps In The Thigh (Upper Leg) (729 82)   45  Neck pain (723 1) (M54 2)   46  History of Need for pneumococcal vaccination (V03 82) (Z23)   47  Need for prophylactic vaccination and inoculation against influenza (V04 81) (Z23)   48  Need for revaccination (V05 9) (Z23)   49  Need for Tdap vaccination (V06 1) (Z23)   50  Neuropathy (355 9) (G62 9)   51  Obesity (278 00) (E66 9)   52  Obstructive sleep apnea (327 23) (G47 33)   53  Otitis media (382 9) (H66 90)   54  Pain, dental (525 9) (K08 89)   55  Pap smear, as part of routine gynecological examination (V76 2) (Z01 419)   56  Paronychia of toe (681 11) (L03 039)   57  Patellofemoral Dysfunction (736 6)   58  Personality disorder (301 9) (F60 9)   59  Postmenopausal Atrophic Vaginitis (627 3)   60  Prepatellar bursitis (726 65) (M70 40)   61  Right wrist pain (719 43) (M25 531)   62  Routine Gynecological Exam With Cervical Pap Smear (V72 31)   63  Sore throat (462) (J02 9)   64  Symptomatic menopausal or female climacteric states (627 2) (N95 1)   65  Thoracic spine pain (724 1) (M54 6)   66  Tinea corporis (110 5) (B35 4)   67  Type 2 diabetes mellitus (250 00) (E11 9)   68  Type 2 diabetes mellitus with hyperglycemia (250 00) (E11 65)   69  Vertigo (780 4) (R42)   70  Viral gastroenteritis (008 8) (A08 4)   71  Visit for screening mammogram (V76 12) (Z12 31)   72  Vitamin D deficiency (268 9) (E55 9)   73   Dia-Parkinson-White Syndrome (426 7)    Immunizations  Influenza --- Mello Cinnamon: Temporarily Deferred: Patient reports item recently done, 10/2015 Efren,  As of: [id number], Defer for 1 Years; Vanna Carl: [id number] Roes: 01-Oct-2013   PPSV --- [place]: 13-Dec-2010; Series2: 18-Jan-2013   Pneumo Other --- [place]: 2012   Td/DT --- Series1: approx 2003   Tdap --- Series1: 18-Dec-2015     Current Meds   1  Accu-Chek Louise Plus In Vitro Strip; test 4 times daily   2  Atorvastatin Calcium 20 MG Oral Tablet; TAKE 1 TABLET DAILY AS DIRECTED   3  Azelastine HCl - 0 15 % Nasal Solution; USE 2 SPRAYS AT BEDTIME   4  BD Pen Needle Susie U/F 32G X 4 MM Miscellaneous; inject once daily   5  Cholestyramine 4 GM Oral Packet; TAKE 1 PACKET 3 times daily PRN   6  Ergocalciferol 97720 UNIT CAPS; 1 CAPSULE 2 TIME A WEEK FOR 6 WEEKS   7  Fenofibrate 145 MG Oral Tablet; Take 1 tablet daily   8  FLUoxetine HCl - 40 MG Oral Capsule; TAKE 2 CAPSULES DAILY   9  Fluticasone Propionate 50 MCG/ACT Nasal Suspension; USE 1 SPRAY IN EACH   NOSTRIL TWICE DAILY   10  HumaLOG KwikPen 200 UNIT/ML Subcutaneous Solution Pen-injector; Take 20 units    before breakfast, 22 units before dinner (ISF 1:20; ICR 1:5)   11  Invokana 300 MG Oral Tablet; Take 1 tablet daily   12  Lansoprazole 30 MG Oral Capsule Delayed Release; TAKE 1 CAPSULE EVERY    MORNING DAILY   13  Latuda 40 MG Oral Tablet; Take 1 tablet in the evening with food   14  Lisinopril 20 MG Oral Tablet; TAKE 1 TABLET DAILY AS DIRECTED   15  MetFORMIN HCl  MG Oral Tablet Extended Release 24 Hour; 2 tabs twice per day    with food   16  MetroNIDAZOLE 500 MG Oral Tablet; TAKE 1 TABLET 3 times daily   17  Mupirocin 2 % External Ointment; Apply twice daily   18  Natural Vitamin E 400 UNIT Oral Tablet; TAKE 1 TABLET DAILY   19  ProAir  (90 Base) MCG/ACT Inhalation Aerosol Solution; INHALE 1 TO 2 PUFFS    EVERY 4 TO 6 HOURS AS NEEDED   20  Symbicort 80-4 5 MCG/ACT Inhalation Aerosol; use 1 puff twice daily and rinse mouth    after each use   21   Isabella Lydia FlexTouch 100 UNIT/ML Subcutaneous Solution Pen-injector; Inject  42 units    once a day    Allergies    1  Biaxin TABS   2  LaMICtal TABS   3  TEGretol TABS   4  Tanzeum PEN    5  Seasonal   6  Bee sting   7  Shellfish    Future Appointments    Date/Time Provider Specialty Site   12/22/2016 04:00 PM MOLLY Lang , DO, [facility] Hematology On[facility]   12/20/2016 08:15 AM Angela Boehringer, [address] Endocrinology [facility] ENDOCRINOLOGY [facility]   12/19/2016 03:15 PM MOLLY Lopez   Psychiatry [address] [facility]   12/22/2016 01:30 PM Diana Zamora DO Internal Medicine [facility]     Signatures   Electronically signed by : Jaleesa Dacosta DO; Dec 29 2016 10:21PM EST                       (Author)

## 2018-03-11 DIAGNOSIS — E11.65 UNCONTROLLED TYPE 2 DIABETES MELLITUS WITH COMPLICATION, UNSPECIFIED LONG TERM INSULIN USE STATUS: Primary | ICD-10-CM

## 2018-03-11 DIAGNOSIS — E11.8 UNCONTROLLED TYPE 2 DIABETES MELLITUS WITH COMPLICATION, UNSPECIFIED LONG TERM INSULIN USE STATUS: Primary | ICD-10-CM

## 2018-03-12 RX ORDER — EMPAGLIFLOZIN 25 MG/1
25 TABLET, FILM COATED ORAL DAILY
Refills: 2 | COMMUNITY
Start: 2018-02-06 | End: 2018-03-12

## 2018-03-12 RX ORDER — EMPAGLIFLOZIN 25 MG/1
25 TABLET, FILM COATED ORAL DAILY
Qty: 30 TABLET | Refills: 2 | Status: SHIPPED | OUTPATIENT
Start: 2018-03-12 | End: 2018-06-18 | Stop reason: SDUPTHER

## 2018-03-12 RX ORDER — EMPAGLIFLOZIN 25 MG/1
TABLET, FILM COATED ORAL
Qty: 30 TABLET | Refills: 2 | OUTPATIENT
Start: 2018-03-12

## 2018-03-14 RX ORDER — METFORMIN HYDROCHLORIDE 500 MG/1
TABLET, EXTENDED RELEASE ORAL
Qty: 60 TABLET | Refills: 2 | OUTPATIENT
Start: 2018-03-14

## 2018-04-11 DIAGNOSIS — E78.5 DYSLIPIDEMIA: Primary | ICD-10-CM

## 2018-04-11 DIAGNOSIS — E11.65 UNCONTROLLED TYPE 2 DIABETES MELLITUS WITH COMPLICATION, UNSPECIFIED WHETHER LONG TERM INSULIN USE: ICD-10-CM

## 2018-04-11 DIAGNOSIS — E11.8 UNCONTROLLED TYPE 2 DIABETES MELLITUS WITH COMPLICATION, UNSPECIFIED WHETHER LONG TERM INSULIN USE: ICD-10-CM

## 2018-04-12 RX ORDER — ATORVASTATIN CALCIUM 20 MG/1
20 TABLET, FILM COATED ORAL DAILY
Qty: 90 TABLET | Refills: 1 | Status: SHIPPED | OUTPATIENT
Start: 2018-04-12 | End: 2018-10-02 | Stop reason: SDUPTHER

## 2018-05-01 DIAGNOSIS — F31.76 BIPOLAR I DISORDER, MOST RECENT EPISODE DEPRESSED, IN FULL REMISSION (HCC): Primary | Chronic | ICD-10-CM

## 2018-05-01 PROBLEM — I11.9 HYPERTENSIVE HEART DISEASE: Status: ACTIVE | Noted: 2017-01-20

## 2018-05-01 PROBLEM — K57.90 DIVERTICULOSIS: Status: ACTIVE | Noted: 2017-02-09

## 2018-05-01 PROBLEM — L92.0 GRANULOMA ANNULARE: Status: ACTIVE | Noted: 2017-07-14

## 2018-05-01 PROBLEM — M21.70 LEG LENGTH DISCREPANCY: Status: ACTIVE | Noted: 2017-03-31

## 2018-05-01 PROBLEM — E04.1 LEFT THYROID NODULE: Status: ACTIVE | Noted: 2017-02-20

## 2018-05-01 RX ORDER — FLUOXETINE HYDROCHLORIDE 20 MG/1
20 CAPSULE ORAL DAILY
Qty: 30 CAPSULE | Refills: 2 | Status: SHIPPED | OUTPATIENT
Start: 2018-05-01 | End: 2018-07-18 | Stop reason: SDUPTHER

## 2018-05-21 ENCOUNTER — TRANSCRIBE ORDERS (OUTPATIENT)
Dept: ADMINISTRATIVE | Age: 58
End: 2018-05-21

## 2018-05-21 ENCOUNTER — APPOINTMENT (OUTPATIENT)
Dept: LAB | Age: 58
End: 2018-05-21
Payer: COMMERCIAL

## 2018-05-21 DIAGNOSIS — E11.65 TYPE 2 DIABETES MELLITUS WITH HYPERGLYCEMIA (HCC): ICD-10-CM

## 2018-05-21 DIAGNOSIS — E55.9 VITAMIN D DEFICIENCY: ICD-10-CM

## 2018-05-21 DIAGNOSIS — E04.2 NONTOXIC MULTINODULAR GOITER: ICD-10-CM

## 2018-05-21 DIAGNOSIS — E78.5 HYPERLIPIDEMIA: ICD-10-CM

## 2018-05-21 LAB
ANION GAP SERPL CALCULATED.3IONS-SCNC: 7 MMOL/L (ref 4–13)
BUN SERPL-MCNC: 19 MG/DL (ref 5–25)
CALCIUM SERPL-MCNC: 9.4 MG/DL (ref 8.3–10.1)
CHLORIDE SERPL-SCNC: 103 MMOL/L (ref 100–108)
CHOLEST SERPL-MCNC: 108 MG/DL (ref 50–200)
CO2 SERPL-SCNC: 29 MMOL/L (ref 21–32)
CREAT SERPL-MCNC: 0.7 MG/DL (ref 0.6–1.3)
CREAT UR-MCNC: 41.4 MG/DL
EST. AVERAGE GLUCOSE BLD GHB EST-MCNC: 163 MG/DL
GFR SERPL CREATININE-BSD FRML MDRD: 97 ML/MIN/1.73SQ M
GLUCOSE P FAST SERPL-MCNC: 143 MG/DL (ref 65–99)
HBA1C MFR BLD: 7.3 % (ref 4.2–6.3)
HDLC SERPL-MCNC: 31 MG/DL (ref 40–60)
LDLC SERPL CALC-MCNC: 7 MG/DL (ref 0–100)
MICROALBUMIN UR-MCNC: <5 MG/L (ref 0–20)
MICROALBUMIN/CREAT 24H UR: <12 MG/G CREATININE (ref 0–30)
NONHDLC SERPL-MCNC: 77 MG/DL
POTASSIUM SERPL-SCNC: 4.7 MMOL/L (ref 3.5–5.3)
SODIUM SERPL-SCNC: 139 MMOL/L (ref 136–145)
TRIGL SERPL-MCNC: 352 MG/DL
TSH SERPL DL<=0.05 MIU/L-ACNC: 2.11 UIU/ML (ref 0.36–3.74)

## 2018-05-21 PROCEDURE — 83036 HEMOGLOBIN GLYCOSYLATED A1C: CPT

## 2018-05-21 PROCEDURE — 82570 ASSAY OF URINE CREATININE: CPT

## 2018-05-21 PROCEDURE — 80048 BASIC METABOLIC PNL TOTAL CA: CPT

## 2018-05-21 PROCEDURE — 80061 LIPID PANEL: CPT

## 2018-05-21 PROCEDURE — 84443 ASSAY THYROID STIM HORMONE: CPT

## 2018-05-21 PROCEDURE — 36415 COLL VENOUS BLD VENIPUNCTURE: CPT

## 2018-05-21 PROCEDURE — 82043 UR ALBUMIN QUANTITATIVE: CPT

## 2018-05-29 ENCOUNTER — ANNUAL EXAM (OUTPATIENT)
Dept: OBGYN CLINIC | Facility: CLINIC | Age: 58
End: 2018-05-29
Payer: COMMERCIAL

## 2018-05-29 VITALS
HEIGHT: 64 IN | WEIGHT: 227.4 LBS | DIASTOLIC BLOOD PRESSURE: 70 MMHG | BODY MASS INDEX: 38.82 KG/M2 | SYSTOLIC BLOOD PRESSURE: 158 MMHG

## 2018-05-29 DIAGNOSIS — Z01.419 ENCOUNTER FOR GYNECOLOGICAL EXAMINATION: ICD-10-CM

## 2018-05-29 DIAGNOSIS — Z12.39 BREAST CANCER SCREENING: ICD-10-CM

## 2018-05-29 DIAGNOSIS — Z01.419 PAP SMEAR, AS PART OF ROUTINE GYNECOLOGICAL EXAMINATION: Primary | ICD-10-CM

## 2018-05-29 PROCEDURE — G0145 SCR C/V CYTO,THINLAYER,RESCR: HCPCS | Performed by: OBSTETRICS & GYNECOLOGY

## 2018-05-29 PROCEDURE — S0612 ANNUAL GYNECOLOGICAL EXAMINA: HCPCS | Performed by: OBSTETRICS & GYNECOLOGY

## 2018-05-29 RX ORDER — CHLORAL HYDRATE 500 MG
1 CAPSULE ORAL DAILY
COMMUNITY
Start: 2017-07-14 | End: 2018-07-17 | Stop reason: ALTCHOICE

## 2018-05-29 NOTE — PROGRESS NOTES
Assessment/Plan:    Assessment:  Annual gynecologic medical examination    Plan:  Normal gynecologic physical examination today    Pap smear completed    Self-breast examinations stressed to the patient    Mammogram ordered    She is followed medically for multiple medical problems    We discussed the importance of good diet and regular exercise    We also reviewed the use of calcium and vitamin-D supplementation on a daily basis    We also reviewed using sun block when exposed to the [facility] Gramovox for prolonged amounts of time    Patient will be seen in 1 year for her annual gynecologic medical examination    All questions were answered for her at today's visit    Patient will call for any problems issues or concerns that may arise during the interim        No problem-specific Assessment & Plan notes found for this encounter  Diagnoses and all orders for this visit:    Pap smear, as part of routine gynecological examination  -     Liquid-based pap, screening    Encounter for gynecological examination    Breast cancer screening  -     Mammo screening bilateral w cad; Future    Other orders  -     Omega-3 Fatty Acids (FISH OIL) 1,000 mg; Take 1 capsule by mouth daily        Subjective:      Patient ID: Claire Almeida is a 65 y o  female  Patient is a 52-year-old female who presents today for annual gynecologic medical examination    She denies any vaginal bleeding and any vasomotor symptoms    She reports normal bowel and bladder habits    She denies any significant pelvic or abdominal pain    She has multiple medical problems and is followed carefully for them    She is currently up-to-date with colonoscopy screening            The following portions of the patient's history were reviewed and updated as appropriate: allergies, current medications, past family history, past medical history, past social history, past surgical history and problem list     Review of Systems   Constitutional: Negative      HENT: Negative  Eyes: Negative  Respiratory: Negative  Cardiovascular: Negative  Followed for lipids and hypertension       Gastrointestinal: Negative  Endocrine: Negative  Followed for thyroid nodules and diabetes   Genitourinary: Negative  Musculoskeletal: Negative  Skin: Negative  Neurological: Negative  Psychiatric/Behavioral: Negative  Objective:      /70 (BP Location: Left arm, Patient Position: Sitting, Cuff Size: Standard)   Ht 5' 4" (1 626 m)   Wt 103 kg (227 lb 6 4 oz)   BMI 39 03 kg/m²          Physical Exam   Constitutional: She appears well-developed  HENT:   Head: Normocephalic  Eyes: Pupils are equal, round, and reactive to light  Neck: Normal range of motion  Neck supple  Cardiovascular: Normal rate and regular rhythm  Pulmonary/Chest: Effort normal and breath sounds normal    Abdominal: Soft  Normal appearance and bowel sounds are normal    Genitourinary: Rectum normal, vagina normal and uterus normal  Rectal exam shows guaiac negative stool  Pelvic exam was performed with patient supine  Right adnexum displays no mass, no tenderness and no fullness  Left adnexum displays no mass, no tenderness and no fullness  No vaginal discharge found  Lymphadenopathy:     She has no cervical adenopathy  She has no axillary adenopathy  Right: No inguinal and no supraclavicular adenopathy present  Left: No inguinal and no supraclavicular adenopathy present  Neurological: She is alert  Skin: Skin is intact  No rash noted  Psychiatric: She has a normal mood and affect   Her speech is normal and behavior is normal  Judgment and thought content normal  Cognition and memory are normal

## 2018-05-31 DIAGNOSIS — E04.2 NONTOXIC MULTINODULAR GOITER: ICD-10-CM

## 2018-06-01 ENCOUNTER — HOSPITAL ENCOUNTER (OUTPATIENT)
Dept: ULTRASOUND IMAGING | Facility: HOSPITAL | Age: 58
Discharge: HOME | End: 2018-06-01
Payer: COMMERCIAL

## 2018-06-01 DIAGNOSIS — E04.2 NONTOXIC MULTINODULAR GOITER: ICD-10-CM

## 2018-06-01 LAB
LAB AP GYN PRIMARY INTERPRETATION: NORMAL
Lab: NORMAL

## 2018-06-01 PROCEDURE — 76536 US EXAM OF HEAD AND NECK: CPT

## 2018-06-05 ENCOUNTER — TELEPHONE (OUTPATIENT)
Dept: ENDOCRINOLOGY | Facility: CLINIC | Age: 58
End: 2018-06-05

## 2018-06-05 NOTE — TELEPHONE ENCOUNTER
----- Message from Simon Pablo MD sent at 6/5/2018 12:18 PM EDT -----  Please inform patient that thyroid ultrasound showed stable findings    No new nodules

## 2018-06-14 ENCOUNTER — APPOINTMENT (OUTPATIENT)
Dept: LAB | Age: 58
End: 2018-06-14
Payer: COMMERCIAL

## 2018-06-14 ENCOUNTER — TRANSCRIBE ORDERS (OUTPATIENT)
Dept: ADMINISTRATIVE | Age: 58
End: 2018-06-14

## 2018-06-14 DIAGNOSIS — E11.8 UNCONTROLLED TYPE 2 DIABETES MELLITUS WITH COMPLICATION, UNSPECIFIED WHETHER LONG TERM INSULIN USE: Primary | ICD-10-CM

## 2018-06-14 DIAGNOSIS — E11.65 UNCONTROLLED TYPE 2 DIABETES MELLITUS WITH COMPLICATION, UNSPECIFIED WHETHER LONG TERM INSULIN USE: Primary | ICD-10-CM

## 2018-06-14 DIAGNOSIS — Z79.899 NEED FOR PROPHYLACTIC CHEMOTHERAPY: ICD-10-CM

## 2018-06-14 DIAGNOSIS — Z79.899 NEED FOR PROPHYLACTIC CHEMOTHERAPY: Primary | ICD-10-CM

## 2018-06-14 LAB
ALBUMIN SERPL BCP-MCNC: 4 G/DL (ref 3.5–5)
ALP SERPL-CCNC: 117 U/L (ref 46–116)
ALT SERPL W P-5'-P-CCNC: 40 U/L (ref 12–78)
ANION GAP SERPL CALCULATED.3IONS-SCNC: 8 MMOL/L (ref 4–13)
AST SERPL W P-5'-P-CCNC: 20 U/L (ref 5–45)
BASOPHILS # BLD AUTO: 0.14 THOUSANDS/ΜL (ref 0–0.1)
BASOPHILS NFR BLD AUTO: 1 % (ref 0–1)
BILIRUB SERPL-MCNC: 0.3 MG/DL (ref 0.2–1)
BUN SERPL-MCNC: 23 MG/DL (ref 5–25)
CALCIUM SERPL-MCNC: 9.9 MG/DL (ref 8.3–10.1)
CHLORIDE SERPL-SCNC: 101 MMOL/L (ref 100–108)
CO2 SERPL-SCNC: 26 MMOL/L (ref 21–32)
CREAT SERPL-MCNC: 0.76 MG/DL (ref 0.6–1.3)
EOSINOPHIL # BLD AUTO: 0.29 THOUSAND/ΜL (ref 0–0.61)
EOSINOPHIL NFR BLD AUTO: 2 % (ref 0–6)
ERYTHROCYTE [DISTWIDTH] IN BLOOD BY AUTOMATED COUNT: 15.2 % (ref 11.6–15.1)
GFR SERPL CREATININE-BSD FRML MDRD: 88 ML/MIN/1.73SQ M
GLUCOSE SERPL-MCNC: 144 MG/DL (ref 65–140)
HCT VFR BLD AUTO: 42.5 % (ref 34.8–46.1)
HGB BLD-MCNC: 13.1 G/DL (ref 11.5–15.4)
IMM GRANULOCYTES # BLD AUTO: 0.34 THOUSAND/UL (ref 0–0.2)
IMM GRANULOCYTES NFR BLD AUTO: 2 % (ref 0–2)
LYMPHOCYTES # BLD AUTO: 5.27 THOUSANDS/ΜL (ref 0.6–4.47)
LYMPHOCYTES NFR BLD AUTO: 32 % (ref 14–44)
MCH RBC QN AUTO: 25.8 PG (ref 26.8–34.3)
MCHC RBC AUTO-ENTMCNC: 30.8 G/DL (ref 31.4–37.4)
MCV RBC AUTO: 84 FL (ref 82–98)
MONOCYTES # BLD AUTO: 1.02 THOUSAND/ΜL (ref 0.17–1.22)
MONOCYTES NFR BLD AUTO: 6 % (ref 4–12)
NEUTROPHILS # BLD AUTO: 9.42 THOUSANDS/ΜL (ref 1.85–7.62)
NEUTS SEG NFR BLD AUTO: 57 % (ref 43–75)
NRBC BLD AUTO-RTO: 0 /100 WBCS
PLATELET # BLD AUTO: 417 THOUSANDS/UL (ref 149–390)
PMV BLD AUTO: 10.1 FL (ref 8.9–12.7)
POTASSIUM SERPL-SCNC: 4.6 MMOL/L (ref 3.5–5.3)
PROT SERPL-MCNC: 8.2 G/DL (ref 6.4–8.2)
RBC # BLD AUTO: 5.08 MILLION/UL (ref 3.81–5.12)
SODIUM SERPL-SCNC: 135 MMOL/L (ref 136–145)
WBC # BLD AUTO: 16.48 THOUSAND/UL (ref 4.31–10.16)

## 2018-06-14 PROCEDURE — 85025 COMPLETE CBC W/AUTO DIFF WBC: CPT

## 2018-06-14 PROCEDURE — 80053 COMPREHEN METABOLIC PANEL: CPT

## 2018-06-14 PROCEDURE — 36415 COLL VENOUS BLD VENIPUNCTURE: CPT

## 2018-06-18 DIAGNOSIS — E11.65 UNCONTROLLED TYPE 2 DIABETES MELLITUS WITH COMPLICATION, UNSPECIFIED WHETHER LONG TERM INSULIN USE: Primary | ICD-10-CM

## 2018-06-18 DIAGNOSIS — E11.8 UNCONTROLLED TYPE 2 DIABETES MELLITUS WITH COMPLICATION, UNSPECIFIED WHETHER LONG TERM INSULIN USE: Primary | ICD-10-CM

## 2018-06-18 RX ORDER — EMPAGLIFLOZIN 25 MG/1
25 TABLET, FILM COATED ORAL DAILY
Qty: 30 TABLET | Refills: 2 | Status: SHIPPED | OUTPATIENT
Start: 2018-06-18 | End: 2018-08-27 | Stop reason: SDUPTHER

## 2018-06-24 DIAGNOSIS — F31.76 BIPOLAR I DISORDER, MOST RECENT EPISODE DEPRESSED, IN FULL REMISSION (HCC): Primary | Chronic | ICD-10-CM

## 2018-06-27 ENCOUNTER — OFFICE VISIT (OUTPATIENT)
Dept: ENDOCRINOLOGY | Facility: CLINIC | Age: 58
End: 2018-06-27
Payer: COMMERCIAL

## 2018-06-27 VITALS
SYSTOLIC BLOOD PRESSURE: 150 MMHG | DIASTOLIC BLOOD PRESSURE: 80 MMHG | BODY MASS INDEX: 37.56 KG/M2 | HEIGHT: 64 IN | WEIGHT: 220 LBS

## 2018-06-27 DIAGNOSIS — Z79.4 UNCONTROLLED TYPE 2 DIABETES MELLITUS WITH HYPERGLYCEMIA, WITH LONG-TERM CURRENT USE OF INSULIN (HCC): Primary | ICD-10-CM

## 2018-06-27 DIAGNOSIS — I10 BENIGN ESSENTIAL HYPERTENSION: ICD-10-CM

## 2018-06-27 DIAGNOSIS — E78.1 ESSENTIAL HYPERTRIGLYCERIDEMIA: ICD-10-CM

## 2018-06-27 DIAGNOSIS — E55.9 VITAMIN D DEFICIENCY: ICD-10-CM

## 2018-06-27 DIAGNOSIS — E11.65 UNCONTROLLED TYPE 2 DIABETES MELLITUS WITH HYPERGLYCEMIA, WITH LONG-TERM CURRENT USE OF INSULIN (HCC): Primary | ICD-10-CM

## 2018-06-27 DIAGNOSIS — E04.2 MULTIPLE THYROID NODULES: ICD-10-CM

## 2018-06-27 PROCEDURE — 99213 OFFICE O/P EST LOW 20 MIN: CPT | Performed by: NURSE PRACTITIONER

## 2018-06-27 NOTE — PROGRESS NOTES
Established Patient Progress Note      Chief Complaint   Patient presents with    Diabetes Type 2          History of Present Illness:   Keith Fleming is a 65 y o  female with a history of HTN, HLD, thyroid nodules, vitamin d deficiency, and type 2 diabetes with long term use of insulin  Reports no  complications of diabetes  Last A1C 7 3  Denies recent illness or hospitalizations  Denies recent severe hypoglycemic or severe hyperglycemic episodes  Denies any issues with her current regimen  Home glucose monitoring: are performed regularly    Home blood glucose readings:   Before breakfast: 120-160s  Before lunch: 100-160s  Before dinner: 100-170s  Bedtime: 90-150s    Current regimen: Metformin 500 mg BID, Jardiance 25 mg daily, Tresiba 44 units, Humalog I:C 5 and ISF 20  compliant all of the timedenies any side effects from current medications     Hypoglycemic episodes: No never   H/o of hypoglycemia causing hospitalization or Intervention such as glucagon injection or ambulance call No     Last Eye Exam: yearly, no retinopathy   Last Foot Exam: yearly     Has hypertension: Taking Lisinopril   Has hyperlipidemia: Taking Atorvastatin and Fenofibrate     Has vitamin d deficiency: Taking 50,000 units weekly  Has thyroid nodules: denies neck pain, dysphonia, dysphagia, or dyspnea    Patient Active Problem List   Diagnosis    Anomalies of nails    Asthma    Benign essential hypertension    Bipolar I disorder, most recent episode depressed, in full remission ([address] )    Cardiac murmur    Cervical disc disease    Degeneration of thoracic intervertebral disc    Degenerative arthritis of knee, bilateral    Diabetes mellitus type 2, uncontrolled ([address] )    Diverticulosis    Dyslipidemia    Essential hypertriglyceridemia    Fatty liver    Gastroesophageal reflux disease    Granuloma annulare    Hypertensive heart disease    Iron deficiency anemia    Left thyroid nodule    Leg length discrepancy    Multiple thyroid nodules    Neuropathy    Obesity, Class II, BMI 35 0-39 9, with comorbidity (see actual BMI)    Obstructive sleep apnea    Other acquired deformity of knee    Personality disorder    Postmenopausal atrophic vaginitis    Prepatellar bursitis    Vertigo    Vitamin D deficiency    Anomalous atrioventricular excitation      Past Medical History:   Diagnosis Date    Abdominal pain     started 7/21/16- found something on the pancreas    Abnormal weight loss     last assessed [id number]    Acute sinusitis     Allergic rhinitis     last assessed [id number]    Anemia     Asthma     Cardiac murmur     Cellulitis     Cervical disc disease     Degeneration of thoracic intervertebral disc     Degenerative arthritis of knee     Diabetes mellitus ([address] )     Diverticulosis     Esophagitis, reflux     last assessed [id number]    Excessive sweating     Fatty liver     Gait disturbance     Granuloma annulare     Head injury     Hyperlipidemia     Hypertension     Hypertensive heart disease     Hypoglycemia     last assessed [id number]    Leg length discrepancy     Leucocytosis     Multiple thyroid nodules     Neuropathy     Obesity     last assessed [id number]    Paronychia of toe     Patellofemoral dysfunction     Postmenopausal atrophic vaginitis     Prepatellar bursitis     Sebaceous cyst     last assessed [id number]    Sleep apnea     no CPAP used    [facility]     Tinea corporis     Type 2 diabetes mellitus ([facility]     cont meds and monitor BS;  last assessed [id number]    Urinary frequency     Vertigo     Vitamin D deficiency     Dia-Parkinson-White syndrome       Past Surgical History:   Procedure Laterality Date    COLONOSCOPY      ESOPHAGOGASTRODUODENOSCOPY N/A 8/8/2016    Procedure: ESOPHAGOGASTRODUODENOSCOPY (EGD); Surgeon: Elvia Mckeon MD;  Location: [facility];   Service:     TONSILLECTOMY      TOOTH EXTRACTION      wisdom teeth removed      Family History   Problem Relation Age of Onset    Cancer Mother         lung    Heart disease Mother     Coronary artery disease Mother         CABG   Andrés Susan Diabetes Mother     Lung cancer Mother     Stroke Mother     Thyroid disease Mother     Other Mother         tobacco use    Parkinsonism Father     Intestinal polyp Father     Bipolar disorder Father     Heart disease Father     Hypertension Sister     Depression Brother     Diabetes Maternal Grandmother     Thyroid disease Maternal Grandmother     Hypertension Family      Social History   Substance Use Topics    Smoking status: Never Smoker    Smokeless tobacco: Never Used    Alcohol use Yes      Comment: rarely     Allergies   Allergen Reactions    Bee Venom     Carbamazepine Other (See Comments)     Reaction Date:Unknown    Clarithromycin Itching    Shellfish-Derived Products     Lamotrigine Rash         Current Outpatient Prescriptions:     atorvastatin (LIPITOR) 20 mg tablet, Take 1 tablet (20 mg total) by mouth daily, Disp: 90 tablet, Rfl: 1    Azelastine HCl 0 15 % SOLN, Azelastine HCl - 0 15 % Nasal Solution USE 2 SPRAYS AT BEDTIME  Refills: 0  Active, Disp: , Rfl:     Cholecalciferol (VITAMIN D PO), Take 50,000 Units by mouth once a week  , Disp: , Rfl:     fenofibrate (TRICOR) 145 mg tablet, Fenofibrate 145 MG Oral Tablet Take 1 tablet daily  Quantity: 30;  Refills: 6    GianlucaAdeel DO;  Started 4-Apr-2012 Active, Disp: , Rfl:     FLUoxetine (PROzac) 20 mg capsule, Take 1 capsule (20 mg total) by mouth daily for 90 days Total Prozac dose is 60 mg daily, Disp: 30 capsule, Rfl: 2    FLUoxetine (PROzac) 40 MG capsule, Take 40 mg by mouth 2 (two) times a day , Disp: , Rfl:     insulin degludec (TRESIBA FLEXTOUCH) injection pen 100 units/mL, Inject 44 Units under the skin every morning, Disp: 5 pen, Rfl: 0    Insulin Pen Needle (BD PEN NEEDLE MARISOL U/F) 32G X 4 MM MISC, Use as directed with insulin, Disp: 100 each, Rfl: 2   insuln lispro (HUMALOG KWIKPEN) 200 units/mL CONCENTRATED U-200 injection pen, Use up to 75 units daily using insulin carb ratio 1:5 and insulin sensitivity factor of 1:20 , Disp: 4 pen, Rfl: 2    JARDIANCE 25 MG TABS, Take 1 tablet (25 mg total) by mouth daily, Disp: 30 tablet, Rfl: 2    lansoprazole (PREVACID) 30 mg capsule, Lansoprazole 30 MG Oral Capsule Delayed Release TAKE 1 CAPSULE EVERY MORNING DAILY  Quantity: 90;  Refills: 3    Genette Box M D ;  Started 11-Sep-2014 Active, Disp: , Rfl:     lisinopril (ZESTRIL) 20 mg tablet, 20 mg every evening  20 mg tablet daily  , Disp: , Rfl:     lurasidone (LATUDA) 20 mg tablet, Take 20 mg by mouth every morning , Disp: , Rfl:     lurasidone (LATUDA) 80 mg tablet, Take 1 tablet (80 mg total) by mouth daily with dinner for 60 days, Disp: 30 tablet, Rfl: 1    metFORMIN (GLUCOPHAGE) 1000 MG tablet, Take 1 tablet (1,000 mg total) by mouth 2 (two) times a day with meals, Disp: 180 tablet, Rfl: 1    Omega-3 Fatty Acids (FISH OIL) 1,000 mg, Take 1 capsule by mouth daily, Disp: , Rfl:     albuterol (PROAIR HFA) 90 mcg/act inhaler, ProAir  (90 Base) MCG/ACT Inhalation Aerosol Solution INHALE 1 TO 2 PUFFS EVERY 4 TO 6 HOURS AS NEEDED  Quantity: 1;  Refills: 3   Started 4-June-2015 Active8  5 GM Inhaler, Disp: , Rfl:     Review of Systems   Constitutional: Negative for chills and fever  HENT: Negative for sore throat and trouble swallowing  Eyes: Negative for visual disturbance  Respiratory: Negative for shortness of breath  Cardiovascular: Negative for chest pain and palpitations  Gastrointestinal: Negative for abdominal pain, constipation and diarrhea  Endocrine: Negative for cold intolerance, heat intolerance, polydipsia, polyphagia and polyuria  Genitourinary: Negative for frequency  Musculoskeletal: Negative for arthralgias and myalgias  Skin: Negative for rash  Neurological: Negative for dizziness and tremors     Hematological: Negative for adenopathy  Psychiatric/Behavioral: Negative for sleep disturbance  All other systems reviewed and are negative  Physical Exam:  Body mass index is 37 76 kg/m²  /80   Ht 5' 4" (1 626 m)   Wt 99 8 kg (220 lb)   BMI 37 76 kg/m²    Wt Readings from Last 3 Encounters:   06/27/18 99 8 kg (220 lb)   05/29/18 103 kg (227 lb 6 4 oz)   01/19/18 103 kg (226 lb)       Physical Exam   Constitutional: She is oriented to person, place, and time  She appears well-developed and well-nourished  No distress  HENT:   Head: Normocephalic and atraumatic  Eyes: Conjunctivae are normal  Pupils are equal, round, and reactive to light  Neck: Normal range of motion  Neck supple  No thyromegaly present  Cardiovascular: Normal rate, regular rhythm and normal heart sounds  Pulses are no weak pulses  Pulses:       Dorsalis pedis pulses are 2+ on the right side, and 2+ on the left side  Pulmonary/Chest: Effort normal and breath sounds normal  No respiratory distress  She has no wheezes  She has no rales  Abdominal: Soft  Bowel sounds are normal  She exhibits no distension  There is no tenderness  Musculoskeletal: Normal range of motion  She exhibits no edema  Feet:   Right Foot:   Skin Integrity: Positive for dry skin  Negative for ulcer, skin breakdown, erythema, warmth or callus  Left Foot:   Skin Integrity: Positive for dry skin  Negative for ulcer, skin breakdown, erythema, warmth or callus  Neurological: She is alert and oriented to person, place, and time  Skin: Skin is warm and dry  Psychiatric: She has a normal mood and affect  Vitals reviewed  Patient's shoes and socks removed  Right Foot/Ankle   Right Foot Inspection  Skin Exam: skin normal and dry skin skin not intact, no warmth, no callus, no erythema, no maceration, no abnormal color, no pre-ulcer, no ulcer and no callus                            Sensory       Monofilament testing: intact  Vascular    The right DP pulse is 2+      Left Foot/Ankle  Left Foot Inspection  Skin Exam: skin normal and dry skinskin not intact, no warmth, no erythema, no maceration, normal color, no pre-ulcer, no ulcer and no callus                                         Sensory       Monofilament: intact  Vascular    The left DP pulse is 2+  Assign Risk Category:  No deformity present; No loss of protective sensation; No weak pulses       Risk: 0      Labs:     Lab Results   Component Value Date    HGBA1C 7 3 (H) 05/21/2018       Lab Results   Component Value Date     (L) 06/14/2018    K 4 6 06/14/2018     06/14/2018    CO2 26 06/14/2018    ANIONGAP 8 06/14/2018    BUN 23 06/14/2018    CREATININE 0 76 06/14/2018    GLUCOSE 144 (H) 06/14/2018    GLUF 143 (H) 05/21/2018    CALCIUM 9 9 06/14/2018    CORRECTEDCA 10 1 06/09/2017    AST 20 06/14/2018    ALT 40 06/14/2018    ALKPHOS 117 (H) 06/14/2018    PROT 8 2 06/14/2018    BILITOT 0 30 06/14/2018    EGFR 88 06/14/2018         Lab Results   Component Value Date    CHOL 108 05/21/2018    HDL 31 (L) 05/21/2018    TRIG 352 (H) 05/21/2018       Lab Results   Component Value Date    GFB8NLNXAQBB 2 110 05/21/2018    EIA6ATXGXDTJ 0 691 09/20/2017    ADT1DTSQDYVV 1 280 02/03/2017     Lab Results   Component Value Date    FREET4 0 88 09/20/2017       THYROID ULTRASOUND     INDICATION:    E04 2: Nontoxic multinodular goiter      COMPARISON:  Thyroid sonogram 5/18/2017 and 5/31/2016     TECHNIQUE:   Ultrasound of the thyroid was performed with a high frequency linear transducer in transverse and sagittal planes including volumetric imaging sweeps as well as traditional still imaging technique      FINDINGS:  Thyroid parenchyma is diffusely heterogeneous in echotexture with focal nodule(s) as described below      Right lobe:  5 x 1 4 x 1 9 cm  Left lobe:  4 3 x 1 6 x 1 5 cm  Isthmus:  0 3 cm      No new discrete suspicious nodules   Multiple bilateral small colloid cysts and spongiform nodules are unchanged     IMPRESSION:     No nodule meets current ACR criteria for requiring biopsy or followup ultrasounds         Impression & Plan:    Problem List Items Addressed This Visit     Benign essential hypertension     BP elevated, will continue to monitor, continue current regimen          Diabetes mellitus type 2, uncontrolled (HCC) - Primary     A1C not at goal, goal less than 7  Review of meter download shows BG overall within range with occasional excursions into the 170s depending in her dietary intake  Educated on diet, exercise, and weight loss  For now continue current regimen  Continue checking BG 4x per day  Check A1C, cmp, and microalbumin prior to next visit  Relevant Orders    Hemoglobin A1C    Comprehensive metabolic panel    Microalbumin / creatinine urine ratio    Essential hypertriglyceridemia     Continue statin and fenofibrate          Relevant Orders    Lipid Panel with Direct LDL reflex    Multiple thyroid nodules     Stable thyroid nodules          Relevant Orders    T4, free    TSH, 3rd generation    Vitamin D deficiency     Continue vitamin d supplementation                Orders Placed This Encounter   Procedures    Hemoglobin A1C     Standing Status:   Future     Standing Expiration Date:   6/27/2019    Comprehensive metabolic panel     This is a patient instruction: Patient fasting for 8 hours or longer recommended  Standing Status:   Future     Standing Expiration Date:   6/27/2019    Lipid Panel with Direct LDL reflex     This is a patient instruction: This test requires patient fasting for 10-12 hours or longer  Drinking of black coffee or black tea is acceptable       Standing Status:   Future     Standing Expiration Date:   6/27/2019    Microalbumin / creatinine urine ratio     Standing Status:   Future     Standing Expiration Date:   6/27/2019    T4, free     Standing Status:   Future     Standing Expiration Date:   9/27/2018    TSH, 3rd generation     This is a patient instruction: This test is non-fasting  Please drink two glasses of water morning of bloodwork  Standing Status:   Future     Standing Expiration Date:   9/27/2018       Discussed with the patient and all questioned fully answered  She will call me if any problems arise  Follow-up appointment in 3 months       Counseled patient on diagnostic results, prognosis, risk and benefit of treatment options, instruction for management, importance of treatment compliance, Risk  factor reduction and impressions      Lulu Ackerman [address]

## 2018-06-27 NOTE — ASSESSMENT & PLAN NOTE
A1C not at goal, goal less than 7  Review of meter download shows BG overall within range with occasional excursions into the 170s depending in her dietary intake  Educated on diet, exercise, and weight loss  For now continue current regimen  Continue checking BG 4x per day  Check A1C, cmp, and microalbumin prior to next visit

## 2018-07-02 DIAGNOSIS — D50.9 IRON DEFICIENCY ANEMIA: ICD-10-CM

## 2018-07-02 DIAGNOSIS — E11.65 TYPE 2 DIABETES MELLITUS WITH HYPERGLYCEMIA (HCC): ICD-10-CM

## 2018-07-11 ENCOUNTER — APPOINTMENT (OUTPATIENT)
Dept: LAB | Age: 58
End: 2018-07-11
Payer: COMMERCIAL

## 2018-07-11 DIAGNOSIS — E11.65 TYPE 2 DIABETES MELLITUS WITH HYPERGLYCEMIA (HCC): ICD-10-CM

## 2018-07-11 DIAGNOSIS — D50.9 IRON DEFICIENCY ANEMIA: ICD-10-CM

## 2018-07-11 LAB
ALBUMIN SERPL BCP-MCNC: 3.9 G/DL (ref 3.5–5)
ALP SERPL-CCNC: 96 U/L (ref 46–116)
ALT SERPL W P-5'-P-CCNC: 35 U/L (ref 12–78)
ANION GAP SERPL CALCULATED.3IONS-SCNC: 6 MMOL/L (ref 4–13)
AST SERPL W P-5'-P-CCNC: 16 U/L (ref 5–45)
BILIRUB SERPL-MCNC: 0.28 MG/DL (ref 0.2–1)
BUN SERPL-MCNC: 20 MG/DL (ref 5–25)
CALCIUM SERPL-MCNC: 9.2 MG/DL (ref 8.3–10.1)
CHLORIDE SERPL-SCNC: 103 MMOL/L (ref 100–108)
CHOLEST SERPL-MCNC: 113 MG/DL (ref 50–200)
CO2 SERPL-SCNC: 28 MMOL/L (ref 21–32)
CREAT SERPL-MCNC: 0.64 MG/DL (ref 0.6–1.3)
CREAT UR-MCNC: 91.6 MG/DL
ERYTHROCYTE [DISTWIDTH] IN BLOOD BY AUTOMATED COUNT: 15.3 % (ref 11.6–15.1)
EST. AVERAGE GLUCOSE BLD GHB EST-MCNC: 160 MG/DL
FERRITIN SERPL-MCNC: 106 NG/ML (ref 8–388)
GFR SERPL CREATININE-BSD FRML MDRD: 100 ML/MIN/1.73SQ M
GLUCOSE P FAST SERPL-MCNC: 103 MG/DL (ref 65–99)
HBA1C MFR BLD: 7.2 % (ref 4.2–6.3)
HCT VFR BLD AUTO: 42.3 % (ref 34.8–46.1)
HDLC SERPL-MCNC: 36 MG/DL (ref 40–60)
HGB BLD-MCNC: 12.9 G/DL (ref 11.5–15.4)
IRON SATN MFR SERPL: 12 %
IRON SERPL-MCNC: 46 UG/DL (ref 50–170)
LDLC SERPL CALC-MCNC: 27 MG/DL (ref 0–100)
MCH RBC QN AUTO: 26 PG (ref 26.8–34.3)
MCHC RBC AUTO-ENTMCNC: 30.5 G/DL (ref 31.4–37.4)
MCV RBC AUTO: 85 FL (ref 82–98)
MICROALBUMIN UR-MCNC: 11.3 MG/L (ref 0–20)
MICROALBUMIN/CREAT 24H UR: 12 MG/G CREATININE (ref 0–30)
NONHDLC SERPL-MCNC: 77 MG/DL
PLATELET # BLD AUTO: 387 THOUSANDS/UL (ref 149–390)
PMV BLD AUTO: 10.2 FL (ref 8.9–12.7)
POTASSIUM SERPL-SCNC: 3.9 MMOL/L (ref 3.5–5.3)
PROT SERPL-MCNC: 7.7 G/DL (ref 6.4–8.2)
RBC # BLD AUTO: 4.97 MILLION/UL (ref 3.81–5.12)
SODIUM SERPL-SCNC: 137 MMOL/L (ref 136–145)
TIBC SERPL-MCNC: 388 UG/DL (ref 250–450)
TRIGL SERPL-MCNC: 249 MG/DL
WBC # BLD AUTO: 13.23 THOUSAND/UL (ref 4.31–10.16)

## 2018-07-11 PROCEDURE — 36415 COLL VENOUS BLD VENIPUNCTURE: CPT

## 2018-07-11 PROCEDURE — 85027 COMPLETE CBC AUTOMATED: CPT

## 2018-07-11 PROCEDURE — 3061F NEG MICROALBUMINURIA REV: CPT | Performed by: INTERNAL MEDICINE

## 2018-07-11 PROCEDURE — 80053 COMPREHEN METABOLIC PANEL: CPT

## 2018-07-11 PROCEDURE — 82570 ASSAY OF URINE CREATININE: CPT

## 2018-07-11 PROCEDURE — 80061 LIPID PANEL: CPT

## 2018-07-11 PROCEDURE — 82043 UR ALBUMIN QUANTITATIVE: CPT

## 2018-07-11 PROCEDURE — 82728 ASSAY OF FERRITIN: CPT

## 2018-07-11 PROCEDURE — 83036 HEMOGLOBIN GLYCOSYLATED A1C: CPT

## 2018-07-11 PROCEDURE — 83540 ASSAY OF IRON: CPT

## 2018-07-11 PROCEDURE — 83550 IRON BINDING TEST: CPT

## 2018-07-15 DIAGNOSIS — E11.8 UNCONTROLLED TYPE 2 DIABETES MELLITUS WITH COMPLICATION, UNSPECIFIED WHETHER LONG TERM INSULIN USE: ICD-10-CM

## 2018-07-15 DIAGNOSIS — E11.65 UNCONTROLLED TYPE 2 DIABETES MELLITUS WITH COMPLICATION, UNSPECIFIED WHETHER LONG TERM INSULIN USE: ICD-10-CM

## 2018-07-16 DIAGNOSIS — I10 ESSENTIAL HYPERTENSION: Primary | ICD-10-CM

## 2018-07-16 RX ORDER — LISINOPRIL 20 MG/1
20 TABLET ORAL EVERY EVENING
Qty: 90 TABLET | Refills: 0 | Status: SHIPPED | OUTPATIENT
Start: 2018-07-16 | End: 2018-10-08 | Stop reason: SDUPTHER

## 2018-07-16 RX ORDER — INSULIN LISPRO 200 [IU]/ML
INJECTION, SOLUTION SUBCUTANEOUS
Qty: 20 PEN | Refills: 3 | Status: SHIPPED | OUTPATIENT
Start: 2018-07-16 | End: 2018-10-24 | Stop reason: SDUPTHER

## 2018-07-17 ENCOUNTER — OFFICE VISIT (OUTPATIENT)
Dept: HEMATOLOGY ONCOLOGY | Facility: CLINIC | Age: 58
End: 2018-07-17
Payer: COMMERCIAL

## 2018-07-17 VITALS
SYSTOLIC BLOOD PRESSURE: 146 MMHG | OXYGEN SATURATION: 97 % | WEIGHT: 221 LBS | TEMPERATURE: 97.7 F | DIASTOLIC BLOOD PRESSURE: 74 MMHG | HEIGHT: 64 IN | BODY MASS INDEX: 37.73 KG/M2 | RESPIRATION RATE: 17 BRPM | HEART RATE: 86 BPM

## 2018-07-17 DIAGNOSIS — D50.9 IRON DEFICIENCY ANEMIA, UNSPECIFIED IRON DEFICIENCY ANEMIA TYPE: Primary | ICD-10-CM

## 2018-07-17 PROCEDURE — 99213 OFFICE O/P EST LOW 20 MIN: CPT | Performed by: INTERNAL MEDICINE

## 2018-07-17 NOTE — PSYCH
MEDICATION MANAGEMENT NOTE        [facility]      Name and Date of Birth:  Fahad Mena 65 y o  1960 MRN: [id number]    Date of Visit: 2018    SUBJECTIVE:     Viola Orozco reports that she has improved since the last visit  She states that mood has been more stable, denies any significant depressive symptoms  She still has some anxiety related to stress at work, also her brother  last month  She has been able to handle his death well "I just took vacations"  She is planning to retire from work in 2018 and is glad that she will be leaving in few months       She denies any suicidal ideation, intent or plan at present; denies any homicidal ideation, intent or plan at present  She has no auditory hallucinations, denies any visual hallucinations, no overt delusions noted  She denies any side effects from psychiatric medications  Alejandra Lightning HPI ROS Appetite Changes and Sleep: normal sleep, adequate number of sleep hours (8 hours), normal appetite, recent weight loss (4 lbs), normal energy level    Review Of Systems:      Constitutional recent weight loss (4 lbs)   ENT negative   Cardiovascular negative   Respiratory negative   Gastrointestinal negative   Genitourinary negative   Musculoskeletal negative   Integumentary skin rash   Neurological negative   Endocrine negative   Other Symptoms none       Past Psychiatric History:     Past Inpatient Psychiatric Treatment:   One past inpatient psychiatric admission at [facility] years abo  Past Outpatient Psychiatric Treatment:    In outpatient treatment at [address] [place] [address] [address] for many years    Past Suicide Attempts: yes, 2 attempts by overdose years ago  Past Violent Behavior: no  Past Psychiatric Medication Trials: Prozac, Depakote, Abilify and Latuda    Traumatic History:     Abuse: no history of sexual abuse, no history of physical abuse  Other Traumatic Events: none     Past Medical History:    Past Medical History:   Diagnosis Date    Abdominal pain     started 7/21/16- found something on the pancreas    Abnormal weight loss     last assessed [id number]    Acute sinusitis     Allergic rhinitis     last assessed [id number]    Anemia     Asthma     Cardiac murmur     Cellulitis     Cervical disc disease     Degeneration of thoracic intervertebral disc     Degenerative arthritis of knee     Diabetes mellitus ([facility]     Diverticulosis     Esophagitis, reflux     last assessed 29Jan2014    Excessive sweating     Fatty liver     Gait disturbance     Granuloma annulare     Head injury     Hyperlipidemia     Hypertension     Hypertensive heart disease     Hypoglycemia     last assessed [id number]    Leg length discrepancy     Leucocytosis     Multiple thyroid nodules     Neuropathy     Obesity     last assessed [id number]    Paronychia of toe     Patellofemoral dysfunction     Postmenopausal atrophic vaginitis     Prepatellar bursitis     Sebaceous cyst     last assessed [id number]    Sleep apnea     no CPAP used    [facility]     Tinea corporis     Type 2 diabetes mellitus (HCC)     cont meds and monitor BS;  last assessed [id number]    Urinary frequency     Vertigo     Vitamin D deficiency     Dia-Parkinson-White syndrome      Past Medical History Pertinent Negatives:   Diagnosis Date Noted    Seizures (HCC)      Allergies   Allergen Reactions    Depakote Er [Divalproex Sodium Er]      pancreatitis    Tanzeum [Albiglutide]      pancreatitis    Bee Venom     Carbamazepine Other (See Comments)     Reaction Date:Unknown    Clarithromycin Itching    Shellfish-Derived Products     Lamotrigine Rash       Substance Abuse History:    History   Alcohol Use    Yes     Comment: rarely     History   Drug Use No       Social History:    Social History     Social History    Marital status: /Civil Union     Spouse name: N/A    Number of children: 0  Years of education: 15     Occupational History    Certified Nursing Assistant      Social History Main Topics    Smoking status: Never Smoker    Smokeless tobacco: Never Used    Alcohol use Yes      Comment: rarely    Drug use: No    Sexual activity: Yes     Other Topics Concern    Not on file     Social History Narrative    Education: high school graduate    Learning Disabilities: none    Marital History:     Children: none    Living Arrangement: lives in home with     Occupational History: 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[place] [address] [employer]: good support system,  is supportive    Legal History: none     History: None        Caffeine use    Denied home environment domestic violence    Job physical requirements heavy lifting           Family Psychiatric History:     Family History   Problem Relation Age of Onset    Cancer Mother         lung    Heart disease Mother     Coronary artery disease Mother         CABG    Diabetes Mother     Lung cancer Mother     Stroke Mother     Thyroid disease Mother     Other Mother         tobacco use    Parkinsonism Father     Intestinal polyp Father     Bipolar disorder Father     Heart disease Father     Hypertension Sister     Alcohol abuse Sister     Depression Brother     Diabetes Maternal Grandmother     Thyroid disease Maternal Grandmother     Hypertension Family     Suicide Attempts Paternal Aunt     Depression Paternal Aunt        History Review:  The following portions of the patient's history were reviewed and updated as appropriate: allergies, current medications, past family history, past medical history, past social history, past surgical history and problem list          OBJECTIVE:     Vital signs in last 24 hours:    Vitals:    07/18/18 1619   BP: 155/64   Pulse: 75   Weight: 100 kg (221 lb)   Height: 5' 4" (1 626 m)       Mental Status Evaluation:    Appearance age appropriate, casually dressed   Behavior cooperative   Speech normal rate, normal volume, normal pitch   Mood anxious   Affect constricted   Thought Processes organized, goal directed   Associations intact associations   Thought Content no overt delusions   Perceptual Disturbances: no auditory hallucinations, no visual hallucinations   Abnormal Thoughts  Risk Potential Suicidal ideation - None  Homicidal ideation - None  Potential for aggression - No   Orientation oriented to person, place, time/date and situation   Memory recent and remote memory grossly intact   Consciousness alert and awake   Attention Span Concentration Span attention span and concentration are age appropriate   Intellect appears to be of average intelligence   Insight intact   Judgement intact   Muscle Strength and  Gait muscle strength and tone were normal, normal gait , normal balance   Motor activity no abnormal movements   Language no difficulty naming common objects, no difficulty repeating a phrase, no difficulty writing a sentence   Fund of Knowledge adequate knowledge of current events  adequate fund of knowledge regarding past history  adequate fund of knowledge regarding vocabulary    Pain none   Pain Scale 0       Laboratory Results: I have personally reviewed all pertinent laboratory/tests results      Most Recent Labs:   Lab Results   Component Value Date    WBC 13 23 (H) 07/11/2018    RBC 4 97 07/11/2018    HGB 12 9 07/11/2018    HCT 42 3 07/11/2018     07/11/2018    RDW 15 3 (H) 07/11/2018    NEUTROABS 9 42 (H) 06/14/2018     07/11/2018    K 3 9 07/11/2018     07/11/2018    CO2 28 07/11/2018    BUN 20 07/11/2018    CREATININE 0 64 07/11/2018    GLUCOSE 144 (H) 06/14/2018    CALCIUM 9 2 07/11/2018    AST 16 07/11/2018    ALT 35 07/11/2018    ALKPHOS 96 07/11/2018    PROT 7 7 07/11/2018    BILITOT 0 28 07/11/2018    CHOL 113 07/11/2018    HDL 36 (L) 07/11/2018    TRIG 249 (H) 07/11/2018    LDLCALC 27 07/11/2018 VALPROICTOT 37 (L) 05/21/2016    JUV4GWGRWLPD 2 110 05/21/2018    FREET4 0 88 09/20/2017       Assessment/Plan:       Diagnoses and all orders for this visit:    Bipolar I disorder, most recent episode depressed, in full remission ([facility] )  -     FLUoxetine (PROzac) 20 mg capsule; Take 1 capsule (20 mg total) by mouth daily for 120 days Total Prozac dose is 60 mg daily  -     FLUoxetine (PROzac) 40 MG capsule; Take 1 capsule (40 mg total) by mouth daily for 120 days Total Prozac dose is 60 mg daily  -     lurasidone (LATUDA) 80 mg tablet; Take 1 tablet (80 mg total) by mouth daily with dinner for 120 days    Personality disorder    LOURDES (generalized anxiety disorder)  -     FLUoxetine (PROzac) 20 mg capsule; Take 1 capsule (20 mg total) by mouth daily for 120 days Total Prozac dose is 60 mg daily  -     FLUoxetine (PROzac) 40 MG capsule; Take 1 capsule (40 mg total) by mouth daily for 120 days Total Prozac dose is 60 mg daily    Other orders  -     Discontinue: lurasidone (LATUDA) 80 mg tablet; Take 80 mg by mouth daily with dinner  -     hydroxychloroquine (PLAQUENIL) 200 mg tablet; Take 200 mg by mouth 2 (two) times a day with meals          Treatment Recommendations/Precautions:    Continue Latuda 80 mg in the evening to help with mood stability  Continue Prozac 60 mg daily to improve depressive symptoms  Medication management every 3 months  Follows with family physician for glucose and lipid monitoring due to current therapy with antipsychotic medication  Follows with family physician for medical issues    Risks/Benefits      Risks, Benefits And Possible Side Effects Of Medications:    Risks, benefits, and possible side effects of medications explained to Viola Heart including risk of parkinsonian symptoms, Tardive Dyskinesia and metabolic syndrome related to treatment with antipsychotic medications and risk of suicidality related to treatment with antidepressants   She verbalizes understanding and agreement for treatment  Controlled Medication Discussion:     Not applicable    Psychotherapy Provided:     Individual psychotherapy provided: Yes  Counseling was provided during the session today for 20 minutes  Medications, treatment progress and treatment plan reviewed with Erendira Brunson  Goals discussed during in session: improve control of anxiety  Recent stressors discussed with Erendira Brunson including death of brother 1 month ago  Discussed with Erendira Brunson coping with brother's death and job stress  Coping strategies including stress reduction, spending time with family and taking time for self reviewed with Erendira Brunson  Supportive therapy provided        Treatment Plan;    Completed and signed during the session: Yes - with Radha Kern MD

## 2018-07-17 NOTE — PROGRESS NOTES
[address] [facility] [place]  [address] [place] [address] [facility] [address] [facility] [address]  [address] COYLEZJROMANDCAMISHA,[birth year], [id number]  07/17/18    Discussion:   In summary, this is a 25-year-old female history of leukocytosis which is stable  This is most likely benign in etiology, possibly representing statistical artifact/outlined are  Hemoglobin is normal   Ferritin is normal  Saturation is 12%  This is lower than normal but has been gradually rising over the past 2-3 years without supplementation  Observation is elected  I discussed the above with the patient  The patient  voiced understanding and agreement   ______________________________________________________________________    Chief Complaint   Patient presents with    Follow-up       HPI:   No history exists  Interval History:  Clinically stable  0 - Asymptomatic    Review of Systems   Constitutional: Negative for appetite change, diaphoresis, fatigue and fever  HENT: Negative for sinus pain  Eyes: Negative for discharge  Respiratory: Negative for cough and shortness of breath  Cardiovascular: Negative for chest pain  Gastrointestinal: Negative for abdominal pain, constipation and diarrhea  Endocrine: Negative for cold intolerance  Genitourinary: Negative for difficulty urinating and hematuria  Musculoskeletal: Negative for joint swelling  Skin: Negative for rash  Allergic/Immunologic: Negative for environmental allergies  Neurological: Negative for dizziness and headaches  Hematological: Negative for adenopathy  Psychiatric/Behavioral: Negative for agitation         Past Medical History:   Diagnosis Date    Abdominal pain     started 7/21/16- found something on the pancreas    Abnormal weight loss     last assessed [id number]    Acute sinusitis     Allergic rhinitis     last assessed [id number]    Anemia     Asthma     Cardiac murmur     Cellulitis     Cervical disc disease     Degeneration of thoracic intervertebral disc     Degenerative arthritis of knee     Diabetes mellitus ([facility]     Diverticulosis     Esophagitis, reflux     last assessed 29Jan2014    Excessive sweating     Fatty liver     Gait disturbance     Granuloma annulare     Head injury     Hyperlipidemia     Hypertension     Hypertensive heart disease     Hypoglycemia     last assessed [id number]    Leg length discrepancy     Leucocytosis     Multiple thyroid nodules     Neuropathy     Obesity     last assessed [id number]    Paronychia of toe     Patellofemoral dysfunction     Postmenopausal atrophic vaginitis     Prepatellar bursitis     Sebaceous cyst     last assessed [id number]    Sleep apnea     no CPAP used    Suicide attempt ([facility]     Tinea corporis     Type 2 diabetes mellitus ([facility] )     cont meds and monitor BS;  last assessed [id number]    Urinary frequency     Vertigo     Vitamin D deficiency     Dia-Parkinson-White syndrome      Patient Active Problem List   Diagnosis    Anomalies of nails    Asthma    Benign essential hypertension    Bipolar I disorder, most recent episode depressed, in full remission ([facility] )    Cardiac murmur    Cervical disc disease    Degeneration of thoracic intervertebral disc    Degenerative arthritis of knee, bilateral    Diabetes mellitus type 2, uncontrolled ([facility]    Diverticulosis    Dyslipidemia    Essential hypertriglyceridemia    Fatty liver    Gastroesophageal reflux disease    Granuloma annulare    Hypertensive heart disease    Iron deficiency anemia    Left thyroid nodule    Leg length discrepancy    Multiple thyroid nodules    Neuropathy    Obesity, Class II, BMI 35 0-39 9, with comorbidity (see actual BMI)    Obstructive sleep apnea    Other acquired deformity of knee    Personality disorder    Postmenopausal atrophic vaginitis    Prepatellar bursitis    Vertigo    Vitamin D deficiency    Anomalous atrioventricular excitation       Current Outpatient Prescriptions:     albuterol (PROAIR HFA) 90 mcg/act inhaler, ProAir  (90 Base) MCG/ACT Inhalation Aerosol Solution INHALE 1 TO 2 PUFFS EVERY 4 TO 6 HOURS AS NEEDED  Quantity: 1;  Refills: 3   Started 4-June-2015 Active8  5 GM Inhaler, Disp: , Rfl:     atorvastatin (LIPITOR) 20 mg tablet, Take 1 tablet (20 mg total) by mouth daily, Disp: 90 tablet, Rfl: 1    Azelastine HCl 0 15 % SOLN, Azelastine HCl - 0 15 % Nasal Solution USE 2 SPRAYS AT BEDTIME  Refills: 0  Active, Disp: , Rfl:     Cholecalciferol (VITAMIN D PO), Take 50,000 Units by mouth once a week  , Disp: , Rfl:     fenofibrate (TRICOR) 145 mg tablet, Fenofibrate 145 MG Oral Tablet Take 1 tablet daily  Quantity: 30;  Refills: 6    GianlucaAdeel rahman ;  Started 4-Apr-2012 Active, Disp: , Rfl:     FLUoxetine (PROzac) 20 mg capsule, Take 1 capsule (20 mg total) by mouth daily for 90 days Total Prozac dose is 60 mg daily, Disp: 30 capsule, Rfl: 2    FLUoxetine (PROzac) 40 MG capsule, Take 40 mg by mouth 2 (two) times a day , Disp: , Rfl:     HUMALOG KWIKPEN 200 units/mL CONCENTRATED U-200 injection pen, USE UP TO 75 UNITS DAILY USING INSULIN CARB RATIO 1:5 AND INSULIN SENSITIVITY FACTOR OF 1:20 , Disp: 20 pen, Rfl: 3    insulin degludec (TRESIBA FLEXTOUCH) injection pen 100 units/mL, Inject 44 Units under the skin every morning, Disp: 5 pen, Rfl: 0    Insulin Pen Needle (BD PEN NEEDLE MARISOL U/F) 32G X 4 MM MISC, Use as directed with insulin, Disp: 100 each, Rfl: 2    JARDIANCE 25 MG TABS, Take 1 tablet (25 mg total) by mouth daily, Disp: 30 tablet, Rfl: 2    lansoprazole (PREVACID) 30 mg capsule, Lansoprazole 30 MG Oral Capsule Delayed Release TAKE 1 CAPSULE EVERY MORNING DAILY    Quantity: 90;  Refills: 3    Jessica BATES ;  Started 11-Sep-2014 Active, Disp: , Rfl:     lisinopril (ZESTRIL) 20 mg tablet, Take 1 tablet (20 mg total) by mouth every evening 20 mg tablet daily, Disp: 90 tablet, Rfl: 0    lurasidone (LATUDA) 20 mg tablet, Take 20 mg by mouth every morning , Disp: , Rfl:     lurasidone (LATUDA) 80 mg tablet, Take 1 tablet (80 mg total) by mouth daily with dinner for 60 days, Disp: 30 tablet, Rfl: 1    metFORMIN (GLUCOPHAGE) 1000 MG tablet, Take 1 tablet (1,000 mg total) by mouth 2 (two) times a day with meals, Disp: 180 tablet, Rfl: 1    Omega-3 Fatty Acids (FISH OIL) 1,000 mg, Take 1 capsule by mouth daily, Disp: , Rfl:   Allergies   Allergen Reactions    Bee Venom     Carbamazepine Other (See Comments)     Reaction Date:Unknown    Clarithromycin Itching    Shellfish-Derived Products     Lamotrigine Rash     Past Surgical History:   Procedure Laterality Date    COLONOSCOPY      ESOPHAGOGASTRODUODENOSCOPY N/A 8/8/2016    Procedure: ESOPHAGOGASTRODUODENOSCOPY (EGD); Surgeon: Kaylee Wick MD;  Location: [facility]; Service:     TONSILLECTOMY      TOOTH EXTRACTION      wisdom teeth removed     Social History     Objective: There were no vitals filed for this visit  Physical Exam   Constitutional: She is oriented to person, place, and time  She appears well-developed  HENT:   Head: Normocephalic  Eyes: Pupils are equal, round, and reactive to light  Neck: Neck supple  Cardiovascular: Normal rate  No murmur heard  Pulmonary/Chest: No respiratory distress  She has no wheezes  She has no rales  Abdominal: Soft  She exhibits no distension  There is no tenderness  There is no rebound  Musculoskeletal: She exhibits no edema  Lymphadenopathy:     She has no cervical adenopathy  Neurological: She is alert and oriented to person, place, and time  She displays normal reflexes  Skin: Skin is warm  No rash noted  Psychiatric: She has a normal mood and affect  Thought content normal          Labs: I personally reviewed the labs and imaging pertinent to this patient care

## 2018-07-18 ENCOUNTER — OFFICE VISIT (OUTPATIENT)
Dept: PSYCHIATRY | Facility: CLINIC | Age: 58
End: 2018-07-18
Payer: COMMERCIAL

## 2018-07-18 VITALS
DIASTOLIC BLOOD PRESSURE: 64 MMHG | HEIGHT: 64 IN | HEART RATE: 75 BPM | BODY MASS INDEX: 37.73 KG/M2 | SYSTOLIC BLOOD PRESSURE: 155 MMHG | WEIGHT: 221 LBS

## 2018-07-18 DIAGNOSIS — F31.76 BIPOLAR I DISORDER, MOST RECENT EPISODE DEPRESSED, IN FULL REMISSION (HCC): Primary | Chronic | ICD-10-CM

## 2018-07-18 DIAGNOSIS — F41.1 GAD (GENERALIZED ANXIETY DISORDER): Chronic | ICD-10-CM

## 2018-07-18 DIAGNOSIS — F60.9 PERSONALITY DISORDER (HCC): Chronic | ICD-10-CM

## 2018-07-18 PROCEDURE — 90833 PSYTX W PT W E/M 30 MIN: CPT | Performed by: PSYCHIATRY & NEUROLOGY

## 2018-07-18 PROCEDURE — 99213 OFFICE O/P EST LOW 20 MIN: CPT | Performed by: PSYCHIATRY & NEUROLOGY

## 2018-07-18 RX ORDER — HYDROXYCHLOROQUINE SULFATE 200 MG/1
200 TABLET, FILM COATED ORAL 2 TIMES DAILY WITH MEALS
COMMUNITY
End: 2018-10-24 | Stop reason: ALTCHOICE

## 2018-07-18 RX ORDER — FLUOXETINE HYDROCHLORIDE 20 MG/1
20 CAPSULE ORAL DAILY
Qty: 30 CAPSULE | Refills: 3 | Status: SHIPPED | OUTPATIENT
Start: 2018-07-18 | End: 2018-11-07 | Stop reason: SDUPTHER

## 2018-07-18 RX ORDER — FLUOXETINE HYDROCHLORIDE 40 MG/1
40 CAPSULE ORAL DAILY
Qty: 30 CAPSULE | Refills: 3 | Status: SHIPPED | OUTPATIENT
Start: 2018-07-18 | End: 2018-11-07 | Stop reason: SDUPTHER

## 2018-07-18 NOTE — PSYCH
TREATMENT PLAN (Medication Management Only)        [address]    Name/Date of Birth/MRN:  Maurizio Young 65 y o  1960 MRN: [id number]  Date of Treatment Plan: July 18, 2018  Diagnosis/Diagnoses:   1  Bipolar I disorder, most recent episode depressed, in full remission ([facility] )    2  Personality disorder    3  LOURDES (generalized anxiety disorder)      Strengths/Personal Resources for Self-Care: "keep on top of things, medications", supportive family  Area/Areas of need (in own words): "self motivation"  1  Long Term Goal: improve control of anxiety  Target Date: 3 months - 10/18/2018  Person/Persons responsible for completion of goal: Jessica  2  Short Term Objective (s) - How will we reach this goal?:   A  Provider new recommended medication/dosage changes and/or continue medication(s): continue current medications as prescribed (Prozac and Latuda)  B   N/A   C   N/A  Target Date: 3 months - 10/18/2018  Person/Persons Responsible for Completion of Goal: Marisela Dunbar   Progress Towards Goals: progressing  Treatment Modality: medication management every 3 months  Review due 90 to 120 days from date of this plan: 4 months - 11/18/2018  Expected length of service: ongoing treatment  My Physician/PA/NP and I have developed this plan together and I agree to work on the goals and objectives  I understand the treatment goals that were developed for my treatment    Signature:       Date and time:  Signature of parent/guardian if under age of 15 years: Date and time:  Signature of provider:      Date and time:  Signature of Supervising Physician:    Date and time: 7/18/2018      Adalid Graff MD

## 2018-07-24 ENCOUNTER — OFFICE VISIT (OUTPATIENT)
Dept: INTERNAL MEDICINE CLINIC | Facility: CLINIC | Age: 58
End: 2018-07-24
Payer: COMMERCIAL

## 2018-07-24 VITALS
RESPIRATION RATE: 16 BRPM | SYSTOLIC BLOOD PRESSURE: 148 MMHG | BODY MASS INDEX: 37.87 KG/M2 | WEIGHT: 221.8 LBS | DIASTOLIC BLOOD PRESSURE: 78 MMHG | HEART RATE: 62 BPM | HEIGHT: 64 IN | OXYGEN SATURATION: 96 %

## 2018-07-24 DIAGNOSIS — F31.76 BIPOLAR I DISORDER, MOST RECENT EPISODE DEPRESSED, IN FULL REMISSION (HCC): Chronic | ICD-10-CM

## 2018-07-24 DIAGNOSIS — K21.9 GASTROESOPHAGEAL REFLUX DISEASE WITHOUT ESOPHAGITIS: ICD-10-CM

## 2018-07-24 DIAGNOSIS — Z79.4 UNCONTROLLED TYPE 2 DIABETES MELLITUS WITH HYPERGLYCEMIA, WITH LONG-TERM CURRENT USE OF INSULIN (HCC): Primary | ICD-10-CM

## 2018-07-24 DIAGNOSIS — E04.1 LEFT THYROID NODULE: ICD-10-CM

## 2018-07-24 DIAGNOSIS — J45.909 UNCOMPLICATED ASTHMA, UNSPECIFIED ASTHMA SEVERITY, UNSPECIFIED WHETHER PERSISTENT: ICD-10-CM

## 2018-07-24 DIAGNOSIS — I11.9 HYPERTENSIVE HEART DISEASE WITHOUT HEART FAILURE: ICD-10-CM

## 2018-07-24 DIAGNOSIS — L92.0 GRANULOMA ANNULARE: ICD-10-CM

## 2018-07-24 DIAGNOSIS — E78.1 ESSENTIAL HYPERTRIGLYCERIDEMIA: ICD-10-CM

## 2018-07-24 DIAGNOSIS — E11.65 UNCONTROLLED TYPE 2 DIABETES MELLITUS WITH HYPERGLYCEMIA, WITH LONG-TERM CURRENT USE OF INSULIN (HCC): Primary | ICD-10-CM

## 2018-07-24 DIAGNOSIS — G47.33 OBSTRUCTIVE SLEEP APNEA: ICD-10-CM

## 2018-07-24 DIAGNOSIS — E55.9 VITAMIN D DEFICIENCY: ICD-10-CM

## 2018-07-24 PROCEDURE — 99214 OFFICE O/P EST MOD 30 MIN: CPT | Performed by: INTERNAL MEDICINE

## 2018-07-24 RX ORDER — ALBUTEROL SULFATE 90 UG/1
2 AEROSOL, METERED RESPIRATORY (INHALATION) EVERY 6 HOURS PRN
Qty: 1 INHALER | Refills: 0 | Status: SHIPPED | OUTPATIENT
Start: 2018-07-24 | End: 2019-02-08

## 2018-07-24 RX ORDER — RANITIDINE 150 MG/1
150 TABLET ORAL DAILY
Qty: 30 TABLET | Refills: 5 | Status: SHIPPED | OUTPATIENT
Start: 2018-07-24 | End: 2019-01-28 | Stop reason: ALTCHOICE

## 2018-07-24 NOTE — ASSESSMENT & PLAN NOTE
Patient is currently stable, she does not have Lynn's I did review her past EGD and biopsy report we will discontinue Prevacid and try Zantac 150 mg 1 tablet once a day based on the new study showing potential side effects proton pump inhibitors if any problems or a worsening of GERD please notify me

## 2018-07-24 NOTE — ASSESSMENT & PLAN NOTE
Currently stable the patient has had some mild intermittent symptoms going from a cool environment to a warm environment she does not have a rescue inhaler I have given her a prescription for ProAir, if she is using it more than twice a week she is to notify me for a maintenance inhaler

## 2018-07-24 NOTE — ASSESSMENT & PLAN NOTE
Improved on Plaquenil I did recommend she see Ophthalmology for a routine eye examination    She is also due for eye examination for diabetes

## 2018-07-24 NOTE — ASSESSMENT & PLAN NOTE
Continue with fish oil, recommend a healthy balance diet there is been some improvement since the last visit she plans to start walking after intermediate routinely  Laboratories via Endocrinology

## 2018-07-24 NOTE — ASSESSMENT & PLAN NOTE
Currently stable being monitored through Endocrinology I did review the Endocrinology report and ultrasound with the patient

## 2018-07-24 NOTE — PROGRESS NOTES
Assessment/Plan:           Problem List Items Addressed This Visit        Digestive    Gastroesophageal reflux disease     Patient is currently stable, she does not have Lynn's I did review her past EGD and biopsy report we will discontinue Prevacid and try Zantac 150 mg 1 tablet once a day based on the new study showing potential side effects proton pump inhibitors if any problems or a worsening of GERD please notify me  Relevant Medications    ranitidine (ZANTAC) 150 mg tablet       Endocrine    Diabetes mellitus type 2, uncontrolled ([facility] [address] ) - Primary    Relevant Orders    Ambulatory referral to Ophthalmology    Left thyroid nodule     Currently stable being monitored through Endocrinology I did review the Endocrinology report and ultrasound with the patient  Respiratory    Asthma     Currently stable the patient has had some mild intermittent symptoms going from a cool environment to a warm environment she does not have a rescue inhaler I have given her a prescription for ProAir, if she is using it more than twice a week she is to notify me for a maintenance inhaler  Relevant Medications    albuterol (PROAIR HFA) 90 mcg/act inhaler    Obstructive sleep apnea     Currently stable doing well encourage weight loss  She continues with CPAP            Cardiovascular and Mediastinum    Hypertensive heart disease       Musculoskeletal and Integument    Granuloma annulare     Improved on Plaquenil I did recommend she see Ophthalmology for a routine eye examination  She is also due for eye examination for diabetes            Other    Bipolar I disorder, most recent episode depressed, in full remission ([facility] [address] ) (Chronic)     Currently stable doing well continue with current medical regimen will continue monitor           Essential hypertriglyceridemia     Continue with fish oil, recommend a healthy balance diet there is been some improvement since the last visit she plans to start walking after [employer] routinely  Laboratories via Endocrinology  Vitamin D deficiency          Return to office 6  months  call if any problems  Recommend flu vaccine in the fall and recommend the shingles vaccine when it becomes available she will sign up for the wait list for this vaccine  Subjective:      Patient ID: Ede Pitts is a 65 y o  female  HPI 64-year old female coming in for a follow up office visit type 2 diabetes, asthma, KODY, hypertension, bipolar, essential hypertriglyceridemia; The patient reports me compliant taking medications without untoward side effects the  The patient is here to review his medical condition, update me on the medical condition and the patient reports me no hospitalizations and no ER visits  Patient does report me her mood is doing well she has been to her psychiatrist no change in medication  The patient also reports me that she has seen her endocrinologist   The patient reports me she is taking over-the-counter fish oil and eating fish twice a week  Overall she is feeling well she is planning to retire in October she is currently working night shift and there will be more time for exercise  She also reports me some stress with work which will be on limited  She plans to get her flu shot at work  She is considering a shingles vaccine and will sign up for the shingles vaccine  She does report me overall her asthma is stable but she does have mild intermittent flare ups when she goes from a cold area outside to the humid area she does not have a rescue inhaler she reports me it occurs very infrequently  The following portions of the patient's history were reviewed and updated as appropriate: allergies, current medications, past family history, past medical history, past social history, past surgical history and problem list     Review of Systems   Constitutional: Negative for activity change, appetite change and unexpected weight change     HENT: Negative for congestion and postnasal drip  Eyes: Negative for visual disturbance  Respiratory: Positive for shortness of breath (Mild intermittent short of breath when she goes from cool environment to humid environment)  Negative for cough  Cardiovascular: Negative for chest pain  Gastrointestinal: Negative for abdominal pain, diarrhea, nausea and vomiting  Neurological: Negative for dizziness, light-headedness and headaches  Hematological: Negative for adenopathy  Objective:      /78 (BP Location: Right arm, Patient Position: Sitting, Cuff Size: Standard)   Pulse 62   Resp 16   Ht 5' 4" (1 626 m)   Wt 101 kg (221 lb 12 8 oz)   SpO2 96%   BMI 38 07 kg/m²                     No Follow-up on file        Allergies   Allergen Reactions    Depakote Er [Divalproex Sodium Er]      pancreatitis    Tanzeum [Albiglutide]      pancreatitis    Bee Venom     Carbamazepine Other (See Comments)     Reaction Date:Unknown    Clarithromycin Itching    Shellfish-Derived Products     Lamotrigine Rash       Past Medical History:   Diagnosis Date    Abdominal pain     started 7/21/16- found something on the pancreas    Abnormal weight loss     last assessed [id number]    Acute sinusitis     Allergic rhinitis     last assessed [id number]    Anemia     Asthma     Cardiac murmur     Cellulitis     Cervical disc disease     Degeneration of thoracic intervertebral disc     Degenerative arthritis of knee     Diabetes mellitus ([facility] [address] )     Diverticulosis     Esophagitis, reflux     last assessed [id number]    Excessive sweating     Fatty liver     Gait disturbance     Granuloma annulare     Head injury     Hyperlipidemia     Hypertension     Hypertensive heart disease     Hypoglycemia     last assessed [id number]    Leg length discrepancy     Leucocytosis     Multiple thyroid nodules     Neuropathy     Obesity     last assessed [id number]    Paronychia of toe     Patellofemoral dysfunction     Postmenopausal atrophic vaginitis     Prepatellar bursitis     Sebaceous cyst     last assessed [id number]    Sleep apnea     no CPAP used    Suicide attempt ([facility] [address] )     Tinea corporis     Type 2 diabetes mellitus ([facility] [address] )     cont meds and monitor BS;  last assessed [id number]    Urinary frequency     Vertigo     Vitamin D deficiency     Dia-Parkinson-White syndrome      Past Surgical History:   Procedure Laterality Date    COLONOSCOPY      ESOPHAGOGASTRODUODENOSCOPY N/A 8/8/2016    Procedure: ESOPHAGOGASTRODUODENOSCOPY (EGD); Surgeon: Diaz Lindsey MD;  Location: [facility];   Service:     TONSILLECTOMY      TOOTH EXTRACTION      wisdom teeth removed     Current Outpatient Prescriptions on File Prior to Visit   Medication Sig Dispense Refill    atorvastatin (LIPITOR) 20 mg tablet Take 1 tablet (20 mg total) by mouth daily 90 tablet 1    Cholecalciferol (VITAMIN D PO) Take 50,000 Units by mouth once a week        fenofibrate (TRICOR) 145 mg tablet Fenofibrate 145 MG Oral Tablet  Take 1 tablet daily   Quantity: 30;  Refills: 6       Adeel Hough DO;  Started 4-Apr-2012  Active      FLUoxetine (PROzac) 20 mg capsule Take 1 capsule (20 mg total) by mouth daily for 120 days Total Prozac dose is 60 mg daily 30 capsule 3    FLUoxetine (PROzac) 40 MG capsule Take 1 capsule (40 mg total) by mouth daily for 120 days Total Prozac dose is 60 mg daily 30 capsule 3    HUMALOG KWIKPEN 200 units/mL CONCENTRATED U-200 injection pen USE UP TO 75 UNITS DAILY USING INSULIN CARB RATIO 1:5 AND INSULIN SENSITIVITY FACTOR OF 1:20  20 pen 3    hydroxychloroquine (PLAQUENIL) 200 mg tablet Take 200 mg by mouth 2 (two) times a day with meals      insulin degludec (TRESIBA FLEXTOUCH) injection pen 100 units/mL Inject 44 Units under the skin every morning 5 pen 0    Insulin Pen Needle (BD PEN NEEDLE MARISOL U/F) 32G X 4 MM MISC Use as directed with insulin 100 each 2    JARDIANCE 25 MG TABS Take 1 tablet (25 mg total) by mouth daily 30 tablet 2    lisinopril (ZESTRIL) 20 mg tablet Take 1 tablet (20 mg total) by mouth every evening 20 mg tablet daily 90 tablet 0    lurasidone (LATUDA) 80 mg tablet Take 1 tablet (80 mg total) by mouth daily with dinner for 120 days 30 tablet 3    metFORMIN (GLUCOPHAGE) 1000 MG tablet Take 1 tablet (1,000 mg total) by mouth 2 (two) times a day with meals 180 tablet 1    [DISCONTINUED] albuterol (PROAIR HFA) 90 mcg/act inhaler ProAir  (90 Base) MCG/ACT Inhalation Aerosol Solution  INHALE 1 TO 2 PUFFS EVERY 4 TO 6 HOURS AS NEEDED  Quantity: 1;  Refills: 3     Started 4-June-2015  Active8  5 GM Inhaler      [DISCONTINUED] lansoprazole (PREVACID) 30 mg capsule Lansoprazole 30 MG Oral Capsule Delayed Release  TAKE 1 CAPSULE EVERY MORNING DAILY  Quantity: 90;  Refills: 3       Leann BATES ;  Started 11-Sep-2014  Active       No current facility-administered medications on file prior to visit  Family History   Problem Relation Age of Onset   [employer] Cancer Mother         lung    Heart disease Mother     Coronary artery disease Mother         CABG    Diabetes Mother     Lung cancer Mother     Stroke Mother     Thyroid disease Mother     Other Mother         tobacco use    Parkinsonism Father     Intestinal polyp Father     Bipolar disorder Father     Heart disease Father     Hypertension Sister     Alcohol abuse Sister     Depression Brother     Diabetes Maternal Grandmother     Thyroid disease Maternal Grandmother     Hypertension Family     Suicide Attempts Paternal Aunt     Depression Paternal Aunt      Social History     Social History    Marital status: /Civil Union     Spouse name: N/A    Number of children: 0    Years of education: 15     Occupational History    Certified Nursing Assistant      Social History Main Topics    Smoking status: Never Smoker    Smokeless tobacco: Never Used    Alcohol use Yes      Comment: rarely    Drug use:  No  Sexual activity: Yes     Other Topics Concern    Not on file     Social History Narrative    Education: high school graduate    Learning Disabilities: none    Marital History:     Children: none    Living Arrangement: lives in home with     Occupational History: works as a Certified Nursing Assistant at [employer]: good support system,  is supportive    Legal History: none     History: None        Caffeine use    Denied home environment domestic violence    Job physical requirements heavy lifting         Vitals:    07/24/18 0758   BP: 148/78   BP Location: Right arm   Patient Position: Sitting   Cuff Size: Standard   Pulse: 62   Resp: 16   SpO2: 96%   Weight: 101 kg (221 lb 12 8 oz)   Height: 5' 4" (1 626 m)     Results for orders placed or performed in visit on 07/11/18   CBC   Result Value Ref Range    WBC 13 23 (H) 4 31 - 10 16 Thousand/uL    RBC 4 97 3 81 - 5 12 Million/uL    Hemoglobin 12 9 11 5 - 15 4 g/dL    Hematocrit 42 3 34 8 - 46 1 %    MCV 85 82 - 98 fL    MCH 26 0 (L) 26 8 - 34 3 pg    MCHC 30 5 (L) 31 4 - 37 4 g/dL    RDW 15 3 (H) 11 6 - 15 1 %    Platelets 084 602 - 859 Thousands/uL    MPV 10 2 8 9 - 12 7 fL   Comprehensive metabolic panel   Result Value Ref Range    Sodium 137 136 - 145 mmol/L    Potassium 3 9 3 5 - 5 3 mmol/L    Chloride 103 100 - 108 mmol/L    CO2 28 21 - 32 mmol/L    Anion Gap 6 4 - 13 mmol/L    BUN 20 5 - 25 mg/dL    Creatinine 0 64 0 60 - 1 30 mg/dL    Glucose, Fasting 103 (H) 65 - 99 mg/dL    Calcium 9 2 8 3 - 10 1 mg/dL    AST 16 5 - 45 U/L    ALT 35 12 - 78 U/L    Alkaline Phosphatase 96 46 - 116 U/L    Total Protein 7 7 6 4 - 8 2 g/dL    Albumin 3 9 3 5 - 5 0 g/dL    Total Bilirubin 0 28 0 20 - 1 00 mg/dL    eGFR 100 ml/min/1 73sq m   Ferritin   Result Value Ref Range    Ferritin 106 8 - 388 ng/mL   Iron Saturation %   Result Value Ref Range    Iron Saturation 12 %    TIBC 388 250 - 450 ug/dL    Iron 46 (L) 50 - 170 ug/dL   Hemoglobin A1c   Result Value Ref Range    Hemoglobin A1C 7 2 (H) 4 2 - 6 3 %     mg/dl   Lipid panel   Result Value Ref Range    Cholesterol 113 50 - 200 mg/dL    Triglycerides 249 (H) <=150 mg/dL    HDL, Direct 36 (L) 40 - 60 mg/dL    LDL Calculated 27 0 - 100 mg/dL    Non-HDL-Chol (CHOL-HDL) 77 mg/dl   Microalbumin / creatinine urine ratio   Result Value Ref Range    Creatinine, Ur 91 6 mg/dL    Microalbum  ,U,Random 11 3 0 0 - 20 0 mg/L    Microalb Creat Ratio 12 0 - 30 mg/g creatinine     Weight (last 2 days)     Date/Time   Weight    07/24/18 0758  101 (221 8)            Body mass index is 38 07 kg/m²  /78 (07/24/18 0758)    Temp      Pulse 62 (07/24/18 0758)   Resp 16 (07/24/18 0758)    SpO2 96 % (07/24/18 0758)      Vitals:    07/24/18 0758   Weight: 101 kg (221 lb 12 8 oz)     Vitals:    07/24/18 0758   Weight: 101 kg (221 lb 12 8 oz)          Physical Exam   Constitutional: She appears well-developed and well-nourished  HENT:   Head: Normocephalic  Mouth/Throat: Oropharynx is clear and moist    Eyes: Conjunctivae are normal  Pupils are equal, round, and reactive to light  Right eye exhibits no discharge  Left eye exhibits no discharge  No scleral icterus  Neck: Neck supple  Cardiovascular: Normal rate, regular rhythm, normal heart sounds and intact distal pulses  Exam reveals no gallop and no friction rub  No murmur heard  Pulmonary/Chest: Breath sounds normal  No respiratory distress  She has no wheezes  She has no rales  Abdominal: Soft  Bowel sounds are normal  She exhibits no distension and no mass  There is no tenderness  There is no rebound and no guarding  Musculoskeletal: She exhibits no edema or deformity  Lymphadenopathy:     She has no cervical adenopathy  Neurological: She is alert   Coordination normal

## 2018-07-30 ENCOUNTER — TELEPHONE (OUTPATIENT)
Dept: INTERNAL MEDICINE CLINIC | Facility: CLINIC | Age: 58
End: 2018-07-30

## 2018-08-01 ENCOUNTER — HOSPITAL ENCOUNTER (OUTPATIENT)
Dept: MAMMOGRAPHY | Facility: HOSPITAL | Age: 58
Discharge: HOME | End: 2018-08-01
Payer: COMMERCIAL

## 2018-08-01 DIAGNOSIS — Z12.39 BREAST CANCER SCREENING: ICD-10-CM

## 2018-08-01 PROCEDURE — 77067 SCR MAMMO BI INCL CAD: CPT

## 2018-08-03 ENCOUNTER — TELEPHONE (OUTPATIENT)
Dept: ENDOCRINOLOGY | Facility: CLINIC | Age: 58
End: 2018-08-03

## 2018-08-03 NOTE — TELEPHONE ENCOUNTER
Meter download  jardiance 25mg daily  humalog up to 75 units 1:5 ICR  tresiba 44 u daily  Metformin 500mg bid

## 2018-08-03 NOTE — TELEPHONE ENCOUNTER
Meter download  jardiance 25mg daily  humalog up to 75 units 1:5 ICR  tresiba 44 u daily  Metformin 500mg bid    Blood sugars are relatively well controlled  Continue current regimen  Please inform patient to keep carbohydrate consistent with meals      Juan Luis Ferreira MD

## 2018-08-21 DIAGNOSIS — E78.1 HYPERTRIGLYCERIDEMIA: Primary | ICD-10-CM

## 2018-08-21 DIAGNOSIS — F31.76 BIPOLAR I DISORDER, MOST RECENT EPISODE DEPRESSED, IN FULL REMISSION (HCC): Chronic | ICD-10-CM

## 2018-08-21 RX ORDER — LURASIDONE HYDROCHLORIDE 80 MG/1
80 TABLET, FILM COATED ORAL
Qty: 30 TABLET | Refills: 1 | OUTPATIENT
Start: 2018-08-21 | End: 2018-10-20

## 2018-08-21 RX ORDER — FENOFIBRATE 145 MG/1
TABLET, COATED ORAL
Qty: 90 TABLET | Refills: 1 | Status: SHIPPED | OUTPATIENT
Start: 2018-08-21 | End: 2019-05-03 | Stop reason: SDUPTHER

## 2018-08-27 DIAGNOSIS — E11.65 UNCONTROLLED TYPE 2 DIABETES MELLITUS WITH COMPLICATION, UNSPECIFIED WHETHER LONG TERM INSULIN USE: ICD-10-CM

## 2018-08-27 DIAGNOSIS — E11.8 UNCONTROLLED TYPE 2 DIABETES MELLITUS WITH COMPLICATION, UNSPECIFIED WHETHER LONG TERM INSULIN USE: ICD-10-CM

## 2018-08-27 RX ORDER — EMPAGLIFLOZIN 25 MG/1
25 TABLET, FILM COATED ORAL DAILY
Qty: 90 TABLET | Refills: 0 | Status: SHIPPED | OUTPATIENT
Start: 2018-08-27 | End: 2018-09-04 | Stop reason: SDUPTHER

## 2018-09-04 DIAGNOSIS — E11.65 UNCONTROLLED TYPE 2 DIABETES MELLITUS WITH COMPLICATION, UNSPECIFIED WHETHER LONG TERM INSULIN USE: ICD-10-CM

## 2018-09-04 DIAGNOSIS — I10 ESSENTIAL HYPERTENSION: ICD-10-CM

## 2018-09-04 DIAGNOSIS — E11.8 UNCONTROLLED TYPE 2 DIABETES MELLITUS WITH COMPLICATION, UNSPECIFIED WHETHER LONG TERM INSULIN USE: ICD-10-CM

## 2018-09-04 RX ORDER — EMPAGLIFLOZIN 25 MG/1
25 TABLET, FILM COATED ORAL DAILY
Qty: 90 TABLET | Refills: 1 | Status: SHIPPED | OUTPATIENT
Start: 2018-09-04 | End: 2018-10-24 | Stop reason: SDUPTHER

## 2018-09-18 DIAGNOSIS — E55.9 VITAMIN D DEFICIENCY: Primary | ICD-10-CM

## 2018-09-18 RX ORDER — ERGOCALCIFEROL (VITAMIN D2) 1250 MCG
50000 CAPSULE ORAL WEEKLY
Qty: 12 CAPSULE | Refills: 1 | Status: SHIPPED | OUTPATIENT
Start: 2018-09-18 | End: 2019-03-01 | Stop reason: SDUPTHER

## 2018-10-02 DIAGNOSIS — E78.5 DYSLIPIDEMIA: ICD-10-CM

## 2018-10-02 DIAGNOSIS — IMO0002 UNCONTROLLED TYPE 2 DIABETES MELLITUS WITH COMPLICATION: ICD-10-CM

## 2018-10-02 RX ORDER — ATORVASTATIN CALCIUM 20 MG/1
20 TABLET, FILM COATED ORAL DAILY
Qty: 90 TABLET | Refills: 1 | Status: SHIPPED | OUTPATIENT
Start: 2018-10-02 | End: 2019-04-07 | Stop reason: SDUPTHER

## 2018-10-08 DIAGNOSIS — I10 ESSENTIAL HYPERTENSION: ICD-10-CM

## 2018-10-08 PROCEDURE — 4010F ACE/ARB THERAPY RXD/TAKEN: CPT | Performed by: INTERNAL MEDICINE

## 2018-10-08 RX ORDER — LISINOPRIL 20 MG/1
TABLET ORAL
Qty: 90 TABLET | Refills: 0 | Status: SHIPPED | OUTPATIENT
Start: 2018-10-08 | End: 2018-12-18 | Stop reason: SDUPTHER

## 2018-10-17 ENCOUNTER — TRANSCRIBE ORDERS (OUTPATIENT)
Dept: ADMINISTRATIVE | Age: 58
End: 2018-10-17

## 2018-10-17 ENCOUNTER — APPOINTMENT (OUTPATIENT)
Dept: LAB | Age: 58
End: 2018-10-17
Payer: COMMERCIAL

## 2018-10-17 DIAGNOSIS — Z79.4 UNCONTROLLED TYPE 2 DIABETES MELLITUS WITH HYPERGLYCEMIA, WITH LONG-TERM CURRENT USE OF INSULIN (HCC): ICD-10-CM

## 2018-10-17 DIAGNOSIS — E04.2 NONTOXIC MULTINODULAR GOITER: ICD-10-CM

## 2018-10-17 DIAGNOSIS — E04.2 NONTOXIC MULTINODULAR GOITER: Primary | ICD-10-CM

## 2018-10-17 DIAGNOSIS — E78.1 ESSENTIAL HYPERTRIGLYCERIDEMIA: ICD-10-CM

## 2018-10-17 DIAGNOSIS — E11.65 UNCONTROLLED TYPE 2 DIABETES MELLITUS WITH HYPERGLYCEMIA, WITH LONG-TERM CURRENT USE OF INSULIN (HCC): ICD-10-CM

## 2018-10-17 LAB
ALBUMIN SERPL BCP-MCNC: 4 G/DL (ref 3.5–5)
ALP SERPL-CCNC: 93 U/L (ref 46–116)
ALT SERPL W P-5'-P-CCNC: 39 U/L (ref 12–78)
ANION GAP SERPL CALCULATED.3IONS-SCNC: 8 MMOL/L (ref 4–13)
AST SERPL W P-5'-P-CCNC: 23 U/L (ref 5–45)
BILIRUB SERPL-MCNC: 0.36 MG/DL (ref 0.2–1)
BUN SERPL-MCNC: 18 MG/DL (ref 5–25)
CALCIUM SERPL-MCNC: 9 MG/DL (ref 8.3–10.1)
CHLORIDE SERPL-SCNC: 106 MMOL/L (ref 100–108)
CHOLEST SERPL-MCNC: 135 MG/DL (ref 50–200)
CO2 SERPL-SCNC: 25 MMOL/L (ref 21–32)
CREAT SERPL-MCNC: 0.73 MG/DL (ref 0.6–1.3)
EST. AVERAGE GLUCOSE BLD GHB EST-MCNC: 154 MG/DL
GFR SERPL CREATININE-BSD FRML MDRD: 92 ML/MIN/1.73SQ M
GLUCOSE P FAST SERPL-MCNC: 100 MG/DL (ref 65–99)
HBA1C MFR BLD: 7 % (ref 4.2–6.3)
HDLC SERPL-MCNC: 35 MG/DL (ref 40–60)
LDLC SERPL CALC-MCNC: 49 MG/DL (ref 0–100)
POTASSIUM SERPL-SCNC: 4.2 MMOL/L (ref 3.5–5.3)
PROT SERPL-MCNC: 8 G/DL (ref 6.4–8.2)
SODIUM SERPL-SCNC: 139 MMOL/L (ref 136–145)
T4 FREE SERPL-MCNC: 0.93 NG/DL (ref 0.76–1.46)
TRIGL SERPL-MCNC: 257 MG/DL
TSH SERPL DL<=0.05 MIU/L-ACNC: 2.34 UIU/ML (ref 0.36–3.74)

## 2018-10-17 PROCEDURE — 82043 UR ALBUMIN QUANTITATIVE: CPT

## 2018-10-17 PROCEDURE — 80061 LIPID PANEL: CPT

## 2018-10-17 PROCEDURE — 82570 ASSAY OF URINE CREATININE: CPT

## 2018-10-17 PROCEDURE — 84439 ASSAY OF FREE THYROXINE: CPT

## 2018-10-17 PROCEDURE — 84443 ASSAY THYROID STIM HORMONE: CPT

## 2018-10-17 PROCEDURE — 83036 HEMOGLOBIN GLYCOSYLATED A1C: CPT

## 2018-10-17 PROCEDURE — 80053 COMPREHEN METABOLIC PANEL: CPT

## 2018-10-17 PROCEDURE — 36415 COLL VENOUS BLD VENIPUNCTURE: CPT

## 2018-10-18 LAB
CREAT UR-MCNC: 210 MG/DL
MICROALBUMIN UR-MCNC: 128 MG/L (ref 0–20)
MICROALBUMIN/CREAT 24H UR: 61 MG/G CREATININE (ref 0–30)

## 2018-10-18 PROCEDURE — 3060F POS MICROALBUMINURIA REV: CPT | Performed by: INTERNAL MEDICINE

## 2018-10-24 ENCOUNTER — TELEPHONE (OUTPATIENT)
Dept: ENDOCRINOLOGY | Facility: CLINIC | Age: 58
End: 2018-10-24

## 2018-10-24 ENCOUNTER — OFFICE VISIT (OUTPATIENT)
Dept: ENDOCRINOLOGY | Facility: CLINIC | Age: 58
End: 2018-10-24
Payer: COMMERCIAL

## 2018-10-24 VITALS
BODY MASS INDEX: 37.87 KG/M2 | HEART RATE: 100 BPM | WEIGHT: 221.8 LBS | SYSTOLIC BLOOD PRESSURE: 148 MMHG | DIASTOLIC BLOOD PRESSURE: 62 MMHG | HEIGHT: 64 IN

## 2018-10-24 DIAGNOSIS — I10 BENIGN ESSENTIAL HYPERTENSION: ICD-10-CM

## 2018-10-24 DIAGNOSIS — IMO0002 UNCONTROLLED TYPE 2 DIABETES MELLITUS WITH COMPLICATION: ICD-10-CM

## 2018-10-24 DIAGNOSIS — E55.9 VITAMIN D DEFICIENCY: ICD-10-CM

## 2018-10-24 DIAGNOSIS — E78.5 HYPERLIPIDEMIA, UNSPECIFIED HYPERLIPIDEMIA TYPE: ICD-10-CM

## 2018-10-24 DIAGNOSIS — Z79.4 UNCONTROLLED TYPE 2 DIABETES MELLITUS WITH HYPERGLYCEMIA, WITH LONG-TERM CURRENT USE OF INSULIN (HCC): Primary | ICD-10-CM

## 2018-10-24 DIAGNOSIS — E11.65 UNCONTROLLED TYPE 2 DIABETES MELLITUS WITH HYPERGLYCEMIA, WITH LONG-TERM CURRENT USE OF INSULIN (HCC): Primary | ICD-10-CM

## 2018-10-24 DIAGNOSIS — E04.2 MULTIPLE THYROID NODULES: ICD-10-CM

## 2018-10-24 LAB
LEFT EYE DIABETIC RETINOPATHY: NORMAL
RIGHT EYE DIABETIC RETINOPATHY: NORMAL

## 2018-10-24 PROCEDURE — 3072F LOW RISK FOR RETINOPATHY: CPT | Performed by: INTERNAL MEDICINE

## 2018-10-24 PROCEDURE — 99214 OFFICE O/P EST MOD 30 MIN: CPT | Performed by: INTERNAL MEDICINE

## 2018-10-24 RX ORDER — EMPAGLIFLOZIN 25 MG/1
25 TABLET, FILM COATED ORAL DAILY
Qty: 90 TABLET | Refills: 0 | Status: SHIPPED | OUTPATIENT
Start: 2018-10-24 | End: 2019-05-12 | Stop reason: SDUPTHER

## 2018-10-24 NOTE — PATIENT INSTRUCTIONS
Hypoglycemia instructions   Sanjaycarolin Skiff  10/24/2018  [id number]    Low Blood Sugar    Steps to treat low blood sugar  1  Test blood sugar if you have symptoms of low blood sugar:   Low Blood Sugar Symptoms:  o Sweaty  o Dizzy  o Rapid heartbeat  o Shaky    o Bad mood  o Hungry      2  Treat blood sugar less than 70 with 15 grams of fast-acting carbohydrate:   Examples of 15 grams Fast-Acting Carbohydrate:  o 4 oz juice  o 4 oz regular soda  o 3-4 glucose tablets (chew)  o 3-4 hard candies (chew)              3    Wait 15 minutes and test your blood sugar again           4   If blood sugar is less than 100, repeat steps 2-3       5  When your blood sugar is 100 or more, eat a snack if it will be longer than one hour until your next meal  The snack should be 15 grams of carbohydrate and a protein:   Examples of snacks:  o ½ sandwich  o 6 crackers with cheese  o Piece of fruit with cheese or peanut butter  o 6 crackers with peanut butter

## 2018-10-24 NOTE — PROGRESS NOTES
Follow up  Patient Progress Note         Referring Provider  Luis Stockton,   [address] [address]  [place], [place] [facility]      History of Present Illness:   Kristin Dolan is a 63 y o  female with a history of type 2 diabetes with long term use of insulin   Diabetes course has been stable  Reports complications of diabetes such as microalbuminuria  She has history of bipolar disorder and personality disorder for which she is taking psych medications, managed by psychiatry  Most recent A1c improved to 7%   Denies recent illness or hospitalizations  Denies recent severe hypoglycemic or severe hyperglycemic episodes  Denies any issues with her current regimen  home glucose monitoring: are performed regularly    Home blood glucose readings:   She is checking blood sugars 4 times daily  Fasting 165 to 227  Before lunch 128-130  Before dinner 104-140  At bedtime 175-200 mg/dl     Current regimen: tresiba 44 units, in Am, humalog 200, 1 units for 5 grams of carb and ISF of 20 with target of 130     compliant most of the timedenies any side effects from medications  Injects in: abdomen  Rotates sites: Yes  Denies Hypoglycemic episodes   Denies H/o of hypoglycemia causing hospitalization or Intervention such as glucagon injection  or ambulance call :   Denies Hypoglycemia symptoms   Treatment of hypoglycemia: Candies or skittles      Medic alert tag: she has one   diabetic diet compliance:  noncompliant some of the time  Activity: Daily activity is predictable Yes The patient engages in little, if any physical activity  Opthamology: sees Opthalmology yearly,; no retinopathy,       Has hypertension: followed by PCP; lisinopril 20 mg daily compliant most of the time  Has hyperlipidemia: followed by PCP; on statin, Lipitor 20 mg daily and fenofibrate 145 mg daily- tolerating well, no myalgias  compliant most of the time  denies any side effects from medications    Vitamin-D deficiency, she takes 44030 International Units once a week    Component      Latest Ref Rng & Units 10/17/2018   Sodium      136 - 145 mmol/L 139   Potassium      3 5 - 5 3 mmol/L 4 2   Chloride      100 - 108 mmol/L 106   CO2      21 - 32 mmol/L 25   Anion Gap      4 - 13 mmol/L 8   BUN      5 - 25 mg/dL 18   Creatinine      0 60 - 1 30 mg/dL 0 73   GLUCOSE FASTING      65 - 99 mg/dL 100 (H)   Calcium      8 3 - 10 1 mg/dL 9 0   AST (SGOT)      5 - 45 U/L 23   ALT      12 - 78 U/L 39   ALK PHOS      46 - 116 U/L 93   Total Protein      6 4 - 8 2 g/dL 8 0   Albumin      3 5 - 5 0 g/dL 4 0   TOTAL BILIRUBIN      0 20 - 1 00 mg/dL 0 36   eGFR      ml/min/1 73sq m 92   Cholesterol      50 - 200 mg/dL 135   Triglycerides      <=150 mg/dL 257 (H)   HDL      40 - 60 mg/dL 35 (L)   LDL Direct      0 - 100 mg/dL 49   EXT Creatinine Urine      mg/dL 210 0   MICROALBUM ,U,RANDOM      0 0 - 20 0 mg/L 128 0 (H)   MICROALBUMIN/CREATININE RATIO      0 - 30 mg/g creatinine 61 (H)   Hemoglobin A1C      4 2 - 6 3 % 7 0 (H)   EAG      mg/dl 154   Free T4      0 76 - 1 46 ng/dL 0 93   TSH 3RD GENERATON      0 358 - 3 740 uIU/mL 2 340       Patient Active Problem List   Diagnosis    Anomalies of nails    Asthma    Benign essential hypertension    Bipolar I disorder, most recent episode depressed, in full remission (HCC)    Cardiac murmur    Cervical disc disease    Degeneration of thoracic intervertebral disc    Degenerative arthritis of knee, bilateral    Diabetes mellitus type 2, uncontrolled ([address] )    Diverticulosis    Dyslipidemia    Essential hypertriglyceridemia    Fatty liver    Gastroesophageal reflux disease    Granuloma annulare    Hypertensive heart disease    Iron deficiency anemia, unspecified    Left thyroid nodule    Leg length discrepancy    Multiple thyroid nodules    Neuropathy    Obesity, Class II, BMI 35 0-39 9, with comorbidity (see actual BMI)    Obstructive sleep apnea    Other acquired deformity of knee    Personality disorder [facility]    Postmenopausal atrophic vaginitis    Prepatellar bursitis    Vertigo    Vitamin D deficiency    Anomalous atrioventricular excitation    LOURDES (generalized anxiety disorder)      Past Medical History:   Diagnosis Date    Abdominal pain     started 7/21/16- found something on the pancreas    Abnormal weight loss     last assessed [id number]    Acute sinusitis     Allergic rhinitis     last assessed [id number]    Anemia     Asthma     Cardiac murmur     Cellulitis     Cervical disc disease     Degeneration of thoracic intervertebral disc     Degenerative arthritis of knee     Diabetes mellitus ([facility] [address] [facility]     Diverticulosis     Esophagitis, reflux     last assessed [id number]    Excessive sweating     Fatty liver     Gait disturbance     Granuloma annulare     Head injury     Hyperlipidemia     Hypertension     Hypertensive heart disease     Hypoglycemia     last assessed [id number]    Leg length discrepancy     Leucocytosis     Multiple thyroid nodules     Neuropathy     Obesity     last assessed [id number]    Paronychia of toe     Patellofemoral dysfunction     Postmenopausal atrophic vaginitis     Prepatellar bursitis     Sebaceous cyst     last assessed [id number]    Sleep apnea     no CPAP used    Suicide attempt ([facility] [address] [facility]     Tinea corporis     Type 2 diabetes mellitus ([facility] [address] )     cont meds and monitor BS;  last assessed [id number]    Urinary frequency     Vertigo     Vitamin D deficiency     Dia-Parkinson-White syndrome       Past Surgical History:   Procedure Laterality Date    COLONOSCOPY      ESOPHAGOGASTRODUODENOSCOPY N/A 8/8/2016    Procedure: ESOPHAGOGASTRODUODENOSCOPY (EGD); Surgeon: Estevan Trent MD;  Location: [facility];   Service:     TONSILLECTOMY      TOOTH EXTRACTION      wisdom teeth removed      Family History   Problem Relation Age of Onset    Cancer Mother         lung    Heart disease Mother     Coronary artery disease Mother         CABG    Diabetes Mother     Lung cancer Mother     Stroke Mother     Thyroid disease Mother     Other Mother         tobacco use    Parkinsonism Father     Intestinal polyp Father     Bipolar disorder Father     Heart disease Father     Hypertension Sister     Alcohol abuse Sister     Depression Brother     Diabetes Maternal Grandmother     Thyroid disease Maternal Grandmother     Hypertension Family     Suicide Attempts Paternal Aunt     Depression Paternal Aunt      Social History   Substance Use Topics    Smoking status: Never Smoker    Smokeless tobacco: Never Used    Alcohol use Yes      Comment: rarely     Allergies   Allergen Reactions    Depakote Er [Divalproex Sodium Er]      pancreatitis    Tanzeum [Albiglutide]      pancreatitis    Bee Venom     Carbamazepine Other (See Comments)     Reaction Date:Unknown    Clarithromycin Itching    Shellfish-Derived Products     Lamotrigine Rash         Current Outpatient Prescriptions:     albuterol (PROAIR HFA) 90 mcg/act inhaler, Inhale 2 puffs every 6 (six) hours as needed for wheezing, Disp: 1 Inhaler, Rfl: 0    atorvastatin (LIPITOR) 20 mg tablet, TAKE 1 TABLET (20 MG TOTAL) BY MOUTH DAILY, Disp: 90 tablet, Rfl: 1    ergocalciferol (ERGOCALCIFEROL) 39931 units capsule, Take 1 capsule (50,000 Units total) by mouth once a week, Disp: 12 capsule, Rfl: 1    fenofibrate (TRICOR) 145 mg tablet, TAKE 1 TABLET DAILY, Disp: 90 tablet, Rfl: 1    FLUoxetine (PROzac) 20 mg capsule, Take 1 capsule (20 mg total) by mouth daily for 120 days Total Prozac dose is 60 mg daily, Disp: 30 capsule, Rfl: 3    FLUoxetine (PROzac) 40 MG capsule, Take 1 capsule (40 mg total) by mouth daily for 120 days Total Prozac dose is 60 mg daily, Disp: 30 capsule, Rfl: 3    insulin degludec (TRESIBA FLEXTOUCH) 100 units/mL injection pen, Inject 44 Units under the skin every morning, Disp: 15 pen, Rfl: 0    Insulin Pen Needle (BD PEN NEEDLE MARISOL U/F) 32G X 4 MM MISC, Use 4x daily to inject insulin , Disp: 400 each, Rfl: 0    insuln lispro (HUMALOG KWIKPEN) 200 units/mL CONCENTRATED U-200 injection pen, USE UP TO 75 UNITS DAILY USING INSULIN CARB RATIO 1:5 for breakfast and lunch and 1 to 4 gm for dinner,  AND INSULIN SENSITIVITY FACTOR OF 1:20 , Disp: 20 pen, Rfl: 0    JARDIANCE 25 MG TABS, Take 1 tablet (25 mg total) by mouth daily, Disp: 90 tablet, Rfl: 0    lisinopril (ZESTRIL) 20 mg tablet, TAKE 1 TABLET IN THE EVENING, Disp: 90 tablet, Rfl: 0    lurasidone (LATUDA) 80 mg tablet, Take 1 tablet (80 mg total) by mouth daily with dinner for 120 days, Disp: 30 tablet, Rfl: 3    metFORMIN (GLUCOPHAGE) 1000 MG tablet, Take 1 tablet (1,000 mg total) by mouth 2 (two) times a day with meals, Disp: 180 tablet, Rfl: 0    ranitidine (ZANTAC) 150 mg tablet, Take 1 tablet (150 mg total) by mouth daily (Patient not taking: Reported on 10/24/2018 ), Disp: 30 tablet, Rfl: 5  Review of Systems   Constitutional: Positive for fatigue  Eyes: Negative  Respiratory: Positive for shortness of breath  Cardiovascular: Negative  Gastrointestinal: Negative  Endocrine: Positive for polydipsia, polyphagia and polyuria  Genitourinary: Negative  Neurological: Negative  Psychiatric/Behavioral: Negative  Physical Exam:  Body mass index is 38 07 kg/m²  /62   Pulse 100   Ht 5' 4" (1 626 m)   Wt 101 kg (221 lb 12 8 oz)   BMI 38 07 kg/m²    Wt Readings from Last 3 Encounters:   10/24/18 101 kg (221 lb 12 8 oz)   07/24/18 101 kg (221 lb 12 8 oz)   07/18/18 100 kg (221 lb)       Physical Exam   Constitutional: She is oriented to person, place, and time  She appears well-developed and well-nourished  No distress  HENT:   Head: Normocephalic and atraumatic  Eyes: Conjunctivae and EOM are normal  Right eye exhibits no discharge  Left eye exhibits no discharge  Neck: Normal range of motion  Neck supple  No thyromegaly present     Cardiovascular: Normal rate, regular rhythm and normal heart sounds  No murmur heard  Pulmonary/Chest: Effort normal and breath sounds normal  No respiratory distress  She has no wheezes  Abdominal: Soft  Bowel sounds are normal    Musculoskeletal: Normal range of motion  She exhibits no edema, tenderness or deformity  Neurological: She is alert and oriented to person, place, and time  She has normal reflexes  Skin: Skin is warm and dry  She is not diaphoretic  Psychiatric: She has a normal mood and affect  Her behavior is normal    Vitals reviewed  Diabetic Foot Exam    Labs:   No components found for: HA1C  No results found for: GLU    Lab Results   Component Value Date    CREATININE 0 73 10/17/2018    CREATININE 0 64 07/11/2018    CREATININE 0 76 06/14/2018    BUN 18 10/17/2018     10/17/2018    K 4 2 10/17/2018     10/17/2018    CO2 25 10/17/2018     eGFR   Date Value Ref Range Status   10/17/2018 92 ml/min/1 73sq m Final     No components found for: [facility]    Lab Results   Component Value Date    CHOL 144 06/06/2015    HDL 35 (L) 10/17/2018    TRIG 257 (H) 10/17/2018       Lab Results   Component Value Date    ALT 39 10/17/2018    AST 23 10/17/2018    ALKPHOS 93 10/17/2018    BILITOT 0 30 09/06/2015       Lab Results   Component Value Date    FREET4 0 93 10/17/2018       Impression:  1  Uncontrolled type 2 diabetes mellitus with hyperglycemia, with long-term current use of insulin ([address] )    2  Multiple thyroid nodules    3  Benign essential hypertension    4  Hyperlipidemia, unspecified hyperlipidemia type    5  Vitamin D deficiency    6   Uncontrolled type 2 diabetes mellitus with complication ([facility] [address] )           Plan:    Diagnoses and all orders for this visit:    Uncontrolled type 2 diabetes mellitus with hyperglycemia, with long-term current use of insulin ([address] )  Lab Results   Component Value Date    HGBA1C 7 0 (H) 10/17/2018    A1c improved, still not at goal, goal is 6 5% to prevent worsening of microalbuminuria discussed with patient  Will change insulin to carb ratio to 1 unit for 4 g for dinner, continue 1 unit for 5 g for other meals, and sensitivity of 20, target 130  Continue tresiba 44 units in AM   Continue checking blood sugar 4 times daily, and send log if blood sugars out of range, < 70 or >250 persistently  Call office if blood sugar less than 60 or more than 350  Discussed to follow up with Ophthalmology yearly  Continue metformin and Jardiance , GFR is normal  -     insulin degludec (TRESIBA FLEXTOUCH) 100 units/mL injection pen; Inject 44 Units under the skin every morning  -     insuln lispro (HUMALOG KWIKPEN) 200 units/mL CONCENTRATED U-200 injection pen; USE UP TO 75 UNITS DAILY USING INSULIN CARB RATIO 1:5 for breakfast and lunch and 1 to 4 gm for dinner,  AND INSULIN SENSITIVITY FACTOR OF 1:20   -     JARDIANCE 25 MG TABS; Take 1 tablet (25 mg total) by mouth daily  -     Insulin Pen Needle (BD PEN NEEDLE MARISOL U/F) 32G X 4 MM MISC; Use 4x daily to inject insulin  -     Hemoglobin A1C; Future  -     Basic metabolic panel; Future    Multiple thyroid nodules  As per recent thyroid ultrasound, multiple cyst and spongiform nodules less than 1 cm present  Will repeat thyroid ultrasound in May 2020 for follow-up  No obstructive symptoms    Benign essential hypertension  BP Readings from Last 3 Encounters:   10/24/18 148/62   07/24/18 148/78   07/18/18 155/64    Blood pressure is slightly elevated  Continue current management   Follow up with PCP   Stay away from Salty food     Hyperlipidemia, unspecified hyperlipidemia type  Lab Results   Component Value Date    [facility] 49 10/17/2018     At goal   Continue Statins     Vitamin D deficiency  Vitamin-D 31 9  Continue vitamin-D supplementation 28764 International Units once a week   -     Vitamin D 25 hydroxy; Future    Uncontrolled type 2 diabetes mellitus with complication (HCC)  -     metFORMIN (GLUCOPHAGE) 1000 MG tablet;  Take 1 tablet (1,000 mg total) by mouth 2 (two) times a day with meals    - metFORMIN (GLUCOPHAGE) 1000 MG tablet; Take 1 tablet (1,000 mg total) by mouth 2 (two) times a day with meals  Dispense: 180 tablet; Refill: 0          Discussed with the patient and all questioned fully answered  She will call me if any problems arise      Counseled patient on diagnostic results, prognosis, risk and benefit of treatment options, instruction for management, importance of treatment compliance, Risk  factor reduction and impressions      Cheryl Salcedo MD

## 2018-10-25 PROCEDURE — 3072F LOW RISK FOR RETINOPATHY: CPT | Performed by: INTERNAL MEDICINE

## 2018-11-02 NOTE — PSYCH
MEDICATION MANAGEMENT NOTE        [address]      Name and Date of Birth:  Romulo Vivas 63 y o  1960 MRN: [id number]    Date of Visit: November 7, 2018    SUBJECTIVE:    Thanh Mckeon continues to do well since the last visit  She reports that mood remains stable, denies any significant depressive symptoms  She states that anxiety symptoms are more controlled  She retired on October 15 and now feels less stressed out and more relaxed  Plans to work on improving her house, start walking and reading more  She denies any suicidal ideation, intent or plan at present; denies any homicidal ideation, intent or plan at present  She has no auditory hallucinations, denies any visual hallucinations, no overt delusions noted  She denies any side effects from current psychiatric medications  [facility] Bates HPI ROS Appetite Changes and Sleep: normal sleep, normal appetite, recent weight loss (7 lbs), normal energy level    Review Of Systems:      Constitutional recent weight loss (7 lbs)   ENT negative   Cardiovascular negative   Respiratory dyspnea on exertion   Gastrointestinal negative   Genitourinary negative   Musculoskeletal negative   Integumentary negative   Neurological negative   Endocrine negative   Other Symptoms none       Past Psychiatric History:      Past Inpatient Psychiatric Treatment:   One past inpatient psychiatric admission at [facility] abo  Past Outpatient Psychiatric Treatment:    In outpatient treatment at [address] [place] [address] for many years    Past Suicide Attempts: yes, 2 attempts by overdose years ago  Past Violent Behavior: no  Past Psychiatric Medication Trials: Prozac, Depakote, Abilify and Latuda     Traumatic History:      Abuse: no history of sexual abuse, no history of physical abuse  Other Traumatic Events: none     Past Medical History:    Past Medical History:   Diagnosis Date    Abdominal pain     started 7/21/16- found something on the pancreas    Abnormal weight loss     last assessed [id number]    Acute sinusitis     Allergic rhinitis     last assessed [id number]    Anemia     Asthma     Cardiac murmur     Cellulitis     Cervical disc disease     Degeneration of thoracic intervertebral disc     Degenerative arthritis of knee     Diabetes mellitus (HCC)     Diverticulosis     Esophagitis, reflux     last assessed [id number]    Excessive sweating     Fatty liver     Gait disturbance     Granuloma annulare     Head injury     Hyperlipidemia     Hypertension     Hypertensive heart disease     Hypoglycemia     last assessed [id number]    Leg length discrepancy     Leucocytosis     Multiple thyroid nodules     Neuropathy     Obesity     last assessed [id number]    Paronychia of toe     Patellofemoral dysfunction     Postmenopausal atrophic vaginitis     Prepatellar bursitis     Sebaceous cyst     last assessed [id number]    Sleep apnea     no CPAP used    Suicide [facility] ([facility] [address]     Tinea corporis     Type 2 diabetes mellitus (HCC)     cont meds and monitor BS;  last assessed [id number]    Urinary frequency     Vertigo     Vitamin D deficiency     Dia-Parkinson-White syndrome      Past Medical History Pertinent Negatives:   Diagnosis Date Noted    Seizures (HCC)      Allergies   Allergen Reactions    Depakote Er [Divalproex Sodium Er]      pancreatitis    Tanzeum [Albiglutide]      pancreatitis    Bee Venom     Carbamazepine Other (See Comments)     Reaction Date:Unknown    Clarithromycin Itching    Shellfish-Derived Products     Lamotrigine Rash       Substance Abuse History:    History   Alcohol Use    Yes     Comment: rarely     History   Drug Use No       Social History:    Social History     Social History    Marital status: /Civil Union     Spouse name: N/A    Number of children: 0    Years of education: 15     Occupational History    retired      Social History Main Topics    Smoking status: Never Smoker    Smokeless tobacco: Never Used    Alcohol use Yes      Comment: rarely    Drug use: No    Sexual activity: Yes     Other Topics Concern    Not on file     Social History Narrative    Education: high school graduate    Learning Disabilities: none    Marital History:     Children: none    Living Arrangement: lives in home with     Occupational History: retired, 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[employer]: good support system,  is supportive    Legal History: none     History: None        Caffeine use    Denied home environment domestic violence    Job physical requirements heavy lifting           Family Psychiatric History:     Family History   Problem Relation Age of Onset    Cancer Mother         lung    Heart disease Mother     Coronary artery disease Mother         CABG    Diabetes Mother     Lung cancer Mother     Stroke Mother     Thyroid disease Mother     Other Mother         tobacco use    Parkinsonism Father     Intestinal polyp Father     Bipolar disorder Father     Heart disease Father     Hypertension Sister     Alcohol abuse Sister     Depression Brother     Diabetes Maternal Grandmother     Thyroid disease Maternal Grandmother     Hypertension Family     Suicide Attempts Paternal Aunt     Depression Paternal Aunt        History Review:  The following portions of the patient's history were reviewed and updated as appropriate: allergies, current medications, past family history, past medical history, past social history, past surgical history and problem list          OBJECTIVE:     Vital signs in last 24 hours:    Vitals:    11/07/18 1348   BP: 135/84   Pulse: 81   Weight: 97 1 kg (214 lb)   Height: 5' 4" (1 626 m)       Mental Status Evaluation:    Appearance age appropriate, casually dressed   Behavior cooperative, calm   Speech normal rate, normal volume, normal pitch   Mood euthymic   Affect normal range and intensity, appropriate   Thought Processes organized, goal directed   Associations intact associations   Thought Content no overt delusions   Perceptual Disturbances: no auditory hallucinations, no visual hallucinations   Abnormal Thoughts  Risk Potential Suicidal ideation - None  Homicidal ideation - None  Potential for aggression - No   Orientation oriented to person, place, time/date and situation   Memory recent and remote memory grossly intact   Consciousness alert and awake   Attention Span Concentration Span attention span and concentration are age appropriate   Intellect appears to be of average intelligence   Insight intact   Judgement intact   Muscle Strength and  Gait muscle strength and tone were normal, normal gait , normal balance   Motor activity no abnormal movements   Language no difficulty naming common objects, no difficulty repeating a phrase, no difficulty writing a sentence   Fund of Knowledge adequate knowledge of current events  adequate fund of knowledge regarding past history  adequate fund of knowledge regarding vocabulary    Pain none   Pain Scale 0       Laboratory Results: I have personally reviewed all pertinent laboratory/tests results      Recent Labs (last 4 months):   Appointment on 10/17/2018   Component Date Value    Hemoglobin A1C 10/17/2018 7 0*    EAG 10/17/2018 154     Sodium 10/17/2018 139     Potassium 10/17/2018 4 2     Chloride 10/17/2018 106     CO2 10/17/2018 25     ANION GAP 10/17/2018 8     BUN 10/17/2018 18     Creatinine 10/17/2018 0 73     Glucose, Fasting 10/17/2018 100*    Calcium 10/17/2018 9 0     AST 10/17/2018 23     ALT 10/17/2018 39     Alkaline Phosphatase 10/17/2018 93     Total Protein 10/17/2018 8 0     Albumin 10/17/2018 4 0     Total Bilirubin 10/17/2018 0 36     eGFR 10/17/2018 92     Cholesterol 10/17/2018 135     Triglycerides 10/17/2018 257*    HDL, Direct 10/17/2018 35*  LDL Calculated 10/17/2018 49     Creatinine, Ur 10/17/2018 210 0     Microalbum  ,U,Random 10/17/2018 128 0*    Microalb Creat Ratio 10/17/2018 61*    Free T4 10/17/2018 0 93     TSH 3RD GENERATON 10/17/2018 2 340    Appointment on 07/11/2018   Component Date Value    WBC 07/11/2018 13 23*    RBC 07/11/2018 4 97     Hemoglobin 07/11/2018 12 9     Hematocrit 07/11/2018 42 3     MCV 07/11/2018 85     MCH 07/11/2018 26 0*    MCHC 07/11/2018 30 5*    RDW 07/11/2018 15 3*    Platelets 32/51/8157 387     MPV 07/11/2018 10 2     Sodium 07/11/2018 137     Potassium 07/11/2018 3 9     Chloride 07/11/2018 103     CO2 07/11/2018 28     ANION GAP 07/11/2018 6     BUN 07/11/2018 20     Creatinine 07/11/2018 0 64     Glucose, Fasting 07/11/2018 103*    Calcium 07/11/2018 9 2     AST 07/11/2018 16     ALT 07/11/2018 35     Alkaline Phosphatase 07/11/2018 96     Total Protein 07/11/2018 7 7     Albumin 07/11/2018 3 9     Total Bilirubin 07/11/2018 0 28     eGFR 07/11/2018 100     Ferritin 07/11/2018 106     Iron Saturation 07/11/2018 12     TIBC 07/11/2018 388     Iron 07/11/2018 46*    Hemoglobin A1C 07/11/2018 7 2*    EAG 07/11/2018 160     Cholesterol 07/11/2018 113     Triglycerides 07/11/2018 249*    HDL, Direct 07/11/2018 36*    LDL Calculated 07/11/2018 27     Non-HDL-Chol (CHOL-HDL) 07/11/2018 77     Creatinine, Ur 07/11/2018 91 6     Microalbum  ,U,Random 07/11/2018 11 3     Microalb Creat Ratio 07/11/2018 12        Assessment/Plan:       Diagnoses and all orders for this visit:    Bipolar [facility], most [facility] episode depressed[facility]  -     lurasidone (LATUDA) 80 mg tablet; Take 1 tablet (80 mg total) by mouth daily with dinner for 120 days  -     FLUoxetine (PROzac) 20 mg capsule; Take 1 capsule (20 mg total) by mouth daily for 120 days Total Prozac dose is 60 mg daily  -     FLUoxetine (PROzac) 40 MG capsule;  Take 1 capsule (40 mg total) by mouth daily for 120 days Total Prozac dose is 60 mg daily    LOURDES (generalized anxiety disorder)  -     FLUoxetine (PROzac) 20 mg capsule; Take 1 capsule (20 mg total) by mouth daily for 120 days Total Prozac dose is 60 mg daily  -     FLUoxetine (PROzac) 40 MG capsule; Take 1 capsule (40 mg total) by mouth daily for 120 days Total Prozac dose is 60 mg daily    Personality disorder (HCC)        Treatment Recommendations/Precautions:    Continue Latuda 80 mg in the evening to help with mood stability  Continue Prozac 60 mg daily to improve depressive symptoms  Medication management every 4 months  Follows with family physician for glucose and lipid monitoring due to current therapy with antipsychotic medication  Follows with family physician for medical issues    Risks/Benefits      Risks, Benefits And Possible Side Effects Of Medications:    Risks, benefits, and possible side effects of medications explained to Vj including risk of parkinsonian symptoms, Tardive Dyskinesia and metabolic syndrome related to treatment with antipsychotic medications and risk of suicidality related to treatment with antidepressants  She verbalizes understanding and agreement for treatment  Controlled Medication Discussion:     Not applicable    Psychotherapy Provided:     Individual psychotherapy provided: Yes  Counseling was provided during the session today for 20 minutes  Medications, treatment progress and treatment plan reviewed with Vj  Goals discussed during in session: continue improvement in anxiety and maintain mood stability  Discussed with Vj coping with everyday stressors and difficulty with weight loss  Coping strategies including exercising, taking walks and attending [facility] program reviewed with Vj  Supportive therapy provided        Treatment Plan;    Completed and signed during the session: Yes - with Chas Lane MD 11/07/18

## 2018-11-07 ENCOUNTER — OFFICE VISIT (OUTPATIENT)
Dept: PSYCHIATRY | Facility: CLINIC | Age: 58
End: 2018-11-07
Payer: COMMERCIAL

## 2018-11-07 VITALS
HEIGHT: 64 IN | SYSTOLIC BLOOD PRESSURE: 135 MMHG | WEIGHT: 214 LBS | DIASTOLIC BLOOD PRESSURE: 84 MMHG | BODY MASS INDEX: 36.54 KG/M2 | HEART RATE: 81 BPM

## 2018-11-07 DIAGNOSIS — F31.76 BIPOLAR I DISORDER, MOST RECENT EPISODE DEPRESSED, IN FULL REMISSION (HCC): Primary | Chronic | ICD-10-CM

## 2018-11-07 DIAGNOSIS — F60.9 PERSONALITY DISORDER (HCC): Chronic | ICD-10-CM

## 2018-11-07 DIAGNOSIS — F41.1 GAD (GENERALIZED ANXIETY DISORDER): Chronic | ICD-10-CM

## 2018-11-07 PROCEDURE — 99213 OFFICE O/P EST LOW 20 MIN: CPT | Performed by: PSYCHIATRY & NEUROLOGY

## 2018-11-07 PROCEDURE — 90833 PSYTX W PT W E/M 30 MIN: CPT | Performed by: PSYCHIATRY & NEUROLOGY

## 2018-11-07 RX ORDER — FLUOXETINE HYDROCHLORIDE 40 MG/1
40 CAPSULE ORAL DAILY
Qty: 30 CAPSULE | Refills: 3 | Status: SHIPPED | OUTPATIENT
Start: 2018-11-07 | End: 2019-03-08 | Stop reason: SDUPTHER

## 2018-11-07 RX ORDER — FLUOXETINE HYDROCHLORIDE 20 MG/1
20 CAPSULE ORAL DAILY
Qty: 30 CAPSULE | Refills: 3 | Status: SHIPPED | OUTPATIENT
Start: 2018-11-07 | End: 2019-03-08 | Stop reason: SDUPTHER

## 2018-11-07 NOTE — PSYCH
TREATMENT PLAN (Medication Management Only)        [facility]    Name/Date of Birth/MRN:  Jeff Valladares 63 y o  1960 MRN: [id number]  Date of Treatment Plan: November 7, 2018  Diagnosis/Diagnoses:   1  Bipolar I disorder, most recent episode depressed, in full remission ([facility] [address] )    2  LOURDES (generalized anxiety disorder)    3  Personality disorder [facility])      Strengths/Personal Resources for Self-Care: "retired, eating better, sleeping better, active"  Area/Areas of need (in own words): "active - not enough walking"  1  Long Term Goal: maintain mood stability  Target Date: 4 months - 3/7/2019  Person/Persons responsible for completion of goal: [facility]  2  Short Term Objective (s) - How will we reach this goal?:   A  Provider new recommended medication/dosage changes and/or continue medication(s): continue current medications as prescribed (Prozac and Latuda)  B   N/A   C   N/A  Target Date: 4 months - 3/7/2019  Person/Persons Responsible for Completion of Goal: [facility]   Progress Towards Goals: stable  Treatment Modality: medication management every 4 months  Review due 90 to 120 days from date of this plan: 4 months - 3/7/2019  Expected length of service: maintenance  My Physician/PA/NP and I have developed this plan together and I agree to work on the goals and objectives  I understand the treatment goals that were developed for my treatment    Signature:       Date and time:  Signature of parent/guardian if under age of 15 years: Date and time:  Signature of provider:      Date and time:  Signature of Supervising Physician:    Date and time: 11/7/2018      Aby Friedman MD

## 2018-11-27 DIAGNOSIS — E11.65 UNCONTROLLED TYPE 2 DIABETES MELLITUS WITH HYPERGLYCEMIA, WITH LONG-TERM CURRENT USE OF INSULIN (HCC): ICD-10-CM

## 2018-11-27 DIAGNOSIS — Z79.4 UNCONTROLLED TYPE 2 DIABETES MELLITUS WITH HYPERGLYCEMIA, WITH LONG-TERM CURRENT USE OF INSULIN (HCC): ICD-10-CM

## 2018-11-27 NOTE — TELEPHONE ENCOUNTER
Patient stopped in to download her meter, she needs refill on tresiba, injecting 44 units at bedtime  She would like 30 day supply called into [facility] on file  Thank you  She also wanted you to know current weight is 208  2  Thanks!

## 2018-11-28 ENCOUNTER — TELEPHONE (OUTPATIENT)
Dept: ENDOCRINOLOGY | Facility: CLINIC | Age: 58
End: 2018-11-28

## 2018-11-28 NOTE — TELEPHONE ENCOUNTER
Log dated 11/27/18 scanned and attached to this encounter  Current diabetic medications patient is taking:    Tresiba 44 u @ HS  Humalog: Breakfast ICR 1:5 & Lunch ICR 1:4; ISF 1:20  Metformin 1000 mg BID  Jardiance 25 mg daily    Please review and recommend

## 2018-12-17 ENCOUNTER — TELEPHONE (OUTPATIENT)
Dept: ENDOCRINOLOGY | Facility: CLINIC | Age: 58
End: 2018-12-17

## 2018-12-17 ENCOUNTER — DOCUMENTATION (OUTPATIENT)
Dept: ENDOCRINOLOGY | Facility: CLINIC | Age: 58
End: 2018-12-17

## 2018-12-17 NOTE — TELEPHONE ENCOUNTER
Email rec'd with some reports from [address] [place] [address]; no logbook  Unable to pull off [employer] website  Patient will bring meter to office tomorrow; she couldn't give numbers over phone

## 2018-12-17 NOTE — PROGRESS NOTES
patient will bring meter tomorrow for download  pt states she is having lows, i asked patient to give them to me over the phone and she said she could not bc she doesnt write them down   patient agreed to bring meter tomorrow

## 2018-12-18 ENCOUNTER — TELEPHONE (OUTPATIENT)
Dept: ENDOCRINOLOGY | Facility: CLINIC | Age: 58
End: 2018-12-18

## 2018-12-18 DIAGNOSIS — Z79.4 UNCONTROLLED TYPE 2 DIABETES MELLITUS WITH HYPERGLYCEMIA, WITH LONG-TERM CURRENT USE OF INSULIN (HCC): ICD-10-CM

## 2018-12-18 DIAGNOSIS — I10 ESSENTIAL HYPERTENSION: ICD-10-CM

## 2018-12-18 DIAGNOSIS — E11.65 UNCONTROLLED TYPE 2 DIABETES MELLITUS WITH HYPERGLYCEMIA, WITH LONG-TERM CURRENT USE OF INSULIN (HCC): ICD-10-CM

## 2018-12-18 RX ORDER — LISINOPRIL 20 MG/1
TABLET ORAL
Qty: 90 TABLET | Refills: 0 | Status: SHIPPED | OUTPATIENT
Start: 2018-12-18 | End: 2019-03-04 | Stop reason: SDUPTHER

## 2018-12-18 NOTE — TELEPHONE ENCOUNTER
Log dated 12/18/2018 scanned and attached to this encounter  Current diabetic medications patient is taking:    Jardiance 25 mg daily  Metformin 1000 mg 2 tabs daily  Humalog - flexible insulin  Tresiba 44 units @ HS    Patient following Weight Watchers diet; current weight 204 6lbs  Please review and recommend

## 2018-12-18 NOTE — TELEPHONE ENCOUNTER
Current diabetic medications patient is taking:     Jardiance 25 mg daily  Metformin 1000 mg 2 tabs daily  Humalog - flexible insulin  Tresiba 44 units @ HS    Blood sugars very tightly controlled, patient is also having blood sugar dropped after taking Humalog bolus  Please inform patient to reduce tresiba to 30 units at bedtime   Continue metformin and Jardiance   For Humalog , she should use only slding scale with meals ,  Scale - 150-200 -2 units, 201-250-4 units, 251-300 -6 units, 301-350-8 units, > 250- 10 units   Thanks   Send log in 2 weeks    Maxim Cuellar MD

## 2019-01-08 ENCOUNTER — TELEPHONE (OUTPATIENT)
Dept: ENDOCRINOLOGY | Facility: CLINIC | Age: 59
End: 2019-01-08

## 2019-01-08 DIAGNOSIS — Z79.4 UNCONTROLLED TYPE 2 DIABETES MELLITUS WITH HYPERGLYCEMIA, WITH LONG-TERM CURRENT USE OF INSULIN (HCC): ICD-10-CM

## 2019-01-08 DIAGNOSIS — E11.65 UNCONTROLLED TYPE 2 DIABETES MELLITUS WITH HYPERGLYCEMIA, WITH LONG-TERM CURRENT USE OF INSULIN (HCC): ICD-10-CM

## 2019-01-08 NOTE — TELEPHONE ENCOUNTER
Log dated 1/8/2019 scanned and attached to this encounter  Current diabetic medications patient is taking:    Metformin HCL 1000 mg BID  Jardiance 25 mg daily  Tresiba 30 u daily  Humalog - flexible insulin, TID with meals    Patient still losing weight; today's weight is 198 8 lbs  Please review and recommend

## 2019-01-09 NOTE — TELEPHONE ENCOUNTER
Current diabetic medications patient is taking:     Metformin HCL 1000 mg BID  Jardiance 25 mg daily  Tresiba 30 u daily  Humalog - flexible insulin, TID with meals    Reviewed blood sugars, blood sugars are tightly controlled,  range  She is having hypoglycemia during the afternoon and before dinner  Please inform patient to reduce Tresiba to 20 units   Continue Humalog, sliding scale only,  Scale - 150-200 -2 units, 201-250-4 units, 251-300 -6 units, 301-350-8 units, > 350- 10 units     Continue metformin and Jardiance   Send log in 2 weeks    Awa Sanders MD

## 2019-01-09 NOTE — TELEPHONE ENCOUNTER
Discussed with pt, about the providers recommendations   Changes made to medlist/ labs ordered   Pt verbalized understanding

## 2019-01-21 ENCOUNTER — TELEPHONE (OUTPATIENT)
Dept: ENDOCRINOLOGY | Facility: CLINIC | Age: 59
End: 2019-01-21

## 2019-01-21 NOTE — TELEPHONE ENCOUNTER
Blood sugars reviewed,  Patient is currently on diet  She continues to have low blood sugars  Basal insulin dose was reduced    Current diabetic medications patient is taking:     Jardiance 25 mg daily  Metformin 1000 mg BID  Humalog -sliding scale  Tresiba 20 u @ hs    Please inform patient to discontinue [place]   Continue Jardiance and Metformin only   Call if blood sugar less than 70 or more than 200    Jesse Guo MD

## 2019-01-21 NOTE — TELEPHONE ENCOUNTER
Log dated 1/21/2019 scanned and attached to this encounter  Current diabetic medications patient is taking:    Jardiance 25 mg daily  Metformin 1000 mg BID  Humalog -sliding scale  Tresiba 20 u @ hs    Please review and recommend

## 2019-01-28 ENCOUNTER — OFFICE VISIT (OUTPATIENT)
Dept: INTERNAL MEDICINE CLINIC | Facility: CLINIC | Age: 59
End: 2019-01-28
Payer: COMMERCIAL

## 2019-01-28 VITALS
WEIGHT: 191.8 LBS | HEIGHT: 64 IN | OXYGEN SATURATION: 98 % | SYSTOLIC BLOOD PRESSURE: 140 MMHG | BODY MASS INDEX: 32.74 KG/M2 | TEMPERATURE: 97.3 F | DIASTOLIC BLOOD PRESSURE: 44 MMHG | HEART RATE: 67 BPM | RESPIRATION RATE: 18 BRPM

## 2019-01-28 DIAGNOSIS — Z12.11 SCREENING FOR MALIGNANT NEOPLASM OF COLON: ICD-10-CM

## 2019-01-28 DIAGNOSIS — I10 BENIGN ESSENTIAL HYPERTENSION: Primary | ICD-10-CM

## 2019-01-28 DIAGNOSIS — E78.1 ESSENTIAL HYPERTRIGLYCERIDEMIA: ICD-10-CM

## 2019-01-28 DIAGNOSIS — K21.9 GASTROESOPHAGEAL REFLUX DISEASE WITHOUT ESOPHAGITIS: ICD-10-CM

## 2019-01-28 DIAGNOSIS — E78.5 DYSLIPIDEMIA: ICD-10-CM

## 2019-01-28 DIAGNOSIS — E66.09 CLASS 1 OBESITY DUE TO EXCESS CALORIES WITH SERIOUS COMORBIDITY AND BODY MASS INDEX (BMI) OF 32.0 TO 32.9 IN ADULT: ICD-10-CM

## 2019-01-28 PROCEDURE — 99214 OFFICE O/P EST MOD 30 MIN: CPT | Performed by: INTERNAL MEDICINE

## 2019-01-28 RX ORDER — CALCIUM CARBONATE/VITAMIN D3 600 MG-10
1200 TABLET ORAL 2 TIMES DAILY
COMMUNITY

## 2019-01-28 NOTE — PROGRESS NOTES
Assessment/Plan:           Problem List Items Addressed This Visit        Digestive    Gastroesophageal reflux disease     Improved with weight loss at this point time she is not even using the Zantac            Cardiovascular and Mediastinum    Benign essential hypertension - Primary     Hypertension stable she continues to lose weight she plans to lose additional weight she is walking routinely will continue monitor likely her blood pressure will continue to improve  Other    Dyslipidemia     Hyperlipidemia controlled continue with current medical regiment recommend a low-cholesterol diet and recommend routine exercise we will continue to monitor the progress  Relevant Orders    Lipid Panel with Direct LDL reflex    Essential hypertriglyceridemia     Reduce carbohydrates and sweets will check lipid profile  Class 1 obesity due to excess calories with serious comorbidity and body mass index (BMI) of 32 0 to 32 9 in adult     Obesity -I have counseled patient following healthy and balanced diet, I would like the patient to lose weight, I would like the patient exercise routinely; we will continue monitor the patient's progress  Other Visit Diagnoses     Screening for malignant neoplasm of colon        Relevant Orders    Ambulatory referral to Gastroenterology          Return to office 6   months  call if any problems  Subjective:      Patient ID: Abraham Prajapati is a 63 y o  female  HPI 59-year old female coming in for a follow up office visit regarding benign essential hypertension, hyperlipidemia, essential hypertriglyceridemia, obesity, GERD and screening for colon cancer; The patient reports me compliant taking medications without untoward side effects the  The patient is here to review his medical condition, update me on the medical condition and the patient reports me no hospitalizations and no ER visits    she retired oct 15  She joined [employer] and 25 lbs , she feels wonderful,  Mood is doing good no si  Considering reducing psych meds  She is going for labs in feb   Walking  3 times per     The following portions of the patient's history were reviewed and updated as appropriate: allergies, current medications, past family history, past medical history, past social history, past surgical history and problem list     Review of Systems   Constitutional: Negative for activity change, appetite change and unexpected weight change  HENT: Negative for congestion and postnasal drip  Eyes: Negative for visual disturbance  Respiratory: Negative for cough and shortness of breath  Cardiovascular: Negative for chest pain  Gastrointestinal: Negative for abdominal pain, diarrhea, nausea and vomiting  Hematological: Negative for adenopathy  Psychiatric/Behavioral: Negative for suicidal ideas  The patient is not nervous/anxious  Objective:    No Follow-up on file  No results found        Allergies   Allergen Reactions    Depakote Er [Divalproex Sodium Er]      pancreatitis    Tanzeum [Albiglutide]      pancreatitis    Bee Venom     Carbamazepine Other (See Comments)     Reaction Date:Unknown    Clarithromycin Itching    Shellfish-Derived Products     Lamotrigine Rash       Past Medical History:   Diagnosis Date    Abdominal pain     started 7/21/16- found something on the pancreas    Abnormal weight loss     last assessed [id number]    Acute sinusitis     Allergic rhinitis     last assessed [id number]    Anemia     Asthma     Cardiac murmur     Cellulitis     Cervical disc disease     Degeneration of thoracic intervertebral disc     Degenerative arthritis of knee     Diabetes mellitus (HCC)     Diverticulosis     Esophagitis, reflux     last assessed 29Jan2014    Excessive sweating     Fatty liver     Gait disturbance     Granuloma annulare     Head injury     Hyperlipidemia     Hypertension     Hypertensive heart disease     Hypoglycemia     last assessed [id number]    Leg length discrepancy     Leucocytosis     Multiple thyroid nodules     Neuropathy     Obesity     last assessed [id number]    Paronychia of toe     Patellofemoral dysfunction     Postmenopausal atrophic vaginitis     Prepatellar bursitis     Sebaceous cyst     last assessed [id number]    Sleep apnea     no CPAP used    Suicide attempt ([facility] [address] )     Tinea corporis     Type 2 diabetes mellitus ([facility] [address] )     cont meds and monitor BS;  last assessed [id number]    Urinary frequency     Vertigo     Vitamin D deficiency     Dia-Parkinson-White syndrome      Past Surgical History:   Procedure Laterality Date    COLONOSCOPY      ESOPHAGOGASTRODUODENOSCOPY N/A 8/8/2016    Procedure: ESOPHAGOGASTRODUODENOSCOPY (EGD); Surgeon: Nya Payne MD;  Location: [facility]; Service:     TONSILLECTOMY      TOOTH EXTRACTION      wisdom teeth removed     Current Outpatient Prescriptions on File Prior to Visit   Medication Sig Dispense Refill    atorvastatin (LIPITOR) 20 mg tablet TAKE 1 TABLET (20 MG TOTAL) BY MOUTH DAILY 90 tablet 1    ergocalciferol (ERGOCALCIFEROL) 38604 units capsule Take 1 capsule (50,000 Units total) by mouth once a week 12 capsule 1    fenofibrate (TRICOR) 145 mg tablet TAKE 1 TABLET DAILY 90 tablet 1    FLUoxetine (PROzac) 20 mg capsule Take 1 capsule (20 mg total) by mouth daily for 120 days Total Prozac dose is 60 mg daily 30 capsule 3    FLUoxetine (PROzac) 40 MG capsule Take 1 capsule (40 mg total) by mouth daily for 120 days Total Prozac dose is 60 mg daily 30 capsule 3    Insulin Pen Needle (BD PEN NEEDLE MARISOL U/F) 32G X 4 MM MISC Use 4x daily to inject insulin   400 each 0    insuln lispro (HUMALOG KWIKPEN) 200 units/mL CONCENTRATED U-200 injection pen Inject insulin TID with meals according to sliding scale (150-200= 2u; 201-250=4u; 251-300=6u; 301-350=8u; >350=10u) 20 pen 0    JARDIANCE 25 MG TABS Take 1 tablet (25 mg total) by mouth daily 90 tablet 0    lisinopril (ZESTRIL) 20 mg tablet TAKE 1 TABLET IN THE EVENING 90 tablet 0    lurasidone (LATUDA) 80 mg tablet Take 1 tablet (80 mg total) by mouth daily with dinner for 120 days 30 tablet 3    metFORMIN (GLUCOPHAGE) 1000 MG tablet Take 1 tablet (1,000 mg total) by mouth 2 (two) times a day with meals 180 tablet 0    albuterol (PROAIR HFA) 90 mcg/act inhaler Inhale 2 puffs every 6 (six) hours as needed for wheezing (Patient not taking: Reported on 1/28/2019 ) 1 Inhaler 0    [DISCONTINUED] ranitidine (ZANTAC) 150 mg tablet Take 1 tablet (150 mg total) by mouth daily (Patient not taking: Reported on 1/28/2019 ) 30 tablet 5     No current facility-administered medications on file prior to visit        Family History   Problem Relation Age of Onset   [employer] Cancer Mother         lung    Heart disease Mother     Coronary artery disease Mother         CABG    Diabetes Mother     Lung cancer Mother     Stroke Mother     Thyroid disease Mother     Other Mother         tobacco use    Parkinsonism Father     Intestinal polyp Father     Bipolar disorder Father     Heart disease Father     Hypertension Sister     Alcohol abuse Sister     Depression Brother     Diabetes Maternal Grandmother     Thyroid disease Maternal Grandmother     Hypertension Family     Suicide Attempts Paternal Aunt     Depression Paternal Aunt      Social History     Social History    Marital status: /Civil Union     Spouse name: N/A    Number of children: 0    Years of education: 15     Occupational History    retired      Social History Main Topics    Smoking status: Never Smoker    Smokeless tobacco: Never Used    Alcohol use Yes      Comment: rarely    Drug use: No    Sexual activity: Yes     Other Topics Concern    Not on file     Social History Narrative    Education: high school graduate    Learning Disabilities: none    Marital History:     Children: none    Living Arrangement: lives in home with     Occupational History: retired, 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[place] [address]: good support system,  is supportive    Legal History: none     History: None        Caffeine use    Denied home environment domestic violence    Job physical requirements heavy lifting         Vitals:    01/28/19 0822   BP: (!) 140/44   Pulse: 67   Resp: 18   Temp: (!) 97 3 °F (36 3 °C)   TempSrc: Oral   SpO2: 98%   Weight: 87 kg (191 lb 12 8 oz)   Height: 5' 4" (1 626 m)     Results for orders placed or performed in visit on 10/24/18    Diabetes Eye Exam   Result Value Ref Range    Right Eye Diabetic Retinopathy None     Left Eye Diabetic Retinopathy None      Weight (last 2 days)     Date/Time   Weight    01/28/19 0822  87 (191 8)            Body mass index is 32 92 kg/m²  BP (!) 140/44 (01/28/19 0822)    Temp (!) 97 3 °F (36 3 °C) (01/28/19 0822)    Pulse 67 (01/28/19 0822)   Resp 18 (01/28/19 0822)    SpO2 98 % (01/28/19 8852)      Vitals:    01/28/19 0822   Weight: 87 kg (191 lb 12 8 oz)     Vitals:    01/28/19 0822   Weight: 87 kg (191 lb 12 8 oz)       BP (!) 140/44   Pulse 67   Temp (!) 97 3 °F (36 3 °C) (Oral)   Resp 18   Ht 5' 4" (1 626 m)   Wt 87 kg (191 lb 12 8 oz)   SpO2 98%   BMI 32 92 kg/m²          Physical Exam   Constitutional: She appears well-developed and well-nourished  HENT:   Head: Normocephalic  Mouth/Throat: Oropharynx is clear and moist    Eyes: Pupils are equal, round, and reactive to light  Conjunctivae are normal  Right eye exhibits no discharge  Left eye exhibits no discharge  No scleral icterus  Neck: Neck supple  Cardiovascular: Normal rate, regular rhythm, normal heart sounds and intact distal pulses  Exam reveals no gallop and no friction rub  No murmur heard  Pulmonary/Chest: Breath sounds normal  No respiratory distress  She has no wheezes  She has no rales  Abdominal: Soft   Bowel sounds are normal  She exhibits no distension and no mass  There is no tenderness  There is no rebound and no guarding  Musculoskeletal: She exhibits no edema or deformity  Lymphadenopathy:     She has no cervical adenopathy  Neurological: She is alert  Coordination normal    Psychiatric: Her mood appears not anxious  She does not exhibit a depressed mood  She expresses no suicidal ideation

## 2019-01-28 NOTE — ASSESSMENT & PLAN NOTE
Hypertension stable she continues to lose weight she plans to lose additional weight she is walking routinely will continue monitor likely her blood pressure will continue to improve

## 2019-02-08 ENCOUNTER — APPOINTMENT (EMERGENCY)
Dept: CT IMAGING | Facility: HOSPITAL | Age: 59
End: 2019-02-08
Payer: COMMERCIAL

## 2019-02-08 ENCOUNTER — HOSPITAL ENCOUNTER (EMERGENCY)
Facility: HOSPITAL | Age: 59
Discharge: HOME | End: 2019-02-08
Attending: EMERGENCY MEDICINE | Admitting: EMERGENCY MEDICINE
Payer: COMMERCIAL

## 2019-02-08 VITALS
OXYGEN SATURATION: 99 % | SYSTOLIC BLOOD PRESSURE: 150 MMHG | HEART RATE: 70 BPM | DIASTOLIC BLOOD PRESSURE: 70 MMHG | RESPIRATION RATE: 18 BRPM | WEIGHT: 190 LBS | BODY MASS INDEX: 32.61 KG/M2 | TEMPERATURE: 98.4 F

## 2019-02-08 DIAGNOSIS — K29.80 DUODENITIS: Primary | ICD-10-CM

## 2019-02-08 LAB
ALBUMIN SERPL BCP-MCNC: 4.3 G/DL (ref 3.5–5)
ALP SERPL-CCNC: 83 U/L (ref 46–116)
ALT SERPL W P-5'-P-CCNC: 31 U/L (ref 12–78)
ANION GAP SERPL CALCULATED.3IONS-SCNC: 13 MMOL/L (ref 4–13)
AST SERPL W P-5'-P-CCNC: 19 U/L (ref 5–45)
BASOPHILS # BLD AUTO: 0.11 THOUSANDS/ΜL (ref 0–0.1)
BASOPHILS NFR BLD AUTO: 1 % (ref 0–1)
BILIRUB SERPL-MCNC: 0.5 MG/DL (ref 0.2–1)
BUN SERPL-MCNC: 23 MG/DL (ref 5–25)
CALCIUM SERPL-MCNC: 9.7 MG/DL (ref 8.3–10.1)
CHLORIDE SERPL-SCNC: 102 MMOL/L (ref 100–108)
CO2 SERPL-SCNC: 23 MMOL/L (ref 21–32)
CREAT SERPL-MCNC: 0.87 MG/DL (ref 0.6–1.3)
EOSINOPHIL # BLD AUTO: 0.39 THOUSAND/ΜL (ref 0–0.61)
EOSINOPHIL NFR BLD AUTO: 2 % (ref 0–6)
ERYTHROCYTE [DISTWIDTH] IN BLOOD BY AUTOMATED COUNT: 14.8 % (ref 11.6–15.1)
GFR SERPL CREATININE-BSD FRML MDRD: 74 ML/MIN/1.73SQ M
GLUCOSE SERPL-MCNC: 94 MG/DL (ref 65–140)
HCT VFR BLD AUTO: 43 % (ref 34.8–46.1)
HGB BLD-MCNC: 13.5 G/DL (ref 11.5–15.4)
IMM GRANULOCYTES # BLD AUTO: 0.16 THOUSAND/UL (ref 0–0.2)
IMM GRANULOCYTES NFR BLD AUTO: 1 % (ref 0–2)
LIPASE SERPL-CCNC: 224 U/L (ref 73–393)
LYMPHOCYTES # BLD AUTO: 4.87 THOUSANDS/ΜL (ref 0.6–4.47)
LYMPHOCYTES NFR BLD AUTO: 26 % (ref 14–44)
MCH RBC QN AUTO: 26.4 PG (ref 26.8–34.3)
MCHC RBC AUTO-ENTMCNC: 31.4 G/DL (ref 31.4–37.4)
MCV RBC AUTO: 84 FL (ref 82–98)
MONOCYTES # BLD AUTO: 1.26 THOUSAND/ΜL (ref 0.17–1.22)
MONOCYTES NFR BLD AUTO: 7 % (ref 4–12)
NEUTROPHILS # BLD AUTO: 12.16 THOUSANDS/ΜL (ref 1.85–7.62)
NEUTS SEG NFR BLD AUTO: 63 % (ref 43–75)
NRBC BLD AUTO-RTO: 0 /100 WBCS
PLATELET # BLD AUTO: 431 THOUSANDS/UL (ref 149–390)
PMV BLD AUTO: 10.2 FL (ref 8.9–12.7)
POTASSIUM SERPL-SCNC: 4.8 MMOL/L (ref 3.5–5.3)
PROT SERPL-MCNC: 8.1 G/DL (ref 6.4–8.2)
RBC # BLD AUTO: 5.11 MILLION/UL (ref 3.81–5.12)
SODIUM SERPL-SCNC: 138 MMOL/L (ref 136–145)
WBC # BLD AUTO: 18.95 THOUSAND/UL (ref 4.31–10.16)

## 2019-02-08 PROCEDURE — 96361 HYDRATE IV INFUSION ADD-ON: CPT

## 2019-02-08 PROCEDURE — 80053 COMPREHEN METABOLIC PANEL: CPT | Performed by: EMERGENCY MEDICINE

## 2019-02-08 PROCEDURE — 36415 COLL VENOUS BLD VENIPUNCTURE: CPT | Performed by: EMERGENCY MEDICINE

## 2019-02-08 PROCEDURE — 99284 EMERGENCY DEPT VISIT MOD MDM: CPT

## 2019-02-08 PROCEDURE — 96374 THER/PROPH/DIAG INJ IV PUSH: CPT

## 2019-02-08 PROCEDURE — 83690 ASSAY OF LIPASE: CPT | Performed by: EMERGENCY MEDICINE

## 2019-02-08 PROCEDURE — 74177 CT ABD & PELVIS W/CONTRAST: CPT

## 2019-02-08 PROCEDURE — 96375 TX/PRO/DX INJ NEW DRUG ADDON: CPT

## 2019-02-08 PROCEDURE — 85025 COMPLETE CBC W/AUTO DIFF WBC: CPT | Performed by: EMERGENCY MEDICINE

## 2019-02-08 RX ORDER — TRAMADOL HYDROCHLORIDE 50 MG/1
50 TABLET ORAL ONCE
Status: COMPLETED | OUTPATIENT
Start: 2019-02-08 | End: 2019-02-08

## 2019-02-08 RX ORDER — ONDANSETRON 2 MG/ML
4 INJECTION INTRAMUSCULAR; INTRAVENOUS ONCE
Status: COMPLETED | OUTPATIENT
Start: 2019-02-08 | End: 2019-02-08

## 2019-02-08 RX ORDER — TRAMADOL HYDROCHLORIDE 50 MG/1
50 TABLET ORAL EVERY 6 HOURS PRN
Qty: 15 TABLET | Refills: 0 | Status: SHIPPED | OUTPATIENT
Start: 2019-02-08 | End: 2019-03-08 | Stop reason: ALTCHOICE

## 2019-02-08 RX ORDER — MORPHINE SULFATE 10 MG/ML
6 INJECTION, SOLUTION INTRAMUSCULAR; INTRAVENOUS ONCE
Status: COMPLETED | OUTPATIENT
Start: 2019-02-08 | End: 2019-02-08

## 2019-02-08 RX ORDER — PANTOPRAZOLE SODIUM 20 MG/1
20 TABLET, DELAYED RELEASE ORAL ONCE
Status: COMPLETED | OUTPATIENT
Start: 2019-02-08 | End: 2019-02-08

## 2019-02-08 RX ORDER — OMEPRAZOLE 20 MG/1
20 CAPSULE, DELAYED RELEASE ORAL DAILY
Qty: 30 CAPSULE | Refills: 0 | Status: SHIPPED | OUTPATIENT
Start: 2019-02-08 | End: 2019-02-26 | Stop reason: DRUGHIGH

## 2019-02-08 RX ADMIN — ONDANSETRON 4 MG: 2 INJECTION INTRAMUSCULAR; INTRAVENOUS at 19:16

## 2019-02-08 RX ADMIN — IOHEXOL 100 ML: 350 INJECTION, SOLUTION INTRAVENOUS at 20:33

## 2019-02-08 RX ADMIN — TRAMADOL HYDROCHLORIDE 50 MG: 50 TABLET, COATED ORAL at 21:10

## 2019-02-08 RX ADMIN — MORPHINE SULFATE 6 MG: 10 INJECTION INTRAVENOUS at 19:17

## 2019-02-08 RX ADMIN — SODIUM CHLORIDE 1000 ML: 0.9 INJECTION, SOLUTION INTRAVENOUS at 19:21

## 2019-02-08 RX ADMIN — PANTOPRAZOLE SODIUM 20 MG: 20 TABLET, DELAYED RELEASE ORAL at 21:10

## 2019-02-09 NOTE — DISCHARGE INSTRUCTIONS
Duodenitis   WHAT YOU NEED TO KNOW:   Duodenitis is inflammation or irritation of the duodenum  The duodenum is the first part of the small intestine, just below your stomach  DISCHARGE INSTRUCTIONS:   Return to the emergency department if:   · You have severe abdominal pain  · You have bloody or black, tarry bowel movements or vomit  Contact your healthcare provider if:   · You have new or worsening symptoms, even after treatment  · You have questions or concerns about your condition or care  Medicines:   · Medicines  may be given to treat an infection or to control stomach acid  · Take your medicine as directed  Contact your healthcare provider if you think your medicine is not helping or if you have side effects  Tell him of her if you are allergic to any medicine  Keep a list of the medicines, vitamins, and herbs you take  Include the amounts, and when and why you take them  Bring the list or the pill bottles to follow-up visits  Carry your medicine list with you in case of an emergency  Follow up with your healthcare provider as directed: You may need ongoing tests or treatment  Write down your questions so you remember to ask them during your visits  Do not smoke:  Nicotine and other chemicals in cigarettes and cigars can cause blood vessel and lung damage  Ask your healthcare provider for information if you currently smoke and need help to quit  E-cigarettes or smokeless tobacco still contain nicotine  Talk to your healthcare provider before you use these products  Limit or do not drink alcohol:  Alcohol can make your duodenitis worse  Talk to your healthcare provider if you need help to stop drinking  Keep batteries and similar objects out of the reach of children:  Babies often put items in their mouths to explore them  Button batteries are easy to swallow and can cause serious damage  Keep the battery covers of electronic devices such as remote controls taped closed   Store all batteries and toxic materials where children cannot get to them  Use childproof locks to keep children away from dangerous materials  Manage or prevent duodenitis:   · Do not take NSAIDs or aspirin unless directed  These and similar medicines can cause irritation  It may help to take NSAIDs with food, but you may not be able to take them at all  · Do not eat foods that cause irritation  Foods such as oranges and salsa can cause burning or pain  Eat a variety of healthy foods  Examples include fruits (not citrus), vegetables, low-fat dairy products, beans, whole-grain breads, and lean meats and fish  Try to eat small meals, and drink water with your meals  Do not eat for at least 3 hours before you go to bed  © 2017 [facility] Information is for End User's use only and may not be sold, redistributed or otherwise used for commercial purposes  All illustrations and images included in CareNotes® are the copyrighted property of [employer]  or Patrick Chapa  The above information is an  only  It is not intended as medical advice for individual conditions or treatments  Talk to your doctor, nurse or pharmacist before following any medical regimen to see if it is safe and effective for you

## 2019-02-09 NOTE — ED PROVIDER NOTES
History  Chief Complaint   Patient presents with    Abdominal Pain     Pt  with mid upper abdominal pain starting Tuesday, states hx  of pancreatitis and feels the same  52-year-old female presents chief complaint epigastric abdominal pain which is crampy and sharp in nature  Patient reports pain started on Tuesday  No fevers or chills  Some nausea but no vomiting  Patient reports there has been exposure to sick contacts however they had nausea, vomiting and diarrhea which she has not had  Patient reports she has past medical history significant for pancreatitis approximately 2 years ago and that her current symptoms feel very similar  Pain does not radiate  History provided by:  Patient   used: No    Abdominal Pain   Pain location:  Epigastric  Pain quality: aching and cramping    Pain radiates to:  Does not radiate  Pain severity:  Moderate  Onset quality:  Gradual  Duration:  4 days  Timing:  Constant  Progression:  Worsening  Chronicity:  New  Context: not alcohol use and not sick contacts    Relieved by:  Nothing  Worsened by:  Nothing  Ineffective treatments:  None tried  Associated symptoms: nausea    Associated symptoms: no chest pain, no chills, no constipation, no diarrhea, no dysuria, no fever, no shortness of breath and no vomiting        Prior to Admission Medications   Prescriptions Last Dose Informant Patient Reported? Taking? FLUoxetine (PROzac) 20 mg capsule   No Yes   Sig: Take 1 capsule (20 mg total) by mouth daily for 120 days Total Prozac dose is 60 mg daily   FLUoxetine (PROzac) 40 MG capsule   No Yes   Sig: Take 1 capsule (40 mg total) by mouth daily for 120 days Total Prozac dose is 60 mg daily   Insulin Pen Needle (BD PEN NEEDLE MARISOL U/F) 32G X 4 MM MISC   No Yes   Sig: Use 4x daily to inject insulin     JARDIANCE 25 MG TABS   No Yes   Sig: Take 1 tablet (25 mg total) by mouth daily   Omega 3 1200 MG CAPS  Self Yes Yes   Sig: Take 1,200 mg by mouth 2 (two) times a day     atorvastatin (LIPITOR) 20 mg tablet  Self No Yes   Sig: TAKE 1 TABLET (20 MG TOTAL) BY MOUTH DAILY   ergocalciferol (ERGOCALCIFEROL) 66625 units capsule  Self No Yes   Sig: Take 1 capsule (50,000 Units total) by mouth once a week   fenofibrate (TRICOR) 145 mg tablet  Self No Yes   Sig: TAKE 1 TABLET DAILY   insuln lispro (HUMALOG KWIKPEN) 200 units/mL CONCENTRATED U-200 injection pen   No Yes   Sig: Inject insulin TID with meals according to sliding scale (150-200= 2u; 201-250=4u; 251-300=6u; 301-350=8u; >350=10u)   lisinopril (ZESTRIL) 20 mg tablet   No Yes   Sig: TAKE 1 TABLET IN THE EVENING   lurasidone (LATUDA) 80 mg tablet   No Yes   Sig: Take 1 tablet (80 mg total) by mouth daily with dinner for 120 days   metFORMIN (GLUCOPHAGE) 1000 MG tablet   No Yes   Sig: Take 1 tablet (1,000 mg total) by mouth 2 (two) times a day with meals      Facility-Administered Medications: None       Past Medical History:   Diagnosis Date    Abdominal pain     started 7/21/16- found something on the pancreas    Abnormal weight loss     last assessed [id number]    Acute sinusitis     Allergic rhinitis     last assessed [id number]    Anemia     Asthma     Cardiac murmur     Cellulitis     Cervical disc disease     Degeneration of thoracic intervertebral disc     Degenerative arthritis of knee     Diabetes mellitus (HCC)     Diverticulosis     Esophagitis, reflux     last assessed [id number]    Excessive sweating     Fatty liver     Gait disturbance     Granuloma annulare     Head injury     Hyperlipidemia     Hypertension     Hypertensive heart disease     Hypoglycemia     last assessed [id number]    Leg length discrepancy     Leucocytosis     Multiple thyroid nodules     Neuropathy     Obesity     last assessed [id number]    Paronychia of toe     Patellofemoral dysfunction     Postmenopausal atrophic vaginitis     Prepatellar bursitis     Sebaceous cyst     last assessed [id number]  Sleep apnea     no CPAP used    Suicide attempt ([facility] [address]     Tinea corporis     Type 2 diabetes mellitus ([facility] [address] )     cont meds and monitor BS;  last assessed [id number]    Urinary frequency     Vertigo     Vitamin D deficiency     Dia-Parkinson-White syndrome        Past Surgical History:   Procedure Laterality Date    COLONOSCOPY      ESOPHAGOGASTRODUODENOSCOPY N/A 8/8/2016    Procedure: ESOPHAGOGASTRODUODENOSCOPY (EGD); Surgeon: Alta Velarde MD;  Location: [facility]; Service:     TONSILLECTOMY      TOOTH EXTRACTION      wisdom teeth removed       Family History   Problem Relation Age of Onset   Jullie Liming Cancer Mother         lung    Heart disease Mother     Coronary artery disease Mother         CABG   Danilo Hardy Diabetes Mother     Lung cancer Mother     Stroke Mother     Thyroid disease Mother     Other Mother         tobacco use    Parkinsonism Father     Intestinal polyp Father     Bipolar disorder Father     Heart disease Father     Hypertension Sister     Alcohol abuse Sister     Depression Brother     Diabetes Maternal Grandmother     Thyroid disease Maternal Grandmother     Hypertension Family     Suicide Attempts Paternal Aunt     Depression Paternal Aunt      I have reviewed and agree with the history as documented  Social History     Tobacco Use    Smoking status: Never Smoker    Smokeless tobacco: Never Used   Substance Use Topics    Alcohol use: Yes     Comment: rarely    Drug use: No        Review of Systems   Constitutional: Negative for chills, diaphoresis and fever  Respiratory: Negative for shortness of breath  Cardiovascular: Negative for chest pain and palpitations  Gastrointestinal: Positive for abdominal pain and nausea  Negative for constipation, diarrhea and vomiting  Genitourinary: Negative for dysuria and frequency  Skin: Negative for rash  All other systems reviewed and are negative        Physical Exam  Physical Exam   Constitutional: She is oriented to person, place, and time  She appears well-developed and well-nourished  She appears distressed  HENT:   Head: Normocephalic and atraumatic  Eyes: Pupils are equal, round, and reactive to light  EOM are normal    Neck: Normal range of motion  No JVD present  Cardiovascular: Normal rate, regular rhythm, normal heart sounds and intact distal pulses  Exam reveals no gallop and no friction rub  No murmur heard  Pulmonary/Chest: Effort normal and breath sounds normal  No respiratory distress  She has no wheezes  She has no rales  She exhibits no tenderness  Abdominal: Bowel sounds are normal  She exhibits no distension  There is tenderness in the epigastric area  There is no rigidity, no rebound and no guarding  Musculoskeletal: Normal range of motion  She exhibits no tenderness  Neurological: She is alert and oriented to person, place, and time  Skin: Skin is warm and dry  Psychiatric: She has a normal mood and affect  Her behavior is normal  Judgment and thought content normal    Nursing note and vitals reviewed        Vital Signs  ED Triage Vitals [02/08/19 1847]   Temperature Pulse Respirations Blood Pressure SpO2   98 4 °F (36 9 °C) 74 18 (!) 186/83 100 %      Temp Source Heart Rate Source Patient Position - Orthostatic VS BP Location FiO2 (%)   Oral Monitor Sitting Right arm --      Pain Score       6           Vitals:    02/08/19 1847 02/08/19 2047 02/08/19 2110   BP: (!) 186/83 156/70 150/70   Pulse: 74 76 70   Patient Position - Orthostatic VS: Sitting  Sitting       Visual Acuity      ED Medications  Medications   sodium chloride 0 9 % bolus 1,000 mL (0 mL Intravenous Stopped 2/8/19 2021)   morphine (PF) 10 mg/mL injection 6 mg (6 mg Intravenous Given 2/8/19 1917)   ondansetron (ZOFRAN) injection 4 mg (4 mg Intravenous Given 2/8/19 1916)   iohexol (OMNIPAQUE) 350 MG/ML injection (MULTI-DOSE) 100 mL (100 mL Intravenous Given 2/8/19 2033)   pantoprazole (PROTONIX) EC tablet 20 mg (20 mg Oral Given 2/8/19 2110)   traMADol (ULTRAM) tablet 50 mg (50 mg Oral Given 2/8/19 2110)       Diagnostic Studies  Results Reviewed     Procedure Component Value Units Date/Time    Comprehensive metabolic panel [[id number]] Collected:  02/08/19 1916    Lab Status:  Final result Specimen:  Blood from Arm, Right Updated:  02/08/19 1955     Sodium 138 mmol/L      Potassium 4 8 mmol/L      Chloride 102 mmol/L      CO2 23 mmol/L      ANION GAP 13 mmol/L      BUN 23 mg/dL      Creatinine 0 87 mg/dL      Glucose 94 mg/dL      Calcium 9 7 mg/dL      AST 19 U/L      ALT 31 U/L      Alkaline Phosphatase 83 U/L      Total Protein 8 1 g/dL      Albumin 4 3 g/dL      Total Bilirubin 0 50 mg/dL      eGFR 74 ml/min/1 73sq m     Narrative:         National Kidney Disease Education Program recommendations are as follows:  GFR calculation is accurate only with a steady state creatinine  Chronic Kidney disease less than 60 ml/min/1 73 sq  meters  Kidney failure less than 15 ml/min/1 73 sq  meters      Lipase [[id number]]  (Normal) Collected:  02/08/19 1916    Lab Status:  Final result Specimen:  Blood from Arm, Right Updated:  02/08/19 1955     Lipase 224 u/L     CBC and differential [[id number]]  (Abnormal) Collected:  02/08/19 1916    Lab Status:  Final result Specimen:  Blood from Arm, Right Updated:  02/08/19 1930     WBC 18 95 Thousand/uL      RBC 5 11 Million/uL      Hemoglobin 13 5 g/dL      Hematocrit 43 0 %      MCV 84 fL      MCH 26 4 pg      MCHC 31 4 g/dL      RDW 14 8 %      MPV 10 2 fL      Platelets 559 Thousands/uL      nRBC 0 /100 WBCs      Neutrophils Relative 63 %      Immat GRANS % 1 %      Lymphocytes Relative 26 %      Monocytes Relative 7 %      Eosinophils Relative 2 %      Basophils Relative 1 %      Neutrophils Absolute 12 16 Thousands/µL      Immature Grans Absolute 0 16 Thousand/uL      Lymphocytes Absolute 4 87 Thousands/µL      Monocytes Absolute 1 26 Thousand/µL      Eosinophils Absolute 0 39 Thousand/µL Basophils Absolute 0 11 Thousands/µL                  CT abdomen pelvis with contrast   Final Result by Devika Alvarez MD (02/08 2050)   1  Fat stranding in the right upper quadrant which appears to center about the proximal duodenum suspicious for duodenitis  Pancreatitis is in the differential but is felt less likely  No free intra-abdominal air  2   Hepatic steatosis  3   7 mm left lower lobe pulmonary nodule (series 2 image 8) appears slightly larger than on prior evaluation dated 7/13/2016  Based on current Fleischner Society 2017 Guidelines on incidental pulmonary nodule, followup non-contrast CT is recommended at    6-12 months from the initial examination and, if stable at that time, an additional followup is recommended for 18-24 months from the initial examination  The study was marked in EPIC for significant notification  Workstation performed: [id number]                    Procedures  Procedures       Phone Contacts  ED Phone Contact    ED Course                               MDM  Number of Diagnoses or Management Options  Duodenitis: new and requires workup  Diagnosis management comments: Background: 63 y o  female presents with chief complaint of epigastric abdominal pain       Differential includes but is not limited to: pancreatitis, gastritis, cholecystitis, choledocholithiasis,  pyelonephritis, ureterolithiasis, non specific abdominal pain    Plan: cbc, cmp, lipase, urinalysis, ct scan, symptom control            Amount and/or Complexity of Data Reviewed  Clinical lab tests: ordered and reviewed  Tests in the radiology section of CPT®: ordered and reviewed    Risk of Complications, Morbidity, and/or Mortality  Presenting problems: high  Diagnostic procedures: high  Management options: high    Patient Progress  Patient progress: stable      Disposition  Final diagnoses:   Duodenitis     Time reflects when diagnosis was documented in both MDM as applicable and the Disposition within this note     Time User Action Codes Description Comment    2/8/2019  8:58 PM Tod Smith Add [K29 80] Duodenitis       ED Disposition     ED Disposition Condition Date/Time Comment    Discharge  Fri Feb 8, 2019  9:01 PM Parul Gomez discharge to home/self care  Condition at discharge: Good        Follow-up Information    None         Discharge Medication List as of 2/8/2019  9:01 PM      START taking these medications    Details   omeprazole (PriLOSEC) 20 mg delayed release capsule Take 1 capsule (20 mg total) by mouth daily, Starting Fri 2/8/2019, Normal      traMADol (ULTRAM) 50 mg tablet Take 1 tablet (50 mg total) by mouth every 6 (six) hours as needed for moderate pain for up to 15 doses, Starting Fri 2/8/2019, Normal         CONTINUE these medications which have NOT CHANGED    Details   atorvastatin (LIPITOR) 20 mg tablet TAKE 1 TABLET (20 MG TOTAL) BY MOUTH DAILY, Starting Tue 10/2/2018, Normal      ergocalciferol (ERGOCALCIFEROL) 86006 units capsule Take 1 capsule (50,000 Units total) by mouth once a week, Starting Tue 9/18/2018, Normal      fenofibrate (TRICOR) 145 mg tablet TAKE 1 TABLET DAILY, Normal      !! FLUoxetine (PROzac) 20 mg capsule Take 1 capsule (20 mg total) by mouth daily for 120 days Total Prozac dose is 60 mg daily, Starting Wed 11/7/2018, Until Thu 3/7/2019, Normal      !!  FLUoxetine (PROzac) 40 MG capsule Take 1 capsule (40 mg total) by mouth daily for 120 days Total Prozac dose is 60 mg daily, Starting Wed 11/7/2018, Until Thu 3/7/2019, Normal      Insulin Pen Needle (BD PEN NEEDLE MARISOL U/F) 32G X 4 MM MISC Use 4x daily to inject insulin , Normal      insuln lispro (HUMALOG KWIKPEN) 200 units/mL CONCENTRATED U-200 injection pen Inject insulin TID with meals according to sliding scale (150-200= 2u; 201-250=4u; 251-300=6u; 301-350=8u; >350=10u), No Print      JARDIANCE 25 MG TABS Take 1 tablet (25 mg total) by mouth daily, Starting Wed 10/24/2018, Normal      lisinopril (ZESTRIL) 20 mg tablet TAKE 1 TABLET IN THE EVENING, Normal      lurasidone (LATUDA) 80 mg tablet Take 1 tablet (80 mg total) by mouth daily with dinner for 120 days, Starting Wed 11/7/2018, Until Thu 3/7/2019, Normal      metFORMIN (GLUCOPHAGE) 1000 MG tablet Take 1 tablet (1,000 mg total) by mouth 2 (two) times a day with meals, Starting Wed 10/24/2018, Normal      Omega 3 1200 MG CAPS Take 1,200 mg by mouth 2 (two) times a day  , Historical Med       !! - Potential duplicate medications found  Please discuss with provider  No discharge procedures on file      ED Provider  Electronically Signed by           Laura Santoyo MD  02/10/19 0789

## 2019-02-11 LAB
CHOLEST SERPL-MCNC: 143 MG/DL
CHOLEST/HDLC SERPL: 3.6 (CALC)
HDLC SERPL-MCNC: 40 MG/DL
LDLC SERPL CALC-MCNC: 75 MG/DL (CALC)
NONHDLC SERPL-MCNC: 103 MG/DL (CALC)
TRIGL SERPL-MCNC: 181 MG/DL

## 2019-02-12 LAB
25(OH)D3 SERPL-MCNC: 41 NG/ML (ref 30–100)
BUN SERPL-MCNC: 20 MG/DL (ref 7–25)
BUN/CREAT SERPL: NORMAL (CALC) (ref 6–22)
CALCIUM SERPL-MCNC: 10.2 MG/DL (ref 8.6–10.4)
CHLORIDE SERPL-SCNC: 104 MMOL/L (ref 98–110)
CO2 SERPL-SCNC: 24 MMOL/L (ref 20–32)
CREAT SERPL-MCNC: 0.83 MG/DL (ref 0.5–1.05)
GLUCOSE SERPL-MCNC: 98 MG/DL (ref 65–99)
HBA1C MFR BLD: 5.5 % OF TOTAL HGB
POTASSIUM SERPL-SCNC: 5 MMOL/L (ref 3.5–5.3)
SL AMB EGFR AFRICAN AMERICAN: 91 ML/MIN/1.73M2
SL AMB EGFR NON AFRICAN AMERICAN: 78 ML/MIN/1.73M2
SODIUM SERPL-SCNC: 138 MMOL/L (ref 135–146)

## 2019-02-21 ENCOUNTER — TELEPHONE (OUTPATIENT)
Dept: ENDOCRINOLOGY | Facility: CLINIC | Age: 59
End: 2019-02-21

## 2019-02-21 NOTE — TELEPHONE ENCOUNTER
Blood sugars reviewed, in optimal range  She should discontinue Humalog  Only continue Jardiance 25 mg po daily, metformin 1000 mg twice a day  She does not have to send the log if blood sugars stay his in range of  mg/dl     Hanane Alfonso MD

## 2019-02-21 NOTE — TELEPHONE ENCOUNTER
Log dated 2/21/2019  scanned and attached to this encounter  Current diabetic medications patient is taking:    Humalog - bolus  Jardiance 25 mg daily  Metformin 1000 mg BID    Please review and recommend

## 2019-02-26 ENCOUNTER — TELEPHONE (OUTPATIENT)
Dept: GASTROENTEROLOGY | Facility: AMBULARY SURGERY CENTER | Age: 59
End: 2019-02-26

## 2019-02-26 ENCOUNTER — OFFICE VISIT (OUTPATIENT)
Dept: GASTROENTEROLOGY | Facility: AMBULARY SURGERY CENTER | Age: 59
End: 2019-02-26
Payer: COMMERCIAL

## 2019-02-26 VITALS
BODY MASS INDEX: 30.93 KG/M2 | HEART RATE: 80 BPM | TEMPERATURE: 98 F | RESPIRATION RATE: 14 BRPM | HEIGHT: 64 IN | DIASTOLIC BLOOD PRESSURE: 72 MMHG | SYSTOLIC BLOOD PRESSURE: 140 MMHG | WEIGHT: 181.2 LBS

## 2019-02-26 DIAGNOSIS — K29.80 DUODENITIS: Primary | ICD-10-CM

## 2019-02-26 DIAGNOSIS — R10.13 EPIGASTRIC PAIN: ICD-10-CM

## 2019-02-26 PROCEDURE — 99214 OFFICE O/P EST MOD 30 MIN: CPT | Performed by: PHYSICIAN ASSISTANT

## 2019-02-26 RX ORDER — OMEPRAZOLE 40 MG/1
40 CAPSULE, DELAYED RELEASE ORAL DAILY
Qty: 30 CAPSULE | Refills: 2 | Status: SHIPPED | OUTPATIENT
Start: 2019-02-26 | End: 2019-05-15 | Stop reason: SDUPTHER

## 2019-02-26 NOTE — PROGRESS NOTES
Follow-up Note -  [facility]  Christella Skiff 1960 63 y o  female         Reason:  Follow-up; recent ER visit with epigastric pain    HPI:  21-year-old female with history of diabetes who presents for follow-up; she was last seen by Dr Roxanna Clark in the office in late 2017, at that time doing well with respect to chronic GERD symptoms  Her last EGD was in August 2016 showing only a small hiatal hernia and a couple of 9 gastric polyps, and her last colonoscopy was in October 2014, which was normal, with normal random biopsies  She is seen in the office today because she was recently in the emergency room on February 8, when she presented with epigastric pain that had been going on intermittently over the previous 3 days; she related that she had a history of pancreatitis about 4 years ago which was thought to be secondary to medications she was taking at the time, And was concerned that this may be pancreatitis again  In the ER, her lipase and liver enzymes appeared to be within normal limits, triglycerides normal at 181, CT scan of the abdomen and pelvis showed some fat stranding around the proximal duodenum which was felt to represent duodenitis or less likely pancreatitis  Of note, patient says that she was discontinued from her daily PPI about 3 weeks before this happened  She otherwise denies any new medications  She says she is feeling well at this time, she is taking 20 mg omeprazole daily since her ER visit  She says the pain cleared up over the course of about 2 days after she went home, and has not recurred  She says her appetite is good  Denies any unexpected weight loss  Denies any disturbances to her bowel habits, any blood or mucus in the stools  REVIEW OF SYSTEMS:      CONSTITUTIONAL: Denies any fever, chills, or rigors  Good appetite, and no recent weight loss  HEENT: No earache or tinnitus  Denies hearing loss or visual disturbances    CARDIOVASCULAR: No chest pain or palpitations  RESPIRATORY: Denies any cough, hemoptysis, shortness of breath or dyspnea on exertion  GASTROINTESTINAL: As noted in the History of Present Illness  GENITOURINARY: No problems with urination  Denies any hematuria or dysuria  NEUROLOGIC: No dizziness or vertigo, denies headaches  MUSCULOSKELETAL: Denies any muscle or joint pain  SKIN: Denies skin rashes or itching  ENDOCRINE: Denies excessive thirst  Denies intolerance to heat or cold  PSYCHOSOCIAL: Denies depression or anxiety  Denies any recent memory loss       Past Medical History:   Diagnosis Date    Abdominal pain     started 7/21/16- found something on the pancreas    Abnormal weight loss     last assessed [id number]    Acute sinusitis     Allergic rhinitis     last assessed [id number]    Anemia     Asthma     Cardiac murmur     Cellulitis     Cervical disc disease     Degeneration of thoracic intervertebral disc     Degenerative arthritis of knee     Diabetes mellitus ([facility] [address] [facility]     Diverticulosis     Esophagitis, reflux     last assessed [id number]    Excessive sweating     Fatty liver     Gait disturbance     Granuloma annulare     Head injury     Hyperlipidemia     Hypertension     Hypertensive heart disease     Hypoglycemia     last assessed [id number]    Leg length discrepancy     Leucocytosis     Multiple thyroid nodules     Neuropathy     Obesity     last assessed [id number]    Paronychia of toe     Patellofemoral dysfunction     Postmenopausal atrophic vaginitis     Prepatellar bursitis     Sebaceous cyst     last assessed [id number]    Sleep apnea     no CPAP used    Suicide attempt ([facility] [address] [facility]     Tinea corporis     Type 2 diabetes mellitus (HCC)     cont meds and monitor BS;  last assessed [id number]    Urinary frequency     Vertigo     Vitamin D deficiency     Dia-Parkinson-White syndrome       Past Surgical History:   Procedure Laterality Date    COLONOSCOPY      ESOPHAGOGASTRODUODENOSCOPY N/A 8/8/2016    Procedure: ESOPHAGOGASTRODUODENOSCOPY (EGD); Surgeon: Stacie Martel MD;  Location: [facility];   Service:     TONSILLECTOMY      TOOTH EXTRACTION      wisdom teeth removed     Social History     Socioeconomic History    Marital status: /Civil Union     Spouse name: Not on file    Number of children: 0    Years of education: 15    Highest education level: Not on file   Occupational History    Occupation: retired   Social Needs    Financial resource strain: Not on file    Food insecurity:     Worry: Not on file     Inability: Not on file   [employer] needs:     Medical: Not on file     Non-medical: Not on file   Tobacco Use    Smoking status: Never Smoker    Smokeless tobacco: Never Used   Substance and Sexual Activity    Alcohol use: Not Currently     Comment: rarely    Drug use: No    Sexual activity: Yes   Lifestyle    Physical activity:     Days per week: Not on file     Minutes per session: Not on file    Stress: Not on file   Relationships    Social connections:     Talks on phone: Not on file     Gets together: Not on file     Attends [employer] service: Not on file     Active member of club or organization: Not on file     Attends meetings of clubs or organizations: Not on file     Relationship status: Not on file    Intimate partner violence:     Fear of current or ex partner: Not on file     Emotionally abused: Not on file     Physically abused: Not on file     Forced sexual activity: Not on file   Other Topics Concern    Not on file   Social History Narrative    Education: high school graduate    Learning Disabilities: none    Marital History:     Children: none    Living Arrangement: lives in home with     Occupational History: retired, 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[place] [address]: good support system,  is supportive    Legal History: none     History: None        Caffeine use Denied home environment domestic violence    Job physical requirements heavy lifting     Family History   Problem Relation Age of Onset    Cancer Mother         lung    Heart disease Mother     Coronary artery disease Mother         CABG    Diabetes Mother     Lung cancer Mother     Stroke Mother     Thyroid disease Mother     Other Mother         tobacco use    Parkinsonism Father     Intestinal polyp Father     Bipolar disorder Father     Heart disease Father     Hypertension Sister     Alcohol abuse Sister     Depression Brother     Diabetes Maternal Grandmother     Thyroid disease Maternal Grandmother     Hypertension Family     Suicide Attempts Paternal Aunt     Depression Paternal Aunt      Depakote er [divalproex sodium er]; Tanzeum [albiglutide]; Bee venom; Carbamazepine; Clarithromycin;  Shellfish-derived products; and Lamotrigine  Current Outpatient Medications   Medication Sig Dispense Refill    atorvastatin (LIPITOR) 20 mg tablet TAKE 1 TABLET (20 MG TOTAL) BY MOUTH DAILY 90 tablet 1    ergocalciferol (ERGOCALCIFEROL) 01075 units capsule Take 1 capsule (50,000 Units total) by mouth once a week 12 capsule 1    fenofibrate (TRICOR) 145 mg tablet TAKE 1 TABLET DAILY 90 tablet 1    FLUoxetine (PROzac) 20 mg capsule Take 1 capsule (20 mg total) by mouth daily for 120 days Total Prozac dose is 60 mg daily 30 capsule 3    FLUoxetine (PROzac) 40 MG capsule Take 1 capsule (40 mg total) by mouth daily for 120 days Total Prozac dose is 60 mg daily 30 capsule 3    JARDIANCE 25 MG TABS Take 1 tablet (25 mg total) by mouth daily 90 tablet 0    lisinopril (ZESTRIL) 20 mg tablet TAKE 1 TABLET IN THE EVENING 90 tablet 0    lurasidone (LATUDA) 80 mg tablet Take 1 tablet (80 mg total) by mouth daily with dinner for 120 days 30 tablet 3    metFORMIN (GLUCOPHAGE) 1000 MG tablet Take 1 tablet (1,000 mg total) by mouth 2 (two) times a day with meals 180 tablet 0    Omega 3 1200 MG CAPS Take 1,200 mg by mouth 2 (two) times a day        Insulin Pen Needle (BD PEN NEEDLE MARISOL U/F) 32G X 4 MM MISC Use 4x daily to inject insulin  (Patient not taking: Reported on 2/26/2019) 400 each 0    omeprazole (PriLOSEC) 40 MG capsule Take 1 capsule (40 mg total) by mouth daily 30 capsule 2    traMADol (ULTRAM) 50 mg tablet Take 1 tablet (50 mg total) by mouth every 6 (six) hours as needed for moderate pain for up to 15 doses (Patient not taking: Reported on 2/26/2019) 15 tablet 0     No current facility-administered medications for this visit  Blood pressure 140/72, pulse 80, temperature 98 °F (36 7 °C), temperature source Tympanic, resp  rate 14, height 5' 4" (1 626 m), weight 82 2 kg (181 lb 3 2 oz)  PHYSICAL EXAM:      General Appearance:   Alert, cooperative, no distress, appears stated age    HEENT:   Normocephalic, atraumatic, anicteric      Neck:  Supple, symmetrical, trachea midline, no adenopathy;    thyroid: no enlargement/tenderness/nodules; no carotid  bruit or JVD    Lungs:   Clear to auscultation bilaterally; no rales, rhonchi or wheezing; respirations unlabored    Heart[de-identified]   S1 and S2 normal; regular rate and rhythm; no murmur, rub, or gallop     Abdomen:   Soft, non-tender, non-distended; normal bowel sounds; no masses, no organomegaly    Extremities: No edema, erythema, wounds, rashes   Rectal:   Deferred                      Lab Results   Component Value Date    WBC 18 95 (H) 02/08/2019    HGB 13 5 02/08/2019    HCT 43 0 02/08/2019    MCV 84 02/08/2019     (H) 02/08/2019     Lab Results   Component Value Date    GLUCOSE 120 09/06/2015    CALCIUM 10 2 02/11/2019     09/06/2015    K 5 0 02/11/2019    CO2 24 02/11/2019     02/11/2019    BUN 20 02/11/2019    CREATININE 0 87 02/08/2019     Lab Results   Component Value Date    ALT 31 02/08/2019    AST 19 02/08/2019    ALKPHOS 83 02/08/2019    BILITOT 0 30 09/06/2015     No results found for: INR, PROTIME    Ct Abdomen Pelvis With Contrast    Result Date: 2/8/2019  Impression: 1  Fat stranding in the right upper quadrant which appears to center about the proximal duodenum suspicious for duodenitis  Pancreatitis is in the differential but is felt less likely  No free intra-abdominal air  2   Hepatic steatosis  3   7 mm left lower lobe pulmonary nodule (series 2 image 8) appears slightly larger than on prior evaluation dated 7/13/2016  Based on current Fleischner Society 2017 Guidelines on incidental pulmonary nodule, followup non-contrast CT is recommended at 6-12 months from the initial examination and, if stable at that time, an additional followup is recommended for 18-24 months from the initial examination  The study was marked in EPIC for significant notification  Workstation performed: [id number]       ASSESSMENT & PLAN:    Epigastric pain  See assessment and plan for "duodenitis"    Duodenitis  Patient was recently seen in the emergency room for acute onset of intermittent epigastric pain which had gone on for several days; related a history of pancreatitis which was thought to be medication-induced several years ago, but on this presentation there is no obvious evidence of pancreatitis on lab work or imaging  There was found to be fat stranding around the proximal duodenum, however, which could potentially have been secondary to pancreatitis, but more likely secondary to primary duodenitis or duodenal ulcer  This also did occur 3 weeks after stopping PPI per the patient    - Will schedule patient for EGD    - Patient was explained about  the risks and benefits of the procedure  Risks including but not limited to bleeding, infection, perforation were explained in detail  Also explained about less than 100% sensitivity with the exam and other alternatives      - Continue PPI, we'll prescribe omeprazole 40 mg once daily    - Avoid NSAIDs    - We'll also check right upper quadrant ultrasound, biliary colic/cholelithiasis is also in the differential

## 2019-02-26 NOTE — ASSESSMENT & PLAN NOTE
Patient was recently seen in the emergency room for acute onset of intermittent epigastric pain which had gone on for several days; related a history of pancreatitis which was thought to be medication-induced several years ago, but on this presentation there is no obvious evidence of pancreatitis on lab work or imaging  There was found to be fat stranding around the proximal duodenum, however, which could potentially have been secondary to pancreatitis, but more likely secondary to primary duodenitis or duodenal ulcer  This also did occur 3 weeks after stopping PPI per the patient    - Will schedule patient for EGD    - Patient was explained about  the risks and benefits of the procedure  Risks including but not limited to bleeding, infection, perforation were explained in detail  Also explained about less than 100% sensitivity with the exam and other alternatives      - Continue PPI, we'll prescribe omeprazole 40 mg once daily    - Avoid NSAIDs    - We'll also check right upper quadrant ultrasound, biliary colic/cholelithiasis is also in the differential

## 2019-02-27 ENCOUNTER — HOSPITAL ENCOUNTER (OUTPATIENT)
Dept: ULTRASOUND IMAGING | Facility: HOSPITAL | Age: 59
Discharge: HOME | End: 2019-02-27
Payer: COMMERCIAL

## 2019-02-27 DIAGNOSIS — K29.80 DUODENITIS: ICD-10-CM

## 2019-02-27 DIAGNOSIS — R10.13 EPIGASTRIC PAIN: ICD-10-CM

## 2019-02-27 PROCEDURE — 76705 ECHO EXAM OF ABDOMEN: CPT

## 2019-03-01 DIAGNOSIS — E55.9 VITAMIN D DEFICIENCY: ICD-10-CM

## 2019-03-01 RX ORDER — ERGOCALCIFEROL 1.25 MG/1
CAPSULE ORAL
Qty: 4 CAPSULE | Refills: 1 | Status: SHIPPED | OUTPATIENT
Start: 2019-03-01 | End: 2019-04-19 | Stop reason: SDUPTHER

## 2019-03-04 ENCOUNTER — OFFICE VISIT (OUTPATIENT)
Dept: ENDOCRINOLOGY | Facility: CLINIC | Age: 59
End: 2019-03-04
Payer: COMMERCIAL

## 2019-03-04 ENCOUNTER — TELEPHONE (OUTPATIENT)
Dept: GASTROENTEROLOGY | Facility: AMBULARY SURGERY CENTER | Age: 59
End: 2019-03-04

## 2019-03-04 VITALS
DIASTOLIC BLOOD PRESSURE: 70 MMHG | WEIGHT: 181 LBS | BODY MASS INDEX: 30.9 KG/M2 | SYSTOLIC BLOOD PRESSURE: 130 MMHG | HEIGHT: 64 IN

## 2019-03-04 DIAGNOSIS — E11.65 TYPE 2 DIABETES MELLITUS WITH HYPERGLYCEMIA, WITHOUT LONG-TERM CURRENT USE OF INSULIN (HCC): Primary | ICD-10-CM

## 2019-03-04 DIAGNOSIS — E04.2 MULTIPLE THYROID NODULES: ICD-10-CM

## 2019-03-04 DIAGNOSIS — E55.9 VITAMIN D DEFICIENCY: ICD-10-CM

## 2019-03-04 DIAGNOSIS — E78.5 DYSLIPIDEMIA: ICD-10-CM

## 2019-03-04 DIAGNOSIS — I10 ESSENTIAL HYPERTENSION: ICD-10-CM

## 2019-03-04 PROCEDURE — 4010F ACE/ARB THERAPY RXD/TAKEN: CPT | Performed by: INTERNAL MEDICINE

## 2019-03-04 PROCEDURE — 99214 OFFICE O/P EST MOD 30 MIN: CPT | Performed by: PHYSICIAN ASSISTANT

## 2019-03-04 RX ORDER — LISINOPRIL 20 MG/1
20 TABLET ORAL EVERY EVENING
Qty: 90 TABLET | Refills: 1 | Status: SHIPPED | OUTPATIENT
Start: 2019-03-04 | End: 2019-11-11 | Stop reason: ALTCHOICE

## 2019-03-04 NOTE — PROGRESS NOTES
Patient Progress Note      CC: DM2, thyroid nodule     Referring Provider  Anitra Patino Do  [address]  [facility], [address] [facility]      History of Present Illness:   Jose Ricks is a 63 y o  female with a history of type 2 diabetes without long term use of insulin  She previously was on basal and bolus insulin dosing, but that was discontinued recently as patient continues to loose weight and blood glucose levels are tightly controlled  She is doing weight watchers and has lost about 30 lbs  Diabetes course has been stable  Complications of DM: none reported  Recently went to ER for abdominal and diagnosed with duodenitis  Did follow-up with GI and was started on omeprazole  Was having hypoglycemia on insulin, but that has improved since discontinuing insulin  Denies recent severe hyperglycemic episodes  Denies any issues with her current regimen  Home glucose monitoring: are performed regularly    Home blood glucose readings:   Before breakfast:  mg/dl  Range  mg/dl  She had a reading of 67 mg/dl the day insulin was completely discontinued  Current regimen: Jardiance 25 mg daily, Metformin 1000 mg BID  compliant most of the time, denies any side effects from medications  Hypoglycemic episodes: Yes, infrequent  H/o of hypoglycemia causing hospitalization or Intervention such as glucagon injection  or ambulance call :  No  Hypoglycemia symptoms: jitteriness and nausea  Treatment of hypoglycemia: orange juice, glucose tablets  Medic alert tag: recommended: Yes    Diabetes education: No  Diet: 3 meals per day, 0-1 snacks per day  Timing of meals is predictable  Diabetic diet compliance:  compliant most of the time  Activity: Daily activity is predictable: Yes  Could improve on routine exercise      Further diabetic ROS: no chest pain, dyspnea or TIAs, no numbness, tingling or pain in extremities        Ophthamology: sees routinely; no retinopathy  Podiatry: does not see routinely    Has hypertension: on lisinopril, compliant most of the time  Has hyperlipidemia: on atorvastatin- tolerating well, no myalgias  compliant most of the time, denies any side effects from medications  Thyroid disorders: thyroid nodule  Last thyroid US done June 2018  FINDINGS:  Thyroid parenchyma is diffusely heterogeneous in echotexture with focal nodule(s) as described below      Right lobe:  5 x 1 4 x 1 9 cm  Left lobe:  4 3 x 1 6 x 1 5 cm  Isthmus:  0 3 cm      No new discrete suspicious nodules  Multiple bilateral small colloid cysts and spongiform nodules are unchanged     IMPRESSION:     No nodule meets current ACR criteria for requiring biopsy or followup ultrasounds       History of pancreatitis: Yes    Vitamin D deficiency: is taking vitamin D2 81816 units weekly    Patient Active Problem List   Diagnosis    Anomalies of nails    Asthma    Essential hypertension    Bipolar I disorder, most recent episode depressed, in full remission ([address] )    Cardiac murmur    Cervical disc disease    Degeneration of thoracic intervertebral disc    Degenerative arthritis of knee, bilateral    Type 2 diabetes mellitus ([address] )    Diverticulosis    Dyslipidemia    Essential hypertriglyceridemia    Fatty liver    Gastroesophageal reflux disease    Granuloma annulare    Hypertensive heart disease    Iron deficiency anemia, unspecified    Left thyroid nodule    Leg length discrepancy    Multiple thyroid nodules    Neuropathy    Class 1 obesity due to excess calories with serious comorbidity and body mass index (BMI) of 32 0 to 32 9 in adult    Obstructive sleep apnea    Other acquired deformity of knee    Personality disorder ([address] )    Postmenopausal atrophic vaginitis    Prepatellar bursitis    Vertigo    Vitamin D deficiency    Anomalous atrioventricular excitation    LOURDES (generalized anxiety disorder)    Duodenitis    Epigastric pain      Past Medical History:   Diagnosis Date    Abdominal pain     started 7/21/16- found something on the pancreas    Abnormal weight loss     last assessed [id number]    Acute sinusitis     Allergic rhinitis     last assessed [id number]    Anemia     Asthma     Cardiac murmur     Cellulitis     Cervical disc disease     Degeneration of thoracic intervertebral disc     Degenerative arthritis of knee     Diabetes mellitus [facility]     Diverticulosis     Esophagitis, reflux     last assessed [id number]    Excessive sweating     Fatty liver     Gait disturbance     Granuloma annulare     Head injury     Hyperlipidemia     Hypertension     Hypertensive heart disease     Hypoglycemia     last assessed [id number]    Leg length discrepancy     Leucocytosis     Multiple thyroid nodules     Neuropathy     Obesity     last assessed [id number]    Paronychia of toe     Patellofemoral dysfunction     Postmenopausal atrophic vaginitis     Prepatellar bursitis     Sebaceous cyst     last assessed [id number]    Sleep apnea     no CPAP used    Suicide attempt ([facility]     Tinea corporis     Type 2 diabetes mellitus (HCC)     cont meds and monitor BS;  last assessed [id number]    Urinary frequency     Vertigo     Vitamin D deficiency     Dia-Parkinson-White syndrome       Past Surgical History:   Procedure Laterality Date    COLONOSCOPY      ESOPHAGOGASTRODUODENOSCOPY N/A 8/8/2016    Procedure: ESOPHAGOGASTRODUODENOSCOPY (EGD); Surgeon: Ariel Hamilton MD;  Location: [facility];   Service:     TONSILLECTOMY      TOOTH EXTRACTION      wisdom teeth removed      Family History   Problem Relation Age of Onset    Cancer Mother         lung    Heart disease Mother     Coronary artery disease Mother         CABG    Diabetes Mother     Lung cancer Mother     Stroke Mother     Thyroid disease Mother     Other Mother         tobacco use    Parkinsonism Father     Intestinal polyp Father     Bipolar disorder Father     Heart disease Father    Mavis Cousin Hypertension Sister     Alcohol abuse Sister     Depression Brother     Diabetes Maternal Grandmother     Thyroid disease Maternal Grandmother     Hypertension Family     Suicide Attempts Paternal Aunt     Depression Paternal Aunt      Social History     Tobacco Use    Smoking status: Never Smoker    Smokeless tobacco: Never Used   Substance Use Topics    Alcohol use: Not Currently     Comment: rarely     Allergies   Allergen Reactions    Depakote Er [Divalproex Sodium Er]      pancreatitis    Tanzeum [Albiglutide]      pancreatitis    Bee Venom     Carbamazepine Other (See Comments)     Reaction Date:Unknown    Clarithromycin Itching    Shellfish-Derived Products     Lamotrigine Rash         Current Outpatient Medications:     atorvastatin (LIPITOR) 20 mg tablet, TAKE 1 TABLET (20 MG TOTAL) BY MOUTH DAILY, Disp: 90 tablet, Rfl: 1    ergocalciferol (VITAMIN D2) 50,000 units, TAKE ONE CAPSULE BY MOUTH ONE TIME PER WEEK, Disp: 4 capsule, Rfl: 1    fenofibrate (TRICOR) 145 mg tablet, TAKE 1 TABLET DAILY, Disp: 90 tablet, Rfl: 1    FLUoxetine (PROzac) 20 mg capsule, Take 1 capsule (20 mg total) by mouth daily for 120 days Total Prozac dose is 60 mg daily, Disp: 30 capsule, Rfl: 3    FLUoxetine (PROzac) 40 MG capsule, Take 1 capsule (40 mg total) by mouth daily for 120 days Total Prozac dose is 60 mg daily, Disp: 30 capsule, Rfl: 3    Insulin Pen Needle (BD PEN NEEDLE MARISOL U/F) 32G X 4 MM MISC, Use 4x daily to inject insulin , Disp: 400 each, Rfl: 0    JARDIANCE 25 MG TABS, Take 1 tablet (25 mg total) by mouth daily, Disp: 90 tablet, Rfl: 0    lisinopril (ZESTRIL) 20 mg tablet, Take 1 tablet (20 mg total) by mouth every evening, Disp: 90 tablet, Rfl: 1    lurasidone (LATUDA) 80 mg tablet, Take 1 tablet (80 mg total) by mouth daily with dinner for 120 days, Disp: 30 tablet, Rfl: 3    metFORMIN (GLUCOPHAGE) 1000 MG tablet, Take 1 tablet (1,000 mg total) by mouth 2 (two) times a day with meals, Disp: 180 tablet, Rfl: 0    Omega 3 1200 MG CAPS, Take 1,200 mg by mouth 2 (two) times a day  , Disp: , Rfl:     omeprazole (PriLOSEC) 40 MG capsule, Take 1 capsule (40 mg total) by mouth daily, Disp: 30 capsule, Rfl: 2    traMADol (ULTRAM) 50 mg tablet, Take 1 tablet (50 mg total) by mouth every 6 (six) hours as needed for moderate pain for up to 15 doses (Patient not taking: Reported on 2/26/2019), Disp: 15 tablet, Rfl: 0  Review of Systems   Constitutional: Negative for activity change, appetite change, fatigue and unexpected weight change (trying to loose weight)  HENT: Negative for trouble swallowing  Eyes: Negative for visual disturbance  Respiratory: Negative for shortness of breath  Cardiovascular: Negative for chest pain and palpitations  Gastrointestinal: Negative for constipation and diarrhea  Endocrine: Positive for polydipsia  Negative for cold intolerance and heat intolerance  Musculoskeletal: Negative  Skin: Negative for wound  Neurological: Negative for numbness  Psychiatric/Behavioral: Negative  Physical Exam:  Body mass index is 31 07 kg/m²  /70   Ht 5' 4" (1 626 m)   Wt 82 1 kg (181 lb)   BMI 31 07 kg/m²    Wt Readings from Last 3 Encounters:   03/04/19 82 1 kg (181 lb)   02/26/19 82 2 kg (181 lb 3 2 oz)   02/08/19 86 2 kg (190 lb)       Physical Exam   Constitutional: She appears well-developed and well-nourished  HENT:   Head: Normocephalic  Eyes: Pupils are equal, round, and reactive to light  EOM are normal  No scleral icterus  Neck: Neck supple  No thyromegaly present  Cardiovascular: Normal rate and regular rhythm  Pulses are no weak pulses  No murmur heard  Pulses:       Radial pulses are 2+ on the right side, and 2+ on the left side  Dorsalis pedis pulses are 2+ on the right side, and 2+ on the left side  Pulmonary/Chest: Effort normal and breath sounds normal  No respiratory distress  She has no wheezes     Feet:   Right Foot: Skin Integrity: Negative for ulcer, skin breakdown, erythema, warmth, callus or dry skin  Left Foot:   Skin Integrity: Negative for ulcer, skin breakdown, erythema, warmth, callus or dry skin  Neurological: She is alert  She has normal reflexes  Skin: Skin is warm and dry  Psychiatric: She has a normal mood and affect  Nursing note and vitals reviewed  Patient's shoes and socks removed  Right Foot/Ankle   Right Foot Inspection  Skin Exam: skin normal and skin intact no dry skin, no warmth, no callus, no erythema, no maceration, no abnormal color, no pre-ulcer, no ulcer and no callus                            Sensory   Vibration: intact    Monofilament testing: intact  Vascular    The right DP pulse is 2+  Left Foot/Ankle  Left Foot Inspection  Skin Exam: skin normal and skin intactno dry skin, no warmth, no erythema, no maceration, normal color, no pre-ulcer, no ulcer and no callus                                         Sensory   Vibration: intact    Monofilament: intact  Vascular    The left DP pulse is 2+  Assign Risk Category:  No deformity present; No loss of protective sensation; No weak pulses       Risk: 0      Labs:      Ref  Range 2/11/2019 08:27   Sodium Latest Ref Range: 135 - 146 mmol/L 138   Potassium Latest Ref Range: 3 5 - 5 3 mmol/L 5 0   Chloride Latest Ref Range: 98 - 110 mmol/L 104   CO2 Latest Ref Range: 20 - 32 mmol/L 24   BUN Latest Ref Range: 7 - 25 mg/dL 20   Creatinine Latest Ref Range: 0 50 - 1 05 mg/dL 0 83   SL AMB BUN/CREATININE RATIO Latest Ref Range: 6 - 22 (calc) NOT APPLICABLE   Glucose, Random Latest Ref Range: 65 - 99 mg/dL 98   eGFR African American Latest Ref Range: > OR = 60 mL/min/1 73m2 91   eGFR Non African American Latest Ref Range: > OR = 60 mL/min/1 73m2 78      Ref   Range 2/11/2019 08:24   Cholesterol Latest Ref Range: <200 mg/dL 143   Triglycerides Latest Ref Range: <150 mg/dL 181 (H)   HDL Latest Ref Range: >50 mg/dL 40 (L)   Non-HDL Cholesterol Latest Ref Range: <130 mg/dL (calc) 103   LDL Direct Latest Units: mg/dL (calc) 75   Chol HDLC Ratio Latest Ref Range: <5 0 (calc) 3 6      Ref  Range 6/9/2017 09:22   Vit D, 25-Hydroxy Latest Ref Range: 30 0 - 100 0 ng/mL 31 9      Ref  Range 10/17/2018 07:50 2/11/2019 08:27   Hemoglobin A1C Latest Ref Range: <5 7 % of total Hgb 7 0 (H) 5 5      Ref  Range 10/17/2018 07:50   TSH 3RD GENERATON Latest Ref Range: 0 358 - 3 740 uIU/mL 2 340   Free T4 Latest Ref Range: 0 76 - 1 46 ng/dL 0 93       Plan:    Diagnoses and all orders for this visit:    Type 2 diabetes mellitus with hyperglycemia, without long-term current use of insulin ([facility])  HGA1C 5 5%  Improved  Most blood sugars range between 75 and 146 mg/dL  Treatment regimen: Continue current treatment  Discussed risks/complications associated with uncontrolled diabetes  Advised to adhere to diabetic diet, and recommended staying active/exercising routinely  Keep carbohydrates consistent to limit blood glucose fluctuations  Advised to call if blood sugars less than 70 mg/dl or over 300 mg/dl  Check blood glucose 1-2 times a day  Discussed symptoms and treatment of hypoglycemia  Recommended routine follow-up with podiatry and ophthalmology  Ordered blood work to complete prior to next visit  -     Hemoglobin A1C; Future  -     Basic metabolic panel; Future    Essential hypertension  Blood pressure well controlled, continue current treatment  -     lisinopril (ZESTRIL) 20 mg tablet; Take 1 tablet (20 mg total) by mouth every evening  -     Basic metabolic panel; Future    Dyslipidemia  LDL 75, at goal  Continue statin therapy    Vitamin D deficiency  Continue current supplementation of vitamin D3  Check vitamin-D level  -     Vitamin D 25 hydroxy; Future    Multiple thyroid nodules  Thyroid ultrasound done in June 2018 showed multiple bilateral small colloid cysts and spongiform nodules are unchanged        Discussed with the patient diagnosis and treatment and all questions fully answered  She will call me if any problems arise  Counseled patient on diagnostic results, prognosis, risk and benefit of treatment options, instruction for management, importance of treatment compliance, risk factor reduction and impressions        Drea Will PA-C

## 2019-03-04 NOTE — PATIENT INSTRUCTIONS
Hypoglycemia instructions   Olivier Essex  3/4/2019  [id number]    Low Blood Sugar    Steps to treat low blood sugar  1  Test blood sugar if you have symptoms of low blood sugar:   Low Blood Sugar Symptoms:  o Sweaty  o Dizzy  o Rapid heartbeat  o Shaky  o Bad mood  o Hungry      2  Treat blood sugar less than 70 with 15 grams of fast-acting carbohydrate:   Examples of 15 grams Fast-Acting Carbohydrate:  o 4 oz juice  o 4 oz regular soda  o 3-4 glucose tablets (chew)  o 3-4 hard candies (chew)          3  Wait 15 minutes and test your blood sugar again     4   If blood sugar is less than 100, repeat steps 2-3     5  When your blood sugar is 100 or more, eat a snack if it will be longer than one hour until your next meal  The snack should be 15 grams of carbohydrate and a protein:   Examples of snacks:  o ½ sandwich  o 6 crackers with cheese  o Piece of fruit with cheese or peanut butter  o 6 crackers with peanut butter

## 2019-03-04 NOTE — TELEPHONE ENCOUNTER
----- Message from Jacinta Villanueva PA-C sent at 3/4/2019  2:23 PM EST -----  Please advise patient, her ultrasound showed evidence of fatty liver, which does not appear to be causing any problems at this time; her liver function tests appeared normal last month  Regular exercise and weight loss would be the management strategy for this  There is also some small amount of sludge in the gallbladder, which can sometimes cause problems with recurrent epigastric pain/nausea, but we would like to see what her upcoming EGD shows, as there may be a more likely cause for his symptoms found at that time    Please call if any questions, thank you

## 2019-03-04 NOTE — PSYCH
MEDICATION MANAGEMENT NOTE        [facility]      Name and Date of Birth:  Alfredo Perez 63 y o  1960 MRN: [id number]    Date of Visit: March 8, 2019    SUBJECTIVE:    Claude Loh is seen today for a follow up for bipolar disorder, generalized anxiety disorder and personality disorder  She has done well since the last visit  She reports that mood continues to be stable, denies any significant depressive symptoms  She states that anxiety symptoms are controlled  She has been thinking about getting a part time job after she goes on a cruise in May  She has been going to [employer] and lost 34 lb since the last visit  She denies any suicidal ideation, intent or plan at present; denies any homicidal ideation, intent or plan at present  She has no auditory hallucinations, denies any visual hallucinations, has no delusional thinking  She denies any side effects from current psychiatric medications  Dhara Prows HPI ROS Appetite Changes and Sleep:     She reports normal sleep, normal appetite, recent weight loss (34 lbs), normal energy level    Review Of Systems:      Constitutional recent weight loss (34 lbs)   ENT negative   Cardiovascular negative   Respiratory negative   Gastrointestinal negative   Genitourinary negative   Musculoskeletal negative   Integumentary negative   Neurological negative   Endocrine negative   Other Symptoms none       Past Psychiatric History:      Past Inpatient Psychiatric Treatment:   One past inpatient psychiatric admission at [place] [address]  Past Outpatient Psychiatric Treatment:    In outpatient treatment at [address] [place] [address] E for many years    Past Suicide Attempts: yes, 2 attempts by overdose years ago  Past Violent Behavior: no  Past Psychiatric Medication Trials: Prozac, Depakote, Abilify and Latuda    Traumatic History:      Abuse: no history of sexual abuse, no history of physical abuse  Other Traumatic Events: none     Past Medical History:    Past Medical History:   Diagnosis Date    Abdominal pain     started 7/21/16- found something on the pancreas    Abnormal weight loss     last assessed [id number]    Acute sinusitis     Allergic rhinitis     last assessed [id number]    Anemia     Asthma     Cardiac murmur     Cellulitis     Cervical disc disease     Degeneration of thoracic intervertebral disc     Degenerative arthritis of knee     Diabetes mellitus ([facility] )     Diverticulosis     Esophagitis, reflux     last assessed [id number]    Excessive sweating     Fatty liver     Gait disturbance     Granuloma annulare     Head injury     Hyperlipidemia     Hypertension     Hypertensive heart disease     Hypoglycemia     last assessed [id number]    Leg length discrepancy     Leucocytosis     Multiple thyroid nodules     Neuropathy     Obesity     last assessed [id number]    Paronychia of toe     Patellofemoral dysfunction     Postmenopausal atrophic vaginitis     Prepatellar bursitis     Sebaceous cyst     last assessed [id number]    Sleep apnea     no CPAP used    Suicide attempt ([facility] )     Tinea corporis     Type 2 diabetes mellitus ([facility] )     cont meds and monitor BS;  last assessed [id number]    Urinary frequency     Vertigo     Vitamin D deficiency     Dia-Parkinson-White syndrome      Past Medical History Pertinent Negatives:   Diagnosis Date Noted    Seizures ([facility] )      Past Surgical History:   Procedure Laterality Date    COLONOSCOPY      ESOPHAGOGASTRODUODENOSCOPY N/A 8/8/2016    Procedure: ESOPHAGOGASTRODUODENOSCOPY (EGD); Surgeon: Van Simental MD;  Location: [facility];   Service:     TONSILLECTOMY      TOOTH EXTRACTION      wisdom teeth removed     Allergies   Allergen Reactions    Depakote Er [Divalproex Sodium Er]      pancreatitis    Tanzeum [Albiglutide]      pancreatitis    Bee Venom     Carbamazepine Other (See Comments)     Reaction Date:Unknown  Clarithromycin Itching    Shellfish-Derived Products     Lamotrigine Rash       Substance Abuse History:    Social History     Substance and Sexual Activity   Alcohol Use Not Currently    Comment: rarely     Social History     Substance and Sexual Activity   Drug Use No       Social History:    Social History     Socioeconomic History    Marital status: /Civil Union     Spouse name: Not on file    Number of children: 0    Years of education: 15    Highest education level: 12th grade   Occupational History    Occupation: retired   Social Needs    Financial resource strain: Not hard at all   Mignon-Deb insecurity:     Worry: Never true     Inability: Never true    Transportation needs:     Medical: No     Non-medical: No   Tobacco Use    Smoking status: Never Smoker    Smokeless tobacco: Never Used   Substance and Sexual Activity    Alcohol use: Not Currently     Comment: rarely    Drug use: No    Sexual activity: Yes     Partners: Male   Lifestyle    Physical activity:     Days per week: 2 days     Minutes per session: 10 min    Stress:  Only a little   Relationships    Social connections:     Talks on phone: Once a week     Gets together: Once a week     Attends [employer] service: Never     Active member of club or organization: Yes     Attends meetings of clubs or organizations: More than 4 times per year     Relationship status:     Intimate partner violence:     Fear of current or ex partner: No     Emotionally abused: No     Physically abused: No     Forced sexual activity: No   Other Topics Concern    Not on file   Social History Narrative    Education: high school graduate    Learning Disabilities: none    Marital History:     Children: none    Living Arrangement: lives in home with     Occupational History: retired, 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[place] [address]: good support system,  is supportive    Legal History: none     History: None        Caffeine use    Denied home environment domestic violence    Job physical requirements heavy lifting       Family Psychiatric History:     Family History   Problem Relation Age of Onset    Cancer Mother         lung    Heart disease Mother     Coronary artery disease Mother         CABG    Diabetes Mother     Lung cancer Mother     Stroke Mother     Thyroid disease Mother     Other Mother         tobacco use    Parkinsonism Father     Intestinal polyp Father     Bipolar disorder Father     Heart disease Father     Hypertension Sister     Alcohol abuse Sister     Depression Brother     Diabetes Maternal Grandmother     Thyroid disease Maternal Grandmother     Hypertension Family     Suicide Attempts Paternal Aunt     Depression Paternal Aunt        History Review:  The following portions of the patient's history were reviewed and updated as appropriate: allergies, current medications, past family history, past medical history, past social history, past surgical history and problem list          OBJECTIVE:     Vital signs in last 24 hours:    Vitals:    03/08/19 1453   BP: 144/77   Pulse: 79   Weight: 81 6 kg (180 lb)   Height: 5' 4" (1 626 m)       Mental Status Evaluation:    Appearance age appropriate, casually dressed   Behavior cooperative, calm   Speech normal rate, normal volume, normal pitch   Mood euthymic   Affect normal range and intensity, appropriate   Thought Processes organized, goal directed   Associations intact associations   Thought Content no overt delusions   Perceptual Disturbances: no auditory hallucinations, no visual hallucinations   Abnormal Thoughts  Risk Potential Suicidal ideation - None  Homicidal ideation - None  Potential for aggression - No   Orientation oriented to person, place, time/date and situation   Memory recent and remote memory grossly intact   Consciousness alert and awake   Attention Span Concentration Span attention span and concentration are age appropriate   Intellect appears to be of average intelligence   Insight intact   Judgement intact   Muscle Strength and  Gait normal muscle strength and normal muscle tone, normal gait and normal balance   Motor activity no abnormal movements   Language no difficulty naming common objects, no difficulty repeating a phrase, no difficulty writing a sentence   Fund of Knowledge adequate knowledge of current events  adequate fund of knowledge regarding past history  adequate fund of knowledge regarding vocabulary    Pain none   Pain Scale 0       Laboratory Results:   I have personally reviewed all pertinent laboratory/tests results    Recent Labs (last 4 months):   Orders Only on 02/11/2019   Component Date Value    Glucose, Random 02/11/2019 98     BUN 02/11/2019 20     Creatinine 02/11/2019 0 83     eGFR Non African American 02/11/2019 78     eGFR  02/11/2019 91     SL AMB BUN/CREATININE RA* 18/63/0409 NOT APPLICABLE     Sodium 02/93/1122 138     Potassium 02/11/2019 5 0     Chloride 02/11/2019 104     CO2 02/11/2019 24     SL AMB CALCIUM 02/11/2019 10 2     Vitamin D, 25-Hydroxy, S* 02/11/2019 41     Hemoglobin A1C 02/11/2019 5 5    Orders Only on 02/11/2019   Component Date Value    Total Cholesterol 02/11/2019 143     HDL 02/11/2019 40*    Triglycerides 02/11/2019 181*    LDL Direct 02/11/2019 75     Chol HDLC Ratio 02/11/2019 3 6     Non-HDL Cholesterol 02/11/2019 103    Admission on 02/08/2019, Discharged on 02/08/2019   Component Date Value    WBC 02/08/2019 18 95*    RBC 02/08/2019 5 11     Hemoglobin 02/08/2019 13 5     Hematocrit 02/08/2019 43 0     MCV 02/08/2019 84     MCH 02/08/2019 26 4*    MCHC 02/08/2019 31 4     RDW 02/08/2019 14 8     MPV 02/08/2019 10 2     Platelets 10/96/2343 431*    nRBC 02/08/2019 0     Neutrophils Relative 02/08/2019 63     Immat GRANS % 02/08/2019 1     Lymphocytes Relative 02/08/2019 26  Monocytes Relative 02/08/2019 7     Eosinophils Relative 02/08/2019 2     Basophils Relative 02/08/2019 1     Neutrophils Absolute 02/08/2019 12 16*    Immature Grans Absolute 02/08/2019 0 16     Lymphocytes Absolute 02/08/2019 4 87*    Monocytes Absolute 02/08/2019 1 26*    Eosinophils Absolute 02/08/2019 0 39     Basophils Absolute 02/08/2019 0 11*    Sodium 02/08/2019 138     Potassium 02/08/2019 4 8     Chloride 02/08/2019 102     CO2 02/08/2019 23     ANION GAP 02/08/2019 13     BUN 02/08/2019 23     Creatinine 02/08/2019 0 87     Glucose 02/08/2019 94     Calcium 02/08/2019 9 7     AST 02/08/2019 19     ALT 02/08/2019 31     Alkaline Phosphatase 02/08/2019 83     Total Protein 02/08/2019 8 1     Albumin 02/08/2019 4 3     Total Bilirubin 02/08/2019 0 50     eGFR 02/08/2019 74     Lipase 02/08/2019 224        Assessment/Plan:       Diagnoses and all orders for this visit:    Bipolar I disorder, most recent episode depressed, in full remission ([facility] [address] )  -     lurasidone (LATUDA) 80 mg tablet; Take 1 tablet (80 mg total) by mouth daily with dinner for 120 days  -     FLUoxetine (PROzac) 20 mg capsule; Take 1 capsule (20 mg total) by mouth daily for 120 days Total Prozac dose is 60 mg daily  -     FLUoxetine (PROzac) 40 MG capsule; Take 1 capsule (40 mg total) by mouth daily for 120 days Total Prozac dose is 60 mg daily    LOURDES (generalized anxiety disorder)  -     FLUoxetine (PROzac) 20 mg capsule; Take 1 capsule (20 mg total) by mouth daily for 120 days Total Prozac dose is 60 mg daily  -     FLUoxetine (PROzac) 40 MG capsule;  Take 1 capsule (40 mg total) by mouth daily for 120 days Total Prozac dose is 60 mg daily    Personality disorder (HCC)        Treatment Recommendations/Precautions:    Continue Latuda 80 mg in the evening to help with mood stability  Continue Prozac 60 mg daily to improve depressive symptoms  Medication management every 4 months  Aware of need to follow up with family physician for glucose and lipid monitoring due to current therapy with antipsychotic medication  Follows with family physician for medical issues    Risks/Benefits      Risks, Benefits And Possible Side Effects Of Medications:    Risks, benefits, and possible side effects of medications explained to Maribell Saldana including risk of parkinsonian symptoms, Tardive Dyskinesia and metabolic syndrome related to treatment with antipsychotic medications and risk of suicidality and serotonin syndrome related to treatment with antidepressants  She verbalizes understanding and agreement for treatment  Controlled Medication Discussion:     Not applicable    Psychotherapy Provided:     Individual psychotherapy provided: Yes  Counseling was provided during the session today for 20 minutes  Medications, treatment progress and treatment plan reviewed with Maribell Saldana  Goals discussed during in session: maintain mood stability  Discussed with Maribell Saldana coping with no longer working and possibility of getting a part time job  Coping mechanisms including play with pet and reading reviewed with Maribell Saldana  Supportive therapy provided        Treatment Plan;    Completed and signed during the session: Yes - with Prosper Logan MD 03/08/19

## 2019-03-08 ENCOUNTER — OFFICE VISIT (OUTPATIENT)
Dept: PSYCHIATRY | Facility: CLINIC | Age: 59
End: 2019-03-08
Payer: COMMERCIAL

## 2019-03-08 VITALS
DIASTOLIC BLOOD PRESSURE: 77 MMHG | SYSTOLIC BLOOD PRESSURE: 144 MMHG | WEIGHT: 180 LBS | HEART RATE: 79 BPM | HEIGHT: 64 IN | BODY MASS INDEX: 30.73 KG/M2

## 2019-03-08 DIAGNOSIS — F41.1 GAD (GENERALIZED ANXIETY DISORDER): Chronic | ICD-10-CM

## 2019-03-08 DIAGNOSIS — F31.76 BIPOLAR I DISORDER, MOST RECENT EPISODE DEPRESSED, IN FULL REMISSION (HCC): Primary | Chronic | ICD-10-CM

## 2019-03-08 DIAGNOSIS — F60.9 PERSONALITY DISORDER (HCC): Chronic | ICD-10-CM

## 2019-03-08 PROCEDURE — 90833 PSYTX W PT W E/M 30 MIN: CPT | Performed by: PSYCHIATRY & NEUROLOGY

## 2019-03-08 PROCEDURE — 99213 OFFICE O/P EST LOW 20 MIN: CPT | Performed by: PSYCHIATRY & NEUROLOGY

## 2019-03-08 RX ORDER — FLUOXETINE HYDROCHLORIDE 20 MG/1
20 CAPSULE ORAL DAILY
Qty: 30 CAPSULE | Refills: 3 | Status: SHIPPED | OUTPATIENT
Start: 2019-03-08 | End: 2019-08-05 | Stop reason: SDUPTHER

## 2019-03-08 RX ORDER — FLUOXETINE HYDROCHLORIDE 40 MG/1
40 CAPSULE ORAL DAILY
Qty: 30 CAPSULE | Refills: 3 | Status: SHIPPED | OUTPATIENT
Start: 2019-03-08 | End: 2019-08-05 | Stop reason: SDUPTHER

## 2019-03-14 ENCOUNTER — ANESTHESIA EVENT (OUTPATIENT)
Dept: PERIOP | Facility: AMBULARY SURGERY CENTER | Age: 59
End: 2019-03-14
Payer: COMMERCIAL

## 2019-03-25 DIAGNOSIS — IMO0002 UNCONTROLLED TYPE 2 DIABETES MELLITUS WITH COMPLICATION: ICD-10-CM

## 2019-03-25 NOTE — TELEPHONE ENCOUNTER
Blood glucose tightly controlled, occasionally low after lunch  Decrease metformin to 500 mg (1/2 tablet of the 1000 mg) BID  Continue Jardiance      Call if blood glucose persistently less than 70 or over 300 mg/dL    Drea Will PA-C

## 2019-03-28 ENCOUNTER — ANESTHESIA (OUTPATIENT)
Dept: PERIOP | Facility: AMBULARY SURGERY CENTER | Age: 59
End: 2019-03-28
Payer: COMMERCIAL

## 2019-03-28 ENCOUNTER — HOSPITAL ENCOUNTER (OUTPATIENT)
Facility: AMBULARY SURGERY CENTER | Age: 59
Setting detail: OUTPATIENT SURGERY
Discharge: HOME | End: 2019-03-28
Attending: INTERNAL MEDICINE | Admitting: INTERNAL MEDICINE
Payer: COMMERCIAL

## 2019-03-28 VITALS
SYSTOLIC BLOOD PRESSURE: 120 MMHG | RESPIRATION RATE: 16 BRPM | BODY MASS INDEX: 29.53 KG/M2 | OXYGEN SATURATION: 96 % | HEART RATE: 68 BPM | WEIGHT: 173 LBS | TEMPERATURE: 98 F | HEIGHT: 64 IN | DIASTOLIC BLOOD PRESSURE: 68 MMHG

## 2019-03-28 DIAGNOSIS — K29.80 DUODENITIS: ICD-10-CM

## 2019-03-28 DIAGNOSIS — R10.13 EPIGASTRIC PAIN: ICD-10-CM

## 2019-03-28 LAB — GLUCOSE SERPL-MCNC: 98 MG/DL (ref 65–140)

## 2019-03-28 PROCEDURE — 43251 EGD REMOVE LESION SNARE: CPT | Performed by: INTERNAL MEDICINE

## 2019-03-28 PROCEDURE — 82948 REAGENT STRIP/BLOOD GLUCOSE: CPT

## 2019-03-28 PROCEDURE — 88305 TISSUE EXAM BY PATHOLOGIST: CPT | Performed by: PATHOLOGY

## 2019-03-28 PROCEDURE — 43239 EGD BIOPSY SINGLE/MULTIPLE: CPT | Performed by: INTERNAL MEDICINE

## 2019-03-28 RX ORDER — LIDOCAINE HYDROCHLORIDE 10 MG/ML
INJECTION, SOLUTION INFILTRATION; PERINEURAL AS NEEDED
Status: DISCONTINUED | OUTPATIENT
Start: 2019-03-28 | End: 2019-03-28 | Stop reason: SURG

## 2019-03-28 RX ORDER — PROPOFOL 10 MG/ML
INJECTION, EMULSION INTRAVENOUS AS NEEDED
Status: DISCONTINUED | OUTPATIENT
Start: 2019-03-28 | End: 2019-03-28 | Stop reason: SURG

## 2019-03-28 RX ORDER — SODIUM CHLORIDE 9 MG/ML
100 INJECTION, SOLUTION INTRAVENOUS CONTINUOUS
Status: DISCONTINUED | OUTPATIENT
Start: 2019-03-28 | End: 2019-03-28 | Stop reason: HOSPADM

## 2019-03-28 RX ADMIN — SODIUM CHLORIDE 100 ML/HR: 0.9 INJECTION, SOLUTION INTRAVENOUS at 08:45

## 2019-03-28 RX ADMIN — PROPOFOL 110 MG: 10 INJECTION, EMULSION INTRAVENOUS at 09:05

## 2019-03-28 RX ADMIN — LIDOCAINE HYDROCHLORIDE ANHYDROUS 50 MG: 10 INJECTION, SOLUTION INFILTRATION at 09:05

## 2019-03-28 RX ADMIN — PROPOFOL 40 MG: 10 INJECTION, EMULSION INTRAVENOUS at 09:08

## 2019-03-28 NOTE — ANESTHESIA PREPROCEDURE EVALUATION
Review of Systems/Medical History  Patient summary reviewed  Chart reviewed  No history of anesthetic complications     Cardiovascular  Exercise tolerance (METS): >4,  Hyperlipidemia, Hypertension , Dysrhythmias ([facility]) ,    Pulmonary  Asthma , well controlled/ stable Asthma type of rescue: PRN inhaler, Sleep apnea (no cpap - has lost weight recently) ,        GI/Hepatic    GERD ,        Negative  ROS        Endo/Other  Diabetes type 2 ,   Obesity    GYN  Negative gynecology ROS          Hematology  Negative hematology ROS      Musculoskeletal    Arthritis     Neurology  Negative neurology ROS      Psychology   Anxiety, Depression , bipolar disorder,            Physical Exam    Airway    Mallampati score: II  TM Distance: >3 FB  Neck ROM: full     Dental   No notable dental hx     Cardiovascular      Pulmonary      Other Findings       Lab Results   Component Value Date    WBC 18 95 (H) 02/08/2019    HGB 13 5 02/08/2019     (H) 02/08/2019     Lab Results   Component Value Date     02/08/2019    K 5 0 02/11/2019    BUN 20 02/11/2019    CREATININE 0 87 02/08/2019    GLUCOSE 98 02/08/2019     Lab Results   Component Value Date    HGBA1C 5 5 02/11/2019     Anesthesia Plan  ASA Score- 2     Anesthesia Type- IV sedation with anesthesia with ASA Monitors  Additional Monitors:   Airway Plan:         Plan Factors-    Induction- intravenous  Postoperative Plan-     Informed Consent- Anesthetic plan and risks discussed with patient and spouse  I personally reviewed this patient with the CRNA  Discussed and agreed on the Anesthesia Plan with the SALEEM Marroquin

## 2019-04-01 ENCOUNTER — TELEPHONE (OUTPATIENT)
Dept: GASTROENTEROLOGY | Facility: AMBULARY SURGERY CENTER | Age: 59
End: 2019-04-01

## 2019-04-07 DIAGNOSIS — E78.5 DYSLIPIDEMIA: ICD-10-CM

## 2019-04-08 RX ORDER — ATORVASTATIN CALCIUM 20 MG/1
20 TABLET, FILM COATED ORAL DAILY
Qty: 90 TABLET | Refills: 1 | Status: SHIPPED | OUTPATIENT
Start: 2019-04-08 | End: 2019-08-18 | Stop reason: SDUPTHER

## 2019-04-17 ENCOUNTER — TELEPHONE (OUTPATIENT)
Dept: ENDOCRINOLOGY | Facility: CLINIC | Age: 59
End: 2019-04-17

## 2019-04-19 DIAGNOSIS — E55.9 VITAMIN D DEFICIENCY: ICD-10-CM

## 2019-04-19 RX ORDER — ERGOCALCIFEROL 1.25 MG/1
CAPSULE ORAL
Qty: 4 CAPSULE | Refills: 1 | Status: SHIPPED | OUTPATIENT
Start: 2019-04-19 | End: 2019-06-11 | Stop reason: SDUPTHER

## 2019-05-03 DIAGNOSIS — E78.1 HYPERTRIGLYCERIDEMIA: ICD-10-CM

## 2019-05-06 RX ORDER — FENOFIBRATE 145 MG/1
TABLET, COATED ORAL
Qty: 90 TABLET | Refills: 0 | Status: SHIPPED | OUTPATIENT
Start: 2019-05-06 | End: 2019-08-01 | Stop reason: SDUPTHER

## 2019-05-12 DIAGNOSIS — E11.65 UNCONTROLLED TYPE 2 DIABETES MELLITUS WITH HYPERGLYCEMIA, WITH LONG-TERM CURRENT USE OF INSULIN (HCC): ICD-10-CM

## 2019-05-12 DIAGNOSIS — Z79.4 UNCONTROLLED TYPE 2 DIABETES MELLITUS WITH HYPERGLYCEMIA, WITH LONG-TERM CURRENT USE OF INSULIN (HCC): ICD-10-CM

## 2019-05-13 RX ORDER — EMPAGLIFLOZIN 25 MG/1
TABLET, FILM COATED ORAL
Qty: 30 TABLET | Refills: 2 | Status: SHIPPED | OUTPATIENT
Start: 2019-05-13 | End: 2019-08-12 | Stop reason: SDUPTHER

## 2019-05-15 ENCOUNTER — TELEPHONE (OUTPATIENT)
Dept: GASTROENTEROLOGY | Facility: AMBULARY SURGERY CENTER | Age: 59
End: 2019-05-15

## 2019-05-15 DIAGNOSIS — K29.80 DUODENITIS: ICD-10-CM

## 2019-05-15 DIAGNOSIS — R10.13 EPIGASTRIC PAIN: ICD-10-CM

## 2019-05-15 RX ORDER — OMEPRAZOLE 40 MG/1
40 CAPSULE, DELAYED RELEASE ORAL DAILY
Qty: 30 CAPSULE | Refills: 2 | Status: SHIPPED | OUTPATIENT
Start: 2019-05-15 | End: 2019-08-01 | Stop reason: SDUPTHER

## 2019-05-15 RX ORDER — OMEPRAZOLE 40 MG/1
40 CAPSULE, DELAYED RELEASE ORAL DAILY
Qty: 30 CAPSULE | Refills: 2 | Status: CANCELLED | OUTPATIENT
Start: 2019-05-15

## 2019-05-21 ENCOUNTER — TELEPHONE (OUTPATIENT)
Dept: ENDOCRINOLOGY | Facility: CLINIC | Age: 59
End: 2019-05-21

## 2019-05-22 ENCOUNTER — TELEPHONE (OUTPATIENT)
Dept: PSYCHIATRY | Facility: CLINIC | Age: 59
End: 2019-05-22

## 2019-05-23 ENCOUNTER — DOCUMENTATION (OUTPATIENT)
Dept: PSYCHIATRY | Facility: CLINIC | Age: 59
End: 2019-05-23

## 2019-05-30 ENCOUNTER — TELEPHONE (OUTPATIENT)
Dept: PSYCHIATRY | Facility: CLINIC | Age: 59
End: 2019-05-30

## 2019-06-04 LAB
25(OH)D3 SERPL-MCNC: 39 NG/ML (ref 30–100)
BUN SERPL-MCNC: 22 MG/DL (ref 7–25)
BUN/CREAT SERPL: ABNORMAL (CALC) (ref 6–22)
CALCIUM SERPL-MCNC: 10.2 MG/DL (ref 8.6–10.4)
CHLORIDE SERPL-SCNC: 104 MMOL/L (ref 98–110)
CO2 SERPL-SCNC: 26 MMOL/L (ref 20–32)
CREAT SERPL-MCNC: 0.78 MG/DL (ref 0.5–1.05)
GLUCOSE SERPL-MCNC: 103 MG/DL (ref 65–99)
HBA1C MFR BLD: 6 % OF TOTAL HGB
POTASSIUM SERPL-SCNC: 4.3 MMOL/L (ref 3.5–5.3)
SL AMB EGFR AFRICAN AMERICAN: 98 ML/MIN/1.73M2
SL AMB EGFR NON AFRICAN AMERICAN: 84 ML/MIN/1.73M2
SODIUM SERPL-SCNC: 140 MMOL/L (ref 135–146)

## 2019-06-05 ENCOUNTER — TELEPHONE (OUTPATIENT)
Dept: PSYCHIATRY | Facility: CLINIC | Age: 59
End: 2019-06-05

## 2019-06-11 DIAGNOSIS — E55.9 VITAMIN D DEFICIENCY: ICD-10-CM

## 2019-06-11 RX ORDER — ERGOCALCIFEROL 1.25 MG/1
CAPSULE ORAL
Qty: 4 CAPSULE | Refills: 1 | Status: SHIPPED | OUTPATIENT
Start: 2019-06-11 | End: 2019-08-03 | Stop reason: SDUPTHER

## 2019-06-14 ENCOUNTER — OFFICE VISIT (OUTPATIENT)
Dept: ENDOCRINOLOGY | Facility: CLINIC | Age: 59
End: 2019-06-14
Payer: COMMERCIAL

## 2019-06-14 VITALS
DIASTOLIC BLOOD PRESSURE: 62 MMHG | HEIGHT: 64 IN | WEIGHT: 170 LBS | HEART RATE: 70 BPM | SYSTOLIC BLOOD PRESSURE: 150 MMHG | BODY MASS INDEX: 29.02 KG/M2

## 2019-06-14 DIAGNOSIS — E11.65 TYPE 2 DIABETES MELLITUS WITH HYPERGLYCEMIA, WITHOUT LONG-TERM CURRENT USE OF INSULIN (HCC): Primary | ICD-10-CM

## 2019-06-14 DIAGNOSIS — I10 ESSENTIAL HYPERTENSION: ICD-10-CM

## 2019-06-14 DIAGNOSIS — E55.9 VITAMIN D DEFICIENCY: ICD-10-CM

## 2019-06-14 DIAGNOSIS — E78.5 DYSLIPIDEMIA: ICD-10-CM

## 2019-06-14 PROCEDURE — 99214 OFFICE O/P EST MOD 30 MIN: CPT | Performed by: INTERNAL MEDICINE

## 2019-06-18 ENCOUNTER — ANNUAL EXAM (OUTPATIENT)
Dept: OBGYN CLINIC | Facility: CLINIC | Age: 59
End: 2019-06-18
Payer: COMMERCIAL

## 2019-06-18 ENCOUNTER — TELEPHONE (OUTPATIENT)
Dept: PSYCHIATRY | Facility: CLINIC | Age: 59
End: 2019-06-18

## 2019-06-18 VITALS
HEIGHT: 64 IN | WEIGHT: 165 LBS | BODY MASS INDEX: 28.17 KG/M2 | DIASTOLIC BLOOD PRESSURE: 68 MMHG | SYSTOLIC BLOOD PRESSURE: 122 MMHG

## 2019-06-18 DIAGNOSIS — Z01.419 ENCOUNTER FOR GYNECOLOGICAL EXAMINATION WITHOUT ABNORMAL FINDING: ICD-10-CM

## 2019-06-18 DIAGNOSIS — Z01.419 PAP SMEAR, AS PART OF ROUTINE GYNECOLOGICAL EXAMINATION: ICD-10-CM

## 2019-06-18 DIAGNOSIS — Z12.39 BREAST CANCER SCREENING: Primary | ICD-10-CM

## 2019-06-18 PROCEDURE — S0612 ANNUAL GYNECOLOGICAL EXAMINA: HCPCS | Performed by: OBSTETRICS & GYNECOLOGY

## 2019-06-18 RX ORDER — TRIAMCINOLONE ACETONIDE 0.1 %
PASTE (GRAM) DENTAL
Refills: 0 | COMMUNITY
Start: 2019-04-17 | End: 2019-12-18 | Stop reason: ALTCHOICE

## 2019-06-20 LAB
CLINICAL INFO: NORMAL
DATE PREVIOUS BX: NORMAL
LMP START DATE: NORMAL
SL AMB PREV. PAP:: NORMAL
SPECIMEN SOURCE CVX/VAG CYTO: NORMAL
STAT OF ADQ CVX/VAG CYTO-IMP: NORMAL

## 2019-07-08 ENCOUNTER — TELEPHONE (OUTPATIENT)
Dept: ENDOCRINOLOGY | Facility: CLINIC | Age: 59
End: 2019-07-08

## 2019-07-18 ENCOUNTER — DOCUMENTATION (OUTPATIENT)
Dept: PSYCHIATRY | Facility: CLINIC | Age: 59
End: 2019-07-18

## 2019-07-28 DIAGNOSIS — F31.76 BIPOLAR I DISORDER, MOST RECENT EPISODE DEPRESSED, IN FULL REMISSION (HCC): Primary | Chronic | ICD-10-CM

## 2019-07-29 DIAGNOSIS — IMO0002 UNCONTROLLED TYPE 2 DIABETES MELLITUS WITH COMPLICATION: ICD-10-CM

## 2019-07-29 NOTE — TELEPHONE ENCOUNTER
Blood sugars tightly controlled  Can decrease metformin to 500 mg once a day (with biggest meal)  Continue Mouna Will PA-C

## 2019-08-01 ENCOUNTER — TELEPHONE (OUTPATIENT)
Dept: GASTROENTEROLOGY | Facility: AMBULARY SURGERY CENTER | Age: 59
End: 2019-08-01

## 2019-08-01 DIAGNOSIS — R10.13 EPIGASTRIC PAIN: ICD-10-CM

## 2019-08-01 DIAGNOSIS — K29.80 DUODENITIS: ICD-10-CM

## 2019-08-01 DIAGNOSIS — E78.1 HYPERTRIGLYCERIDEMIA: ICD-10-CM

## 2019-08-01 RX ORDER — OMEPRAZOLE 40 MG/1
40 CAPSULE, DELAYED RELEASE ORAL DAILY
Qty: 30 CAPSULE | Refills: 5 | Status: SHIPPED | OUTPATIENT
Start: 2019-08-01 | End: 2020-02-19 | Stop reason: SDUPTHER

## 2019-08-01 RX ORDER — FENOFIBRATE 145 MG/1
145 TABLET, COATED ORAL DAILY
Qty: 90 TABLET | Refills: 1 | Status: SHIPPED | OUTPATIENT
Start: 2019-08-01 | End: 2019-12-19 | Stop reason: SDUPTHER

## 2019-08-01 NOTE — TELEPHONE ENCOUNTER
silvia patient    Refill Request for Medication: omeprazole    Dose: 40 mg    Quantity: 30 cap    Pharmacy: [facility]

## 2019-08-03 DIAGNOSIS — E55.9 VITAMIN D DEFICIENCY: ICD-10-CM

## 2019-08-05 ENCOUNTER — OFFICE VISIT (OUTPATIENT)
Dept: INTERNAL MEDICINE CLINIC | Facility: CLINIC | Age: 59
End: 2019-08-05
Payer: COMMERCIAL

## 2019-08-05 VITALS
DIASTOLIC BLOOD PRESSURE: 64 MMHG | SYSTOLIC BLOOD PRESSURE: 144 MMHG | HEIGHT: 64 IN | RESPIRATION RATE: 16 BRPM | WEIGHT: 162.4 LBS | BODY MASS INDEX: 27.72 KG/M2

## 2019-08-05 DIAGNOSIS — E78.1 ESSENTIAL HYPERTRIGLYCERIDEMIA: ICD-10-CM

## 2019-08-05 DIAGNOSIS — K21.9 GASTROESOPHAGEAL REFLUX DISEASE WITHOUT ESOPHAGITIS: ICD-10-CM

## 2019-08-05 DIAGNOSIS — Z79.4 UNCONTROLLED TYPE 2 DIABETES MELLITUS WITH HYPERGLYCEMIA, WITH LONG-TERM CURRENT USE OF INSULIN (HCC): ICD-10-CM

## 2019-08-05 DIAGNOSIS — I10 BENIGN ESSENTIAL HYPERTENSION: Primary | ICD-10-CM

## 2019-08-05 DIAGNOSIS — E66.09 CLASS 1 OBESITY DUE TO EXCESS CALORIES WITH SERIOUS COMORBIDITY AND BODY MASS INDEX (BMI) OF 32.0 TO 32.9 IN ADULT: ICD-10-CM

## 2019-08-05 DIAGNOSIS — R19.4 CHANGE IN BOWEL HABIT: ICD-10-CM

## 2019-08-05 DIAGNOSIS — E11.65 UNCONTROLLED TYPE 2 DIABETES MELLITUS WITH HYPERGLYCEMIA, WITH LONG-TERM CURRENT USE OF INSULIN (HCC): ICD-10-CM

## 2019-08-05 DIAGNOSIS — E78.5 DYSLIPIDEMIA: ICD-10-CM

## 2019-08-05 PROCEDURE — 4010F ACE/ARB THERAPY RXD/TAKEN: CPT | Performed by: INTERNAL MEDICINE

## 2019-08-05 PROCEDURE — 99214 OFFICE O/P EST MOD 30 MIN: CPT | Performed by: INTERNAL MEDICINE

## 2019-08-05 RX ORDER — ERGOCALCIFEROL 1.25 MG/1
CAPSULE ORAL
Qty: 4 CAPSULE | Refills: 1 | Status: SHIPPED | OUTPATIENT
Start: 2019-08-05 | End: 2019-08-20 | Stop reason: SDUPTHER

## 2019-08-05 RX ORDER — FLUOXETINE HYDROCHLORIDE 40 MG/1
CAPSULE ORAL
Refills: 3 | COMMUNITY
Start: 2019-07-28 | End: 2019-08-19 | Stop reason: SDUPTHER

## 2019-08-05 RX ORDER — FLUOXETINE HYDROCHLORIDE 20 MG/1
CAPSULE ORAL
Refills: 3 | COMMUNITY
Start: 2019-07-31 | End: 2019-08-19 | Stop reason: SDUPTHER

## 2019-08-05 RX ORDER — LISINOPRIL 5 MG/1
5 TABLET ORAL DAILY
Qty: 30 TABLET | Refills: 6 | Status: SHIPPED | OUTPATIENT
Start: 2019-08-05 | End: 2019-11-11 | Stop reason: SDUPTHER

## 2019-08-05 NOTE — PROGRESS NOTES
BMI Counseling: Body mass index is 27 88 kg/m²  Discussed the patient's BMI with her  The BMI is above average  BMI counseling and education was provided to the patient  Nutrition recommendations include reducing portion sizes  Assessment/Plan:    Gastroesophageal reflux disease  Clinically stable and doing well continue the current medical regiment will continue monitor  Uncontrolled type 2 diabetes mellitus with hyperglycemia, with long-term current use of insulin ([facility])  Lab Results   Component Value Date    HGBA1C 6 0 (H) 06/03/2019       No results for input(s): POCGLU in the last 72 hours  Blood Sugar Average: Last 72 hrs:   improving working with Endocrinology insulin dose is being reduced with the weight loss I have counselled the pt to follow a healthy and balanced diet ,and recommend routine exercise  Annual eye examination recommended    Essential hypertriglyceridemia  Reduce carbohydrates, reduce sweets will continue monitor currently improving  Problem List Items Addressed This Visit        Digestive    Gastroesophageal reflux disease     Clinically stable and doing well continue the current medical regiment will continue monitor  Endocrine    Uncontrolled type 2 diabetes mellitus with hyperglycemia, with long-term current use of insulin ([facility])     Lab Results   Component Value Date    HGBA1C 6 0 (H) 06/03/2019       No results for input(s): POCGLU in the last 72 hours  Blood Sugar Average: Last 72 hrs:   improving working with Endocrinology insulin dose is being reduced with the weight loss I have counselled the pt to follow a healthy and balanced diet ,and recommend routine exercise  Annual eye examination recommended            Other    Dyslipidemia    Essential hypertriglyceridemia     Reduce carbohydrates, reduce sweets will continue monitor currently improving           Class 1 obesity due to excess calories with serious comorbidity and body mass index (BMI) of 32 0 to 32 9 in adult    Change in bowel habit    Relevant Orders    Ambulatory referral to Gastroenterology      Other Visit Diagnoses     Benign essential hypertension    -  Primary    Relevant Medications    lisinopril (ZESTRIL) 5 mg tablet    Other Relevant Orders    Basic metabolic panel          Return to office 6  months  call if any problems  Subjective:      Patient ID: Maurizio Young is a 63 y o  female  HPI 59-year old female coming in for a follow up office visit regarding hypertension, hypertriglyceridemia, GERD, type 2 diabetes, overweight; The patient reports me compliant taking medications without untoward side effects the  The patient is here to review his medical condition, update me on the medical condition and the patient reports me no hospitalizations and no ER visits  She continues to be motivated with weight loss she is plane to start a part-time job in the near future  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  she reports diet good , but hit plateau, walking  A couple times per week    The following portions of the patient's history were reviewed and updated as appropriate: allergies, current medications, past family history, past medical history, past social history, past surgical history and problem list     Review of Systems   Constitutional: Negative for activity change, appetite change and unexpected weight change  HENT: Negative for congestion and postnasal drip  Eyes: Negative for visual disturbance  Respiratory: Negative for cough and shortness of breath  Cardiovascular: Negative for chest pain  Gastrointestinal: Negative for abdominal pain, diarrhea, nausea and vomiting  Neurological: Negative for dizziness, light-headedness and headaches  Hematological: Negative for adenopathy  Objective:    No follow-ups on file  No results found    Recent Results (from the past 96 [id number] hour(s))   Echo complete with contrast if indicated    [facility] [address]  [phone]    Transthoracic Echocardiogram  2D, M-mode, Doppler, and Color Doppler    Study date:  2017    Patient: Sebastián Rodas  MR number: [id number]  Account number: [de-identified]  : 1960  Age: 55 years  Gender: Female  Status: Outpatient  Location: [address] [place] [address]  Height: 64 in  Weight: 216 5 lb  BP: 140/ 62 mmHg    Indications: Murmur  Diagnoses: R01 1 - Cardiac murmur, unspecified    Sonographer:  NICOL Hammond  Primary Physician:  García Mendoza DO  Group:  [id number]  Interpreting Physician:  Zaid Santillan MD    SUMMARY    LEFT VENTRICLE:  Systolic function was vigorous by visual assessment  Ejection fraction was  estimated to be 70 %  There were no regional wall motion abnormalities  Features were consistent with a pseudonormal left ventricular filling pattern,  with concomitant abnormal relaxation and increased filling pressure (grade 2  diastolic dysfunction)  RIGHT VENTRICLE:  Systolic pressure was mildly increased  Estimated peak pressure was 37 mmHg  LEFT ATRIUM:  The atrium was mildly dilated  MITRAL VALVE:  There was trace regurgitation  TRICUSPID VALVE:  There was trace regurgitation  HISTORY: PRIOR HISTORY: Murmur, Hypertension, Dyslipidemia, DM2, KODY, Obesity,  WPW    PROCEDURE: The study was performed in the [address] [place] [address]  This was a routine study  The transthoracic approach was used  The study  included complete 2D imaging, M-mode, complete spectral Doppler, and color  Doppler  The heart rate was 72 bpm, at the start of the study  Images were  obtained from the parasternal, apical, subcostal, and suprasternal notch  acoustic windows  Image quality was adequate  LEFT VENTRICLE: Size was normal  Systolic function was vigorous by visual  assessment  Ejection fraction was estimated to be 70 %   There were no regional  wall motion abnormalities  Wall thickness was normal  DOPPLER: The ratio of  early ventricular filling to atrial contraction velocities was within the  normal range  Features were consistent with a pseudonormal left ventricular  filling pattern, with concomitant abnormal relaxation and increased filling  pressure (grade 2 diastolic dysfunction)  RIGHT VENTRICLE: The size was normal  Systolic function was normal  DOPPLER:  Systolic pressure was mildly increased  Estimated peak pressure was 37 mmHg  LEFT ATRIUM: The atrium was mildly dilated  RIGHT ATRIUM: Size was normal     MITRAL VALVE: Valve structure was normal  There was normal leaflet separation  DOPPLER: The transmitral velocity was within the normal range  There was no  evidence for stenosis  There was trace regurgitation  AORTIC VALVE: The valve was trileaflet  Leaflets exhibited normal thickness and  normal cuspal separation  DOPPLER: Transaortic velocity was minimally  increased  There was no evidence for stenosis  There was no regurgitation  TRICUSPID VALVE: The valve structure was normal  There was normal leaflet  separation  DOPPLER: The transtricuspid velocity was within the normal range  There was no evidence for stenosis  There was trace regurgitation  PULMONIC VALVE: Leaflets exhibited normal thickness, no calcification, and  normal cuspal separation  DOPPLER: The transpulmonic velocity was within the  normal range  There was no regurgitation  PERICARDIUM: There was no pericardial effusion  AORTA: The root exhibited normal size  SYSTEMIC VEINS: IVC: The inferior vena cava was normal in size and course    Respirophasic changes were normal     SYSTEM MEASUREMENT TABLES    2D  %FS: 32 %  AV Diam: 2 4 cm  EDV(Teich): 92 67 ml  EF(Cube): 68 56 %  EF(Teich): 60 27 %  ESV(Cube): 28 74 ml  ESV(Teich): 36 82 ml  IVSd: 0 99 cm  LA Area: 20 97 cm2  LA Diam: 3 93 cm  LVEDV MOD A4C: 114 44 ml  LVEF MOD A4C: 67 8 %  LVESV MOD A4C: 36 85 ml  LVIDd: 4 5 cm  LVIDs: 3 06 cm  LVLd A4C: 9 06 cm  LVLs A4C: 7 39 cm  LVOT Diam: 1 97 cm  LVPWd: 0 99 cm  RA Area: 15 84 cm2  RV Diam: 3 17 cm  SI(Cube): 30 87 ml/m2  SI(Teich): 27 51 ml/m2  SV MOD A4C: 77 59 ml  SV(Cube): 62 67 ml  SV(Teich): 55 85 ml    CW  AV Env  Ti: 277 26 ms  AV MaxP 47 mmHg  AV SI: 104 58 ml/m2  AV SV: 212 3 ml  AV VTI: 46 97 cm  AV Vmax: 2 42 m/s  AV Vmean: 1 69 m/s  AV meanP 34 mmHg  TR MaxP 18 mmHg  TR Vmax: 2 79 m/s    MM  TAPSE: 2 49 cm    PW  ARYMON (VTI): 2 19 cm2  RAYMON Vmax: 1 99 cm2  E': 0 07 m/s  E/E': 17 58  LVOT Env  Ti: 325 32 ms  LVOT VTI: 33 64 cm  LVOT Vmax: 1 58 m/s  LVOT Vmean: 1 03 m/s  LVOT maxP 97 mmHg  LVOT meanP 99 mmHg  LVSI Dopp: 50 63 ml/m2  LVSV Dopp: 102 77 ml  MV A Mihir: 0 84 m/s  MV Dec [place]: 6 31 m/s2  MV DecT: 182 29 ms  MV E Mihir: 1 15 m/s  MV E/A Ratio: 1 37    [facility] Accredited [facility]ch[facility]    Prepared and electronically signed by    Sree Donald MD  Signed 2017 12:56:22         Allergies   Allergen Reactions    Depakote Er [Divalproex Sodium Er]      pancreatitis    Tanzeum [Albiglutide]      pancreatitis    Bee Venom     Carbamazepine Other (See Comments)     Reaction Date:Unknown    Clarithromycin Itching    Shellfish-Derived Products     Lamotrigine Rash       Past Medical History:   Diagnosis Date    Abdominal pain     started 17- found something on the pancreas    Abnormal weight loss     last assessed 2014    Acute sinusitis     Allergic rhinitis     last assessed 2015    Anemia     Asthma     Cardiac murmur     Cellulitis     Cervical disc disease     Degeneration of thoracic intervertebral disc     Degenerative arthritis of knee     Diabetes mellitus (HCC)     Diverticulosis     Esophagitis, reflux     last assessed 2014    Excessive sweating     Fatty liver     Gait disturbance     Granuloma annulare     Head injury     Hyperlipidemia     Hypertension     Hypertensive heart disease     Hypoglycemia     last assessed [id number]    Leg length discrepancy     Leucocytosis     Multiple thyroid nodules     Neuropathy     Obesity     last assessed [id number]    Paronychia of toe     Patellofemoral dysfunction     Postmenopausal atrophic vaginitis     Prepatellar bursitis     Sebaceous cyst     last assessed [id number]    Sleep apnea     no CPAP used    Suicide attempt ([facility]     Tinea corporis     Type 2 diabetes mellitus ([facility] )     cont meds and monitor BS;  last assessed [id number]    Urinary frequency     Vertigo     Vitamin D deficiency     Dia-Parkinson-White syndrome      Past Surgical History:   Procedure Laterality Date    COLONOSCOPY      ESOPHAGOGASTRODUODENOSCOPY N/A 8/8/2016    Procedure: ESOPHAGOGASTRODUODENOSCOPY (EGD); Surgeon: Emerita Parrish MD;  Location: [facility] GI [facility]; Service:     [place] ESOPHAGOGASTRODUODENOSCOPY TRANSORAL DIAGNOSTIC N/A 3/28/2019    Procedure: ESOPHAGOGASTRODUODENOSCOPY (EGD); Surgeon: Emerita Parrish MD;  Location: [facility]; Service: Gastroenterology    TONSILLECTOMY      TOOTH EXTRACTION      wisdom teeth removed     Current Outpatient Medications on File Prior to Visit   Medication Sig Dispense Refill    atorvastatin (LIPITOR) 20 mg tablet TAKE 1 TABLET (20 MG TOTAL) BY MOUTH DAILY 90 tablet 1    ergocalciferol (VITAMIN D2) 50,000 units TAKE ONE CAPSULE BY MOUTH ONE TIME PER WEEK 4 capsule 1    fenofibrate (TRICOR) 145 mg tablet Take 1 tablet (145 mg total) by mouth daily 90 tablet 1    FLUoxetine (PROzac) 20 mg capsule TAKE 1 CAPSULE (20 MG TOTAL) BY MOUTH DAILY  DAYS TOTAL PROZAC DOSE IS 60 MG DAILY  3    FLUoxetine (PROzac) 40 MG capsule TAKE 1 CAPSULE (40 MG TOTAL) BY MOUTH DAILY  DAYS TOTAL PROZAC DOSE IS 60 MG DAILY  3    Insulin Pen Needle (BD PEN NEEDLE MARISOL U/F) 32G X 4 MM MISC Use 4x daily to inject insulin   400 each 0    JARDIANCE 25 MG TABS TAKE 1 TABLET BY MOUTH ONCE A DAY 30 tablet 2    lisinopril (ZESTRIL) 20 mg tablet Take 1 tablet (20 mg total) by mouth every evening 90 tablet 1    lurasidone (LATUDA) 80 mg tablet Take 1 tablet (80 mg total) by mouth daily with dinner for 30 days 30 tablet 0    metFORMIN (GLUCOPHAGE) 1000 MG tablet Take 0 5 tablets (500 mg total) by mouth daily 180 tablet 0    Omega 3 1200 MG CAPS Take 1,200 mg by mouth 2 (two) times a day        omeprazole (PriLOSEC) 40 MG capsule Take 1 capsule (40 mg total) by mouth daily 30 capsule 5    triamcinolone (KENALOG) 0 1 % oral topical paste APPLY TO AFFECTED AREA 3 TIMES A DAY  0    [DISCONTINUED] FLUoxetine (PROzac) 20 mg capsule Take 1 capsule (20 mg total) by mouth daily for 120 days Total Prozac dose is 60 mg daily 30 capsule 3    [DISCONTINUED] FLUoxetine (PROzac) 40 MG capsule Take 1 capsule (40 mg total) by mouth daily for 120 days Total Prozac dose is 60 mg daily 30 capsule 3     No current facility-administered medications on file prior to visit        Family History   Problem Relation Age of Onset   [employer] Cancer Mother         lung    Heart disease Mother     Coronary artery disease Mother         CABG    Diabetes Mother     Lung cancer Mother     Stroke Mother     Thyroid disease Mother     Other Mother         tobacco use    Parkinsonism Father     Intestinal polyp Father     Bipolar disorder Father     Heart disease Father     Hypertension Sister     Alcohol abuse Sister     Depression Brother     Diabetes Maternal Grandmother     Thyroid disease Maternal Grandmother     Hypertension Family     Suicide Attempts Paternal Aunt     Depression Paternal Aunt      Social History     Socioeconomic History    Marital status: /Civil Union     Spouse name: Not on file    Number of children: 0    Years of education: 15    Highest education level: 12th grade   Occupational History    Occupation: retired   Social Needs    Financial resource strain: Not hard at all  Food insecurity:     Worry: Never true     Inability: Never true    Transportation needs:     Medical: No     Non-medical: No   Tobacco Use    Smoking status: Never Smoker    Smokeless tobacco: Never Used   Substance and Sexual Activity    Alcohol use: Yes     Alcohol/week: 1 0 standard drinks     Types: 1 Glasses of wine per week     Frequency: Monthly or less     Drinks per session: 1 or 2     Binge frequency: Never     Comment: rarely    Drug use: No    Sexual activity: Yes     Partners: Male   Lifestyle    Physical activity:     Days per week: 2 days     Minutes per session: 10 min    Stress: Only a little   Relationships    Social connections:     Talks on phone: Once a week     Gets together: Once a week     Attends Mosque service: Never     Active member of club or organization: Yes     Attends meetings of clubs or organizations: More than 4 times per year     Relationship status:     Intimate partner violence:     Fear of current or ex partner: No     Emotionally abused: No     Physically abused: No     Forced sexual activity: No   Other Topics Concern    Not on file   Social History Narrative    Education: high school graduate    Learning Disabilities: none    Marital History:     Children: none    Living Arrangement: lives in home with     Occupational History: retired, 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[place] [address]: good support system,  is supportive    Legal History: none     History: None        Caffeine use    Denied home environment domestic violence    Job physical requirements heavy lifting     Vitals:    08/05/19 1524   BP: 144/64   Resp: 16   Weight: 73 7 kg (162 lb 6 4 oz)   Height: 5' 4" (1 626 m)     Results for orders placed or performed in visit on 06/18/19   Thinprep Pap (Refl) HPV mRNA E6/E7   Result Value Ref Range    SL AMB CLINICAL INFORMATION IF ASCUS     LMP NONE GIVEN     Prev   PAP NONE GIVEN     Prev  BX NONE GIVEN     Source Cervix, Endocervix     Statement of Adequacy SATISFACTORY FOR EVALUATION     Interpretation/Result      MARIA ISABEL COOLEY CYTOTECHNOLOGIST:      Comment       Weight (last 2 days)     Date/Time   Weight    08/05/19 1524   73 7 (162 4)            Body mass index is 27 88 kg/m²  BP      Temp      Pulse     Resp      SpO2        Vitals:    08/05/19 1524   Weight: 73 7 kg (162 lb 6 4 oz)     Vitals:    08/05/19 1524   Weight: 73 7 kg (162 lb 6 4 oz)       /64   Resp 16   Ht 5' 4" (1 626 m)   Wt 73 7 kg (162 lb 6 4 oz)   BMI 27 88 kg/m²          Physical Exam   Constitutional: She appears well-developed and well-nourished  HENT:   Head: Normocephalic  Mouth/Throat: Oropharynx is clear and moist    Eyes: Pupils are equal, round, and reactive to light  Conjunctivae are normal  Right eye exhibits no discharge  Left eye exhibits no discharge  No scleral icterus  Neck: Neck supple  Cardiovascular: Normal rate, regular rhythm, normal heart sounds and intact distal pulses  Exam reveals no gallop and no friction rub  No murmur heard  Pulmonary/Chest: Breath sounds normal  No respiratory distress  She has no wheezes  She has no rales  Abdominal: Soft  Bowel sounds are normal  She exhibits no distension and no mass  There is no tenderness  There is no rebound and no guarding  Musculoskeletal: She exhibits no edema or deformity  Lymphadenopathy:     She has no cervical adenopathy  Neurological: She is alert   Coordination normal

## 2019-08-06 NOTE — ASSESSMENT & PLAN NOTE
Lab Results   Component Value Date    HGBA1C 6 0 (H) 06/03/2019       No results for input(s): POCGLU in the last 72 hours  Blood Sugar Average: Last 72 hrs:   improving working with Endocrinology insulin dose is being reduced with the weight loss I have counselled the pt to follow a healthy and balanced diet ,and recommend routine exercise    Annual eye examination recommended

## 2019-08-08 ENCOUNTER — TELEPHONE (OUTPATIENT)
Dept: INTERNAL MEDICINE CLINIC | Facility: CLINIC | Age: 59
End: 2019-08-08

## 2019-08-08 ENCOUNTER — TELEPHONE (OUTPATIENT)
Dept: GASTROENTEROLOGY | Facility: AMBULARY SURGERY CENTER | Age: 59
End: 2019-08-08

## 2019-08-08 DIAGNOSIS — I10 ESSENTIAL HYPERTENSION: ICD-10-CM

## 2019-08-08 NOTE — TELEPHONE ENCOUNTER
Yes I increased the medication at the last office visit because her blood pressure was mildly elevated

## 2019-08-08 NOTE — TELEPHONE ENCOUNTER
Pt called in stating there is an issue with her lisinopril prescription - says she's supposed to be on 25mg and two different scripts were sent to the pharmacy and her insurance will not cover two separate doses    Please advise

## 2019-08-08 NOTE — TELEPHONE ENCOUNTER
Can you please clarify if patient is supposed to be taking both the Lisinopril 20 and 5 mg and why  The pharmacy states that the patient has [address] and it is requiring a clinical review      ID # [id number]  [id number]

## 2019-08-08 NOTE — TELEPHONE ENCOUNTER
Kitty Palmer patient      She has been constipated for over a month with trying medication, stool softeners, extra fiber, etc---please assist for sooner office visit      Call back#    [phone]

## 2019-08-12 DIAGNOSIS — Z79.4 UNCONTROLLED TYPE 2 DIABETES MELLITUS WITH HYPERGLYCEMIA, WITH LONG-TERM CURRENT USE OF INSULIN (HCC): ICD-10-CM

## 2019-08-12 DIAGNOSIS — E11.65 UNCONTROLLED TYPE 2 DIABETES MELLITUS WITH HYPERGLYCEMIA, WITH LONG-TERM CURRENT USE OF INSULIN (HCC): ICD-10-CM

## 2019-08-12 RX ORDER — EMPAGLIFLOZIN 25 MG/1
TABLET, FILM COATED ORAL
Qty: 30 TABLET | Refills: 2 | Status: SHIPPED | OUTPATIENT
Start: 2019-08-12 | End: 2019-12-19

## 2019-08-12 NOTE — TELEPHONE ENCOUNTER
Received form and completed  Faxed to [facility] for determination

## 2019-08-13 ENCOUNTER — OFFICE VISIT (OUTPATIENT)
Dept: GASTROENTEROLOGY | Facility: AMBULARY SURGERY CENTER | Age: 59
End: 2019-08-13
Payer: COMMERCIAL

## 2019-08-13 VITALS
BODY MASS INDEX: 27.18 KG/M2 | HEIGHT: 64 IN | WEIGHT: 159.2 LBS | HEART RATE: 72 BPM | RESPIRATION RATE: 16 BRPM | DIASTOLIC BLOOD PRESSURE: 62 MMHG | SYSTOLIC BLOOD PRESSURE: 128 MMHG | TEMPERATURE: 98.5 F

## 2019-08-13 DIAGNOSIS — K21.9 GASTROESOPHAGEAL REFLUX DISEASE WITHOUT ESOPHAGITIS: ICD-10-CM

## 2019-08-13 DIAGNOSIS — K59.00 CONSTIPATION, UNSPECIFIED CONSTIPATION TYPE: Primary | ICD-10-CM

## 2019-08-13 DIAGNOSIS — R19.4 CHANGE IN BOWEL HABIT: ICD-10-CM

## 2019-08-13 DIAGNOSIS — K62.5 BRBPR (BRIGHT RED BLOOD PER RECTUM): ICD-10-CM

## 2019-08-13 PROCEDURE — 99214 OFFICE O/P EST MOD 30 MIN: CPT | Performed by: PHYSICIAN ASSISTANT

## 2019-08-13 NOTE — H&P (VIEW-ONLY)
[facility] - Outpatient Follow-up Note  Soniya Mendez 63 y o  female MRN: [id number]  Encounter: [id number]  ASSESSMENT AND PLAN:      Constipation   BRBPR  -patient with change in bowel habits now having constipation with intermittent bright red blood  - I suspect this is secondary to her medication regimen  -we will plan for colonoscopy to rule out any obstruction lesion, bleeding polyp, etc  -she will start taking miralax twice daily along with the colace, if this is ineffective she will call us and we can trial amitiza  GERD  -EGD 3/28/19 with healed gastric erosions, possible healed duodenal ulceration  -she has had significant improvement in symptoms on omeprazole  ______________________________________________________________________    SUBJECTIVE:  James Gong is a pleasant 62 yo female who presents for follow up  She presents for new complaints of constipation and intermittent bright red blood per rectum over the last few months  She has a bm every other day and strains  Prior to this she has having regular bowel habits, in the past she even had issues with chronic diarrhea  She states she was taken off of her insulin and her metformin dose was reduced during the time these symptoms started  She intentionally lost 55 lbs  Other than the constipation she has been feeling very well  Her last colonoscopy was in 10/2014 and was unremarkable  She has no family history of colon cancer  She denies nausea, vomiting, abdominal pain  She trialed miralax once daily and a stool softener and increased her dietary fiber however this has only helped a small amount  REVIEW OF SYSTEMS IS OTHERWISE NEGATIVE    Historical Information   Past Medical History:   Diagnosis Date    Abdominal pain     started 7/21/16- found something on the pancreas    Abnormal weight loss     last assessed [id number]    Acute sinusitis     Allergic rhinitis     last assessed [id number]    Anemia     Asthma     Cardiac murmur     Cellulitis     Cervical disc disease     Degeneration of thoracic intervertebral disc     Degenerative arthritis of knee     [facility]     Diverticulosis     Esophagitis, reflux     last assessed 29Jan2014    Excessive sweating     Fatty liver     Gait disturbance     Granuloma annulare     Head injury     Hyperlipidemia     Hypertension     Hypertensive heart disease     Hypoglycemia     last assessed [id number]    Leg length discrepancy     Leucocytosis     Multiple thyroid nodules     Neuropathy     Obesity     last assessed [id number]    Paronychia of toe     Patellofemoral dysfunction     Postmenopausal atrophic vaginitis     Prepatellar bursitis     Sebaceous cyst     last assessed [id number]    Sleep apnea     no CPAP used    [facility]     Tinea corporis     Type 2 diabetes mellitus (HCC)     cont meds and monitor BS;  last assessed [id number]    Urinary frequency     Vertigo     Vitamin D deficiency     Dia-Parkinson-White syndrome      Past Surgical History:   Procedure Laterality Date    COLONOSCOPY      ESOPHAGOGASTRODUODENOSCOPY N/A 8/8/2016    Procedure: ESOPHAGOGASTRODUODENOSCOPY (EGD); Surgeon: Tony Chen MD;  Location: [facility] GI LAB; Service:     [place] ESOPHAGOGASTRODUODENOSCOPY TRANSORAL DIAGNOSTIC N/A 3/28/2019    Procedure: ESOPHAGOGASTRODUODENOSCOPY (EGD); Surgeon: Tony Chen MD;  Location: [facility] GI LAB;   Service: Gastroenterology    TONSILLECTOMY      TOOTH EXTRACTION      wisdom teeth removed     Social History   Social History     Substance and Sexual Activity   Alcohol Use Not Currently    Alcohol/week: 1 0 standard drinks    Types: 1 Glasses of wine per week    Frequency: Monthly or less    Drinks per session: 1 or 2    Binge frequency: Never    Comment: rarely     Social History     Substance and Sexual Activity   Drug Use No     Social History     Tobacco Use   Smoking Status Never Smoker   Smokeless Tobacco Never Used     Family History   Problem Relation Age of Onset    Cancer Mother         lung    Heart disease Mother     Coronary artery disease Mother         CABG    Diabetes Mother     Lung cancer Mother     Stroke Mother     Thyroid disease Mother     Other Mother         tobacco use    Parkinsonism Father     Intestinal polyp Father     Bipolar disorder Father     Heart disease Father     Hypertension Sister     Alcohol abuse Sister     Depression Brother     Diabetes Maternal Grandmother     Thyroid disease Maternal Grandmother     Hypertension Family     Suicide Attempts Paternal Aunt     Depression Paternal Aunt        Meds/Allergies       Current Outpatient Medications:     atorvastatin (LIPITOR) 20 mg tablet    ergocalciferol (VITAMIN D2) 50,000 units    fenofibrate (TRICOR) 145 mg tablet    FLUoxetine (PROzac) 20 mg capsule    FLUoxetine (PROzac) 40 MG capsule    JARDIANCE 25 MG TABS    lisinopril (ZESTRIL) 20 mg tablet    lisinopril (ZESTRIL) 5 mg tablet    lurasidone (LATUDA) 80 mg tablet    metFORMIN (GLUCOPHAGE) 1000 MG tablet    Omega 3 1200 MG CAPS    omeprazole (PriLOSEC) 40 MG capsule    Insulin Pen Needle (BD PEN NEEDLE MARISOL U/F) 32G X 4 MM MISC    triamcinolone (KENALOG) 0 1 % oral topical paste    Allergies   Allergen Reactions    Depakote Er [Divalproex Sodium Er]      pancreatitis    Tanzeum [Albiglutide]      pancreatitis    Bee Venom     Carbamazepine Other (See Comments)     Reaction Date:Unknown    Clarithromycin Itching    Shellfish-Derived Products     Lamotrigine Rash     Objective     Blood pressure 128/62, pulse 72, temperature 98 5 °F (36 9 °C), temperature source Tympanic, resp  rate 16, height 5' 4" (1 626 m), weight 72 2 kg (159 lb 3 2 oz), not currently breastfeeding  Body mass index is 27 33 kg/m²      PHYSICAL EXAM:    General Appearance:   Alert, cooperative, appears mildly anxious   HEENT:   Normocephalic, atraumatic, anicteric      Neck:  Supple, symmetrical, trachea midline   Lungs:   Clear to auscultation bilaterally; no rales, rhonchi or wheezing; respirations unlabored    Heart[de-identified]   Regular rate and rhythm; no murmur, rub, or gallop  Abdomen:   Soft, non-tender, non-distended; normal bowel sounds; no masses, no organomegaly    Genitalia:   Deferred    Rectal:   Deferred    Extremities:  No cyanosis, clubbing or edema    Pulses:  2+ and symmetric    Skin:  No jaundice, rashes, or lesions    Lymph nodes:  No palpable cervical lymphadenopathy        Lab Results:   No visits with results within 1 Day(s) from this visit  Latest known visit with results is:   Annual Exam on 06/18/2019   Component Date Value    MARIA ISABEL COOLEY CLINICAL INFORMAT* 06/18/2019 IF ASCUS     LMP 06/18/2019 NONE GIVEN     Prev  PAP 06/18/2019 NONE GIVEN     Prev  BX 06/18/2019 NONE GIVEN     Source 06/18/2019 Cervix, Endocervix     Statement of Adequacy 06/18/2019 SATISFACTORY FOR EVALUATION     Interpretation/Result 06/18/2019      MARIA ISABEL COOLEY CYTOTECHNOLOGIST: 06/18/2019      Comment 06/18/2019       Radiology Results:   No results found

## 2019-08-13 NOTE — PROGRESS NOTES
[facility] - Outpatient Follow-up Note  Lula Patel 63 y o  female MRN: [id number]  Encounter: [id number]  ASSESSMENT AND PLAN:      Constipation   BRBPR  -patient with change in bowel habits now having constipation with intermittent bright red blood  - I suspect this is secondary to her medication regimen  -we will plan for colonoscopy to rule out any obstruction lesion, bleeding polyp, etc  -she will start taking miralax twice daily along with the colace, if this is ineffective she will call us and we can trial amitiza  GERD  -EGD 3/28/19 with healed gastric erosions, possible healed duodenal ulceration  -she has had significant improvement in symptoms on omeprazole  ______________________________________________________________________    SUBJECTIVE:  Lulu Hayden is a pleasant 62 yo female who presents for follow up  She presents for new complaints of constipation and intermittent bright red blood per rectum over the last few months  She has a bm every other day and strains  Prior to this she has having regular bowel habits, in the past she even had issues with chronic diarrhea  She states she was taken off of her insulin and her metformin dose was reduced during the time these symptoms started  She intentionally lost 55 lbs  Other than the constipation she has been feeling very well  Her last colonoscopy was in 10/2014 and was unremarkable  She has no family history of colon cancer  She denies nausea, vomiting, abdominal pain  She trialed miralax once daily and a stool softener and increased her dietary fiber however this has only helped a small amount  REVIEW OF SYSTEMS IS OTHERWISE NEGATIVE    Historical Information   Past Medical History:   Diagnosis Date    Abdominal pain     started 7/21/16- found something on the pancreas    Abnormal weight loss     last assessed [id number]    Acute sinusitis     Allergic rhinitis     last assessed [id number]    Anemia     Asthma     Cardiac murmur     Cellulitis     Cervical disc disease     Degeneration of thoracic intervertebral disc     Degenerative arthritis of knee     Diabetes [facility]     Diverticulosis     Esophagitis, reflux     last assessed 29Jan2014    Excessive sweating     Fatty liver     Gait disturbance     Granuloma annulare     Head injury     Hyperlipidemia     Hypertension     Hypertensive heart disease     Hypoglycemia     last assessed [id number]    Leg length discrepancy     Leucocytosis     Multiple thyroid nodules     Neuropathy     Obesity     last assessed [id number]    Paronychia of toe     Patellofemoral dysfunction     Postmenopausal atrophic vaginitis     Prepatellar bursitis     Sebaceous cyst     last assessed [id number]    Sleep apnea     no CPAP used    Suicide attempt ([facility]     Tinea corporis     Type 2 diabetes mellitus (HCC)     cont meds and monitor BS;  last assessed [id number]    Urinary frequency     Vertigo     Vitamin D deficiency     Dia-Parkinson-White syndrome      Past Surgical History:   Procedure Laterality Date    COLONOSCOPY      ESOPHAGOGASTRODUODENOSCOPY N/A 8/8/2016    Procedure: ESOPHAGOGASTRODUODENOSCOPY (EGD); Surgeon: Alisha Eller MD;  Location: [facility] GI LAB; Service:     [place] ESOPHAGOGASTRODUODENOSCOPY TRANSORAL DIAGNOSTIC N/A 3/28/2019    Procedure: ESOPHAGOGASTRODUODENOSCOPY (EGD); Surgeon: Alisha Eller MD;  [facility] GI LAB;   Service: Gastroenterology    TONSILLECTOMY      TOOTH EXTRACTION      wisdom teeth removed     Social History   Social History     Substance and Sexual Activity   Alcohol Use Not Currently    Alcohol/week: 1 0 standard drinks    Types: 1 Glasses of wine per week    Frequency: Monthly or less    Drinks per session: 1 or 2    Binge frequency: Never    Comment: rarely     Social History     Substance and Sexual Activity   Drug Use No     Social History     Tobacco Use   Smoking Status Never Smoker   Smokeless Tobacco Never Used     Family History   Problem Relation Age of Onset    Cancer Mother         lung    Heart disease Mother     Coronary artery disease Mother         CABG    Diabetes Mother     Lung cancer Mother     Stroke Mother     Thyroid disease Mother     Other Mother         tobacco use    Parkinsonism Father     Intestinal polyp Father     Bipolar disorder Father     Heart disease Father     Hypertension Sister     Alcohol abuse Sister     Depression Brother     Diabetes Maternal Grandmother     Thyroid disease Maternal Grandmother     Hypertension Family     Suicide Attempts Paternal Aunt     Depression Paternal Aunt        Meds/Allergies       Current Outpatient Medications:     atorvastatin (LIPITOR) 20 mg tablet    ergocalciferol (VITAMIN D2) 50,000 units    fenofibrate (TRICOR) 145 mg tablet    FLUoxetine (PROzac) 20 mg capsule    FLUoxetine (PROzac) 40 MG capsule    JARDIANCE 25 MG TABS    lisinopril (ZESTRIL) 20 mg tablet    lisinopril (ZESTRIL) 5 mg tablet    lurasidone (LATUDA) 80 mg tablet    metFORMIN (GLUCOPHAGE) 1000 MG tablet    Omega 3 1200 MG CAPS    omeprazole (PriLOSEC) 40 MG capsule    Insulin Pen Needle (BD PEN NEEDLE MARISOL U/F) 32G X 4 MM MISC    triamcinolone (KENALOG) 0 1 % oral topical paste    Allergies   Allergen Reactions    Depakote Er [Divalproex Sodium Er]      pancreatitis    Tanzeum [Albiglutide]      pancreatitis    Bee Venom     Carbamazepine Other (See Comments)     Reaction Date:Unknown    Clarithromycin Itching    Shellfish-Derived Products     Lamotrigine Rash     Objective     Blood pressure 128/62, pulse 72, temperature 98 5 °F (36 9 °C), temperature source Tympanic, resp  rate 16, height 5' 4" (1 626 m), weight 72 2 kg (159 lb 3 2 oz), not currently breastfeeding  Body mass index is 27 33 kg/m²      PHYSICAL EXAM:    General Appearance:   Alert, cooperative, appears mildly anxious   HEENT:   Normocephalic, atraumatic, anicteric      Neck:  Supple, symmetrical, trachea midline   Lungs:   Clear to auscultation bilaterally; no rales, rhonchi or wheezing; respirations unlabored    Heart[de-identified]   Regular rate and rhythm; no murmur, rub, or gallop  Abdomen:   Soft, non-tender, non-distended; normal bowel sounds; no masses, no organomegaly    Genitalia:   Deferred    Rectal:   Deferred    Extremities:  No cyanosis, clubbing or edema    Pulses:  2+ and symmetric    Skin:  No jaundice, rashes, or lesions    Lymph nodes:  No palpable cervical lymphadenopathy        Lab Results:   No visits with results within 1 Day(s) from this visit  Latest known visit with results is:   Annual Exam on 06/18/2019   Component Date Value    MARIA ISABEL COOLEY CLINICAL INFORMAT* 06/18/2019 IF ASCUS     LMP 06/18/2019 NONE GIVEN     Prev  PAP 06/18/2019 NONE GIVEN     Prev  BX 06/18/2019 NONE GIVEN     Source 06/18/2019 Cervix, Endocervix     Statement of Adequacy 06/18/2019 SATISFACTORY FOR EVALUATION     Interpretation/Result 06/18/2019      MARIA ISABEL COOLEY CYTOTECHNOLOGIST: 06/18/2019      Comment 06/18/2019       Radiology Results:   No results found

## 2019-08-17 DIAGNOSIS — E55.9 VITAMIN D DEFICIENCY: ICD-10-CM

## 2019-08-18 DIAGNOSIS — E78.5 DYSLIPIDEMIA: ICD-10-CM

## 2019-08-19 ENCOUNTER — OFFICE VISIT (OUTPATIENT)
Dept: PSYCHIATRY | Facility: CLINIC | Age: 59
End: 2019-08-19
Payer: COMMERCIAL

## 2019-08-19 ENCOUNTER — CLINICAL SUPPORT (OUTPATIENT)
Dept: INTERNAL MEDICINE CLINIC | Facility: CLINIC | Age: 59
End: 2019-08-19
Payer: COMMERCIAL

## 2019-08-19 ENCOUNTER — TELEPHONE (OUTPATIENT)
Dept: ENDOCRINOLOGY | Facility: CLINIC | Age: 59
End: 2019-08-19

## 2019-08-19 ENCOUNTER — ANESTHESIA EVENT (OUTPATIENT)
Dept: GASTROENTEROLOGY | Facility: AMBULARY SURGERY CENTER | Age: 59
End: 2019-08-19

## 2019-08-19 VITALS — DIASTOLIC BLOOD PRESSURE: 72 MMHG | SYSTOLIC BLOOD PRESSURE: 110 MMHG

## 2019-08-19 VITALS
WEIGHT: 155.9 LBS | SYSTOLIC BLOOD PRESSURE: 109 MMHG | BODY MASS INDEX: 26.61 KG/M2 | HEART RATE: 55 BPM | HEIGHT: 64 IN | DIASTOLIC BLOOD PRESSURE: 66 MMHG

## 2019-08-19 DIAGNOSIS — F31.76 BIPOLAR I DISORDER, MOST RECENT EPISODE DEPRESSED, IN FULL REMISSION (HCC): Primary | Chronic | ICD-10-CM

## 2019-08-19 DIAGNOSIS — F41.1 GAD (GENERALIZED ANXIETY DISORDER): Chronic | ICD-10-CM

## 2019-08-19 DIAGNOSIS — E55.9 VITAMIN D DEFICIENCY: ICD-10-CM

## 2019-08-19 DIAGNOSIS — E11.9 CONTROLLED TYPE 2 DIABETES MELLITUS WITHOUT COMPLICATION, WITHOUT LONG-TERM CURRENT USE OF INSULIN (HCC): ICD-10-CM

## 2019-08-19 DIAGNOSIS — F60.9 PERSONALITY DISORDER (HCC): Chronic | ICD-10-CM

## 2019-08-19 DIAGNOSIS — I10 ESSENTIAL HYPERTENSION: Primary | ICD-10-CM

## 2019-08-19 PROCEDURE — 99211 OFF/OP EST MAY X REQ PHY/QHP: CPT

## 2019-08-19 PROCEDURE — 99214 OFFICE O/P EST MOD 30 MIN: CPT | Performed by: PHYSICIAN ASSISTANT

## 2019-08-19 RX ORDER — ATORVASTATIN CALCIUM 20 MG/1
20 TABLET, FILM COATED ORAL DAILY
Qty: 30 TABLET | Refills: 5 | Status: SHIPPED | OUTPATIENT
Start: 2019-08-19 | End: 2020-03-02 | Stop reason: SDUPTHER

## 2019-08-19 RX ORDER — FLUOXETINE HYDROCHLORIDE 40 MG/1
40 CAPSULE ORAL DAILY
Qty: 30 CAPSULE | Refills: 3 | Status: SHIPPED | OUTPATIENT
Start: 2019-08-19 | End: 2019-12-18 | Stop reason: SDUPTHER

## 2019-08-19 RX ORDER — ERGOCALCIFEROL 1.25 MG/1
CAPSULE ORAL
Qty: 4 CAPSULE | Refills: 1 | Status: SHIPPED | OUTPATIENT
Start: 2019-08-19 | End: 2019-12-18 | Stop reason: ALTCHOICE

## 2019-08-19 RX ORDER — FLUOXETINE HYDROCHLORIDE 20 MG/1
20 CAPSULE ORAL DAILY
Qty: 30 CAPSULE | Refills: 3 | Status: SHIPPED | OUTPATIENT
Start: 2019-08-19 | End: 2019-12-18 | Stop reason: SDUPTHER

## 2019-08-19 NOTE — PROGRESS NOTES
PATIENT PRESENTS TODAY FOR BLOOD PRESSURE CHECK DR Karrie Sherwood INCREASED LISINOPRIL FROM 20MG TO 25MG
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
## 2019-08-19 NOTE — BH TREATMENT PLAN
TREATMENT PLAN (Medication Management Only)        [facility]    Name and Date of Birth:  Valeriano Warren 63 y o  1960  Date of Treatment Plan: August 19, 2019  Diagnosis/Diagnoses:    1  Bipolar I disorder, most recent episode depressed, in full remission ([facility] [address] )    2  LOURDES (generalized anxiety disorder)    3  Personality disorder ([facility] )    4  Vitamin D deficiency    5  Controlled type 2 diabetes mellitus without complication, without long-term current use of insulin [facility])      Strengths/Personal Resources for Self-Care: "Compassionalt; Caring; Know what I want--decision maker; Strong""  Area/Areas of need (in own words): "I'm starting to feel my SAD effects, my seasonal affective disorder"  1  Long Term Goal: Maintain mood stability and anxiety control  Target Date: 2 months - 10/19/2019  Person/Persons responsible for completion of goal: Dr Uziel Zabala   2  Short Term Objective (s) - How will we reach this goal?:   A  Provider new recommended medication/dosage changes and/or continue medication(s): Pt is having the mild beginnings of this season's SAD but feels well, appears stable and wants to continue her present regimen unchanged  This regimen has seen her through previous years of SAD and she feels confident it will again  Preliminarily discussed the potential to add Wellbutrin on a temporary basis in the future if needed  Pt is not interested in having psychotherapy  Labwork reviewed and Pt states her WBC and PLTs have been chronically high and she has been worked up for leukemia/cancer--Her PMD is aware per Pt  I advised continued healthy eating, good hydration and to keep cool in the Summer heat given her SGA  Treatment plan done and Pt accepts the plan        Continue:  Latuda 80mg (1) tab po qd # 30 R3  Fluoxetine 20mg + 40mg (1) cap po qd # 30 R3 each  Vit D 50,000IU weekly per PMD  F/U PMD for mgt of Vit D and DM type 2  Return 4 months, call sooner prn    Target Date: Pt is at goal  Person/Persons Responsible for Completion of Goal: Dr Nydia Peters  Progress Towards Goals: continuing treatment  Treatment Modality: Medication mgt  Review due 90 to 120 days from date of this plan: 4 months - 12/19/2019  Expected length of service: ongoing treatment  My Physician/PA/NP and I have developed this plan together and I agree to work on the goals and objectives  I understand the treatment goals that were developed for my treatment

## 2019-08-19 NOTE — ANESTHESIA PREPROCEDURE EVALUATION
Review of Systems/Medical History  Patient summary reviewed  Chart reviewed      Cardiovascular  Hyperlipidemia, Hypertension ,   Comment: Echo and stress test 5y ago essentially normal,  Pulmonary  Asthma , well controlled/ stable , Sleep apnea ,        GI/Hepatic    GERD , Liver disease ,   Comment: Fatty liver          Endo/Other  Diabetes well controlled ,   Comment: Thyroid nodules  Recent 57 pound weight loss with dieting    GYN       Hematology  Anemia ,     Musculoskeletal    Arthritis     Neurology   Psychology   Anxiety,              Physical Exam    Airway    Mallampati score: I  TM Distance: >3 FB  Neck ROM: full     Dental   No notable dental hx     Cardiovascular      Pulmonary      Other Findings        Anesthesia Plan  ASA Score- 2     Anesthesia Type- IV sedation with anesthesia with ASA Monitors  Additional Monitors:   Airway Plan:         Plan Factors-  Patient did not smoke on day of surgery  Induction-     Postoperative Plan-     Informed Consent- Anesthetic plan and risks discussed with patient  I personally reviewed this patient with the CRNA  Discussed and agreed on the Anesthesia Plan with the CRNA  Georgette Ormond

## 2019-08-20 ENCOUNTER — TELEPHONE (OUTPATIENT)
Dept: INTERNAL MEDICINE CLINIC | Facility: CLINIC | Age: 59
End: 2019-08-20

## 2019-08-20 ENCOUNTER — ANESTHESIA (OUTPATIENT)
Dept: GASTROENTEROLOGY | Facility: AMBULARY SURGERY CENTER | Age: 59
End: 2019-08-20

## 2019-08-20 ENCOUNTER — HOSPITAL ENCOUNTER (OUTPATIENT)
Dept: GASTROENTEROLOGY | Facility: AMBULARY SURGERY CENTER | Age: 59
Setting detail: OUTPATIENT SURGERY
Discharge: HOME | End: 2019-08-20
Attending: INTERNAL MEDICINE | Admitting: INTERNAL MEDICINE
Payer: COMMERCIAL

## 2019-08-20 VITALS
RESPIRATION RATE: 16 BRPM | HEIGHT: 64 IN | HEART RATE: 55 BPM | TEMPERATURE: 97.6 F | WEIGHT: 151 LBS | OXYGEN SATURATION: 99 % | BODY MASS INDEX: 25.78 KG/M2 | SYSTOLIC BLOOD PRESSURE: 94 MMHG | DIASTOLIC BLOOD PRESSURE: 54 MMHG

## 2019-08-20 DIAGNOSIS — K59.00 CONSTIPATION, UNSPECIFIED CONSTIPATION TYPE: ICD-10-CM

## 2019-08-20 LAB — GLUCOSE SERPL-MCNC: 96 MG/DL (ref 65–140)

## 2019-08-20 PROCEDURE — 82948 REAGENT STRIP/BLOOD GLUCOSE: CPT

## 2019-08-20 PROCEDURE — 45378 DIAGNOSTIC COLONOSCOPY: CPT | Performed by: INTERNAL MEDICINE

## 2019-08-20 RX ORDER — LIDOCAINE HYDROCHLORIDE 10 MG/ML
0.5 INJECTION, SOLUTION EPIDURAL; INFILTRATION; INTRACAUDAL; PERINEURAL ONCE AS NEEDED
Status: DISCONTINUED | OUTPATIENT
Start: 2019-08-20 | End: 2019-08-24 | Stop reason: HOSPADM

## 2019-08-20 RX ORDER — SODIUM CHLORIDE, SODIUM LACTATE, POTASSIUM CHLORIDE, CALCIUM CHLORIDE 600; 310; 30; 20 MG/100ML; MG/100ML; MG/100ML; MG/100ML
125 INJECTION, SOLUTION INTRAVENOUS CONTINUOUS
Status: DISCONTINUED | OUTPATIENT
Start: 2019-08-20 | End: 2019-08-24 | Stop reason: HOSPADM

## 2019-08-20 RX ORDER — LIDOCAINE HYDROCHLORIDE 10 MG/ML
INJECTION, SOLUTION INFILTRATION; PERINEURAL AS NEEDED
Status: DISCONTINUED | OUTPATIENT
Start: 2019-08-20 | End: 2019-08-20 | Stop reason: SURG

## 2019-08-20 RX ORDER — PROPOFOL 10 MG/ML
INJECTION, EMULSION INTRAVENOUS AS NEEDED
Status: DISCONTINUED | OUTPATIENT
Start: 2019-08-20 | End: 2019-08-20 | Stop reason: SURG

## 2019-08-20 RX ADMIN — LIDOCAINE HYDROCHLORIDE 50 MG: 10 INJECTION, SOLUTION INFILTRATION; PERINEURAL at 10:46

## 2019-08-20 RX ADMIN — SODIUM CHLORIDE, SODIUM LACTATE, POTASSIUM CHLORIDE, AND CALCIUM CHLORIDE: .6; .31; .03; .02 INJECTION, SOLUTION INTRAVENOUS at 10:42

## 2019-08-20 RX ADMIN — PROPOFOL 50 MG: 10 INJECTION, EMULSION INTRAVENOUS at 10:53

## 2019-08-20 RX ADMIN — PROPOFOL 100 MG: 10 INJECTION, EMULSION INTRAVENOUS at 10:46

## 2019-08-20 RX ADMIN — PROPOFOL 50 MG: 10 INJECTION, EMULSION INTRAVENOUS at 10:48

## 2019-08-20 RX ADMIN — PROPOFOL 50 MG: 10 INJECTION, EMULSION INTRAVENOUS at 10:51

## 2019-08-20 NOTE — TELEPHONE ENCOUNTER
I spoke to patient and she thinks she may have a monitor at home if not she will buy one  I scheduled her for 2 days instead of 3  She is currently doing fine and has no symptoms at all  She did mention that you increased her BP medication the last time she was here and she thinks that may be the reason it's so low

## 2019-08-20 NOTE — TELEPHONE ENCOUNTER
Please have the patient stop the lisinopril increase her fluid intake monitor the blood pressure twice a day if the blood pressure is greater than 140/90 please call immediately recheck blood pressure an our office by the nurse in 3 days;?   What is the current blood pressure reading, is the patient symptomatic

## 2019-08-20 NOTE — TELEPHONE ENCOUNTER
Pt called and said she went today for a Colonoscopy but the wouldn't do it because her blood pressure was too low  The readings at their office this morning were as follows: At 10:30 am it was 93/50, at 11:00 it was 79/43, at 11:10 it was 78/42, at 11:15 it was 94/42 and at 11:30 it was 94/54  Please advise

## 2019-08-20 NOTE — TELEPHONE ENCOUNTER
Have the patient stop the lisinopril check blood pressure twice a day and come in for a blood pressure check in 3 days if blood pressure greater than 140/90 please call

## 2019-08-20 NOTE — ANESTHESIA POSTPROCEDURE EVALUATION
Post-Op Assessment Note    CV Status:  Stable    Pain management: adequate     Mental Status:  Alert   Hydration Status:  Euvolemic   PONV Controlled:  Controlled   Airway Patency:  Patent   Post Op Vitals Reviewed: Yes      Staff: CRNA           BP      Temp     Pulse     Resp      SpO2

## 2019-08-20 NOTE — INTERVAL H&P NOTE
H&P reviewed  After examining the patient I find no changes in the patients condition since the H&P had been written      Vitals:    08/20/19 1032   BP: 93/50   Pulse: 71   Resp: 18   Temp: (!) 97 4 °F (36 3 °C)   SpO2: 95%

## 2019-08-21 NOTE — TELEPHONE ENCOUNTER
Patient advised and has stopped the Lisinopril  She did find her monitor and has been keeping track  She is scheduled for a BP check tomorrow

## 2019-08-22 ENCOUNTER — CLINICAL SUPPORT (OUTPATIENT)
Dept: INTERNAL MEDICINE CLINIC | Facility: CLINIC | Age: 59
End: 2019-08-22
Payer: COMMERCIAL

## 2019-08-22 VITALS — DIASTOLIC BLOOD PRESSURE: 68 MMHG | HEART RATE: 58 BPM | SYSTOLIC BLOOD PRESSURE: 142 MMHG

## 2019-08-22 DIAGNOSIS — R03.0 ELEVATED BLOOD PRESSURE READING: Primary | ICD-10-CM

## 2019-08-22 PROCEDURE — 99211 OFF/OP EST MAY X REQ PHY/QHP: CPT

## 2019-08-22 NOTE — TELEPHONE ENCOUNTER
jardiance 25mg daily  Metformin 500mg daily    Blood sugars are controlled in  range  Please inform patient to continue current regimen  Call if blood sugar less than 70 or more than 300 persistently    Fátima Gloria MD

## 2019-08-29 ENCOUNTER — TELEPHONE (OUTPATIENT)
Dept: INTERNAL MEDICINE CLINIC | Facility: CLINIC | Age: 59
End: 2019-08-29

## 2019-08-29 ENCOUNTER — CLINICAL SUPPORT (OUTPATIENT)
Dept: INTERNAL MEDICINE CLINIC | Facility: CLINIC | Age: 59
End: 2019-08-29
Payer: COMMERCIAL

## 2019-08-29 VITALS — DIASTOLIC BLOOD PRESSURE: 52 MMHG | SYSTOLIC BLOOD PRESSURE: 162 MMHG

## 2019-08-29 DIAGNOSIS — I10 ESSENTIAL HYPERTENSION: Primary | ICD-10-CM

## 2019-08-29 PROCEDURE — 99212 OFFICE O/P EST SF 10 MIN: CPT

## 2019-08-29 NOTE — TELEPHONE ENCOUNTER
Dr Evon Kent was in for a nurse visit her BP was 162/52  She indicated that you had placed her on a medication for hypertension  When her BP's became low you stopped the medication  She was to have blood work completed for the medication that was stopped  Does she need the blood work completed now that she is not on the medication

## 2019-08-30 DIAGNOSIS — IMO0002 UNCONTROLLED TYPE 2 DIABETES MELLITUS WITH COMPLICATION: ICD-10-CM

## 2019-09-03 NOTE — TELEPHONE ENCOUNTER
Spoke to patient and advised  She states that she is not checking her pressures at home  She has another BP check here on Thursday  We can check then and review with you when she comes in

## 2019-09-05 ENCOUNTER — CLINICAL SUPPORT (OUTPATIENT)
Dept: INTERNAL MEDICINE CLINIC | Facility: CLINIC | Age: 59
End: 2019-09-05
Payer: COMMERCIAL

## 2019-09-05 VITALS — DIASTOLIC BLOOD PRESSURE: 66 MMHG | SYSTOLIC BLOOD PRESSURE: 152 MMHG

## 2019-09-05 DIAGNOSIS — Z01.30 BP CHECK: Primary | ICD-10-CM

## 2019-09-05 PROCEDURE — 99211 OFF/OP EST MAY X REQ PHY/QHP: CPT

## 2019-09-12 ENCOUNTER — CLINICAL SUPPORT (OUTPATIENT)
Dept: INTERNAL MEDICINE CLINIC | Facility: CLINIC | Age: 59
End: 2019-09-12
Payer: COMMERCIAL

## 2019-09-12 VITALS — SYSTOLIC BLOOD PRESSURE: 142 MMHG | DIASTOLIC BLOOD PRESSURE: 68 MMHG

## 2019-09-12 DIAGNOSIS — Z01.30 BP CHECK: Primary | ICD-10-CM

## 2019-09-12 PROCEDURE — 99211 OFF/OP EST MAY X REQ PHY/QHP: CPT

## 2019-09-19 ENCOUNTER — CLINICAL SUPPORT (OUTPATIENT)
Dept: INTERNAL MEDICINE CLINIC | Facility: CLINIC | Age: 59
End: 2019-09-19
Payer: COMMERCIAL

## 2019-09-19 VITALS — SYSTOLIC BLOOD PRESSURE: 152 MMHG | DIASTOLIC BLOOD PRESSURE: 72 MMHG

## 2019-09-19 DIAGNOSIS — Z01.30 BLOOD PRESSURE CHECK: Primary | ICD-10-CM

## 2019-09-19 PROCEDURE — 99211 OFF/OP EST MAY X REQ PHY/QHP: CPT

## 2019-09-23 ENCOUNTER — HOSPITAL ENCOUNTER (OUTPATIENT)
Dept: MAMMOGRAPHY | Facility: HOSPITAL | Age: 59
Discharge: HOME | End: 2019-09-23
Payer: COMMERCIAL

## 2019-09-23 DIAGNOSIS — Z12.39 BREAST CANCER SCREENING: ICD-10-CM

## 2019-09-23 PROCEDURE — 77067 SCR MAMMO BI INCL CAD: CPT

## 2019-09-30 ENCOUNTER — DOCUMENTATION (OUTPATIENT)
Dept: PSYCHIATRY | Facility: CLINIC | Age: 59
End: 2019-09-30

## 2019-09-30 NOTE — PROGRESS NOTES
Treatment Plan not completed within required time limits due to : Provider will be out of the office  and will reschedule at a later time

## 2019-10-03 DIAGNOSIS — E55.9 VITAMIN D DEFICIENCY: Primary | ICD-10-CM

## 2019-10-03 NOTE — TELEPHONE ENCOUNTER
Her most recent vitamin-D was normal, she should take over-the-counter vitamin D3 supplementation 4000 International Units daily    Thank you    Eunice Soliz MD

## 2019-10-03 NOTE — TELEPHONE ENCOUNTER
Patient called wants to know if you want her to stay on vitamin d [id number]      if so, she will need a refill

## 2019-10-07 ENCOUNTER — TELEPHONE (OUTPATIENT)
Dept: ENDOCRINOLOGY | Facility: CLINIC | Age: 59
End: 2019-10-07

## 2019-10-07 NOTE — TELEPHONE ENCOUNTER
BS log review     Meds-  Jardiance 25mg QD  Metformin 500mg QD    Blood sugars are very well controlled  Continue current regimen  She should call if blood sugar less than 70 or more than 200  No need to send the log    Donny Dueñas MD

## 2019-11-04 ENCOUNTER — TELEPHONE (OUTPATIENT)
Dept: INTERNAL MEDICINE CLINIC | Facility: CLINIC | Age: 59
End: 2019-11-04

## 2019-11-04 DIAGNOSIS — I10 ESSENTIAL HYPERTENSION: Primary | ICD-10-CM

## 2019-11-04 NOTE — TELEPHONE ENCOUNTER
Pt said she never heard back about what was finally decided about her blood pressure  Pt has not been taking her lisinopril  She had been coming in for bp checks but hasn't heard anything since the last one  Please, advise  Pt has not been taking her bp  She doesn't have a working cuff

## 2019-11-04 NOTE — TELEPHONE ENCOUNTER
Patient states that she has NOT been taking the Lisinopril 5 mg  Do you still want her to increase or continue to take 5 mg and come in for BP check? Please advise

## 2019-11-04 NOTE — TELEPHONE ENCOUNTER
The blood pressure has been mildly elevated on the last 3 blood pressure checks please increase lisinopril to 10 mg once a day please come in for blood pressure check in 1 week check a basic metabolic panel in 1 week please have the pt follow up with me to discuss as scheduled

## 2019-11-04 NOTE — TELEPHONE ENCOUNTER
LM on AM with detailed instructions per Dr Mauricio Urias  Advised patient to CB to let us know if she needs us to send in a prescription for a blood pressure monitor to take at home  Labs ordered for recheck

## 2019-11-04 NOTE — TELEPHONE ENCOUNTER
She can restart the lisinopril 5 mg once daily recheck BMP in 1 week monitor blood pressure twice daily keep a log

## 2019-11-08 ENCOUNTER — TELEPHONE (OUTPATIENT)
Dept: INTERNAL MEDICINE CLINIC | Facility: CLINIC | Age: 59
End: 2019-11-08

## 2019-11-08 NOTE — TELEPHONE ENCOUNTER
I would like the patient to take lisinopril 5 mg once a day check the blood pressure twice a day and keep a log if blood pressure remains over 140/90 please notify me immediately or if it drops under 100/55 notify me immediately; please have the patient come back in for a blood pressure check with the nurse in 1 week after starting lisinopril

## 2019-11-08 NOTE — TELEPHONE ENCOUNTER
The patient is confused about the dose of her lisinopril  Her blood pressure was high so she was put on the lisinopril 25 mg  Her blood pressure while taking the 25 mg was low  The patient stopped taking it and now her blood pressure is boarder line high  She thinks you told her to take 5 mg of the lisinopril  She would like to know what does she should be taking    You would have to call a new script to the [facility] on [facility]  Please advise   Thank you

## 2019-11-11 DIAGNOSIS — I10 BENIGN ESSENTIAL HYPERTENSION: ICD-10-CM

## 2019-11-11 PROCEDURE — 4010F ACE/ARB THERAPY RXD/TAKEN: CPT | Performed by: INTERNAL MEDICINE

## 2019-11-11 RX ORDER — LISINOPRIL 5 MG/1
5 TABLET ORAL DAILY
Qty: 30 TABLET | Refills: 6 | Status: SHIPPED | OUTPATIENT
Start: 2019-11-11 | End: 2020-05-04

## 2019-11-11 NOTE — TELEPHONE ENCOUNTER
Spoke to patient and advised  Prescription sent to Dr Jerrica Duarte to authorize and appt for BP check made on 11/18/19

## 2019-11-18 ENCOUNTER — CLINICAL SUPPORT (OUTPATIENT)
Dept: INTERNAL MEDICINE CLINIC | Facility: CLINIC | Age: 59
End: 2019-11-18
Payer: COMMERCIAL

## 2019-11-18 VITALS — SYSTOLIC BLOOD PRESSURE: 124 MMHG | DIASTOLIC BLOOD PRESSURE: 62 MMHG

## 2019-11-18 DIAGNOSIS — I10 ESSENTIAL HYPERTENSION: Primary | ICD-10-CM

## 2019-11-18 PROCEDURE — 99211 OFF/OP EST MAY X REQ PHY/QHP: CPT

## 2019-11-18 NOTE — PROGRESS NOTES
Patient presents today for a BP check  /63  P 76    Her averages are 145-150 over 70-80      She scheduled a BP check in 1 week

## 2019-11-25 ENCOUNTER — CLINICAL SUPPORT (OUTPATIENT)
Dept: INTERNAL MEDICINE CLINIC | Facility: CLINIC | Age: 59
End: 2019-11-25
Payer: COMMERCIAL

## 2019-11-25 DIAGNOSIS — I10 ESSENTIAL HYPERTENSION: Primary | ICD-10-CM

## 2019-11-25 PROCEDURE — 99211 OFF/OP EST MAY X REQ PHY/QHP: CPT

## 2019-12-16 LAB
ALBUMIN/CREAT UR: 10 MCG/MG CREAT
BUN SERPL-MCNC: 24 MG/DL (ref 7–25)
BUN/CREAT SERPL: NORMAL (CALC) (ref 6–22)
CALCIUM SERPL-MCNC: 10.3 MG/DL (ref 8.6–10.4)
CHLORIDE SERPL-SCNC: 104 MMOL/L (ref 98–110)
CHOLEST SERPL-MCNC: 145 MG/DL
CHOLEST/HDLC SERPL: 2.8 (CALC)
CO2 SERPL-SCNC: 27 MMOL/L (ref 20–32)
CREAT SERPL-MCNC: 0.77 MG/DL (ref 0.5–1.05)
CREAT UR-MCNC: 59 MG/DL (ref 20–275)
GLUCOSE SERPL-MCNC: 81 MG/DL (ref 65–99)
HBA1C MFR BLD: 5.6 % OF TOTAL HGB
HDLC SERPL-MCNC: 52 MG/DL
LDLC SERPL CALC-MCNC: 71 MG/DL (CALC)
MICROALBUMIN UR-MCNC: 0.6 MG/DL
NONHDLC SERPL-MCNC: 93 MG/DL (CALC)
POTASSIUM SERPL-SCNC: 5 MMOL/L (ref 3.5–5.3)
SL AMB EGFR AFRICAN AMERICAN: 99 ML/MIN/1.73M2
SL AMB EGFR NON AFRICAN AMERICAN: 85 ML/MIN/1.73M2
SODIUM SERPL-SCNC: 139 MMOL/L (ref 135–146)
TRIGL SERPL-MCNC: 134 MG/DL

## 2019-12-16 NOTE — PSYCH
MEDICATION MANAGEMENT NOTE        [facility]      Name and Date of Birth:  Yaniv Ca 63 y o  1960 MRN: [id number]    Date of Visit: December 18, 2019    SUBJECTIVE:    Michell Carrel is seen today for a follow up for Bipolar Disorder, Generalized Anxiety Disorder and personality disorder  She continues to do well since the last visit  She states that mood remains well controlled, denies any significant depressive symptoms  She reports that anxiety symptoms are also well controlled  She has been enjoying her [facility] "I exercise, I read a lot"  She has joined gym in a [employer] center and has been exercising regularly  Also attends [employer] and lost weight intentionally  She denies any suicidal ideation, intent or plan at present; denies any homicidal ideation, intent or plan at present  She has no auditory hallucinations, denies any visual hallucinations, no overt delusions noted  She denies any side effects from current psychiatric medications  [place] HPI ROS Appetite Changes and Sleep:     She reports normal sleep, normal appetite, recent weight loss (21 lbs) - intentional, normal energy level    Review Of Systems:      Constitutional recent weight loss (21 lbs)   ENT negative   Cardiovascular negative   Respiratory negative   Gastrointestinal negative   Genitourinary negative   Musculoskeletal negative   Integumentary negative   Neurological negative   Endocrine negative   Other Symptoms all other systems are negative       Past Psychiatric History:      Past Inpatient Psychiatric Treatment:   One past inpatient psychiatric admission at [place] [address]  Past Outpatient Psychiatric Treatment:    In outpatient treatment at [address] [place] [address] for many years    Past Suicide Attempts: yes, 2 attempts by overdose years ago  Past Violent Behavior: no  Past Psychiatric Medication Trials: Prozac, Depakote, Abilify and Steven     Traumatic History:      Abuse: no history of sexual abuse, no history of physical abuse  Other Traumatic Events: none     Past Medical History:    Past Medical History:   Diagnosis Date    Abdominal pain     started 7/21/16- found something on the pancreas    Abnormal weight loss     last assessed [id number]    Acute sinusitis     Allergic rhinitis     last assessed [id number]    Anemia     Asthma     Cardiac murmur     Cellulitis     Cervical disc disease     Colon polyp     Degeneration of thoracic intervertebral disc     Degenerative arthritis of knee     Diabetes mellitus ([facility] )     Diverticulosis     Esophagitis, reflux     last assessed [id number]    Excessive sweating     Fatty liver     Gait disturbance     Granuloma annulare     Head injury     Hyperlipidemia     Hypertension     Hypertensive heart disease     Hypoglycemia     last assessed [id number]    Leg length discrepancy     Leucocytosis     Multiple thyroid nodules     Neuropathy     Obesity     last assessed [id number]    Paronychia of toe     Patellofemoral dysfunction     Postmenopausal atrophic vaginitis     Prepatellar bursitis     Sebaceous cyst     last assessed [id number]    Sleep apnea     no CPAP used    Suicide attempt ([facility] )     Tinea corporis     Type 2 diabetes mellitus ([facility] )     cont meds and monitor BS;  last assessed [id number]    Urinary frequency     Vertigo     Vitamin D deficiency     Dia-Parkinson-White syndrome      Past Medical History Pertinent Negatives:   Diagnosis Date Noted    Colon cancer ([facility] ) 08/20/2019    CPAP (continuous positive airway pressure) dependence 03/28/2019     Past Surgical History:   Procedure Laterality Date    COLONOSCOPY      ESOPHAGOGASTRODUODENOSCOPY N/A 8/8/2016    Procedure: ESOPHAGOGASTRODUODENOSCOPY (EGD); Surgeon: Pia Hernandez MD;  Location: [facility] GI LAB;   Service:     VT ESOPHAGOGASTRODUODENOSCOPY TRANSORAL DIAGNOSTIC N/A 3/28/2019 Procedure: ESOPHAGOGASTRODUODENOSCOPY (EGD); Surgeon: Harley Donald MD;  [facility]  GI [facility]; Service: Gastroenterology    TONSILLECTOMY      TOOTH EXTRACTION      wisdom teeth removed     Allergies   Allergen Reactions    Depakote Er [Divalproex Sodium Er]      pancreatitis    Tanzeum [Albiglutide]      pancreatitis    Bee Venom     Carbamazepine Other (See Comments)     Reaction Date:Unknown    Clarithromycin Itching    Shellfish-Derived Products     Lamotrigine Rash       Substance Abuse History:    Social History     Substance and Sexual Activity   Alcohol Use Not Currently    Alcohol/week: 1 0 standard drinks    Types: 1 Glasses of wine per week    Frequency: Monthly or less    Drinks per session: 1 or 2    Binge frequency: Never    Comment: rarely     Social History     Substance and Sexual Activity   Drug Use No       Social History:    Social History     Socioeconomic History    Marital status: /Civil Union     Spouse name: Not on file    Number of children: 0    Years of education: 15    Highest education level: 12th grade   Occupational History    Occupation: retired   Social Needs    Financial resource strain: Not hard at all   [place]-Deb insecurity:     Worry: Never true     Inability: Never true    Transportation needs:     Medical: No     Non-medical: No   Tobacco Use    Smoking status: Never Smoker    Smokeless tobacco: Never Used   Substance and Sexual Activity    Alcohol use: Not Currently     Alcohol/week: 1 0 standard drinks     Types: 1 Glasses of wine per week     Frequency: Monthly or less     Drinks per session: 1 or 2     Binge frequency: Never     Comment: rarely    Drug use: No    Sexual activity: Not Currently     Partners: Male   Lifestyle    Physical activity:     Days per week: 5 days     Minutes per session: 40 min    Stress:  Only a little   Relationships    Social connections:     Talks on phone: Once a week     Gets together: Once a week Attends [employer] service: Never     Active member of club or organization: Yes     Attends meetings of clubs or organizations: More than 4 times per year     Relationship status:     Intimate partner violence:     Fear of current or ex partner: No     Emotionally abused: No     Physically abused: No     Forced sexual activity: No   Other Topics Concern    Not on file   Social History Narrative    Education: high school graduate    Learning Disabilities: none    Marital History:     Children: none    Living Arrangement: lives in home with     Occupational History: retired, 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[place] [address]: good support system,  is supportive    Legal History: none     History: None        Caffeine use    Denied home environment domestic violence    Job physical requirements heavy lifting       Family Psychiatric History:     Family History   Problem Relation Age of Onset    Cancer Mother         lung    Heart disease Mother     Coronary artery disease Mother         CABG    Diabetes Mother     Lung cancer Mother 76        smoker    Stroke Mother     Thyroid disease Mother     Other Mother         tobacco use    Parkinsonism Father     Intestinal polyp Father     Bipolar disorder Father     Heart disease Father     Prostate cancer Father 62    Hypertension Sister     Alcohol abuse Sister     Depression Brother     Diabetes Maternal Grandmother     Thyroid disease Maternal Grandmother     Hypertension Family     Suicide Attempts Paternal Aunt     Depression Paternal Aunt     Endometrial cancer Maternal Aunt         unknown age   [facility] Ovarian cancer Cousin 36        maternal       History Review:  The following portions of the patient's history were reviewed and updated as appropriate: allergies, current medications, past family history, past medical history, past social history, past surgical history and problem list          OBJECTIVE:     Vital signs in last 24 hours:    Vitals:    12/18/19 1434   BP: 146/70   Pulse: 62   Weight: 72 1 kg (159 lb)   Height: 5' 4" (1 626 m)       Mental Status Evaluation:    Appearance age appropriate, casually dressed   Behavior cooperative, calm   Speech normal rate, normal volume, normal pitch   Mood euthymic   Affect normal range and intensity, appropriate   Thought Processes organized, goal directed   Associations intact associations   Thought Content no overt delusions   Perceptual Disturbances: no auditory hallucinations, no visual hallucinations   Abnormal Thoughts  Risk Potential Suicidal ideation - None  Homicidal ideation - None  Potential for aggression - No   Orientation oriented to person, place, time/date and situation   Memory recent and remote memory grossly intact   Consciousness alert and awake   Attention Span Concentration Span attention span and concentration are age appropriate   Intellect appears to be of average intelligence   Insight intact   Judgement intact   Muscle Strength and  Gait normal muscle strength and normal muscle tone, normal gait and normal balance   Motor activity no abnormal movements   Language no difficulty naming common objects, no difficulty repeating a phrase, no difficulty writing a sentence   Fund of Knowledge adequate knowledge of current events  adequate fund of knowledge regarding past history  adequate fund of knowledge regarding vocabulary    Pain none   Pain Scale 4       Laboratory Results: I have personally reviewed all pertinent laboratory/tests results  Recent Labs (last 4 months):   Orders Only on 12/13/2019   Component Date Value    Total Cholesterol 12/13/2019 145     HDL 12/13/2019 52     Triglycerides 12/13/2019 134     LDL Direct 12/13/2019 71     Chol HDLC Ratio 12/13/2019 2 8     Non-HDL Cholesterol 12/13/2019 93     Creatinine, Urine 12/13/2019 59     Microalbum  ,U,Random 12/13/2019 0 6     Microalb/Creat Ratio 12/13/2019 10     Glucose, Random 12/13/2019 81     BUN 12/13/2019 24     Creatinine 12/13/2019 0 77     eGFR Non  12/13/2019 85     eGFR  12/13/2019 99     SL AMB BUN/CREATININE RA* 65/81/8820 NOT APPLICABLE     Sodium 46/21/6574 139     Potassium 12/13/2019 5 0     Chloride 12/13/2019 104     CO2 12/13/2019 27     SL AMB CALCIUM 12/13/2019 10 3     Hemoglobin A1C 12/13/2019 5 6    Hospital Outpatient Visit on 08/20/2019   Component Date Value    POC Glucose 08/20/2019 96        Suicide/Homicide Risk Assessment:    Risk of Harm to Self:  Demographic risk factors include: , age: over 48 or older  Historical Risk Factors include: history of anxiety, history of mood disorder, history of suicide attempts  Recent Specific Risk Factors include: diagnosis of mood disorder  Protective Factors: no current suicidal ideation, compliant with medications, connection to community, good self-esteem, no substance use problems, stable living environment, supportive family  Weapons: none  The following steps have been taken to ensure weapons are properly secured: not applicable  Based on today's assessment, Sandro Rojas presents the following risk of harm to self: minimal    Risk of Harm to Others:  Based on today's assessment, Sandro Rojas presents the following risk of harm to others: none    The following interventions are recommended: no intervention changes needed    Assessment/Plan:       Diagnoses and all orders for this visit:    Bipolar I disorder, most recent episode depressed, in full remission ([facility] )  -     lurasidone (LATUDA) 80 mg tablet; Take 1 tablet (80 mg total) by mouth daily with dinner  -     FLUoxetine (PROzac) 20 mg capsule; Take 1 capsule (20 mg total) by mouth daily Take with the 40 mg cap for a total dose of 60 mg daily  -     FLUoxetine (PROzac) 40 MG capsule;  Take 1 capsule (40 mg total) by mouth daily Take with the 20 mg cap for a total dose of 60 mg daily    LOURDES (generalized anxiety disorder)  -     FLUoxetine (PROzac) 20 mg capsule; Take 1 capsule (20 mg total) by mouth daily Take with the 40 mg cap for a total dose of 60 mg daily  -     FLUoxetine (PROzac) 40 MG capsule; Take 1 capsule (40 mg total) by mouth daily Take with the 20 mg cap for a total dose of 60 mg daily    Personality disorder (HCC)          Treatment Recommendations/Precautions:    Continue Latuda 80 mg in the evening to help with mood stability  Continue Prozac 60 mg daily to improve depressive symptoms  Medication management every 4 months  Follows with family physician for glucose and lipid monitoring due to current therapy with antipsychotic medication  Follows with family physician for medical issues  Aware of 24 hour and weekend coverage for urgent situations accessed by calling [facility] main practice number    Medications Risks/Benefits      Risks, Benefits And Possible Side Effects Of Medications:    Risks, benefits, and possible side effects of medications explained to Rahel Yin including risk of parkinsonian symptoms, Tardive Dyskinesia and metabolic syndrome related to treatment with antipsychotic medications and risk of suicidality and serotonin syndrome related to treatment with antidepressants  She verbalizes understanding and agreement for treatment  Controlled Medication Discussion:     Not applicable    Psychotherapy Provided:     Individual psychotherapy provided: Yes  Counseling was provided during the session today for 16 minutes  Medications, treatment progress and treatment plan reviewed with Rahel Yin  Goals discussed during in session: maintain control of anxiety and maintain stability of mood  Discussed with Rahel Yin coping with occasional anxiety and chronic mental illness  Coping techniques including exercising, maintain healthy diet, maintain positive attitude and reading reviewed with Rahel Yin  Supportive therapy provided        Treatment Plan:    Completed and signed during the session: Yes - with Alvarado Salazar MD 12/18/19

## 2019-12-18 ENCOUNTER — OFFICE VISIT (OUTPATIENT)
Dept: PSYCHIATRY | Facility: CLINIC | Age: 59
End: 2019-12-18
Payer: COMMERCIAL

## 2019-12-18 VITALS
SYSTOLIC BLOOD PRESSURE: 146 MMHG | BODY MASS INDEX: 27.14 KG/M2 | DIASTOLIC BLOOD PRESSURE: 70 MMHG | HEIGHT: 64 IN | WEIGHT: 159 LBS | HEART RATE: 62 BPM

## 2019-12-18 DIAGNOSIS — F60.9 PERSONALITY DISORDER (HCC): Chronic | ICD-10-CM

## 2019-12-18 DIAGNOSIS — F31.76 BIPOLAR I DISORDER, MOST RECENT EPISODE DEPRESSED, IN FULL REMISSION (HCC): Primary | Chronic | ICD-10-CM

## 2019-12-18 DIAGNOSIS — F41.1 GAD (GENERALIZED ANXIETY DISORDER): Chronic | ICD-10-CM

## 2019-12-18 PROCEDURE — 90833 PSYTX W PT W E/M 30 MIN: CPT | Performed by: PSYCHIATRY & NEUROLOGY

## 2019-12-18 PROCEDURE — 99213 OFFICE O/P EST LOW 20 MIN: CPT | Performed by: PSYCHIATRY & NEUROLOGY

## 2019-12-18 RX ORDER — FLUOXETINE HYDROCHLORIDE 20 MG/1
20 CAPSULE ORAL DAILY
Qty: 90 CAPSULE | Refills: 1 | Status: SHIPPED | OUTPATIENT
Start: 2019-12-18 | End: 2020-06-12

## 2019-12-18 RX ORDER — FLUOXETINE HYDROCHLORIDE 40 MG/1
40 CAPSULE ORAL DAILY
Qty: 90 CAPSULE | Refills: 1 | Status: SHIPPED | OUTPATIENT
Start: 2019-12-18 | End: 2020-06-12 | Stop reason: SDUPTHER

## 2019-12-18 NOTE — BH TREATMENT PLAN
TREATMENT PLAN (Medication Management Only)        [facility]    Name/Date of Birth/MRN:  Mike Reagan 63 y o  1960 MRN: [id number]  Date of Treatment Plan: December 18, 2019  Diagnosis/Diagnoses:   1  Bipolar I disorder, most recent episode depressed, in full remission ([facility] )    2  LOURDES (generalized anxiety disorder)    3  Personality disorder [facility])      Strengths/Personal Resources for Self-Care: "exercise, reading, decision maker"  Area/Areas of need (in own words): "get my diabetes and weight better"  1  Long Term Goal:   "healthier and lose more weight"  Target Date: 4 months - 4/18/2020  Person/Persons responsible for completion of goal: Jessica Trejo  Short Term Objective (s) - How will we reach this goal?:   A  Provider new recommended medication/dosage changes and/or continue medication(s): continue current medications as prescribed (Prozac and Latuda)  B   "keep exercising and Weight Watchers"  C   N/A  Target Date: 4 months - 4/18/2020  Person/Persons Responsible for Completion of Goal: Ilan Shaffer   Progress Towards Goals: stable  Treatment Modality: medication management every 4 months  Review due 180 days from date of this plan: 6 months - 6/18/2020  Expected length of service: maintenance unless revised  My Physician/PA/NP and I have developed this plan together and I agree to work on the goals and objectives  I understand the treatment goals that were developed for my treatment    Electronic Signatures: on file (unless signed below)    Chicho Alba MD 12/18/19

## 2019-12-19 ENCOUNTER — OFFICE VISIT (OUTPATIENT)
Dept: ENDOCRINOLOGY | Facility: CLINIC | Age: 59
End: 2019-12-19
Payer: COMMERCIAL

## 2019-12-19 VITALS
SYSTOLIC BLOOD PRESSURE: 132 MMHG | BODY MASS INDEX: 26.98 KG/M2 | WEIGHT: 158 LBS | HEIGHT: 64 IN | DIASTOLIC BLOOD PRESSURE: 70 MMHG

## 2019-12-19 DIAGNOSIS — E55.9 VITAMIN D DEFICIENCY: ICD-10-CM

## 2019-12-19 DIAGNOSIS — E04.2 MULTIPLE THYROID NODULES: ICD-10-CM

## 2019-12-19 DIAGNOSIS — E78.5 DYSLIPIDEMIA: ICD-10-CM

## 2019-12-19 DIAGNOSIS — E11.9 CONTROLLED TYPE 2 DIABETES MELLITUS WITHOUT COMPLICATION, WITHOUT LONG-TERM CURRENT USE OF INSULIN (HCC): Primary | ICD-10-CM

## 2019-12-19 PROCEDURE — 99214 OFFICE O/P EST MOD 30 MIN: CPT | Performed by: PHYSICIAN ASSISTANT

## 2019-12-19 RX ORDER — FENOFIBRATE 145 MG/1
145 TABLET, COATED ORAL DAILY
Qty: 90 TABLET | Refills: 1 | Status: SHIPPED | OUTPATIENT
Start: 2019-12-19 | End: 2020-07-06 | Stop reason: SDUPTHER

## 2019-12-19 NOTE — PROGRESS NOTES
Patient Progress Note      CC: DM2      Referring Provider  Keisha Adams Do  [address]  [facility], [address] [facility]      History of Present Illness:   Peter Leal is a 63 y o  female with a history of type 2 diabetes without long term use of insulin  Diabetes course has been stable  Complications of DM: none reported  Denies recent illness or hospitalizations  Denies recent severe hypoglycemic or severe hyperglycemic episodes  Denies any issues with her current regimen  Home glucose monitoring: are performed regularly    Forgot blood glucose log and meter  Reports readings are between  mg/dl     Current regimen: Jardiance 25 mg daily, metformin 1000 mg 1/2 tablet (500 mg) daily  compliant most of the time, denies any side effects from medications  Hypoglycemic episodes: No, rare  H/o of hypoglycemia causing hospitalization or Intervention such as glucagon injection  or ambulance call :  No  Hypoglycemia symptoms: jitteriness  Treatment of hypoglycemia: orange juice    Medic alert tag: recommended: Yes    Diabetes education: No  Diet: 3 meals per day, 1-2 snacks per day  Timing of meals is predictable  Diabetic diet compliance:  compliant most of the time  Has been doing weight watchers  Activity: Daily activity is predictable: Yes  Trying to exercise 3-5 days a week  Further diabetic ROS: no polyuria or polydipsia, no chest pain, dyspnea, no numbness, tingling or pain in extremities        Ophthamology: eye exam UTD per patient; went to [facility]  MA will request note  Podiatry: foot exam UTD, March 2019    Has hypertension: on ACE inhibitor/ARB, compliant most of the time  Has hyperlipidemia: on statin - tolerating well, no myalgias  compliant most of the time, denies any side effects from medications    Thyroid disorders: Thyroid nodules  Thyroid US done June 2018   FINDINGS:  Thyroid parenchyma is diffusely heterogeneous in echotexture with focal nodule(s) as described below      Right lobe:  5 x 1 4 x 1 9 cm  Left lobe:  4 3 x 1 6 x 1 5 cm  Isthmus:  0 3 cm      No new discrete suspicious nodules  Multiple bilateral small colloid cysts and spongiform nodules are unchanged     IMPRESSION:     No nodule meets current ACR criteria for requiring biopsy or followup ultrasounds       History of pancreatitis: Yes    Vitamin-D deficiency:  Patient is taking vitamin D3 4000 International Units daily    Patient Active Problem List   Diagnosis    Anomalies of nails    Asthma    Essential hypertension    Bipolar I disorder, most recent episode depressed, in full remission ([facility] [address] )    Cardiac murmur    Cervical disc disease    Degeneration of thoracic intervertebral disc    Degenerative arthritis of knee, bilateral    Controlled type 2 diabetes mellitus without complication ([facility] [address] )    Diverticulosis    Dyslipidemia    Essential hypertriglyceridemia    Fatty liver    Gastroesophageal reflux disease    Granuloma annulare    Hypertensive heart disease    Iron deficiency anemia, unspecified    Left thyroid nodule    Leg length discrepancy    Multiple thyroid nodules    Neuropathy    Class 1 obesity due to excess calories with serious comorbidity and body mass index (BMI) of 32 0 to 32 9 in adult    Obstructive sleep apnea    Other acquired deformity of knee    Personality disorder ([facility] [address] )    Postmenopausal atrophic vaginitis    Prepatellar bursitis    Vertigo    Vitamin D deficiency    Anomalous atrioventricular excitation    LOURDES (generalized anxiety disorder)    Duodenitis    Epigastric pain    Uncontrolled type 2 diabetes mellitus with hyperglycemia, with long-term current use of insulin ([facility])    Change in bowel habit    Constipation    BRBPR (bright red blood per rectum)      Past Medical History:   Diagnosis Date    Abdominal pain     started 7/21/16- found something on the pancreas    Abnormal weight loss     last assessed [id number]    Acute sinusitis     Allergic rhinitis     last assessed [id number]    Anemia     Asthma     Cardiac murmur     Cellulitis     Cervical disc disease     Colon polyp     Degeneration of thoracic intervertebral disc     Degenerative arthritis of knee     Diabetes mellitus (HCC)     Diverticulosis     Esophagitis, reflux     last assessed [id number]    Excessive sweating     Fatty liver     Gait disturbance     Granuloma annulare     Head injury     Hyperlipidemia     Hypertension     Hypertensive heart disease     Hypoglycemia     last assessed [id number]    Leg length discrepancy     Leucocytosis     Multiple thyroid nodules     Neuropathy     Obesity     last assessed [id number]    Paronychia of toe     Patellofemoral dysfunction     Postmenopausal atrophic vaginitis     Prepatellar bursitis     Sebaceous cyst     last assessed [id number]    Sleep apnea     no CPAP used    Suicide attempt ([facility] [address] )     Tinea corporis     Type 2 diabetes mellitus (HCC)     cont meds and monitor BS;  last assessed [id number]    Urinary frequency     Vertigo     Vitamin D deficiency     Dia-Parkinson-White syndrome       Past Surgical History:   Procedure Laterality Date    COLONOSCOPY      ESOPHAGOGASTRODUODENOSCOPY N/A 8/8/2016    Procedure: ESOPHAGOGASTRODUODENOSCOPY (EGD); Surgeon: Santy Domínguez MD;  Location: [facility] GI LAB; Service:     MA ESOPHAGOGASTRODUODENOSCOPY TRANSORAL DIAGNOSTIC N/A 3/28/2019    Procedure: ESOPHAGOGASTRODUODENOSCOPY (EGD); Surgeon: Santy Domínguez MD;  Location: [facility] GI [facility];   Service: Gastroenterology    TONSILLECTOMY      TOOTH EXTRACTION      wisdom teeth removed      Family History   Problem Relation Age of Onset    Cancer Mother         lung    Heart disease Mother     Coronary artery disease Mother         CABG    Diabetes Mother     Lung cancer Mother 76        smoker    Stroke Mother     Thyroid disease Mother     Other Mother         tobacco use    Parkinsonism Father  Intestinal polyp Father     Bipolar disorder Father     Heart disease Father     Prostate cancer Father 62    Hypertension Sister     Alcohol abuse Sister     Depression Brother     Diabetes Maternal Grandmother     Thyroid disease Maternal Grandmother     Hypertension Family     Suicide Attempts Paternal Aunt     Depression Paternal Aunt     Endometrial cancer Maternal Aunt         unknown age   Colleen Paez Ovarian cancer Cousin 36        maternal     Social History     Tobacco Use    Smoking status: Never Smoker    Smokeless tobacco: Never Used   Substance Use Topics    Alcohol use: Not Currently     Alcohol/week: 1 0 standard drinks     Types: 1 Glasses of wine per week     Frequency: Monthly or less     Drinks per session: 1 or 2     Binge frequency: Never     Comment: rarely     Allergies   Allergen Reactions    Depakote Er [Divalproex Sodium Er]      pancreatitis    Tanzeum [Albiglutide]      pancreatitis    Bee Venom     Carbamazepine Other (See Comments)     Reaction Date:Unknown    Clarithromycin Itching    Shellfish-Derived Products     Lamotrigine Rash         Current Outpatient Medications:     atorvastatin (LIPITOR) 20 mg tablet, TAKE 1 TABLET (20 MG TOTAL) BY MOUTH DAILY, Disp: 30 tablet, Rfl: 5    Cholecalciferol 4000 units CAPS, Take 1 capsule (4,000 Units total) by mouth daily, Disp: , Rfl:     fenofibrate (TRICOR) 145 mg tablet, Take 1 tablet (145 mg total) by mouth daily, Disp: 90 tablet, Rfl: 1    FLUoxetine (PROzac) 20 mg capsule, Take 1 capsule (20 mg total) by mouth daily Take with the 40 mg cap for a total dose of 60 mg daily, Disp: 90 capsule, Rfl: 1    FLUoxetine (PROzac) 40 MG capsule, Take 1 capsule (40 mg total) by mouth daily Take with the 20 mg cap for a total dose of 60 mg daily, Disp: 90 capsule, Rfl: 1    lisinopril (ZESTRIL) 5 mg tablet, Take 1 tablet (5 mg total) by mouth daily, Disp: 30 tablet, Rfl: 6    lurasidone (LATUDA) 80 mg tablet, Take 1 tablet (80 mg total) by mouth daily with dinner, Disp: 90 tablet, Rfl: 1    metFORMIN (GLUCOPHAGE) 500 mg tablet, Take 1 tablet (500 mg total) by mouth daily, Disp: 90 tablet, Rfl: 1    Omega 3 1200 MG CAPS, Take 1,200 mg by mouth 2 (two) times a day  , Disp: , Rfl:     omeprazole (PriLOSEC) 40 MG capsule, Take 1 capsule (40 mg total) by mouth daily, Disp: 30 capsule, Rfl: 5    Empagliflozin (JARDIANCE) 10 MG TABS, Take 1 tablet (10 mg total) by mouth every morning, Disp: 90 tablet, Rfl: 1  Review of Systems   Constitutional: Negative for activity change, appetite change, fatigue and unexpected weight change  HENT: Negative for trouble swallowing  Eyes: Negative for visual disturbance  Respiratory: Negative for shortness of breath  Cardiovascular: Negative for chest pain and palpitations  Gastrointestinal: Positive for constipation (on Miralax)  Negative for diarrhea  Endocrine: Negative for polydipsia and polyuria  Musculoskeletal: Negative  Skin: Negative  Negative for wound  Neurological: Negative for numbness  Psychiatric/Behavioral: Negative  Physical Exam:  Body mass index is 27 12 kg/m²  /70   Ht 5' 4" (1 626 m)   Wt 71 7 kg (158 lb)   BMI 27 12 kg/m²    Wt Readings from Last 3 Encounters:   12/19/19 71 7 kg (158 lb)   08/20/19 68 5 kg (151 lb)   08/13/19 72 2 kg (159 lb 3 2 oz)       Physical Exam   Constitutional: She appears well-developed and well-nourished  HENT:   Head: Normocephalic  Eyes: Pupils are equal, round, and reactive to light  EOM are normal  No scleral icterus  Neck: Neck supple  No thyromegaly present  Cardiovascular: Normal rate and regular rhythm  No murmur heard  Pulses:       Radial pulses are 2+ on the right side, and 2+ on the left side  Pulmonary/Chest: Effort normal and breath sounds normal  No respiratory distress  She has no wheezes  Neurological: She is alert  She has normal reflexes  Skin: Skin is warm and dry     Psychiatric: She has a normal mood and affect  Nursing note and vitals reviewed  Patient's shoes and socks were not removed  Labs:   Component      Latest Ref Rng & Units 6/3/2019 12/13/2019   Glucose, Random      65 - 99 mg/dL 103 (H) 81   BUN      7 - 25 mg/dL 22 24   Creatinine      0 50 - 1 05 mg/dL 0 78 0 77   eGFR Non       > OR = 60 mL/min/1 73m2 84 85   eGFR       > OR = 60 mL/min/1 73m2 98 99   SL AMB BUN/CREATININE RATIO      6 - 22 (calc) NOT APPLICABLE NOT APPLICABLE   Sodium      135 - 146 mmol/L 140 139   Potassium      3 5 - 5 3 mmol/L 4 3 5 0   Chloride      98 - 110 mmol/L 104 104   CO2      20 - 32 mmol/L 26 27   SL AMB CALCIUM      8 6 - 10 4 mg/dL 10 2 10 3   Cholesterol      <200 mg/dL  145   HDL      >50 mg/dL  52   Triglycerides      <150 mg/dL  134   LDL Direct      mg/dL (calc)  71   Chol HDLC Ratio      <5 0 (calc)  2 8   Non-HDL Cholesterol      <130 mg/dL (calc)  93   EXT Creatinine Urine      20 - 275 mg/dL  59   MICROALBUM ,U,RANDOM      See Note: mg/dL  0 6   MICROALBUMIN/CREATININE RATIO      <30 mcg/mg creat  10   EXTERNAL VITAMIN D,25      30 - 100 ng/mL 39    Hemoglobin A1C      <5 7 % of total Hgb 6 0 (H) 5 6       Plan:    Diagnoses and all orders for this visit:    Controlled type 2 diabetes mellitus without complication, without long-term current use of insulin (HCC)  HGA1C 5 6%  Improved  Treatment regimen: Reduce dose of Jardiance to 10 mg as A1C is tightly controlled  Continue metformin 500 mg daily  Discussed risks/complications associated with uncontrolled diabetes  Advised to adhere to diabetic diet, and recommended staying active/exercising routinely  Keep carbohydrates consistent to limit blood glucose fluctuations  Advised to call if blood sugars less than 70 mg/dl or over 300 mg/dl  Check blood glucose 2 times a day  Discussed symptoms and treatment of hypoglycemia  Recommended routine follow-up with podiatry and ophthalmology  Send log in 2 weeks  Ordered blood work to complete prior to next visit  -     metFORMIN (GLUCOPHAGE) 500 mg tablet; Take 1 tablet (500 mg total) by mouth daily  -     fenofibrate (TRICOR) 145 mg tablet; Take 1 tablet (145 mg total) by mouth daily  -     Empagliflozin (JARDIANCE) 10 MG TABS; Take 1 tablet (10 mg total) by mouth every morning  -     Hemoglobin A1C; Future  -     Basic metabolic panel; Future  -     Hemoglobin A1C; Future  -     Basic metabolic panel; Future    Multiple thyroid nodules  Thyroid US done June 2018 showed no new discrete suspicious nodules  Multiple bilateral small colloid cysts and spongiform nodules are unchanged  No nodule meets current ACR criteria for requiring biopsy or followup ultrasounds  Dyslipidemia  LDL 71, at goal  Continue statin therapy  -     fenofibrate (TRICOR) 145 mg tablet; Take 1 tablet (145 mg total) by mouth daily    Vitamin D deficiency  Vitamin D previously normal  Continue current supplementation of vitamin D3  -     Vitamin D 25 hydroxy; Future         Discussed with the patient diagnosis and treatment and all questions fully answered  She will call me if any problems arise  Counseled patient on diagnostic results, prognosis, risk and benefit of treatment options, instruction for management, importance of treatment compliance, risk factor reduction and impressions        Drea Will PA-C

## 2019-12-19 NOTE — PATIENT INSTRUCTIONS
Hypoglycemia instructions   Rayray Batress  12/19/2019  [id number]    Low Blood Sugar    Steps to treat low blood sugar  1  Test blood sugar if you have symptoms of low blood sugar:   Low Blood Sugar Symptoms:  o Sweaty  o Dizzy  o Rapid heartbeat  o Shaky  o Bad mood  o Hungry      2  Treat blood sugar less than 70 with 15 grams of fast-acting carbohydrate:   Examples of 15 grams Fast-Acting Carbohydrate:  o 4 oz juice  o 4 oz regular soda  o 3-4 glucose tablets (chew)  o 3-4 hard candies (chew)          3  Wait 15 minutes and test your blood sugar again     4   If blood sugar is less than 100, repeat steps 2-3     5  When your blood sugar is 100 or more, eat a snack if it will be longer than one hour until your next meal  The snack should be 15 grams of carbohydrate and a protein:   Examples of snacks:  o ½ sandwich  o 6 crackers with cheese  o Piece of fruit with cheese or peanut butter  o 6 crackers with peanut butter

## 2019-12-23 ENCOUNTER — TELEPHONE (OUTPATIENT)
Dept: ENDOCRINOLOGY | Facility: CLINIC | Age: 59
End: 2019-12-23

## 2019-12-23 NOTE — TELEPHONE ENCOUNTER
BG levels tightly controlled  As discussed during recent OV, decrease Jardiance to 10 mg daily  Script was sent during [facility]       Drea Will PA-C

## 2020-01-06 ENCOUNTER — TELEPHONE (OUTPATIENT)
Dept: ENDOCRINOLOGY | Facility: CLINIC | Age: 60
End: 2020-01-06

## 2020-01-22 ENCOUNTER — TELEPHONE (OUTPATIENT)
Dept: ENDOCRINOLOGY | Facility: CLINIC | Age: 60
End: 2020-01-22

## 2020-01-22 NOTE — TELEPHONE ENCOUNTER
Blood glucose levels are tightly controlled  She can stop the Jardiance 10 mg daily  (d/c med from list)  Continue metformin       Send log in 1 month  Call sooner if BG levels persistently over 200 mg/dl or less than 70 mg/dl     Drea Will PA-C

## 2020-02-12 ENCOUNTER — OFFICE VISIT (OUTPATIENT)
Dept: INTERNAL MEDICINE CLINIC | Facility: CLINIC | Age: 60
End: 2020-02-12
Payer: COMMERCIAL

## 2020-02-12 VITALS
RESPIRATION RATE: 20 BRPM | HEIGHT: 64 IN | OXYGEN SATURATION: 98 % | HEART RATE: 68 BPM | WEIGHT: 158 LBS | BODY MASS INDEX: 26.98 KG/M2 | DIASTOLIC BLOOD PRESSURE: 82 MMHG | SYSTOLIC BLOOD PRESSURE: 138 MMHG

## 2020-02-12 DIAGNOSIS — Z00.00 WELLNESS EXAMINATION: ICD-10-CM

## 2020-02-12 DIAGNOSIS — E78.5 DYSLIPIDEMIA: Primary | ICD-10-CM

## 2020-02-12 PROCEDURE — 99396 PREV VISIT EST AGE 40-64: CPT | Performed by: INTERNAL MEDICINE

## 2020-02-12 RX ORDER — POLYETHYLENE GLYCOL 3350 17 G/17G
17 POWDER, FOR SOLUTION ORAL DAILY
COMMUNITY
End: 2021-09-29

## 2020-02-16 PROBLEM — Z00.00 WELLNESS EXAMINATION: Status: ACTIVE | Noted: 2020-02-16

## 2020-02-16 NOTE — ASSESSMENT & PLAN NOTE
Assessment and plan 1  Health maintenance annual wellness examination overall the patient is clinically stable and doing well, we encouraged the patient to follow a healthy and balanced diet  We recommend that the patient exercise routinely approximately 30 minutes 5 times per week   We have reviewed the patient's vaccines and have made recommendations for updates if necessary  annual flu vaccine recommended, consider new shingles vaccine      We will be ordering screening laboratories which are age appropriate  Return to the office in  6 months    call if any problems

## 2020-02-19 DIAGNOSIS — K29.80 DUODENITIS: ICD-10-CM

## 2020-02-19 DIAGNOSIS — R10.13 EPIGASTRIC PAIN: ICD-10-CM

## 2020-02-19 RX ORDER — OMEPRAZOLE 40 MG/1
40 CAPSULE, DELAYED RELEASE ORAL DAILY
Qty: 30 CAPSULE | Refills: 5 | Status: SHIPPED | OUTPATIENT
Start: 2020-02-19 | End: 2020-08-26 | Stop reason: SDUPTHER

## 2020-02-19 NOTE — TELEPHONE ENCOUNTER
GI Physician: Dr Platt Scarce for Medication: Omeprazole     Dose: 40 mg     Quantity: 90 day supply     Pharmacy: [facility]

## 2020-02-19 NOTE — TELEPHONE ENCOUNTER
Patient has hx GERD, last seen in office Aug 2019  EGD march 2019 showed healed gastric erosion and possible healed duodenal ulceration with significant improvement on omeprazole   Recommend

## 2020-03-02 DIAGNOSIS — E78.5 DYSLIPIDEMIA: ICD-10-CM

## 2020-03-02 RX ORDER — ATORVASTATIN CALCIUM 20 MG/1
20 TABLET, FILM COATED ORAL DAILY
Qty: 30 TABLET | Refills: 3 | Status: SHIPPED | OUTPATIENT
Start: 2020-03-02 | End: 2020-04-24

## 2020-03-16 ENCOUNTER — TELEPHONE (OUTPATIENT)
Dept: ENDOCRINOLOGY | Facility: CLINIC | Age: 60
End: 2020-03-16

## 2020-04-13 ENCOUNTER — TELEPHONE (OUTPATIENT)
Dept: ENDOCRINOLOGY | Facility: CLINIC | Age: 60
End: 2020-04-13

## 2020-04-24 DIAGNOSIS — E78.5 DYSLIPIDEMIA: ICD-10-CM

## 2020-04-24 RX ORDER — ATORVASTATIN CALCIUM 20 MG/1
TABLET, FILM COATED ORAL
Qty: 30 TABLET | Refills: 3 | Status: SHIPPED | OUTPATIENT
Start: 2020-04-24 | End: 2020-07-23

## 2020-05-04 DIAGNOSIS — I10 BENIGN ESSENTIAL HYPERTENSION: ICD-10-CM

## 2020-05-04 PROCEDURE — 4010F ACE/ARB THERAPY RXD/TAKEN: CPT | Performed by: INTERNAL MEDICINE

## 2020-05-04 RX ORDER — LISINOPRIL 5 MG/1
TABLET ORAL
Qty: 90 TABLET | Refills: 2 | Status: SHIPPED | OUTPATIENT
Start: 2020-05-04 | End: 2020-12-24

## 2020-05-12 ENCOUNTER — TELEPHONE (OUTPATIENT)
Dept: ENDOCRINOLOGY | Facility: CLINIC | Age: 60
End: 2020-05-12

## 2020-05-15 NOTE — TELEPHONE ENCOUNTER
General Surgery Week 2 Survey      Responses   [facility] patient discharged from?  Jan   Does the patient have one of the following disease processes/diagnoses(primary or secondary)?  General Surgery          Gerri Walden RN   Spoke to pt who understood to discontinue tresiba  Will send another in 2 weeks

## 2020-06-09 DIAGNOSIS — F31.76 BIPOLAR I DISORDER, MOST RECENT EPISODE DEPRESSED, IN FULL REMISSION (HCC): Primary | Chronic | ICD-10-CM

## 2020-06-10 ENCOUNTER — TELEPHONE (OUTPATIENT)
Dept: ENDOCRINOLOGY | Facility: CLINIC | Age: 60
End: 2020-06-10

## 2020-06-11 ENCOUNTER — ANNUAL EXAM (OUTPATIENT)
Dept: OBGYN CLINIC | Facility: CLINIC | Age: 60
End: 2020-06-11
Payer: COMMERCIAL

## 2020-06-11 VITALS
WEIGHT: 171 LBS | DIASTOLIC BLOOD PRESSURE: 76 MMHG | TEMPERATURE: 98.1 F | BODY MASS INDEX: 29.35 KG/M2 | SYSTOLIC BLOOD PRESSURE: 130 MMHG

## 2020-06-11 DIAGNOSIS — Z01.419 ENCOUNTER FOR GYNECOLOGICAL EXAMINATION WITHOUT ABNORMAL FINDING: ICD-10-CM

## 2020-06-11 DIAGNOSIS — Z01.419 PAP SMEAR, AS PART OF ROUTINE GYNECOLOGICAL EXAMINATION: ICD-10-CM

## 2020-06-11 DIAGNOSIS — Z12.39 BREAST CANCER SCREENING: Primary | ICD-10-CM

## 2020-06-11 PROCEDURE — G0145 SCR C/V CYTO,THINLAYER,RESCR: HCPCS | Performed by: OBSTETRICS & GYNECOLOGY

## 2020-06-11 PROCEDURE — 3075F SYST BP GE 130 - 139MM HG: CPT | Performed by: OBSTETRICS & GYNECOLOGY

## 2020-06-11 PROCEDURE — 3078F DIAST BP <80 MM HG: CPT | Performed by: OBSTETRICS & GYNECOLOGY

## 2020-06-11 PROCEDURE — 87624 HPV HI-RISK TYP POOLED RSLT: CPT | Performed by: OBSTETRICS & GYNECOLOGY

## 2020-06-11 PROCEDURE — S0612 ANNUAL GYNECOLOGICAL EXAMINA: HCPCS | Performed by: OBSTETRICS & GYNECOLOGY

## 2020-06-12 DIAGNOSIS — F41.1 GAD (GENERALIZED ANXIETY DISORDER): Chronic | ICD-10-CM

## 2020-06-12 DIAGNOSIS — F31.76 BIPOLAR I DISORDER, MOST RECENT EPISODE DEPRESSED, IN FULL REMISSION (HCC): Chronic | ICD-10-CM

## 2020-06-12 RX ORDER — FLUOXETINE HYDROCHLORIDE 20 MG/1
20 CAPSULE ORAL DAILY
Qty: 90 CAPSULE | Refills: 0 | Status: SHIPPED | OUTPATIENT
Start: 2020-06-12 | End: 2020-08-06 | Stop reason: SDUPTHER

## 2020-06-12 RX ORDER — FLUOXETINE HYDROCHLORIDE 40 MG/1
40 CAPSULE ORAL DAILY
Qty: 90 CAPSULE | Refills: 0 | Status: SHIPPED | OUTPATIENT
Start: 2020-06-12 | End: 2020-08-06 | Stop reason: SDUPTHER

## 2020-06-16 LAB
HPV HR 12 DNA CVX QL NAA+PROBE: NEGATIVE
HPV16 DNA CVX QL NAA+PROBE: NEGATIVE
HPV18 DNA CVX QL NAA+PROBE: NEGATIVE

## 2020-06-19 LAB
CHOLEST SERPL-MCNC: 154 MG/DL
CHOLEST/HDLC SERPL: 2.9 (CALC)
HDLC SERPL-MCNC: 53 MG/DL
LDLC SERPL CALC-MCNC: 75 MG/DL (CALC)
NONHDLC SERPL-MCNC: 101 MG/DL (CALC)
TRIGL SERPL-MCNC: 159 MG/DL

## 2020-06-20 LAB
25(OH)D3 SERPL-MCNC: 46 NG/ML (ref 30–100)
BUN SERPL-MCNC: 26 MG/DL (ref 7–25)
BUN/CREAT SERPL: 31 (CALC) (ref 6–22)
CALCIUM SERPL-MCNC: 10.1 MG/DL (ref 8.6–10.4)
CHLORIDE SERPL-SCNC: 102 MMOL/L (ref 98–110)
CO2 SERPL-SCNC: 28 MMOL/L (ref 20–32)
CREAT SERPL-MCNC: 0.84 MG/DL (ref 0.5–1.05)
GLUCOSE SERPL-MCNC: 98 MG/DL (ref 65–99)
HBA1C MFR BLD: 5.7 % OF TOTAL HGB
POTASSIUM SERPL-SCNC: 4.9 MMOL/L (ref 3.5–5.3)
SL AMB EGFR AFRICAN AMERICAN: 89 ML/MIN/1.73M2
SL AMB EGFR NON AFRICAN AMERICAN: 77 ML/MIN/1.73M2
SODIUM SERPL-SCNC: 139 MMOL/L (ref 135–146)

## 2020-06-20 PROCEDURE — 3044F HG A1C LEVEL LT 7.0%: CPT | Performed by: INTERNAL MEDICINE

## 2020-06-23 LAB
LAB AP GYN PRIMARY INTERPRETATION: NORMAL
Lab: NORMAL

## 2020-06-25 ENCOUNTER — TELEMEDICINE (OUTPATIENT)
Dept: ENDOCRINOLOGY | Facility: CLINIC | Age: 60
End: 2020-06-25
Payer: COMMERCIAL

## 2020-06-25 DIAGNOSIS — E78.5 DYSLIPIDEMIA: ICD-10-CM

## 2020-06-25 DIAGNOSIS — I10 BENIGN ESSENTIAL HYPERTENSION: ICD-10-CM

## 2020-06-25 DIAGNOSIS — E11.9 CONTROLLED TYPE 2 DIABETES MELLITUS WITHOUT COMPLICATION, WITHOUT LONG-TERM CURRENT USE OF INSULIN (HCC): Primary | ICD-10-CM

## 2020-06-25 DIAGNOSIS — E55.9 VITAMIN D DEFICIENCY: ICD-10-CM

## 2020-06-25 PROCEDURE — 99214 OFFICE O/P EST MOD 30 MIN: CPT | Performed by: PHYSICIAN ASSISTANT

## 2020-07-06 DIAGNOSIS — E78.5 DYSLIPIDEMIA: ICD-10-CM

## 2020-07-06 DIAGNOSIS — E11.9 CONTROLLED TYPE 2 DIABETES MELLITUS WITHOUT COMPLICATION, WITHOUT LONG-TERM CURRENT USE OF INSULIN (HCC): ICD-10-CM

## 2020-07-06 RX ORDER — FENOFIBRATE 145 MG/1
145 TABLET, COATED ORAL DAILY
Qty: 90 TABLET | Refills: 1 | Status: SHIPPED | OUTPATIENT
Start: 2020-07-06 | End: 2020-10-26

## 2020-07-23 DIAGNOSIS — E78.5 DYSLIPIDEMIA: ICD-10-CM

## 2020-07-23 RX ORDER — ATORVASTATIN CALCIUM 20 MG/1
TABLET, FILM COATED ORAL
Qty: 90 TABLET | Refills: 1 | Status: SHIPPED | OUTPATIENT
Start: 2020-07-23 | End: 2020-12-28

## 2020-07-28 ENCOUNTER — DOCUMENTATION (OUTPATIENT)
Dept: PSYCHIATRY | Facility: CLINIC | Age: 60
End: 2020-07-28

## 2020-08-03 DIAGNOSIS — F31.76 BIPOLAR I DISORDER, MOST RECENT EPISODE DEPRESSED, IN FULL REMISSION (HCC): Chronic | ICD-10-CM

## 2020-08-03 DIAGNOSIS — F41.1 GAD (GENERALIZED ANXIETY DISORDER): Chronic | ICD-10-CM

## 2020-08-03 RX ORDER — FLUOXETINE HYDROCHLORIDE 20 MG/1
20 CAPSULE ORAL DAILY
Qty: 90 CAPSULE | Refills: 0 | OUTPATIENT
Start: 2020-08-03 | End: 2021-01-30

## 2020-08-06 ENCOUNTER — OFFICE VISIT (OUTPATIENT)
Dept: PSYCHIATRY | Facility: CLINIC | Age: 60
End: 2020-08-06
Payer: COMMERCIAL

## 2020-08-06 VITALS — WEIGHT: 177 LBS | BODY MASS INDEX: 30.22 KG/M2 | HEIGHT: 64 IN

## 2020-08-06 DIAGNOSIS — F60.9 PERSONALITY DISORDER (HCC): Chronic | ICD-10-CM

## 2020-08-06 DIAGNOSIS — F41.1 GAD (GENERALIZED ANXIETY DISORDER): Chronic | ICD-10-CM

## 2020-08-06 DIAGNOSIS — F31.76 BIPOLAR I DISORDER, MOST RECENT EPISODE DEPRESSED, IN FULL REMISSION (HCC): Primary | Chronic | ICD-10-CM

## 2020-08-06 PROCEDURE — 99213 OFFICE O/P EST LOW 20 MIN: CPT | Performed by: PSYCHIATRY & NEUROLOGY

## 2020-08-06 PROCEDURE — 90833 PSYTX W PT W E/M 30 MIN: CPT | Performed by: PSYCHIATRY & NEUROLOGY

## 2020-08-06 PROCEDURE — 1036F TOBACCO NON-USER: CPT | Performed by: PSYCHIATRY & NEUROLOGY

## 2020-08-06 RX ORDER — FLUOXETINE HYDROCHLORIDE 40 MG/1
40 CAPSULE ORAL DAILY
Qty: 90 CAPSULE | Refills: 1 | Status: SHIPPED | OUTPATIENT
Start: 2020-08-06 | End: 2020-12-08 | Stop reason: SDUPTHER

## 2020-08-06 RX ORDER — FLUOXETINE HYDROCHLORIDE 20 MG/1
20 CAPSULE ORAL DAILY
Qty: 90 CAPSULE | Refills: 1 | Status: SHIPPED | OUTPATIENT
Start: 2020-08-06 | End: 2020-12-08 | Stop reason: SDUPTHER

## 2020-08-06 NOTE — PSYCH
MEDICATION MANAGEMENT NOTE        [facility]      Name and Date of Birth:  Cameron Michael 63 y o  1960 MRN: [id number]    Date of Visit: August 6, 2020    Reason for Visit:   Chief Complaint   Patient presents with    Medication Management    Follow-up       SUBJECTIVE:    Jeevan Gasca is seen today for a follow up for Bipolar Disorder, Generalized Anxiety Disorder and personality disorder  She has done fairly well since the last visit  She states that mood continues to be stable, denies any significant depressive symptoms  She reports that anxiety symptoms are controlled, despite ongoing COVID-19 pandemic "I am doing good through this whole mess"  She has been coping fairly well with the fact that her  had his work hours reduced and that she had to stop going to the gym and to [employer] "that is why I gained weight"  She now has a puppy "I adopted him"  She denies any suicidal ideation, intent or plan at present; denies any homicidal ideation, intent or plan at present  She has no auditory hallucinations, denies any visual hallucinations, no overt delusions noted  She denies any side effects from current psychiatric medications      She reports normal sleep, adequate number of sleep hours, normal appetite, recent weight gain (18 lbs), low energy    Review Of Systems:      Constitutional low energy and recent weight gain (18 lbs)   ENT negative   Cardiovascular negative   Respiratory negative   Gastrointestinal negative   Genitourinary negative   Musculoskeletal negative   Integumentary negative   Neurological negative   Endocrine negative   Other Symptoms none, all other systems are negative       Past Psychiatric History: (unchanged information from previous note copied and updated)    Past Inpatient Psychiatric Treatment:   One past inpatient psychiatric admission at [place] [address]  Past Outpatient Psychiatric Treatment:    In outpatient treatment at [address] [place] [address] [address] for many years    Past Suicide Attempts: yes, 2 attempts by overdose years ago  Past Violent Behavior: no  Past Psychiatric Medication Trials: Prozac, Depakote, Abilify and Latuda    Traumatic History: (unchanged information from previous note copied and updated)    Abuse: no history of sexual abuse, no history of physical abuse  Other Traumatic Events: none     Past Medical History:    Past Medical History:   Diagnosis Date    Abdominal pain     started 7/21/16- found something on the pancreas    Abnormal weight loss     last assessed [id number]    Acute sinusitis     Allergic rhinitis     last assessed [id number]    Anemia     Asthma     Cardiac murmur     Cellulitis     Cervical disc disease     Colon polyp     Degeneration of thoracic intervertebral disc     Degenerative arthritis of knee     Diabetes mellitus ([facility]     Diverticulosis     Esophagitis, reflux     last assessed [id number]    Excessive sweating     Fatty liver     Gait disturbance     Granuloma annulare     Head injury     Hyperlipidemia     Hypertension     Hypertensive heart disease     Hypoglycemia     last assessed [id number]    Leg length discrepancy     Leucocytosis     Multiple thyroid nodules     Neuropathy     Obesity     last assessed [id number]    Paronychia of toe     Patellofemoral dysfunction     Postmenopausal atrophic vaginitis     Prepatellar bursitis     Sebaceous cyst     last assessed [id number]    Sleep apnea     no CPAP used    Suicide attempt ([facility]     Tinea corporis     Type 2 diabetes mellitus ([facility]     cont meds and monitor BS;  last assessed [id number]    Urinary frequency     Vertigo     Vitamin D deficiency     Dia-Parkinson-White syndrome      Past Medical History Pertinent Negatives:   Diagnosis Date Noted    Colon cancer ([facility] ) 08/20/2019    CPAP (continuous positive airway pressure) dependence 03/28/2019     Past Surgical History:   Procedure Laterality Date    COLONOSCOPY      ESOPHAGOGASTRODUODENOSCOPY N/A 8/8/2016    Procedure: ESOPHAGOGASTRODUODENOSCOPY (EGD); Surgeon: Vadim Fajardo MD;  Location[facility] GI LAB; Service:     AK ESOPHAGOGASTRODUODENOSCOPY TRANSORAL DIAGNOSTIC N/A 3/28/2019    Procedure: ESOPHAGOGASTRODUODENOSCOPY (EGD); Surgeon: Vadim Fajardo MD;  Location: [facility] GI [facility]; Service: Gastroenterology    TONSILLECTOMY      TOOTH EXTRACTION      wisdom teeth removed     Allergies   Allergen Reactions    Depakote Er [Divalproex Sodium Er]      pancreatitis    Tanzeum [Albiglutide]      pancreatitis    Bee Venom     Carbamazepine Other (See Comments)     Reaction Date:Unknown    Clarithromycin Itching    Shellfish-Derived Products     Lamotrigine Rash       Substance Abuse History:    Social History     Substance and Sexual Activity   Alcohol Use Not Currently    Alcohol/week: 1 0 standard drinks    Types: 1 Glasses of wine per week    Frequency: Monthly or less    Drinks per session: 1 or 2    Binge frequency: Never    Comment: rarely     Social History     Substance and Sexual Activity   Drug Use No       Social History:    Social History     Socioeconomic History    Marital status: /Civil Union     Spouse name: Not on file    Number of children: 0    Years of education: 15    Highest education level: 12th grade   Occupational History    Occupation: retired   Social Needs    Financial resource strain: Not hard at all   [place]-Deb insecurity     Worry: Never true     Inability: Never true    Transportation needs     Medical: No     Non-medical: No   Tobacco Use    Smoking status: Never Smoker    Smokeless tobacco: Never Used   Substance and Sexual Activity    Alcohol use: Not Currently     Alcohol/week: 1 0 standard drinks     Types: 1 Glasses of wine per week     Frequency: Monthly or less     Drinks per session: 1 or 2     Binge frequency: Never     Comment: rarely    Drug use:  No  Sexual activity: Not Currently     Partners: Male   Lifestyle    Physical activity     Days per week: 0 days     Minutes per session: 0 min    Stress:  Only a little   Relationships    Social connections     Talks on phone: Once a week     Gets together: Once a week     Attends Congregation service: Never     Active member of club or organization: Yes     Attends meetings of clubs or organizations: More than 4 times per year     Relationship status:     Intimate partner violence     Fear of current or ex partner: No     Emotionally abused: No     Physically abused: No     Forced sexual activity: No   Other Topics Concern    Not on file   Social History Narrative    Education: high school graduate    Learning Disabilities: none    Marital History:     Children: none    Living Arrangement: lives in home with     Occupational History: retired, 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[place] [address]: good support system,  is supportive    Legal History: none     History: None        Caffeine use    Denied home environment domestic violence    Job physical requirements heavy lifting       Family Psychiatric History:     Family History   Problem Relation Age of Onset    Cancer Mother         lung    Heart disease Mother     Coronary artery disease Mother         CABG    Diabetes Mother     Lung cancer Mother 76        smoker    Stroke Mother     Thyroid disease Mother     Other Mother         tobacco use    Parkinsonism Father     Intestinal polyp Father     Bipolar disorder Father     Heart disease Father     Prostate cancer Father 62    Hypertension Sister     Alcohol abuse Sister     Depression Brother     Diabetes Maternal Grandmother     Thyroid disease Maternal Grandmother     Hypertension Family     Suicide Attempts Paternal Aunt     Depression Paternal Aunt     Endometrial cancer Maternal Aunt         unknown age  Ovarian cancer Cousin 40        maternal       History Review:  The following portions of the patient's history were reviewed and updated as appropriate: allergies, current medications, past family history, past medical history, past social history, past surgical history and problem list          OBJECTIVE:     Vital signs in last 24 hours:    Vitals:    08/06/20 1507   Weight: 80 3 kg (177 lb)   Height: 5' 4" (1 626 m)       Mental Status Evaluation:    Appearance age appropriate, casually dressed   Behavior cooperative, calm   Speech normal rate, normal volume, normal pitch   Mood euthymic   Affect normal range and intensity, appropriate   Thought Processes organized, goal directed   Associations intact associations   Thought Content no overt delusions   Perceptual Disturbances: no auditory hallucinations, no visual hallucinations   Abnormal Thoughts  Risk Potential Suicidal ideation - None  Homicidal ideation - None  Potential for aggression - No   Orientation oriented to person, place, time/date and situation   Memory recent and remote memory grossly intact   Consciousness alert and awake   Attention Span Concentration Span attention span and concentration are age appropriate   Intellect appears to be of average intelligence   Insight intact   Judgement intact   Muscle Strength and  Gait normal muscle strength and normal muscle tone, normal gait and normal balance   Motor activity no abnormal movements   Language no difficulty naming common objects, no difficulty repeating a phrase, no difficulty writing a sentence   Fund of Knowledge adequate knowledge of current events  adequate fund of knowledge regarding past history  adequate fund of knowledge regarding vocabulary    Pain none   Pain Scale 0       Laboratory Results: I have personally reviewed all pertinent laboratory/tests results    Recent Labs (last 4 months):   Orders Only on 06/19/2020   Component Date Value    Glucose, Random 06/19/2020 98     BUN 06/19/2020 26*    Creatinine 06/19/2020 0 84     eGFR Non African American 06/19/2020 77     eGFR  06/19/2020 89     SL AMB BUN/CREATININE RA* 06/19/2020 31*    Sodium 06/19/2020 139     Potassium 06/19/2020 4 9     Chloride 06/19/2020 102     CO2 06/19/2020 28     Calcium 06/19/2020 10 1     Vitamin D, 25-Hydroxy, S* 06/19/2020 46     Hemoglobin A1C 06/19/2020 5 7*   Orders Only on 06/19/2020   Component Date Value    Total Cholesterol 06/19/2020 154     HDL 06/19/2020 53     Triglycerides 06/19/2020 159*    LDL Calculated 06/19/2020 75     Chol HDLC Ratio 06/19/2020 2 9     Non-HDL Cholesterol 06/19/2020 101    Annual Exam on 06/11/2020   Component Date Value    Case Report 06/11/2020                      Value:Gynecologic Cytology Report                       Case: [id number]                                  Authorizing Provider:  Henna Best MD          Collected:           06/11/2020 1106              Ordering Location:     [address] [place] [address] OB Received:            06/11/2020 1106                                     GYN                                                                          First Screen:          Taye Reaper, [place]                                                       Specimen:    LIQUID-BASED PAP, SCREENING, Cervix                                                        Primary Interpretation 06/11/2020 Negative for intraepithelial lesion or malignancy     Specimen Adequacy 06/11/2020 Satisfactory for evaluation  (See note)     Note 06/11/2020                      Value: This result contains rich text formatting which cannot be displayed here   Additional Information 06/11/2020                      Value: This result contains rich text formatting which cannot be displayed here      HPV Other HR 06/11/2020 Negative     HPV16 06/11/2020 Negative     HPV18 06/11/2020 Negative        Suicide/Homicide Risk Assessment:    Risk of Harm to Self:  Demographic risk factors include: age: over 48 or older  Historical Risk Factors include: history of anxiety, history of mood disorder, history of suicide attempts  Recent Specific Risk Factors include: diagnosis of mood disorder  Protective Factors: no current suicidal ideation, compliant with medications, compliant with mental health treatment, having a desire to live, responsibilities and duties to others, stable living environment, supportive family  Weapons: none  The following steps have been taken to ensure weapons are properly secured: not applicable  Based on today's assessment, Sisi Oliver presents the following risk of harm to self: minimal    Risk of Harm to Others: The following ratings are based on assessment at the time of the interview  Based on today's assessment, Sisi Benito presents the following risk of harm to others: none    The following interventions are recommended: no intervention changes needed    Assessment/Plan:       Diagnoses and all orders for this visit:    Bipolar I disorder, most recent episode depressed, in full remission ([facility] )  -     lurasidone (Latuda) 80 mg tablet; Take 1 tablet (80 mg total) by mouth daily with dinner  -     FLUoxetine (PROzac) 40 MG capsule; Take 1 capsule (40 mg total) by mouth daily Take with the 20 mg cap for a total dose of 60 mg daily  -     FLUoxetine (PROzac) 20 mg capsule; Take 1 capsule (20 mg total) by mouth daily Take with the 40 mg cap for a total dose of 60 mg daily    LOURDES (generalized anxiety disorder)  -     FLUoxetine (PROzac) 40 MG capsule; Take 1 capsule (40 mg total) by mouth daily Take with the 20 mg cap for a total dose of 60 mg daily  -     FLUoxetine (PROzac) 20 mg capsule;  Take 1 capsule (20 mg total) by mouth daily Take with the 40 mg cap for a total dose of 60 mg daily    Personality disorder (HCC)          Treatment Recommendations/Precautions:    Continue Prozac 60 mg every evening to improve depressive symptoms  Continue Latuda 80 mg every evening to help with mood  Medication management every 4 months  Follows with family physician for glucose and lipid monitoring due to current therapy with antipsychotic medication  Follows with family physician for asthma, diabetes, hyperlipidemia and hypertension  Aware of 24 hour and weekend coverage for urgent situations accessed by calling [facility] main practice number    Medications Risks/Benefits      Risks, Benefits And Possible Side Effects Of Medications:    Risks, benefits, and possible side effects of medications explained to Luisito Epstein including risk of parkinsonian symptoms, Tardive Dyskinesia and metabolic syndrome related to treatment with antipsychotic medications and risk of suicidality and serotonin syndrome related to treatment with antidepressants  She verbalizes understanding and agreement for treatment  Controlled Medication Discussion:     Not applicable    Psychotherapy Provided:     Individual psychotherapy provided: Yes  Counseling was provided during the session today for 16 minutes  Medications, treatment progress and treatment plan reviewed with Luisito Epstein  Goals discussed during in session: maintain control of anxiety and maintain mood stability  Discussed with Luisito Epstein coping with COVID-19 issues and family issues  Coping strategies including exercising, maintain healthy diet and playing with her new puppy reviewed with Luisito Epstein  Supportive therapy provided        Treatment Plan:    Completed and signed during the session: Yes - Treatment Plan done but not signed at time of office visit due to:  Plan reviewed in person and verbal consent given due to Viola social apollo Torres MD 08/06/20

## 2020-08-06 NOTE — BH TREATMENT PLAN
TREATMENT PLAN (Medication Management Only)        [facility]    Name/Date of Birth/MRN:  Alfredo Perez 63 y o  1960 MRN: [id number]  Date of Treatment Plan: August 6, 2020  Diagnosis/Diagnoses:   1  Bipolar I disorder, most recent episode depressed, in full remission ([facility] )    2  LOURDES (generalized anxiety disorder)    3  Personality disorder [facility])      Strengths/Personal Resources for Self-Care: "I am confident, I know what I want"  Area/Areas of need (in own words): "my weight and exercise"  1  Long Term Goal:   maintain control of anxiety, maintain stability of mood stability  Target Date: 4 months - 12/6/2020  Person/Persons responsible for completion of goal: Jessica Trejo  Short Term Objective (s) - How will we reach this goal?:   A  Provider new recommended medication/dosage changes and/or continue medication(s): continue current medications as prescribed (Prozac and Latuda)  B   N/A   C   N/A  Target Date: 4 months - 12/6/2020  Person/Persons Responsible for Completion of Goal: Claude Loh   Progress Towards Goals: stable  Treatment Modality: medication management every 4 months  Review due 180 days from date of this plan: 6 months - 2/6/2021  Expected length of service: maintenance unless revised  My Physician/PA/NP and I have developed this plan together and I agree to work on the goals and objectives  I understand the treatment goals that were developed for my treatment    Electronic Signatures: on file (unless signed below)    Court Owens MD 08/06/20

## 2020-08-12 ENCOUNTER — OFFICE VISIT (OUTPATIENT)
Dept: INTERNAL MEDICINE CLINIC | Facility: CLINIC | Age: 60
End: 2020-08-12
Payer: COMMERCIAL

## 2020-08-12 VITALS
RESPIRATION RATE: 16 BRPM | HEART RATE: 68 BPM | TEMPERATURE: 98.6 F | HEIGHT: 64 IN | SYSTOLIC BLOOD PRESSURE: 124 MMHG | DIASTOLIC BLOOD PRESSURE: 64 MMHG | OXYGEN SATURATION: 96 % | WEIGHT: 173.8 LBS | BODY MASS INDEX: 29.67 KG/M2

## 2020-08-12 DIAGNOSIS — E78.5 DYSLIPIDEMIA: ICD-10-CM

## 2020-08-12 DIAGNOSIS — I10 BENIGN ESSENTIAL HYPERTENSION: ICD-10-CM

## 2020-08-12 DIAGNOSIS — E11.9 TYPE 2 DIABETES MELLITUS WITHOUT COMPLICATION, WITHOUT LONG-TERM CURRENT USE OF INSULIN (HCC): Primary | ICD-10-CM

## 2020-08-12 DIAGNOSIS — E78.1 ESSENTIAL HYPERTRIGLYCERIDEMIA: ICD-10-CM

## 2020-08-12 PROCEDURE — 3008F BODY MASS INDEX DOCD: CPT | Performed by: INTERNAL MEDICINE

## 2020-08-12 PROCEDURE — 99214 OFFICE O/P EST MOD 30 MIN: CPT | Performed by: INTERNAL MEDICINE

## 2020-08-12 PROCEDURE — 3074F SYST BP LT 130 MM HG: CPT | Performed by: INTERNAL MEDICINE

## 2020-08-12 PROCEDURE — 3078F DIAST BP <80 MM HG: CPT | Performed by: INTERNAL MEDICINE

## 2020-08-12 PROCEDURE — 1036F TOBACCO NON-USER: CPT | Performed by: INTERNAL MEDICINE

## 2020-08-12 NOTE — PROGRESS NOTES
Assessment/Plan:    Type 2 diabetes mellitus without complication, without long-term current use of insulin (Formerly [facility])    Lab Results   Component Value Date    HGBA1C 5 7 (H) 06/19/2020   I have counselled the pt to follow a healthy and balanced diet ,and recommend routine exercise  I will be ordering diabetic laboratories including comprehensive metabolic panel, hemoglobin A1c, urine microalbumin, lipid panel  Foot examination completed    Benign essential hypertension  Hypertension - controlled, I have counseled patient following healthy balance diet, I would like the patient reduce sodium, exercise routinely, I would like the patient continued the med current medical regiment and we will continue to monitor  Essential hypertriglyceridemia  Reduce carbohydrates/sweets continue monitor lipid profile continue Tricor    Dyslipidemia  Hyperlipidemia controlled continue with current medical regiment recommend a low-cholesterol diet and recommend routine exercise we will continue to monitor the progress  Continue Lipitor 20 mg once a day         Problem List Items Addressed This Visit        Endocrine    Type 2 diabetes mellitus without complication, without long-term current use of insulin ([facility] ) - Primary       Lab Results   Component Value Date    HGBA1C 5 7 (H) 06/19/2020   I have counselled the pt to follow a healthy and balanced diet ,and recommend routine exercise  I will be ordering diabetic laboratories including comprehensive metabolic panel, hemoglobin A1c, urine microalbumin, lipid panel    Foot examination completed         Relevant Orders    Diabetic foot exam    Comprehensive metabolic panel    Hemoglobin A1C    Lipid Panel with Direct LDL reflex    Microalbumin / creatinine urine ratio       Cardiovascular and Mediastinum    Benign essential hypertension     Hypertension - controlled, I have counseled patient following healthy balance diet, I would like the patient reduce sodium, exercise routinely, I would like the patient continued the med current medical regiment and we will continue to monitor  Other    Dyslipidemia     Hyperlipidemia controlled continue with current medical regiment recommend a low-cholesterol diet and recommend routine exercise we will continue to monitor the progress  Continue Lipitor 20 mg once a day         Essential hypertriglyceridemia     Reduce carbohydrates/sweets continue monitor lipid profile continue Tricor               Return to office 6  months  call if any problems  Subjective:      Patient ID: Varney Scheuermann is a 63 y o  female  HPI 64-year old female coming in for a follow up office visit regarding type 2 diabetes, hypertension, hyperlipidemia and hypertriglyceridemia; The patient reports me compliant taking medications without untoward side effects the  The patient is here to review his medical condition, update me on the medical condition and the patient reports me no hospitalizations and no ER visits  She reports me her diet could be better and less exercise she has gained a little bit of weight  The following portions of the patient's history were reviewed and updated as appropriate: allergies, current medications, past family history, past medical history, past social history, past surgical history and problem list     Review of Systems   Constitutional: Negative for activity change, appetite change and unexpected weight change  HENT: Negative for congestion and postnasal drip  Respiratory: Negative for cough and shortness of breath  Cardiovascular: Negative for chest pain  Gastrointestinal: Negative for abdominal pain, diarrhea, nausea and vomiting  Neurological: Negative for dizziness, light-headedness and headaches  Objective:    No follow-ups on file  No results found        Allergies   Allergen Reactions    Depakote Er [Divalproex Sodium Er]      pancreatitis    Tanzeum [Albiglutide]      pancreatitis    Bee Venom  Carbamazepine Other (See Comments)     Reaction Date:Unknown    Clarithromycin Itching    Shellfish-Derived Products     Lamotrigine Rash       Past Medical History:   Diagnosis Date    Abdominal pain     started 7/21/16- found something on the pancreas    Abnormal weight loss     last assessed [id number]    Acute sinusitis     Allergic rhinitis     last assessed [id number]    Anemia     Asthma     Cardiac murmur     Cellulitis     Cervical disc disease     Colon polyp     Degeneration of thoracic intervertebral disc     Degenerative arthritis of knee     Diabetes mellitus ([facility]     Diverticulosis     Esophagitis, reflux     last assessed [id number]    Excessive sweating     Fatty liver     Gait disturbance     Granuloma annulare     Head injury     Hyperlipidemia     Hypertension     Hypertensive heart disease     Hypoglycemia     last assessed [id number]    Leg length discrepancy     Leucocytosis     Multiple thyroid nodules     Neuropathy     Obesity     last assessed [id number]    Paronychia of toe     Patellofemoral dysfunction     Postmenopausal atrophic vaginitis     Prepatellar bursitis     Sebaceous cyst     last assessed [id number]    Sleep apnea     no CPAP used    Suicide attempt ([facility] )     Tinea corporis     Type 2 diabetes mellitus ([facility] )     cont meds and monitor BS;  last assessed [id number]    Urinary frequency     Vertigo     Vitamin D deficiency     Dia-Parkinson-White syndrome      Past Surgical History:   Procedure Laterality Date    COLONOSCOPY      ESOPHAGOGASTRODUODENOSCOPY N/A 8/8/2016    Procedure: ESOPHAGOGASTRODUODENOSCOPY (EGD); Surgeon: Richa Weaver MD;  Location: [facility] GI [facility]; Service:     HI ESOPHAGOGASTRODUODENOSCOPY TRANSORAL DIAGNOSTIC N/A 3/28/2019    Procedure: ESOPHAGOGASTRODUODENOSCOPY (EGD); Surgeon: Richa Weaver MD;  Location: [facility];   Service: Gastroenterology    TONSILLECTOMY      TOOTH EXTRACTION      wisdom teeth removed     Current Outpatient Medications on File Prior to Visit   Medication Sig Dispense Refill    atorvastatin (LIPITOR) 20 mg tablet TAKE 1 TABLET BY MOUTH EVERY DAY 90 tablet 1    Cholecalciferol 4000 units CAPS Take 1 capsule (4,000 Units total) by mouth daily (Patient taking differently: Take 5,000 Units by mouth daily )      fenofibrate (TRICOR) 145 mg tablet Take 1 tablet (145 mg total) by mouth daily 90 tablet 1    FLUoxetine (PROzac) 20 mg capsule Take 1 capsule (20 mg total) by mouth daily Take with the 40 mg cap for a total dose of 60 mg daily 90 capsule 1    FLUoxetine (PROzac) 40 MG capsule Take 1 capsule (40 mg total) by mouth daily Take with the 20 mg cap for a total dose of 60 mg daily 90 capsule 1    lisinopril (ZESTRIL) 5 mg tablet TAKE 1 TABLET BY MOUTH EVERY DAY 90 tablet 2    lurasidone (Latuda) 80 mg tablet Take 1 tablet (80 mg total) by mouth daily with dinner 30 tablet 4    Omega 3 1200 MG CAPS Take 1,200 mg by mouth 2 (two) times a day        omeprazole (PriLOSEC) 40 MG capsule Take 1 capsule (40 mg total) by mouth daily 30 capsule 5    polyethylene glycol (MIRALAX) 17 g packet Take 17 g by mouth daily       No current facility-administered medications on file prior to visit        Family History   Problem Relation Age of Onset   Edenilson Cathy Cancer Mother         lung    Heart disease Mother     Coronary artery disease Mother         CABG    Diabetes Mother     Lung cancer Mother 76        smoker    Stroke Mother     Thyroid disease Mother     Other Mother         tobacco use    Parkinsonism Father     Intestinal polyp Father     Bipolar disorder Father     Heart disease Father     Prostate cancer Father 62    Hypertension Sister     Alcohol abuse Sister     Depression Brother     Diabetes Maternal Grandmother     Thyroid disease Maternal Grandmother     Hypertension Family     Suicide Attempts Paternal Aunt     Depression Paternal Aunt     Endometrial cancer Maternal Aunt         unknown age   Nic Polio Ovarian cancer Cousin 36        maternal     Social History     Socioeconomic History    Marital status: /Civil Union     Spouse name: Not on file    Number of children: 0    Years of education: 15    Highest education level: 12th grade   Occupational History    Occupation: retired   Social Needs    Financial resource strain: Not hard at all   Mignon-Deb insecurity     Worry: Never true     Inability: Never true   [employer] needs     Medical: No     Non-medical: No   Tobacco Use    Smoking status: Never Smoker    Smokeless tobacco: Never Used   Substance and Sexual Activity    Alcohol use: Not Currently     Alcohol/week: 1 0 standard drinks     Types: 1 Glasses of wine per week     Frequency: Monthly or less     Drinks per session: 1 or 2     Binge frequency: Never     Comment: rarely    Drug use: No    Sexual activity: Not Currently     Partners: Male   Lifestyle    Physical activity     Days per week: 0 days     Minutes per session: 0 min    Stress:  Only a little   Relationships    Social connections     Talks on phone: Once a week     Gets together: Once a week     Attends Nondenominational service: Never     Active member of club or organization: Yes     Attends meetings of clubs or organizations: More than 4 times per year     Relationship status:     Intimate partner violence     Fear of current or ex partner: No     Emotionally abused: No     Physically abused: No     Forced sexual activity: No   Other Topics Concern    Not on file   Social History Narrative    Education: high school graduate    Learning Disabilities: none    Marital History:     Children: none    Living Arrangement: lives in home with     Occupational History: retired, 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[place] [address]: good support system,  is supportive    Legal History: none     History: None Caffeine use    Denied home environment domestic violence    Job physical requirements heavy lifting     Vitals:    08/12/20 1153   BP: 124/64   Pulse: 68   Resp: 16   Temp: 98 6 °F (37 °C)   TempSrc: Temporal   SpO2: 96%   Weight: 78 8 kg (173 lb 12 8 oz)   Height: 5' 4" (1 626 m)     Results for orders placed or performed in visit on 02/43/85   Basic metabolic panel   Result Value Ref Range    Glucose, Random 98 65 - 99 mg/dL    BUN 26 (H) 7 - 25 mg/dL    Creatinine 0 84 0 50 - 1 05 mg/dL    eGFR Non  77 > OR = 60 mL/min/1 73m2    eGFR  89 > OR = 60 mL/min/1 73m2    SL AMB BUN/CREATININE RATIO 31 (H) 6 - 22 (calc)    Sodium 139 135 - 146 mmol/L    Potassium 4 9 3 5 - 5 3 mmol/L    Chloride 102 98 - 110 mmol/L    CO2 28 20 - 32 mmol/L    Calcium 10 1 8 6 - 10 4 mg/dL   Vitamin D 25 hydroxy   Result Value Ref Range    Vitamin D, 25-Hydroxy, Serum 46 30 - 100 ng/mL   Hemoglobin A1c (w/out EAG)   Result Value Ref Range    Hemoglobin A1C 5 7 (H) <5 7 % of total Hgb     Weight (last 2 days)     Date/Time   Weight    08/12/20 1153   78 8 (173 8)            Body mass index is 29 83 kg/m²  BP      Temp      Pulse     Resp      SpO2        Vitals:    08/12/20 1153   Weight: 78 8 kg (173 lb 12 8 oz)     Vitals:    08/12/20 1153   Weight: 78 8 kg (173 lb 12 8 oz)       /64   Pulse 68   Temp 98 6 °F (37 °C) (Temporal)   Resp 16   Ht 5' 4" (1 626 m)   Wt 78 8 kg (173 lb 12 8 oz)   SpO2 96%   BMI 29 83 kg/m²          Physical Exam  Constitutional:       Appearance: She is well-developed  HENT:      Head: Normocephalic  Eyes:      General: No scleral icterus  Right eye: No discharge  Left eye: No discharge  Conjunctiva/sclera: Conjunctivae normal       Pupils: Pupils are equal, round, and reactive to light  Neck:      Musculoskeletal: Neck supple  Cardiovascular:      Rate and Rhythm: Normal rate and regular rhythm        Pulses: no weak pulses Dorsalis pedis pulses are 2+ on the right side and 2+ on the left side  Posterior tibial pulses are 2+ on the right side and 2+ on the left side  Heart sounds: Normal heart sounds  No murmur  No friction rub  No gallop  Pulmonary:      Effort: No respiratory distress  Breath sounds: Normal breath sounds  No wheezing or rales  Abdominal:      General: Bowel sounds are normal  There is no distension  Palpations: Abdomen is soft  There is no mass  Tenderness: There is no abdominal tenderness  There is no guarding or rebound  Musculoskeletal:         General: No deformity  Feet:      Right foot:      Skin integrity: No ulcer, skin breakdown, erythema, warmth, callus or dry skin  Left foot:      Skin integrity: No ulcer, skin breakdown, erythema, warmth, callus or dry skin  Lymphadenopathy:      Cervical: No cervical adenopathy  Neurological:      Mental Status: She is alert  Coordination: Coordination normal      Patient's shoes and socks removed  Right Foot/Ankle   Right Foot Inspection  Skin Exam: skin normal and skin intact no dry skin, no warmth, no callus, no erythema, no maceration, no abnormal color, no pre-ulcer, no ulcer and no callus                          Toe Exam: ROM and strength within normal limits  Sensory       Monofilament testing: intact  Vascular    The right DP pulse is 2+  The right PT pulse is 2+  Left Foot/Ankle  Left Foot Inspection  Skin Exam: skin normal and skin intactno dry skin, no warmth, no erythema, no maceration, normal color, no pre-ulcer, no ulcer and no callus                         Toe Exam: ROM and strength within normal limits                   Sensory       Monofilament: intact  Vascular    The left DP pulse is 2+  The left PT pulse is 2+  Assign Risk Category:  No deformity present; No loss of protective sensation;  No weak pulses       Risk: 0

## 2020-08-12 NOTE — ASSESSMENT & PLAN NOTE
Lab Results   Component Value Date    HGBA1C 5 7 (H) 06/19/2020   I have counselled the pt to follow a healthy and balanced diet ,and recommend routine exercise  I will be ordering diabetic laboratories including comprehensive metabolic panel, hemoglobin A1c, urine microalbumin, lipid panel    Foot examination completed

## 2020-08-12 NOTE — ASSESSMENT & PLAN NOTE
Hyperlipidemia controlled continue with current medical regiment recommend a low-cholesterol diet and recommend routine exercise we will continue to monitor the progress    Continue Lipitor 20 mg once a day

## 2020-08-26 DIAGNOSIS — K29.80 DUODENITIS: ICD-10-CM

## 2020-08-26 DIAGNOSIS — R10.13 EPIGASTRIC PAIN: ICD-10-CM

## 2020-08-26 RX ORDER — OMEPRAZOLE 40 MG/1
40 CAPSULE, DELAYED RELEASE ORAL DAILY
Qty: 30 CAPSULE | Refills: 1 | Status: SHIPPED | OUTPATIENT
Start: 2020-08-26 | End: 2020-09-24

## 2020-08-26 NOTE — TELEPHONE ENCOUNTER
Refill sent to provider for approval    Clerical:  Please reach out to patient and schedule a follow up for her   She has not been seen in a year and we cannot prescribe more omeprazole if she isn't seen

## 2020-08-28 ENCOUNTER — TELEPHONE (OUTPATIENT)
Dept: GASTROENTEROLOGY | Facility: AMBULARY SURGERY CENTER | Age: 60
End: 2020-08-28

## 2020-09-02 ENCOUNTER — TELEPHONE (OUTPATIENT)
Dept: GASTROENTEROLOGY | Facility: AMBULARY SURGERY CENTER | Age: 60
End: 2020-09-02

## 2020-09-02 NOTE — TELEPHONE ENCOUNTER
Received message on voice mail from patient regarding Omeprazole refill  Omeprazole refill was sent to [facility] pharmacy 8/26/20  I called and confirmed with [facility] staff script was received and patient can   Patient is aware Omeprazole script sent  **clerical -please contact patient to schedule follow up visit    Thank you

## 2020-09-24 DIAGNOSIS — K29.80 DUODENITIS: ICD-10-CM

## 2020-09-24 DIAGNOSIS — R10.13 EPIGASTRIC PAIN: ICD-10-CM

## 2020-09-24 RX ORDER — OMEPRAZOLE 40 MG/1
CAPSULE, DELAYED RELEASE ORAL
Qty: 30 CAPSULE | Refills: 1 | Status: SHIPPED | OUTPATIENT
Start: 2020-09-24 | End: 2020-10-05 | Stop reason: SDUPTHER

## 2020-10-05 ENCOUNTER — OFFICE VISIT (OUTPATIENT)
Dept: GASTROENTEROLOGY | Facility: AMBULARY SURGERY CENTER | Age: 60
End: 2020-10-05
Payer: COMMERCIAL

## 2020-10-05 VITALS — HEIGHT: 64 IN | TEMPERATURE: 96.4 F | BODY MASS INDEX: 31.55 KG/M2 | WEIGHT: 184.8 LBS

## 2020-10-05 DIAGNOSIS — K29.80 DUODENITIS: ICD-10-CM

## 2020-10-05 DIAGNOSIS — K21.9 GASTROESOPHAGEAL REFLUX DISEASE, UNSPECIFIED WHETHER ESOPHAGITIS PRESENT: Primary | ICD-10-CM

## 2020-10-05 DIAGNOSIS — R10.13 EPIGASTRIC PAIN: ICD-10-CM

## 2020-10-05 DIAGNOSIS — Z86.010 HISTORY OF COLON POLYPS: ICD-10-CM

## 2020-10-05 PROBLEM — Z86.0100 HISTORY OF COLON POLYPS: Status: ACTIVE | Noted: 2020-10-05

## 2020-10-05 PROCEDURE — 99213 OFFICE O/P EST LOW 20 MIN: CPT | Performed by: PHYSICIAN ASSISTANT

## 2020-10-05 RX ORDER — OMEPRAZOLE 40 MG/1
40 CAPSULE, DELAYED RELEASE ORAL DAILY
Qty: 30 CAPSULE | Refills: 5 | Status: SHIPPED | OUTPATIENT
Start: 2020-10-05 | End: 2021-03-22

## 2020-10-05 RX ORDER — FAMOTIDINE 20 MG/1
20 TABLET, FILM COATED ORAL DAILY
Qty: 30 TABLET | Refills: 5 | Status: SHIPPED | OUTPATIENT
Start: 2020-10-05 | End: 2020-12-08 | Stop reason: ALTCHOICE

## 2020-10-13 ENCOUNTER — TELEPHONE (OUTPATIENT)
Dept: PSYCHIATRY | Facility: CLINIC | Age: 60
End: 2020-10-13

## 2020-10-21 ENCOUNTER — OFFICE VISIT (OUTPATIENT)
Dept: PSYCHIATRY | Facility: CLINIC | Age: 60
End: 2020-10-21
Payer: COMMERCIAL

## 2020-10-21 VITALS — HEIGHT: 64 IN | WEIGHT: 183 LBS | BODY MASS INDEX: 31.24 KG/M2

## 2020-10-21 DIAGNOSIS — Z79.899 LONG-TERM USE OF HIGH-RISK MEDICATION: Chronic | ICD-10-CM

## 2020-10-21 DIAGNOSIS — F41.1 GAD (GENERALIZED ANXIETY DISORDER): Chronic | ICD-10-CM

## 2020-10-21 DIAGNOSIS — F31.76 BIPOLAR I DISORDER, MOST RECENT EPISODE DEPRESSED, IN FULL REMISSION (HCC): Primary | Chronic | ICD-10-CM

## 2020-10-21 DIAGNOSIS — F60.9 PERSONALITY DISORDER (HCC): Chronic | ICD-10-CM

## 2020-10-21 PROCEDURE — 90833 PSYTX W PT W E/M 30 MIN: CPT | Performed by: PSYCHIATRY & NEUROLOGY

## 2020-10-21 PROCEDURE — 1036F TOBACCO NON-USER: CPT | Performed by: PSYCHIATRY & NEUROLOGY

## 2020-10-21 PROCEDURE — 99214 OFFICE O/P EST MOD 30 MIN: CPT | Performed by: PSYCHIATRY & NEUROLOGY

## 2020-10-21 RX ORDER — ARIPIPRAZOLE 10 MG/1
TABLET ORAL
Qty: 30 TABLET | Refills: 2 | Status: SHIPPED | OUTPATIENT
Start: 2020-10-21 | End: 2020-12-08 | Stop reason: SDUPTHER

## 2020-10-23 DIAGNOSIS — E11.9 CONTROLLED TYPE 2 DIABETES MELLITUS WITHOUT COMPLICATION, WITHOUT LONG-TERM CURRENT USE OF INSULIN (HCC): ICD-10-CM

## 2020-10-23 DIAGNOSIS — E78.5 DYSLIPIDEMIA: ICD-10-CM

## 2020-10-26 RX ORDER — FENOFIBRATE 145 MG/1
TABLET, COATED ORAL
Qty: 90 TABLET | Refills: 0 | Status: SHIPPED | OUTPATIENT
Start: 2020-10-26 | End: 2021-02-16 | Stop reason: SDUPTHER

## 2020-10-29 ENCOUNTER — HOSPITAL ENCOUNTER (OUTPATIENT)
Dept: MAMMOGRAPHY | Facility: HOSPITAL | Age: 60
Discharge: HOME | End: 2020-10-29
Attending: OBSTETRICS & GYNECOLOGY
Payer: COMMERCIAL

## 2020-10-29 ENCOUNTER — OFFICE VISIT (OUTPATIENT)
Dept: LAB | Facility: CLINIC | Age: 60
End: 2020-10-29
Payer: COMMERCIAL

## 2020-10-29 ENCOUNTER — TRANSCRIBE ORDERS (OUTPATIENT)
Dept: LAB | Facility: CLINIC | Age: 60
End: 2020-10-29

## 2020-10-29 VITALS — BODY MASS INDEX: 31.24 KG/M2 | WEIGHT: 183 LBS | HEIGHT: 64 IN

## 2020-10-29 DIAGNOSIS — Z79.899 LONG-TERM USE OF HIGH-RISK MEDICATION: Chronic | ICD-10-CM

## 2020-10-29 DIAGNOSIS — F31.76 BIPOLAR I DISORDER, MOST RECENT EPISODE DEPRESSED, IN FULL REMISSION (HCC): Chronic | ICD-10-CM

## 2020-10-29 DIAGNOSIS — Z12.39 BREAST CANCER SCREENING: ICD-10-CM

## 2020-10-29 LAB
ATRIAL RATE: 61 BPM
P AXIS: 57 DEGREES
PR INTERVAL: 164 MS
QRS AXIS: 23 DEGREES
QRSD INTERVAL: 76 MS
QT INTERVAL: 430 MS
QTC INTERVAL: 432 MS
T WAVE AXIS: 58 DEGREES
VENTRICULAR RATE: 61 BPM

## 2020-10-29 PROCEDURE — 93005 ELECTROCARDIOGRAM TRACING: CPT

## 2020-10-29 PROCEDURE — 77063 BREAST TOMOSYNTHESIS BI: CPT

## 2020-10-29 PROCEDURE — 77067 SCR MAMMO BI INCL CAD: CPT

## 2020-10-29 PROCEDURE — 93010 ELECTROCARDIOGRAM REPORT: CPT | Performed by: INTERNAL MEDICINE

## 2020-11-06 ENCOUNTER — TELEPHONE (OUTPATIENT)
Dept: GASTROENTEROLOGY | Facility: AMBULARY SURGERY CENTER | Age: 60
End: 2020-11-06

## 2020-12-08 ENCOUNTER — OFFICE VISIT (OUTPATIENT)
Dept: PSYCHIATRY | Facility: CLINIC | Age: 60
End: 2020-12-08
Payer: COMMERCIAL

## 2020-12-08 VITALS — WEIGHT: 182 LBS | HEIGHT: 64 IN | BODY MASS INDEX: 31.07 KG/M2

## 2020-12-08 DIAGNOSIS — F41.1 GAD (GENERALIZED ANXIETY DISORDER): Chronic | ICD-10-CM

## 2020-12-08 DIAGNOSIS — Z79.899 LONG-TERM USE OF HIGH-RISK MEDICATION: Chronic | ICD-10-CM

## 2020-12-08 DIAGNOSIS — F60.9 PERSONALITY DISORDER (HCC): Chronic | ICD-10-CM

## 2020-12-08 DIAGNOSIS — F31.76 BIPOLAR I DISORDER, MOST RECENT EPISODE DEPRESSED, IN FULL REMISSION (HCC): Primary | Chronic | ICD-10-CM

## 2020-12-08 PROCEDURE — 99214 OFFICE O/P EST MOD 30 MIN: CPT | Performed by: PSYCHIATRY & NEUROLOGY

## 2020-12-08 PROCEDURE — 90833 PSYTX W PT W E/M 30 MIN: CPT | Performed by: PSYCHIATRY & NEUROLOGY

## 2020-12-08 PROCEDURE — 3008F BODY MASS INDEX DOCD: CPT | Performed by: PSYCHIATRY & NEUROLOGY

## 2020-12-08 PROCEDURE — 1036F TOBACCO NON-USER: CPT | Performed by: PSYCHIATRY & NEUROLOGY

## 2020-12-08 RX ORDER — ARIPIPRAZOLE 10 MG/1
5 TABLET ORAL EVERY EVENING
Qty: 45 TABLET | Refills: 1 | Status: SHIPPED | OUTPATIENT
Start: 2020-12-08 | End: 2021-02-08 | Stop reason: SDUPTHER

## 2020-12-08 RX ORDER — FLUOXETINE HYDROCHLORIDE 40 MG/1
40 CAPSULE ORAL DAILY
Qty: 90 CAPSULE | Refills: 1 | Status: SHIPPED | OUTPATIENT
Start: 2020-12-08 | End: 2021-06-08 | Stop reason: SDUPTHER

## 2020-12-08 RX ORDER — FLUOXETINE HYDROCHLORIDE 20 MG/1
20 CAPSULE ORAL DAILY
Qty: 90 CAPSULE | Refills: 1 | Status: SHIPPED | OUTPATIENT
Start: 2020-12-08 | End: 2021-06-08 | Stop reason: SDUPTHER

## 2020-12-15 LAB
BUN SERPL-MCNC: 17 MG/DL (ref 7–25)
BUN/CREAT SERPL: ABNORMAL (CALC) (ref 6–22)
CALCIUM SERPL-MCNC: 9.7 MG/DL (ref 8.6–10.4)
CHLORIDE SERPL-SCNC: 104 MMOL/L (ref 98–110)
CO2 SERPL-SCNC: 29 MMOL/L (ref 20–32)
CREAT SERPL-MCNC: 0.76 MG/DL (ref 0.5–1.05)
GLUCOSE SERPL-MCNC: 105 MG/DL (ref 65–99)
HBA1C MFR BLD: 5.8 % OF TOTAL HGB
POTASSIUM SERPL-SCNC: 4.6 MMOL/L (ref 3.5–5.3)
SL AMB EGFR AFRICAN AMERICAN: 100 ML/MIN/1.73M2
SL AMB EGFR NON AFRICAN AMERICAN: 86 ML/MIN/1.73M2
SODIUM SERPL-SCNC: 141 MMOL/L (ref 135–146)

## 2020-12-15 PROCEDURE — 3044F HG A1C LEVEL LT 7.0%: CPT | Performed by: PSYCHIATRY & NEUROLOGY

## 2020-12-24 DIAGNOSIS — E78.5 DYSLIPIDEMIA: ICD-10-CM

## 2020-12-24 DIAGNOSIS — I10 BENIGN ESSENTIAL HYPERTENSION: ICD-10-CM

## 2020-12-24 PROCEDURE — 4010F ACE/ARB THERAPY RXD/TAKEN: CPT | Performed by: INTERNAL MEDICINE

## 2020-12-24 RX ORDER — LISINOPRIL 5 MG/1
TABLET ORAL
Qty: 90 TABLET | Refills: 2 | Status: SHIPPED | OUTPATIENT
Start: 2020-12-24 | End: 2021-02-16

## 2020-12-26 LAB
LEFT EYE DIABETIC RETINOPATHY: NORMAL
RIGHT EYE DIABETIC RETINOPATHY: NORMAL

## 2020-12-28 RX ORDER — ATORVASTATIN CALCIUM 20 MG/1
TABLET, FILM COATED ORAL
Qty: 90 TABLET | Refills: 0 | Status: SHIPPED | OUTPATIENT
Start: 2020-12-28 | End: 2021-01-28 | Stop reason: SDUPTHER

## 2020-12-31 ENCOUNTER — OFFICE VISIT (OUTPATIENT)
Dept: ENDOCRINOLOGY | Facility: CLINIC | Age: 60
End: 2020-12-31
Payer: COMMERCIAL

## 2020-12-31 VITALS
BODY MASS INDEX: 31.07 KG/M2 | SYSTOLIC BLOOD PRESSURE: 148 MMHG | DIASTOLIC BLOOD PRESSURE: 80 MMHG | HEIGHT: 64 IN | HEART RATE: 76 BPM | TEMPERATURE: 98.1 F | WEIGHT: 182 LBS

## 2020-12-31 DIAGNOSIS — E11.9 CONTROLLED TYPE 2 DIABETES MELLITUS WITHOUT COMPLICATION, WITHOUT LONG-TERM CURRENT USE OF INSULIN (HCC): Primary | ICD-10-CM

## 2020-12-31 DIAGNOSIS — E04.2 MULTIPLE THYROID NODULES: ICD-10-CM

## 2020-12-31 DIAGNOSIS — E78.1 HYPERTRIGLYCERIDEMIA: ICD-10-CM

## 2020-12-31 DIAGNOSIS — E11.65 TYPE 2 DIABETES MELLITUS WITH HYPERGLYCEMIA, WITHOUT LONG-TERM CURRENT USE OF INSULIN (HCC): ICD-10-CM

## 2020-12-31 DIAGNOSIS — E55.9 VITAMIN D DEFICIENCY: ICD-10-CM

## 2020-12-31 PROCEDURE — 3008F BODY MASS INDEX DOCD: CPT | Performed by: INTERNAL MEDICINE

## 2020-12-31 PROCEDURE — 3077F SYST BP >= 140 MM HG: CPT | Performed by: INTERNAL MEDICINE

## 2020-12-31 PROCEDURE — 1036F TOBACCO NON-USER: CPT | Performed by: INTERNAL MEDICINE

## 2020-12-31 PROCEDURE — 3079F DIAST BP 80-89 MM HG: CPT | Performed by: INTERNAL MEDICINE

## 2020-12-31 PROCEDURE — 99213 OFFICE O/P EST LOW 20 MIN: CPT | Performed by: INTERNAL MEDICINE

## 2021-01-07 ENCOUNTER — VBI (OUTPATIENT)
Dept: ADMINISTRATIVE | Facility: OTHER | Age: 61
End: 2021-01-07

## 2021-01-18 DIAGNOSIS — F31.76 BIPOLAR I DISORDER, MOST RECENT EPISODE DEPRESSED, IN FULL REMISSION (HCC): Chronic | ICD-10-CM

## 2021-01-18 RX ORDER — ARIPIPRAZOLE 10 MG/1
TABLET ORAL
Qty: 30 TABLET | Refills: 2 | OUTPATIENT
Start: 2021-01-18

## 2021-01-23 DIAGNOSIS — F31.76 BIPOLAR I DISORDER, MOST RECENT EPISODE DEPRESSED, IN FULL REMISSION (HCC): Chronic | ICD-10-CM

## 2021-01-23 RX ORDER — ARIPIPRAZOLE 10 MG/1
TABLET ORAL
Qty: 30 TABLET | Refills: 2 | OUTPATIENT
Start: 2021-01-23

## 2021-01-26 LAB
ALBUMIN SERPL-MCNC: 4.7 G/DL (ref 3.6–5.1)
ALBUMIN/GLOB SERPL: 1.9 (CALC) (ref 1–2.5)
ALP SERPL-CCNC: 65 U/L (ref 37–153)
ALT SERPL-CCNC: 16 U/L (ref 6–29)
AST SERPL-CCNC: 14 U/L (ref 10–35)
BILIRUB SERPL-MCNC: 0.3 MG/DL (ref 0.2–1.2)
BUN SERPL-MCNC: 23 MG/DL (ref 7–25)
BUN/CREAT SERPL: ABNORMAL (CALC) (ref 6–22)
CALCIUM SERPL-MCNC: 9.7 MG/DL (ref 8.6–10.4)
CHLORIDE SERPL-SCNC: 103 MMOL/L (ref 98–110)
CHOLEST SERPL-MCNC: 174 MG/DL
CHOLEST/HDLC SERPL: 3.2 (CALC)
CO2 SERPL-SCNC: 25 MMOL/L (ref 20–32)
CREAT SERPL-MCNC: 0.74 MG/DL (ref 0.5–1.05)
GLOBULIN SER CALC-MCNC: 2.5 G/DL (CALC) (ref 1.9–3.7)
GLUCOSE SERPL-MCNC: 125 MG/DL (ref 65–99)
HBA1C MFR BLD: 6.2 % OF TOTAL HGB
HDLC SERPL-MCNC: 55 MG/DL
LDLC SERPL CALC-MCNC: 88 MG/DL (CALC)
NONHDLC SERPL-MCNC: 119 MG/DL (CALC)
POTASSIUM SERPL-SCNC: 4.8 MMOL/L (ref 3.5–5.3)
PROT SERPL-MCNC: 7.2 G/DL (ref 6.1–8.1)
SL AMB EGFR AFRICAN AMERICAN: 103 ML/MIN/1.73M2
SL AMB EGFR NON AFRICAN AMERICAN: 89 ML/MIN/1.73M2
SODIUM SERPL-SCNC: 139 MMOL/L (ref 135–146)
TRIGL SERPL-MCNC: 217 MG/DL

## 2021-01-26 PROCEDURE — 3044F HG A1C LEVEL LT 7.0%: CPT | Performed by: INTERNAL MEDICINE

## 2021-01-28 DIAGNOSIS — E78.5 DYSLIPIDEMIA: ICD-10-CM

## 2021-01-28 RX ORDER — ATORVASTATIN CALCIUM 20 MG/1
20 TABLET, FILM COATED ORAL DAILY
Qty: 90 TABLET | Refills: 0 | Status: SHIPPED | OUTPATIENT
Start: 2021-01-28 | End: 2021-04-20 | Stop reason: SDUPTHER

## 2021-01-31 NOTE — PSYCH
MEDICATION MANAGEMENT NOTE        [facility]      Name and Date of Birth:  Igor Aguirre 62 y o  1960 MRN: [id number]    Date of Visit: February 8, 2021    Reason for Visit:   Chief Complaint   Patient presents with    Medication Management    Follow-up       SUBJECTIVE:    Benito Zamora is seen today for a follow up for Bipolar Disorder, Generalized Anxiety Disorder and personality disorder  She has done very well since the last visit  She states that mood continues to be stable, denies any significant depressive symptoms or manic symptoms  She reports that anxiety symptoms are controlled  She has been coping fairly well with COVID-19 pandemic "I got a dog  He makes me laugh"  She denies any suicidal ideation, intent or plan at present; denies any homicidal ideation, intent or plan at present  She has no auditory hallucinations, denies any visual hallucinations, has no delusional thinking  She denies any side effects from current psychiatric medications  HPI ROS Appetite Changes and Sleep:     She reports normal sleep, adequate number of sleep hours (8 to 9 hours), normal appetite, recent weight gain (12 lbs), normal energy level    Current Rating Scores:     Not Applicable  Review Of Systems:      Constitutional recent weight gain (12 lbs)   ENT negative   Cardiovascular negative   Respiratory negative   Gastrointestinal negative   Genitourinary negative   Musculoskeletal neck pain   Integumentary negative   Neurological negative   Endocrine negative   Other Symptoms none, all other systems are negative       Past Psychiatric History: (unchanged information from previous note copied and updated)    Past Inpatient Psychiatric Treatment:   One past inpatient psychiatric admission at [place] [address]  Past Outpatient Psychiatric Treatment:    In outpatient treatment at [address] [place] [address] E for many years    Past Suicide Attempts: yes, 2 attempts by overdose years ago  Past Violent Behavior: no  Past Psychiatric Medication Trials: Prozac, Depakote, Lamictal (rash), Tegretol, Abilify and Latuda    Traumatic History: (unchanged information from previous note copied and updated)    Abuse: no history of sexual abuse, no history of physical abuse  Other Traumatic Events: none     Past Medical History:    Past Medical History:   Diagnosis Date    Abdominal pain     started 7/21/16- found something on the pancreas    Abnormal weight loss     last assessed [id number]    Acute sinusitis     Allergic rhinitis     last assessed [id number]    Anemia     Asthma     Cardiac murmur     Cellulitis     Cervical disc disease     Colon polyp     Degeneration of thoracic intervertebral disc     Degenerative arthritis of knee     Diabetes mellitus ([facility] )     Diverticulosis     Esophagitis, reflux     last assessed [id number]    Excessive sweating     Fatty liver     Gait disturbance     Granuloma annulare     Head injury     Hyperlipidemia     Hypertension     Hypertensive heart disease     Hypoglycemia     last assessed [id number]    Leg length discrepancy     Leucocytosis     Multiple thyroid nodules     Neuropathy     Obesity     last assessed [id number]    Paronychia of toe     Patellofemoral dysfunction     Postmenopausal atrophic vaginitis     Prepatellar bursitis     Sebaceous cyst     last assessed [id number]    Sleep apnea     no CPAP used    Suicide attempt ([facility]     Tinea corporis     Type 2 diabetes mellitus ([facility] )     cont meds and monitor BS;  last assessed [id number]    Urinary frequency     Vertigo     Vitamin D deficiency     Dia-Parkinson-White syndrome      Past Medical History Pertinent Negatives:   Diagnosis Date Noted    Colon cancer ([facility] ) 08/20/2019    CPAP (continuous positive airway pressure) dependence 03/28/2019     Past Surgical History:   Procedure Laterality Date    COLONOSCOPY      ESOPHAGOGASTRODUODENOSCOPY N/A 8/8/2016    Procedure: ESOPHAGOGASTRODUODENOSCOPY (EGD); Surgeon: Joey Collier MD;  Location: [facility] GI LAB; Service:     MT ESOPHAGOGASTRODUODENOSCOPY TRANSORAL DIAGNOSTIC N/A 3/28/2019    Procedure: ESOPHAGOGASTRODUODENOSCOPY (EGD); Surgeon: Joey Collier MD;  Location: [facility] GI [facility];   Service: Gastroenterology    TONSILLECTOMY      TOOTH EXTRACTION      wisdom teeth removed     Allergies   Allergen Reactions    Depakote Er [Divalproex Sodium Er]      pancreatitis    Tanzeum [Albiglutide]      pancreatitis    Bee Venom     Carbamazepine Other (See Comments)     Reaction Date:Unknown    Clarithromycin Itching    Shellfish-Derived Products     Lamotrigine Rash       Substance Abuse History:    Social History     Substance and Sexual Activity   Alcohol Use Not Currently    Alcohol/week: 1 0 standard drinks    Types: 1 Glasses of wine per week    Frequency: Monthly or less    Drinks per session: 1 or 2    Binge frequency: Never    Comment: rarely     Social History     Substance and Sexual Activity   Drug Use No       Social History:    Social History     Socioeconomic History    Marital status: /Civil Union     Spouse name: Not on file    Number of children: 0    Years of education: 15    Highest education level: 12th grade   Occupational History    Occupation: retired   Social Needs    Financial resource strain: Not hard at all   [employer] insecurity     Worry: Never true     Inability: Never true    Transportation needs     Medical: No     Non-medical: No   Tobacco Use    Smoking status: Never Smoker    Smokeless tobacco: Never Used   Substance and Sexual Activity    Alcohol use: Not Currently     Alcohol/week: 1 0 standard drinks     Types: 1 Glasses of wine per week     Frequency: Monthly or less     Drinks per session: 1 or 2     Binge frequency: Never     Comment: rarely    Drug use: No    Sexual activity: Not Currently     Partners: Male Lifestyle    Physical activity     Days per week: 0 days     Minutes per session: 0 min    Stress:  Only a little   Relationships    Social connections     Talks on phone: Once a week     Gets together: Once a week     Attends Yarsanism service: Never     Active member of club or organization: Yes     Attends meetings of clubs or organizations: More than 4 times per year     Relationship status:     Intimate partner violence     Fear of current or ex partner: No     Emotionally abused: No     Physically abused: No     Forced sexual activity: No   Other Topics Concern    Not on file   Social History Narrative    Education: high school graduate    Learning Disabilities: none    Marital History:     Children: none    Living Arrangement: lives in home with     Occupational History: retired, 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[place] [address]: good support system,  is supportive    Legal History: none     History: None        Caffeine use    Denied home environment domestic violence    Job physical requirements heavy lifting       Family Psychiatric History:     Family History   Problem Relation Age of Onset    Cancer Mother         lung    Heart disease Mother     Coronary artery disease Mother         CABG    Diabetes Mother     Lung cancer Mother 76        smoker    Stroke Mother     Thyroid disease Mother     Other Mother         tobacco use    Parkinsonism Father     Intestinal polyp Father     Bipolar disorder Father     Heart disease Father     Prostate cancer Father 62    Hypertension Sister     Alcohol abuse Sister     Depression Brother     Heart disease Brother     Diabetes Maternal Grandmother     Thyroid disease Maternal Grandmother     Hypertension Family     Suicide Attempts Paternal Aunt     Depression Paternal Aunt     Endometrial cancer Maternal Aunt         unknown age   Mike Bello Ovarian cancer Cousin 40        maternal       History Review:  The following portions of the patient's history were reviewed and updated as appropriate: allergies, current medications, past family history, past medical history, past social history, past surgical history and problem list          OBJECTIVE:     Vital signs in last 24 hours:    Vitals:    02/08/21 1605   Weight: 88 kg (194 lb)   Height: 5' 4" (1 626 m)       Mental Status Evaluation:    Appearance age appropriate, casually dressed   Behavior cooperative, calm   Speech normal rate, normal volume, normal pitch   Mood euthymic   Affect normal range and intensity, appropriate   Thought Processes organized, goal directed   Associations intact associations   Thought Content no overt delusions   Perceptual Disturbances: no auditory hallucinations, no visual hallucinations   Abnormal Thoughts  Risk Potential Suicidal ideation - None  Homicidal ideation - None  Potential for aggression - No   Orientation oriented to person, place, time/date and situation   Memory recent and remote memory grossly intact   Consciousness alert and awake   Attention Span Concentration Span attention span and concentration are age appropriate   Intellect appears to be of average intelligence   Insight intact   Judgement intact   Muscle Strength and  Gait normal muscle strength and normal muscle tone, normal gait and normal balance   Motor activity no abnormal movements   Language no difficulty naming common objects, no difficulty repeating a phrase, no difficulty writing a sentence   Fund of Knowledge adequate knowledge of current events  adequate fund of knowledge regarding past history  adequate fund of knowledge regarding vocabulary    Pain moderate   Pain Scale 5       Laboratory Results: I have personally reviewed all pertinent laboratory/tests results    Recent Labs (last 4 months):   Orders Only on 01/25/2021   Component Date Value    Total Cholesterol 01/25/2021 174     HDL 01/25/2021 55     Triglycerides 01/25/2021 217*    LDL Calculated 01/25/2021 88     Chol HDLC Ratio 01/25/2021 3 2     Non-HDL Cholesterol 01/25/2021 119     Glucose, Random 01/25/2021 125*    BUN 01/25/2021 23     Creatinine 01/25/2021 0 74     eGFR Non  01/25/2021 89     eGFR  01/25/2021 103     SL AMB BUN/CREATININE RA* 77/07/7787 NOT APPLICABLE     Sodium 15/67/5889 139     Potassium 01/25/2021 4 8     Chloride 01/25/2021 103     CO2 01/25/2021 25     Calcium 01/25/2021 9 7     Protein, Total 01/25/2021 7 2     Albumin 01/25/2021 4 7     Globulin 01/25/2021 2 5     Albumin/Globulin Ratio 01/25/2021 1 9     TOTAL BILIRUBIN 01/25/2021 0 3     Alkaline Phosphatase 01/25/2021 65     AST 01/25/2021 14     ALT 01/25/2021 16     Hemoglobin A1C 01/25/2021 6 2*   Orders Only on 12/26/2020   Component Date Value    Right Eye Diabetic Retin* 12/26/2020 None     Left Eye Diabetic Retino* 12/26/2020 Mild    Orders Only on 12/14/2020   Component Date Value    Glucose, Random 12/14/2020 105*    BUN 12/14/2020 17     Creatinine 12/14/2020 0 76     eGFR Non  12/14/2020 86     eGFR  12/14/2020 100     SL AMB BUN/CREATININE RA* 99/71/7028 NOT APPLICABLE     Sodium 24/86/8860 141     Potassium 12/14/2020 4 6     Chloride 12/14/2020 104     CO2 12/14/2020 29     Calcium 12/14/2020 9 7     Hemoglobin A1C 12/14/2020 5 8*   Office Visit on 10/29/2020   Component Date Value    Ventricular Rate 10/29/2020 61     Atrial Rate 10/29/2020 61     WA Interval 10/29/2020 164     QRSD Interval 10/29/2020 76     QT Interval 10/29/2020 430     QTC Interval 10/29/2020 432     P Axis 10/29/2020 57     QRS Axis 10/29/2020 23     T Wave Axis 10/29/2020 58        Suicide/Homicide Risk Assessment:    Risk of Harm to Self:  Demographic risk factors include: , age: over 48 or older  Historical Risk Factors include: history of anxiety, history of mood disorder, history of suicide attempts  Recent Specific Risk Factors include: diagnosis of mood disorder  Protective Factors: no current suicidal ideation, compliant with medications, compliant with mental health treatment, responsibilities and duties to others, stable living environment, supportive family  Weapons: none  The following steps have been taken to ensure weapons are properly secured: not applicable  Based on today's assessment, Veronica Ayala presents the following risk of harm to self: minimal    Risk of Harm to Others: The following ratings are based on assessment at the time of the interview  Based on today's assessment, Veronica Ayala presents the following risk of harm to others: none    The following interventions are recommended: no intervention changes needed    Assessment/Plan:       Diagnoses and all orders for this visit:    Bipolar I disorder, most recent episode depressed, in full remission ([facility]  -     ARIPiprazole (ABILIFY) 10 mg tablet;  Take 1 tablet (10 mg total) by mouth every evening    LOURDES (generalized anxiety disorder)    Personality disorder (HCC)          Treatment Recommendations/Precautions:    Continue Prozac 60 mg every evening to improve depressive symptoms  Continue Abilify 10 mg every evening to help with mood  Medication management every 4 months  Follows with family physician for glucose and lipid monitoring due to current therapy with antipsychotic medication  Follows with family physician for asthma, diabetes, hyperlipidemia and hypertension  Aware of 24 hour and weekend coverage for urgent situations accessed by calling [facility] main practice number    Medications Risks/Benefits      Risks, Benefits And Possible Side Effects Of Medications:    Risks, benefits, and possible side effects of medications explained to Veronica Ayala including risk of parkinsonian symptoms, Tardive Dyskinesia and metabolic syndrome related to treatment with antipsychotic medications and risk of suicidality and serotonin syndrome related to treatment with antidepressants  She verbalizes understanding and agreement for treatment  Controlled Medication Discussion:     Not applicable    Psychotherapy Provided:     Individual psychotherapy provided: Yes  Counseling was provided during the session today for 16 minutes  Medications, treatment progress and treatment plan reviewed with Williams Grimes  Goals discussed during in session: maintain control of anxiety and maintain mood stability  Discussed with Williams Grimes coping with COVID-19 issues and health issues  Coping strategies including play with dog and taking time for self reviewed with Williams Grimes  Supportive therapy provided  Treatment Plan:    Completed and signed during the session: Yes - Treatment Plan done but not signed at time of office visit due to:  Plan reviewed in person and verbal consent given due to [place] social distancing    Note Share: This note was shared with patient      aMhendra Jaeger MD 02/08/21

## 2021-02-08 ENCOUNTER — OFFICE VISIT (OUTPATIENT)
Dept: PSYCHIATRY | Facility: CLINIC | Age: 61
End: 2021-02-08
Payer: COMMERCIAL

## 2021-02-08 VITALS — BODY MASS INDEX: 33.12 KG/M2 | HEIGHT: 64 IN | WEIGHT: 194 LBS

## 2021-02-08 DIAGNOSIS — F60.9 PERSONALITY DISORDER (HCC): Chronic | ICD-10-CM

## 2021-02-08 DIAGNOSIS — F41.1 GAD (GENERALIZED ANXIETY DISORDER): Chronic | ICD-10-CM

## 2021-02-08 DIAGNOSIS — F31.76 BIPOLAR I DISORDER, MOST RECENT EPISODE DEPRESSED, IN FULL REMISSION (HCC): Primary | Chronic | ICD-10-CM

## 2021-02-08 PROCEDURE — 99213 OFFICE O/P EST LOW 20 MIN: CPT | Performed by: PSYCHIATRY & NEUROLOGY

## 2021-02-08 PROCEDURE — 90833 PSYTX W PT W E/M 30 MIN: CPT | Performed by: PSYCHIATRY & NEUROLOGY

## 2021-02-08 RX ORDER — ARIPIPRAZOLE 10 MG/1
10 TABLET ORAL EVERY EVENING
Qty: 90 TABLET | Refills: 2 | Status: SHIPPED | OUTPATIENT
Start: 2021-02-08 | End: 2021-11-17 | Stop reason: SDUPTHER

## 2021-02-08 NOTE — BH TREATMENT PLAN
TREATMENT PLAN (Medication Management Only)        [facility]    Name/Date of Birth/MRN:  Demetrice Martinez 62 y o  1960 MRN: [id number]  Date of Treatment Plan: February 8, 2021  Diagnosis/Diagnoses:   1  Bipolar I disorder, most recent episode depressed, in full remission ([facility] )    2  LOURDES (generalized anxiety disorder)    3  Personality disorder [facility])      Strengths/Personal Resources for Self-Care: "I am more alert, more awake, I am on top of things"  Area/Areas of need (in own words): "more exercise again"  1  Long Term Goal:   maintain control of anxiety, maintain mood stability  Target Date: 4 months - 6/8/2021  Person/Persons responsible for completion of goal: Clotilde Guevara  2  Short Term Objective (s) - How will we reach this goal?:   A  Provider new recommended medication/dosage changes and/or continue medication(s): continue current medications as prescribed (Prozac and Abilify)  B   N/A   C   N/A  Target Date: 4 months - 6/8/2021  Person/Persons Responsible for Completion of Goal: Clotilde Guevara   Progress Towards Goals: stable  Treatment Modality: medication management every 4 months  Review due 180 days from date of this plan: 6 months - 8/8/2021  Expected length of service: maintenance unless revised  My Physician/PA/NP and I have developed this plan together and I agree to work on the goals and objectives  I understand the treatment goals that were developed for my treatment    Electronic Signatures: on file (unless signed below)    Anna Horvath MD 02/08/21

## 2021-02-16 ENCOUNTER — OFFICE VISIT (OUTPATIENT)
Dept: INTERNAL MEDICINE CLINIC | Facility: CLINIC | Age: 61
End: 2021-02-16
Payer: COMMERCIAL

## 2021-02-16 VITALS
TEMPERATURE: 97.5 F | HEIGHT: 64 IN | DIASTOLIC BLOOD PRESSURE: 70 MMHG | OXYGEN SATURATION: 97 % | BODY MASS INDEX: 32.98 KG/M2 | HEART RATE: 66 BPM | WEIGHT: 193.2 LBS | SYSTOLIC BLOOD PRESSURE: 150 MMHG

## 2021-02-16 DIAGNOSIS — E78.5 DYSLIPIDEMIA: ICD-10-CM

## 2021-02-16 DIAGNOSIS — M25.511 RIGHT SHOULDER PAIN, UNSPECIFIED CHRONICITY: ICD-10-CM

## 2021-02-16 DIAGNOSIS — I10 BENIGN ESSENTIAL HYPERTENSION: ICD-10-CM

## 2021-02-16 DIAGNOSIS — E11.9 CONTROLLED TYPE 2 DIABETES MELLITUS WITHOUT COMPLICATION, WITHOUT LONG-TERM CURRENT USE OF INSULIN (HCC): Primary | ICD-10-CM

## 2021-02-16 DIAGNOSIS — M25.559 HIP PAIN: ICD-10-CM

## 2021-02-16 DIAGNOSIS — M54.12 CERVICAL RADICULOPATHY: ICD-10-CM

## 2021-02-16 PROCEDURE — 3008F BODY MASS INDEX DOCD: CPT | Performed by: INTERNAL MEDICINE

## 2021-02-16 PROCEDURE — 3078F DIAST BP <80 MM HG: CPT | Performed by: INTERNAL MEDICINE

## 2021-02-16 PROCEDURE — 3077F SYST BP >= 140 MM HG: CPT | Performed by: INTERNAL MEDICINE

## 2021-02-16 PROCEDURE — 99214 OFFICE O/P EST MOD 30 MIN: CPT | Performed by: INTERNAL MEDICINE

## 2021-02-16 PROCEDURE — 1036F TOBACCO NON-USER: CPT | Performed by: INTERNAL MEDICINE

## 2021-02-16 PROCEDURE — 4010F ACE/ARB THERAPY RXD/TAKEN: CPT | Performed by: INTERNAL MEDICINE

## 2021-02-16 RX ORDER — FENOFIBRATE 145 MG/1
145 TABLET, COATED ORAL DAILY
Qty: 90 TABLET | Refills: 3 | Status: SHIPPED | OUTPATIENT
Start: 2021-02-16 | End: 2021-06-02 | Stop reason: SDUPTHER

## 2021-02-16 RX ORDER — LISINOPRIL 5 MG/1
5 TABLET ORAL DAILY
Qty: 90 TABLET | Refills: 2 | Status: SHIPPED | OUTPATIENT
Start: 2021-02-16 | End: 2021-11-19

## 2021-02-16 NOTE — ASSESSMENT & PLAN NOTE
Hypertension -  suboptimal control likely secondary to weight gain patient is very motivated to make changes in her diet will continue monitor if blood pressure does not improve will consider increasing medication     will recheck the blood pressure in 2 weeks

## 2021-02-16 NOTE — PROGRESS NOTES
Assessment/Plan:    Controlled type 2 diabetes mellitus without complication ([facility] )    Lab Results   Component Value Date    HGBA1C 6 2 (H) 01/25/2021    I have counselled the pt to follow a healthy and balanced diet ,and recommend routine exercise  I will be ordering diabetic laboratories including comprehensive metabolic panel, hemoglobin A1c, urine microalbumin, lipid panel  I would like her to lose weight    Benign essential hypertension   Hypertension -  suboptimal control likely secondary to weight gain patient is very motivated to make changes in her diet will continue monitor if blood pressure does not improve will consider increasing medication     will recheck the blood pressure in 2 weeks    Cervical radiculopathy   Check x-ray of the cervical spine  Sim William /start physical therapy    Dyslipidemia   Hyperlipidemia controlled continue with current medical regiment recommend a low-cholesterol diet and recommend routine exercise we will continue to monitor the progress  Continue Lipitor 20 mg once daily    Hip pain   Rule out osteoarthritis check x-ray of the hips bilaterally    Right shoulder pain   Suspect rotator cuff tendinitis start physical therapy check x-ray right shoulder         Problem List Items Addressed This Visit        Endocrine    Controlled type 2 diabetes mellitus without complication ([facility] ) - Primary       Lab Results   Component Value Date    HGBA1C 6 2 (H) 01/25/2021    I have counselled the pt to follow a healthy and balanced diet ,and recommend routine exercise  I will be ordering diabetic laboratories including comprehensive metabolic panel, hemoglobin A1c, urine microalbumin, lipid panel   I would like her to lose weight         Relevant Medications    fenofibrate (TRICOR) 145 mg tablet    Other Relevant Orders    Comprehensive metabolic panel    Hemoglobin A1C    Lipid Panel with Direct LDL reflex    Microalbumin / creatinine urine ratio       Cardiovascular and Mediastinum    Benign essential hypertension      Hypertension -  suboptimal control likely secondary to weight gain patient is very motivated to make changes in her diet will continue monitor if blood pressure does not improve will consider increasing medication     will recheck the blood pressure in 2 weeks         Relevant Medications    lisinopril (ZESTRIL) 5 mg tablet       Nervous and Auditory    Cervical radiculopathy      Check x-ray of the cervical spine  Dorethea Shine /start physical therapy         Relevant Medications    Diclofenac Sodium (VOLTAREN) 1 %    Other Relevant Orders    EMG 2 Limb Upper Extremity    XR spine cervical complete 4 or 5 vw non injury    Ambulatory referral to Physical Therapy       Other    Dyslipidemia      Hyperlipidemia controlled continue with current medical regiment recommend a low-cholesterol diet and recommend routine exercise we will continue to monitor the progress  Continue Lipitor 20 mg once daily         Relevant Medications    fenofibrate (TRICOR) 145 mg tablet    Hip pain      Rule out osteoarthritis check x-ray of the hips bilaterally         Relevant Medications    Diclofenac Sodium (VOLTAREN) 1 %    Other Relevant Orders    XR hip/pelv 2-3 vws left if performed    XR hip/pelv 2-3 vws right if performed    Ambulatory referral to Physical Therapy    Right shoulder pain      Suspect rotator cuff tendinitis start physical therapy check x-ray right shoulder         Relevant Medications    Diclofenac Sodium (VOLTAREN) 1 %    Other Relevant Orders    XR shoulder 2+ vw right    Ambulatory referral to Physical Therapy         RTO in 3 months call if any problems  Subjective:      Patient ID: Igor Aguirre is a 62 y o  female      HPI 62-year old female coming in for a follow up office visit regarding type 2 diabetes, hyperlipidemia, benign essential hypertension, bilateral hip pain, right shoulder pain and neck pain with radiation down the  Right upper extremity; neck pain started in Jan previous injury (yrs ago has roller skating injury close (she reports me her home difficulty with removal of the right upper extremity and a Pap fraction L for mammogram recurrent tingling he is noted numbness and tingling in the bilateral hand these symptoms started in January the numbness and tingling in her hands and the right upper extremity discomfort and pain started in mid December  Reports me doing very well up until leaving the gym because of COVID these symptoms have developed  All her notices bilateral hip pain it may awaken her at times and improved with moving around during the day  The following portions of the patient's history were reviewed and updated as appropriate: allergies, current medications, past family history, past medical history, past social history, past surgical history and problem list     Review of Systems   Constitutional: Negative for activity change, appetite change and unexpected weight change  HENT: Negative for congestion and postnasal drip  Eyes: Negative for visual disturbance  Respiratory: Negative for cough and shortness of breath  Cardiovascular: Negative for chest pain  Gastrointestinal: Negative for abdominal pain, diarrhea, nausea and vomiting  Musculoskeletal: Positive for neck pain  Shoulder pain   Neurological: Positive for numbness  Negative for weakness, light-headedness and headaches  Hematological: Negative for adenopathy  Objective:    No follow-ups on file  No results found        Allergies   Allergen Reactions    Depakote Er [Divalproex Sodium Er]      pancreatitis    Tanzeum [Albiglutide]      pancreatitis    Bee Venom     Carbamazepine Other (See Comments)     Reaction Date:Unknown    Clarithromycin Itching    Shellfish-Derived Products     Lamotrigine Rash       Past Medical History:   Diagnosis Date    Abdominal pain     started 7/21/16- found something on the pancreas    Abnormal weight loss     last assessed [id number]    Acute sinusitis     Allergic rhinitis     last assessed [id number]    Anemia     Asthma     Cardiac murmur     Cellulitis     Cervical disc disease     Colon polyp     Degeneration of thoracic intervertebral disc     Degenerative arthritis of knee     Diabetes mellitus (HCC)     Diverticulosis     Esophagitis, reflux     last assessed [id number]    Excessive sweating     Fatty liver     Gait disturbance     Granuloma annulare     Head injury     Hyperlipidemia     Hypertension     Hypertensive heart disease     Hypoglycemia     last assessed [id number]    Leg length discrepancy     Leucocytosis     Multiple thyroid nodules     Neuropathy     Obesity     last assessed [id number]    Paronychia of toe     Patellofemoral dysfunction     Postmenopausal atrophic vaginitis     Prepatellar bursitis     Sebaceous cyst     last assessed [id number]    Sleep apnea     no CPAP used    [facility] )     Tinea corporis     Type 2 diabetes mellitus (HCC)     cont meds and monitor BS;  last assessed [id number]    Urinary frequency     Vertigo     Vitamin D deficiency     Dia-Parkinson-White syndrome      Past Surgical History:   Procedure Laterality Date    COLONOSCOPY      ESOPHAGOGASTRODUODENOSCOPY N/A 8/8/2016    Procedure: ESOPHAGOGASTRODUODENOSCOPY (EGD); Surgeon: Shana Medley MD;  Location: [facility] GI [facility]; Service:     WI ESOPHAGOGASTRODUODENOSCOPY TRANSORAL DIAGNOSTIC N/A 3/28/2019    Procedure: ESOPHAGOGASTRODUODENOSCOPY (EGD); Surgeon: Shana Medley MD;  Location[facility];   Service: Gastroenterology    TONSILLECTOMY      TOOTH EXTRACTION      wisdom teeth removed     Current Outpatient Medications on File Prior to Visit   Medication Sig Dispense Refill    ARIPiprazole (ABILIFY) 10 mg tablet Take 1 tablet (10 mg total) by mouth every evening 90 tablet 2    atorvastatin (LIPITOR) 20 mg tablet Take 1 tablet (20 mg total) by mouth daily 90 tablet 0    Cholecalciferol 4000 units CAPS Take 1 capsule (4,000 Units total) by mouth daily (Patient taking differently: Take 5,000 Units by mouth daily )      FLUoxetine (PROzac) 20 mg capsule Take 1 capsule (20 mg total) by mouth daily Take with the 40 mg cap for a total dose of 60 mg daily 90 capsule 1    FLUoxetine (PROzac) 40 MG capsule Take 1 capsule (40 mg total) by mouth daily Take with the 20 mg cap for a total dose of 60 mg daily 90 capsule 1    Omega 3 1200 MG CAPS Take 1,200 mg by mouth 2 (two) times a day        omeprazole (PriLOSEC) 40 MG capsule Take 1 capsule (40 mg total) by mouth daily 30 capsule 5    polyethylene glycol (MIRALAX) 17 g packet Take 17 g by mouth daily      [DISCONTINUED] fenofibrate (TRICOR) 145 mg tablet TAKE 1 TABLET BY MOUTH EVERY DAY 90 tablet 0    [DISCONTINUED] lisinopril (ZESTRIL) 5 mg tablet TAKE 1 TABLET BY MOUTH EVERY DAY 90 tablet 2     No current facility-administered medications on file prior to visit        Family History   Problem Relation Age of Onset   Adamaris Ott Cancer Mother         lung    Heart disease Mother     Coronary artery disease Mother         CABG    Diabetes Mother     Lung cancer Mother 76        smoker    Stroke Mother     Thyroid disease Mother     Other Mother         tobacco use    Parkinsonism Father     Intestinal polyp Father     Bipolar disorder Father     Heart disease Father     Prostate cancer Father 62    Hypertension Sister     Alcohol abuse Sister     Depression Brother     Heart disease Brother     Diabetes Maternal Grandmother     Thyroid disease Maternal Grandmother     Hypertension Family     Suicide Attempts Paternal Aunt     Depression Paternal Aunt     Endometrial cancer Maternal Aunt         unknown age   Adamaris Ott Ovarian cancer Cousin 36        maternal     Social History     Socioeconomic History    Marital status: /Civil Union     Spouse name: Not on file    Number of children: 0    Years of education: 15    Highest education level: 12th grade   Occupational History    Occupation: retired   Social Needs    Financial resource strain: Not hard at all   Odell-Deb insecurity     Worry: Never true     Inability: Never true   [employer] needs     Medical: No     Non-medical: No   Tobacco Use    Smoking status: Never Smoker    Smokeless tobacco: Never Used   Substance and Sexual Activity    Alcohol use: Not Currently     Alcohol/week: 1 0 standard drinks     Types: 1 Glasses of wine per week     Frequency: Monthly or less     Drinks per session: 1 or 2     Binge frequency: Never     Comment: rarely    Drug use: No    Sexual activity: Not Currently     Partners: Male   Lifestyle    Physical activity     Days per week: 0 days     Minutes per session: 0 min    Stress:  Only a little   Relationships    Social connections     Talks on phone: Once a week     Gets together: Once a week     Attends Restorationism service: Never     Active member of club or organization: Yes     Attends meetings of clubs or organizations: More than 4 times per year     Relationship status:     Intimate partner violence     Fear of current or ex partner: No     Emotionally abused: No     Physically abused: No     Forced sexual activity: No   Other Topics Concern    Not on file   Social History Narrative    Education: high school graduate    Learning Disabilities: none    Marital History:     Children: none    Living Arrangement: lives in home with     Occupational History: retired, 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[place] [address]: good support system,  is supportive    Legal History: none     History: None        Caffeine use    Denied home environment domestic violence    Job physical requirements heavy lifting     Vitals:    02/16/21 1259   BP: 150/70   Pulse: 66   Temp: 97 5 °F (36 4 °C)   TempSrc: Temporal   SpO2: 97%   Weight: 87 6 kg (193 lb 3 2 oz) Height: 5' 4" (1 626 m)     Results for orders placed or performed in visit on 01/25/21   Lipid Panel with Direct LDL reflex   Result Value Ref Range    Total Cholesterol 174 <200 mg/dL    HDL 55 > OR = 50 mg/dL    Triglycerides 217 (H) <150 mg/dL    LDL Calculated 88 mg/dL (calc)    Chol HDLC Ratio 3 2 <5 0 (calc)    Non-HDL Cholesterol 119 <130 mg/dL (calc)   Comprehensive metabolic panel   Result Value Ref Range    Glucose, Random 125 (H) 65 - 99 mg/dL    BUN 23 7 - 25 mg/dL    Creatinine 0 74 0 50 - 1 05 mg/dL    eGFR Non  89 > OR = 60 mL/min/1 73m2    eGFR  103 > OR = 60 mL/min/1 73m2    SL AMB BUN/CREATININE RATIO NOT APPLICABLE 6 - 22 (calc)    Sodium 139 135 - 146 mmol/L    Potassium 4 8 3 5 - 5 3 mmol/L    Chloride 103 98 - 110 mmol/L    CO2 25 20 - 32 mmol/L    Calcium 9 7 8 6 - 10 4 mg/dL    Protein, Total 7 2 6 1 - 8 1 g/dL    Albumin 4 7 3 6 - 5 1 g/dL    Globulin 2 5 1 9 - 3 7 g/dL (calc)    Albumin/Globulin Ratio 1 9 1 0 - 2 5 (calc)    TOTAL BILIRUBIN 0 3 0 2 - 1 2 mg/dL    Alkaline Phosphatase 65 37 - 153 U/L    AST 14 10 - 35 U/L    ALT 16 6 - 29 U/L   Hemoglobin A1c (w/out EAG)   Result Value Ref Range    Hemoglobin A1C 6 2 (H) <5 7 % of total Hgb     Weight (last 2 days)     Date/Time   Weight    02/16/21 1259   87 6 (193 2)            Body mass index is 33 16 kg/m²  BP      Temp      Pulse     Resp      SpO2        Vitals:    02/16/21 1259   Weight: 87 6 kg (193 lb 3 2 oz)     Vitals:    02/16/21 1259   Weight: 87 6 kg (193 lb 3 2 oz)       /70   Pulse 66   Temp 97 5 °F (36 4 °C) (Temporal)   Ht 5' 4" (1 626 m)   Wt 87 6 kg (193 lb 3 2 oz)   SpO2 97%   BMI 33 16 kg/m²          Physical Exam  Constitutional:       Appearance: She is well-developed  HENT:      Head: Normocephalic  Eyes:      General: No scleral icterus  Right eye: No discharge  Left eye: No discharge        Conjunctiva/sclera: Conjunctivae normal       Pupils: Pupils are equal, round, and reactive to light  Neck:      Musculoskeletal: Neck supple  Cardiovascular:      Rate and Rhythm: Normal rate and regular rhythm  Heart sounds: Normal heart sounds  No murmur  No friction rub  No gallop  Pulmonary:      Effort: No respiratory distress  Breath sounds: Normal breath sounds  No wheezing or rales  Abdominal:      General: Bowel sounds are normal  There is no distension  Palpations: Abdomen is soft  There is no mass  Tenderness: There is no abdominal tenderness  There is no guarding or rebound  Musculoskeletal:         General: No deformity  Lymphadenopathy:      Cervical: No cervical adenopathy  Neurological:      Mental Status: She is alert        Coordination: Coordination normal         Positive Phalen sign pain with internal external rotation of the right shoulder range of motion of the cervical spine intact motor strength 5/5

## 2021-02-16 NOTE — ASSESSMENT & PLAN NOTE
Lab Results   Component Value Date    HGBA1C 6 2 (H) 01/25/2021    I have counselled the pt to follow a healthy and balanced diet ,and recommend routine exercise  I will be ordering diabetic laboratories including comprehensive metabolic panel, hemoglobin A1c, urine microalbumin, lipid panel   I would like her to lose weight

## 2021-02-16 NOTE — ASSESSMENT & PLAN NOTE
Hyperlipidemia controlled continue with current medical regiment recommend a low-cholesterol diet and recommend routine exercise we will continue to monitor the progress   Continue Lipitor 20 mg once daily

## 2021-02-23 ENCOUNTER — HOSPITAL ENCOUNTER (OUTPATIENT)
Dept: RADIOLOGY | Facility: HOSPITAL | Age: 61
Discharge: HOME | End: 2021-02-23
Payer: COMMERCIAL

## 2021-02-23 ENCOUNTER — EVALUATION (OUTPATIENT)
Dept: PHYSICAL THERAPY | Facility: CLINIC | Age: 61
End: 2021-02-23
Payer: COMMERCIAL

## 2021-02-23 DIAGNOSIS — M54.12 CERVICAL RADICULOPATHY: ICD-10-CM

## 2021-02-23 DIAGNOSIS — M25.559 HIP PAIN: ICD-10-CM

## 2021-02-23 DIAGNOSIS — M25.511 CHRONIC RIGHT SHOULDER PAIN: ICD-10-CM

## 2021-02-23 DIAGNOSIS — M25.511 RIGHT SHOULDER PAIN, UNSPECIFIED CHRONICITY: ICD-10-CM

## 2021-02-23 DIAGNOSIS — M54.12 CERVICAL RADICULOPATHY: Primary | ICD-10-CM

## 2021-02-23 DIAGNOSIS — G89.29 CHRONIC RIGHT SHOULDER PAIN: ICD-10-CM

## 2021-02-23 DIAGNOSIS — M25.551 RIGHT HIP PAIN: ICD-10-CM

## 2021-02-23 PROCEDURE — 73030 X-RAY EXAM OF SHOULDER: CPT

## 2021-02-23 PROCEDURE — 97162 PT EVAL MOD COMPLEX 30 MIN: CPT | Performed by: PHYSICAL THERAPIST

## 2021-02-23 PROCEDURE — 73521 X-RAY EXAM HIPS BI 2 VIEWS: CPT

## 2021-02-23 PROCEDURE — 72050 X-RAY EXAM NECK SPINE 4/5VWS: CPT

## 2021-02-23 PROCEDURE — 97140 MANUAL THERAPY 1/> REGIONS: CPT | Performed by: PHYSICAL THERAPIST

## 2021-02-23 PROCEDURE — 97110 THERAPEUTIC EXERCISES: CPT | Performed by: PHYSICAL THERAPIST

## 2021-02-23 NOTE — PROGRESS NOTES
PT Evaluation     Today's date: 2021  Patient name: Noam Dupont  : 1960  MRN: [id number]  Referring provider: Lee Rolle DO  Dx:   Encounter Diagnosis     ICD-10-CM    1  Cervical radiculopathy  M54 12    2  Right hip pain  M25 551    3  Chronic right shoulder pain  M25 511     G89 29                   Assessment  Assessment details: Pt presents with s/s consistent with C5-7 radiculopathy with an opening preference,  R subacromial impingement and R GT bursitis  Pt will benefit from PT to address impairments and restore function  Impairments: abnormal gait, abnormal or restricted ROM, activity intolerance, impaired physical strength, lacks appropriate home exercise program, pain with function and poor posture     Goals  ST-4 weeks  1   Pt will decrease pain > 50%  2   Pt will centralize c/s radiculopathy  3   Pt will decrease pain > 50% with SL     LT-8 weeks  1   Pt will decrease pain > 75%  2   Pt will walk her dog for 20 minutes without pain  3   Pt will sleep t/o the night without pain  Plan  Planned modality interventions: traction  Planned therapy interventions: abdominal trunk stabilization, neuromuscular re-education, patient education, postural training, strengthening, stretching, therapeutic activities, therapeutic exercise, therapeutic training, flexibility, functional ROM exercises, gait training, home exercise program and body mechanics training  Frequency: 2x week  Duration in weeks: 6        Subjective Evaluation    History of Present Illness  Date of onset: 12/15/2020  Mechanism of injury: Pt is a 62 yof c/o cervical pain with B hand paresthesias, R shoulder and upper arm pain and R hip pain  Pt reports all pains are chronic in nature, but were aggravated by a fall in mid-December      Pain  Current pain ratin  At best pain ratin  At worst pain ratin  Quality: dull ache, radiating and throbbing  Relieving factors: change in position, heat and medications      Diagnostic Tests  Abnormal x-ray: taken 2/23/21, results not yet available    Treatments  Previous treatment: medication  Patient Goals  Patient goals for therapy: decreased edema, increased motion, increased strength, independence with ADLs/IADLs and return to sport/leisure activities          Objective     Postural Observations  Seated posture: poor  Standing posture: poor  Correction of posture: makes symptoms better        Neurological Testing     Sensation   Cervical/Thoracic   Left   Intact: light touch    Right   Intact: light touch    Reflexes   Left   Biceps (C5/C6): normal (2+)    Right   Biceps (C5/C6): normal (2+)    Active Range of Motion   Cervical/Thoracic Spine       Cervical    Flexion:  WFL  Extension:  with pain Restriction level: moderate  Left lateral flexion:  Restriction level: moderate  Right lateral flexion:  Restriction level moderate  Left rotation:  Restriction level: moderate  Right rotation:  Restriction level: moderate    Passive Range of Motion   Left Hip   Flexion: 110 degrees   External rotation (90/90): 40 degrees   Internal rotation (90/90): 15 degrees     Right Hip   Flexion: 110 degrees   External rotation (90/90): 35 degrees   Internal rotation (90/90): 10 degrees     Joint Play     Hypomobile: C1, C2, C3, C4, C5, C6 and C7   Mechanical Assessment    Cervical    Seated Extension: repeated movements  Pain location: peripheralized    Thoracic      Lumbar      Strength/Myotome Testing     Left Shoulder     Planes of Motion   Flexion: 4   Abduction: 4   External rotation at 0°: 4   Internal rotation at 0°: 4     Right Shoulder     Planes of Motion   Flexion: 3+   Abduction: 3+   External rotation at 0°: 3+   Internal rotation at 0°: 3+     Left Hip   Planes of Motion   Flexion: 4-  Extension: 4-  Abduction: 4-  Adduction: 4-    Right Hip   Planes of Motion   Flexion: 4-  Extension: 4-  Abduction: 4-  Adduction: 4-    Tests   Cervical   Negative alar ligament test and Sharp-Prerna test      Left   Negative Spurling's Test A and Spurling's Test B  Right   Positive Spurling's Test B  Negative Spurling's Test A  Right Shoulder   Positive Hawkin's and Neer's  Left Hip   Negative Georgette and scour  Right Hip   Positive Georgette  Negative scour  Precautions: none  Dx: C5-7 radiculopathy with an opening preference, R subacromial impingement, R GT bursitis        Manuals 2/23            Manual c/s distraction  1x5 min            R sh PROM             R ITB foam roller 3 min            Mechanical traction             Neuro Re-Ed             scap retraction iso 5"x5            B c/s Rot 1x5 ea            B c/s SB             bridges 1x10 3x10           Median nerve flossing  1x5                                     Ther Ex             R TB ER Y/ 1x10 Y/ 3x10           Mod sleeper stretch  10"x3           clamshells                                                                              Ther Activity                                       Gait Training                                       Modalities

## 2021-02-27 ENCOUNTER — HOSPITAL ENCOUNTER (EMERGENCY)
Facility: HOSPITAL | Age: 61
Discharge: HOME | End: 2021-02-27
Attending: EMERGENCY MEDICINE
Payer: COMMERCIAL

## 2021-02-27 ENCOUNTER — APPOINTMENT (EMERGENCY)
Dept: RADIOLOGY | Facility: HOSPITAL | Age: 61
End: 2021-02-27
Payer: COMMERCIAL

## 2021-02-27 VITALS
TEMPERATURE: 98.7 F | RESPIRATION RATE: 18 BRPM | WEIGHT: 197.8 LBS | OXYGEN SATURATION: 97 % | BODY MASS INDEX: 33.95 KG/M2 | HEART RATE: 65 BPM

## 2021-02-27 DIAGNOSIS — M54.9 BACK PAIN: Primary | ICD-10-CM

## 2021-02-27 LAB
BACTERIA UR QL AUTO: NORMAL /HPF
BILIRUB UR QL STRIP: NEGATIVE
CLARITY UR: CLEAR
COLOR UR: YELLOW
GLUCOSE UR STRIP-MCNC: NEGATIVE MG/DL
HGB UR QL STRIP.AUTO: NEGATIVE
KETONES UR STRIP-MCNC: NEGATIVE MG/DL
LEUKOCYTE ESTERASE UR QL STRIP: ABNORMAL
NITRITE UR QL STRIP: NEGATIVE
NON-SQ EPI CELLS URNS QL MICRO: NORMAL /HPF
PH UR STRIP.AUTO: 5.5 [PH] (ref 4.5–8)
PROT UR STRIP-MCNC: NEGATIVE MG/DL
RBC #/AREA URNS AUTO: NORMAL /HPF
SP GR UR STRIP.AUTO: 1.01 (ref 1–1.03)
UROBILINOGEN UR QL STRIP.AUTO: 0.2 E.U./DL
WBC #/AREA URNS AUTO: NORMAL /HPF

## 2021-02-27 PROCEDURE — 81001 URINALYSIS AUTO W/SCOPE: CPT

## 2021-02-27 PROCEDURE — 99283 EMERGENCY DEPT VISIT LOW MDM: CPT

## 2021-02-27 PROCEDURE — 99284 EMERGENCY DEPT VISIT MOD MDM: CPT | Performed by: EMERGENCY MEDICINE

## 2021-02-27 PROCEDURE — 72100 X-RAY EXAM L-S SPINE 2/3 VWS: CPT

## 2021-02-27 RX ORDER — HYDROCODONE BITARTRATE AND ACETAMINOPHEN 5; 325 MG/1; MG/1
1 TABLET ORAL EVERY 6 HOURS PRN
Qty: 15 TABLET | Refills: 0 | Status: SHIPPED | OUTPATIENT
Start: 2021-02-27 | End: 2021-06-08 | Stop reason: ALTCHOICE

## 2021-02-27 RX ORDER — HYDROCODONE BITARTRATE AND ACETAMINOPHEN 5; 325 MG/1; MG/1
1 TABLET ORAL ONCE
Status: COMPLETED | OUTPATIENT
Start: 2021-02-27 | End: 2021-02-27

## 2021-02-27 RX ADMIN — HYDROCODONE BITARTRATE AND ACETAMINOPHEN 1 TABLET: 5; 325 TABLET ORAL at 20:58

## 2021-02-28 NOTE — ED PROVIDER NOTES
History  Chief Complaint   Patient presents with    Back Pain     pt reports falling on ice over a month ago and has back pain radiating into right hip  no prior hx of back pain  no thinners  62 y o  female presents with chief complaint of low right sided back pain  Patient had a fall over a month ago and has been having pain in her neck, shoulder and hips since then  She was seen by her pcp and has been in therapy  Outpatient films of her shoulder, neck and hips were done - no official radiology readings yet but my initial assessment is that there are chronic degenerative changes but no acute fractures  She did not have any imaging of her lumbar spine  She reports the low back pain (right sided above buttock) started during a therapy session that included twisting  No weakness or sensory changes  No urinary complaints  Patient has tried naproxen and acetaminophen without relief  History provided by:  Medical records and patient   used: No    Back Pain  Location:  Lumbar spine and sacro-iliac joint  Quality:  Aching and stabbing  Radiates to: right hip  Pain severity:  Moderate  Pain is:  Same all the time  Onset quality:  Gradual  Duration:  4 days  Timing:  Constant  Progression:  Waxing and waning  Chronicity:  New  Context: twisting    Relieved by:  Being still  Worsened by: Movement and twisting  Ineffective treatments:  NSAIDs and OTC medications  Associated symptoms: no bladder incontinence, no bowel incontinence, no chest pain, no dysuria, no fever, no leg pain, no numbness, no paresthesias, no pelvic pain, no tingling and no weakness        Prior to Admission Medications   Prescriptions Last Dose Informant Patient Reported? Taking?    ARIPiprazole (ABILIFY) 10 mg tablet   No No   Sig: Take 1 tablet (10 mg total) by mouth every evening   Cholecalciferol 4000 units CAPS  Self No No   Sig: Take 1 capsule (4,000 Units total) by mouth daily   Patient taking differently: Take 5,000 Units by mouth daily    Diclofenac Sodium (VOLTAREN) 1 %   No No   Sig: Apply 2 g topically 4 (four) times a day   FLUoxetine (PROzac) 20 mg capsule   No No   Sig: Take 1 capsule (20 mg total) by mouth daily Take with the 40 mg cap for a total dose of 60 mg daily   FLUoxetine (PROzac) 40 MG capsule   No No   Sig: Take 1 capsule (40 mg total) by mouth daily Take with the 20 mg cap for a total dose of 60 mg daily   Omega 3 1200 MG CAPS  Self Yes No   Sig: Take 1,200 mg by mouth 2 (two) times a day     atorvastatin (LIPITOR) 20 mg tablet   No No   Sig: Take 1 tablet (20 mg total) by mouth daily   fenofibrate (TRICOR) 145 mg tablet   No No   Sig: Take 1 tablet (145 mg total) by mouth daily   lisinopril (ZESTRIL) 5 mg tablet   No No   Sig: Take 1 tablet (5 mg total) by mouth daily   omeprazole (PriLOSEC) 40 MG capsule   No No   Sig: Take 1 capsule (40 mg total) by mouth daily   polyethylene glycol (MIRALAX) 17 g packet  Self Yes No   Sig: Take 17 g by mouth daily      Facility-Administered Medications: None       Past Medical History:   Diagnosis Date    Abdominal pain     started 7/21/16- found something on the pancreas    Abnormal weight loss     last assessed [id number]    Acute sinusitis     Allergic rhinitis     last assessed [id number]    Anemia     Asthma     Cardiac murmur     Cellulitis     Cervical disc disease     Colon polyp     Degeneration of thoracic intervertebral disc     Degenerative arthritis of knee     Diabetes mellitus (HCC)     Diverticulosis     Esophagitis, reflux     last assessed [id number]    Excessive sweating     Fatty liver     Gait disturbance     Granuloma annulare     Head injury     Hyperlipidemia     Hypertension     Hypertensive heart disease     Hypoglycemia     last assessed [id number]    Leg length discrepancy     Leucocytosis     Multiple thyroid nodules     Neuropathy     Obesity     last assessed [id number]    Paronychia of toe     Patellofemoral dysfunction     Postmenopausal atrophic vaginitis     Prepatellar bursitis     Sebaceous cyst     last assessed [id number]    Sleep apnea     no CPAP used    Suicide attempt ([facility] [place] [address] )     Tinea corporis     Type 2 diabetes mellitus ([facility] [place] [address] )     cont meds and monitor BS;  last assessed [id number]    Urinary frequency     Vertigo     Vitamin D deficiency     Dia-Parkinson-White syndrome        Past Surgical History:   Procedure Laterality Date    COLONOSCOPY      ESOPHAGOGASTRODUODENOSCOPY N/A 8/8/2016    Procedure: ESOPHAGOGASTRODUODENOSCOPY (EGD); Surgeon: Jaime Palacios MD;  Location: [facility]; Service:     [place] ESOPHAGOGASTRODUODENOSCOPY TRANSORAL DIAGNOSTIC N/A 3/28/2019    Procedure: ESOPHAGOGASTRODUODENOSCOPY (EGD); Surgeon: Jaime Palacios MD;  Location: [facility]; Service: Gastroenterology    TONSILLECTOMY      TOOTH EXTRACTION      wisdom teeth removed       Family History   Problem Relation Age of Onset    Cancer Mother         lung    Heart disease Mother     Coronary artery disease Mother         CABG    Diabetes Mother     Lung cancer Mother 76        smoker    Stroke Mother     Thyroid disease Mother     Other Mother         tobacco use    Parkinsonism Father     Intestinal polyp Father     Bipolar disorder Father     Heart disease Father     Prostate cancer Father 62    Hypertension Sister     Alcohol abuse Sister     Depression Brother     Heart disease Brother     Diabetes Maternal Grandmother     Thyroid disease Maternal Grandmother     Hypertension Family     Suicide Attempts Paternal Aunt     Depression Paternal Aunt     Endometrial cancer Maternal Aunt         unknown age   Valdo Levon Ovarian cancer Cousin 36        maternal     I have reviewed and agree with the history as documented      E-Cigarette/Vaping    E-Cigarette Use Never User      E-Cigarette/Vaping Substances    Nicotine No     THC No     CBD No     Flavoring No     Other No     Unknown No      Social History     Tobacco Use    Smoking status: Never Smoker    Smokeless tobacco: Never Used   Substance Use Topics    Alcohol use: Not Currently     Alcohol/week: 1 0 standard drinks     Types: 1 Glasses of wine per week     Frequency: Monthly or less     Drinks per session: 1 or 2     Binge frequency: Never     Comment: rarely    Drug use: No       Review of Systems   Constitutional: Negative for chills, diaphoresis and fever  Respiratory: Negative for shortness of breath  Cardiovascular: Negative for chest pain and palpitations  Gastrointestinal: Negative for bowel incontinence, diarrhea, nausea and vomiting  Genitourinary: Negative for bladder incontinence, dysuria, frequency and pelvic pain  Musculoskeletal: Positive for arthralgias (shoulder pain, neck pain, low back pain, hip pain) and back pain  Skin: Negative for rash  Neurological: Negative for tingling, weakness, numbness and paresthesias  All other systems reviewed and are negative  Physical Exam  Physical Exam  Vitals signs and nursing note reviewed  Constitutional:       General: She is in acute distress (mild)  Appearance: She is well-developed  HENT:      Head: Normocephalic and atraumatic  Eyes:      Pupils: Pupils are equal, round, and reactive to light  Neck:      Musculoskeletal: Normal range of motion  Vascular: No JVD  Cardiovascular:      Rate and Rhythm: Normal rate and regular rhythm  Heart sounds: Normal heart sounds  No murmur  No friction rub  No gallop  Pulmonary:      Effort: Pulmonary effort is normal  No respiratory distress  Breath sounds: Normal breath sounds  No wheezing or rales  Chest:      Chest wall: No tenderness  Musculoskeletal: Normal range of motion  General: No tenderness  Skin:     General: Skin is warm and dry  Neurological:      General: No focal deficit present        Mental Status: She is alert and oriented to person, place, and time  Psychiatric:         Behavior: Behavior normal          Thought Content: Thought content normal          Judgment: Judgment normal          Vital Signs  ED Triage Vitals [02/27/21 1903]   Temperature Pulse Respirations BP SpO2   98 7 °F (37 1 °C) 65 18 -- 97 %      Temp Source Heart Rate Source Patient Position - Orthostatic VS BP Location FiO2 (%)   Oral Monitor -- -- --      Pain Score       9           Vitals:    02/27/21 1903   Pulse: 65         Visual Acuity      ED Medications  Medications   HYDROcodone-acetaminophen (NORCO) 5-325 mg per tablet 1 tablet (1 tablet Oral Given 2/27/21 2058)       Diagnostic Studies  Results Reviewed     Procedure Component Value Units Date/Time    Urine Microscopic [[id number]]  (Normal) Collected: 02/27/21 2016    Lab Status: Final result Specimen: Urine, Clean Catch Updated: 02/27/21 2059     RBC, UA None Seen /hpf      WBC, UA 1-2 /hpf      Epithelial Cells Occasional /hpf      Bacteria, UA Occasional /hpf     Urine Macroscopic, POC [[id number]]  (Abnormal) Collected: 02/27/21 2016    Lab Status: Final result Specimen: Urine Updated: 02/27/21 2017     Color, UA Yellow     Clarity, UA Clear     pH, UA 5 5     Leukocytes, UA Small     Nitrite, UA Negative     Protein, UA Negative mg/dl      Glucose, UA Negative mg/dl      Ketones, UA Negative mg/dl      Urobilinogen, UA 0 2 E U /dl      Bilirubin, UA Negative     Blood, UA Negative     Specific Gravity, UA 1 010    Narrative:      CLINITEK RESULT                 XR spine lumbar 2 or 3 views injury   ED Interpretation by Surinder Kelly MD (02/27 2037)   This film was interpreted independently by me  No fracture or dislocation  Slight abnormality of L5 vertebral body, suspect artifact  Procedures  Procedures         ED Course                                           MDM  Number of Diagnoses or Management Options  Back pain: new and requires workup  Diagnosis management comments: Background: 62 y o  female presents with low right sided back pain - after physical therapy session (for pain from a recent fall)    Differential DX includes but is not limited to: sacroiliitis, doubt lumbar fracture but possible, possible pyelonephritis, less likely ureterolithiasis     Plan: urinalysis, lumbar spine, symptom control          Amount and/or Complexity of Data Reviewed  Tests in the radiology section of CPT®: ordered and reviewed  Independent visualization of images, tracings, or specimens: yes    Risk of Complications, Morbidity, and/or Mortality  Diagnostic procedures: high    Patient Progress  Patient progress: stable      Disposition  Final diagnoses:   Back pain     Time reflects when diagnosis was documented in both MDM as applicable and the Disposition within this note     Time User Action Codes Description Comment    2/27/2021  8:37 PM Sanaz Course Add [M54 9] Back pain       ED Disposition     ED Disposition Condition Date/Time Comment    Discharge Stable Sat Feb 27, 2021  8:37 PM Colleen Ramsey discharge to home/self care              Follow-up Information    None         Discharge Medication List as of 2/27/2021  8:42 PM      START taking these medications    Details   HYDROcodone-acetaminophen (NORCO) 5-325 mg per tablet Take 1 tablet by mouth every 6 (six) hours as needed for pain for up to 15 dosesMax Daily Amount: 4 tablets, Starting Sat 2/27/2021, Normal         CONTINUE these medications which have NOT CHANGED    Details   ARIPiprazole (ABILIFY) 10 mg tablet Take 1 tablet (10 mg total) by mouth every evening, Starting Mon 2/8/2021, Until Fri 11/5/2021, Normal      atorvastatin (LIPITOR) 20 mg tablet Take 1 tablet (20 mg total) by mouth daily, Starting Thu 1/28/2021, Normal      Cholecalciferol 4000 units CAPS Take 1 capsule (4,000 Units total) by mouth daily, Starting Thu 10/3/2019, No Print      Diclofenac Sodium (VOLTAREN) 1 % Apply 2 g topically 4 (four) times a day, Starting Tue 2/16/2021, Normal fenofibrate (TRICOR) 145 mg tablet Take 1 tablet (145 mg total) by mouth daily, Starting Tue 2/16/2021, Normal      !! FLUoxetine (PROzac) 20 mg capsule Take 1 capsule (20 mg total) by mouth daily Take with the 40 mg cap for a total dose of 60 mg daily, Starting Tue 12/8/2020, Until Sun 6/6/2021, Normal      !! FLUoxetine (PROzac) 40 MG capsule Take 1 capsule (40 mg total) by mouth daily Take with the 20 mg cap for a total dose of 60 mg daily, Starting Tue 12/8/2020, Until Sun 6/6/2021, Normal      lisinopril (ZESTRIL) 5 mg tablet Take 1 tablet (5 mg total) by mouth daily, Starting Tue 2/16/2021, Normal      Omega 3 1200 MG CAPS Take 1,200 mg by mouth 2 (two) times a day  , Historical Med      omeprazole (PriLOSEC) 40 MG capsule Take 1 capsule (40 mg total) by mouth daily, Starting Mon 10/5/2020, Normal      polyethylene glycol (MIRALAX) 17 g packet Take 17 g by mouth daily, Historical Med       !! - Potential duplicate medications found  Please discuss with provider              PDMP Review     None          ED Provider  Electronically Signed by           Marjorie Lomas MD  02/27/21 2724

## 2021-03-01 ENCOUNTER — TELEPHONE (OUTPATIENT)
Dept: PHYSICAL THERAPY | Facility: OTHER | Age: 61
End: 2021-03-01

## 2021-03-01 NOTE — TELEPHONE ENCOUNTER
Call placed to the patient per [facility] referral     After explanation of the program the patient states she is already seeing PT for her neck, shoulder, and hip  Patient is seeing one of our [facility] PT at the [facility] site  Patient states her back pain has improved and that she will mention to the Therapist that she developed pain after her session last week  No further questions and/or concerns were voiced by the patient at this time  Referral Closed

## 2021-03-01 NOTE — RESULT ENCOUNTER NOTE
Patient called at [phone]  Patient name and date of birth use is positive patient identifiers  Discussed x-ray findings with fracture of the L3 spinous process  Patient has a follow-up appointment with physical therapy as well as her family doctor scheduled  Will modify activities as needed  Return to the emergency department with worsening pain

## 2021-03-02 ENCOUNTER — OFFICE VISIT (OUTPATIENT)
Dept: PHYSICAL THERAPY | Facility: CLINIC | Age: 61
End: 2021-03-02
Payer: COMMERCIAL

## 2021-03-02 DIAGNOSIS — M25.511 CHRONIC RIGHT SHOULDER PAIN: ICD-10-CM

## 2021-03-02 DIAGNOSIS — M54.12 CERVICAL RADICULOPATHY: Primary | ICD-10-CM

## 2021-03-02 DIAGNOSIS — M25.551 RIGHT HIP PAIN: ICD-10-CM

## 2021-03-02 DIAGNOSIS — G89.29 CHRONIC RIGHT SHOULDER PAIN: ICD-10-CM

## 2021-03-02 PROCEDURE — 97112 NEUROMUSCULAR REEDUCATION: CPT | Performed by: PHYSICAL THERAPIST

## 2021-03-02 PROCEDURE — 97140 MANUAL THERAPY 1/> REGIONS: CPT | Performed by: PHYSICAL THERAPIST

## 2021-03-02 NOTE — PROGRESS NOTES
Daily Note     Today's date: 3/2/2021  Patient name: Natalie Grullon  : 1960  MRN: [id number]  Referring provider: Kindra Summers DO  Dx:   Encounter Diagnosis     ICD-10-CM    1  Cervical radiculopathy  M54 12    2  Right hip pain  M25 551    3  Chronic right shoulder pain  M25 511     G89 29                   Subjective: Pt reports going to the ER on Sat secondary to extreme LBP  Pt received an x-ray which slowed L3 fx  Objective: See treatment diary below  Added core stab, Laser to L3 to POC DA  Assessment: Pt demonstrated improved c/s pain and ROM, tolerated core stab POC well  Plan: Cont POC  Precautions: none  Dx: C5-7 radiculopathy with an opening preference, R subacromial impingement, R GT bursitis, L3 fx: DA 3/2/21        Manuals 2/23 3/2           Manual c/s distraction  1x5 min 1x5 min           R sh PROM  10"x3 ea           R ITB foam roller 3 min held           Laser L3  4000J           Neuro Re-Ed             scap retraction iso 5"x5 5"x15           B c/s Rot 1x5 ea 1x15           B c/s SB  1x15           Abdominal bracing  5"x10           DLS marching   1A95 ea           bridges 1x10 1x10           Median nerve flossing  1x10                                     Ther Ex             R TB ER Y/ 1x10 Y/ 3x10           Mod sleeper stretch             clamshells                                                                              Ther Activity                                       Gait Training                                       Modalities

## 2021-03-04 ENCOUNTER — OFFICE VISIT (OUTPATIENT)
Dept: PHYSICAL THERAPY | Facility: CLINIC | Age: 61
End: 2021-03-04
Payer: COMMERCIAL

## 2021-03-04 DIAGNOSIS — M25.511 CHRONIC RIGHT SHOULDER PAIN: ICD-10-CM

## 2021-03-04 DIAGNOSIS — M25.551 RIGHT HIP PAIN: ICD-10-CM

## 2021-03-04 DIAGNOSIS — G89.29 CHRONIC RIGHT SHOULDER PAIN: ICD-10-CM

## 2021-03-04 DIAGNOSIS — M54.50 ACUTE RIGHT-SIDED LOW BACK PAIN WITHOUT SCIATICA: ICD-10-CM

## 2021-03-04 DIAGNOSIS — M54.12 CERVICAL RADICULOPATHY: Primary | ICD-10-CM

## 2021-03-04 PROCEDURE — 97140 MANUAL THERAPY 1/> REGIONS: CPT | Performed by: PHYSICAL THERAPIST

## 2021-03-04 PROCEDURE — 97112 NEUROMUSCULAR REEDUCATION: CPT | Performed by: PHYSICAL THERAPIST

## 2021-03-04 NOTE — PROGRESS NOTES
Daily Note     Today's date: 3/4/2021  Patient name: Rom Pinzon  : 1960  MRN: [id number]  Referring provider: True Aguiar DO  Dx:   Encounter Diagnosis     ICD-10-CM    1  Cervical radiculopathy  M54 12    2  Right hip pain  M25 551    3  Chronic right shoulder pain  M25 511     G89 29    4  Acute right-sided low back pain without sciatica  M54 5                   Subjective: Pt reports 2/10 c/s pain, 2/10 R sh pain, 4/10 LBP  Objective: See treatment diary below  Assessment: Pt demonstrated improved c/s B Rot  Pt demonstrated a good response to L/S Core stab POC  Plan: Cont POC  Precautions: none  Dx: C5-7 radiculopathy with an opening preference, R subacromial impingement, R GT bursitis, L3 fx: DA 3/2/21        Manuals 2/23 3/2 3          Manual c/s distraction  1x5 min 1x5 min 1x5 min          R sh PROM  10"x3 ea 10"x3 ea          R ITB foam roller 3 min held           Laser L3  4000J 4000J          Neuro Re-Ed             scap retraction iso 5"x5 5"x15 5"x15          B c/s Rot 1x5 ea 1x15 1x15          B c/s SB  1x15 1x15          Abdominal bracing  5"x10 5"x15          DLS marching   4M24 ea 1x30 ea          bridges 1x10 1x10 2x10          Median nerve flossing  1x10 1x10                                    Ther Ex             R TB ER Y/ 1x10 Y/ 3x10 Y/ 3x10          Mod sleeper stretch             clamshells   supine R/ 3x10                                                                           Ther Activity                                       Gait Training                                       Modalities

## 2021-03-09 ENCOUNTER — OFFICE VISIT (OUTPATIENT)
Dept: PHYSICAL THERAPY | Facility: CLINIC | Age: 61
End: 2021-03-09
Payer: COMMERCIAL

## 2021-03-09 DIAGNOSIS — M25.551 RIGHT HIP PAIN: ICD-10-CM

## 2021-03-09 DIAGNOSIS — M54.50 ACUTE RIGHT-SIDED LOW BACK PAIN WITHOUT SCIATICA: ICD-10-CM

## 2021-03-09 DIAGNOSIS — M25.511 CHRONIC RIGHT SHOULDER PAIN: ICD-10-CM

## 2021-03-09 DIAGNOSIS — G89.29 CHRONIC RIGHT SHOULDER PAIN: ICD-10-CM

## 2021-03-09 DIAGNOSIS — M54.12 CERVICAL RADICULOPATHY: Primary | ICD-10-CM

## 2021-03-09 PROCEDURE — 97140 MANUAL THERAPY 1/> REGIONS: CPT | Performed by: PHYSICAL THERAPIST

## 2021-03-09 PROCEDURE — 97112 NEUROMUSCULAR REEDUCATION: CPT | Performed by: PHYSICAL THERAPIST

## 2021-03-09 NOTE — PROGRESS NOTES
Daily Note     Today's date: 3/9/2021  Patient name: Jamarcus Ward  : 1960  MRN: [id number]  Referring provider: Whitney Ha DO  Dx:   Encounter Diagnosis     ICD-10-CM    1  Cervical radiculopathy  M54 12    2  Right hip pain  M25 551    3  Chronic right shoulder pain  M25 511     G89 29    4  Acute right-sided low back pain without sciatica  M54 5                   Subjective: Pt reports 2/10 c/s pain, 2/10 R sh pain, 2/10 LBP  Pt reports high levels of LBP over the weekend, constant B UE numbness  Objective: See treatment diary below  Assessment: Pt demonstrated improved c/s B Rot  Pt demonstrated a good response to L/S Core stab POC  Plan: Cont POC  Precautions: none  Dx: C5-7 radiculopathy with an opening preference, R subacromial impingement, R GT bursitis, L3 fx: DA 3/2/21        Manuals 2/23 3/2 3/4 3/9         Manual c/s distraction  1x5 min 1x5 min 1x5 min 1x5 min         R sh PROM  10"x3 ea 10"x3 ea 10"x3 ea         R ITB foam roller 3 min held           Laser L3  4000J 4000J 4000J         Neuro Re-Ed             scap retraction iso 5"x5 5"x15 5"x15 5"x15         B c/s Rot 1x5 ea 1x15 1x15 1x15         B c/s SB  1x15 1x15 1x15         Abdominal bracing  5"x10 5"x15 5"x15         DLS marching   1U21 ea 1x30 ea 1x30 ea         bridges 1x10 1x10 2x10 2x12         Median nerve flossing  1x10 1x10 1x10         DLS SLR    3x5 ea                      Ther Ex             R TB ER Y/ 1x10 Y/ 3x10 Y/ 3x10 Y/ 3x15         Mod sleeper stretch             clamshells   supine R/ 3x10 supine R/ 3x12                                                                          Ther Activity                                       Gait Training                                       Modalities

## 2021-03-10 DIAGNOSIS — Z23 ENCOUNTER FOR IMMUNIZATION: ICD-10-CM

## 2021-03-11 ENCOUNTER — OFFICE VISIT (OUTPATIENT)
Dept: PHYSICAL THERAPY | Facility: CLINIC | Age: 61
End: 2021-03-11
Payer: COMMERCIAL

## 2021-03-11 DIAGNOSIS — M54.50 ACUTE RIGHT-SIDED LOW BACK PAIN WITHOUT SCIATICA: ICD-10-CM

## 2021-03-11 DIAGNOSIS — G89.29 CHRONIC RIGHT SHOULDER PAIN: ICD-10-CM

## 2021-03-11 DIAGNOSIS — M54.12 CERVICAL RADICULOPATHY: Primary | ICD-10-CM

## 2021-03-11 DIAGNOSIS — M25.551 RIGHT HIP PAIN: ICD-10-CM

## 2021-03-11 DIAGNOSIS — M25.511 CHRONIC RIGHT SHOULDER PAIN: ICD-10-CM

## 2021-03-11 PROCEDURE — 97110 THERAPEUTIC EXERCISES: CPT | Performed by: PHYSICAL THERAPIST

## 2021-03-11 PROCEDURE — 97112 NEUROMUSCULAR REEDUCATION: CPT | Performed by: PHYSICAL THERAPIST

## 2021-03-11 NOTE — PROGRESS NOTES
Daily Note     Today's date: 3/11/2021  Patient name: Natalie Grullon  : 1960  MRN: [id number]  Referring provider: Kindra Summers DO  Dx:   Encounter Diagnosis     ICD-10-CM    1  Cervical radiculopathy  M54 12    2  Right hip pain  M25 551    3  Chronic right shoulder pain  M25 511     G89 29    4  Acute right-sided low back pain without sciatica  M54 5                   Subjective: Pt reports 1/10 c/s pain, 3/10 R sh pain, 0/10 LBP  Pt reports constant B UE numbness into her first 3 fingers  Objective: See treatment diary below  Assessment: Pt demonstrated improved core stability  Plan: Cont POC  Assess response to mechanical tx  Precautions: none  Dx: C5-7 radiculopathy with an opening preference, R subacromial impingement, R GT bursitis, L3 fx: DA 3/2/21        Manuals  3 3 3/9 3/11        Manual c/s distraction  1x5 min 1x5 min 1x5 min 1x5 min         R sh PROM  10"x3 ea 10"x3 ea 10"x3 ea 10"x3 ea        R ITB foam roller 3 min held           Mechanical traction     5 min x 2 25#        Laser L3  4000J 4000J 4000J 4000J        Neuro Re-Ed             scap retraction iso 5"x5 5"x15 5"x15 5"x15 5"x15        B c/s Rot 1x5 ea 1x15 1x15 1x15 1x15        B c/s SB  1x15 1x15 1x15 1x15        Abdominal bracing  5"x10 5"x15 5"x15 5"x15        DLS marching   6A43 ea 1x30 ea 1x30 ea 1#/ 1x30 ea        bridges 1x10 1x10 2x10 2x12 3x10        Median nerve flossing  1x10 1x10 1x10 1x10        DLS SLR    3x5 ea 3x5 ea                     Ther Ex             R TB ER Y/ 1x10 Y/ 3x10 Y/ 3x10 Y/ 3x15 Y/ 3x15        Mod sleeper stretch             clamshells   supine R/ 3x10 supine R/ 3x12 supine R/ 3" 3x12        TB rows     R/ 3x10                                                            Ther Activity                                       Gait Training                                       Modalities

## 2021-03-16 ENCOUNTER — OFFICE VISIT (OUTPATIENT)
Dept: PHYSICAL THERAPY | Facility: CLINIC | Age: 61
End: 2021-03-16
Payer: COMMERCIAL

## 2021-03-16 DIAGNOSIS — G89.29 CHRONIC RIGHT SHOULDER PAIN: ICD-10-CM

## 2021-03-16 DIAGNOSIS — M25.551 RIGHT HIP PAIN: ICD-10-CM

## 2021-03-16 DIAGNOSIS — M54.12 CERVICAL RADICULOPATHY: Primary | ICD-10-CM

## 2021-03-16 DIAGNOSIS — M54.50 ACUTE RIGHT-SIDED LOW BACK PAIN WITHOUT SCIATICA: ICD-10-CM

## 2021-03-16 DIAGNOSIS — M25.511 CHRONIC RIGHT SHOULDER PAIN: ICD-10-CM

## 2021-03-16 PROCEDURE — 97112 NEUROMUSCULAR REEDUCATION: CPT | Performed by: PHYSICAL THERAPIST

## 2021-03-16 PROCEDURE — 97012 MECHANICAL TRACTION THERAPY: CPT | Performed by: PHYSICAL THERAPIST

## 2021-03-16 PROCEDURE — 97110 THERAPEUTIC EXERCISES: CPT | Performed by: PHYSICAL THERAPIST

## 2021-03-16 NOTE — PROGRESS NOTES
Daily Note     Today's date: 3/16/2021  Patient name: Demond Capps  : 1960  MRN: [id number]  Referring provider: Kathy Thomson DO  Dx:   Encounter Diagnosis     ICD-10-CM    1  Cervical radiculopathy  M54 12    2  Right hip pain  M25 551    3  Chronic right shoulder pain  M25 511     G89 29    4  Acute right-sided low back pain without sciatica  M54 5                   Subjective: Pt reports 3/10 c/s pain, 3/10 R sh pain, 0/10 LBP  Pt reports constant B UE numbness into her first 3 fingers  Objective: See treatment diary below  Assessment: Pt demonstrated improved core stability  Plan: Cont POC  Assess response to mechanical tx  Precautions: none  Dx: C5-7 radiculopathy with an opening preference, R subacromial impingement, R GT bursitis, L3 fx: DA 3/2/21        Manuals  3 3 3 3 316       Manual c/s distraction  1x5 min 1x5 min 1x5 min 1x5 min         R sh PROM  10"x3 ea 10"x3 ea 10"x3 ea 10"x3 ea        R ITB foam roller 3 min held           Mechanical traction     5 min x 2 25# 5 min x 2 25#       Graston lumbar paraspinals      3 min       Laser L3  4000J 4000J 4000J 4000J 4000J       Laser R knee      3000J       Neuro Re-Ed             scap retraction iso 5"x5 5"x15 5"x15 5"x15 5"x15 5"x15       B c/s Rot 1x5 ea 1x15 1x15 1x15 1x15 1x15       B c/s SB  1x15 1x15 1x15 1x15 1x15       Abdominal bracing  5"x10 5"x15 5"x15 5"x15        DLS marching   5B87 ea 1x30 ea 1x30 ea 1#/ 1x30 ea 1 5#/ 1x30 ea       bridges 1x10 1x10 2x10 2x12 3x10 3x10       Median nerve flossing  1x10 1x10 1x10 1x10 1x10       DLS SLR    3x5 ea 3x5 ea 3x6 ea                    Ther Ex             R TB ER Y/ 1x10 Y/ 3x10 Y/ 3x10 Y/ 3x15 Y/ 3x15 Y/ 3x15       Mod sleeper stretch             clamshells   supine R/ 3x10 supine R/ 3x12 supine R/ 3" 3x12 supine G/ 3" 3x12       TB rows     R/ 3x10 [place]/ 3x10                                                           Ther Activity Gait Training                                       Modalities

## 2021-03-18 ENCOUNTER — OFFICE VISIT (OUTPATIENT)
Dept: PHYSICAL THERAPY | Facility: CLINIC | Age: 61
End: 2021-03-18
Payer: COMMERCIAL

## 2021-03-18 DIAGNOSIS — G89.29 CHRONIC RIGHT SHOULDER PAIN: ICD-10-CM

## 2021-03-18 DIAGNOSIS — M54.50 ACUTE RIGHT-SIDED LOW BACK PAIN WITHOUT SCIATICA: ICD-10-CM

## 2021-03-18 DIAGNOSIS — M25.559 HIP PAIN: ICD-10-CM

## 2021-03-18 DIAGNOSIS — M54.12 CERVICAL RADICULOPATHY: Primary | ICD-10-CM

## 2021-03-18 DIAGNOSIS — M25.551 RIGHT HIP PAIN: ICD-10-CM

## 2021-03-18 DIAGNOSIS — M25.511 RIGHT SHOULDER PAIN, UNSPECIFIED CHRONICITY: ICD-10-CM

## 2021-03-18 DIAGNOSIS — M25.511 CHRONIC RIGHT SHOULDER PAIN: ICD-10-CM

## 2021-03-18 PROCEDURE — 97140 MANUAL THERAPY 1/> REGIONS: CPT | Performed by: PHYSICAL THERAPIST

## 2021-03-18 PROCEDURE — 97110 THERAPEUTIC EXERCISES: CPT | Performed by: PHYSICAL THERAPIST

## 2021-03-18 PROCEDURE — 97112 NEUROMUSCULAR REEDUCATION: CPT | Performed by: PHYSICAL THERAPIST

## 2021-03-18 NOTE — PROGRESS NOTES
Daily Note     Today's date: 3/18/2021  Patient name: Neil Lockhart  : 1960  MRN: [id number]  Referring provider: Albina Tavarez DO  Dx:   Encounter Diagnosis     ICD-10-CM    1  Cervical radiculopathy  M54 12    2  Right hip pain  M25 551    3  Chronic right shoulder pain  M25 511     G89 29    4  Acute right-sided low back pain without sciatica  M54 5                   Subjective: Pt reports 3/10 c/s pain with increased stiffness in the morning, 0/10 R sh pain, 0/10 LBP with am stiffness  Pt reports no change in constant B UE paresthesias  Objective: See treatment diary below  Assessment: Pt demonstrated improved core stability and c/s ROM  Plan: Cont POC  Precautions: none  Dx: C5-7 radiculopathy with an opening preference, R subacromial impingement, R GT bursitis, L3 fx: DA 3/2/21        Manuals  3 3/4 3/9 3/11 3/16 3/18      Manual c/s distraction  1x5 min 1x5 min 1x5 min 1x5 min         R sh PROM  10"x3 ea 10"x3 ea 10"x3 ea 10"x3 ea        R ITB foam roller 3 min held           Mechanical traction     5 min x 2 25# 5 min x 2 25# 5 min x 2 25#      Graston lumbar paraspinals      3 min       Laser L3  4000J 4000J 4000J 4000J 4000J 4000J      Laser R knee      3000J 3000J      Neuro Re-Ed             scap retraction iso 5"x5 5"x15 5"x15 5"x15 5"x15 5"x15 5"x15      B c/s Rot 1x5 ea 1x15 1x15 1x15 1x15 1x15 1x15      B c/s SB  1x15 1x15 1x15 1x15 1x15 1x15      Abdominal bracing  5"x10 5"x15 5"x15 5"x15        DLS marching   1R05 ea 1x30 ea 1x30 ea 1#/ 1x30 ea 1 5#/ 1x30 ea 1 5#/ 1x30 ea      bridges 1x10 1x10 2x10 2x12 3x10 3x10 3x12      Median nerve flossing  1x10 1x10 1x10 1x10 1x10 1x10      DLS SLR    3x5 ea 3x5 ea 3x6 ea 3x6 ea                   Ther Ex             R TB ER Y/ 1x10 Y/ 3x10 Y/ 3x10 Y/ 3x15 Y/ 3x15 Y/ 3x15 Y/ 3x20      Mod sleeper stretch             clamshells   supine R/ 3x10 supine R/ 3x12 supine R/ 3" 3x12 supine G/ 3" 3x12 supine G/ 3x12      TB rows     R/ 3x10 León/ 3x10 León/ 3x10                                                          Ther Activity                                       Gait Training                                       Modalities

## 2021-03-19 ENCOUNTER — IMMUNIZATIONS (OUTPATIENT)
Dept: FAMILY MEDICINE CLINIC | Facility: HOSPITAL | Age: 61
End: 2021-03-19

## 2021-03-19 DIAGNOSIS — Z23 ENCOUNTER FOR IMMUNIZATION: Primary | ICD-10-CM

## 2021-03-19 PROCEDURE — 0001A SARS-COV-2 / COVID-19 MRNA VACCINE (PFIZER-BIONTECH) 30 MCG: CPT

## 2021-03-19 PROCEDURE — 91300 SARS-COV-2 / COVID-19 MRNA VACCINE (PFIZER-BIONTECH) 30 MCG: CPT

## 2021-03-20 DIAGNOSIS — K29.80 DUODENITIS: ICD-10-CM

## 2021-03-20 DIAGNOSIS — R10.13 EPIGASTRIC PAIN: ICD-10-CM

## 2021-03-22 ENCOUNTER — TELEPHONE (OUTPATIENT)
Dept: INTERNAL MEDICINE CLINIC | Facility: CLINIC | Age: 61
End: 2021-03-22

## 2021-03-22 RX ORDER — OMEPRAZOLE 40 MG/1
CAPSULE, DELAYED RELEASE ORAL
Qty: 90 CAPSULE | Refills: 1 | Status: SHIPPED | OUTPATIENT
Start: 2021-03-22 | End: 2021-06-03 | Stop reason: SDUPTHER

## 2021-03-22 NOTE — TELEPHONE ENCOUNTER
Patient called to let you know that she scheduled her EMG but the first available appt she was able to get is not until August 11  They did put her on a cancellation list, but she said the tingling in her hand is getting worse  Please advise

## 2021-03-23 ENCOUNTER — OFFICE VISIT (OUTPATIENT)
Dept: PHYSICAL THERAPY | Facility: CLINIC | Age: 61
End: 2021-03-23
Payer: COMMERCIAL

## 2021-03-23 DIAGNOSIS — G89.29 CHRONIC RIGHT SHOULDER PAIN: ICD-10-CM

## 2021-03-23 DIAGNOSIS — M54.50 ACUTE RIGHT-SIDED LOW BACK PAIN WITHOUT SCIATICA: ICD-10-CM

## 2021-03-23 DIAGNOSIS — M25.511 RIGHT SHOULDER PAIN, UNSPECIFIED CHRONICITY: ICD-10-CM

## 2021-03-23 DIAGNOSIS — M25.551 RIGHT HIP PAIN: ICD-10-CM

## 2021-03-23 DIAGNOSIS — M25.559 HIP PAIN: ICD-10-CM

## 2021-03-23 DIAGNOSIS — M25.511 CHRONIC RIGHT SHOULDER PAIN: ICD-10-CM

## 2021-03-23 DIAGNOSIS — M54.12 CERVICAL RADICULOPATHY: Primary | ICD-10-CM

## 2021-03-23 PROCEDURE — 97110 THERAPEUTIC EXERCISES: CPT | Performed by: PHYSICAL THERAPIST

## 2021-03-23 PROCEDURE — 97140 MANUAL THERAPY 1/> REGIONS: CPT | Performed by: PHYSICAL THERAPIST

## 2021-03-23 PROCEDURE — 97112 NEUROMUSCULAR REEDUCATION: CPT | Performed by: PHYSICAL THERAPIST

## 2021-03-23 NOTE — PROGRESS NOTES
Daily Note     Today's date: 3/23/2021  Patient name: Martine Eason  : 1960  MRN: [id number]  Referring provider: Mat Lemos DO  Dx:   Encounter Diagnosis     ICD-10-CM    1  Cervical radiculopathy  M54 12    2  Right hip pain  M25 551    3  Chronic right shoulder pain  M25 511     G89 29    4  Acute right-sided low back pain without sciatica  M54 5    5  Hip pain  M25 559    6  Right shoulder pain, unspecified chronicity  M25 511                   Subjective: Pt reports 3/10 B c/s pain with no change in B UE paresthesias  Pt reports she is occasionally dropping items, 0/10 R sh pain, 0/10 LBP, B lateral hip pain with sleeping on her side  Objective: See treatment diary below  Assessment: Pt's B hip pain with SL is likely coming from the bursae  Pt was educated to sleep on her back     Plan: Cont POC  Precautions: none  Dx: C5-7 radiculopathy with an opening preference, R subacromial impingement, R GT bursitis, L3 fx: DA 3/2/21        Manuals 2/23 3/2 3/4 3/9 3/11 3/16 3/18 3/23     Manual c/s distraction  1x5 min 1x5 min 1x5 min 1x5 min         R sh PROM  10"x3 ea 10"x3 ea 10"x3 ea 10"x3 ea        R ITB foam roller 3 min held      3 min     Mechanical traction     5 min x 2 25# 5 min x 2 25# 5 min x 2 25# 5 min x 2 25#     Graston lumbar paraspinals      3 min       Laser L3  4000J 4000J 4000J 4000J 4000J 4000J 4000J     Laser L knee      3000J 3000J 3000J     Neuro Re-Ed             scap retraction iso 5"x5 5"x15 5"x15 5"x15 5"x15 5"x15 5"x15 5"x15     B c/s Rot 1x5 ea 1x15 1x15 1x15 1x15 1x15 1x15 1x15     B c/s SB  1x15 1x15 1x15 1x15 1x15 1x15 1x15     Abdominal bracing  5"x10 5"x15 5"x15 5"x15        DLS marching   9N06 ea 1x30 ea 1x30 ea 1#/ 1x30 ea 1 5#/ 1x30 ea 1 5#/ 1x30 ea      bridges 1x10 1x10 2x10 2x12 3x10 3x10 3x12 3x12     Median nerve flossing  1x10 1x10 1x10 1x10 1x10 1x10 1x10     DLS SLR    3x5 ea 3x5 ea 3x6 ea 3x6 ea 3x7 ea     Standing DLS Hip ABD 2x15 ea     SLS        1x30" ea     Ther Ex             R TB ER Y/ 1x10 Y/ 3x10 Y/ 3x10 Y/ 3x15 Y/ 3x15 Y/ 3x15 Y/ 3x20 Y/ 3x20     Mod sleeper stretch             clamshells   supine R/ 3x10 supine R/ 3x12 supine R/ 3" 3x12 supine G/ 3" 3x12 supine G/ 3x12 supine G/ 3" 3x15     TB rows     R/ 3x10 León/ 3x10 León/ 3x10 León/ 3x12                                                         Ther Activity                                       Gait Training                                       Modalities

## 2021-03-26 ENCOUNTER — OFFICE VISIT (OUTPATIENT)
Dept: PHYSICAL THERAPY | Facility: CLINIC | Age: 61
End: 2021-03-26
Payer: COMMERCIAL

## 2021-03-26 DIAGNOSIS — M54.12 CERVICAL RADICULOPATHY: ICD-10-CM

## 2021-03-26 DIAGNOSIS — M54.50 ACUTE RIGHT-SIDED LOW BACK PAIN WITHOUT SCIATICA: ICD-10-CM

## 2021-03-26 DIAGNOSIS — M25.511 CHRONIC RIGHT SHOULDER PAIN: ICD-10-CM

## 2021-03-26 DIAGNOSIS — M25.511 RIGHT SHOULDER PAIN, UNSPECIFIED CHRONICITY: Primary | ICD-10-CM

## 2021-03-26 DIAGNOSIS — G89.29 CHRONIC RIGHT SHOULDER PAIN: ICD-10-CM

## 2021-03-26 DIAGNOSIS — M25.559 HIP PAIN: ICD-10-CM

## 2021-03-26 DIAGNOSIS — M25.551 RIGHT HIP PAIN: ICD-10-CM

## 2021-03-26 PROCEDURE — 97112 NEUROMUSCULAR REEDUCATION: CPT | Performed by: PHYSICAL THERAPIST

## 2021-03-26 PROCEDURE — 97110 THERAPEUTIC EXERCISES: CPT | Performed by: PHYSICAL THERAPIST

## 2021-03-26 PROCEDURE — 97140 MANUAL THERAPY 1/> REGIONS: CPT | Performed by: PHYSICAL THERAPIST

## 2021-03-26 NOTE — PROGRESS NOTES
Daily Note     Today's date: 3/26/2021  Patient name: Josué Mccall  : 1960  MRN: [id number]  Referring provider: Hiral Bautista DO  Dx:   Encounter Diagnosis     ICD-10-CM    1  Right shoulder pain, unspecified chronicity  M25 511    2  Cervical radiculopathy  M54 12    3  Right hip pain  M25 551    4  Chronic right shoulder pain  M25 511     G89 29    5  Acute right-sided low back pain without sciatica  M54 5    6  Hip pain  M25 559                   Subjective: Pt reports 210 B c/s pain with no change in B UE paresthesias, no LBP, but feels a little unsteady with walking  Objective: See treatment diary below  Assessment: Pt demonstrated difficulty with standing hip ABD balance, but improved SLS  Plan: Cont POC  Precautions: none  Dx: C5-7 radiculopathy with an opening preference, R subacromial impingement, R GT bursitis, L3 fx: DA 3/2/21        Manuals 2/23 3/2 3/4 3/9 3/11 3/16 3/18 3/23 3/26    Manual c/s distraction  1x5 min 1x5 min 1x5 min 1x5 min         R sh PROM  10"x3 ea 10"x3 ea 10"x3 ea 10"x3 ea        R ITB foam roller 3 min held      3 min     Mechanical traction     5 min x 2 25# 5 min x 2 25# 5 min x 2 25# 5 min x 2 25# 5 min x 2 @ 25#    Graston lumbar paraspinals      3 min       Laser L3  4000J 4000J 4000J 4000J 4000J 4000J 4000J 5000J    Laser L knee      3000J 3000J 3000J     Neuro Re-Ed             scap retraction iso 5"x5 5"x15 5"x15 5"x15 5"x15 5"x15 5"x15 5"x15 5"x15    B c/s Rot 1x5 ea 1x15 1x15 1x15 1x15 1x15 1x15 1x15 1x15    B c/s SB  1x15 1x15 1x15 1x15 1x15 1x15 1x15 1x15    Abdominal bracing  5"x10 5"x15 5"x15 5"x15        DLS marching   8X61 ea 1x30 ea 1x30 ea 1#/ 1x30 ea 1 5#/ 1x30 ea 1 5#/ 1x30 ea      bridges 1x10 1x10 2x10 2x12 3x10 3x10 3x12 3x12     Median nerve flossing  1x10 1x10 1x10 1x10 1x10 1x10 1x10     DLS SLR    3x5 ea 3x5 ea 3x6 ea 3x6 ea 3x7 ea     Standing DLS Hip ABD        1O96 ea     SLS        1x30" ea     Ther Ex             R TB ER Y/ 1x10 Y/ 3x10 Y/ 3x10 Y/ 3x15 Y/ 3x15 Y/ 3x15 Y/ 3x20 Y/ 3x20     Mod sleeper stretch             clamshells   supine R/ 3x10 supine R/ 3x12 supine R/ 3" 3x12 supine G/ 3" 3x12 supine G/ 3x12 supine G/ 3" 3x15     TB rows     R/ 3x10 León/ 3x10 León/ 3x10 León/ 3x12                                                         Ther Activity                                       Gait Training                                       Modalities

## 2021-03-30 ENCOUNTER — OFFICE VISIT (OUTPATIENT)
Dept: PHYSICAL THERAPY | Facility: CLINIC | Age: 61
End: 2021-03-30
Payer: COMMERCIAL

## 2021-03-30 DIAGNOSIS — M25.511 CHRONIC RIGHT SHOULDER PAIN: ICD-10-CM

## 2021-03-30 DIAGNOSIS — G89.29 CHRONIC RIGHT SHOULDER PAIN: ICD-10-CM

## 2021-03-30 DIAGNOSIS — M54.50 ACUTE RIGHT-SIDED LOW BACK PAIN WITHOUT SCIATICA: ICD-10-CM

## 2021-03-30 DIAGNOSIS — M25.511 RIGHT SHOULDER PAIN, UNSPECIFIED CHRONICITY: Primary | ICD-10-CM

## 2021-03-30 DIAGNOSIS — M54.12 CERVICAL RADICULOPATHY: ICD-10-CM

## 2021-03-30 DIAGNOSIS — M25.559 HIP PAIN: ICD-10-CM

## 2021-03-30 DIAGNOSIS — M25.551 RIGHT HIP PAIN: ICD-10-CM

## 2021-03-30 PROCEDURE — 97140 MANUAL THERAPY 1/> REGIONS: CPT | Performed by: PHYSICAL THERAPIST

## 2021-03-30 PROCEDURE — 97110 THERAPEUTIC EXERCISES: CPT | Performed by: PHYSICAL THERAPIST

## 2021-03-30 PROCEDURE — 97112 NEUROMUSCULAR REEDUCATION: CPT | Performed by: PHYSICAL THERAPIST

## 2021-03-30 NOTE — PROGRESS NOTES
Daily Note     Today's date: 3/30/2021  Patient name: Rayray Greene  : 1960  MRN: [id number]  Referring provider: Alicia Jhaveri DO  Dx:   Encounter Diagnosis     ICD-10-CM    1  Right shoulder pain, unspecified chronicity  M25 511    2  Cervical radiculopathy  M54 12    3  Right hip pain  M25 551    4  Chronic right shoulder pain  M25 511     G89 29    5  Acute right-sided low back pain without sciatica  M54 5    6  Hip pain  M25 559                   Subjective: Pt reports 4/10 L c/s pain with constant B UE paresthesias with occasional weakness  Pt reports 2/10 L sided LBP  Objective: See treatment diary below  Assessment: Pt demonstrated improved core stability, but continues to have C5/6 radiculopathy B  Plan: Cont POC  Precautions: none  Dx: C5-7 radiculopathy with an opening preference, R subacromial impingement, R GT bursitis, L3 fx: DA 3/2/21        Manuals 2/23 3/2 3/4 3/9 3/11 3/16 3/18 3/23 3/26 3/30   Manual c/s distraction  1x5 min 1x5 min 1x5 min 1x5 min         R sh PROM  10"x3 ea 10"x3 ea 10"x3 ea 10"x3 ea        R ITB foam roller 3 min held      3 min     Mechanical traction     5 min x 2 25# 5 min x 2 25# 5 min x 2 25# 5 min x 2 25# 5 min x 2 @ 25# 5 min x 2 @ 25#   Graston lumbar paraspinals      3 min       Laser L3  4000J 4000J 4000J 4000J 4000J 4000J 4000J 5000J 5000J   Laser L knee      3000J 3000J 3000J     Neuro Re-Ed             scap retraction iso 5"x5 5"x15 5"x15 5"x15 5"x15 5"x15 5"x15 5"x15 5"x15 5"x15   B c/s Rot 1x5 ea 1x15 1x15 1x15 1x15 1x15 1x15 1x15 1x15 1x15   B c/s SB  1x15 1x15 1x15 1x15 1x15 1x15 1x15 1x15 1x15   Abdominal bracing  5"x10 5"x15 5"x15 5"x15        DLS marching   7W ea 1x30 ea 1x30 ea 1#/ 1x30 ea 1 5#/ 1x30 ea 1 5#/ 1x30 ea      bridges 1x10 1x10 2x10 2x12 3x10 3x10 3x12 3x12  3x15   Median nerve flossing  1x10 1x10 1x10 1x10 1x10 1x10 1x10  1x10   DLS SLR    3x5 ea 3x5 ea 3x6 ea 3x6 ea 3x7 ea  3x7 ea   Standing DLS Hip ABD 2x15 ea  2x15 ea   SLS        1x30" ea  1x30" ea   Ther Ex             R TB ER Y/ 1x10 Y/ 3x10 Y/ 3x10 Y/ 3x15 Y/ 3x15 Y/ 3x15 Y/ 3x20 Y/ 3x20  R/ 3x10   Mod sleeper stretch             clamshells   supine R/ 3x10 supine R/ 3x12 supine R/ 3" 3x12 supine G/ 3" 3x12 supine G/ 3x12 supine G/ 3" 3x15  supine Buzz/ 3" 3x15   TB rows     R/ 3x10 León/ 3x10 León/ 3x10 León/ 3x12  León/ 3x12                                                       Ther Activity                                       Gait Training                                       Modalities

## 2021-04-01 ENCOUNTER — OFFICE VISIT (OUTPATIENT)
Dept: PHYSICAL THERAPY | Facility: CLINIC | Age: 61
End: 2021-04-01
Payer: COMMERCIAL

## 2021-04-01 DIAGNOSIS — M25.559 HIP PAIN: ICD-10-CM

## 2021-04-01 DIAGNOSIS — M25.511 CHRONIC RIGHT SHOULDER PAIN: ICD-10-CM

## 2021-04-01 DIAGNOSIS — M54.12 CERVICAL RADICULOPATHY: ICD-10-CM

## 2021-04-01 DIAGNOSIS — M54.50 ACUTE RIGHT-SIDED LOW BACK PAIN WITHOUT SCIATICA: ICD-10-CM

## 2021-04-01 DIAGNOSIS — G89.29 CHRONIC RIGHT SHOULDER PAIN: ICD-10-CM

## 2021-04-01 DIAGNOSIS — M25.511 RIGHT SHOULDER PAIN, UNSPECIFIED CHRONICITY: Primary | ICD-10-CM

## 2021-04-01 DIAGNOSIS — M25.551 RIGHT HIP PAIN: ICD-10-CM

## 2021-04-01 PROCEDURE — 97140 MANUAL THERAPY 1/> REGIONS: CPT | Performed by: PHYSICAL THERAPIST

## 2021-04-01 PROCEDURE — 97110 THERAPEUTIC EXERCISES: CPT | Performed by: PHYSICAL THERAPIST

## 2021-04-01 PROCEDURE — 97112 NEUROMUSCULAR REEDUCATION: CPT | Performed by: PHYSICAL THERAPIST

## 2021-04-01 NOTE — PROGRESS NOTES
Daily Note     Today's date: 2021  Patient name: Vaughn Angulo  : 1960  MRN: [id number]  Referring provider: Isai Smith DO  Dx:   Encounter Diagnosis     ICD-10-CM    1  Right shoulder pain, unspecified chronicity  M25 511    2  Cervical radiculopathy  M54 12    3  Right hip pain  M25 551    4  Chronic right shoulder pain  M25 511     G89 29    5  Acute right-sided low back pain without sciatica  M54 5    6  Hip pain  M25 559                   Subjective: Pt reports 1/10 L c/s pain with constant B UE paresthesias with occasional weakness  Pt reports 2/10 L sided LBP  Objective: See treatment diary below  Assessment: Pt demonstrated improved posture  Attempted laser on c/s secondary to no change in UE radicular sx after traction  Plan: Cont POC  Assess response to laser on c/s nv  Precautions: none  Dx: C5-7 radiculopathy with an opening preference, R subacromial impingement, R GT bursitis, L3 fx: DA 3/2/21        Manuals 4/1         3/30   Manual c/s distraction              R sh PROM             R ITB foam roller             Mechanical traction          5 min x 2 @ 25#   Graston lumbar paraspinals             Laser L3 5000J         5000J   Laser C4-T2 4000J            Neuro Re-Ed             scap retraction iso 5"x15         5"x15   B c/s Rot 1x15 ea         1x15   B c/s SB 1x15 ea         1x15   Abdominal bracing             DLS marching              Bridges 3x15         3x15   Median nerve flossing 1x10         1x10   DLS SLR 3x8 ea         3x7 ea   Standing DLS Hip ABD 8Q21 ea         2x15 ea   SLS 1x30 ea         1x30" ea   Ther Ex             R TB ER R/ 3x10         R/ 3x10   Mod sleeper stretch             clamshells supine Buzz/ 3" 3x15         supine Buzz/ 3" 3x15   TB rows León/ 3x12         León/ 3x12                                                       Ther Activity                                       Gait Training                                       Modalities

## 2021-04-06 ENCOUNTER — EVALUATION (OUTPATIENT)
Dept: PHYSICAL THERAPY | Facility: CLINIC | Age: 61
End: 2021-04-06
Payer: COMMERCIAL

## 2021-04-06 DIAGNOSIS — M54.12 CERVICAL RADICULOPATHY: Primary | ICD-10-CM

## 2021-04-06 DIAGNOSIS — M25.559 HIP PAIN: ICD-10-CM

## 2021-04-06 DIAGNOSIS — G89.29 CHRONIC RIGHT SHOULDER PAIN: ICD-10-CM

## 2021-04-06 DIAGNOSIS — M54.50 ACUTE RIGHT-SIDED LOW BACK PAIN WITHOUT SCIATICA: ICD-10-CM

## 2021-04-06 DIAGNOSIS — M25.511 CHRONIC RIGHT SHOULDER PAIN: ICD-10-CM

## 2021-04-06 DIAGNOSIS — M25.551 RIGHT HIP PAIN: ICD-10-CM

## 2021-04-06 PROCEDURE — 97110 THERAPEUTIC EXERCISES: CPT | Performed by: PHYSICAL THERAPIST

## 2021-04-06 PROCEDURE — 97112 NEUROMUSCULAR REEDUCATION: CPT | Performed by: PHYSICAL THERAPIST

## 2021-04-06 PROCEDURE — 97140 MANUAL THERAPY 1/> REGIONS: CPT | Performed by: PHYSICAL THERAPIST

## 2021-04-06 NOTE — PROGRESS NOTES
PT Re-Evaluation     Today's date: 2021  Patient name: Abraham Prajapati  : 1960  MRN: [id number]  Referring provider: Rica Grover DO  Dx:   Encounter Diagnosis     ICD-10-CM    1  Right shoulder pain, unspecified chronicity  M25 511    2  Cervical radiculopathy  M54 12    3  Right hip pain  M25 551    4  Chronic right shoulder pain  M25 511     G89 29    5  Acute right-sided low back pain without sciatica  M54 5    6  Hip pain  M25 559                   Assessment  Assessment details: Pt demonstrated improved lumbar and cervical spine ROM and pain, improved RTC strength and shoulder pain, improved c/s ROM, but no change in paresthesias  Pt will benefit from continued PT to further improve c/s pain and radicular sx, B hip pain and lumbar pain  Pt is appropriate for d/cing R shoulder POC to HEP  Impairments: abnormal gait, abnormal or restricted ROM, activity intolerance, impaired physical strength, lacks appropriate home exercise program, pain with function and poor posture     Goals  ST-4 weeks  1   Pt will decrease pain > 50%  met  2  Pt will centralize c/s radiculopathy  3   Pt will decrease pain > 50% with SL     LT-8 weeks  1   Pt will decrease pain > 75%  2   Pt will walk her dog for 20 minutes without pain  3   Pt will sleep t/o the night without pain      Plan  Planned modality interventions: traction  Planned therapy interventions: abdominal trunk stabilization, neuromuscular re-education, patient education, postural training, strengthening, stretching, therapeutic activities, therapeutic exercise, therapeutic training, flexibility, functional ROM exercises, gait training, home exercise program and body mechanics training  Frequency: 2x week  Duration in weeks: 4        Subjective Evaluation    History of Present Illness  Date of onset: 12/15/2020  Mechanism of injury: Pt reports a 100% improvement in shoulder pain, a 90% improvement in LBP, a 75% improvement in B hip pain a 50% improvement in cervical pain, but pt reports no change in constant B hand paresthesias  Pain  Current pain ratin  At best pain ratin  At worst pain ratin  Quality: dull ache, radiating and throbbing  Relieving factors: change in position, heat and medications      Diagnostic Tests  Abnormal x-ray: taken 21, results not yet available    Treatments  Previous treatment: medication  Patient Goals  Patient goals for therapy: decreased edema, increased motion, increased strength, independence with ADLs/IADLs and return to sport/leisure activities          Objective     Postural Observations  Seated posture: poor  Standing posture: poor  Correction of posture: makes symptoms better        Neurological Testing     Sensation   Cervical/Thoracic   Left   Intact: light touch    Right   Intact: light touch    Reflexes   Left   Biceps (C5/C6): normal (2+)    Right   Biceps (C5/C6): normal (2+)    Active Range of Motion   Cervical/Thoracic Spine       Cervical    Flexion:  WFL  Extension:  with pain Restriction level: minimal  Left lateral flexion:  Restriction level: moderate  Right lateral flexion:  Restriction level moderate  Left rotation:  Restriction level: moderate  Right rotation:  Restriction level: moderate    Passive Range of Motion   Left Hip   Flexion: 110 degrees   External rotation (90/90): 40 degrees   Internal rotation (90/90): 15 degrees     Right Hip   Flexion: 110 degrees   External rotation (90/90): 35 degrees   Internal rotation (90/90): 10 degrees     Joint Play     Hypomobile: C1, C2, C3, C4, C5, C6 and C7   Mechanical Assessment    Cervical    Seated Extension: repeated movements  Pain location: peripheralized    Thoracic      Lumbar      Strength/Myotome Testing     Left Shoulder     Planes of Motion   Flexion: 4   Abduction: 4   External rotation at 0°: 4   Internal rotation at 0°: 4     Right Shoulder     Planes of Motion   Flexion: 4-   Abduction: 4-   External rotation at 0°: 4- Internal rotation at 0°: 4-     Left Hip   Planes of Motion   Flexion: 4-  Extension: 4  Abduction: 4-  Adduction: 4-    Right Hip   Planes of Motion   Flexion: 4-  Extension: 4  Abduction: 4-  Adduction: 4-    Tests   Cervical   Negative alar ligament test and Sharp-Prerna test      Left   Negative Spurling's Test A and Spurling's Test B  Right   Positive Spurling's Test B  Negative Spurling's Test A  Right Shoulder   Negative Hawkin's and Neer's  Left Elbow   Positive Cozen's  Right Elbow   Negative Cozen's  Left Wrist/Hand   Positive Tinel's sign (medial nerve)  Right Wrist/Hand   Positive Tinel's sign (medial nerve)  Left Hip   Negative Georgette and scour  Right Hip   Positive Georgette  Negative scour

## 2021-04-06 NOTE — PROGRESS NOTES
Daily Note     Today's date: 2021  Patient name: Vaughn Angulo  : 1960  MRN: [id number]  Referring provider: Isai Smith DO  Dx:   Encounter Diagnosis     ICD-10-CM    1  Right shoulder pain, unspecified chronicity  M25 511    2  Cervical radiculopathy  M54 12    3  Right hip pain  M25 551    4  Chronic right shoulder pain  M25 511     G89 29    5  Acute right-sided low back pain without sciatica  M54 5    6  Hip pain  M25 559                   Subjective: Pt reports improved c/s pain after Laser last visit, but no change in UE paresthesias  Objective: See treatment diary below  Assessment: Attempted laser and tx in an attempt to decrease radicular sx  Plan: Cont POC  Precautions: none  Dx: C5-7 radiculopathy with an opening preference, R subacromial impingement, R GT bursitis, L3 fx: DA 3/2/21        Manuals 4/1 4/6        3/30   Manual c/s distraction              R sh PROM             R ITB foam roller             Mechanical traction 5 min x 2 @ 25# 5 min x 3 @ 27#        5 min x 2 @ 25#   Graston lumbar paraspinals             Laser L3 5000J 5000J        5000J   Laser C4-T2 4000J 4000J           Neuro Re-Ed             scap retraction iso 5"x15 5"x15        5"x15   B c/s Rot 1x15 ea 1x15 ea        1x15   B c/s SB 1x15 ea 1x15 ea        1x15   Abdominal bracing             DLS marching              Bridges 3x15 3x15        3x15   Median nerve flossing 1x10 1x10        1x10   DLS SLR 3x8 ea 3x8 ea        3x7 ea   Standing DLS Hip ABD 8H31 ea 6A36 ea        2x15 ea   SLS 1x30 ea 1x30 ea        1x30" ea   Ther Ex             R TB ER R/ 3x10 R/ 3x10        R/ 3x10   Mod sleeper stretch             clamshells supine Buzz/ 3" 3x15 Supine Buzz/ 3" 3x15        supine Buzz/ 3" 3x15   TB rows León/ 3x12 León/ 3x15        León/ 3x12                                                       Ther Activity                                       Gait Training Modalities

## 2021-04-08 ENCOUNTER — OFFICE VISIT (OUTPATIENT)
Dept: PHYSICAL THERAPY | Facility: CLINIC | Age: 61
End: 2021-04-08
Payer: COMMERCIAL

## 2021-04-08 DIAGNOSIS — M25.559 HIP PAIN: Primary | ICD-10-CM

## 2021-04-08 DIAGNOSIS — M25.511 RIGHT SHOULDER PAIN, UNSPECIFIED CHRONICITY: ICD-10-CM

## 2021-04-08 PROCEDURE — 97140 MANUAL THERAPY 1/> REGIONS: CPT

## 2021-04-08 PROCEDURE — 97110 THERAPEUTIC EXERCISES: CPT

## 2021-04-08 PROCEDURE — 97112 NEUROMUSCULAR REEDUCATION: CPT

## 2021-04-08 NOTE — PROGRESS NOTES
Daily Note     Today's date: 2021  Patient name: Juanita Ward  : 1960  MRN: [id number]  Referring provider: Jamar Bass DO  Dx:   Encounter Diagnosis     ICD-10-CM    1  Hip pain  M25 559    2  Right shoulder pain, unspecified chronicity  M25 511                   Subjective: Pt reports 3/10 L-sided cv pain pre tx  Pt reports good response to carpal tunnel Graston and decreased paresthesias in her 4th and 5th fingers afterwards  Objective: See treatment diary below       Assessment: Pt demonstrated fair SLS balance, fatigue with WB strengthening and fair scapular stability  Pt demonstrated good response to Graston  Plan: Continue poc as per PT  Precautions: none  Dx: C5-7 radiculopathy with an opening preference, R subacromial impingement, R GT bursitis, L3 fx: DA 3/2/21        Manuals 4/1 4/6 4/8       3/30   Manual c/s distraction              R sh PROM             R ITB foam roller             Mechanical traction 5 min x 2 @ 25# 5 min x 3 @ 27# 5 min x 3 @ 27#       5 min x 2 @ 25#   Graston B carpal tunnel   3 min ea          Laser L3 5000J 5000J 5000J       5000J   Laser C4-T2 4000J 4000J 4000J          Neuro Re-Ed             scap retraction iso 5"x15 5"x15 5"x15       5"x15   B c/s Rot 1x15 ea 1x15 ea 1x15 ea       1x15   B c/s SB 1x15 ea 1x15 ea 1x15 ea       1x15   Abdominal bracing             DLS marching              Bridges 3x15 3x15 3x15       3x15   Median nerve flossing 1x10 1x10 1x10       1x10   DLS SLR 3x8 ea 3x8 ea 3x8 ea       3x7 ea   Standing DLS Hip ABD 1A80 ea 2T72 ea 2x15 ea       2x15 ea   SLS 1x30 ea 1x30 ea 1x30" ea       1x30" ea   Ther Ex             R TB ER R/ 3x10 R/ 3x10 R/  3x10       R/ 3x10   Mod sleeper stretch             clamshells supine Buzz/ 3" 3x15 Supine Buzz/ 3" 3x15 Supine Buzz/ 3" 3x15       supine Buzz/ 3" 3x15   TB rows León/ 3x12 León/ 3x15 León/  3x15       León/ 3x12                                                       Ther Activity                                       Gait Training                                       Modalities

## 2021-04-09 ENCOUNTER — IMMUNIZATIONS (OUTPATIENT)
Dept: FAMILY MEDICINE CLINIC | Facility: HOSPITAL | Age: 61
End: 2021-04-09

## 2021-04-09 DIAGNOSIS — Z23 ENCOUNTER FOR IMMUNIZATION: Primary | ICD-10-CM

## 2021-04-09 PROCEDURE — 0002A SARS-COV-2 / COVID-19 MRNA VACCINE (PFIZER-BIONTECH) 30 MCG: CPT

## 2021-04-09 PROCEDURE — 91300 SARS-COV-2 / COVID-19 MRNA VACCINE (PFIZER-BIONTECH) 30 MCG: CPT

## 2021-04-12 ENCOUNTER — OFFICE VISIT (OUTPATIENT)
Dept: PHYSICAL THERAPY | Facility: CLINIC | Age: 61
End: 2021-04-12
Payer: COMMERCIAL

## 2021-04-12 DIAGNOSIS — G89.29 CHRONIC RIGHT SHOULDER PAIN: ICD-10-CM

## 2021-04-12 DIAGNOSIS — M54.12 CERVICAL RADICULOPATHY: ICD-10-CM

## 2021-04-12 DIAGNOSIS — M25.559 HIP PAIN: Primary | ICD-10-CM

## 2021-04-12 DIAGNOSIS — M25.511 CHRONIC RIGHT SHOULDER PAIN: ICD-10-CM

## 2021-04-12 DIAGNOSIS — M54.50 ACUTE RIGHT-SIDED LOW BACK PAIN WITHOUT SCIATICA: ICD-10-CM

## 2021-04-12 DIAGNOSIS — M25.551 RIGHT HIP PAIN: ICD-10-CM

## 2021-04-12 DIAGNOSIS — M25.511 RIGHT SHOULDER PAIN, UNSPECIFIED CHRONICITY: ICD-10-CM

## 2021-04-12 PROCEDURE — 97110 THERAPEUTIC EXERCISES: CPT

## 2021-04-12 PROCEDURE — 97140 MANUAL THERAPY 1/> REGIONS: CPT

## 2021-04-12 PROCEDURE — 97012 MECHANICAL TRACTION THERAPY: CPT

## 2021-04-12 PROCEDURE — 97112 NEUROMUSCULAR REEDUCATION: CPT

## 2021-04-12 NOTE — PROGRESS NOTES
Daily Note     Today's date: 2021  Patient name: Artur Wheeler  : 1960  MRN: [id number]  Referring provider: Phoebe Corrigan DO  Dx:   Encounter Diagnosis     ICD-10-CM    1  Hip pain  M25 559    2  Right shoulder pain, unspecified chronicity  M25 511    3  Cervical radiculopathy  M54 12    4  Right hip pain  M25 551    5  Chronic right shoulder pain  M25 511     G89 29    6  Acute right-sided low back pain without sciatica  M54 5                   Subjective: Pt reports /10 L-sided cv pain and increased B paresthesias returned over the weekend after feeling some relief following last tx  She states min pain in L hip today and reports she felt a little unsteady over the weekend with her gait  Objective: See treatment diary below       Assessment: Pt required cuing for proper posture throughout interventions today  Pt notes decrease in L hip pain/discomfort following exercises and continues good response to Graston with reported decreased paresthesias in 4th and 5th fingers following manuals  Plan: Continue poc as per PT  Precautions: none  Dx: C5-7 radiculopathy with an opening preference, R subacromial impingement, R GT bursitis, L3 fx: DA 3/2/21        Manuals  4/6 4/8 4/12      3/30   Manual c/s distraction              R sh PROM             R ITB foam roller             Mechanical traction 5 min x 2 @ 25# 5 min x 3 @ 27# 5 min x 3 @ 27# 5 min x3 @ 27#      5 min x 2 @ 25#   Graston B carpal tunnel   3 min ea 3 min ea         Laser L3 5000J 5000J 5000J 5000J      5000J   Laser C4-T2 4000J 4000J 4000J 4000J         Neuro Re-Ed             scap retraction iso 5"x15 5"x15 5"x15 5"x15      5"x15   B c/s Rot 1x15 ea 1x15 ea 1x15 ea 1x15 ea      1x15   B c/s SB 1x15 ea 1x15 ea 1x15 ea 1x15 ea      1x15   Abdominal bracing             DLS marching              Bridges 3x15 3x15 3x15 3x15      3x15   Median nerve flossing 1x10 1x10 1x10 1x10      1x10   DLS SLR 3x8 ea 3x8 ea 3x8 ea 3x8 ea      3x7 ea   Standing DLS Hip ABD 7W43 ea 9Y30 ea 2x15 ea 2x15 ea      2x15 ea   SLS 1x30 ea 1x30 ea 1x30" ea 1x30" ea      1x30" ea   Ther Ex             R TB ER R/ 3x10 R/ 3x10 R/  3x10 R/  3x10      R/ 3x10   Mod sleeper stretch             clamshells supine Buzz/ 3" 3x15 Supine Buzz/ 3" 3x15 Supine Buzz/ 3" 3x15 Supine  Buzz/  3"  3x15      supine Buzz/ 3" 3x15   TB rows León/ 3x12 Lenó/ 3x15 León/  3x15 León/  3x15      León/ 3x12                                                       Ther Activity                                       Gait Training                                       Modalities

## 2021-04-14 ENCOUNTER — RA CDI HCC (OUTPATIENT)
Dept: OTHER | Facility: HOSPITAL | Age: 61
End: 2021-04-14

## 2021-04-14 NOTE — PROGRESS NOTES
[facility]  coding opportunities             Chart reviewed, (number of) suggestions sent to provider: 1           Patients insurance company: [employer] (Medicare Advantage and Commercial)     Visit status: Patient arrived for their scheduled appointment     Provider never responded to [employer]  coding request     [facility]  coding opportunities             Chart reviewed, (number of) suggestions sent to provider: 1           Patients insurance company: [employer] [place] [address] [employer])           E11 65: Uncontrolled type 2 diabetes mellitus with hyperglycemia, with long-term current use of insulin ([employer] [facility] ) - NOT USED

## 2021-04-15 ENCOUNTER — OFFICE VISIT (OUTPATIENT)
Dept: PHYSICAL THERAPY | Facility: CLINIC | Age: 61
End: 2021-04-15
Payer: COMMERCIAL

## 2021-04-15 DIAGNOSIS — M25.511 RIGHT SHOULDER PAIN, UNSPECIFIED CHRONICITY: ICD-10-CM

## 2021-04-15 DIAGNOSIS — M25.551 RIGHT HIP PAIN: ICD-10-CM

## 2021-04-15 DIAGNOSIS — M54.50 ACUTE RIGHT-SIDED LOW BACK PAIN WITHOUT SCIATICA: ICD-10-CM

## 2021-04-15 DIAGNOSIS — M25.511 CHRONIC RIGHT SHOULDER PAIN: ICD-10-CM

## 2021-04-15 DIAGNOSIS — G89.29 CHRONIC RIGHT SHOULDER PAIN: ICD-10-CM

## 2021-04-15 DIAGNOSIS — M25.559 HIP PAIN: ICD-10-CM

## 2021-04-15 DIAGNOSIS — M54.12 CERVICAL RADICULOPATHY: Primary | ICD-10-CM

## 2021-04-15 PROCEDURE — 97140 MANUAL THERAPY 1/> REGIONS: CPT | Performed by: PHYSICAL THERAPIST

## 2021-04-15 PROCEDURE — 97110 THERAPEUTIC EXERCISES: CPT | Performed by: PHYSICAL THERAPIST

## 2021-04-15 PROCEDURE — 97112 NEUROMUSCULAR REEDUCATION: CPT | Performed by: PHYSICAL THERAPIST

## 2021-04-15 NOTE — PROGRESS NOTES
Daily Note     Today's date: 4/15/2021  Patient name: Yoshi Cardona  : 1960  MRN: [id number]  Referring provider: Manish Mar DO  Dx:   Encounter Diagnosis     ICD-10-CM    1  Cervical radiculopathy  M54 12    2  Right hip pain  M25 551    3  Chronic right shoulder pain  M25 511     G89 29    4  Acute right-sided low back pain without sciatica  M54 5    5  Right shoulder pain, unspecified chronicity  M25 511    6  Hip pain  M25 559                   Subjective: Pt reports 2/10 c/s pain constant 1-3rd finger paresthesias B  Pt reports 1-2 days of relief in paresthesias after Graston at carpal tunnel  No LBP or LE weakness currently, but does report some earlier this week with shopping  Pt reports no shoulder pain  Objective: See treatment diary below     Assessment: Pt appears to have a secondary median nerve compression site at the carpal tunnel  Plan: Continue POC  Precautions: none  Dx: C5-7 radiculopathy with an opening preference, R subacromial impingement, R GT bursitis, L3 fx: DA 3/2/21        Manuals 4/ 46  415        Manual c/s distraction              R sh PROM             R ITB foam roller             Mechanical traction 5 min x 2 @ 25# 5 min x 3 @ 27# 5 min x 3 @ 27# 5 min x3 @ 27# 5min x 3 @ 28#        Graston B carpal tunnel   3 min ea 3 min ea 3 min ea        Laser L3 5000J 5000J 5000J 5000J 5000J     5000J   Laser C4-T2 4000J 4000J 4000J 4000J 4000J        Neuro Re-Ed             scap retraction iso 5"x15 5"x15 5"x15 5"x15 5"x15     5"x15   B c/s Rot 1x15 ea 1x15 ea 1x15 ea 1x15 ea 1x15 ea     1x15   B c/s SB 1x15 ea 1x15 ea 1x15 ea 1x15 ea 1x15 ea     1x15   Abdominal bracing             DLS marching              Bridges 3x15 3x15 3x15 3x15 3x20     3x15   Median nerve flossing 1x10 1x10 1x10 1x10 1x10     1x10   DLS SLR 3x8 ea 3x8 ea 3x8 ea 3x8 ea 3x10 ea     3x7 ea   Standing DLS Hip ABD 5L45 ea 0H17 ea 2x15 ea 2x15 ea 2x15 ea     2x15 ea   SLS 1x30 ea 1x30 ea 1x30" ea 1x30" ea 1x30" ea     1x30" ea   Ther Ex             R TB ER R/ 3x10 R/ 3x10 R/  3x10 R/  3x10 R/ 3x12     R/ 3x10   Mod sleeper stretch             clamshells supine Buzz/ 3" 3x15 Supine Buzz/ 3" 3x15 Supine Buzz/ 3" 3x15 Supine  Buzz/  3"  3x15 Supine Buzz/ 3" 3x15     supine Buzz/ 3" 3x15   TB rows León/ 3x12 León/ 3x15 León/  3x15 León/  3x15 León/ 3x15     León/ 3x12                                                       Ther Activity                                       Gait Training                                       Modalities

## 2021-04-20 ENCOUNTER — OFFICE VISIT (OUTPATIENT)
Dept: INTERNAL MEDICINE CLINIC | Facility: CLINIC | Age: 61
End: 2021-04-20
Payer: COMMERCIAL

## 2021-04-20 VITALS
OXYGEN SATURATION: 98 % | BODY MASS INDEX: 34.18 KG/M2 | HEART RATE: 71 BPM | RESPIRATION RATE: 16 BRPM | DIASTOLIC BLOOD PRESSURE: 78 MMHG | SYSTOLIC BLOOD PRESSURE: 120 MMHG | HEIGHT: 64 IN | WEIGHT: 200.2 LBS

## 2021-04-20 DIAGNOSIS — E66.09 CLASS 1 OBESITY DUE TO EXCESS CALORIES WITH SERIOUS COMORBIDITY AND BODY MASS INDEX (BMI) OF 32.0 TO 32.9 IN ADULT: ICD-10-CM

## 2021-04-20 DIAGNOSIS — M25.559 HIP PAIN: ICD-10-CM

## 2021-04-20 DIAGNOSIS — S32.009A CLOSED FRACTURE OF TRANSVERSE PROCESS OF LUMBAR VERTEBRA, INITIAL ENCOUNTER (HCC): ICD-10-CM

## 2021-04-20 DIAGNOSIS — E78.5 DYSLIPIDEMIA: ICD-10-CM

## 2021-04-20 DIAGNOSIS — Z11.4 SCREENING FOR HIV (HUMAN IMMUNODEFICIENCY VIRUS): Primary | ICD-10-CM

## 2021-04-20 DIAGNOSIS — G56.03 BILATERAL CARPAL TUNNEL SYNDROME: ICD-10-CM

## 2021-04-20 PROBLEM — S12.9XXA CLOSED FRACTURE OF TRANSVERSE PROCESS OF CERVICAL VERTEBRA (HCC): Status: ACTIVE | Noted: 2021-04-20

## 2021-04-20 PROCEDURE — 1036F TOBACCO NON-USER: CPT | Performed by: INTERNAL MEDICINE

## 2021-04-20 PROCEDURE — 99214 OFFICE O/P EST MOD 30 MIN: CPT | Performed by: INTERNAL MEDICINE

## 2021-04-20 PROCEDURE — 3074F SYST BP LT 130 MM HG: CPT | Performed by: INTERNAL MEDICINE

## 2021-04-20 PROCEDURE — 3078F DIAST BP <80 MM HG: CPT | Performed by: INTERNAL MEDICINE

## 2021-04-20 RX ORDER — ATORVASTATIN CALCIUM 20 MG/1
20 TABLET, FILM COATED ORAL DAILY
Qty: 90 TABLET | Refills: 0 | Status: SHIPPED | OUTPATIENT
Start: 2021-04-20 | End: 2021-07-29 | Stop reason: SDUPTHER

## 2021-04-20 NOTE — PROGRESS NOTES
Assessment/Plan:    Closed fracture of transverse process of cervical vertebra (HCC)   Status post fall resulting in a transverse process fracture the from working with physical therapy through for pain over extremity weakness  I would like her to see orthopedics Dr Noa Cordon 1 obesity due to excess calories with serious comorbidity and body mass index (BMI) of 32 0 to 32 9 in adult   I have counselled the pt to follow a healthy and balanced diet ,and recommend routine exercise  Dyslipidemia    Low-cholesterol diet, provided Rx for Lipitor    Hip pain   Working with physical therapy related to recent fall like her to also see Orthopedics         Problem List Items Addressed This Visit        Nervous and Auditory    Bilateral carpal tunnel syndrome    Relevant Orders    Cock Up Wrist Splint       Musculoskeletal and Integument    Closed fracture of transverse process of cervical vertebra ([address] )      Status post fall resulting in a transverse process fracture the from working with physical therapy through for pain over extremity weakness  I would like her to see orthopedics Dr Yaritza Solitario            Other    Dyslipidemia       Low-cholesterol diet, provided Rx for Lipitor         Relevant Medications    atorvastatin (LIPITOR) 20 mg tablet    Class 1 obesity due to excess calories with serious comorbidity and body mass index (BMI) of 32 0 to 32 9 in adult      I have counselled the pt to follow a healthy and balanced diet ,and recommend routine exercise  Hip pain      Working with physical therapy related to recent fall like her to also see Orthopedics           Other Visit Diagnoses     Screening for HIV (human immunodeficiency virus)    -  Primary           return to office   months  call if any problems  Subjective:      Patient ID: Jose Ricks is a 62 y o  female      HPI  62-year old female coming in for a follow up office visit regarding hyperlipidemia, carpal tunnel syndrome, status post fracture of the transverse process, hip pain, obesity; The patient reports me compliant taking medications without untoward side effects the  The patient is here to review his medical condition, update me on the medical condition and the patient reports me no hospitalizations and 1 ER visits  she does report me a slip and fall on the ice requiring ER evaluation found to have a fractured vertebrae she reports me since that point time she has noticed more aches and pains of the hips, legs feel like rubber in the home a m , for couple weeks left foot dragging  Does numbness and tingling in a cold sensation of her hand she will be going for the EMG in August she is undergoing some physical therapy including Graston technique with Lilia Bajwa physical therapist an arm exercises as of Sunday she has noticed increased swelling in her hands and numbness she reports me that she is dropping things because is period    The following portions of the patient's history were reviewed and updated as appropriate: allergies, current medications, past family history, past medical history, past social history, past surgical history and problem list     Review of Systems   Constitutional: Negative for activity change, appetite change and unexpected weight change  HENT: Negative for congestion and postnasal drip  Eyes: Negative for visual disturbance  Respiratory: Negative for cough and shortness of breath  Cardiovascular: Negative for chest pain  Gastrointestinal: Negative for abdominal pain, diarrhea, nausea and vomiting  Neurological: Negative for dizziness, light-headedness and headaches  Hip pain, numbness and tingling, back pain    Objective:    No follow-ups on file  No results found        Allergies   Allergen Reactions    Depakote Er [Divalproex Sodium Er]      pancreatitis    Tanzeum [Albiglutide]      pancreatitis    Bee Venom     Carbamazepine Other (See Comments)     Reaction Date:Unknown    Clarithromycin Itching    Shellfish-Derived Products - Food Allergy     Lamotrigine Rash       Past Medical History:   Diagnosis Date    Abdominal pain     started 7/21/16- found something on the pancreas    Abnormal weight loss     last assessed [id number]    Acute sinusitis     Allergic rhinitis     last assessed [id number]    Anemia     Asthma     Cardiac murmur     Cellulitis     Cervical disc disease     Colon polyp     Degeneration of thoracic intervertebral disc     Degenerative arthritis of knee     Diabetes mellitus ([facility] )     Diverticulosis     Esophagitis, reflux     last assessed [id number]    Excessive sweating     Fatty liver     Gait disturbance     Granuloma annulare     Head injury     Hyperlipidemia     Hypertension     Hypertensive heart disease     Hypoglycemia     last assessed [id number]    Leg length discrepancy     Leucocytosis     Multiple thyroid nodules     Neuropathy     Obesity     last assessed [id number]    Paronychia of toe     Patellofemoral dysfunction     Postmenopausal atrophic vaginitis     Prepatellar bursitis     Sebaceous cyst     last assessed [id number]    Sleep apnea     no CPAP used    Suicide attempt ([facility] )     Tinea corporis     Type 2 diabetes mellitus ([facility] )     cont meds and monitor BS;  last assessed [id number]    Urinary frequency     Vertigo     Vitamin D deficiency     Dia-Parkinson-White syndrome      Past Surgical History:   Procedure Laterality Date    COLONOSCOPY      ESOPHAGOGASTRODUODENOSCOPY N/A 8/8/2016    Procedure: ESOPHAGOGASTRODUODENOSCOPY (EGD); Surgeon: Pao Jonas MD;  Location: [facility]; Service:     MN ESOPHAGOGASTRODUODENOSCOPY TRANSORAL DIAGNOSTIC N/A 3/28/2019    Procedure: ESOPHAGOGASTRODUODENOSCOPY (EGD); Surgeon: Pao Jonas MD;  Location: [facility];   Service: Gastroenterology    TONSILLECTOMY      TOOTH EXTRACTION      wisdom teeth removed     Current Outpatient Medications on File Prior to Visit   Medication Sig Dispense Refill    ARIPiprazole (ABILIFY) 10 mg tablet Take 1 tablet (10 mg total) by mouth every evening 90 tablet 2    Cholecalciferol 4000 units CAPS Take 1 capsule (4,000 Units total) by mouth daily (Patient taking differently: Take 5,000 Units by mouth daily )      Diclofenac Sodium (VOLTAREN) 1 % Apply 2 g topically 4 (four) times a day 1 Tube 2    fenofibrate (TRICOR) 145 mg tablet Take 1 tablet (145 mg total) by mouth daily 90 tablet 3    FLUoxetine (PROzac) 20 mg capsule Take 1 capsule (20 mg total) by mouth daily Take with the 40 mg cap for a total dose of 60 mg daily 90 capsule 1    FLUoxetine (PROzac) 40 MG capsule Take 1 capsule (40 mg total) by mouth daily Take with the 20 mg cap for a total dose of 60 mg daily 90 capsule 1    HYDROcodone-acetaminophen (NORCO) 5-325 mg per tablet Take 1 tablet by mouth every 6 (six) hours as needed for pain for up to 15 dosesMax Daily Amount: 4 tablets 15 tablet 0    lisinopril (ZESTRIL) 5 mg tablet Take 1 tablet (5 mg total) by mouth daily 90 tablet 2    Omega 3 1200 MG CAPS Take 1,200 mg by mouth 2 (two) times a day        omeprazole (PriLOSEC) 40 MG capsule TAKE 1 CAPSULE BY MOUTH EVERY DAY 90 capsule 1    polyethylene glycol (MIRALAX) 17 g packet Take 17 g by mouth daily      [DISCONTINUED] atorvastatin (LIPITOR) 20 mg tablet Take 1 tablet (20 mg total) by mouth daily 90 tablet 0     No current facility-administered medications on file prior to visit        Family History   Problem Relation Age of Onset   [employer] Cancer Mother         lung    Heart disease Mother     Coronary artery disease Mother         CABG    Diabetes Mother     Lung cancer Mother 76        smoker    Stroke Mother     Thyroid disease Mother     Other Mother         tobacco use    Parkinsonism Father     Intestinal polyp Father     Bipolar disorder Father     Heart disease Father     Prostate cancer Father 62    Hypertension Sister     Alcohol abuse Sister     Depression Brother     Heart disease Brother     Diabetes Maternal Grandmother     Thyroid disease Maternal Grandmother     Hypertension Family     Suicide Attempts Paternal Aunt     Depression Paternal Aunt     Endometrial cancer Maternal Aunt         unknown age   Mavis Cousin Ovarian cancer Cousin 36        maternal     Social History     Socioeconomic History    Marital status: /Civil Union     Spouse name: Not on file    Number of children: 0    Years of education: 15    Highest education level: 12th grade   Occupational History    Occupation: retired   Social Needs    Financial resource strain: Not hard at all   [place]-Deb insecurity     Worry: Never true     Inability: Never true    Transportation needs     Medical: No     Non-medical: No   Tobacco Use    Smoking status: Never Smoker    Smokeless tobacco: Never Used   Substance and Sexual Activity    Alcohol use: Not Currently     Alcohol/week: 1 0 standard drinks     Types: 1 Glasses of wine per week     Frequency: Monthly or less     Drinks per session: 1 or 2     Binge frequency: Never     Comment: rarely    Drug use: No    Sexual activity: Not Currently     Partners: Male   Lifestyle    Physical activity     Days per week: 0 days     Minutes per session: 0 min    Stress:  Only a little   Relationships    Social connections     Talks on phone: Once a week     Gets together: Once a week     Attends Sabianist service: Never     Active member of club or organization: Yes     Attends meetings of clubs or organizations: More than 4 times per year     Relationship status:     Intimate partner violence     Fear of current or ex partner: No     Emotionally abused: No     Physically abused: No     Forced sexual activity: No   Other Topics Concern    Not on file   Social History Narrative    Education: high school graduate    Learning Disabilities: none    Marital History:     Children: none    Living Arrangement: lives in home with     Occupational History: retired, 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[place] [address]: good support system,  is supportive    Legal History: none     History: None        Caffeine use    Denied home environment domestic violence    Job physical requirements heavy lifting     Vitals:    04/20/21 1631   BP: 120/78   Pulse: 71   Resp: 16   SpO2: 98%   Weight: 90 8 kg (200 lb 3 2 oz)   Height: 5' 4" (1 626 m)     Results for orders placed or performed during the hospital encounter of 02/27/21   Urine Microscopic   Result Value Ref Range    RBC, UA None Seen None Seen, 0-1, 1-2, 2-4, 0-5 /hpf    WBC, UA 1-2 None Seen, 0-1, 1-2, 0-5, 2-4 /hpf    Epithelial Cells Occasional None Seen, Occasional /hpf    Bacteria, UA Occasional None Seen, Occasional /hpf   Urine Macroscopic, POC   Result Value Ref Range    Color, UA Yellow     Clarity, UA Clear     pH, UA 5 5 4 5 - 8 0    Leukocytes, UA Small (A) Negative    Nitrite, UA Negative Negative    Protein, UA Negative Negative mg/dl    Glucose, UA Negative Negative mg/dl    Ketones, UA Negative Negative mg/dl    Urobilinogen, UA 0 2 0 2, 1 0 E U /dl E U /dl    Bilirubin, UA Negative Negative    Blood, UA Negative Negative    Specific Gravity, UA 1 010 1 003 - 1 030     Weight (last 2 days)     Date/Time   Weight    04/20/21 1631   90 8 (200 2)            Body mass index is 34 36 kg/m²  BP      Temp      Pulse     Resp      SpO2        Vitals:    04/20/21 1631   Weight: 90 8 kg (200 lb 3 2 oz)     Vitals:    04/20/21 1631   Weight: 90 8 kg (200 lb 3 2 oz)       /78   Pulse 71   Resp 16   Ht 5' 4" (1 626 m)   Wt 90 8 kg (200 lb 3 2 oz)   SpO2 98%   BMI 34 36 kg/m²          Physical Exam  Constitutional:       Appearance: She is well-developed  HENT:      Head: Normocephalic  Eyes:      General: No scleral icterus  Right eye: No discharge  Left eye: No discharge  Conjunctiva/sclera: Conjunctivae normal       Pupils: Pupils are equal, round, and reactive to light  Neck:      Musculoskeletal: Neck supple  Cardiovascular:      Rate and Rhythm: Normal rate and regular rhythm  Heart sounds: Normal heart sounds  No murmur  No friction rub  No gallop  Pulmonary:      Effort: No respiratory distress  Breath sounds: Normal breath sounds  No wheezing or rales  Abdominal:      General: Bowel sounds are normal  There is no distension  Palpations: Abdomen is soft  There is no mass  Tenderness: There is no abdominal tenderness  There is no guarding or rebound  Musculoskeletal:         General: No deformity  Lymphadenopathy:      Cervical: No cervical adenopathy  Neurological:      Mental Status: She is alert        Coordination: Coordination normal         Sensation intact motor strength 5/5

## 2021-04-21 ENCOUNTER — TELEPHONE (OUTPATIENT)
Dept: INTERNAL MEDICINE CLINIC | Facility: CLINIC | Age: 61
End: 2021-04-21

## 2021-04-21 ENCOUNTER — OFFICE VISIT (OUTPATIENT)
Dept: PHYSICAL THERAPY | Facility: CLINIC | Age: 61
End: 2021-04-21
Payer: COMMERCIAL

## 2021-04-21 DIAGNOSIS — M25.559 HIP PAIN: ICD-10-CM

## 2021-04-21 DIAGNOSIS — G89.29 CHRONIC RIGHT SHOULDER PAIN: ICD-10-CM

## 2021-04-21 DIAGNOSIS — M54.50 ACUTE RIGHT-SIDED LOW BACK PAIN WITHOUT SCIATICA: ICD-10-CM

## 2021-04-21 DIAGNOSIS — M54.12 CERVICAL RADICULOPATHY: Primary | ICD-10-CM

## 2021-04-21 DIAGNOSIS — M25.511 RIGHT SHOULDER PAIN, UNSPECIFIED CHRONICITY: ICD-10-CM

## 2021-04-21 DIAGNOSIS — M25.511 CHRONIC RIGHT SHOULDER PAIN: ICD-10-CM

## 2021-04-21 DIAGNOSIS — M25.551 RIGHT HIP PAIN: ICD-10-CM

## 2021-04-21 PROCEDURE — 97112 NEUROMUSCULAR REEDUCATION: CPT

## 2021-04-21 PROCEDURE — 97110 THERAPEUTIC EXERCISES: CPT

## 2021-04-21 PROCEDURE — 97140 MANUAL THERAPY 1/> REGIONS: CPT

## 2021-04-21 NOTE — PROGRESS NOTES
Daily Note     Today's date: 2021  Patient name: Carina Mancilla  : 1960  MRN: [id number]  Referring provider: Criss Gallardo DO  Dx:   Encounter Diagnosis     ICD-10-CM    1  Cervical radiculopathy  M54 12    2  Right hip pain  M25 551    3  Chronic right shoulder pain  M25 511     G89 29    4  Acute right-sided low back pain without sciatica  M54 5    5  Right shoulder pain, unspecified chronicity  M25 511    6  Hip pain  M25 559                   Subjective: Pt reports 2/10 cv pain, intermittent episodes of lateral right hip pain with feelings of her right leg giving out on her  Pt notes increased hand swelling since last tx  Pt reports MD has ordered her wrist splints  Objective: See treatment diary below     Assessment: Pt demonstrated good response to Graston and no cv pain following tx  Pt demonstrated fatigue with hip strengthening, decreased difficulty with SLS balance  Plan: Continue POC  Precautions: none  Dx: C5-7 radiculopathy with an opening preference, R subacromial impingement, R GT bursitis, L3 fx: DA 3/2/21        Manuals 4/1 4/6 4/8 4/12 4/15 4/21       Manual c/s distraction              R sh PROM             R ITB foam roller             Mechanical traction 5 min x 2 @ 25# 5 min x 3 @ 27# 5 min x 3 @ 27# 5 min x3 @ 27# 5min x 3 @ 28# 5 min x2 @ 28#       Graston B carpal tunnel   3 min ea 3 min ea 3 min ea 3 min ea       Laser L3 5000J 5000J 5000J 5000J 5000J 5000J       Laser C4-T2 4000J 4000J 4000J 4000J 4000J 4000J       Neuro Re-Ed             scap retraction iso 5"x15 5"x15 5"x15 5"x15 5"x15 5"x15       B c/s Rot 1x15 ea 1x15 ea 1x15 ea 1x15 ea 1x15 ea 1x15  ea       B c/s SB 1x15 ea 1x15 ea 1x15 ea 1x15 ea 1x15 ea 1x15 ea       Abdominal bracing             DLS marching              Bridges 3x15 3x15 3x15 3x15 3x20 3x20       Median nerve flossing 1x10 1x10 1x10 1x10 1x10 1x10       DLS SLR 3x8 ea 3x8 ea 3x8 ea 3x8 ea 3x10 ea 3x10 ea       Standing DLS Hip ABD 2x15 ea 2x15 ea 2x15 ea 2x15 ea 2x15 ea 2x15 ea       SLS 1x30 ea 1x30 ea 1x30" ea 1x30" ea 1x30" ea 1x30" ea       Ther Ex             R TB ER R/ 3x10 R/ 3x10 R/  3x10 R/  3x10 R/ 3x12 R/  3x12       Mod sleeper stretch             clamshells supine Buzz/ 3" 3x15 Supine Buzz/ 3" 3x15 Supine Buzz/ 3" 3x15 Supine  Buzz/  3"  3x15 Supine Buzz/ 3" 3x15 np       TB rows León/ 3x12 León/ 3x15 León/  3x15 León/  3x15 León/ 3x15 Blue/  3x15                                                           Ther Activity                                       Gait Training                                       Modalities

## 2021-04-21 NOTE — ASSESSMENT & PLAN NOTE
Status post fall resulting in a transverse process fracture the from working with physical therapy through for pain over extremity weakness  I would like her to see orthopedics Dr Gutierrez Bile

## 2021-04-21 NOTE — TELEPHONE ENCOUNTER
----- Message from Michael Ortega DO sent at 4/20/2021  8:27 PM EDT -----  Joni Portal call the pt   I would like the patient also go for MRI of the lumbar spine checked with the patient if she is willing to go  for this test

## 2021-04-22 DIAGNOSIS — R29.898 WEAKNESS OF BOTH LOWER EXTREMITIES: Primary | ICD-10-CM

## 2021-04-23 ENCOUNTER — OFFICE VISIT (OUTPATIENT)
Dept: PHYSICAL THERAPY | Facility: CLINIC | Age: 61
End: 2021-04-23
Payer: COMMERCIAL

## 2021-04-23 DIAGNOSIS — M25.551 RIGHT HIP PAIN: ICD-10-CM

## 2021-04-23 DIAGNOSIS — M25.511 CHRONIC RIGHT SHOULDER PAIN: ICD-10-CM

## 2021-04-23 DIAGNOSIS — G89.29 CHRONIC RIGHT SHOULDER PAIN: ICD-10-CM

## 2021-04-23 DIAGNOSIS — M25.511 RIGHT SHOULDER PAIN, UNSPECIFIED CHRONICITY: ICD-10-CM

## 2021-04-23 DIAGNOSIS — M54.12 CERVICAL RADICULOPATHY: Primary | ICD-10-CM

## 2021-04-23 DIAGNOSIS — M54.50 ACUTE RIGHT-SIDED LOW BACK PAIN WITHOUT SCIATICA: ICD-10-CM

## 2021-04-23 DIAGNOSIS — M25.559 HIP PAIN: ICD-10-CM

## 2021-04-23 PROCEDURE — 97012 MECHANICAL TRACTION THERAPY: CPT

## 2021-04-23 PROCEDURE — 97112 NEUROMUSCULAR REEDUCATION: CPT

## 2021-04-23 PROCEDURE — 97140 MANUAL THERAPY 1/> REGIONS: CPT

## 2021-04-23 PROCEDURE — 97110 THERAPEUTIC EXERCISES: CPT

## 2021-04-23 NOTE — PROGRESS NOTES
Daily Note     Today's date: 2021  Patient name: Varney Scheuermann  : 1960  MRN: [id number]  Referring provider: Scott Cuellar DO  Dx:   Encounter Diagnosis     ICD-10-CM    1  Cervical radiculopathy  M54 12    2  Right hip pain  M25 551    3  Chronic right shoulder pain  M25 511     G89 29    4  Acute right-sided low back pain without sciatica  M54 5    5  Hip pain  M25 559    6  Right shoulder pain, unspecified chronicity  M25 511                   Subjective: Pt denies neck and back pain today  She reports she had a bad day yesterday and was unable to maintain her  on objects  She wore her B splints last night and reports no symptoms upon waking  She currently is feeling mild numbness and tingling into her hands  Objective: See treatment diary below     Assessment: Pt demonstrated improved balance and good tolerance to hip and knee strengthening  No increased pain or radicular symptoms with interventions today  Plan: Continue POC  Precautions: none  Dx: C5-7 radiculopathy with an opening preference, R subacromial impingement, R GT bursitis, L3 fx: DA 3/2/21        Manuals 4/1 4/6 4/8 4/12 4/15 4/21 4/23      Manual c/s distraction              R sh PROM             R ITB foam roller             Mechanical traction 5 min x 2 @ 25# 5 min x 3 @ 27# 5 min x 3 @ 27# 5 min x3 @ 27# 5min x 3 @ 28# 5 min x2 @ 28# 5 min x2 @ 28#      Graston B carpal tunnel   3 min ea 3 min ea 3 min ea 3 min ea 3 min ea      Laser L3 5000J 5000J 5000J 5000J 5000J 5000J       Laser C4-T2 4000J 4000J 4000J 4000J 4000J 4000J       Neuro Re-Ed             scap retraction iso 5"x15 5"x15 5"x15 5"x15 5"x15 5"x15 5"x15      B c/s Rot 1x15 ea 1x15 ea 1x15 ea 1x15 ea 1x15 ea 1x15  ea 1x15 ea      B c/s SB 1x15 ea 1x15 ea 1x15 ea 1x15 ea 1x15 ea 1x15 ea 1x15ea      Abdominal bracing             DLS marching              Bridges 3x15 3x15 3x15 3x15 3x20 3x20 3x20      Median nerve flossing 1x10 1x10 1x10 1x10 1x10 1x10 1x10      DLS SLR 3x8 ea 3x8 ea 3x8 ea 3x8 ea 3x10 ea 3x10 ea 3x10 ea      Standing DLS Hip ABD 0M31 ea 1E65 ea 2x15 ea 2x15 ea 2x15 ea 2x15 ea 2x15  ea      SLS 1x30 ea 1x30 ea 1x30" ea 1x30" ea 1x30" ea 1x30" ea 1x30"ea      Ther Ex             R TB ER R/ 3x10 R/ 3x10 R/  3x10 R/  3x10 R/ 3x12 R/  3x12 R/  3x12      Mod sleeper stretch             clamshells supine Buzz/ 3" 3x15 Supine Buzz/ 3" 3x15 Supine Buzz/ 3" 3x15 Supine  Buzz/  3"  3x15 Supine Buzz/ 3" 3x15 np       TB rows León/ 3x12 León/ 3x15 León/  3x15 León/  3x15 León/ 3x15 Blue/  3x15 Blue/  3x15                                                          Ther Activity                                       Gait Training                                       Modalities

## 2021-04-25 NOTE — PROGRESS NOTES
Patient presents to the office today for a BP check  Based on your last note, patient was advised to call us if BP reaches a certain range  BP readings at home have been in the 130s over 60s 
Yes, proceed

## 2021-04-27 ENCOUNTER — OFFICE VISIT (OUTPATIENT)
Dept: PHYSICAL THERAPY | Facility: CLINIC | Age: 61
End: 2021-04-27
Payer: COMMERCIAL

## 2021-04-27 DIAGNOSIS — M54.50 ACUTE RIGHT-SIDED LOW BACK PAIN WITHOUT SCIATICA: ICD-10-CM

## 2021-04-27 DIAGNOSIS — M54.12 CERVICAL RADICULOPATHY: Primary | ICD-10-CM

## 2021-04-27 DIAGNOSIS — M25.511 RIGHT SHOULDER PAIN, UNSPECIFIED CHRONICITY: ICD-10-CM

## 2021-04-27 DIAGNOSIS — M25.559 HIP PAIN: ICD-10-CM

## 2021-04-27 DIAGNOSIS — G89.29 CHRONIC RIGHT SHOULDER PAIN: ICD-10-CM

## 2021-04-27 DIAGNOSIS — M25.551 RIGHT HIP PAIN: ICD-10-CM

## 2021-04-27 DIAGNOSIS — M25.511 CHRONIC RIGHT SHOULDER PAIN: ICD-10-CM

## 2021-04-27 PROCEDURE — 97112 NEUROMUSCULAR REEDUCATION: CPT

## 2021-04-27 PROCEDURE — 97110 THERAPEUTIC EXERCISES: CPT

## 2021-04-27 PROCEDURE — 97140 MANUAL THERAPY 1/> REGIONS: CPT

## 2021-04-27 NOTE — PROGRESS NOTES
Daily Note     Today's date: 2021  Patient name: Oz Santiago  : 1960  MRN: [id number]  Referring provider: Lazara Moreno DO  Dx:   Encounter Diagnosis     ICD-10-CM    1  Cervical radiculopathy  M54 12    2  Right hip pain  M25 551    3  Chronic right shoulder pain  M25 511     G89 29    4  Acute right-sided low back pain without sciatica  M54 5    5  Hip pain  M25 559    6  Right shoulder pain, unspecified chronicity  M25 511                   Subjective: Pt reports 1/10 low back pain pre tx and a 60 minute standing and walking tolerance before experiencing increased pain  Pt reports no change in B hand numbness and weakness but does report relief for a few hours after last tx session  Objective: See treatment diary below     Assessment: Pt demonstrated good response to mechanical traction, increased tolerance to RTC strengthening  Pt demonstrated difficulty with hip strengthening in WB  Plan: Continue POC as per PT  Precautions: none  Dx: C5-7 radiculopathy with an opening preference, R subacromial impingement, R GT bursitis, L3 fx: DA 3/2/21        Manuals 4/1 4/6 4/8 4/12 4/15 4/21 4/23 4/27     Manual c/s distraction              R sh PROM             R ITB foam roller             Mechanical traction 5 min x 2 @ 25# 5 min x 3 @ 27# 5 min x 3 @ 27# 5 min x3 @ 27# 5min x 3 @ 28# 5 min x2 @ 28# 5 min x2 @ 28# 5 min x2 @ 28#     Graston B carpal tunnel   3 min ea 3 min ea 3 min ea 3 min ea 3 min ea 3 min ea     Laser L3 5000J 5000J 5000J 5000J 5000J 5000J  5000J     Laser C4-T2 4000J 4000J 4000J 4000J 4000J 4000J  4000J     Neuro Re-Ed             scap retraction iso 5"x15 5"x15 5"x15 5"x15 5"x15 5"x15 5"x15 5"x15     B c/s Rot 1x15 ea 1x15 ea 1x15 ea 1x15 ea 1x15 ea 1x15  ea 1x15 ea 1x15 ea     B c/s SB 1x15 ea 1x15 ea 1x15 ea 1x15 ea 1x15 ea 1x15 ea 1x15ea 1x15 ea     Abdominal bracing             DLS marching              Bridges 3x15 3x15 3x15 3x15 3x20 3x20 3x20 3x20 Median nerve flossing 1x10 1x10 1x10 1x10 1x10 1x10 1x10 1x10     DLS SLR 3x8 ea 3x8 ea 3x8 ea 3x8 ea 3x10 ea 3x10 ea 3x10 ea 3x10 ea     Standing DLS Hip ABD 9C69 ea 6X55 ea 2x15 ea 2x15 ea 2x15 ea 2x15 ea 2x15  ea 2x15 ea     SLS 1x30 ea 1x30 ea 1x30" ea 1x30" ea 1x30" ea 1x30" ea 1x30"ea 1x30" ea     Ther Ex             R TB ER R/ 3x10 R/ 3x10 R/  3x10 R/  3x10 R/ 3x12 R/  3x12 R/  3x12 R/  3x15     Mod sleeper stretch             clamshells supine Buzz/ 3" 3x15 Supine Buzz/ 3" 3x15 Supine Buzz/ 3" 3x15 Supine  Buzz/  3"  3x15 Supine Buzz/ 3" 3x15 np       TB rows León/ 3x12 León/ 3x15 León/  3x15 León/  3x15 León/ 3x15 Blue/  3x15 Blue/  3x15 Blue  3x15                                                         Ther Activity                                       Gait Training                                       Modalities

## 2021-04-29 ENCOUNTER — OFFICE VISIT (OUTPATIENT)
Dept: PHYSICAL THERAPY | Facility: CLINIC | Age: 61
End: 2021-04-29
Payer: COMMERCIAL

## 2021-04-29 DIAGNOSIS — G89.29 CHRONIC RIGHT SHOULDER PAIN: ICD-10-CM

## 2021-04-29 DIAGNOSIS — M25.511 RIGHT SHOULDER PAIN, UNSPECIFIED CHRONICITY: ICD-10-CM

## 2021-04-29 DIAGNOSIS — M54.50 ACUTE RIGHT-SIDED LOW BACK PAIN WITHOUT SCIATICA: ICD-10-CM

## 2021-04-29 DIAGNOSIS — M54.12 CERVICAL RADICULOPATHY: Primary | ICD-10-CM

## 2021-04-29 DIAGNOSIS — M25.511 CHRONIC RIGHT SHOULDER PAIN: ICD-10-CM

## 2021-04-29 DIAGNOSIS — M25.551 RIGHT HIP PAIN: ICD-10-CM

## 2021-04-29 DIAGNOSIS — M25.559 HIP PAIN: ICD-10-CM

## 2021-04-29 PROCEDURE — 97140 MANUAL THERAPY 1/> REGIONS: CPT | Performed by: PHYSICAL THERAPIST

## 2021-04-29 PROCEDURE — 97112 NEUROMUSCULAR REEDUCATION: CPT | Performed by: PHYSICAL THERAPIST

## 2021-04-29 PROCEDURE — 97110 THERAPEUTIC EXERCISES: CPT | Performed by: PHYSICAL THERAPIST

## 2021-04-29 NOTE — PROGRESS NOTES
Daily Note     Today's date: 2021  Patient name: Negar Villarreal  : 1960  MRN: [id number]  Referring provider: Anay Moreno DO  Dx:   Encounter Diagnosis     ICD-10-CM    1  Cervical radiculopathy  M54 12    2  Right hip pain  M25 551    3  Chronic right shoulder pain  M25 511     G89 29    4  Acute right-sided low back pain without sciatica  M54 5    5  Hip pain  M25 559    6  Right shoulder pain, unspecified chronicity  M25 511                   Subjective: Pt reports 1 hour of B hand paresthesias after Graston  Pt reports dropping things  Pt reports R lateral ankle pain with walking that improves with ankle ROM  Objective: See treatment diary below  Repeated EIS: worse, no change, FIS: better, no change, FiSs: better no change  Assessment: Radicular sx could not be produced with lumbar repeated motions, but appears to have a flexion and core stab preference  Plan: Continue POC  Precautions: none  Dx: C5-7 radiculopathy with an opening preference, R subacromial impingement, R GT bursitis, L3 fx: DA 3/2/21        Manuals 4/1 4/6 4/8 4/12 4/15 4/21 4/23 4/27 4/29    Manual c/s distraction              R sh PROM             R ITB foam roller             Mechanical traction 5 min x 2 @ 25# 5 min x 3 @ 27# 5 min x 3 @ 27# 5 min x3 @ 27# 5min x 3 @ 28# 5 min x2 @ 28# 5 min x2 @ 28# 5 min x2 @ 28# 5 min x 2 @ 28#    Graston B carpal tunnel   3 min ea 3 min ea 3 min ea 3 min ea 3 min ea 3 min ea 3 min ea    Laser L3 5000J 5000J 5000J 5000J 5000J 5000J  5000J 5000J    Laser C4-T2 4000J 4000J 4000J 4000J 4000J 4000J  4000J 3000J    Neuro Re-Ed             scap retraction iso 5"x15 5"x15 5"x15 5"x15 5"x15 5"x15 5"x15 5"x15 5"x15    B c/s Rot 1x15 ea 1x15 ea 1x15 ea 1x15 ea 1x15 ea 1x15  ea 1x15 ea 1x15 ea 1x15 ea    B c/s SB 1x15 ea 1x15 ea 1x15 ea 1x15 ea 1x15 ea 1x15 ea 1x15ea 1x15 ea 1x15 ea    Abdominal bracing             DLS marching              Bridges 3x15 3x15 3x15 3x15 3x20 3x20 3x20 3x20 3x20    Median nerve flossing 1x10 1x10 1x10 1x10 1x10 1x10 1x10 1x10 1x10    DLS SLR 3x8 ea 3x8 ea 3x8 ea 3x8 ea 3x10 ea 3x10 ea 3x10 ea 3x10 ea     Standing DLS Hip ABD 2E32 ea 9Z99 ea 2x15 ea 2x15 ea 2x15 ea 2x15 ea 2x15  ea 2x15 ea 2x18 ea    SLS 1x30 ea 1x30 ea 1x30" ea 1x30" ea 1x30" ea 1x30" ea 1x30"ea 1x30" ea 1x30" ea    Ther Ex             R TB ER R/ 3x10 R/ 3x10 R/  3x10 R/  3x10 R/ 3x12 R/  3x12 R/  3x12 R/  3x15 R/ 3x15    Mod sleeper stretch             clamshells supine Buzz/ 3" 3x15 Supine Buzz/ 3" 3x15 Supine Buzz/ 3" 3x15 Supine  Buzz/  3"  3x15 Supine Buzz/ 3" 3x15 np       TB rows León/ 3x12 León/ 3x15 León/  3x15 León/  3x15 León/ 3x15 Blue/  3x15 Blue/  3x15 Blue  3x15 Buzz/ 3x10    Seated lumbar flexion         3"x10    Standing lumbar flexion         2x5                              Ther Activity                                       Gait Training                                       Modalities

## 2021-05-03 ENCOUNTER — OFFICE VISIT (OUTPATIENT)
Dept: PHYSICAL THERAPY | Facility: CLINIC | Age: 61
End: 2021-05-03
Payer: COMMERCIAL

## 2021-05-03 DIAGNOSIS — M25.511 CHRONIC RIGHT SHOULDER PAIN: ICD-10-CM

## 2021-05-03 DIAGNOSIS — M25.511 RIGHT SHOULDER PAIN, UNSPECIFIED CHRONICITY: ICD-10-CM

## 2021-05-03 DIAGNOSIS — M25.559 HIP PAIN: ICD-10-CM

## 2021-05-03 DIAGNOSIS — M54.50 ACUTE RIGHT-SIDED LOW BACK PAIN WITHOUT SCIATICA: ICD-10-CM

## 2021-05-03 DIAGNOSIS — G89.29 CHRONIC RIGHT SHOULDER PAIN: ICD-10-CM

## 2021-05-03 DIAGNOSIS — M54.12 CERVICAL RADICULOPATHY: Primary | ICD-10-CM

## 2021-05-03 DIAGNOSIS — M25.551 RIGHT HIP PAIN: ICD-10-CM

## 2021-05-03 PROCEDURE — 97140 MANUAL THERAPY 1/> REGIONS: CPT | Performed by: PHYSICAL THERAPIST

## 2021-05-03 PROCEDURE — 97112 NEUROMUSCULAR REEDUCATION: CPT | Performed by: PHYSICAL THERAPIST

## 2021-05-03 PROCEDURE — 97110 THERAPEUTIC EXERCISES: CPT | Performed by: PHYSICAL THERAPIST

## 2021-05-03 NOTE — PROGRESS NOTES
Daily Note     Today's date: 5/3/2021  Patient name: Todd Varela  : 1960  MRN: [id number]  Referring provider: Blu Villatoro DO  Dx:   Encounter Diagnosis     ICD-10-CM    1  Cervical radiculopathy  M54 12    2  Right hip pain  M25 551    3  Chronic right shoulder pain  M25 511     G89 29    4  Acute right-sided low back pain without sciatica  M54 5    5  Hip pain  M25 559    6  Right shoulder pain, unspecified chronicity  M25 511                   Subjective: Pt reports 2/10 c/s pain, 4 /10 l/s pain with R thigh pain  Objective: See treatment diary below  Repeated EIS: centralized thigh pain and improved LBP from a 4/10 to a 1/10  Assessment: Pt responded well to an extension POC and was instructed to perform 10-50 reps of standing lumbar ext every time she experiences thigh pain  Plan: Continue POC  Precautions: none  Dx: C5-7 radiculopathy with an opening preference, R subacromial impingement, R GT bursitis, L3 fx: DA 3/2/21        Manuals 4/1 4/6 4/8 4/12 4/15 4/21 4/23 4/27 4/29 5/3   Manual c/s distraction              R sh PROM             R ITB foam roller             Mechanical traction 5 min x 2 @ 25# 5 min x 3 @ 27# 5 min x 3 @ 27# 5 min x3 @ 27# 5min x 3 @ 28# 5 min x2 @ 28# 5 min x2 @ 28# 5 min x2 @ 28# 5 min x 2 @ 28# 5 min x 2 @ 28#   Graston B carpal tunnel   3 min ea 3 min ea 3 min ea 3 min ea 3 min ea 3 min ea 3 min ea 3 min ea   Laser L3 5000J 5000J 5000J 5000J 5000J 5000J  5000J 5000J 5000J in standing   Laser C4-T2 4000J 4000J 4000J 4000J 4000J 4000J  4000J 3000J 3000J in standing   Neuro Re-Ed             scap retraction iso 5"x15 5"x15 5"x15 5"x15 5"x15 5"x15 5"x15 5"x15 5"x15    B c/s Rot 1x15 ea 1x15 ea 1x15 ea 1x15 ea 1x15 ea 1x15  ea 1x15 ea 1x15 ea 1x15 ea 1x15 ea   B c/s SB 1x15 ea 1x15 ea 1x15 ea 1x15 ea 1x15 ea 1x15 ea 1x15ea 1x15 ea 1x15 ea 1x15 ea   Abdominal bracing             DLS marching              Bridges 3x15 3x15 3x15 3x15 3x20 3x20 3x20 3x20 3x20 3x20   Median nerve flossing 1x10 1x10 1x10 1x10 1x10 1x10 1x10 1x10 1x10 1x10   DLS SLR 3x8 ea 3x8 ea 3x8 ea 3x8 ea 3x10 ea 3x10 ea 3x10 ea 3x10 ea  3x10 ea   Standing DLS Hip ABD 0X13 ea 9L94 ea 2x15 ea 2x15 ea 2x15 ea 2x15 ea 2x15  ea 2x15 ea 2x18 ea    SLS 1x30 ea 1x30 ea 1x30" ea 1x30" ea 1x30" ea 1x30" ea 1x30"ea 1x30" ea 1x30" ea    Ther Ex             R TB ER R/ 3x10 R/ 3x10 R/  3x10 R/  3x10 R/ 3x12 R/  3x12 R/  3x12 R/  3x15 R/ 3x15 R/ 3x20   Mod sleeper stretch             clamshells supine Buzz/ 3" 3x15 Supine Buzz/ 3" 3x15 Supine Buzz/ 3" 3x15 Supine  Buzz/  3"  3x15 Supine Buzz/ 3" 3x15 np       TB rows León/ 3x12 León/ 3x15 León/  3x15 León/  3x15 León/ 3x15 Blue/  3x15 Blue/  3x15 Blue  3x15 Buzz/ 3x10 Buzz/ 3x10   Seated lumbar flexion         3"x10 np   Standing lumbar flexion         2x5 4x10                             Ther Activity                                       Gait Training                                       Modalities

## 2021-05-05 ENCOUNTER — OFFICE VISIT (OUTPATIENT)
Dept: PHYSICAL THERAPY | Facility: CLINIC | Age: 61
End: 2021-05-05
Payer: COMMERCIAL

## 2021-05-05 DIAGNOSIS — M25.511 CHRONIC RIGHT SHOULDER PAIN: ICD-10-CM

## 2021-05-05 DIAGNOSIS — G89.29 CHRONIC RIGHT SHOULDER PAIN: ICD-10-CM

## 2021-05-05 DIAGNOSIS — M25.511 RIGHT SHOULDER PAIN, UNSPECIFIED CHRONICITY: ICD-10-CM

## 2021-05-05 DIAGNOSIS — M54.12 CERVICAL RADICULOPATHY: Primary | ICD-10-CM

## 2021-05-05 DIAGNOSIS — M54.50 ACUTE RIGHT-SIDED LOW BACK PAIN WITHOUT SCIATICA: ICD-10-CM

## 2021-05-05 DIAGNOSIS — M25.551 RIGHT HIP PAIN: ICD-10-CM

## 2021-05-05 DIAGNOSIS — M25.559 HIP PAIN: ICD-10-CM

## 2021-05-05 PROCEDURE — 97112 NEUROMUSCULAR REEDUCATION: CPT | Performed by: PHYSICAL THERAPIST

## 2021-05-05 PROCEDURE — 97140 MANUAL THERAPY 1/> REGIONS: CPT | Performed by: PHYSICAL THERAPIST

## 2021-05-05 PROCEDURE — 97110 THERAPEUTIC EXERCISES: CPT | Performed by: PHYSICAL THERAPIST

## 2021-05-05 PROCEDURE — 97012 MECHANICAL TRACTION THERAPY: CPT | Performed by: PHYSICAL THERAPIST

## 2021-05-05 NOTE — PROGRESS NOTES
Daily Note     Today's date: 2021  Patient name: Artur Wheeler  : 1960  MRN: [id number]  Referring provider: Phoebe Corrigan DO  Dx:   Encounter Diagnosis     ICD-10-CM    1  Cervical radiculopathy  M54 12    2  Right hip pain  M25 551    3  Chronic right shoulder pain  M25 511     G89 29    4  Acute right-sided low back pain without sciatica  M54 5    5  Hip pain  M25 559    6  Right shoulder pain, unspecified chronicity  M25 511                   Subjective: Pt reports 2/10 c/s pain, 1 /10 l/s pain with R thigh pain  Pt reports a good response to ext POC for lumbar pain and LE sx  Objective: See treatment diary below  Assessment: Pt demonstrated improve centralization and LBP management with lumbar extension POC  Plan: Continue POC  Precautions: none      Dx: C5-7 radiculopathy with an opening preference, R subacromial impingement, R GT bursitis,    Manuals 5/5         5/3   Manual c/s distraction              R sh PROM             R ITB foam roller             Mechanical traction 5 min x 2 @ 25#         5 min x 2 @ 28#   Graston B carpal tunnel 3 min ea         3 min ea   Laser L3 5000J in standing         5000J in standing   Laser C4-T2 4000J in standing         3000J in standing   Neuro Re-Ed             scap retraction iso 5"x15            B c/s Rot 1x15 ea         1x15 ea   B c/s SB 1x15 ea         1x15 ea   Abdominal bracing             DLS marching              Bridges 3x20         3x20   Median nerve flossing 1x10         1x10   Carpal tunnel flossing 1x10            DLS SLR 0U30 ea         3x10 ea   Standing DLS Hip ABD             SLS             Ther Ex             R TB ER          R/ 3x20   Mod sleeper stretch             clamshells             TB rows          Buzz/ 3x10   Standing lumbar ext 3x10         3x10                                          Ther Activity                                       Gait Training                                       Modalities

## 2021-05-10 ENCOUNTER — OFFICE VISIT (OUTPATIENT)
Dept: PHYSICAL THERAPY | Facility: CLINIC | Age: 61
End: 2021-05-10
Payer: COMMERCIAL

## 2021-05-10 DIAGNOSIS — M25.511 RIGHT SHOULDER PAIN, UNSPECIFIED CHRONICITY: ICD-10-CM

## 2021-05-10 DIAGNOSIS — G89.29 CHRONIC RIGHT SHOULDER PAIN: ICD-10-CM

## 2021-05-10 DIAGNOSIS — M25.559 HIP PAIN: ICD-10-CM

## 2021-05-10 DIAGNOSIS — M25.511 CHRONIC RIGHT SHOULDER PAIN: ICD-10-CM

## 2021-05-10 DIAGNOSIS — M25.551 RIGHT HIP PAIN: ICD-10-CM

## 2021-05-10 DIAGNOSIS — M54.50 ACUTE RIGHT-SIDED LOW BACK PAIN WITHOUT SCIATICA: ICD-10-CM

## 2021-05-10 DIAGNOSIS — M54.12 CERVICAL RADICULOPATHY: Primary | ICD-10-CM

## 2021-05-10 PROCEDURE — 97110 THERAPEUTIC EXERCISES: CPT | Performed by: PHYSICAL THERAPIST

## 2021-05-10 PROCEDURE — 97112 NEUROMUSCULAR REEDUCATION: CPT | Performed by: PHYSICAL THERAPIST

## 2021-05-10 NOTE — PROGRESS NOTES
Daily Note     Today's date: 5/10/2021  Patient name: Abraham Prajapati  : 1960  MRN: [id number]  Referring provider: Rica Grover DO  Dx:   Encounter Diagnosis     ICD-10-CM    1  Cervical radiculopathy  M54 12    2  Right hip pain  M25 551    3  Chronic right shoulder pain  M25 511     G89 29    4  Acute right-sided low back pain without sciatica  M54 5    5  Hip pain  M25 559    6  Right shoulder pain, unspecified chronicity  M25 511                   Subjective: Pt reports 2/10 c/s pain, constant B UE parasthesias, 1/10 l/s pain with R thigh pain  Pt reports being able to resolve or improve her R LE pain over the weekend with standing lumbar ext  Objective: See treatment diary below  Assessment: Pt demonstrated improve centralization and LBP management with lumbar extension POC, temporary relief with carpal tunnel Graston, no lasting change  Plan: Continue POC  Precautions: none      Dx: C5-7 radiculopathy with an opening preference, R subacromial impingement, R GT bursitis,    Manuals 5/5 5/10        5/3   Manual c/s distraction              R sh PROM             R ITB foam roller             Mechanical traction 5 min x 2 @ 25# 5 min x 2 @ 25#        5 min x 2 @ 28#   Graston B carpal tunnel 3 min ea 3 min        3 min ea   Laser L3 5000J in standing 5000J        5000J in standing   Laser C4-T2 4000J in standing 4000J        3000J in standing   Neuro Re-Ed             scap retraction iso 5"x15 5"x15           B c/s Rot 1x15 ea 1x15 ea        1x15 ea   B c/s SB 1x15 ea 1x15 ea        1x15 ea   Abdominal bracing             DLS marching              Bridges 3x20 3x20        3x20   Median nerve flossing 1x10 1x10        1x10   Carpal tunnel flossing 1x10 1x10           DLS SLR 9W49 ea 3M81 ea        3x10 ea   Standing DLS Hip ABD             SLS             Ther Ex             R TB ER  G/ 3x10        R/ 3x20   Mod sleeper stretch             clamshells             TB rows  Buzz/ 3x10 Buzz/ 3x10   Standing lumbar ext 3x10 3x10        3x10                                          Ther Activity                                       Gait Training                                       Modalities

## 2021-05-12 ENCOUNTER — VBI (OUTPATIENT)
Dept: ADMINISTRATIVE | Facility: OTHER | Age: 61
End: 2021-05-12

## 2021-05-12 ENCOUNTER — OFFICE VISIT (OUTPATIENT)
Dept: PHYSICAL THERAPY | Facility: CLINIC | Age: 61
End: 2021-05-12
Payer: COMMERCIAL

## 2021-05-12 DIAGNOSIS — M54.50 ACUTE RIGHT-SIDED LOW BACK PAIN WITHOUT SCIATICA: ICD-10-CM

## 2021-05-12 DIAGNOSIS — M54.12 CERVICAL RADICULOPATHY: Primary | ICD-10-CM

## 2021-05-12 PROCEDURE — 97110 THERAPEUTIC EXERCISES: CPT

## 2021-05-12 PROCEDURE — 97112 NEUROMUSCULAR REEDUCATION: CPT

## 2021-05-12 PROCEDURE — 97140 MANUAL THERAPY 1/> REGIONS: CPT

## 2021-05-12 NOTE — PROGRESS NOTES
Daily Note     Today's date: 2021  Patient name: Milla Leslie  : 1960  MRN: [id number]  Referring provider: Diane Peng DO  Dx:   Encounter Diagnosis     ICD-10-CM    1  Cervical radiculopathy  M54 12                   Subjective: Pt reports 4/10 c/s pain, 0/10 low back pain, and continued constant B UE parasthesias  Objective: See treatment diary below  Assessment: Pt demonstrates decreased hand parasthesias but only for 1-2 hours following tx  Pt demonstrated decreased low back irritabilty  Plan: RE NV  Precautions: none      Dx: C5-7 radiculopathy with an opening preference, R subacromial impingement, R GT bursitis,    Manuals 5/5 5/10        5/3   Manual c/s distraction              R sh PROM             R ITB foam roller             Mechanical traction 5 min x 2 @ 25# 5 min x 2 @ 25#        5 min x 2 @ 28#   Graston B carpal tunnel 3 min ea 3 min        3 min ea   Laser L3 5000J in standing 5000J        5000J in standing   Laser C4-T2 4000J in standing 4000J        3000J in standing   Neuro Re-Ed             scap retraction iso 5"x15 5"x15           B c/s Rot 1x15 ea 1x15 ea        1x15 ea   B c/s SB 1x15 ea 1x15 ea        1x15 ea   Abdominal bracing             DLS marching              Bridges 3x20 3x20        3x20   Median nerve flossing 1x10 1x10        1x10   Carpal tunnel flossing 1x10 1x10           DLS SLR 1Q71 ea 4J73 ea        3x10 ea   Standing DLS Hip ABD             SLS             Ther Ex             R TB ER  G/ 3x10        R/ 3x20   Mod sleeper stretch             clamshells             TB rows  Buzz/ 3x10        Buzz/ 3x10   Standing lumbar ext 3x10 3x10        3x10                                          Ther Activity                                       Gait Training                                       Modalities

## 2021-05-18 ENCOUNTER — EVALUATION (OUTPATIENT)
Dept: PHYSICAL THERAPY | Facility: CLINIC | Age: 61
End: 2021-05-18
Payer: COMMERCIAL

## 2021-05-18 DIAGNOSIS — M54.12 CERVICAL RADICULOPATHY: Primary | ICD-10-CM

## 2021-05-18 DIAGNOSIS — M54.50 ACUTE RIGHT-SIDED LOW BACK PAIN WITHOUT SCIATICA: ICD-10-CM

## 2021-05-18 PROCEDURE — 97140 MANUAL THERAPY 1/> REGIONS: CPT | Performed by: PHYSICAL THERAPIST

## 2021-05-18 PROCEDURE — 97112 NEUROMUSCULAR REEDUCATION: CPT | Performed by: PHYSICAL THERAPIST

## 2021-05-18 PROCEDURE — 97110 THERAPEUTIC EXERCISES: CPT | Performed by: PHYSICAL THERAPIST

## 2021-05-18 NOTE — PROGRESS NOTES
PT Re-Evaluation     Today's date: 2021  Patient name: Todd Varela  : 1960  MRN: [id number]  Referring provider: lBu Villatoro DO  Dx:   Encounter Diagnosis     ICD-10-CM    1  Cervical radiculopathy  M54 12    2  Acute right-sided low back pain without sciatica  M54 5                   Assessment  Assessment details: Pt demonstrated improved lumbar pain and radicular sx, improved cervical ROM and pain  Pt continues to have constant B hand paresthesias, and balance deficits  Pt will benefit from continued PT to further improve balance and safety with negotiating stairs to prevent another fall  Impairments: abnormal gait, abnormal or restricted ROM, activity intolerance, impaired physical strength, lacks appropriate home exercise program, pain with function and poor posture     Goals  ST-4 weeks  1   Pt will decrease pain > 50%  met  2  Pt will centralize c/s radiculopathy  3   Pt will decrease pain > 50% with SL     LT-8 weeks  1   Pt will decrease pain > 75%  met  2  Pt will walk her dog for 20 minutes without pain  met  3  Pt will sleep t/o the night without pain  Met  4  Pt will increase FT EC balance > 30 sec  5   Pt will increase SLS B > 15 sec  6   Pt will safely negotiate stairs  Plan  Planned modality interventions: traction  Planned therapy interventions: abdominal trunk stabilization, neuromuscular re-education, patient education, postural training, strengthening, stretching, therapeutic activities, therapeutic exercise, therapeutic training, flexibility, functional ROM exercises, gait training, home exercise program and body mechanics training  Frequency: 2x week  Duration in weeks: 4        Subjective Evaluation    History of Present Illness  Date of onset: 12/15/2020  Mechanism of injury: Pt reports constant B elbow to finger paresthesias, weakness and occasional dropping items    Pt reports 4/10 L sided cervical pain at worst with L c/s Rot or SB, 0/10 at best   Pt reports a resolution of LBP  Pt reports falling down the stairs backward on Saturday but denies pain, dizziness, visual changes, difficulty with speech or confusion  Pain  Current pain ratin  At best pain ratin  At worst pain ratin  Quality: dull ache  Relieving factors: change in position, heat and medications      Diagnostic Tests  Abnormal x-ray: taken 21, results not yet available  Treatments  Previous treatment: medication  Patient Goals  Patient goals for therapy: decreased edema, increased motion, increased strength, independence with ADLs/IADLs and return to sport/leisure activities          Objective     Postural Observations  Seated posture: poor  Standing posture: fair  Correction of posture: makes symptoms better    Additional Postural Observation Details  After her fall, pt demonstrated L shoulder and anterior upper arm bruising, R sided LBP bruising and L knee scratches  Pt also demonstrated a small bump on the back of her head      Neurological Testing     Sensation   Cervical/Thoracic   Left   Intact: light touch    Right   Intact: light touch    Reflexes   Left   Biceps (C5/C6): trace (1+)  Brachioradialis (C6): trace (1+)  Triceps (C7): trace (1+)    Right   Biceps (C5/C6): trace (1+)  Brachioradialis (C6): trace (1+)  Triceps (C7): trace (1+)    Active Range of Motion   Cervical/Thoracic Spine       Cervical  Subcranial protraction:  WFL   Subcranial retraction:   Restriction level: minimal  Flexion:  WFL  Extension:  with pain Restriction level: minimal  Left lateral flexion:  Restriction level: moderate  Right lateral flexion:  Restriction level moderate  Left rotation:  Restriction level: moderate  Right rotation:  Restriction level: moderate  Left Shoulder   Flexion: WFL  Abduction: WFL  External rotation BTH: WFL  Internal rotation BTB: WFL    Right Shoulder   Flexion: WFL  Abduction: WFL  External rotation BTH: WFL  Internal rotation BTB: WFL    Lumbar Flexion:  WFL  Extension:  WFL  Left lateral flexion:  WFL  Right lateral flexion:  WFL  Left rotation:  WFL  Right rotation:  [facility]    Additional Active Range of Motion Details  Balance: FT EC: 15 sec  Tandem stance R: 15 sec, L: 20 sec, SLS: R: 5 sec, L: 8 sec  Passive Range of Motion   Left Hip   Flexion: 110 degrees   External rotation (90/90): 40 degrees   Internal rotation (90/90): 15 degrees     Right Hip   Flexion: 110 degrees   External rotation (90/90): 35 degrees   Internal rotation (90/90): 10 degrees     Joint Play     Hypomobile: C1, C2, C3, C4, C5, C6 and C7   Mechanical Assessment    Cervical    Seated Extension: repeated movements  Pain location: peripheralized    Thoracic      Lumbar    Standing extension: repeated movements  Pain location: centralized  Pain intensity: better  Pain level: abolished    Strength/Myotome Testing     Left Shoulder     Planes of Motion   Flexion: 4+   Abduction: 4+   External rotation at 0°: 5   Internal rotation at 0°: 5     Right Shoulder     Planes of Motion   Flexion: 4+   Abduction: 4+   External rotation at 0°: 5   Internal rotation at 0°: 5     Left Elbow   Flexion: 5  Extension: 5    Right Elbow   Flexion: 5  Extension: 5    Left Wrist/Hand   Wrist extension: 5  Wrist flexion: 5  Thumb extension: 5    Right Wrist/Hand   Wrist extension: 5  Wrist flexion: 5  Thumb extension: 5    Left Hip   Planes of Motion   Flexion: 4-  Extension: 4  Abduction: 4-  Adduction: 4-    Right Hip   Planes of Motion   Flexion: 4-  Extension: 4  Abduction: 4-  Adduction: 4-    Tests   Cervical   Negative alar ligament test and Sharp-Prerna test      Left   Negative Spurling's Test A and Spurling's Test B  Right   Positive Spurling's Test B  Negative Spurling's Test A  Left Shoulder   Positive ULTT1 and ULTT4  Right Shoulder   Positive ULTT1 and ULTT4  Negative Hawkin's and Neer's  Left Elbow   Positive Cozen's  Right Elbow   Negative Cozen's       Left Wrist/Hand   Positive Tinel's sign (medial nerve)  Right Wrist/Hand   Positive Tinel's sign (medial nerve)  Left Hip   Negative Georgette and scour  Right Hip   Positive Georgette  Negative scour  Additional Tests Details   strength: R: 40, L: 45#      Precautions: none      Dx: C5-7 radiculopathy with an opening preference, R subacromial impingement, R GT bursitis,     Manuals 5/5 5/10  5/17             5/3   Manual c/s distraction                        R sh PROM                       R ITB foam roller                       Mechanical traction 5 min x 2 @ 25# 5 min x 2 @ 25#  5 min x 2 25#             5 min x 2 @ 28#   Graston B carpal tunnel 3 min ea 3 min               3 min ea   Laser L3 5000J in standing 5000J  2500J             5000J in standing   Laser C4-T2 4000J in standing 4000J  2500J             3000J in standing   Prone t/s Gr IV mobs   1x30 ea          Seated t/s Gr V mobs   x2          Neuro Re-Ed                       scap retraction iso 5"x15 5"x15  5"x15                 B c/s Rot 1x15 ea 1x15 ea  1x15 ea             1x15 ea   B c/s SB 1x15 ea 1x15 ea  1x15 ea             1x15 ea   Abdominal bracing                       DLS marching                        Bridges 3x20 3x20  3x20             3x20   Median nerve flossing 1x10 1x10  1x10             1x10   Ulnar nerve flossing   1x5          Carpal tunnel flossing 1x10 1x10  1x10                 DLS SLR 4D25 ea 4P66 ea  3x12 ea             3x10 ea   Standing DLS Hip ABD                       SLS                       Ther Ex                       R TB ER   G/ 3x10               R/ 3x20   Mod sleeper stretch                       clamshells                       TB rows   Buzz/ 3x10               Buzz/ 3x10   Standing lumbar ext 3x10 3x10  3x10             3x10                                                                           Ther Activity                                                                       Gait Training                                                                       Modalities

## 2021-05-20 ENCOUNTER — OFFICE VISIT (OUTPATIENT)
Dept: PHYSICAL THERAPY | Facility: CLINIC | Age: 61
End: 2021-05-20
Payer: COMMERCIAL

## 2021-05-20 DIAGNOSIS — M54.12 CERVICAL RADICULOPATHY: Primary | ICD-10-CM

## 2021-05-20 DIAGNOSIS — M54.50 ACUTE RIGHT-SIDED LOW BACK PAIN WITHOUT SCIATICA: ICD-10-CM

## 2021-05-20 PROCEDURE — 97140 MANUAL THERAPY 1/> REGIONS: CPT

## 2021-05-20 PROCEDURE — 97112 NEUROMUSCULAR REEDUCATION: CPT

## 2021-05-20 NOTE — PROGRESS NOTES
Daily Note     Today's date: 2021  Patient name: Varney Scheuermann  : 1960  MRN: [id number]  Referring provider: Scott Cuellar DO  Dx:   Encounter Diagnosis     ICD-10-CM    1  Cervical radiculopathy  M54 12    2  Acute right-sided low back pain without sciatica  M54 5                   Subjective: Pt reports no low back pain, mild cv pain pre tx  Objective: See treatment diary below      Assessment: Pt demonstrated increased intensity of numbness and tightness in both hands, low irritability in low back and cv spine  Plan: Continue poc as per PT  Precautions: none      Dx: C5-7 radiculopathy with an opening preference, R subacromial impingement, R GT bursitis,        Manuals 5/5 5/10  5/18  5/20           5/3   Manual c/s distraction                        R sh PROM                       R ITB foam roller                       Mechanical traction 5 min x 2 @ 25# 5 min x 2 @ 25#  5 min x 2 25#  5 min x2 25#           5 min x 2 @ 28#   Graston B carpal tunnel 3 min ea 3 min    3 min           3 min ea   Laser L3 5000J in standing 5000J  2500J 5000J           5000J in standing   Laser C4-T2 4000J in standing 4000J  2500J  4000J           3000J in standing   Prone t/s Gr IV mobs     1x30 ea                 Seated t/s Gr V mobs     x2                 Neuro Re-Ed                       scap retraction iso 5"x15 5"x15  5"x15  5"x15               B c/s Rot 1x15 ea 1x15 ea  1x15 ea  1x15 ea           1x15 ea   B c/s SB 1x15 ea 1x15 ea  1x15 ea 1x15 ea           1x15 ea   Abdominal bracing                       DLS marching                        Bridges 3x20 3x20  3x20  3x20           3x20   Median nerve flossing 1x10 1x10  1x10  1x10           1x10   Ulnar nerve flossing     1x5  1x5               Carpal tunnel flossing 1x10 1x10  1x10  1x10               DLS SLR 5P23 ea 5I57 ea  3x12 ea  3x12 ea           3x10 ea   Standing DLS Hip ABD                       SLS                       Ther Ex                       R TB ER   G/ 3x10               R/ 3x20   Mod sleeper stretch                       clamshells                       TB rows   Buzz/ 3x10               Buzz/ 3x10   Standing lumbar ext 3x10 3x10  3x10  3x10           3x10                                                                           Ther Activity                                                                       Gait Training                                                                       Modalities

## 2021-05-21 ENCOUNTER — HOSPITAL ENCOUNTER (OUTPATIENT)
Dept: MRI IMAGING | Facility: HOSPITAL | Age: 61
Discharge: HOME | End: 2021-05-21
Payer: COMMERCIAL

## 2021-05-21 DIAGNOSIS — M54.12 CERVICAL RADICULOPATHY: ICD-10-CM

## 2021-05-21 DIAGNOSIS — M25.559 HIP PAIN: ICD-10-CM

## 2021-05-21 DIAGNOSIS — M25.511 RIGHT SHOULDER PAIN, UNSPECIFIED CHRONICITY: ICD-10-CM

## 2021-05-21 DIAGNOSIS — R29.898 WEAKNESS OF BOTH LOWER EXTREMITIES: ICD-10-CM

## 2021-05-21 PROCEDURE — 72148 MRI LUMBAR SPINE W/O DYE: CPT

## 2021-05-21 PROCEDURE — G1004 CDSM NDSC: HCPCS

## 2021-05-24 ENCOUNTER — OFFICE VISIT (OUTPATIENT)
Dept: PHYSICAL THERAPY | Facility: CLINIC | Age: 61
End: 2021-05-24
Payer: COMMERCIAL

## 2021-05-24 DIAGNOSIS — M54.12 CERVICAL RADICULOPATHY: Primary | ICD-10-CM

## 2021-05-24 DIAGNOSIS — M54.50 ACUTE RIGHT-SIDED LOW BACK PAIN WITHOUT SCIATICA: ICD-10-CM

## 2021-05-24 PROCEDURE — 97012 MECHANICAL TRACTION THERAPY: CPT | Performed by: PHYSICAL THERAPIST

## 2021-05-24 PROCEDURE — 97140 MANUAL THERAPY 1/> REGIONS: CPT | Performed by: PHYSICAL THERAPIST

## 2021-05-24 PROCEDURE — 97112 NEUROMUSCULAR REEDUCATION: CPT | Performed by: PHYSICAL THERAPIST

## 2021-05-24 NOTE — PROGRESS NOTES
Daily Note     Today's date: 2021  Patient name: Nata Freeman  : 1960  MRN: [id number]  Referring provider: Lashae Hendrickson DO  Dx:   Encounter Diagnosis     ICD-10-CM    1  Cervical radiculopathy  M54 12    2  Acute right-sided low back pain without sciatica  M54 5                   Subjective: Pt reports several episodes of LE weakness that feels better after standing lumbar extensions  Pt reports 4/10 R UT pain with constant B UE paresthesias  Objective: See treatment diary below  Assessment: Pt demonstrated improved R UT sx, but no  with c/s retraction and retraction, extension  Added to HEP  Plan: Continue POC, assess response of c/s retraction to UE sx  Precautions: none      Tx: 11-11:10    Dx: C5-7 radiculopathy with an opening preference, R subacromial impingement, R GT bursitis,        Manuals /5 5/10  5/18  5/20  5/24            Manual c/s distraction                       R sh PROM                      R ITB foam roller                      Mechanical traction 5 min x 2 @ 25# 5 min x 2 @ 25#  5 min x 2 25#  5 min x2 25#  5min x 2 25#            Graston B carpal tunnel 3 min ea 3 min    3 min  3 min            Laser L3 5000J in standing 5000J  2500J 5000J  5000J            Laser C4-T2 4000J in standing 4000J  2500J  4000J              Prone t/s Gr IV mobs     1x30 ea                Seated t/s Gr V mobs     x2                Standing c/s retraction OP     x3        Neuro Re-Ed                       C/s retraction     3x10        C/s retraction, extension     1x10        scap retraction iso 5"x15 5"x15  5"x15  5"x15               B c/s Rot 1x15 ea 1x15 ea  1x15 ea  1x15 ea  1x15 ea            B c/s SB 1x15 ea 1x15 ea  1x15 ea 1x15 ea              Abdominal bracing                      DLS marching                       Bridges 3x20 3x20  3x20  3x20  3x22            Median nerve flossing 1x10 1x10  1x10  1x10  1x10            Ulnar nerve flossing     1x5  1x5  1x10             Carpal tunnel flossing 1x10 1x10  1x10  1x10  1x10             DLS SLR 6W82 ea 5J50 ea  3x12 ea  3x12 ea              Standing DLS Hip ABD                      SLS                      Ther Ex                      R TB ER   G/ 3x10                  Mod sleeper stretch                      clamshells                      TB rows   Buzz/ 3x10                  Standing lumbar ext 3x10 3x10  3x10  3x10                                                                                     Ther Activity                                                                       Gait Training                                                                       Modalities

## 2021-05-26 ENCOUNTER — TELEPHONE (OUTPATIENT)
Dept: PAIN MEDICINE | Facility: CLINIC | Age: 61
End: 2021-05-26

## 2021-05-26 ENCOUNTER — OFFICE VISIT (OUTPATIENT)
Dept: OBGYN CLINIC | Facility: CLINIC | Age: 61
End: 2021-05-26
Payer: COMMERCIAL

## 2021-05-26 VITALS
HEART RATE: 84 BPM | HEIGHT: 64 IN | SYSTOLIC BLOOD PRESSURE: 147 MMHG | DIASTOLIC BLOOD PRESSURE: 69 MMHG | BODY MASS INDEX: 34.15 KG/M2 | WEIGHT: 200 LBS

## 2021-05-26 DIAGNOSIS — M54.2 CERVICAL PAIN: ICD-10-CM

## 2021-05-26 DIAGNOSIS — S32.009A CLOSED FRACTURE OF TRANSVERSE PROCESS OF LUMBAR VERTEBRA, INITIAL ENCOUNTER (HCC): Primary | ICD-10-CM

## 2021-05-26 DIAGNOSIS — M48.061 SPINAL STENOSIS OF LUMBAR REGION, UNSPECIFIED WHETHER NEUROGENIC CLAUDICATION PRESENT: ICD-10-CM

## 2021-05-26 PROCEDURE — 99204 OFFICE O/P NEW MOD 45 MIN: CPT | Performed by: ORTHOPAEDIC SURGERY

## 2021-05-26 PROCEDURE — 3078F DIAST BP <80 MM HG: CPT | Performed by: ORTHOPAEDIC SURGERY

## 2021-05-26 PROCEDURE — 3077F SYST BP >= 140 MM HG: CPT | Performed by: ORTHOPAEDIC SURGERY

## 2021-05-26 NOTE — TELEPHONE ENCOUNTER
Dr Shannon Perales, This patient is being referred by Dr Marcin Espinoza  Please review note and let me know what procedure you would like scheduled or if you would like a consult

## 2021-05-26 NOTE — PROGRESS NOTES
Assessment:    L3 transverse process fracture, left  Multilevel lumbar degenerative disc disease  Lumbar spinal stenosis   Lumbar radiculopathy, right  Chronic neck pain   Cervical degenerative disc disease, severe    Plan:    Regarding her L3 transverse process fracture on the left, her symptoms of left-sided lower back pain are improving  No surgical intervention or bracing is required for this  Recent MRI of the lumbar spine has not been resulted however does demonstrate significant degenerative disc disease with severe spinal stenosis at L L3-4, L5-S1   We will place a referral to pain management for lumbar HARITHA at these levels in hopes to improve her right lumbar radicular symptoms  She also notes significant neck pain without recent improvement with outpatient physical therapy over the last 6 weeks  For this we will order MRI cervical spine without contrast     Follow-up 6 weeks for re-evaluation  Problem List Items Addressed This Visit     None      Visit Diagnoses     Closed fracture of transverse process of lumbar vertebra, initial encounter [facility])    -  Primary                   Subjective:     Patient ID:  Cameron Michael is a 62 y o  female    HPI    79-year-old female presenting for initial evaluation  She was referred for L3 transverse process fracture found on recent imaging  According to the patient, she has several month history of left-sided lower back pain secondary to a fall which is slowly feeling better however she still has some pinching  She has been doing physical therapy outpatient with transition to home exercise program with significant improvement  She more recently has been experiencing radiating and pinching pain from her lower back down to the right lower extremity to the toes  This is her main complaint at today's visit  She also offers complaints of chronic neck pain for which she has been doing outpatient physical therapy      The following portions of the patient's history were reviewed and updated as appropriate: allergies, current medications, past family history, past medical history, past social history, past surgical history and problem list     Review of Systems   Constitutional: Positive for activity change  Negative for chills, diaphoresis and fatigue  Respiratory: Negative  Cardiovascular: Negative  Gastrointestinal: Negative  Genitourinary: Negative for decreased urine volume and difficulty urinating  Musculoskeletal: Positive for arthralgias, back pain, neck pain and neck stiffness  Skin: Negative  All other systems reviewed and are negative  Objective:    Imaging:  Lumbar spine x-rays   Age indeterminant transverse process fracture of L3 on the left  Although indeterminate in age, it is new since the prior CT of February 8, 2019  MRI lumbar spine has not been resulted yet  Vitals:    05/26/21 1015   BP: 147/69   Pulse: 84         Physical Exam  Vitals signs and nursing note reviewed  Constitutional:       General: She is not in acute distress  Appearance: Normal appearance  She is not ill-appearing, toxic-appearing or diaphoretic  HENT:      Head: Normocephalic and atraumatic  Eyes:      General:         Right eye: No discharge  Left eye: No discharge  Pulmonary:      Effort: Pulmonary effort is normal  No respiratory distress  Musculoskeletal:         General: Tenderness present  Skin:     General: Skin is warm and dry  Neurological:      General: No focal deficit present  Mental Status: She is alert and oriented to person, place, and time  Motor: No weakness  Psychiatric:         Mood and Affect: Mood normal          Behavior: Behavior normal           No acute distress  Gait is without assistance  Inspection open wounds or erythema  Left lower back mildly tender to palpation including paraspinal musculature      Negative modified straight leg raise bilaterally  Strength 5/5 L2-S1 bilaterally, sensation equal intact  Reflexes are normal at L4 and S1 symmetrically

## 2021-05-26 NOTE — TELEPHONE ENCOUNTER
Scheduled pt for Rt L4 L5 Tfesi for 6/17/21  Pt denies rx blood thinners  Went over pre-procedure instructions below:  Nothing to eat or drink 1 hr prior to procedure  Need to arrange transportation  Proper clothing for procedure  If ill or placed on antibiotics please call to reschedule  covid travel  and vaccine instructions  Pt had booster on 4/9/21

## 2021-05-27 ENCOUNTER — OFFICE VISIT (OUTPATIENT)
Dept: PHYSICAL THERAPY | Facility: CLINIC | Age: 61
End: 2021-05-27
Payer: COMMERCIAL

## 2021-05-27 ENCOUNTER — HOSPITAL ENCOUNTER (OUTPATIENT)
Dept: NEUROLOGY | Facility: CLINIC | Age: 61
Discharge: HOME | End: 2021-05-27
Payer: COMMERCIAL

## 2021-05-27 DIAGNOSIS — M54.12 CERVICAL RADICULOPATHY: ICD-10-CM

## 2021-05-27 DIAGNOSIS — M54.12 CERVICAL RADICULOPATHY: Primary | ICD-10-CM

## 2021-05-27 DIAGNOSIS — M54.50 ACUTE RIGHT-SIDED LOW BACK PAIN WITHOUT SCIATICA: ICD-10-CM

## 2021-05-27 PROCEDURE — 97112 NEUROMUSCULAR REEDUCATION: CPT | Performed by: PHYSICAL THERAPIST

## 2021-05-27 PROCEDURE — 97140 MANUAL THERAPY 1/> REGIONS: CPT | Performed by: PHYSICAL THERAPIST

## 2021-05-27 PROCEDURE — 97012 MECHANICAL TRACTION THERAPY: CPT | Performed by: PHYSICAL THERAPIST

## 2021-05-27 PROCEDURE — 95911 NRV CNDJ TEST 9-10 STUDIES: CPT | Performed by: PSYCHIATRY & NEUROLOGY

## 2021-05-27 PROCEDURE — 95886 MUSC TEST DONE W/N TEST COMP: CPT | Performed by: PSYCHIATRY & NEUROLOGY

## 2021-05-27 NOTE — PROGRESS NOTES
Daily Note     Today's date: 2021  Patient name: Yisroel Canavan  : 1960  MRN: [id number]  Referring provider: Diana Zamora DO  Dx:   Encounter Diagnosis     ICD-10-CM    1  Cervical radiculopathy  M54 12    2  Acute right-sided low back pain without sciatica  M54 5                   Subjective: Pt reports 3/10 c/s pain currently with constant B UE paresthesias into her fingers and decreased dexterity  After adding c/s retractions and extensions to HEP, pt reports maybe a mild improvement in sx  Pt reports 3/10 LBP with leg weakness but no numbness or pain  Pt reports standing lumbar extensions consistently improve LE sx  Objective: See treatment diary below  Assessment:  Although pt's ext l/s POC is centralizing, pt may require more abdominal activation for improved pain management  Plan: Continue POC, assess response to increased TA contraction during sitting  Precautions: none      Tx: 11-11:10    Dx: C5-7 radiculopathy with an opening preference, R subacromial impingement, R GT bursitis,        Manuals 5/5 5/10  5/18  5/20  5/24  5/27          Manual c/s distraction                       R sh PROM                      R ITB foam roller                      Mechanical traction 5 min x 2 @ 25# 5 min x 2 @ 25#  5 min x 2 25#  5 min x2 25#  5min x 2 25#  5 min x 2 25#          Graston B carpal tunnel 3 min ea 3 min    3 min  3 min  3 min          Laser L3 5000J in standing 5000J  2500J 5000J  5000J  5000J          Laser C4-T2 4000J in standing 4000J  2500J  4000J              Prone t/s Gr IV mobs     1x30 ea                Seated t/s Gr V mobs     x2                Standing c/s retraction OP     x3        Neuro Re-Ed                       C/s retraction     3x10 3x10       C/s retraction, extension     1x10 2x10       scap retraction iso 5"x15 5"x15  5"x15  5"x15    1x15           B c/s Rot 1x15 ea 1x15 ea  1x15 ea  1x15 ea  1x15 ea  1x15          B c/s SB 1x15 ea 1x15 ea  1x15 ea 1x15 ea              Abdominal bracing                      DLS marching                       Bridges 3x20 3x20  3x20  3x20  3x22  3x22          Median nerve flossing 1x10 1x10  1x10  1x10  1x10  1x10          Ulnar nerve flossing     1x5  1x5  1x10  1x10           Carpal tunnel flossing 1x10 1x10  1x10  1x10  1x10  1x10           DLS SLR 9W75 ea 9R36 ea  3x12 ea  3x12 ea              Standing DLS Hip ABD                      SLS                      Ther Ex                      R TB ER   G/ 3x10                  Mod sleeper stretch                      clamshells                      TB rows   Buzz/ 3x10                  Standing lumbar ext 3x10 3x10  3x10  3x10    3x10                                                                                 Ther Activity                                                                       Gait Training                                                                       Modalities

## 2021-06-01 ENCOUNTER — OFFICE VISIT (OUTPATIENT)
Dept: PHYSICAL THERAPY | Facility: CLINIC | Age: 61
End: 2021-06-01
Payer: COMMERCIAL

## 2021-06-01 DIAGNOSIS — M54.12 CERVICAL RADICULOPATHY: Primary | ICD-10-CM

## 2021-06-01 DIAGNOSIS — M54.50 ACUTE RIGHT-SIDED LOW BACK PAIN WITHOUT SCIATICA: ICD-10-CM

## 2021-06-01 PROCEDURE — 97140 MANUAL THERAPY 1/> REGIONS: CPT | Performed by: PHYSICAL THERAPIST

## 2021-06-01 PROCEDURE — 97012 MECHANICAL TRACTION THERAPY: CPT | Performed by: PHYSICAL THERAPIST

## 2021-06-01 PROCEDURE — 97112 NEUROMUSCULAR REEDUCATION: CPT | Performed by: PHYSICAL THERAPIST

## 2021-06-01 NOTE — PROGRESS NOTES
Daily Note     Today's date: 2021  Patient name: Rachna Mims  : 1960  MRN: [id number]  Referring provider: Gregory Leonard DO  Dx:   Encounter Diagnosis     ICD-10-CM    1  Cervical radiculopathy  M54 12    2  Acute right-sided low back pain without sciatica  M54 5                 Subjective: Pt reports 2/10 neck pain today with constant B UE paresthesias into her fingers  Pt reports 0/10 LBP today, but that she had a bad weekend in terms of pain and balance  Pt reports difficulty with DLS while in seated position  Objective: See treatment diary below  Assessment:  Pt demonstrated difficulty with DLS, indicating plausible lumbar stabilization component to her back pain  Plan: Continue POC, assess response to increased TA contraction during sitting  Precautions: none  Tx: 10:05-10:15      Dx: C5-7 radiculopathy with an opening preference, R subacromial impingement, R GT bursitis,        Manuals 5/5 5/10  5/18  5/20  5/24  5/27  6        Manual c/s distraction                       R sh PROM                      R ITB foam roller                      Mechanical traction 5 min x 2 @ 25# 5 min x 2 @ 25#  5 min x 2 25#  5 min x2 25#  5min x 2 25#  5 min x 2 25#  5min x2 25#        Graston B carpal tunnel 3 min ea 3 min    3 min  3 min  3 min  3 min ea        Laser L3 5000J in standing 5000J  2500J 5000J  5000J  5000J  6000 J        Laser C4-T2 4000J in standing 4000J  2500J  4000J              Prone t/s Gr IV mobs     1x30 ea                Seated t/s Gr V mobs     x2                Standing c/s retraction OP     x3        Neuro Re-Ed                       C/s retraction     3x10 3x10 3x10      C/s retraction, extension     1x10 2x10 2x10      scap retraction iso 5"x15 5"x15  5"x15  5"x15    1x15  1x15         B c/s Rot 1x15 ea 1x15 ea  1x15 ea  1x15 ea  1x15 ea  1x15  1x15 ea        B c/s SB 1x15 ea 1x15 ea  1x15 ea 1x15 ea      1x15ea        Abdominal bracing                   DLS marching                       Bridges 3x20 3x20  3x20  3x20  3x22  3x22  np        Median nerve flossing 1x10 1x10  1x10  1x10  1x10  1x10  1x10 ea        Ulnar nerve flossing     1x5  1x5  1x10  1x10  1x10 ea         Carpal tunnel flossing 1x10 1x10  1x10  1x10  1x10  1x10  1x10 ea         DLS SLR 3K34 ea 6M51 ea  3x12 ea  3x12 ea              Standing DLS Hip ABD                      SLS              30"x2        Ther Ex                      R TB ER   G/ 3x10                  Mod sleeper stretch                      clamshells                      TB rows   Buzz/ 3x10                  Standing lumbar ext 3x10 3x10  3x10  3x10    3x10  3x10                                                                               Ther Activity                                                                       Gait Training                                                                       Modalities

## 2021-06-02 DIAGNOSIS — E78.5 DYSLIPIDEMIA: ICD-10-CM

## 2021-06-02 DIAGNOSIS — E11.9 CONTROLLED TYPE 2 DIABETES MELLITUS WITHOUT COMPLICATION, WITHOUT LONG-TERM CURRENT USE OF INSULIN (HCC): ICD-10-CM

## 2021-06-02 RX ORDER — FENOFIBRATE 145 MG/1
145 TABLET, COATED ORAL DAILY
Qty: 90 TABLET | Refills: 3 | Status: SHIPPED | OUTPATIENT
Start: 2021-06-02

## 2021-06-02 NOTE — PSYCH
MEDICATION MANAGEMENT NOTE        [facility]      Name and Date of Birth:  Freeman Richards 62 y o  1960 MRN: [id number]    Date of Visit: June 8, 2021    Reason for Visit:   Chief Complaint   Patient presents with    Medication Management    Follow-up       SUBJECTIVE:    Rosie Pizano is seen today for a follow up for Bipolar Disorder, Generalized Anxiety Disorder and personality disorder  She continues to do relatively well since the last visit despite multiple recent stressors  She states that mood remains stable, denies any significant depressive symptoms or manic symptoms  She reports on and off anxiety symptoms  She has been frustrated with multiple medical issues - had several falls since January and is now in physician therapy due to lumbar fracture "my legs are not working right  I am only going to be 61"  She denies any suicidal ideation, intent or plan at present; denies any homicidal ideation, intent or plan at present  She has no auditory hallucinations, denies any visual hallucinations, no overt delusions noted  She denies any side effects from current psychiatric medications  HPI ROS Appetite Changes and Sleep:     She reports normal sleep, adequate number of sleep hours (8 hours), normal appetite, recent weight gain (3 lbs), low energy    Current Rating Scores:     Not Applicable      Review Of Systems:      Constitutional low energy and recent weight gain (3 lbs)   ENT negative   Cardiovascular negative   Respiratory negative   Gastrointestinal negative   Genitourinary negative   Musculoskeletal negative   Integumentary negative   Neurological slow gait   Endocrine negative   Other Symptoms none, all other systems are negative       Past Psychiatric History: (unchanged information from previous note copied and updated)    Past Inpatient Psychiatric Treatment:   One past inpatient psychiatric admission at [place] [address]  Past Outpatient Psychiatric Treatment:    In outpatient treatment at [address] [place] [address] [address] for many years    Past Suicide Attempts: yes, 2 attempts by overdose years ago  Past Violent Behavior: no  Past Psychiatric Medication Trials: Prozac, Depakote, Lamictal (rash), Tegretol, Abilify and Latuda    Traumatic History: (unchanged information from previous note copied and updated)    Abuse: no history of sexual abuse, no history of physical abuse  Other Traumatic Events: none     Past Medical History:    Past Medical History:   Diagnosis Date    Abdominal pain     started 7/21/16- found something on the pancreas    Abnormal weight loss     last assessed [id number]    Acute sinusitis     Allergic rhinitis     last assessed [id number]    Anemia     Asthma     Cardiac murmur     Carpal tunnel syndrome     Cellulitis     Cervical disc disease     Colon polyp     Degeneration of thoracic intervertebral disc     Degenerative arthritis of knee     Diabetes mellitus ([facility] [address] )     Diverticulosis     Esophagitis, reflux     last assessed [id number]    Excessive sweating     Fatty liver     Frequent falls     Gait disturbance     Granuloma annulare     Head injury     Hyperlipidemia     Hypertension     Hypertensive heart disease     Hypoglycemia     last assessed [id number]    Leg length discrepancy     Leucocytosis     Lumbar stress fracture     Multiple thyroid nodules     Neuropathy     Obesity     last assessed [id number]    Paronychia of toe     Patellofemoral dysfunction     Postmenopausal atrophic vaginitis     Prepatellar bursitis     Sebaceous cyst     last assessed [id number]    Sleep apnea     no CPAP used    Suicide attempt ([facility] [address] )     Tinea corporis     Type 2 diabetes mellitus ([facility] [address] )     cont meds and monitor BS;  last assessed [id number]    Urinary frequency     Vertigo     Vitamin D deficiency     Dia-Parkinson-White syndrome      Past Medical History Pertinent Negatives: Diagnosis Date Noted    Colon cancer ([facility] [address] ) 08/20/2019    CPAP (continuous positive airway pressure) dependence 03/28/2019     Past Surgical History:   Procedure Laterality Date    COLONOSCOPY      ESOPHAGOGASTRODUODENOSCOPY N/A 8/8/2016    Procedure: ESOPHAGOGASTRODUODENOSCOPY (EGD); Surgeon: Lizette Kaur MD;  Location: [facility] GI LAB; Service:     IN ESOPHAGOGASTRODUODENOSCOPY TRANSORAL DIAGNOSTIC N/A 3/28/2019    Procedure: ESOPHAGOGASTRODUODENOSCOPY (EGD); Surgeon: Lizette Kaur MD;  Location: [facility] GI [facility];   Service: Gastroenterology    TONSILLECTOMY      TOOTH EXTRACTION      wisdom teeth removed     Allergies   Allergen Reactions    Depakote Er [Divalproex Sodium Er]      pancreatitis    Tanzeum [Albiglutide]      pancreatitis    Bee Venom     Carbamazepine Other (See Comments)     Reaction Date:Unknown    Clarithromycin Itching    Shellfish-Derived Products - Food Allergy     Lamotrigine Rash       Substance Abuse History:    Social History     Substance and Sexual Activity   Alcohol Use Not Currently    Alcohol/week: 1 0 standard drinks    Types: 1 Glasses of wine per week    Frequency: Monthly or less    Drinks per session: 1 or 2    Binge frequency: Never    Comment: rarely     Social History     Substance and Sexual Activity   Drug Use No       Social History:    Social History     Socioeconomic History    Marital status: /Civil Union     Spouse name: Not on file    Number of children: 0    Years of education: 15    Highest education level: 12th grade   Occupational History    Occupation: retired   Social Needs    Financial resource strain: Not hard at all   [place]-Deb insecurity     Worry: Never true     Inability: Never true    Transportation needs     Medical: No     Non-medical: No   Tobacco Use    Smoking status: Never Smoker    Smokeless tobacco: Never Used   Substance and Sexual Activity    Alcohol use: Not Currently     Alcohol/week: 1 0 standard drinks Types: 1 Glasses of wine per week     Frequency: Monthly or less     Drinks per session: 1 or 2     Binge frequency: Never     Comment: rarely    Drug use: No    Sexual activity: Not Currently     Partners: Male   Lifestyle    Physical activity     Days per week: 7 days     Minutes per session: 30 min    Stress:  Only a little   Relationships    Social connections     Talks on phone: Once a week     Gets together: Once a week     Attends Yazidi service: Never     Active member of club or organization: Yes     Attends meetings of clubs or organizations: More than 4 times per year     Relationship status:     Intimate partner violence     Fear of current or ex partner: No     Emotionally abused: No     Physically abused: No     Forced sexual activity: No   Other Topics Concern    Not on file   Social History Narrative    Education: high school graduate    Learning Disabilities: none    Marital History:     Children: none    Living Arrangement: lives in home with     Occupational History: retired, 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[place] [address]: good support system,  is supportive    Legal History: none     History: None        Caffeine use    Denied home environment domestic violence    Job physical requirements heavy lifting       Family Psychiatric History:     Family History   Problem Relation Age of Onset    Cancer Mother         lung    Heart disease Mother     Coronary artery disease Mother         CABG    Diabetes Mother     Lung cancer Mother 76        smoker    Stroke Mother     Thyroid disease Mother     Other Mother         tobacco use    Parkinsonism Father     Intestinal polyp Father     Bipolar disorder Father     Heart disease Father     Prostate cancer Father 62    Hypertension Sister     Alcohol abuse Sister     Depression Brother     Heart disease Brother     Diabetes Maternal Grandmother  Thyroid disease Maternal Grandmother     Hypertension Family     Suicide Attempts Paternal Aunt     Depression Paternal Aunt     Endometrial cancer Maternal Aunt         unknown age   Joanna Cox Ovarian cancer Cousin 39        maternal       History Review:  The following portions of the patient's history were reviewed and updated as appropriate: allergies, current medications, past family history, past medical history, past social history, past surgical history and problem list          OBJECTIVE:     Vital signs in last 24 hours:    Vitals:    06/08/21 1537   Weight: 89 8 kg (198 lb)   Height: 5' 4" (1 626 m)       Mental Status Evaluation:    Appearance age appropriate, casually dressed   Behavior cooperative, mildly anxious   Speech normal rate, normal volume, normal pitch   Mood mildly anxious   Affect normal range and intensity, appropriate   Thought Processes organized, goal directed   Associations intact associations   Thought Content no overt delusions   Perceptual Disturbances: no auditory hallucinations, no visual hallucinations   Abnormal Thoughts  Risk Potential Suicidal ideation - None  Homicidal ideation - None  Potential for aggression - No   Orientation oriented to person, place, time/date and situation   Memory recent and remote memory grossly intact   Consciousness alert and awake   Attention Span Concentration Span attention span and concentration are age appropriate   Intellect appears to be of average intelligence   Insight intact   Judgement intact   Muscle Strength and  Gait normal muscle strength and normal muscle tone, normal gait and normal balance   Motor activity no abnormal movements   Language no difficulty naming common objects, no difficulty repeating a phrase, no difficulty writing a sentence   Fund of Knowledge adequate knowledge of current events  adequate fund of knowledge regarding past history  adequate fund of knowledge regarding vocabulary    Pain none   Pain Scale 0 Laboratory Results: I have personally reviewed all pertinent laboratory/tests results    Recent Labs (last 6 months):    Admission on 02/27/2021, Discharged on 02/27/2021   Component Date Value    Color, UA 02/27/2021 Yellow     Clarity, UA 02/27/2021 Clear     pH, UA 02/27/2021 5 5     Leukocytes, UA 02/27/2021 Small*    Nitrite, UA 02/27/2021 Negative     Protein, UA 02/27/2021 Negative     Glucose, UA 02/27/2021 Negative     Ketones, UA 02/27/2021 Negative     Urobilinogen, UA 02/27/2021 0 2     Bilirubin, UA 02/27/2021 Negative     Blood, UA 02/27/2021 Negative     Specific Gravity, UA 02/27/2021 1 010     RBC, UA 02/27/2021 None Seen     WBC, UA 02/27/2021 1-2     Epithelial Cells 02/27/2021 Occasional     Bacteria, UA 02/27/2021 Occasional    Orders Only on 01/25/2021   Component Date Value    Total Cholesterol 01/25/2021 174     HDL 01/25/2021 55     Triglycerides 01/25/2021 217*    LDL Calculated 01/25/2021 88     Chol HDLC Ratio 01/25/2021 3 2     Non-HDL Cholesterol 01/25/2021 119     Glucose, Random 01/25/2021 125*    BUN 01/25/2021 23     Creatinine 01/25/2021 0 74     eGFR Non  01/25/2021 89     eGFR  01/25/2021 103     SL AMB BUN/CREATININE RA* 36/64/3268 NOT APPLICABLE     Sodium 89/40/9819 139     Potassium 01/25/2021 4 8     Chloride 01/25/2021 103     CO2 01/25/2021 25     Calcium 01/25/2021 9 7     Protein, Total 01/25/2021 7 2     Albumin 01/25/2021 4 7     Globulin 01/25/2021 2 5     Albumin/Globulin Ratio 01/25/2021 1 9     TOTAL BILIRUBIN 01/25/2021 0 3     Alkaline Phosphatase 01/25/2021 65     AST 01/25/2021 14     ALT 01/25/2021 16     Hemoglobin A1C 01/25/2021 6 2*   Orders Only on 12/26/2020   Component Date Value    Right Eye Diabetic Retin* 12/26/2020 None     Left Eye Diabetic Retino* 12/26/2020 Mild    Orders Only on 12/14/2020   Component Date Value    Glucose, Random 12/14/2020 105*    BUN 12/14/2020 17     Creatinine 12/14/2020 0 76     eGFR Non  12/14/2020 86     eGFR  12/14/2020 100     SL AMB BUN/CREATININE RA* 88/73/6874 NOT APPLICABLE     Sodium 99/75/9522 141     Potassium 12/14/2020 4 6     Chloride 12/14/2020 104     CO2 12/14/2020 29     Calcium 12/14/2020 9 7     Hemoglobin A1C 12/14/2020 5 8*       Suicide/Homicide Risk Assessment:    Risk of Harm to Self:  Demographic risk factors include: , age: over 48 or older  Historical Risk Factors include: history of anxiety, history of mood disorder, history of suicide attempts  Recent Specific Risk Factors include: diagnosis of mood disorder, current anxiety symptoms  Protective Factors: no current suicidal ideation, compliant with medications, compliant with mental health treatment, responsibilities and duties to others, stable living environment, supportive family  Weapons: none  The following steps have been taken to ensure weapons are properly secured: not applicable  Based on today's assessment, Wm Lucero presents the following risk of harm to self: minimal    Risk of Harm to Others: The following ratings are based on assessment at the time of the interview  Based on today's assessment, Wm Lucero presents the following risk of harm to others: none    The following interventions are recommended: no intervention changes needed    Assessment/Plan:       Diagnoses and all orders for this visit:    Bipolar I disorder, most recent episode depressed, in full remission ([facility] [address] )  -     FLUoxetine (PROzac) 20 mg capsule; Take 1 capsule (20 mg total) by mouth daily Take with the 40 mg cap for a total dose of 60 mg daily  -     FLUoxetine (PROzac) 40 MG capsule; Take 1 capsule (40 mg total) by mouth daily Take with the 20 mg cap for a total dose of 60 mg daily    LOURDES (generalized anxiety disorder)  -     FLUoxetine (PROzac) 20 mg capsule;  Take 1 capsule (20 mg total) by mouth daily Take with the 40 mg cap for a total dose of 60 mg daily  -     FLUoxetine (PROzac) 40 MG capsule; Take 1 capsule (40 mg total) by mouth daily Take with the 20 mg cap for a total dose of 60 mg daily    Personality disorder (HCC)    Long-term use of high-risk medication          Treatment Recommendations/Precautions:    Continue Prozac 60 mg every evening to improve depressive symptoms  Continue Abilify 10 mg every evening to help with mood  Medication management every 4 months  Follows with family physician for glucose and lipid monitoring due to current therapy with antipsychotic medication  Follows with family physician for yearly physical exam, asthma, diabetes, hyperlipidemia and hypertension  Aware of 24 hour and weekend coverage for urgent situations accessed by calling [facility] main practice number  Monitor lipid profile and hemoglobin A1C yearly due to current therapy with antipsychotic medication    Medications Risks/Benefits      Risks, Benefits And Possible Side Effects Of Medications:    Risks, benefits, and possible side effects of medications explained to Vj including risk of parkinsonian symptoms, Tardive Dyskinesia and metabolic syndrome related to treatment with antipsychotic medications and risk of suicidality and serotonin syndrome related to treatment with antidepressants  She verbalizes understanding and agreement for treatment  Controlled Medication Discussion:     Not applicable    Psychotherapy Provided:     Individual psychotherapy provided: Yes  Counseling was provided during the session today for 16 minutes  Medications, treatment progress and treatment plan reviewed with Vj  Goals discussed during in session: improve control of anxiety and maintain mood stability  Discussed with Vj coping with medical problems and chronic mental illness  Coping mechanisms including exercising and keeping busy at home reviewed with Vj  Supportive therapy provided        Treatment Plan:    Completed and signed during the session: Yes - Treatment Plan done but not signed at time of office visit due to:  Plan reviewed in person and verbal consent given due to [place] social distancing    Note Share: This note was shared with patient      Emani Ch MD 06/08/21

## 2021-06-03 DIAGNOSIS — R10.13 EPIGASTRIC PAIN: ICD-10-CM

## 2021-06-03 DIAGNOSIS — K29.80 DUODENITIS: ICD-10-CM

## 2021-06-03 RX ORDER — OMEPRAZOLE 40 MG/1
40 CAPSULE, DELAYED RELEASE ORAL DAILY
Qty: 90 CAPSULE | Refills: 1 | Status: SHIPPED | OUTPATIENT
Start: 2021-06-03 | End: 2022-02-15

## 2021-06-03 NOTE — TELEPHONE ENCOUNTER
GI Physician: Dr Hayde Westbrook    Refill Request for Medication: Omeprazole    Dose: 40 mg    Quantity: 90    Pharmacy and Location: [facility]

## 2021-06-07 ENCOUNTER — OFFICE VISIT (OUTPATIENT)
Dept: PHYSICAL THERAPY | Facility: CLINIC | Age: 61
End: 2021-06-07
Payer: COMMERCIAL

## 2021-06-07 DIAGNOSIS — M54.50 ACUTE RIGHT-SIDED LOW BACK PAIN WITHOUT SCIATICA: ICD-10-CM

## 2021-06-07 DIAGNOSIS — M54.12 CERVICAL RADICULOPATHY: Primary | ICD-10-CM

## 2021-06-07 PROCEDURE — 97140 MANUAL THERAPY 1/> REGIONS: CPT | Performed by: PHYSICAL THERAPIST

## 2021-06-07 PROCEDURE — 97112 NEUROMUSCULAR REEDUCATION: CPT | Performed by: PHYSICAL THERAPIST

## 2021-06-07 NOTE — PROGRESS NOTES
Daily Note     Today's date: 2021  Patient name: Parul Gomez  : 1960  MRN: [id number]  Referring provider: Brenda Boykin DO  Dx:   Encounter Diagnosis     ICD-10-CM    1  Cervical radiculopathy  M54 12    2  Acute right-sided low back pain without sciatica  M54 5                 Subjective: Pt reports 3/10 neck pain and stiffness and 1/10 low back pain  Pt reports that lumbar extension exercises are helping  Pt reports that she fell over the weekend after turning around too quickly, landing on her knees  Pt reported some minor bruising, but had no other injuries and did not go to MD     Objective: See treatment diary below  Assessment:  Pt had decreased lumbar and cervical stiffness following exercises, indicating likely mechanical cause of symptoms  Pt had difficulty with balance exercise with feet together and looking over her shoulder  Balance performance did improve throughout session  Plan: Continue POC, Progress with balance exercises     Precautions: none  Tx: 10:05-10:15      Dx: C5-7 radiculopathy with an opening preference, R subacromial impingement, R GT bursitis,        Manuals 5/5 5/10  5/18  5/20  5/24  5/27  6/1  6/7      Manual c/s distraction                       R sh PROM                      R ITB foam roller                      Mechanical traction 5 min x 2 @ 25# 5 min x 2 @ 25#  5 min x 2 25#  5 min x2 25#  5min x 2 25#  5 min x 2 25#  5min x2 25#  5 minx2 25#      Graston B carpal tunnel 3 min ea 3 min    3 min  3 min  3 min  3 min ea  3 min ea      Laser L3 5000J in standing 5000J  2500J 5000J  5000J  5000J  6000 J  6000J      Laser C4-T2 4000J in standing 4000J  2500J  4000J              Prone t/s Gr IV mobs     1x30 ea                Seated t/s Gr V mobs     x2                Standing c/s retraction OP     x3        Neuro Re-Ed                       C/s retraction     3x10 3x10 3x10 3x10     C/s retraction, extension     1x10 2x10 2x10 1x10     scap retraction iso 5"x15 5"x15  5"x15  5"x15    1x15  1x15  5"x15       B c/s Rot 1x15 ea 1x15 ea  1x15 ea  1x15 ea  1x15 ea  1x15  1x15 ea  1x15 ea      B c/s SB 1x15 ea 1x15 ea  1x15 ea 1x15 ea      1x15ea  1x15 ea      Abdominal bracing                      DLS marching                       Bridges 3x20 3x20  3x20  3x20  3x22  3x22  np  3x22      Median nerve flossing 1x10 1x10  1x10  1x10  1x10  1x10  1x10 ea  1x10 ea      Ulnar nerve flossing     1x5  1x5  1x10  1x10  1x10 ea  1x10 ea       Carpal tunnel flossing 1x10 1x10  1x10  1x10  1x10  1x10  1x10 ea  1x10 ea       DLS SLR 8A95 ea 9P37 ea  3x12 ea  3x12 ea              Standing DLS Hip ABD                      Feet together eyes closed        30"x1     Feet together looking over shoulder        1x10 ea     SLS              30"x2  30"x2      Ther Ex                      R TB ER   G/ 3x10                  Mod sleeper stretch                      clamshells                      TB rows   Buzz/ 3x10                  Standing lumbar ext 3x10 3x10  3x10  3x10    3x10  3x10  np                                                                             Ther Activity                                                                       Gait Training                                                                       Modalities

## 2021-06-08 ENCOUNTER — OFFICE VISIT (OUTPATIENT)
Dept: PSYCHIATRY | Facility: CLINIC | Age: 61
End: 2021-06-08
Payer: COMMERCIAL

## 2021-06-08 VITALS — BODY MASS INDEX: 33.8 KG/M2 | HEIGHT: 64 IN | WEIGHT: 198 LBS

## 2021-06-08 DIAGNOSIS — F60.9 PERSONALITY DISORDER (HCC): Chronic | ICD-10-CM

## 2021-06-08 DIAGNOSIS — Z79.899 LONG-TERM USE OF HIGH-RISK MEDICATION: Chronic | ICD-10-CM

## 2021-06-08 DIAGNOSIS — F41.1 GAD (GENERALIZED ANXIETY DISORDER): Chronic | ICD-10-CM

## 2021-06-08 DIAGNOSIS — F31.76 BIPOLAR I DISORDER, MOST RECENT EPISODE DEPRESSED, IN FULL REMISSION (HCC): Primary | Chronic | ICD-10-CM

## 2021-06-08 PROCEDURE — 99214 OFFICE O/P EST MOD 30 MIN: CPT | Performed by: PSYCHIATRY & NEUROLOGY

## 2021-06-08 PROCEDURE — 90833 PSYTX W PT W E/M 30 MIN: CPT | Performed by: PSYCHIATRY & NEUROLOGY

## 2021-06-08 PROCEDURE — 1036F TOBACCO NON-USER: CPT | Performed by: PSYCHIATRY & NEUROLOGY

## 2021-06-08 RX ORDER — FLUOXETINE HYDROCHLORIDE 20 MG/1
20 CAPSULE ORAL DAILY
Qty: 90 CAPSULE | Refills: 2 | Status: SHIPPED | OUTPATIENT
Start: 2021-06-08 | End: 2021-11-17 | Stop reason: SDUPTHER

## 2021-06-08 RX ORDER — FLUOXETINE HYDROCHLORIDE 40 MG/1
40 CAPSULE ORAL DAILY
Qty: 90 CAPSULE | Refills: 2 | Status: SHIPPED | OUTPATIENT
Start: 2021-06-08 | End: 2021-11-17 | Stop reason: SDUPTHER

## 2021-06-08 NOTE — BH TREATMENT PLAN
TREATMENT PLAN (Medication Management Only)        [facility]    Name/Date of Birth/MRN:  Deshawn Nguyen 62 y o  1960 MRN: [id number]  Date of Treatment Plan: June 8, 2021  Diagnosis/Diagnoses:   1  Bipolar I disorder, most recent episode depressed, in full remission ([facility] [address] )    2  LOURDES (generalized anxiety disorder)    3  Personality disorder [facility])      Strengths/Personal Resources for Self-Care: "I am good decision-maker, I keep up with exercises"  Area/Areas of need (in own words): "maybe my weight"  1  Long Term Goal:   improve control of anxiety, maintain mood stability  Target Date: 4 months - 10/8/2021  Person/Persons responsible for completion of goal: Jessica Trejo  Short Term Objective (s) - How will we reach this goal?:   A  Provider new recommended medication/dosage changes and/or continue medication(s): continue current medications as prescribed (Prozac and Abilify)  B   N/A   C   N/A  Target Date: 4 months - 10/8/2021  Person/Persons Responsible for Completion of Goal: Janelle Gonzales   Progress Towards Goals: stable  Treatment Modality: medication management every 4 months  Review due 180 days from date of this plan: 6 months - 12/8/2021  Expected length of service: maintenance unless revised  My Physician/PA/NP and I have developed this plan together and I agree to work on the goals and objectives  I understand the treatment goals that were developed for my treatment    Electronic Signatures: on file (unless signed below)    Zechariah Wheeler MD 06/08/21

## 2021-06-09 ENCOUNTER — ANNUAL EXAM (OUTPATIENT)
Dept: OBGYN CLINIC | Facility: CLINIC | Age: 61
End: 2021-06-09
Payer: COMMERCIAL

## 2021-06-09 VITALS
BODY MASS INDEX: 33.97 KG/M2 | HEIGHT: 64 IN | SYSTOLIC BLOOD PRESSURE: 148 MMHG | WEIGHT: 199 LBS | DIASTOLIC BLOOD PRESSURE: 76 MMHG

## 2021-06-09 DIAGNOSIS — Z12.31 ENCOUNTER FOR SCREENING MAMMOGRAM FOR MALIGNANT NEOPLASM OF BREAST: ICD-10-CM

## 2021-06-09 DIAGNOSIS — Z01.419 PAP SMEAR, AS PART OF ROUTINE GYNECOLOGICAL EXAMINATION: ICD-10-CM

## 2021-06-09 DIAGNOSIS — Z01.419 ENCNTR FOR GYN EXAM (GENERAL) (ROUTINE) W/O ABN FINDINGS: Primary | ICD-10-CM

## 2021-06-09 PROCEDURE — S0612 ANNUAL GYNECOLOGICAL EXAMINA: HCPCS | Performed by: OBSTETRICS & GYNECOLOGY

## 2021-06-09 PROCEDURE — G0145 SCR C/V CYTO,THINLAYER,RESCR: HCPCS | Performed by: OBSTETRICS & GYNECOLOGY

## 2021-06-09 PROCEDURE — 3008F BODY MASS INDEX DOCD: CPT | Performed by: PSYCHIATRY & NEUROLOGY

## 2021-06-09 NOTE — PATIENT INSTRUCTIONS
Normal gynecological physical examination  Self-breast examination stressed  Mammogram ordered  Discussed regular exercise, healthy diet, importance of vitamin D and calcium supplements  Discussed importance of sun block use during periods of prolonged sun exposure  Patient will be seen in 1 year for routine gynecologic and medical examination  Patient will call office for any problems, concerns, or issues which may arise during the interim  Recommend trying coconut oil for relief of vaginal dryness  [Fatigue] : fatigue [Negative] : Heme/Lymph [Recent Change In Weight] : ~T no recent weight change [Dysphagia] : no dysphagia [Chest Pain] : no chest pain [Shortness Of Breath] : no shortness of breath [Wheezing] : no wheezing [Cough] : no cough [SOB on Exertion] : no shortness of breath during exertion [Abdominal Pain] : no abdominal pain [Vomiting] : no vomiting [Constipation] : no constipation [Diarrhea] : no diarrhea [Reflux/Heartburn] : no reflux/ heartburn [Hernia] : no hernia [FreeTextEntry2] : weight loss since last visit.

## 2021-06-09 NOTE — PROGRESS NOTES
Assessment/Plan:    No problem-specific Assessment & Plan notes found for this encounter  Diagnoses and all orders for this visit:    Encntr for gyn exam (general) (routine) w/o abn findings  -     Liquid-based pap, screening    Pap smear, as part of routine gynecological examination    Encounter for screening mammogram for malignant neoplasm of breast  -     Mammo screening bilateral w 3d & cad; Future          Normal gynecological physical examination  Self-breast examination stressed  Mammogram ordered  Discussed regular exercise, healthy diet, importance of vitamin D and calcium supplements  Discussed importance of sun block use during periods of prolonged sun exposure  Patient will be seen in 1 year for routine gynecologic and medical examination  Patient will call office for any problems, concerns, or issues which may arise during the interim  Subjective:      Patient ID: Nata Freeman is a 62 y o  female  Patient is a 61-year-old female who presents the office today for her annual gynecologic and medical examination  She has no acute complaints or concerns today  She is postmenopausal and endorses vaginal dryness for which she has tried [employer] [facility] without relief  She denies any symptoms of hot flashes, mood changes, insomnia  Advise patient to add natural organic coconut oil as well as other lubricants  I explained to her that if necessary we would had estrogen vaginal cream for relief and she will let us know if that becomes necessary  She is also experiencing urinary symptoms which she attributes to her recent weight gain  And diabetes  She endorses mixed incontinence, urinary frequency and occasional urgency  She denies dysuria  She also admits 3 recent falls and difficulty walking which she also attributes to her weight gain      She denies any symptoms of fevers, chills, chest pain, palpitations, SOB, nausea, vomiting, diarrhea, vaginal bleeding or discharge, breast pain or masses  She also denies any symptoms of COVID-19 including as sore throat, cough, or loss of taste or smell  She is compliant with her medications for hypertension, hyperlipidemia, and diabetes and she also takes supplements including vitamin-D  Her last mammogram was in October,  And her last colonoscopy was approximately 2 years ago  The following portions of the patient's history were reviewed and updated as appropriate: allergies, current medications, past family history, past medical history, past social history, past surgical history and problem list     Review of Systems   Constitutional: Positive for activity change (difficulty walking)  Negative for appetite change, diaphoresis, fatigue, fever and unexpected weight change  HENT: Negative  Negative for sore throat  Eyes: Negative  Respiratory: Negative  Negative for cough and shortness of breath  Cardiovascular: Negative  Negative for chest pain and palpitations  Followed for cholesterol and blood pressure   Gastrointestinal: Negative  Negative for abdominal pain, blood in stool, constipation, diarrhea, nausea and vomiting  Endocrine: Negative  Negative for cold intolerance and heat intolerance  Followed for diabetes   Genitourinary: Positive for vaginal pain (dryness)  Negative for dysuria, frequency, hematuria, pelvic pain, urgency, vaginal bleeding and vaginal discharge  Musculoskeletal: Positive for gait problem  Skin: Negative  Allergic/Immunologic: Negative  Hematological: Negative  Negative for adenopathy  Psychiatric/Behavioral: Negative  Objective:      BP (!) 184/90 (BP Location: Left arm, Patient Position: Sitting, Cuff Size: Standard)   Ht 5' 4" (1 626 m)   Wt 90 3 kg (199 lb)   BMI 34 16 kg/m²          Physical Exam  Exam conducted with a chaperone present  Constitutional:       General: She is not in acute distress  Appearance: Normal appearance  She is well-developed  She is obese  She is diaphoretic  HENT:      Head: Normocephalic and atraumatic  Eyes:      Pupils: Pupils are equal, round, and reactive to light  Neck:      Musculoskeletal: Normal range of motion and neck supple  Cardiovascular:      Rate and Rhythm: Normal rate  Rhythm irregular  Heart sounds: Normal heart sounds  No murmur  No friction rub  No gallop  Pulmonary:      Effort: Pulmonary effort is normal       Breath sounds: Normal breath sounds  Chest:      Breasts: Breasts are symmetrical          Right: No inverted nipple, mass, nipple discharge, skin change or tenderness  Left: No inverted nipple, mass, nipple discharge, skin change or tenderness  Abdominal:      Palpations: Abdomen is soft  Genitourinary:     General: Normal vulva  Exam position: Supine  Labia:         Right: No rash, tenderness or lesion  Left: No rash, tenderness or lesion  Vagina: Erythema present  No vaginal discharge, tenderness or bleeding  Cervix: No discharge or friability  Uterus: Not enlarged and not tender  Adnexa:         Right: No mass, tenderness or fullness  Left: No mass, tenderness or fullness  Rectum: Normal  Guaiac result negative  Comments: Atrophic vaginal changes present  Good pelvic support confirmed  Musculoskeletal: Normal range of motion  Lymphadenopathy:      Cervical: No cervical adenopathy  Upper Body:      Right upper body: No supraclavicular adenopathy  Left upper body: No supraclavicular adenopathy  Skin:     General: Skin is warm  Findings: No rash  Neurological:      Mental Status: She is alert and oriented to person, place, and time  Psychiatric:         Mood and Affect: Mood normal          Speech: Speech normal          Behavior: Behavior normal          Thought Content:  Thought content normal          Judgment: Judgment normal

## 2021-06-10 ENCOUNTER — OFFICE VISIT (OUTPATIENT)
Dept: PHYSICAL THERAPY | Facility: CLINIC | Age: 61
End: 2021-06-10
Payer: COMMERCIAL

## 2021-06-10 DIAGNOSIS — M54.12 CERVICAL RADICULOPATHY: Primary | ICD-10-CM

## 2021-06-10 DIAGNOSIS — M54.50 ACUTE RIGHT-SIDED LOW BACK PAIN WITHOUT SCIATICA: ICD-10-CM

## 2021-06-10 PROCEDURE — 97112 NEUROMUSCULAR REEDUCATION: CPT | Performed by: PHYSICAL THERAPIST

## 2021-06-10 PROCEDURE — 97140 MANUAL THERAPY 1/> REGIONS: CPT | Performed by: PHYSICAL THERAPIST

## 2021-06-10 NOTE — PROGRESS NOTES
Daily Note     Today's date: 6/10/2021  Patient name: Artur Wheeler  : 1960  MRN: [id number]  Referring provider: Phoebe Corrigan DO  Dx:   Encounter Diagnosis     ICD-10-CM    1  Cervical radiculopathy  M54 12    2  Acute right-sided low back pain without sciatica  M54 5                 Subjective: Patient stated mild pain in her C/S and L/S prior to treatment session; states decreased pain after completion of repeated extension in standing  Objective: See treatment diary below  Assessment: Patient demonstrated significant challenge with SLS activity; moderate challenge with standing FT looking over shoulder; positive response to manual intervention and mechanical traction  Plan: Continue POC, Progress with balance exercises     Precautions: none  Tx: 10:05-10:15      Dx: C5-7 radiculopathy with an opening preference, R subacromial impingement, R GT bursitis,        Manuals 5/5 5/10  5/18  5/20  5/24  5/27  6/1  6/7  6/10    Manual c/s distraction                       R sh PROM                      R ITB foam roller                      Mechanical traction 5 min x 2 @ 25# 5 min x 2 @ 25#  5 min x 2 25#  5 min x2 25#  5min x 2 25#  5 min x 2 25#  5min x2 25#  5 minx2 25#  5 minx2 25#    Graston B carpal tunnel 3 min ea 3 min    3 min  3 min  3 min  3 min ea  3 min ea 3 min ea    Laser L3 5000J in standing 5000J  2500J 5000J  5000J  5000J  6000 J  6000J  6000J    Laser C4-T2 4000J in standing 4000J  2500J  4000J             Prone t/s Gr IV mobs     1x30 ea                Seated t/s Gr V mobs     x2                Standing c/s retraction OP     x3        Neuro Re-Ed                      C/s retraction     3x10 3x10 3x10 3x10 3x10    C/s retraction, extension     1x10 2x10 2x10 1x10 1x10    scap retraction iso 5"x15 5"x15  5"x15  5"x15    1x15  1x15  5"x15  5"x15    B c/s Rot 1x15 ea 1x15 ea  1x15 ea  1x15 ea  1x15 ea  1x15  1x15 ea  1x15 ea 1x15 ea    B c/s SB 1x15 ea 1x15 ea  1x15 ea 1x15 ea      1x15ea  1x15 ea  1x15 ea    Abdominal bracing                      DLS marching                       Bridges 3x20 3x20  3x20  3x20  3x22  3x22  np  3x22  3x22    Median nerve flossing 1x10 1x10  1x10  1x10  1x10  1x10  1x10 ea  1x10 ea  1x10 ea    Ulnar nerve flossing     1x5  1x5  1x10  1x10  1x10 ea  1x10 ea  1x10 ea     Carpal tunnel flossing 1x10 1x10  1x10  1x10  1x10  1x10  1x10 ea  1x10 ea 1x10 ea     DLS SLR 8Z79 ea 6K33 ea  3x12 ea  3x12 ea              Standing DLS Hip ABD                      Feet together eyes closed        30"x1 30"x2    Feet together looking over shoulder        1x10 ea 1x20    SLS              30"x2  30"x2  30"x2 ea    Ther Ex                      R TB ER   G/ 3x10                  Mod sleeper stretch                      clamshells                      TB rows   Buzz/ 3x10                  Standing lumbar ext 3x10 3x10  3x10  3x10    3x10  3x10  np np                                                                            Ther Activity                                                                       Gait Training                                                                       Modalities

## 2021-06-14 ENCOUNTER — OFFICE VISIT (OUTPATIENT)
Dept: PHYSICAL THERAPY | Facility: CLINIC | Age: 61
End: 2021-06-14
Payer: COMMERCIAL

## 2021-06-14 DIAGNOSIS — M54.50 ACUTE RIGHT-SIDED LOW BACK PAIN WITHOUT SCIATICA: ICD-10-CM

## 2021-06-14 DIAGNOSIS — M54.12 CERVICAL RADICULOPATHY: Primary | ICD-10-CM

## 2021-06-14 LAB
LAB AP GYN PRIMARY INTERPRETATION: NORMAL
Lab: NORMAL

## 2021-06-14 PROCEDURE — 97112 NEUROMUSCULAR REEDUCATION: CPT | Performed by: PHYSICAL THERAPIST

## 2021-06-14 PROCEDURE — 97140 MANUAL THERAPY 1/> REGIONS: CPT | Performed by: PHYSICAL THERAPIST

## 2021-06-14 NOTE — PROGRESS NOTES
Daily Note     Today's date: 2021  Patient name: Todd Varela  : 1960  MRN: [id number]  Referring provider: Blu Villatoro DO  Dx:   Encounter Diagnosis     ICD-10-CM    1  Cervical radiculopathy  M54 12    2  Acute right-sided low back pain without sciatica  M54 5                 Subjective: Patient reports 2/10 neck pain and stiffness and 0/10 LBP today  Pt reports that she had 2/10 LBP over the weekend and reports 3 days of pretty good balance  Objective: See treatment diary below  Assessment: Patient demonstrated improved SLS balance B and improved eyes closed balance  Despite improved core stability, pt does not engage TA in standing that is likely slowing reaction time for balance corrections  Plan: Continue POC, Progress with balance exercises     Precautions: none          Dx: C5-7 radiculopathy with an opening preference, R subacromial impingement, R GT bursitis,        Manuals 5/5 5/10  5/18  5/20  5/24  5/27  6/1  6/7  6/10 6/14   Manual c/s distraction                       R sh PROM                      R ITB foam roller                      Mechanical traction 5 min x 2 @ 25# 5 min x 2 @ 25#  5 min x 2 25#  5 min x2 25#  5min x 2 25#  5 min x 2 25#  5min x2 25#  5 minx2 25#  5 minx2 25# 5 min 25#x5   Graston B carpal tunnel 3 min ea 3 min    3 min  3 min  3 min  3 min ea  3 min ea 3 min ea 3 min ea   Laser L3 5000J in standing 5000J  2500J 5000J  5000J  5000J  6000 J  6000J  6000J 6000 J   Laser C4-T2 4000J in standing 4000J  2500J  4000J             Prone t/s Gr IV mobs     1x30 ea                Seated t/s Gr V mobs     x2                Standing c/s retraction OP     x3        Neuro Re-Ed                      C/s retraction     3x10 3x10 3x10 3x10 3x10 3x10   C/s retraction, extension     1x10 2x10 2x10 1x10 1x10 1x10   scap retraction iso 5"x15 5"x15  5"x15  5"x15    1x15  1x15  5"x15  5"x15 5"x15   B c/s Rot 1x15 ea 1x15 ea  1x15 ea  1x15 ea  1x15 ea  1x15  1x15 ea  1x15 ea 1x15 ea 1x15 ea   B c/s SB 1x15 ea 1x15 ea  1x15 ea 1x15 ea      1x15ea  1x15 ea  1x15 ea 1x15 ea   Abdominal bracing                      DLS marching                       Bridges 3x20 3x20  3x20  3x20  3x22  3x22  np  3x22  3x22 3x25   Median nerve flossing 1x10 1x10  1x10  1x10  1x10  1x10  1x10 ea  1x10 ea  1x10 ea 1x10 ea   Ulnar nerve flossing     1x5  1x5  1x10  1x10  1x10 ea  1x10 ea  1x10 ea  1x10 ea   Carpal tunnel flossing 1x10 1x10  1x10  1x10  1x10  1x10  1x10 ea  1x10 ea 1x10 ea  1x10 ea   DLS SLR 9Y85 ea 6D18 ea  3x12 ea  3x12 ea              Standing DLS Hip ABD                   2x15 ea   Feet together eyes closed        30"x1 30"x2 1x45"   FT EO          Lvl 8 1x45"   SLS          1x30 ea   Feet together looking over shoulder        1x10 ea 1x20    SLS              30"x2  30"x2  30"x2 ea    Ther Ex                      R TB ER   G/ 3x10                  Mod sleeper stretch                      clamshells                      TB rows   Buzz/ 3x10                  Standing lumbar ext 3x10 3x10  3x10  3x10    3x10  3x10  np np 3x10                                                                           Ther Activity                                                                       Gait Training                                                                       Modalities

## 2021-06-16 ENCOUNTER — HOSPITAL ENCOUNTER (OUTPATIENT)
Dept: RADIOLOGY | Facility: HOSPITAL | Age: 61
Discharge: HOME | End: 2021-06-16
Payer: COMMERCIAL

## 2021-06-16 DIAGNOSIS — M54.2 CERVICAL PAIN: ICD-10-CM

## 2021-06-16 PROCEDURE — 72141 MRI NECK SPINE W/O DYE: CPT

## 2021-06-17 ENCOUNTER — OFFICE VISIT (OUTPATIENT)
Dept: PHYSICAL THERAPY | Facility: CLINIC | Age: 61
End: 2021-06-17
Payer: COMMERCIAL

## 2021-06-17 ENCOUNTER — HOSPITAL ENCOUNTER (OUTPATIENT)
Dept: RADIOLOGY | Facility: CLINIC | Age: 61
Discharge: HOME | End: 2021-06-17
Attending: ANESTHESIOLOGY
Payer: COMMERCIAL

## 2021-06-17 VITALS
DIASTOLIC BLOOD PRESSURE: 68 MMHG | OXYGEN SATURATION: 94 % | TEMPERATURE: 98.3 F | HEART RATE: 65 BPM | SYSTOLIC BLOOD PRESSURE: 124 MMHG | RESPIRATION RATE: 20 BRPM

## 2021-06-17 DIAGNOSIS — M54.50 ACUTE RIGHT-SIDED LOW BACK PAIN WITHOUT SCIATICA: ICD-10-CM

## 2021-06-17 DIAGNOSIS — M48.061 LUMBAR STENOSIS WITHOUT NEUROGENIC CLAUDICATION: ICD-10-CM

## 2021-06-17 DIAGNOSIS — M54.12 CERVICAL RADICULOPATHY: Primary | ICD-10-CM

## 2021-06-17 PROCEDURE — 64484 NJX AA&/STRD TFRM EPI L/S EA: CPT | Performed by: ANESTHESIOLOGY

## 2021-06-17 PROCEDURE — 64483 NJX AA&/STRD TFRM EPI L/S 1: CPT | Performed by: ANESTHESIOLOGY

## 2021-06-17 PROCEDURE — 97112 NEUROMUSCULAR REEDUCATION: CPT

## 2021-06-17 PROCEDURE — 97140 MANUAL THERAPY 1/> REGIONS: CPT

## 2021-06-17 RX ORDER — BUPIVACAINE HCL/PF 2.5 MG/ML
10 VIAL (ML) INJECTION ONCE
Status: COMPLETED | OUTPATIENT
Start: 2021-06-17 | End: 2021-06-17

## 2021-06-17 RX ORDER — 0.9 % SODIUM CHLORIDE 0.9 %
10 VIAL (ML) INJECTION ONCE
Status: COMPLETED | OUTPATIENT
Start: 2021-06-17 | End: 2021-06-17

## 2021-06-17 RX ORDER — METHYLPREDNISOLONE ACETATE 80 MG/ML
80 INJECTION, SUSPENSION INTRA-ARTICULAR; INTRALESIONAL; INTRAMUSCULAR; PARENTERAL; SOFT TISSUE ONCE
Status: COMPLETED | OUTPATIENT
Start: 2021-06-17 | End: 2021-06-17

## 2021-06-17 RX ADMIN — IOHEXOL 1 ML: 300 INJECTION, SOLUTION INTRAVENOUS at 13:46

## 2021-06-17 RX ADMIN — Medication 3 ML: at 13:45

## 2021-06-17 RX ADMIN — METHYLPREDNISOLONE ACETATE 80 MG: 80 INJECTION, SUSPENSION INTRA-ARTICULAR; INTRALESIONAL; INTRAMUSCULAR; PARENTERAL; SOFT TISSUE at 13:46

## 2021-06-17 RX ADMIN — SODIUM CHLORIDE 4 ML: 9 INJECTION, SOLUTION INTRAMUSCULAR; INTRAVENOUS; SUBCUTANEOUS at 13:46

## 2021-06-17 RX ADMIN — BUPIVACAINE HYDROCHLORIDE 4 ML: 2.5 INJECTION, SOLUTION EPIDURAL; INFILTRATION; INTRACAUDAL at 13:45

## 2021-06-17 NOTE — DISCHARGE INSTR - APPOINTMENTS
Epidural Steroid Injection   WHAT YOU NEED TO KNOW:   An epidural steroid injection (HARITHA) is a procedure to inject steroid medicine into the epidural space  The epidural space is between your spinal cord and vertebrae  Steroids reduce inflammation and fluid buildup in your spine that may be causing pain  You may be given pain medicine along with the steroids  ACTIVITY  · Do not drive or operate machinery today  · No strenuous activity today - bending, lifting, etc   · You may resume normal activites starting tomorrow - start slowly and as tolerated  · You may shower today, but no tub baths or hot tubs  · You may have numbness for several hours from the local anesthetic  Please use caution and common sense, especially with weight-bearing activities  CARE OF THE INJECTION SITE  · If you have soreness or pain, apply ice to the area today (20 minutes on/20 minutes off)  · Starting tomorrow, you may use warm, moist heat or ice if needed  · You may have an increase or change in your discomfort for 36-48 hours after your treatment  · Apply ice and continue with any pain medication you have been prescribed  · Notify the [facility] if you have any of the following: redness, drainage, swelling, headache, stiff neck or fever above 100°F     SPECIAL INSTRUCTIONS  · Our office will contact you in approximately 7 days for a progress report  MEDICATIONS  · Continue to take all routine medications  · Our office may have instructed you to hold some medications  As no general anesthesia was used in today's procedure, you should not experience any side effects related to anesthesia  If you have a problem specifically related to your procedure, please call our office at [phone]  Problems not related to your procedure should be directed to your primary care physician

## 2021-06-17 NOTE — H&P
History of Present Illness: The patient is a 62 y o  female who presents with complaints of right lower back and leg pain secondary to spinal stenosis and is here today for right L4-5 transforaminal epidural steroid injection      Patient Active Problem List   Diagnosis    Anomalies of nails    Asthma    Essential hypertension    Bipolar I disorder, most recent episode depressed, in full remission ([facility] [place] [address] )    Cardiac murmur    Cervical disc disease    Degeneration of thoracic intervertebral disc    Degenerative arthritis of knee, bilateral    Controlled type 2 diabetes mellitus without complication ([address] )    Diverticulosis    Dyslipidemia    Essential hypertriglyceridemia    Fatty liver    Gastroesophageal reflux disease    Granuloma annulare    Hypertensive heart disease    Iron deficiency anemia, unspecified    Left thyroid nodule    Leg length discrepancy    Multiple thyroid nodules    Neuropathy    Class 1 obesity due to excess calories with serious comorbidity and body mass index (BMI) of 32 0 to 32 9 in adult    Obstructive sleep apnea    Other acquired deformity of knee    Personality disorder ([facility] [place] [address] [facility]    Postmenopausal atrophic vaginitis    Prepatellar bursitis    Vertigo    Vitamin D deficiency    Anomalous atrioventricular excitation    LOURDES (generalized anxiety disorder)    Duodenitis    Epigastric pain    Uncontrolled type 2 diabetes mellitus with hyperglycemia, with long-term current use of insulin (HCC)    Change in bowel habit    Constipation    BRBPR (bright red blood per rectum)    Wellness examination    Type 2 diabetes mellitus without complication, without long-term current use of insulin (HCC)    Benign essential hypertension    History of colon polyps    Long-term use of high-risk medication    Hip pain    Right shoulder pain    Cervical radiculopathy    Carpal tunnel syndrome, bilateral    Closed fracture of transverse process of cervical vertebra ([facility] [place] [address] ) Past Medical History:   Diagnosis Date    Abdominal pain     started 7/21/16- found something on the pancreas    Abnormal weight loss     last assessed [id number]    Acute sinusitis     Allergic rhinitis     last assessed [id number]    Anemia     Asthma     Cardiac murmur     Carpal tunnel syndrome     Cellulitis     Cervical disc disease     Colon polyp     Degeneration of thoracic intervertebral disc     Degenerative arthritis of knee     Diabetes mellitus (HCC)     Diverticulosis     Esophagitis, reflux     last assessed [id number]    Excessive sweating     Fatty liver     Frequent falls     Gait disturbance     Granuloma annulare     Head injury     Hyperlipidemia     Hypertension     Hypertensive heart disease     Hypoglycemia     last assessed [id number]    Leg length discrepancy     Leucocytosis     Lumbar stress fracture     Multiple thyroid nodules     Neuropathy     Obesity     last assessed [id number]    Paronychia of toe     Patellofemoral dysfunction     Postmenopausal atrophic vaginitis     Prepatellar bursitis     Sebaceous cyst     last assessed [id number]    Sleep apnea     no CPAP used    Suicide attempt ([facility] [place] [address] )     Tinea corporis     Type 2 diabetes mellitus (HCC)     cont meds and monitor BS;  last assessed [id number]    Urinary frequency     Vertigo     Vitamin D deficiency     Dia-Parkinson-White syndrome        Past Surgical History:   Procedure Laterality Date    COLONOSCOPY      ESOPHAGOGASTRODUODENOSCOPY N/A 8/8/2016    Procedure: ESOPHAGOGASTRODUODENOSCOPY (EGD); Surgeon: Jeramie De Santiago MD;  Location: [facility]; Service:     [place] ESOPHAGOGASTRODUODENOSCOPY TRANSORAL DIAGNOSTIC N/A 3/28/2019    Procedure: ESOPHAGOGASTRODUODENOSCOPY (EGD); Surgeon: Jeramie De Santiago MD;  Location: [facility];   Service: Gastroenterology    TONSILLECTOMY      TOOTH EXTRACTION      wisdom teeth removed         Current Outpatient Medications:     ARIPiprazole (ABILIFY) 10 mg tablet, Take 1 tablet (10 mg total) by mouth every evening, Disp: 90 tablet, Rfl: 2    atorvastatin (LIPITOR) 20 mg tablet, Take 1 tablet (20 mg total) by mouth daily, Disp: 90 tablet, Rfl: 0    Cholecalciferol 4000 units CAPS, Take 1 capsule (4,000 Units total) by mouth daily (Patient taking differently: Take 1,000 Units by mouth daily ), Disp: , Rfl:     Diclofenac Sodium (VOLTAREN) 1 %, APPLY 2 GRAMS TO AFFECTED AREA 4 TIMES A DAY, Disp: 100 g, Rfl: 2    fenofibrate (TRICOR) 145 mg tablet, Take 1 tablet (145 mg total) by mouth daily, Disp: 90 tablet, Rfl: 3    FLUoxetine (PROzac) 20 mg capsule, Take 1 capsule (20 mg total) by mouth daily Take with the 40 mg cap for a total dose of 60 mg daily, Disp: 90 capsule, Rfl: 2    FLUoxetine (PROzac) 40 MG capsule, Take 1 capsule (40 mg total) by mouth daily Take with the 20 mg cap for a total dose of 60 mg daily, Disp: 90 capsule, Rfl: 2    lisinopril (ZESTRIL) 5 mg tablet, Take 1 tablet (5 mg total) by mouth daily, Disp: 90 tablet, Rfl: 2    Omega 3 1200 MG CAPS, Take 1,200 mg by mouth 2 (two) times a day  , Disp: , Rfl:     omeprazole (PriLOSEC) 40 MG capsule, Take 1 capsule (40 mg total) by mouth daily, Disp: 90 capsule, Rfl: 1    polyethylene glycol (MIRALAX) 17 g packet, Take 17 g by mouth daily, Disp: , Rfl:     Current Facility-Administered Medications:     bupivacaine (PF) (MARCAINE) 0 25 % injection 10 mL, 10 mL, Epidural, Once, Kamar Gamboa MD    iohexol (OMNIPAQUE) 300 mg/mL injection 50 mL, 50 mL, Epidural, Once, Kamar Gamboa MD    lidocaine (PF) (XYLOCAINE-MPF) 2 % injection 5 mL, 5 mL, Infiltration, Once, Kamar Gamboa MD    methylPREDNISolone acetate (DEPO-MEDROL) injection 80 mg, 80 mg, Epidural, Once, Kamar Gamboa MD    sodium chloride (PF) 0 9 % injection 10 mL, 10 mL, Infiltration, Once, Kamar Gamboa MD    Allergies   Allergen Reactions    Depakote Er [Divalproex Sodium Er]      pancreatitis    Tanzeum [Albiglutide]      pancreatitis    Bee Venom     Carbamazepine Other (See Comments)     Reaction Date:Unknown    Clarithromycin Itching    Shellfish-Derived Products - Food Allergy     Lamotrigine Rash       Physical Exam:   Vitals:    06/17/21 1322   BP: 126/75   Pulse: 70   Resp: 20   Temp: 98 3 °F (36 8 °C)   SpO2: 96%     General: Awake, Alert, Oriented x 3, Mood and affect appropriate  Respiratory: Respirations even and unlabored  Cardiovascular: Peripheral pulses intact; no edema  Musculoskeletal Exam:  Right lower back tenderness    ASA Score: 3    Patient/Chart Verification  Patient ID Verified: Verbal  Consents Confirmed: To be obtained in the Pre-Procedure area  H&P( within 30 days) Verified: To be obtained in the Pre-Procedure area  Interval H&P(within 24 hr) Complete (required for Outpatients and Surgery Admit only): To be obtained in the Pre-Procedure area  Allergies Reviewed: Yes  Anticoag/NSAID held?: NA  Currently on antibiotics?: No    Assessment:   1   Lumbar stenosis without neurogenic claudication        Plan: Rt L4 L5 Tfesi

## 2021-06-17 NOTE — PROGRESS NOTES
Daily Note     Today's date: 2021  Patient name: Matti Shin  : 1960  MRN: [id number]  Referring provider: Shari Smith DO  Dx:   Encounter Diagnosis     ICD-10-CM    1  Cervical radiculopathy  M54 12    2  Acute right-sided low back pain without sciatica  M54 5                   Subjective: Pt reports having increased pain in L neck and L LB since her MRI performed yesterday  States both wrists feel more stiff today as well  Is scheduled for injections to LB tomorrow, in effort to manage LB pain  Pt reports feeling better post treatment compared to start  Objective: See treatment diary below      Assessment: Tolerated treatment well  Graston performed to L UT/LS in effort to address L cervical pain reported at start of session  Pt noted decrease in pain and improved mobility following this intervention  Was most challenged with standing balance interventions at [employer], especially SLS on LLE  Patient would benefit from continued PT to further improve strength, decrease pain/frequency of symptoms, and maximize overall function  Plan: Continue per plan of care  Monitor response to LB injections NV  Precautions: none      Dx: C5-7 radiculopathy with an opening preference, R subacromial impingement, R GT bursitis,       Manuals 6/17   5/18  5/20  5/24  5/27  6/1  6/7  6/10 6/14   Manual c/s distraction                      R sh PROM                     R ITB foam roller                     Mechanical traction 5 min  25#   5 min x 2 25#  5 min x2 25#  5min x 2 25#  5 min x 2 25#  5min x2 25#  5 minx2 25#  5 minx2 25# 5 min 25#x5   Graston B carpal tunnel 3 min ea     3 min  3 min  3 min  3 min ea  3 min ea 3 min ea 3 min ea   Graston L UT/LS 5 min            Laser L3 6000 J   2500J 5000J  5000J  5000J  6000 J  6000J  6000J 6000 J   Laser C4-T2    2500J  4000J             Prone t/s Gr IV mobs    1x30 ea                Seated t/s Gr V mobs    x2                Standing c/s retraction OP     x3        Neuro Re-Ed                     C/s retraction 3x10    3x10 3x10 3x10 3x10 3x10 3x10   C/s retraction, extension 1x10    1x10 2x10 2x10 1x10 1x10 1x10   scap retraction iso 5"x15   5"x15  5"x15    1x15  1x15  5"x15  5"x15 5"x15   B c/s Rot 1x15 ea   1x15 ea  1x15 ea  1x15 ea  1x15  1x15 ea  1x15 ea 1x15 ea 1x15 ea   B c/s SB 1x15 ea   1x15 ea 1x15 ea      1x15ea  1x15 ea  1x15 ea 1x15 ea   Abdominal bracing                     DLS marching                      Bridges nv   3x20  3x20  3x22  3x22  np  3x22  3x22 3x25   Median nerve flossing 1x10 ea   1x10  1x10  1x10  1x10  1x10 ea  1x10 ea  1x10 ea 1x10 ea   Ulnar nerve flossing 1x10 ea  1x5  1x5  1x10  1x10  1x10 ea  1x10 ea  1x10 ea  1x10 ea   Carpal tunnel flossing 1x10 ea   1x10  1x10  1x10  1x10  1x10 ea  1x10 ea 1x10 ea  1x10 ea   DLS SLR    0X34 ea  1Q08 ea              Standing DLS Hip ABD 8L13 ea                 2x15 ea   Feet together eyes closed 1x45"       30"x1 30"x2 1x45"   FT EO Lvl 8 1x45"         Lvl 8 1x45"   SLS          1x30 ea   Feet together looking over shoulder        1x10 ea 1x20    SLS 30"x1 ea            30"x2  30"x2  30"x2 ea    Ther Ex                     R TB ER                    Mod sleeper stretch                     clamshells                     TB rows                    Standing lumbar ext 3x10   3x10  3x10    3x10  3x10  np np 3x10                                                                          Ther Activity                                                                       Gait Training                                                                       Modalities

## 2021-06-21 ENCOUNTER — OFFICE VISIT (OUTPATIENT)
Dept: PHYSICAL THERAPY | Facility: CLINIC | Age: 61
End: 2021-06-21
Payer: COMMERCIAL

## 2021-06-21 DIAGNOSIS — M54.12 CERVICAL RADICULOPATHY: Primary | ICD-10-CM

## 2021-06-21 DIAGNOSIS — M54.50 ACUTE RIGHT-SIDED LOW BACK PAIN WITHOUT SCIATICA: ICD-10-CM

## 2021-06-21 PROCEDURE — 97140 MANUAL THERAPY 1/> REGIONS: CPT | Performed by: PHYSICAL THERAPIST

## 2021-06-21 PROCEDURE — 97112 NEUROMUSCULAR REEDUCATION: CPT | Performed by: PHYSICAL THERAPIST

## 2021-06-21 NOTE — PROGRESS NOTES
Daily Note     Today's date: 2021  Patient name: Ismael Greene  : 1960  MRN: [id number]  Referring provider: Sascha Espana DO  Dx:   Encounter Diagnosis     ICD-10-CM    1  Cervical radiculopathy  M54 12    2  Acute right-sided low back pain without sciatica  M54 5                   Subjective: Pt reports that she feels like her balance is off today and that her legs are weak  Pt reports feeling pinching in her neck on L side  Pt reports 2/10 neck pain and 1/10 low back pain  Pt reports that she received her injection and has no complaints regarding the injection  Pt reports feeling better post treatment compared to start  Objective: See treatment diary below      Assessment: Pt demonstrated cervical AROM deficits which improved following graston of L UT/LS  Plan: Continue per plan of care  Monitor response to LB injections NV  Precautions: none      Dx: C5-7 radiculopathy with an opening preference, R subacromial impingement, R GT bursitis,       Manuals 6/17 6/21   5/20  5/24  5/27  6/1  6/7  6/10 6/14   Manual c/s distraction                     R sh PROM                    R ITB foam roller                    Mechanical traction 5 min  25# 5 min 25#   5 min x2 25#  5min x 2 25#  5 min x 2 25#  5min x2 25#  5 minx2 25#  5 minx2 25# 5 min 25#x5   Graston B carpal tunnel 3 min ea 3 min ea   3 min  3 min  3 min  3 min ea  3 min ea 3 min ea 3 min ea   Graston L UT/LS 5 min 5 min           Laser L3 6000 J nv  5000J  5000J  5000J  6000 J  6000J  6000J 6000 J   Laser C4-T2     4000J             Prone t/s Gr IV mobs                    Seated t/s Gr V mobs                    Standing c/s retraction OP     x3        Neuro Re-Ed                    C/s retraction 3x10 3x10   3x10 3x10 3x10 3x10 3x10 3x10   C/s retraction, extension 1x10 1x10   1x10 2x10 2x10 1x10 1x10 1x10   scap retraction iso 5"x15 5"x15   5"x15    1x15  1x15  5"x15  5"x15 5"x15   B c/s Rot 1x15 ea 1x15 ea   1x15 ea  1x15 ea  1x15  1x15 ea  1x15 ea 1x15 ea 1x15 ea   B c/s SB 1x15 ea 1x15 ea  1x15 ea      1x15ea  1x15 ea  1x15 ea 1x15 ea   Abdominal bracing                    DLS marching                     Bridges nv 3x25   3x20  3x22  3x22  np  3x22  3x22 3x25   Median nerve flossing 1x10 ea 1x10 ea   1x10  1x10  1x10  1x10 ea  1x10 ea  1x10 ea 1x10 ea   Ulnar nerve flossing 1x10 ea 1x10 ea   1x5  1x10  1x10  1x10 ea  1x10 ea  1x10 ea  1x10 ea   Carpal tunnel flossing 1x10 ea 1x10 ea   1x10  1x10  1x10  1x10 ea  1x10 ea 1x10 ea  1x10 ea   DLS SLR     2B22 ea              Standing DLS Hip ABD 3N49 ea  8O86 ea              2x15 ea   Feet together eyes closed 1x45" 1x45"      30"x1 30"x2 1x45"   FT EO Lvl 8 1x45" Lvl 8 1x45"        Lvl 8 1x45"   SLS          1x30 ea   Feet together looking over shoulder        1x10 ea 1x20    SLS 30"x1 ea  Lvl 12 30"x1 ea          30"x2  30"x2  30"x2 ea    Ther Ex                     R TB ER                    Mod sleeper stretch                     clamshells                     TB rows                    Standing lumbar ext 3x10 3x10    3x10    3x10  3x10  np np 3x10                                                                          Ther Activity                                                                       Gait Training                                                                       Modalities

## 2021-06-24 ENCOUNTER — OFFICE VISIT (OUTPATIENT)
Dept: PHYSICAL THERAPY | Facility: CLINIC | Age: 61
End: 2021-06-24
Payer: COMMERCIAL

## 2021-06-24 ENCOUNTER — TELEPHONE (OUTPATIENT)
Dept: PAIN MEDICINE | Facility: CLINIC | Age: 61
End: 2021-06-24

## 2021-06-24 DIAGNOSIS — M54.50 ACUTE RIGHT-SIDED LOW BACK PAIN WITHOUT SCIATICA: ICD-10-CM

## 2021-06-24 DIAGNOSIS — M54.12 CERVICAL RADICULOPATHY: Primary | ICD-10-CM

## 2021-06-24 PROCEDURE — 97112 NEUROMUSCULAR REEDUCATION: CPT

## 2021-06-24 PROCEDURE — 97110 THERAPEUTIC EXERCISES: CPT

## 2021-06-24 PROCEDURE — 97140 MANUAL THERAPY 1/> REGIONS: CPT

## 2021-06-24 NOTE — PROGRESS NOTES
Daily Note     Today's date: 2021  Patient name: Destin Nelson  : 1960  MRN: [id number]  Referring provider: Hodges Nageotte, DO  Dx:   Encounter Diagnosis     ICD-10-CM    1  Cervical radiculopathy  M54 12    2  Acute right-sided low back pain without sciatica  M54 5                   Subjective: Pt reports getting an injection on the R side of lower back and now has c/o increased pain on L side, pt cntinues to reports not being able to feel her hands and neck pain  Objective: See treatment diary below      Assessment: Pt reports no changes in sx during or post tx  Plan: Continue per plan of care  Precautions: none      Dx: C5-7 radiculopathy with an opening preference, R subacromial impingement, R GT bursitis,       Manuals 6/17 6/21 6/24    5/27  6/1  6/7  6/10 6/14   Manual c/s distraction                   R sh PROM                  R ITB foam roller                  Mechanical traction 5 min  25# 5 min 25# 5 min 25#    5 min x 2 25#  5min x2 25#  5 minx2 25#  5 minx2 25# 5 min 25#x5   Graston B carpal tunnel 3 min ea 3 min ea 3 min ea    3 min  3 min ea  3 min ea 3 min ea 3 min ea   Graston L UT/LS 5 min 5 min 5 min          Laser L3 6000 J nv 6000J    5000J  6000 J  6000J  6000J 6000 J   Neuro Re-Ed                  C/s retraction 3x10 3x10 3x10   3x10 3x10 3x10 3x10 3x10   C/s retraction, extension 1x10 1x10 1x10   2x10 2x10 1x10 1x10 1x10   scap retraction iso 5"x15 5"x15 5"x15    1x15  1x15  5"x15  5"x15 5"x15   B c/s Rot 1x15 ea 1x15 ea 1x15 ea    1x15  1x15 ea  1x15 ea 1x15 ea 1x15 ea   B c/s SB 1x15 ea 1x15 ea 1x15 ea      1x15ea  1x15 ea  1x15 ea 1x15 ea   Bridges nv 3x25 3x25    3x22  np  3x22  3x22 3x25   Median nerve flossing 1x10 ea 1x10 ea 1x10 ea    1x10  1x10 ea  1x10 ea  1x10 ea 1x10 ea   Ulnar nerve flossing 1x10 ea 1x10 ea 1x10 ea    1x10  1x10 ea  1x10 ea  1x10 ea  1x10 ea   Carpal tunnel flossing 1x10 ea 1x10 ea 1x10ea    1x10  1x10 ea  1x10 ea 1x10 ea  1x10 ea   Standing DLS Hip ABD 4K79 ea  2U67 ea 3x15 ea           2x15 ea   Feet together eyes closed 1x45" 1x45" NV     30"x1 30"x2 1x45"   FT EO Lvl 8 1x45" Lvl 8 1x45" NV       Lvl 8 1x45"   SLS          1x30 ea   SLS 30"x1 ea  Lvl 12 30"x1 ea  NV      30"x2  30"x2  30"x2 ea    Ther Ex                   Standing lumbar ext 3x10 3x10  3x10    3x10  3x10  np np 3x10                                                                    Ther Activity                                                                 Gait Training                                                                 Modalities

## 2021-06-24 NOTE — TELEPHONE ENCOUNTER
Pt said that she is ok, Rt leg is ok the. There is a sharp pain in the Lt hip. Pain level is a 6/10 when it flares up, the injection helped a 100% there is no pain on the rt side.

## 2021-06-28 ENCOUNTER — OFFICE VISIT (OUTPATIENT)
Dept: PHYSICAL THERAPY | Facility: CLINIC | Age: 61
End: 2021-06-28
Payer: COMMERCIAL

## 2021-06-28 DIAGNOSIS — M54.50 ACUTE RIGHT-SIDED LOW BACK PAIN WITHOUT SCIATICA: ICD-10-CM

## 2021-06-28 DIAGNOSIS — M54.12 CERVICAL RADICULOPATHY: Primary | ICD-10-CM

## 2021-06-28 PROCEDURE — 97112 NEUROMUSCULAR REEDUCATION: CPT | Performed by: PHYSICAL THERAPIST

## 2021-06-28 NOTE — PROGRESS NOTES
Daily Note     Today's date: 2021  Patient name: Todd Varela  : 1960  MRN: [id number]  Referring provider: Blu Villatoro DO  Dx:   Encounter Diagnosis     ICD-10-CM    1  Cervical radiculopathy  M54 12    2  Acute right-sided low back pain without sciatica  M54 5                   Subjective: Pt reports 1/10 neck pain with improved ROM  Pt reports 2/10 left sided LBP without radicular symptoms  Objective: See treatment diary below      Assessment: Pt's lumbar radicular symptoms have centralized and remained centralized throughout treatment session  Pt demonstrated increased difficulty with balance exercises in comparison to previous session, possibly due to fatigue level  Plan: Continue per plan of care  Re-eval next session for auth     Precautions: none      Dx: C5-7 radiculopathy with an opening preference, R subacromial impingement, R GT bursitis,       Manuals 6/17 6/21 6/24 6/28   5/27  6/1  6/7  6/10 6/14   Manual c/s distraction                   R sh PROM                  R ITB foam roller                  Mechanical traction 5 min  25# 5 min 25# 5 min 25# 5 min 25#   5 min x 2 25#  5min x2 25#  5 minx2 25#  5 minx2 25# 5 min 25#x5   Graston B carpal tunnel 3 min ea 3 min ea 3 min ea 3 min ea   3 min  3 min ea  3 min ea 3 min ea 3 min ea   Graston L UT/LS 5 min 5 min 5 min np         Laser L3 6000 J nv 6000J 6000 J   5000J  6000 J  6000J  6000J 6000 J   Neuro Re-Ed                  C/s retraction 3x10 3x10 3x10 3x10  3x10 3x10 3x10 3x10 3x10   C/s retraction, extension 1x10 1x10 1x10 1x10  2x10 2x10 1x10 1x10 1x10   scap retraction iso 5"x15 5"x15 5"x15 5"x15   1x15  1x15  5"x15  5"x15 5"x15   B c/s Rot 1x15 ea 1x15 ea 1x15 ea 1x15 ea   1x15  1x15 ea  1x15 ea 1x15 ea 1x15 ea   B c/s SB 1x15 ea 1x15 ea 1x15 ea 1x15 ea     1x15ea  1x15 ea  1x15 ea 1x15 ea   Bridges nv 3x25 3x25 3x28   3x22  np  3x22  3x22 3x25   Median nerve flossing 1x10 ea 1x10 ea 1x10 ea 1x10 ea   1x10  1x10 ea  1x10 ea  1x10 ea 1x10 ea   Ulnar nerve flossing 1x10 ea 1x10 ea 1x10 ea 1x10 ea   1x10  1x10 ea  1x10 ea  1x10 ea  1x10 ea   Carpal tunnel flossing 1x10 ea 1x10 ea 1x10ea 1x10 ea   1x10  1x10 ea  1x10 ea 1x10 ea  1x10 ea   Standing DLS Hip ABD 1O55 ea  4Z05 ea 3x15 ea 3x15 ea          2x15 ea   Feet together eyes closed 1x45" 1x45" NV NV    30"x1 30"x2 1x45"   FT EO Lvl 8 1x45" Lvl 8 1x45" NV Lvl 8 1x45"      Lvl 8 1x45"   SLS          1x30 ea   SLS 30"x1 ea  Lvl 12 30"x1 ea  NV 30"x1 ea     30"x2  30"x2  30"x2 ea    Ther Ex                   Standing lumbar ext 3x10 3x10  3x10 3x10   3x10  3x10  np np 3x10                                                                    Ther Activity                                                                 Gait Training                                                                 Modalities

## 2021-06-30 ENCOUNTER — OFFICE VISIT (OUTPATIENT)
Dept: OBGYN CLINIC | Facility: CLINIC | Age: 61
End: 2021-06-30
Payer: COMMERCIAL

## 2021-06-30 VITALS
DIASTOLIC BLOOD PRESSURE: 67 MMHG | WEIGHT: 200 LBS | BODY MASS INDEX: 34.15 KG/M2 | HEIGHT: 64 IN | SYSTOLIC BLOOD PRESSURE: 150 MMHG | HEART RATE: 76 BPM

## 2021-06-30 DIAGNOSIS — G95.9 CERVICAL MYELOPATHY (HCC): ICD-10-CM

## 2021-06-30 DIAGNOSIS — M54.2 CERVICAL PAIN: ICD-10-CM

## 2021-06-30 DIAGNOSIS — M54.41 CHRONIC BILATERAL LOW BACK PAIN WITH RIGHT-SIDED SCIATICA: ICD-10-CM

## 2021-06-30 DIAGNOSIS — M54.12 CERVICAL RADICULOPATHY: ICD-10-CM

## 2021-06-30 DIAGNOSIS — S32.009A CLOSED FRACTURE OF TRANSVERSE PROCESS OF LUMBAR VERTEBRA, INITIAL ENCOUNTER (HCC): Primary | ICD-10-CM

## 2021-06-30 DIAGNOSIS — M54.16 LUMBAR RADICULOPATHY: ICD-10-CM

## 2021-06-30 DIAGNOSIS — G89.29 CHRONIC BILATERAL LOW BACK PAIN WITH RIGHT-SIDED SCIATICA: ICD-10-CM

## 2021-06-30 PROCEDURE — 99214 OFFICE O/P EST MOD 30 MIN: CPT | Performed by: ORTHOPAEDIC SURGERY

## 2021-06-30 NOTE — PROGRESS NOTES
Assessment/Plan:      Multilevel cervical DDD and spinal stenosis with cord compression at C4-5, C5-6 and to a lesser extent at C6-7  Patient has evidence of myelopathy  She is a candidate for surgical intervention based on the natural history of cervical myelopathy and her current symptoms  She will likely need a CT scan of cervical spine to rule out OPLL prior to surgical intervention  She will like to discuss this option with her  prior to committing to surgical intervention  She is also advised that she is able to obtain a 2nd opinion if she chooses  Natural history of cervical myelopathy is discussed  No problem-specific Assessment & Plan notes found for this encounter  Cervical myelopathy   · Cervical MRI reviewed with patient  · Patient candidate for ACDF C4-5, C5-6 and C6-7  · Risks, expectations and benefits discussed with patient in detail  · All questions answered  · Patient encouraged to have 2nd opinion with other spine surgeon  · Follow up in one week       Problem List Items Addressed This Visit        Nervous and Auditory    Cervical radiculopathy      Other Visit Diagnoses     [facility]    -  Primary    Cervical pain        Lumbar radiculopathy        Chronic bilateral low back pain with right-sided sciatica        Cervical myelopathy (HCC)                Subjective:      Patient ID: Ryann Resendez is a 62 y o  female  HPI   The patient presents for follow up of lumbar and cervical spine with cervical MRI review  She is s/p right L4 TFESI with benefit to right leg, 7/17/2021 with Ahmed Plater  Today she complains of left low back pain with resolved right anterior thigh pain and neck pain with occasional electrical feeling into the arms bilaterally with balance issues  She rates her neck at 3/10 and lumbar 0-6/10  Activity can aggravate while rest alleviates  She does use IBU with benefit    She is currently in physical therapy for neck and lumbar with balance  She denies past spine surgery  She does work with Dr Borden for CTS symptoms  The following portions of the patient's history were reviewed and updated as appropriate: allergies, current medications, past family history, past medical history, past social history, past surgical history and problem list     Review of Systems   Constitutional: Negative for chills, fever and unexpected weight change  HENT: Negative for hearing loss, nosebleeds and sore throat  Eyes: Negative for pain, redness and visual disturbance  Respiratory: Negative for cough, shortness of breath and wheezing  Cardiovascular: Negative for chest pain, palpitations and leg swelling  Gastrointestinal: Negative for abdominal pain, nausea and vomiting  Genitourinary: Negative for dyspareunia, dysuria and frequency  Skin: Negative for rash and wound  Neurological: Negative for dizziness, numbness and headaches  Psychiatric/Behavioral: Negative for decreased concentration and suicidal ideas  The patient is not nervous/anxious  Objective:      /67   Pulse 76   Ht 5' 4" (1 626 m)   Wt 90 7 kg (200 lb)   BMI 34 33 kg/m²          Physical Exam  Constitutional:       Appearance: She is well-developed  HENT:      Head: Normocephalic  Eyes:      Conjunctiva/sclera: Conjunctivae normal    Cardiovascular:      Rate and Rhythm: Normal rate  Pulmonary:      Effort: Pulmonary effort is normal    Musculoskeletal:      Cervical back: Normal range of motion  Skin:     General: Skin is warm and dry  Neurological:      Mental Status: She is alert and oriented to person, place, and time           Patient ambulates without assistance  Tender to palpation: none  Patient unable to walk straight line heel/toe   Strength C5-T1 5/5 bilaterally with exception 4/5 left triceps   Sensation C5-T1 intact bilaterally   Christi's positive bilaterally     Imaging: Cervical MRI of 6/16/2021  Radiologist's IMPRESSION:     Severe central canal stenosis at the C4-5 level secondary to degenerative disc disease and a moderately large left paramedian disc extrusion  Cord is compressed and there is faint increased signal noted within the cord consistent with edema      Degenerative disc disease at C5-6 and C6-7 with moderate canal stenosis  Changes are more pronounced on the right at C5-6 and on the left at C6-7 where there is severe left foraminal narrowing      Scribe Attestation    I,:  Mell Ko am acting as a scribe while in the presence of the attending physician :       I,:  Redd Jacques MD personally performed the services described in this documentation    as scribed in my presence :

## 2021-07-01 ENCOUNTER — OFFICE VISIT (OUTPATIENT)
Dept: PHYSICAL THERAPY | Facility: CLINIC | Age: 61
End: 2021-07-01
Payer: COMMERCIAL

## 2021-07-01 DIAGNOSIS — M54.12 CERVICAL RADICULOPATHY: Primary | ICD-10-CM

## 2021-07-01 DIAGNOSIS — M54.50 ACUTE RIGHT-SIDED LOW BACK PAIN WITHOUT SCIATICA: ICD-10-CM

## 2021-07-01 PROCEDURE — 97140 MANUAL THERAPY 1/> REGIONS: CPT | Performed by: PHYSICAL THERAPIST

## 2021-07-01 PROCEDURE — 97112 NEUROMUSCULAR REEDUCATION: CPT | Performed by: PHYSICAL THERAPIST

## 2021-07-07 ENCOUNTER — OFFICE VISIT (OUTPATIENT)
Dept: OBGYN CLINIC | Facility: CLINIC | Age: 61
End: 2021-07-07
Payer: COMMERCIAL

## 2021-07-07 VITALS
BODY MASS INDEX: 34.15 KG/M2 | HEIGHT: 64 IN | HEART RATE: 80 BPM | DIASTOLIC BLOOD PRESSURE: 65 MMHG | WEIGHT: 200 LBS | SYSTOLIC BLOOD PRESSURE: 154 MMHG

## 2021-07-07 DIAGNOSIS — M54.12 CERVICAL RADICULOPATHY: ICD-10-CM

## 2021-07-07 DIAGNOSIS — M54.2 CERVICAL PAIN: ICD-10-CM

## 2021-07-07 DIAGNOSIS — G95.9 CERVICAL MYELOPATHY (HCC): Primary | ICD-10-CM

## 2021-07-07 PROCEDURE — 3078F DIAST BP <80 MM HG: CPT | Performed by: ORTHOPAEDIC SURGERY

## 2021-07-07 PROCEDURE — 3077F SYST BP >= 140 MM HG: CPT | Performed by: ORTHOPAEDIC SURGERY

## 2021-07-07 PROCEDURE — 1036F TOBACCO NON-USER: CPT | Performed by: ORTHOPAEDIC SURGERY

## 2021-07-07 PROCEDURE — 99214 OFFICE O/P EST MOD 30 MIN: CPT | Performed by: ORTHOPAEDIC SURGERY

## 2021-07-07 RX ORDER — GABAPENTIN 100 MG/1
300 CAPSULE ORAL ONCE
Status: CANCELLED | OUTPATIENT
Start: 2021-07-07 | End: 2021-07-07

## 2021-07-07 RX ORDER — ACETAMINOPHEN 325 MG/1
975 TABLET ORAL ONCE
Status: CANCELLED | OUTPATIENT
Start: 2021-07-07 | End: 2021-07-07

## 2021-07-07 NOTE — H&P (VIEW-ONLY)
Assessment/Plan:     multilevel cervical DDD with some cervical spine compression and evidence of myelopathy  Patient reports dropping objects and feeling unsteady with her balance  She is a candidate for ACDF at C4-5, C5-6 and C6-7  Risk and benefits of the planned procedure explained in great detail and all questions answered to satisfaction  Lateral history of myelopathy is explained  She understands risks of surgery to include but are not limited to bleeding, infection, dysphagia, hardware complications, neurological deficit, nerve palsy, possible worsening symptoms, possible need for reoperation  She will need medical clearance  No problem-specific Assessment & Plan notes found for this encounter  Cervical myelopathy  · Patient is a candidate for anterior cervical discectomy and instrumented fusion C4-5, C5-6, C6-7 with possible additional levels  she understands risks of surgery to include but not limited to bleeding, infection, CSF leak, nerve damage, possible persistent symptoms, possible need for reoperation  she will need medical clearance and blood work  · Follow up after surgery        Problem List Items Addressed This Visit        Nervous and Auditory    Cervical radiculopathy      Other Visit Diagnoses     Cervical myelopathy ([facility] )    -  Primary    Cervical pain                Subjective:      Patient ID: Fahad Mena is a 62 y o  female  HPI   The patient presents for follow up cervical spine with known cord compression at C4-5, C5-6 and lesser extend C6-7 with evidence of myelopathy  Today she complains of left low back pain, resolved anterior thigh pain, remaining neck pain with bilateral arm electrical symptoms and balance issues  She rates her neck pain at 2/10  Activity aggravates while rest alleviates  She does use IBU with some benefit  She denies past spine surgery  She has cancelled her CTS surgery with Dr Borden        The following portions of the patient's history were reviewed and updated as appropriate: allergies, current medications, past family history, past medical history, past social history, past surgical history and problem list     Review of Systems   Constitutional: Negative for chills, fever and unexpected weight change  HENT: Negative for hearing loss, nosebleeds and sore throat  Eyes: Negative for pain, redness and visual disturbance  Respiratory: Negative for cough, shortness of breath and wheezing  Cardiovascular: Negative for chest pain, palpitations and leg swelling  Gastrointestinal: Negative for abdominal pain, nausea and vomiting  Genitourinary: Negative for dyspareunia, dysuria and frequency  Skin: Negative for rash and wound  Neurological: Negative for dizziness, numbness and headaches  Psychiatric/Behavioral: Negative for decreased concentration and suicidal ideas  The patient is not nervous/anxious  Objective:      /65   Pulse 80   Ht 5' 4" (1 626 m)   Wt 90 7 kg (200 lb)   BMI 34 33 kg/m²          Physical Exam  Constitutional:       Appearance: She is well-developed  HENT:      Head: Normocephalic  Eyes:      Conjunctiva/sclera: Conjunctivae normal    Cardiovascular:      Rate and Rhythm: Normal rate  Pulmonary:      Effort: Pulmonary effort is normal    Musculoskeletal:      Cervical back: Normal range of motion  Skin:     General: Skin is warm and dry  Neurological:      Mental Status: She is alert and oriented to person, place, and time           Patient ambulates without assistance  Tender to palpation: none  Patient unable to walk heel/toe in straight line  Strength C5-T1 5/5 bilaterally with exception of 4/5 left triceps  Sensation C5-T1 intact bilaterally   Christi's positive bilaterally     Imaging:  Cervical MRI of 6/16/2021:  Radiologist's IMPRESSION:     Severe central canal stenosis at the C4-5 level secondary to degenerative disc disease and a moderately large left paramedian disc extrusion  Cord is compressed and there is faint increased signal noted within the cord consistent with edema      Degenerative disc disease at C5-6 and C6-7 with moderate canal stenosis  Changes are more pronounced on the right at C5-6 and on the left at C6-7 where there is severe left foraminal narrowing      Scribe Attestation    I,:  Jluis Chavez am acting as a scribe while in the presence of the attending physician :       I,:  Vivi Chase MD personally performed the services described in this documentation    as scribed in my presence :

## 2021-07-07 NOTE — PROGRESS NOTES
Assessment/Plan:     multilevel cervical DDD with some cervical spine compression and evidence of myelopathy  Patient reports dropping objects and feeling unsteady with her balance  She is a candidate for ACDF at C4-5, C5-6 and C6-7  Risk and benefits of the planned procedure explained in great detail and all questions answered to satisfaction  Lateral history of myelopathy is explained  She understands risks of surgery to include but are not limited to bleeding, infection, dysphagia, hardware complications, neurological deficit, nerve palsy, possible worsening symptoms, possible need for reoperation  She will need medical clearance  No problem-specific Assessment & Plan notes found for this encounter  Cervical myelopathy  · Patient is a candidate for anterior cervical discectomy and instrumented fusion C4-5, C5-6, C6-7 with possible additional levels  she understands risks of surgery to include but not limited to bleeding, infection, CSF leak, nerve damage, possible persistent symptoms, possible need for reoperation  she will need medical clearance and blood work  · Follow up after surgery        Problem List Items Addressed This Visit        Nervous and Auditory    Cervical radiculopathy      Other Visit Diagnoses     Cervical myelopathy ([facility] [address] )    -  Primary    Cervical pain                Subjective:      Patient ID: Delvis Zayas is a 62 y o  female  HPI   The patient presents for follow up cervical spine with known cord compression at C4-5, C5-6 and lesser extend C6-7 with evidence of myelopathy  Today she complains of left low back pain, resolved anterior thigh pain, remaining neck pain with bilateral arm electrical symptoms and balance issues  She rates her neck pain at 2/10  Activity aggravates while rest alleviates  She does use IBU with some benefit  She denies past spine surgery  She has cancelled her CTS surgery with Dr Borden        The following portions of the patient's history were reviewed and updated as appropriate: allergies, current medications, past family history, past medical history, past social history, past surgical history and problem list     Review of Systems   Constitutional: Negative for chills, fever and unexpected weight change  HENT: Negative for hearing loss, nosebleeds and sore throat  Eyes: Negative for pain, redness and visual disturbance  Respiratory: Negative for cough, shortness of breath and wheezing  Cardiovascular: Negative for chest pain, palpitations and leg swelling  Gastrointestinal: Negative for abdominal pain, nausea and vomiting  Genitourinary: Negative for dyspareunia, dysuria and frequency  Skin: Negative for rash and wound  Neurological: Negative for dizziness, numbness and headaches  Psychiatric/Behavioral: Negative for decreased concentration and suicidal ideas  The patient is not nervous/anxious  Objective:      /65   Pulse 80   Ht 5' 4" (1 626 m)   Wt 90 7 kg (200 lb)   BMI 34 33 kg/m²          Physical Exam  Constitutional:       Appearance: She is well-developed  HENT:      Head: Normocephalic  Eyes:      Conjunctiva/sclera: Conjunctivae normal    Cardiovascular:      Rate and Rhythm: Normal rate  Pulmonary:      Effort: Pulmonary effort is normal    Musculoskeletal:      Cervical back: Normal range of motion  Skin:     General: Skin is warm and dry  Neurological:      Mental Status: She is alert and oriented to person, place, and time           Patient ambulates without assistance  Tender to palpation: none  Patient unable to walk heel/toe in straight line  Strength C5-T1 5/5 bilaterally with exception of 4/5 left triceps  Sensation C5-T1 intact bilaterally   Christi's positive bilaterally     Imaging:  Cervical MRI of 6/16/2021:  Radiologist's IMPRESSION:     Severe central canal stenosis at the C4-5 level secondary to degenerative disc disease and a moderately large left paramedian disc extrusion  Cord is compressed and there is faint increased signal noted within the cord consistent with edema      Degenerative disc disease at C5-6 and C6-7 with moderate canal stenosis  Changes are more pronounced on the right at C5-6 and on the left at C6-7 where there is severe left foraminal narrowing      Scribe Attestation    I,:  Monet Price am acting as a scribe while in the presence of the attending physician :       I,:  Tisa Galeazzi, MD personally performed the services described in this documentation    as scribed in my presence :

## 2021-07-09 ENCOUNTER — LAB REQUISITION (OUTPATIENT)
Dept: LAB | Facility: HOSPITAL | Age: 61
End: 2021-07-09
Payer: COMMERCIAL

## 2021-07-09 ENCOUNTER — HOSPITAL ENCOUNTER (OUTPATIENT)
Dept: RADIOLOGY | Facility: HOSPITAL | Age: 61
Discharge: HOME | End: 2021-07-09
Attending: ORTHOPAEDIC SURGERY
Payer: COMMERCIAL

## 2021-07-09 ENCOUNTER — APPOINTMENT (OUTPATIENT)
Dept: LAB | Facility: CLINIC | Age: 61
End: 2021-07-09
Payer: COMMERCIAL

## 2021-07-09 ENCOUNTER — OFFICE VISIT (OUTPATIENT)
Dept: LAB | Facility: CLINIC | Age: 61
End: 2021-07-09
Payer: COMMERCIAL

## 2021-07-09 DIAGNOSIS — M54.2 CERVICALGIA: ICD-10-CM

## 2021-07-09 DIAGNOSIS — G95.9 CERVICAL MYELOPATHY (HCC): ICD-10-CM

## 2021-07-09 DIAGNOSIS — G95.9 DISEASE OF SPINAL CORD, UNSPECIFIED (HCC): ICD-10-CM

## 2021-07-09 DIAGNOSIS — M54.12 BRACHIAL NEURITIS: ICD-10-CM

## 2021-07-09 DIAGNOSIS — M54.12 CERVICAL RADICULOPATHY: ICD-10-CM

## 2021-07-09 DIAGNOSIS — M54.2 CERVICAL PAIN: ICD-10-CM

## 2021-07-09 DIAGNOSIS — G95.9 DISEASE OF SPINAL CORD (HCC): ICD-10-CM

## 2021-07-09 DIAGNOSIS — M54.12 RADICULOPATHY, CERVICAL REGION: ICD-10-CM

## 2021-07-09 LAB
ABO GROUP BLD: NORMAL
ALBUMIN SERPL BCP-MCNC: 2.5 G/DL (ref 3.5–5)
ALP SERPL-CCNC: 104 U/L (ref 46–116)
ALT SERPL W P-5'-P-CCNC: 35 U/L (ref 12–78)
ANION GAP SERPL CALCULATED.3IONS-SCNC: 9 MMOL/L (ref 4–13)
APTT PPP: 25 SECONDS (ref 23–37)
AST SERPL W P-5'-P-CCNC: 20 U/L (ref 5–45)
BACTERIA UR QL AUTO: ABNORMAL /HPF
BASOPHILS # BLD AUTO: 0.09 THOUSANDS/ΜL (ref 0–0.1)
BASOPHILS NFR BLD AUTO: 1 % (ref 0–1)
BILIRUB SERPL-MCNC: 0.28 MG/DL (ref 0.2–1)
BILIRUB UR QL STRIP: NEGATIVE
BLD GP AB SCN SERPL QL: NEGATIVE
BUN SERPL-MCNC: 20 MG/DL (ref 5–25)
CALCIUM ALBUM COR SERPL-MCNC: 10.9 MG/DL (ref 8.3–10.1)
CALCIUM SERPL-MCNC: 9.7 MG/DL (ref 8.3–10.1)
CHLORIDE SERPL-SCNC: 103 MMOL/L (ref 100–108)
CLARITY UR: ABNORMAL
CO2 SERPL-SCNC: 27 MMOL/L (ref 21–32)
COLOR UR: YELLOW
CREAT SERPL-MCNC: 0.77 MG/DL (ref 0.6–1.3)
EOSINOPHIL # BLD AUTO: 0.15 THOUSAND/ΜL (ref 0–0.61)
EOSINOPHIL NFR BLD AUTO: 2 % (ref 0–6)
ERYTHROCYTE [DISTWIDTH] IN BLOOD BY AUTOMATED COUNT: 14 % (ref 11.6–15.1)
EST. AVERAGE GLUCOSE BLD GHB EST-MCNC: 180 MG/DL
GFR SERPL CREATININE-BSD FRML MDRD: 85 ML/MIN/1.73SQ M
GLUCOSE SERPL-MCNC: 176 MG/DL (ref 65–140)
GLUCOSE UR STRIP-MCNC: ABNORMAL MG/DL
HBA1C MFR BLD: 7.9 %
HCT VFR BLD AUTO: 37.3 % (ref 34.8–46.1)
HGB BLD-MCNC: 11.5 G/DL (ref 11.5–15.4)
HGB UR QL STRIP.AUTO: NEGATIVE
HYALINE CASTS #/AREA URNS LPF: ABNORMAL /LPF
IMM GRANULOCYTES # BLD AUTO: 0.1 THOUSAND/UL (ref 0–0.2)
IMM GRANULOCYTES NFR BLD AUTO: 1 % (ref 0–2)
INR PPP: 0.93 (ref 0.84–1.19)
KETONES UR STRIP-MCNC: NEGATIVE MG/DL
LEUKOCYTE ESTERASE UR QL STRIP: ABNORMAL
LYMPHOCYTES # BLD AUTO: 2.7 THOUSANDS/ΜL (ref 0.6–4.47)
LYMPHOCYTES NFR BLD AUTO: 31 % (ref 14–44)
MCH RBC QN AUTO: 27.4 PG (ref 26.8–34.3)
MCHC RBC AUTO-ENTMCNC: 30.8 G/DL (ref 31.4–37.4)
MCV RBC AUTO: 89 FL (ref 82–98)
MONOCYTES # BLD AUTO: 0.49 THOUSAND/ΜL (ref 0.17–1.22)
MONOCYTES NFR BLD AUTO: 6 % (ref 4–12)
NEUTROPHILS # BLD AUTO: 5.09 THOUSANDS/ΜL (ref 1.85–7.62)
NEUTS SEG NFR BLD AUTO: 59 % (ref 43–75)
NITRITE UR QL STRIP: NEGATIVE
NON-SQ EPI CELLS URNS QL MICRO: ABNORMAL /HPF
NRBC BLD AUTO-RTO: 0 /100 WBCS
PH UR STRIP.AUTO: 7 [PH]
PLATELET # BLD AUTO: 336 THOUSANDS/UL (ref 149–390)
PMV BLD AUTO: 10.2 FL (ref 8.9–12.7)
POTASSIUM SERPL-SCNC: 4.8 MMOL/L (ref 3.5–5.3)
PROT SERPL-MCNC: 7.9 G/DL (ref 6.4–8.2)
PROT UR STRIP-MCNC: NEGATIVE MG/DL
PROTHROMBIN TIME: 12.5 SECONDS (ref 11.6–14.5)
RBC # BLD AUTO: 4.2 MILLION/UL (ref 3.81–5.12)
RBC #/AREA URNS AUTO: ABNORMAL /HPF
RH BLD: NEGATIVE
SODIUM SERPL-SCNC: 139 MMOL/L (ref 136–145)
SP GR UR STRIP.AUTO: 1.02 (ref 1–1.03)
SPECIMEN EXPIRATION DATE: NORMAL
UROBILINOGEN UR QL STRIP.AUTO: 0.2 E.U./DL
WBC # BLD AUTO: 8.62 THOUSAND/UL (ref 4.31–10.16)
WBC #/AREA URNS AUTO: ABNORMAL /HPF

## 2021-07-09 PROCEDURE — 81001 URINALYSIS AUTO W/SCOPE: CPT | Performed by: ORTHOPAEDIC SURGERY

## 2021-07-09 PROCEDURE — 80053 COMPREHEN METABOLIC PANEL: CPT

## 2021-07-09 PROCEDURE — 85025 COMPLETE CBC W/AUTO DIFF WBC: CPT

## 2021-07-09 PROCEDURE — 85610 PROTHROMBIN TIME: CPT

## 2021-07-09 PROCEDURE — 85730 THROMBOPLASTIN TIME PARTIAL: CPT

## 2021-07-09 PROCEDURE — 93005 ELECTROCARDIOGRAM TRACING: CPT

## 2021-07-09 PROCEDURE — 86900 BLOOD TYPING SEROLOGIC ABO: CPT | Performed by: ORTHOPAEDIC SURGERY

## 2021-07-09 PROCEDURE — 83036 HEMOGLOBIN GLYCOSYLATED A1C: CPT

## 2021-07-09 PROCEDURE — 71046 X-RAY EXAM CHEST 2 VIEWS: CPT

## 2021-07-09 PROCEDURE — 3051F HG A1C>EQUAL 7.0%<8.0%: CPT | Performed by: ORTHOPAEDIC SURGERY

## 2021-07-09 PROCEDURE — 36415 COLL VENOUS BLD VENIPUNCTURE: CPT

## 2021-07-09 PROCEDURE — 86850 RBC ANTIBODY SCREEN: CPT | Performed by: ORTHOPAEDIC SURGERY

## 2021-07-09 PROCEDURE — 87186 SC STD MICRODIL/AGAR DIL: CPT | Performed by: ORTHOPAEDIC SURGERY

## 2021-07-09 PROCEDURE — 87086 URINE CULTURE/COLONY COUNT: CPT | Performed by: ORTHOPAEDIC SURGERY

## 2021-07-09 PROCEDURE — 87077 CULTURE AEROBIC IDENTIFY: CPT | Performed by: ORTHOPAEDIC SURGERY

## 2021-07-09 PROCEDURE — 86901 BLOOD TYPING SEROLOGIC RH(D): CPT | Performed by: ORTHOPAEDIC SURGERY

## 2021-07-11 LAB
ATRIAL RATE: 65 BPM
BACTERIA UR CULT: ABNORMAL
P AXIS: 49 DEGREES
PR INTERVAL: 168 MS
QRS AXIS: 9 DEGREES
QRSD INTERVAL: 78 MS
QT INTERVAL: 406 MS
QTC INTERVAL: 422 MS
T WAVE AXIS: 40 DEGREES
VENTRICULAR RATE: 65 BPM

## 2021-07-11 PROCEDURE — 93010 ELECTROCARDIOGRAM REPORT: CPT | Performed by: INTERNAL MEDICINE

## 2021-07-13 ENCOUNTER — RA CDI HCC (OUTPATIENT)
Dept: OTHER | Facility: HOSPITAL | Age: 61
End: 2021-07-13

## 2021-07-13 NOTE — PROGRESS NOTES
[facility]  coding opportunities          Chart reviewed, no opportunity found: CHART REVIEWED, NO OPPORTUNITY FOUND                     Patients insurance company: [employer] (Medicare Advantage and Commercial)

## 2021-07-19 ENCOUNTER — CONSULT (OUTPATIENT)
Dept: INTERNAL MEDICINE CLINIC | Facility: CLINIC | Age: 61
End: 2021-07-19
Payer: COMMERCIAL

## 2021-07-19 ENCOUNTER — ANESTHESIA EVENT (OUTPATIENT)
Dept: PERIOP | Facility: HOSPITAL | Age: 61
DRG: 472 | End: 2021-07-19
Payer: COMMERCIAL

## 2021-07-19 VITALS
SYSTOLIC BLOOD PRESSURE: 144 MMHG | DIASTOLIC BLOOD PRESSURE: 62 MMHG | BODY MASS INDEX: 34.38 KG/M2 | HEIGHT: 64 IN | HEART RATE: 72 BPM | WEIGHT: 201.4 LBS | OXYGEN SATURATION: 97 %

## 2021-07-19 DIAGNOSIS — Z01.818 PREOPERATIVE CLEARANCE: Primary | ICD-10-CM

## 2021-07-19 DIAGNOSIS — I10 BENIGN ESSENTIAL HYPERTENSION: ICD-10-CM

## 2021-07-19 DIAGNOSIS — E11.9 TYPE 2 DIABETES MELLITUS WITHOUT COMPLICATION, WITHOUT LONG-TERM CURRENT USE OF INSULIN (HCC): ICD-10-CM

## 2021-07-19 DIAGNOSIS — G95.9 CERVICAL MYELOPATHY (HCC): ICD-10-CM

## 2021-07-19 PROCEDURE — 99242 OFF/OP CONSLTJ NEW/EST SF 20: CPT | Performed by: NURSE PRACTITIONER

## 2021-07-19 PROCEDURE — 3008F BODY MASS INDEX DOCD: CPT | Performed by: ORTHOPAEDIC SURGERY

## 2021-07-19 NOTE — PROGRESS NOTES
Assessment/Plan:    Type 2 diabetes mellitus without complication, without long-term current use of insulin ([address] )  Advised patient to eat healthier and cut back on carbs and sugar  Lab Results   Component Value Date    HGBA1C 7 9 (H) 07/09/2021       Preoperative clearance  Cleared for surgery       Diagnoses and all orders for this visit:    Preoperative clearance    Type 2 diabetes mellitus without complication, without long-term current use of insulin (HCC)  -     Diabetic foot exam; Future    Benign essential hypertension    Cervical myelopathy (HCC)    Other orders  -     Cancel: HIV 1/2 Antigen/Antibody (4th Generation) w Reflex [facility]; Future        BMI Counseling: Body mass index is 34 57 kg/m²  The BMI is above normal  Nutrition recommendations include decreasing overall calorie intake, 3-5 servings of fruits/vegetables daily and consuming healthier snacks  Subjective:      Patient ID: Maurizio Young is a 62 y o  female  Patient is here for preop clearance for neck surgery due to myelopathy and radiculopathy  a1c elevated at 7 9  has not been adhering to her diabetic diet  Having problems walking      The following portions of the patient's history were reviewed and updated as appropriate: allergies, current medications, past family history, past medical history, past social history, past surgical history and problem list     Review of Systems   Constitutional: Negative  HENT: Negative  Eyes: Negative  Respiratory: Negative  Cardiovascular: Negative  Gastrointestinal: Negative  Musculoskeletal: Positive for gait problem and neck pain  Objective:      /62   Pulse 72   Ht 5' 4" (1 626 m)   Wt 91 4 kg (201 lb 6 4 oz)   SpO2 97%   BMI 34 57 kg/m²          Physical Exam  Vitals and nursing note reviewed  Constitutional:       Appearance: She is well-developed  HENT:      Head: Normocephalic and atraumatic        Right Ear: External ear normal       Left Ear: External ear normal       Nose: Nose normal    Eyes:      Conjunctiva/sclera: Conjunctivae normal       Pupils: Pupils are equal, round, and reactive to light  Cardiovascular:      Rate and Rhythm: Normal rate and regular rhythm  Pulses: no weak pulses          Dorsalis pedis pulses are 2+ on the right side and 2+ on the left side  Posterior tibial pulses are 2+ on the right side and 2+ on the left side  Pulmonary:      Effort: Pulmonary effort is normal       Breath sounds: Normal breath sounds  Abdominal:      General: Bowel sounds are normal       Palpations: Abdomen is soft  Musculoskeletal:         General: Normal range of motion  Cervical back: Normal range of motion and neck supple  Feet:      Right foot:      Skin integrity: No ulcer, skin breakdown, erythema, warmth, callus or dry skin  Left foot:      Skin integrity: No ulcer, skin breakdown, erythema, warmth, callus or dry skin  Skin:     General: Skin is warm and dry  Neurological:      Mental Status: She is alert and oriented to person, place, and time  Patient's shoes and socks removed  Right Foot/Ankle   Right Foot Inspection  Skin Exam: skin normal and skin intact no dry skin, no warmth, no callus, no erythema, no maceration, no abnormal color, no pre-ulcer, no ulcer and no callus                          Toe Exam: ROM and strength within normal limits  Sensory       Monofilament testing: intact  Vascular    The right DP pulse is 2+  The right PT pulse is 2+  Left Foot/Ankle  Left Foot Inspection  Skin Exam: skin normal and skin intactno dry skin, no warmth, no erythema, no maceration, normal color, no pre-ulcer, no ulcer and no callus                         Toe Exam: ROM and strength within normal limits                   Sensory       Monofilament: intact  Vascular    The left DP pulse is 2+  The left PT pulse is 2+  Assign Risk Category:  No deformity present; No loss of protective sensation;  No weak pulses       Risk: 0

## 2021-07-19 NOTE — ASSESSMENT & PLAN NOTE
Advised patient to eat healthier and cut back on carbs and sugar  Lab Results   Component Value Date    HGBA1C 7 9 (H) 07/09/2021

## 2021-07-20 RX ORDER — IBUPROFEN 200 MG
400 TABLET ORAL EVERY 4 HOURS PRN
COMMUNITY
End: 2021-08-04 | Stop reason: HOSPADM

## 2021-07-20 NOTE — PRE-PROCEDURE INSTRUCTIONS
Pre-Surgery Instructions:   Medication Instructions    ARIPiprazole (ABILIFY) 10 mg tablet Take every evening     atorvastatin (LIPITOR) 20 mg tablet ok take morning day of surgery with sips of water    Cholecalciferol 4000 units CAPS stop 7 days prior surgery    Diclofenac Sodium (VOLTAREN) 1 % don't use after skin prep    fenofibrate (TRICOR) 145 mg tablet ok take morning day of surgery with sips of water    FLUoxetine (PROzac) 20 mg capsule ok take morning day of surgery with sips of water    FLUoxetine (PROzac) 40 MG capsule ok take morning day of surgery with sips of water    ibuprofen (MOTRIN) 200 mg tablet Patient was instructed by Physician and understands  stop 7 days prior surgery    lisinopril (ZESTRIL) 5 mg tablet don't take morning day of surgery    Omega 3 1200 MG CAPS stop 7 days prior surgery    omeprazole (PriLOSEC) 40 MG capsule ok take morning day of surgery with sips of water    polyethylene glycol (MIRALAX) 17 g packet don't take morning day of surgery   Have you had / have a sore throat? No  Have you had / have a cough less than 1 week? No  Have you had / have a fever greater than 100 0 - 100  4? No  Are you experiencing any shortness of breath? No  Fully vaccinated    Reviewed with patient via phone all medication instructions  Advised not to take any NSAID's, Vitamins or Herbal products for 7 days prior to the DOS  Acetaminophen products are ok to take  Instructed about NPO after midnight the night before DOS, except sips of water with medications allowed for the morning of the DOS  Reviewed showering instructions as given by Surgical office  Instructed to call Surgical office with any questions  Informed about call from [facility] with the time to arrive for the scheduled surgery  Patient verbalized understanding

## 2021-07-22 NOTE — ANESTHESIA PREPROCEDURE EVALUATION
Procedure:  FUSION CERVICAL ANTERIOR W DISCECTOMY C4-5, C5-6, C6-7 with allograft and neuromonitoring  case length 3 hours (N/A Spine Cervical)    Relevant Problems   CARDIO   (+) Anomalous atrioventricular excitation   (+) Benign essential hypertension   (+) Cardiac murmur   (+) Essential hypertension   (+) Essential hypertriglyceridemia      ENDO   (+) Controlled type 2 diabetes mellitus without complication (HCC)   (+) Type 2 diabetes mellitus without complication, without long-term current use of insulin (HCC)      GI/HEPATIC   (+) BRBPR (bright red blood per rectum)   (+) Fatty liver   (+) Gastroesophageal reflux disease      HEMATOLOGY   (+) Iron deficiency anemia, unspecified      MUSCULOSKELETAL   (+) Degeneration of thoracic intervertebral disc   (+) Degenerative arthritis of knee, bilateral   (+) Prepatellar bursitis      NEURO/PSYCH   (+) LOURDES (generalized anxiety disorder)   (+) History of colon polyps      PULMONARY   (+) Asthma   (+) Obstructive sleep apnea      Pain management doc:lacey  Pain regimen: motrin, voltaren gel  Tylenol and gabapentin ordered by surgeon preop

## 2021-07-29 DIAGNOSIS — E78.5 DYSLIPIDEMIA: ICD-10-CM

## 2021-07-29 RX ORDER — ATORVASTATIN CALCIUM 20 MG/1
20 TABLET, FILM COATED ORAL DAILY
Qty: 90 TABLET | Refills: 1 | Status: SHIPPED | OUTPATIENT
Start: 2021-07-29 | End: 2022-03-07

## 2021-08-02 PROBLEM — E66.811 OBESITY (BMI 30.0-34.9): Status: ACTIVE | Noted: 2021-08-02

## 2021-08-02 PROBLEM — E66.9 OBESITY (BMI 30.0-34.9): Status: ACTIVE | Noted: 2021-08-02

## 2021-08-02 NOTE — ANESTHESIA PREPROCEDURE EVALUATION
Procedure:  FUSION CERVICAL ANTERIOR W DISCECTOMY C4-5, C5-6, C6-7 with allograft and neuromonitoring  case length 3 hours (N/A Spine Cervical)    Relevant Problems   ANESTHESIA (within normal limits)   (-) History of anesthesia complications      CARDIO   (+) Anomalous atrioventricular excitation   (+) Benign essential hypertension   (+) Cardiac murmur   (+) Essential hypertension   (+) Essential hypertriglyceridemia      ENDO   (+) Controlled type 2 diabetes mellitus without complication (HCC)   (+) Type 2 diabetes mellitus without complication, without long-term current use of insulin (HCC)      GI/HEPATIC   (+) Fatty liver   (+) Gastroesophageal reflux disease      /RENAL (within normal limits)      HEMATOLOGY   (+) Iron deficiency anemia, unspecified      MUSCULOSKELETAL   (+) Degeneration of thoracic intervertebral disc   (+) Degenerative arthritis of knee, bilateral   (+) Prepatellar bursitis      NEURO/PSYCH   (+) LOURDES (generalized anxiety disorder)   (+) History of colon polyps      PULMONARY   (+) Asthma   (+) Obstructive sleep apnea      Other   (+) Bipolar I disorder, most recent episode depressed, in full remission (HCC)   (+) Cervical disc disease   (+) Cervical radiculopathy   (+) Obesity (BMI 30 0-34  9)        Physical Exam    Airway    Mallampati score: II  TM Distance: >3 FB  Neck ROM: full     Dental   No notable dental hx     Cardiovascular  Rhythm: regular, Rate: normal, Cardiovascular exam normal    Pulmonary  Pulmonary exam normal Breath sounds clear to auscultation,     Other Findings        Anesthesia Plan  ASA Score- 3     Anesthesia Type- general with ASA Monitors  Additional Monitors:   Airway Plan: ETT  Plan Factors-    Chart reviewed  EKG reviewed  Imaging results reviewed  Existing labs reviewed  Patient summary reviewed  Patient is not a current smoker  Patient did not smoke on day of surgery  Induction- intravenous      Postoperative Plan-   Planned trial extubation    Informed Consent- Anesthetic plan and risks discussed with patient and spouse  Echo (2017):  LEFT VENTRICLE:  Systolic function was vigorous by visual assessment  Ejection fraction was estimated to be 70 %  There were no regional wall motion abnormalities  Features were consistent with a pseudonormal left ventricular filling pattern, with concomitant abnormal relaxation and increased filling pressure (grade 2 diastolic dysfunction)      RIGHT VENTRICLE:  Systolic pressure was mildly increased  Estimated peak pressure was 37 mmHg      LEFT ATRIUM:  The atrium was mildly dilated      MITRAL VALVE:  There was trace regurgitation      TRICUSPID VALVE:  There was trace regurgitation  NPO and allergies verified  Patient took atorvastatin, fenofibrate, fluoxetine, and omeprazole this AM 8/3/21  Patient received PO Tylenol and gabapentin preoperatively as ordered by surgeon  Plan:  GETA    Risks and benefits of general anesthesia were discussed with the patient  Discussed risks of anesthesia including, but not limited to, the risk of dental injury, n/v, sore throat, corneal abrasions, and arrhythmias  All questions were answered  Anesthesia consent was obtained from the patient

## 2021-08-03 ENCOUNTER — APPOINTMENT (OUTPATIENT)
Dept: RADIOLOGY | Facility: HOSPITAL | Age: 61
DRG: 472 | End: 2021-08-03
Payer: COMMERCIAL

## 2021-08-03 ENCOUNTER — ANESTHESIA (OUTPATIENT)
Dept: PERIOP | Facility: HOSPITAL | Age: 61
DRG: 472 | End: 2021-08-03
Payer: COMMERCIAL

## 2021-08-03 ENCOUNTER — HOSPITAL ENCOUNTER (INPATIENT)
Facility: HOSPITAL | Age: 61
LOS: 1 days | Discharge: HOME | DRG: 472 | End: 2021-08-04
Attending: ORTHOPAEDIC SURGERY | Admitting: ORTHOPAEDIC SURGERY
Payer: COMMERCIAL

## 2021-08-03 DIAGNOSIS — Z98.1 S/P CERVICAL SPINAL FUSION: Primary | ICD-10-CM

## 2021-08-03 LAB
ABO GROUP BLD: NORMAL
GLUCOSE SERPL-MCNC: 254 MG/DL (ref 65–140)
GLUCOSE SERPL-MCNC: 271 MG/DL (ref 65–140)
GLUCOSE SERPL-MCNC: 317 MG/DL (ref 65–140)
GLUCOSE SERPL-MCNC: 385 MG/DL (ref 65–140)
RH BLD: NEGATIVE

## 2021-08-03 PROCEDURE — 82948 REAGENT STRIP/BLOOD GLUCOSE: CPT

## 2021-08-03 PROCEDURE — 0RG20K0 FUSION OF 2 OR MORE CERVICAL VERTEBRAL JOINTS WITH NONAUTOLOGOUS TISSUE SUBSTITUTE, ANTERIOR APPROACH, ANTERIOR COLUMN, OPEN APPROACH: ICD-10-PCS | Performed by: ORTHOPAEDIC SURGERY

## 2021-08-03 PROCEDURE — C1713 ANCHOR/SCREW BN/BN,TIS/BN: HCPCS | Performed by: ORTHOPAEDIC SURGERY

## 2021-08-03 PROCEDURE — 22551 ARTHRD ANT NTRBDY CERVICAL: CPT | Performed by: ORTHOPAEDIC SURGERY

## 2021-08-03 PROCEDURE — 30233N1 TRANSFUSION OF NONAUTOLOGOUS RED BLOOD CELLS INTO PERIPHERAL VEIN, PERCUTANEOUS APPROACH: ICD-10-PCS | Performed by: ORTHOPAEDIC SURGERY

## 2021-08-03 PROCEDURE — 0RT30ZZ RESECTION OF CERVICAL VERTEBRAL DISC, OPEN APPROACH: ICD-10-PCS | Performed by: ORTHOPAEDIC SURGERY

## 2021-08-03 PROCEDURE — 99255 IP/OBS CONSLTJ NEW/EST HI 80: CPT | Performed by: INTERNAL MEDICINE

## 2021-08-03 PROCEDURE — 22552 ARTHRD ANT NTRBD CERVICAL EA: CPT | Performed by: ORTHOPAEDIC SURGERY

## 2021-08-03 PROCEDURE — 22846 INSERT SPINE FIXATION DEVICE: CPT | Performed by: ORTHOPAEDIC SURGERY

## 2021-08-03 PROCEDURE — 20931 SP BONE ALGRFT STRUCT ADD-ON: CPT | Performed by: ORTHOPAEDIC SURGERY

## 2021-08-03 PROCEDURE — 72040 X-RAY EXAM NECK SPINE 2-3 VW: CPT

## 2021-08-03 PROCEDURE — 4A11X4G MONITORING OF PERIPHERAL NERVOUS ELECTRICAL ACTIVITY, INTRAOPERATIVE, EXTERNAL APPROACH: ICD-10-PCS | Performed by: ORTHOPAEDIC SURGERY

## 2021-08-03 PROCEDURE — 86920 COMPATIBILITY TEST SPIN: CPT

## 2021-08-03 DEVICE — VARIABLE, SELF-DRILLING SCREW
Type: IMPLANTABLE DEVICE | Site: SPINE CERVICAL | Status: FUNCTIONAL
Brand: REFLEX HYBRID

## 2021-08-03 DEVICE — IMPLANTABLE DEVICE: Type: IMPLANTABLE DEVICE | Site: SPINE CERVICAL | Status: FUNCTIONAL

## 2021-08-03 DEVICE — THREE-LEVEL PLATE
Type: IMPLANTABLE DEVICE | Site: SPINE CERVICAL | Status: FUNCTIONAL
Brand: REFLEX HYBRID

## 2021-08-03 DEVICE — GRAFT BIO AVS W/PLUG 6MM X 12MM X 14MM X 4DEG: Type: IMPLANTABLE DEVICE | Site: SPINE CERVICAL | Status: FUNCTIONAL

## 2021-08-03 RX ORDER — OXYCODONE HYDROCHLORIDE 5 MG/1
5 TABLET ORAL EVERY 6 HOURS PRN
Qty: 30 TABLET | Refills: 0 | Status: SHIPPED | OUTPATIENT
Start: 2021-08-03 | End: 2021-09-29

## 2021-08-03 RX ORDER — ACETAMINOPHEN 325 MG/1
975 TABLET ORAL ONCE
Status: COMPLETED | OUTPATIENT
Start: 2021-08-03 | End: 2021-08-03

## 2021-08-03 RX ORDER — SODIUM CHLORIDE, SODIUM LACTATE, POTASSIUM CHLORIDE, CALCIUM CHLORIDE 600; 310; 30; 20 MG/100ML; MG/100ML; MG/100ML; MG/100ML
75 INJECTION, SOLUTION INTRAVENOUS CONTINUOUS
Status: DISCONTINUED | OUTPATIENT
Start: 2021-08-03 | End: 2021-08-04 | Stop reason: HOSPADM

## 2021-08-03 RX ORDER — HYDROMORPHONE HCL/PF 1 MG/ML
0.5 SYRINGE (ML) INJECTION EVERY 2 HOUR PRN
Status: DISCONTINUED | OUTPATIENT
Start: 2021-08-03 | End: 2021-08-04 | Stop reason: HOSPADM

## 2021-08-03 RX ORDER — GABAPENTIN 300 MG/1
300 CAPSULE ORAL 2 TIMES DAILY
Status: DISCONTINUED | OUTPATIENT
Start: 2021-08-03 | End: 2021-08-04 | Stop reason: HOSPADM

## 2021-08-03 RX ORDER — FLUOXETINE HYDROCHLORIDE 20 MG/1
40 CAPSULE ORAL DAILY
Status: DISCONTINUED | OUTPATIENT
Start: 2021-08-04 | End: 2021-08-04 | Stop reason: HOSPADM

## 2021-08-03 RX ORDER — METHOCARBAMOL 500 MG/1
500 TABLET, FILM COATED ORAL EVERY 6 HOURS SCHEDULED
Status: DISCONTINUED | OUTPATIENT
Start: 2021-08-03 | End: 2021-08-04 | Stop reason: HOSPADM

## 2021-08-03 RX ORDER — DEXAMETHASONE SODIUM PHOSPHATE 4 MG/ML
10 INJECTION, SOLUTION INTRA-ARTICULAR; INTRALESIONAL; INTRAMUSCULAR; INTRAVENOUS; SOFT TISSUE EVERY 12 HOURS SCHEDULED
Status: COMPLETED | OUTPATIENT
Start: 2021-08-03 | End: 2021-08-04

## 2021-08-03 RX ORDER — SUCCINYLCHOLINE/SOD CL,ISO/PF 100 MG/5ML
SYRINGE (ML) INTRAVENOUS AS NEEDED
Status: DISCONTINUED | OUTPATIENT
Start: 2021-08-03 | End: 2021-08-03

## 2021-08-03 RX ORDER — FLUOXETINE HYDROCHLORIDE 20 MG/1
20 CAPSULE ORAL
Status: DISCONTINUED | OUTPATIENT
Start: 2021-08-03 | End: 2021-08-04 | Stop reason: HOSPADM

## 2021-08-03 RX ORDER — METHOCARBAMOL 500 MG/1
500 TABLET, FILM COATED ORAL 4 TIMES DAILY PRN
Qty: 30 TABLET | Refills: 0 | Status: SHIPPED | OUTPATIENT
Start: 2021-08-03 | End: 2021-09-29

## 2021-08-03 RX ORDER — GABAPENTIN 300 MG/1
300 CAPSULE ORAL 2 TIMES DAILY
Qty: 60 CAPSULE | Refills: 0 | Status: SHIPPED | OUTPATIENT
Start: 2021-08-03 | End: 2021-09-29

## 2021-08-03 RX ORDER — MELATONIN
1000 DAILY
Status: DISCONTINUED | OUTPATIENT
Start: 2021-08-04 | End: 2021-08-04 | Stop reason: HOSPADM

## 2021-08-03 RX ORDER — ONDANSETRON 2 MG/ML
INJECTION INTRAMUSCULAR; INTRAVENOUS AS NEEDED
Status: DISCONTINUED | OUTPATIENT
Start: 2021-08-03 | End: 2021-08-03

## 2021-08-03 RX ORDER — ONDANSETRON 2 MG/ML
4 INJECTION INTRAMUSCULAR; INTRAVENOUS ONCE AS NEEDED
Status: DISCONTINUED | OUTPATIENT
Start: 2021-08-03 | End: 2021-08-03 | Stop reason: HOSPADM

## 2021-08-03 RX ORDER — ARIPIPRAZOLE 10 MG/1
10 TABLET ORAL EVERY EVENING
Status: DISCONTINUED | OUTPATIENT
Start: 2021-08-03 | End: 2021-08-04 | Stop reason: HOSPADM

## 2021-08-03 RX ORDER — DEXAMETHASONE SODIUM PHOSPHATE 10 MG/ML
INJECTION, SOLUTION INTRAMUSCULAR; INTRAVENOUS AS NEEDED
Status: DISCONTINUED | OUTPATIENT
Start: 2021-08-03 | End: 2021-08-03

## 2021-08-03 RX ORDER — PROPOFOL 10 MG/ML
INJECTION, EMULSION INTRAVENOUS CONTINUOUS PRN
Status: DISCONTINUED | OUTPATIENT
Start: 2021-08-03 | End: 2021-08-03

## 2021-08-03 RX ORDER — PANTOPRAZOLE SODIUM 40 MG/1
40 TABLET, DELAYED RELEASE ORAL
Status: DISCONTINUED | OUTPATIENT
Start: 2021-08-04 | End: 2021-08-04 | Stop reason: HOSPADM

## 2021-08-03 RX ORDER — FENTANYL CITRATE/PF 50 MCG/ML
25 SYRINGE (ML) INJECTION
Status: DISCONTINUED | OUTPATIENT
Start: 2021-08-03 | End: 2021-08-03 | Stop reason: HOSPADM

## 2021-08-03 RX ORDER — LIDOCAINE HYDROCHLORIDE 10 MG/ML
INJECTION, SOLUTION EPIDURAL; INFILTRATION; INTRACAUDAL; PERINEURAL AS NEEDED
Status: DISCONTINUED | OUTPATIENT
Start: 2021-08-03 | End: 2021-08-03

## 2021-08-03 RX ORDER — DEXMEDETOMIDINE HYDROCHLORIDE 100 UG/ML
INJECTION, SOLUTION INTRAVENOUS AS NEEDED
Status: DISCONTINUED | OUTPATIENT
Start: 2021-08-03 | End: 2021-08-03

## 2021-08-03 RX ORDER — SULFAMETHOXAZOLE AND TRIMETHOPRIM 800; 160 MG/1; MG/1
1 TABLET ORAL 2 TIMES DAILY
Qty: 10 TABLET | Refills: 0 | Status: SHIPPED | OUTPATIENT
Start: 2021-08-03 | End: 2021-08-08

## 2021-08-03 RX ORDER — OXYCODONE HYDROCHLORIDE 5 MG/1
5 TABLET ORAL EVERY 4 HOURS PRN
Status: DISCONTINUED | OUTPATIENT
Start: 2021-08-03 | End: 2021-08-04 | Stop reason: HOSPADM

## 2021-08-03 RX ORDER — GABAPENTIN 300 MG/1
300 CAPSULE ORAL ONCE
Status: COMPLETED | OUTPATIENT
Start: 2021-08-03 | End: 2021-08-03

## 2021-08-03 RX ORDER — MAGNESIUM HYDROXIDE 1200 MG/15ML
LIQUID ORAL AS NEEDED
Status: DISCONTINUED | OUTPATIENT
Start: 2021-08-03 | End: 2021-08-03 | Stop reason: HOSPADM

## 2021-08-03 RX ORDER — ONDANSETRON 2 MG/ML
4 INJECTION INTRAMUSCULAR; INTRAVENOUS EVERY 6 HOURS PRN
Status: DISCONTINUED | OUTPATIENT
Start: 2021-08-03 | End: 2021-08-04 | Stop reason: HOSPADM

## 2021-08-03 RX ORDER — METHOCARBAMOL 500 MG/1
500 TABLET, FILM COATED ORAL EVERY 6 HOURS PRN
Status: DISCONTINUED | OUTPATIENT
Start: 2021-08-03 | End: 2021-08-04 | Stop reason: HOSPADM

## 2021-08-03 RX ORDER — FENTANYL CITRATE 50 UG/ML
INJECTION, SOLUTION INTRAMUSCULAR; INTRAVENOUS AS NEEDED
Status: DISCONTINUED | OUTPATIENT
Start: 2021-08-03 | End: 2021-08-03

## 2021-08-03 RX ORDER — SODIUM CHLORIDE, SODIUM LACTATE, POTASSIUM CHLORIDE, CALCIUM CHLORIDE 600; 310; 30; 20 MG/100ML; MG/100ML; MG/100ML; MG/100ML
100 INJECTION, SOLUTION INTRAVENOUS CONTINUOUS
Status: DISCONTINUED | OUTPATIENT
Start: 2021-08-03 | End: 2021-08-03

## 2021-08-03 RX ORDER — SENNOSIDES 8.6 MG
1 TABLET ORAL DAILY
Status: DISCONTINUED | OUTPATIENT
Start: 2021-08-04 | End: 2021-08-04 | Stop reason: HOSPADM

## 2021-08-03 RX ORDER — SODIUM CHLORIDE, SODIUM LACTATE, POTASSIUM CHLORIDE, CALCIUM CHLORIDE 600; 310; 30; 20 MG/100ML; MG/100ML; MG/100ML; MG/100ML
75 INJECTION, SOLUTION INTRAVENOUS CONTINUOUS
Status: DISPENSED | OUTPATIENT
Start: 2021-08-03 | End: 2021-08-04

## 2021-08-03 RX ORDER — OXYCODONE HYDROCHLORIDE 10 MG/1
10 TABLET ORAL EVERY 4 HOURS PRN
Status: DISCONTINUED | OUTPATIENT
Start: 2021-08-03 | End: 2021-08-04 | Stop reason: HOSPADM

## 2021-08-03 RX ORDER — SODIUM CHLORIDE 9 MG/ML
INJECTION, SOLUTION INTRAVENOUS CONTINUOUS PRN
Status: DISCONTINUED | OUTPATIENT
Start: 2021-08-03 | End: 2021-08-03

## 2021-08-03 RX ORDER — FENOFIBRATE 145 MG/1
145 TABLET, COATED ORAL DAILY
Status: DISCONTINUED | OUTPATIENT
Start: 2021-08-04 | End: 2021-08-04 | Stop reason: HOSPADM

## 2021-08-03 RX ORDER — PROPOFOL 10 MG/ML
INJECTION, EMULSION INTRAVENOUS AS NEEDED
Status: DISCONTINUED | OUTPATIENT
Start: 2021-08-03 | End: 2021-08-03

## 2021-08-03 RX ORDER — HYDROMORPHONE HCL/PF 1 MG/ML
0.25 SYRINGE (ML) INJECTION
Status: DISCONTINUED | OUTPATIENT
Start: 2021-08-03 | End: 2021-08-03 | Stop reason: HOSPADM

## 2021-08-03 RX ORDER — CHLORHEXIDINE GLUCONATE 0.12 MG/ML
15 RINSE ORAL ONCE
Status: COMPLETED | OUTPATIENT
Start: 2021-08-03 | End: 2021-08-03

## 2021-08-03 RX ORDER — CEFAZOLIN SODIUM 2 G/50ML
2000 SOLUTION INTRAVENOUS EVERY 8 HOURS
Status: COMPLETED | OUTPATIENT
Start: 2021-08-03 | End: 2021-08-04

## 2021-08-03 RX ORDER — DOCUSATE SODIUM 100 MG/1
100 CAPSULE, LIQUID FILLED ORAL 2 TIMES DAILY
Status: DISCONTINUED | OUTPATIENT
Start: 2021-08-03 | End: 2021-08-04 | Stop reason: HOSPADM

## 2021-08-03 RX ORDER — EPHEDRINE SULFATE 50 MG/ML
INJECTION INTRAVENOUS AS NEEDED
Status: DISCONTINUED | OUTPATIENT
Start: 2021-08-03 | End: 2021-08-03

## 2021-08-03 RX ORDER — ACETAMINOPHEN 325 MG/1
975 TABLET ORAL EVERY 8 HOURS
Status: DISCONTINUED | OUTPATIENT
Start: 2021-08-03 | End: 2021-08-04 | Stop reason: HOSPADM

## 2021-08-03 RX ORDER — HYDROMORPHONE HCL/PF 1 MG/ML
SYRINGE (ML) INJECTION AS NEEDED
Status: DISCONTINUED | OUTPATIENT
Start: 2021-08-03 | End: 2021-08-03

## 2021-08-03 RX ORDER — LIDOCAINE HYDROCHLORIDE 10 MG/ML
0.5 INJECTION, SOLUTION EPIDURAL; INFILTRATION; INTRACAUDAL; PERINEURAL ONCE AS NEEDED
Status: DISCONTINUED | OUTPATIENT
Start: 2021-08-03 | End: 2021-08-03 | Stop reason: HOSPADM

## 2021-08-03 RX ORDER — ATORVASTATIN CALCIUM 20 MG/1
20 TABLET, FILM COATED ORAL
Status: DISCONTINUED | OUTPATIENT
Start: 2021-08-04 | End: 2021-08-04 | Stop reason: HOSPADM

## 2021-08-03 RX ORDER — CEFAZOLIN SODIUM 2 G/50ML
2000 SOLUTION INTRAVENOUS ONCE
Status: COMPLETED | OUTPATIENT
Start: 2021-08-03 | End: 2021-08-03

## 2021-08-03 RX ADMIN — CHLORHEXIDINE GLUCONATE 15 ML: 1.2 SOLUTION ORAL at 05:59

## 2021-08-03 RX ADMIN — ACETAMINOPHEN 975 MG: 325 TABLET, FILM COATED ORAL at 05:59

## 2021-08-03 RX ADMIN — SODIUM CHLORIDE, SODIUM LACTATE, POTASSIUM CHLORIDE, AND CALCIUM CHLORIDE: .6; .31; .03; .02 INJECTION, SOLUTION INTRAVENOUS at 11:38

## 2021-08-03 RX ADMIN — PHENYLEPHRINE HYDROCHLORIDE 50 MCG/MIN: 10 INJECTION INTRAVENOUS at 08:07

## 2021-08-03 RX ADMIN — HYDROMORPHONE HYDROCHLORIDE 0.25 MG: 1 INJECTION, SOLUTION INTRAMUSCULAR; INTRAVENOUS; SUBCUTANEOUS at 10:35

## 2021-08-03 RX ADMIN — DEXMEDETOMIDINE 4 MCG: 100 INJECTION, SOLUTION, CONCENTRATE INTRAVENOUS at 08:40

## 2021-08-03 RX ADMIN — CEFAZOLIN SODIUM 2000 MG: 2 SOLUTION INTRAVENOUS at 08:08

## 2021-08-03 RX ADMIN — FENTANYL CITRATE 25 MCG: 50 INJECTION INTRAMUSCULAR; INTRAVENOUS at 07:40

## 2021-08-03 RX ADMIN — DOCUSATE SODIUM 100 MG: 100 CAPSULE ORAL at 17:07

## 2021-08-03 RX ADMIN — DEXMEDETOMIDINE 4 MCG: 100 INJECTION, SOLUTION, CONCENTRATE INTRAVENOUS at 08:49

## 2021-08-03 RX ADMIN — DEXMEDETOMIDINE 8 MCG: 100 INJECTION, SOLUTION, CONCENTRATE INTRAVENOUS at 09:47

## 2021-08-03 RX ADMIN — ACETAMINOPHEN 975 MG: 325 TABLET, FILM COATED ORAL at 21:39

## 2021-08-03 RX ADMIN — METHOCARBAMOL 500 MG: 500 TABLET, FILM COATED ORAL at 17:07

## 2021-08-03 RX ADMIN — DEXMEDETOMIDINE 4 MCG: 100 INJECTION, SOLUTION, CONCENTRATE INTRAVENOUS at 08:56

## 2021-08-03 RX ADMIN — DEXMEDETOMIDINE 4 MCG: 100 INJECTION, SOLUTION, CONCENTRATE INTRAVENOUS at 08:35

## 2021-08-03 RX ADMIN — DEXMEDETOMIDINE 8 MCG: 100 INJECTION, SOLUTION, CONCENTRATE INTRAVENOUS at 08:44

## 2021-08-03 RX ADMIN — FENTANYL CITRATE 25 MCG: 50 INJECTION INTRAMUSCULAR; INTRAVENOUS at 08:23

## 2021-08-03 RX ADMIN — HYDROMORPHONE HYDROCHLORIDE 0.25 MG: 1 INJECTION, SOLUTION INTRAMUSCULAR; INTRAVENOUS; SUBCUTANEOUS at 09:58

## 2021-08-03 RX ADMIN — DEXMEDETOMIDINE 8 MCG: 100 INJECTION, SOLUTION, CONCENTRATE INTRAVENOUS at 10:27

## 2021-08-03 RX ADMIN — Medication 100 MG: at 07:41

## 2021-08-03 RX ADMIN — ONDANSETRON 4 MG: 2 INJECTION INTRAMUSCULAR; INTRAVENOUS at 11:31

## 2021-08-03 RX ADMIN — DEXMEDETOMIDINE 4 MCG: 100 INJECTION, SOLUTION, CONCENTRATE INTRAVENOUS at 09:37

## 2021-08-03 RX ADMIN — HYDROMORPHONE HYDROCHLORIDE 0.25 MG: 1 INJECTION, SOLUTION INTRAMUSCULAR; INTRAVENOUS; SUBCUTANEOUS at 11:25

## 2021-08-03 RX ADMIN — DEXMEDETOMIDINE 4 MCG: 100 INJECTION, SOLUTION, CONCENTRATE INTRAVENOUS at 09:54

## 2021-08-03 RX ADMIN — GABAPENTIN 300 MG: 300 CAPSULE ORAL at 05:59

## 2021-08-03 RX ADMIN — EPHEDRINE SULFATE 10 MG: 50 INJECTION, SOLUTION INTRAVENOUS at 08:03

## 2021-08-03 RX ADMIN — PROPOFOL 110 MCG/KG/MIN: 10 INJECTION, EMULSION INTRAVENOUS at 07:50

## 2021-08-03 RX ADMIN — OXYCODONE HYDROCHLORIDE 10 MG: 10 TABLET ORAL at 16:28

## 2021-08-03 RX ADMIN — METHOCARBAMOL 500 MG: 500 TABLET, FILM COATED ORAL at 14:20

## 2021-08-03 RX ADMIN — GABAPENTIN 300 MG: 300 CAPSULE ORAL at 17:07

## 2021-08-03 RX ADMIN — OXYCODONE HYDROCHLORIDE 5 MG: 5 TABLET ORAL at 20:33

## 2021-08-03 RX ADMIN — INSULIN HUMAN 5 UNITS: 100 INJECTION, SOLUTION PARENTERAL at 12:23

## 2021-08-03 RX ADMIN — SODIUM CHLORIDE, SODIUM LACTATE, POTASSIUM CHLORIDE, AND CALCIUM CHLORIDE: .6; .31; .03; .02 INJECTION, SOLUTION INTRAVENOUS at 07:31

## 2021-08-03 RX ADMIN — IRON SUCROSE 200 MG: 20 INJECTION, SOLUTION INTRAVENOUS at 17:12

## 2021-08-03 RX ADMIN — REMIFENTANIL HYDROCHLORIDE 0.2 MCG/KG/MIN: 1 INJECTION, POWDER, LYOPHILIZED, FOR SOLUTION INTRAVENOUS at 07:50

## 2021-08-03 RX ADMIN — METFORMIN HYDROCHLORIDE 500 MG: 500 TABLET ORAL at 17:07

## 2021-08-03 RX ADMIN — ACETAMINOPHEN 975 MG: 325 TABLET, FILM COATED ORAL at 14:41

## 2021-08-03 RX ADMIN — FLUOXETINE 20 MG: 20 CAPSULE ORAL at 20:32

## 2021-08-03 RX ADMIN — LIDOCAINE HYDROCHLORIDE 100 MG: 10 INJECTION, SOLUTION EPIDURAL; INFILTRATION; INTRACAUDAL; PERINEURAL at 07:40

## 2021-08-03 RX ADMIN — INSULIN LISPRO 4 UNITS: 100 INJECTION, SOLUTION INTRAVENOUS; SUBCUTANEOUS at 16:30

## 2021-08-03 RX ADMIN — DEXAMETHASONE SODIUM PHOSPHATE 10 MG: 10 INJECTION, SOLUTION INTRAMUSCULAR; INTRAVENOUS at 08:13

## 2021-08-03 RX ADMIN — FENTANYL CITRATE 50 MCG: 50 INJECTION INTRAMUSCULAR; INTRAVENOUS at 07:54

## 2021-08-03 RX ADMIN — DEXAMETHASONE SODIUM PHOSPHATE 10 MG: 4 INJECTION INTRA-ARTICULAR; INTRALESIONAL; INTRAMUSCULAR; INTRAVENOUS; SOFT TISSUE at 20:37

## 2021-08-03 RX ADMIN — CEFAZOLIN SODIUM 2000 MG: 2 SOLUTION INTRAVENOUS at 16:29

## 2021-08-03 RX ADMIN — INSULIN HUMAN 4 UNITS: 100 INJECTION, SOLUTION PARENTERAL at 06:34

## 2021-08-03 RX ADMIN — ARIPIPRAZOLE 10 MG: 10 TABLET ORAL at 20:35

## 2021-08-03 RX ADMIN — SODIUM CHLORIDE: 0.9 INJECTION, SOLUTION INTRAVENOUS at 07:49

## 2021-08-03 RX ADMIN — PROPOFOL 180 MG: 10 INJECTION, EMULSION INTRAVENOUS at 07:40

## 2021-08-03 RX ADMIN — INSULIN LISPRO 3 UNITS: 100 INJECTION, SOLUTION INTRAVENOUS; SUBCUTANEOUS at 21:39

## 2021-08-03 NOTE — INTERVAL H&P NOTE
H&P reviewed  After examining the patient I find no changes in the patients condition since the H&P had been written      Vitals:    08/03/21 0618   BP: 121/65   Pulse: 83   Resp: 16   Temp: 97 8 °F (36 6 °C)   SpO2: 96%

## 2021-08-03 NOTE — ANESTHESIA POSTPROCEDURE EVALUATION
Post-Op Assessment Note    CV Status:  Stable  Pain Score: 0    Pain management: adequate     Mental Status:  Alert and awake   Hydration Status:  Euvolemic and stable   PONV Controlled:  Controlled   Airway Patency:  Patent      Post Op Vitals Reviewed: Yes      Staff: Anesthesiologist         No complications documented  /85 (08/03/21 1215)    Temp 98 2 °F (36 8 °C) (08/03/21 1215)    Pulse 89 (08/03/21 1215)   Resp 14 (08/03/21 1215)    SpO2 99 % (08/03/21 1215)        Patient transported to PACU extubated and on supplemental O2  Patient was awake and alert, with stable vital signs  Signout was given to PACU RN

## 2021-08-03 NOTE — OP NOTE
OPERATIVE REPORT  PATIENT NAME: Delvis Zayas    :  1960  MRN: [id number]  Pt Location: BE OR ROOM 18    SURGERY DATE: 8/3/2021    Surgeon(s) and Role:     * Christy Moragn MD - Primary     * Janiya Dempsey MD - Assisting     * Tee Hedrick - Assisting    Preop Diagnosis:  Cervical myelopathy ([address] ) [G95 9]  Cervical radiculopathy [M54 12]  Cervical pain [M54 2]    Post-Op Diagnosis Codes:     * Cervical myelopathy ([address] ) [G95 9]     * Cervical radiculopathy [M54 12]     * Cervical pain [M54 2]    Procedure(s) (LRB):  FUSION CERVICAL ANTERIOR W DISCECTOMY C4-5, C5-6, C6-7 with allograft and neuromonitoring  case length 3 hours (N/A)    Specimen(s):  * No specimens in log *    Estimated Blood Loss:   200 mL    Drains:  Urethral Catheter Latex 16 Fr  (Active)   Reasons to continue Urinary Catheter  Accurate I&O assessment in critically ill patients (48 hr  max) 21 0850   Number of days: 0       Anesthesia Type:   General    Operative Indications:  Cervical myelopathy (HCC) [G95 9]  Cervical radiculopathy [M54 12]  Cervical pain [M54 2]      Operative Findings:  Severe spinal stenosis C4-5, cord compression C4-5  Moderate to severe spinal stenosis C5-6 and W7-4    Complications:   None    Procedure and Technique:    Anterior cervical diskectomy and instrumented fusion C4-5, C5-6 and C6-7 with allograft and neural monitoring  Patient was correctly identified by both the anesthesia department and myself  The neck was marked  In the operating room general anesthesia was induced  IV antibiotics were administered  Neural monitoring leads were placed  SCDs were attached legs  She was positioned supine onto radiolucent table ensuring that all bony prominences and neurovascular structures were adequately padded protected  Neck was positioned neutral alignment with the head in a horseshoe  The shoulders were taped down to facilitate radiographic exposure    The lower cervical segments with difficult to visualize secondary to body habitus  AP and lateral imaging demonstrated with the incision be made overlying C5  The neck was then prepped and draped in usual sterile fashion  A time-out was performed  Fifteen blade was used to create a 2 5 cm curvilinear incision over the left anterior neck  An anterior Mcmahon-Abraham approach was utilized  The platysma muscle was dissected  Flaps were raised  The anterior border of the sternocleidomastoid muscle was identified  The interval between this and the medial strap muscles was developed  The omohyoid muscle was divided within his mid belly  The anterior bony spine was palpated while the carotid sheath was protected laterally and the esophagus and larynx protected medially  Significant osteophytes were encountered from C3-C7  A marker was placed lateral image confirmed the appropriate level  Overlying the disc space at C4-5, C5-6 and C6-7 the anterior osteophytes had to be burred down  The osteophytes overlying the vertebral bodies were also burred down to create a flush plane  Started at the C4-5 level  The disc space was identified and found to be severely collapse  Annulotomy was created  Disc ectomy was performed with prep of the endplates with the use of high-speed bur  The PLL was identified  Several large pieces of disc fragment was found behind the PLL which was carefully teased off the thecal sac with the use of pituitary rongeur  Complete central decompression bilateral foraminal is were performed with 1  Kerrison rongeur  The same sequence of steps was taken at C5-6 with complete diskectomy and decompression of the thecal sac and C6-7 with complete diskectomy and decompression of thecal sac and foraminotomies  Endplates were prepped back to bleeding bone  Trial sizing ensued  We selected a size 6 4 disc space C4-5 and C6-7 and size 5 for disc space C5-C6    Cortical cancellous bone plugs were then malleted into the disc spaces  No significant neural monitoring events were noted  A reflex hybrid plate from [employer] measuring 42 mm was selected  This was secured in place with 16 mm screws  Final tightening was performed to the construct  AP and lateral imaging demonstrated appropriate instrumentation from C4-C7  The wound bed was found to be relatively dry  Copious irrigation was performed to the entire wound  Hemostasis was confirmed  The strap muscle layer was reapproximated with 3-0 Vicryl suture  The subcutaneous layer was closed with 2 Vicryl suture  The skin layer was closed with 3-0 Monocryl suture in a running subcuticular fashion  Sterile dressings were applied to the wound    Patient was extubated and taken to recovery room after cervical collar was applied       I was present for the entire procedure    Patient Disposition:  PACU      Implant Mora:  Reflex hybrid plate, cortical cancellous bone plugs    SIGNATURE: Felton Tomlinson MD  DATE: August 3, 2021  TIME: 12:24 PM

## 2021-08-03 NOTE — DISCHARGE INSTRUCTIONS
Discharge Instructions - Orthopedics  Nancy Donnelly 62 y o  female MRN: [id number]  Unit/Bed#: APU 13    Weight Bearing Status:                                           Full weight bearing to both legs  Light use of arms  Cervical Collar:  Must wear cervical collar at all times  DVT prophylaxis:  DO NOT take blood thinners until cleared by surgeon    Pain:  Continue analgesics as directed    Showering Instructions:   Do not shower until 2-3 days after the procedure  Dressing Instructions:   Keep surgical incision clean and dry at all times  A dry atmosphere helps the incision heal   No soaking or scrubbing of wound at any time  No ointment or soap on the incision  May change dressing in 2-3 days, apply dry bandage  OK to change the dressing if saturated  Driving Instructions:  No driving until cleared by Orthopaedic Surgery  PT/OT:  No PT/OT required until directed by surgeon at followup appointment    Appt Instructions: If you do not have your appointment, please call the clinic at [phone]  Otherwise followup as scheduled below: Follow-up with Dr Venus Velasco in the office in 2 weeks  Contact the office sooner if you experience any increased numbness/tingling in the extremities  Miscellaneous:  No lifting greater than 5 lbs  No strenuous exercise  No repetitive lifting or bending    Maintain a soft diet (i e  Pudding, jello, soup, etc )

## 2021-08-03 NOTE — RESTORATIVE TECHNICIAN NOTE
Restorative Technician Note      Patient Name: Damion Samson     Restorative Tech Visit Date: 08/03/21  Note Type: Bracing, Initial consult  Patient Position Upon Consult: Supine  Brace Applied: [place] Collar Set (collar fit in OR)  Education Provided: Yes;Other (comment) (instrutional sheet and jyotsna collar provided)  Patient Position at End of Consult: Supine; All needs within reach  Nurse Communication: Nurse aware of consult, application of brace      Please call [facility] at extension [phone] with any questions or adjustments      MANOHAR Casiano

## 2021-08-03 NOTE — CONSULTS
Internal Medicine  Consultation Note    Patient: Susana Diamond  Age/sex: 62 y o  female  Medical Record #: [id number]    Assessment/Plan    Status Post decompressive CervicalSurgery   Continue post op pain control measures as prescribed   Follow bowel regimen to help decrease narcotic induced constipation    Follow post operative hemoglobin with serial CBC and treat accordingly   Monitor WBC and fever curve post op while encouraging use of incentive spirometer   DVT prophylaxis in place and reviewed  DM II   Hgb A1c 7 9   Home meds: none   Switch to DM diet   Add sliding scale and coverage   Does not take home meds    Prior hgb A1c have been in the 6 2 range   Strongly recommend weight loss   Adherence to ADA diet   Start metformin 500mg bid   Monitor trend    HTN   Hold lisinopril in the post operative setting to avoid hypotension/BILL   OK to resume on dc   Add hydralazine as needed for SBP >160   Monitor trend    Depression  · Takes abilify/prozac at home  · Cont same    GERD   Cont PPI   Monitor for nausea/vomiting    Anemia  · Start IV venofer infusion        PRE-OP HGB LEVEL: 11 5    Subjective/ HPI: Susana Diamond was seen and examined  Hx of Cervicalpain failed out patient conservative measures  Elected to undergo surgical intervention  We are asked to see patient for post op management of underlying medical co-morbidities as outlined above  Pt did well intra and post operatively with good hemodynamics  Pt currently comfortable and without any reported post op nausea  ROS:   A 10 point ROS was performed; negative except as noted above       Social History:    Substance Use History:   Social History     Substance and Sexual Activity   Alcohol Use Not Currently    Alcohol/week: 1 0 standard drinks    Types: 1 Glasses of wine per week    Comment: rarely     Social History     Tobacco Use   Smoking Status Never Smoker   Smokeless Tobacco Never Used     Social History     Substance and Sexual Activity   Drug Use No       Family History:    non-contributory      Review of Scheduled Meds:  Current Facility-Administered Medications   Medication Dose Route Frequency Provider Last Rate    acetaminophen  975 mg Oral [address] [place], [place]      ARIPiprazole  10 mg Oral QPM [place], [place]      [START ON 8/4/2021] atorvastatin  20 mg Oral After [employer], PA-C      cefazolin  2,000 mg Intravenous [address] [place], [place]      [START ON 8/4/2021] cholecalciferol  1,000 Units Oral Daily [place], [place]      dexamethasone  10 mg Intravenous Q12H [address]ven[address], [place]      docusate sodium  100 mg Oral BID [place], [place]      [START ON 8/4/2021] fenofibrate  145 mg Oral Daily [place], [place]      FLUoxetine  20 mg Oral HS [place], [place]      [START ON 8/4/2021] FLUoxetine  40 mg Oral Daily [place], [place]      gabapentin  300 mg Oral BID [place], [place]      HYDROmorphone  0 5 mg Intravenous Q2H PRN Benton Brennonk [place], PA-C      lactated ringers  75 mL/hr Intravenous Continuous Dann Brito MD Stopped (08/03/21 1259)    lactated ringers  75 mL/hr Intravenous Continuous [facility], PA-C 75 mL/hr (08/03/21 1259)    methocarbamol  500 mg Oral Q6H [address], PA-C      ondansetron  4 mg Intravenous Q6H PRN [employer], PA-C      oxyCODONE  10 mg Oral Q4H PRN [facility], PA-C      oxyCODONE  5 mg Oral Q4H PRN [facility], PA-C      [START ON 8/4/2021] pantoprazole  40 mg Oral Early Morning [place], [place]      [START ON 8/4/2021] senna  1 tablet Oral Daily [place], [place]         Labs:                Invalid input(s): LABGLOM, CMP               Results from last 7 days   Lab Units 08/03/21  1216 08/03/21  0603   POC GLUCOSE mg/dl 271* 254*       Lab Results   Component Value Date    URINECX >100,000 cfu/ml Klebsiella pneumoniae (A) 07/09/2021    Valentino Bejarano 70,000-79,000 cfu/ml Mixed Contaminants X4 2016       Input and Output Summary (last 24 hours): Intake/Output Summary (Last 24 hours) at 8/3/2021 1327  Last data filed at 8/3/2021 1259  Gross per 24 hour   Intake 2000 ml   Output 1050 ml   Net 950 ml       Imaging:     XR spine cervical 2 or 3 vw injury    (Results Pending)       *Labs /Radiology studiesLabs reviewed  *Medications reviewed and reconciled as needed  *Please refer to order section for additional ordered labs studies  *Case discussed with primary attending during morning huddle case rounds    Vitals:   Temp (24hrs), Av 9 °F (36 6 °C), Min:97 5 °F (36 4 °C), Max:98 2 °F (36 8 °C)    Temp:  [97 5 °F (36 4 °C)-98 2 °F (36 8 °C)] 98 1 °F (36 7 °C)  HR:  [70-90] 90  Resp:  [11-19] 19  BP: (121-164)/(61-85) 159/82  SpO2:  [94 %-99 %] 94 %  Body mass index is 34 5 kg/m²  Physical Exam:   General Appearance: no distress, conversive, Cervical drain/dressing in place  HEENT: PERRLA, conjuctiva normal; oropharynx clear; mucous membranes moist;   Neck:  Supple, no lymphadenopathy or thyromegaly  Lungs: CTA, normal respiratory effort, no retractions, expiratory effort normal  CV: regular rate and rhythm , PMI normal   ABD: soft non tender, no masses , no hepatic or splenomegaly  EXT: DP pulses intact, no lymphadenopathy, no edema   : macias draining clear yellow urine  Skin: normal turgor, normal texture, no rash  Psych: affect normal, mood normal  Neuro: AAOx3          Invasive Devices     Peripheral Intravenous Line            Peripheral IV 21 Left Hand <1 day    Peripheral IV 21 Right Arm <1 day          Drain            Urethral Catheter Latex 16 Fr  <1 day                   Code Status: No Order  Current Length of Stay: 0 day(s)    Total floor / unit time spent today 30 minutes  Coordination of patient's care was performed in conjunction with primary service   Time invested included review of patient's labs, vitals, and management of their comorbidities with continued monitoring, examination of patient as well as answering patient questions, documenting her findings and creating progress note in electronic medical record,  ordering appropriate diagnostic testing  In addition, this patient was discussed with medical team including physician and advanced extenders  ** Please Note: Fluency Direct voice to text software may have been used in the creation of this document   Audio transcription errors may occur**

## 2021-08-04 VITALS
DIASTOLIC BLOOD PRESSURE: 79 MMHG | HEART RATE: 93 BPM | HEIGHT: 64 IN | OXYGEN SATURATION: 93 % | WEIGHT: 201 LBS | RESPIRATION RATE: 17 BRPM | SYSTOLIC BLOOD PRESSURE: 167 MMHG | TEMPERATURE: 97.9 F | BODY MASS INDEX: 34.31 KG/M2

## 2021-08-04 LAB
ANION GAP SERPL CALCULATED.3IONS-SCNC: 8 MMOL/L (ref 4–13)
ANISOCYTOSIS BLD QL SMEAR: PRESENT
BASOPHILS # BLD MANUAL: 0 THOUSAND/UL (ref 0–0.1)
BASOPHILS NFR MAR MANUAL: 0 % (ref 0–1)
BUN SERPL-MCNC: 21 MG/DL (ref 5–25)
CALCIUM SERPL-MCNC: 8.9 MG/DL (ref 8.3–10.1)
CHLORIDE SERPL-SCNC: 101 MMOL/L (ref 100–108)
CO2 SERPL-SCNC: 25 MMOL/L (ref 21–32)
CREAT SERPL-MCNC: 0.67 MG/DL (ref 0.6–1.3)
EOSINOPHIL # BLD MANUAL: 0 THOUSAND/UL (ref 0–0.4)
EOSINOPHIL NFR BLD MANUAL: 0 % (ref 0–6)
ERYTHROCYTE [DISTWIDTH] IN BLOOD BY AUTOMATED COUNT: 13.9 % (ref 11.6–15.1)
GFR SERPL CREATININE-BSD FRML MDRD: 97 ML/MIN/1.73SQ M
GLUCOSE SERPL-MCNC: 163 MG/DL (ref 65–140)
GLUCOSE SERPL-MCNC: 263 MG/DL (ref 65–140)
GLUCOSE SERPL-MCNC: 278 MG/DL (ref 65–140)
GLUCOSE SERPL-MCNC: 279 MG/DL (ref 65–140)
HCT VFR BLD AUTO: 32.9 % (ref 34.8–46.1)
HGB BLD-MCNC: 10.2 G/DL (ref 11.5–15.4)
LYMPHOCYTES # BLD AUTO: 0.36 THOUSAND/UL (ref 0.6–4.47)
LYMPHOCYTES # BLD AUTO: 2 % (ref 14–44)
MCH RBC QN AUTO: 26.8 PG (ref 26.8–34.3)
MCHC RBC AUTO-ENTMCNC: 31 G/DL (ref 31.4–37.4)
MCV RBC AUTO: 86 FL (ref 82–98)
MICROCYTES BLD QL AUTO: PRESENT
MONOCYTES # BLD AUTO: 0.18 THOUSAND/UL (ref 0–1.22)
MONOCYTES NFR BLD: 1 % (ref 4–12)
NEUTROPHILS # BLD MANUAL: 17.41 THOUSAND/UL (ref 1.85–7.62)
NEUTS SEG NFR BLD AUTO: 97 % (ref 43–75)
NRBC BLD AUTO-RTO: 0 /100 WBCS
PLATELET # BLD AUTO: 353 THOUSANDS/UL (ref 149–390)
PLATELET BLD QL SMEAR: ADEQUATE
PMV BLD AUTO: 10.7 FL (ref 8.9–12.7)
POLYCHROMASIA BLD QL SMEAR: PRESENT
POTASSIUM SERPL-SCNC: 4 MMOL/L (ref 3.5–5.3)
RBC # BLD AUTO: 3.81 MILLION/UL (ref 3.81–5.12)
RBC MORPH BLD: PRESENT
SMUDGE CELLS BLD QL SMEAR: PRESENT
SODIUM SERPL-SCNC: 134 MMOL/L (ref 136–145)
WBC # BLD AUTO: 17.95 THOUSAND/UL (ref 4.31–10.16)

## 2021-08-04 PROCEDURE — 85007 BL SMEAR W/DIFF WBC COUNT: CPT | Performed by: PHYSICIAN ASSISTANT

## 2021-08-04 PROCEDURE — 85027 COMPLETE CBC AUTOMATED: CPT | Performed by: PHYSICIAN ASSISTANT

## 2021-08-04 PROCEDURE — 80048 BASIC METABOLIC PNL TOTAL CA: CPT | Performed by: PHYSICIAN ASSISTANT

## 2021-08-04 PROCEDURE — NC001 PR NO CHARGE: Performed by: ORTHOPAEDIC SURGERY

## 2021-08-04 PROCEDURE — 97166 OT EVAL MOD COMPLEX 45 MIN: CPT

## 2021-08-04 PROCEDURE — 97163 PT EVAL HIGH COMPLEX 45 MIN: CPT

## 2021-08-04 PROCEDURE — 82948 REAGENT STRIP/BLOOD GLUCOSE: CPT

## 2021-08-04 PROCEDURE — 99232 SBSQ HOSP IP/OBS MODERATE 35: CPT | Performed by: INTERNAL MEDICINE

## 2021-08-04 RX ORDER — HYDRALAZINE HYDROCHLORIDE 25 MG/1
25 TABLET, FILM COATED ORAL EVERY 8 HOURS PRN
Status: DISCONTINUED | OUTPATIENT
Start: 2021-08-04 | End: 2021-08-04 | Stop reason: HOSPADM

## 2021-08-04 RX ADMIN — HYDRALAZINE HYDROCHLORIDE 25 MG: 25 TABLET, FILM COATED ORAL at 04:42

## 2021-08-04 RX ADMIN — FLUOXETINE 40 MG: 20 CAPSULE ORAL at 08:05

## 2021-08-04 RX ADMIN — INSULIN LISPRO 2 UNITS: 100 INJECTION, SOLUTION INTRAVENOUS; SUBCUTANEOUS at 06:59

## 2021-08-04 RX ADMIN — PANTOPRAZOLE SODIUM 40 MG: 40 TABLET, DELAYED RELEASE ORAL at 05:18

## 2021-08-04 RX ADMIN — DOCUSATE SODIUM 100 MG: 100 CAPSULE ORAL at 08:05

## 2021-08-04 RX ADMIN — CEFAZOLIN SODIUM 2000 MG: 2 SOLUTION INTRAVENOUS at 00:09

## 2021-08-04 RX ADMIN — METFORMIN HYDROCHLORIDE 500 MG: 500 TABLET ORAL at 08:04

## 2021-08-04 RX ADMIN — METFORMIN HYDROCHLORIDE 500 MG: 500 TABLET ORAL at 11:53

## 2021-08-04 RX ADMIN — METHOCARBAMOL 500 MG: 500 TABLET, FILM COATED ORAL at 00:08

## 2021-08-04 RX ADMIN — FENOFIBRATE 145 MG: 145 TABLET ORAL at 09:24

## 2021-08-04 RX ADMIN — IRON SUCROSE 200 MG: 20 INJECTION, SOLUTION INTRAVENOUS at 08:15

## 2021-08-04 RX ADMIN — SODIUM CHLORIDE, SODIUM LACTATE, POTASSIUM CHLORIDE, AND CALCIUM CHLORIDE 75 ML/HR: .6; .31; .03; .02 INJECTION, SOLUTION INTRAVENOUS at 00:09

## 2021-08-04 RX ADMIN — OXYCODONE HYDROCHLORIDE 10 MG: 10 TABLET ORAL at 04:42

## 2021-08-04 RX ADMIN — ACETAMINOPHEN 975 MG: 325 TABLET, FILM COATED ORAL at 05:18

## 2021-08-04 RX ADMIN — GABAPENTIN 300 MG: 300 CAPSULE ORAL at 08:05

## 2021-08-04 RX ADMIN — SENNOSIDES 8.6 MG: 8.6 TABLET ORAL at 08:05

## 2021-08-04 RX ADMIN — METHOCARBAMOL 500 MG: 500 TABLET, FILM COATED ORAL at 05:18

## 2021-08-04 RX ADMIN — DEXAMETHASONE SODIUM PHOSPHATE 10 MG: 4 INJECTION INTRA-ARTICULAR; INTRALESIONAL; INTRAMUSCULAR; INTRAVENOUS; SOFT TISSUE at 09:16

## 2021-08-04 RX ADMIN — INSULIN LISPRO 3 UNITS: 100 INJECTION, SOLUTION INTRAVENOUS; SUBCUTANEOUS at 11:08

## 2021-08-04 RX ADMIN — Medication 1000 UNITS: at 09:16

## 2021-08-04 NOTE — CASE MANAGEMENT
CM made aware that patient will need a RW and BSC at discharge  Orders placed through [facility] and sent to Dr Nora Melendrez for signature

## 2021-08-04 NOTE — PROGRESS NOTES
Internal Medicine Progress Note  Patient: Shannon Beavers  Age/sex: 62 y o  female  Medical Record #: [id number]      ASSESSMENT/PLAN: (Interval History)  Shannon Beavers is seen and examined and management for following issues:    Status Post Cervical Spinal surgery   Pain controlled   Continue encourage incentive spirometry; monitor fever curve   DVT prophylaxis in place and reviewed  Results from last 7 days   Lab Units 08/04/21  0455   WBC Thousand/uL 17 95*   HEMOGLOBIN g/dL 10 2*   HEMATOCRIT % 32 9*   PLATELETS Thousands/uL 353          Operative acute blood loss anemia  · Decrease in hgb levels from preop labs results; suspect due to post operation extravasation losses along with potential dilutional effects of fluids  · cont surgery optimization venofer protocol/transfusion  · Transfuse PRBC if hgb less then 8 0 (per ortho protocol)  or symptomatic  · Monitor follow up CBC post intervention to trend effect    DM II  · Hgb A1c 7 9  · Home meds: none  · Cont DM diet  · Cont sliding scale and coverage  · Does not take home meds   · Prior hgb A1c have been in the 6 2 range  · Strongly recommend weight loss/exercise/Adherence to ADA diet  · increase metformin 1000mg bid (8/3) should go home on same  · Elevated last evening improved this a m  but still elevated  · D/w pt she knows to accucheck and f/u with PCP     HTN  · Hold lisinopril in the post operative setting to avoid hypotension/BILL  · OK to resume on dc  · Add hydralazine as needed for SBP >160  · Monitor trend     Depression  · Takes abilify/prozac at home  · Cont same     GERD  · Cont PPI  · Monitor for nausea/vomiting     Anemia  · cont IV venofer infusion    Leukocytosis  · Likely reactive  · Afebrile  · Monitor cbc         PRE-OP HGB LEVEL: 11 5  The above assessment and plan was reviewed and updated as determined by my evaluation of the patient on 8/4/2021      Labs:   Results from last 7 days   Lab Units 08/04/21  0455   WBC Thousand/uL 17 95* HEMOGLOBIN g/dL 10 2*   HEMATOCRIT % 32 9*   PLATELETS Thousands/uL 353     Results from last 7 days   Lab Units 08/04/21  0455   SODIUM mmol/L 134*   POTASSIUM mmol/L 4 0   CHLORIDE mmol/L 101   CO2 mmol/L 25   BUN mg/dL 21   CREATININE mg/dL 0 67   CALCIUM mg/dL 8 9             Results from last 7 days   Lab Units 08/04/21  0654 08/03/21  2019 08/03/21  1614   POC GLUCOSE mg/dl 263* 317* 385*       Review of Scheduled Meds:  Current Facility-Administered Medications   Medication Dose Route Frequency Provider Last Rate    acetaminophen  975 mg Oral [address] [place], [place]      ARIPiprazole  10 mg Oral QPM Andressa SHERRIE Mandujano      atorvastatin  20 mg Oral After [employer]SHERRIE      cholecalciferol  1,000 Units Oral Daily [place], [place]      dexamethasone  10 mg Intravenous Q12H Juven[address] Buck , [place]      docusate sodium  100 mg Oral BID [place], [place]      fenofibrate  145 mg Oral Daily [place], [place]      FLUoxetine  20 mg Oral HS [place], [place]      FLUoxetine  40 mg Oral Daily [place], [place]      gabapentin  300 mg Oral BID [place], [place]      hydrALAZINE  25 mg Oral Q8H PRN Charlene Nest, DO      HYDROmorphone  0 5 mg Intravenous Q2H PRN Andressa Mandujano PA-C      insulin lispro  1-5 Units Subcutaneous TID AC Garon Polite, DANNIELLENP      insulin lispro  1-5 Units Subcutaneous HS Garon Polite, CRNP      iron sucrose  200 mg Intravenous Daily Garon Polite, RANDI      lactated ringers  75 mL/hr Intravenous Continuous Rossi Vargas MD Stopped (08/03/21 1259)    metFORMIN  500 mg Oral BID With Meals Garon PoliRANDI gimenez      methocarbamol  500 mg Oral Q6H [address] [place], [place]      methocarbamol  500 mg Oral Q6H PRN Garon RANDI Barker      ondansetron  4 mg Intravenous Q6H PRN Andressa Mandujano PA-C      oxyCODONE  10 mg Oral Q4H PRN Andressa DelbertSHERRIE      oxyCODONE  5 mg Oral Q4H PRN Andressa DelbertSHERRIE hanks      pantoprazole  40 mg Oral Early Morning [facility], [place]      senna  1 tablet Oral Daily [facility], [place]         Subjective/ HPI: Keith Fleming seen and examined  Patients overnight issues or events were reviewed with nursing or staff during rounds or morning huddle session  New or overnight issues include the following:     Pt without any overnight events or reported nursing issues      ROS:   A 10 point ROS was performed; negative except as noted above  Imaging:     XR spine cervical 2 or 3 vw injury   Final Result by Alba Faustin MD (08/03 6033)      Fluoroscopic guidance provided for procedure guidance  Please refer to the separate procedure notes for additional details            Workstation performed: [id number]             *Labs /Radiology studies Reviewed  *Medications  reviewed and reconciled as needed  *Please refer to order section for additional ordered labs studies  *Case discussed with primary attending during morning huddle case rounds    Physical Examination:  Vitals:   Vitals:    08/04/21 0339 08/04/21 0530 08/04/21 0624 08/04/21 0655   BP: (!) 190/80 140/78 142/78 167/79   BP Location: Right arm Right arm Right arm    Pulse: 76   93   Resp:    17   Temp:    97 9 °F (36 6 °C)   TempSrc:       SpO2: 91%   93%   Weight:       Height:           General Appearance: no distress, conversive  HEENT: PERRLA, conjuctiva normal; oropharynx clear; mucous membranes moist;   Neck:  Supple, no lymphadenopathy or thyromegaly  Lungs: CTA, normal respiratory effort, no retractions, expiratory effort normal  CV: regular rate and rhythm , PMI normal   ABD: soft non tender, no masses , no hepatic or splenomegaly  EXT: DP pulses intact, no lymphadenopathy, no edema; cervical collar surgical dressing in place  Skin: normal turgor, normal texture, no rash  Psych: affect normal, mood normal  Neuro: AAOx3    : macias removed ; due to void    The above physical exam was reviewed and updated as determined by my evaluation of the patient on 8/4/2021  Invasive Devices     Peripheral Intravenous Line            Peripheral IV 08/03/21 Left Hand 1 day    Peripheral IV 08/03/21 Right Arm 1 day                   VTE Pharmacologic Prophylaxis: Sequential pneumatic compression stocking  Code Status: No Order  Current Length of Stay: 1 day(s)      Total floor / unit time spent today 30 minutes  Coordination of patient's care was performed in conjunction with primary service  Time invested included review of patient's labs, vitals, and management of their comorbidities with continued monitoring, examination of patient as well as answering patient questions, documenting her findings and creating progress note in electronic medical record,  ordering appropriate diagnostic testing  ** Please Note:  voice to text software may have been used in the creation of this document   Although proof errors in transcription or interpretation are a potential of such software**

## 2021-08-04 NOTE — UTILIZATION REVIEW
Initial Clinical Review     Elective Inpatient surgical procedure  Age/Sex: 62 y o  female  Surgery Date: 8/3/21  Procedure: FUSION CERVICAL ANTERIOR W DISCECTOMY C4-5, C5-6, C6-7 with allograft and neuromonitoring  case length 3 hours  Anesthesia: general  Operative Findings: Severe spinal stenosis C4-5, cord compression C4-5  Moderate to severe spinal stenosis C5-6 and C6-7    POD#1 Progress Note: Resting in bed  Denies nausea, vomiting, fevers, chills, dysphagia or SOB    Discharge to home 8/4/21    Admission Orders: Date/Time/Statement:   Admission Orders (From admission, onward)     Ordered        08/03/21 1247  Inpatient Admission  Once                   Orders Placed This Encounter   Procedures    Inpatient Admission     Standing Status:   Standing     Number of Occurrences:   1     Order Specific Question:   Level of Care     Answer:   Med Surg [16]     Order Specific Question:   Estimated length of stay     Answer:   Inpatient Only Surgery     Vital Signs: /79   Pulse 93   Temp 97 9 °F (36 6 °C)   Resp 17   Ht 5' 4" (1 626 m)   Wt 91 2 kg (201 lb)   SpO2 93%   BMI 34 50 kg/m²     Pertinent Labs/Diagnostic Test Results:       Results from last 7 days   Lab Units 08/04/21  0455   WBC Thousand/uL 17 95*   HEMOGLOBIN g/dL 10 2*   HEMATOCRIT % 32 9*   PLATELETS Thousands/uL 353     Results from last 7 days   Lab Units 08/04/21  0455   SODIUM mmol/L 134*   POTASSIUM mmol/L 4 0   CHLORIDE mmol/L 101   CO2 mmol/L 25   ANION GAP mmol/L 8   BUN mg/dL 21   CREATININE mg/dL 0 67   EGFR ml/min/1 73sq m 97   CALCIUM mg/dL 8 9     Results from last 7 days   Lab Units 08/04/21  0654 08/03/21  2019 08/03/21  1614 08/03/21  1216 08/03/21  0946 08/03/21  0603   POC GLUCOSE mg/dl 263* 317* 385* 271* 163* 254*     Results from last 7 days   Lab Units 08/04/21  0455   GLUCOSE RANDOM mg/dL 278*       Diet:   Diet Camacho/CHO Controlled; Consistent Carbohydrate Diet Level 2 (5 carb servings/75 grams CHO/meal Mobility: OOB  DVT Prophylaxis: ambulatory    Medications/Pain Control:   Scheduled Medications:  acetaminophen, 975 mg, Oral, Q8H  ARIPiprazole, 10 mg, Oral, QPM  atorvastatin, 20 mg, Oral, After Dinner  cholecalciferol, 1,000 Units, Oral, Daily  dexamethasone, 10 mg, Intravenous, Q12H NABILA  docusate sodium, 100 mg, Oral, BID  fenofibrate, 145 mg, Oral, Daily  FLUoxetine, 20 mg, Oral, HS  FLUoxetine, 40 mg, Oral, Daily  gabapentin, 300 mg, Oral, BID  insulin lispro, 1-5 Units, Subcutaneous, TID AC  insulin lispro, 1-5 Units, Subcutaneous, HS  metFORMIN, 500 mg, Oral, BID With Meals  methocarbamol, 500 mg, Oral, Q6H NABILA  pantoprazole, 40 mg, Oral, Early Morning  senna, 1 tablet, Oral, Daily      Continuous IV Infusions:  lactated ringers, 75 mL/hr, Intravenous, Continuous      PRN Meds:  hydrALAZINE, 25 mg, Oral, Q8H PRN x1 thus far  HYDROmorphone, 0 5 mg, Intravenous, Q2H PRN  methocarbamol, 500 mg, Oral, Q6H PRN  ondansetron, 4 mg, Intravenous, Q6H PRN  oxyCODONE, 10 mg, Oral, Q4H PRN x2 thus far  oxyCODONE, 5 mg, Oral, Q4H PRN x1 thus far        Network Utilization Review Department  ATTENTION: Please call with any questions or concerns to [phone] and carefully listen to the prompts so that you are directed to the right person  All voicemails are confidential   Cloyde Havers all requests for admission clinical reviews, approved or denied determinations and any other requests to dedicated fax number below belonging to the campus where the patient is receiving treatment   List of dedicated fax numbers for the Facilities:  [address] DENIALS (Administrative/Medical Necessity) [phone]   [address] [place] [address] (Maternity/NICU/Pediatrics) 261 [facility],[address] [place] [address] [place] [address] [facility] [address] [place] [address] Lulu Mcdaniel [phone] [place] [address] [phone]

## 2021-08-04 NOTE — CASE MANAGEMENT
DME approved per [employer]  RW and BSC delivered to bedside, delivery ticket signed and placed in folder for Horace's liaison

## 2021-08-04 NOTE — DISCHARGE SUMMARY
ORTHOPEDICS DISCHARGE SUMMARY  Delvis Zayas 62 y o  female MRN: [id number]  Unit/Bed#: -70    Attending Physician: Lj Pena    Admitting diagnosis: Cervical myelopathy ([facility] [address] ) [G95 9]  Cervical radiculopathy [M54 12]  Cervical pain [M54 2]    Discharge diagnosis: Cervical myelopathy ([facility] [address] ) [G95 9]  Cervical radiculopathy [M54 12]  Cervical pain [M54 2]    Date of admission: 8/3/2021    Date of discharge: 08/04/21    Procedure: Anterior cervical diskectomy and fusion c4/5, c5/6, c6/7    HPI:  This is a 62y o  year old female that presented to the office with signs and symptoms of myelopathy   They tried and failed conservative treatment measures and wished to proceed with surgical intervention  The risks, benefits, and complications of the procedure were discussed with the patient and informed consent was obtained  Hospital Course: The patient was admitted to the hospital on 8/3/2021 and underwent an uncomplicated ACDF S3-1  They were transferred to the floor after a brief stay in the post-anesthesia care unit  Their pain was well managed with IV and oral pain medications  They began therapy on post operative day #1  Daily discussion was had with the patient, nursing staff, orthopaedic team, and family members if present  All questions were answered to the patients satisfaction        0   Lab Value Date/Time    HGB 10 2 (L) 08/04/2021 0455    HGB 11 5 07/09/2021 0909    HGB 13 5 02/08/2019 1916    HGB 12 9 07/11/2018 0752    HGB 13 1 06/14/2018 0730    HGB 12 4 12/23/2017 0736    HGB 12 2 06/23/2017 0831    HGB 12 2 12/23/2016 0743    HGB 11 9 10/11/2016 0758    HGB 12 5 07/12/2016 2216    HGB 11 3 (L) 05/21/2016 0757    HGB 12 4 02/04/2016 0822    HGB 12 2 11/18/2015 1034    HGB 11 9 09/14/2015 0923    HGB 11 0 (L) 06/21/2015 1129    HGB 10 8 (L) 06/06/2015 0812    HGB 10 5 (L) 04/11/2015 0804    HGB 11 2 (L) 10/09/2014 0739    HGB 12 2 08/09/2014 2137    HGB 9 9 (L) 01/16/2014 0926    HGB 11 1 (L) 10/05/2013 0815       Body mass index is 34 5 kg/m²  moderately obese  Recommend behavior modifications, nutrition and physical activity  Discharge Instructions: The patient was discharged weight bearing as tolerated to all extremities  Take pain medications as instructed  Patient must keep cervical brace on  Discharge Medications: For the complete list of discharge medications, please refer to the patient's medication reconciliation

## 2021-08-04 NOTE — CASE MANAGEMENT
Pt is not a <30 day readmission or current bundle  Risk of unplanned readmission score is 11 (green)  Pt admitted due to cervical disc disease, s/p cervical fusion  Pt documented as alert and oriented x4  CM met with pt and pt's , Yazan Holly ([phone]) to introduce CM role and begin discharge planning  Pt lives with her  in a 1 story house that has 3 WALTER  Pt reports being independent with ADLs PTA, no use of DME for ambulation  RW and BSC ordered  Pt reports no history of VNA, STR, inpatient MH treatment or D/A treatment  Pt drives and is retired  PCP is Dr Moody Rios ([phone]) and pt uses [facility] pharmacy in [place] for prescriptions  Pt's  to assist with transportation at time of discharge  CM reviewed d/c planning process including the following: identifying help at home, patient preference for d/c planning needs, Discharge Lounge, [facility] Meds to Bed program, availability of treatment team to discuss questions or concerns patient and/or family may have regarding understanding medications and recognizing signs and symptoms once discharged  CM also encouraged patient to follow up with all recommended appointments after discharge  Patient advised of importance for patient and family to participate in managing patients medical well being

## 2021-08-04 NOTE — PROGRESS NOTES
[address] 62 y o  female MRN: [id number]  Unit/Bed#: -01    Subjective:  Resting in bed   Denies nausea, vomiting, fevers, chills, dysphagia or SOB    Labs:  0   Lab Value Date/Time    HCT 32 9 (L) 08/04/2021 0455    HCT 37 3 07/09/2021 0909    HCT 43 0 02/08/2019 1916    HCT 40 9 11/18/2015 1034    HCT 38 7 09/14/2015 0923    HCT 36 7 06/21/2015 1129    HGB 10 2 (L) 08/04/2021 0455    HGB 11 5 07/09/2021 0909    HGB 13 5 02/08/2019 1916    HGB 12 2 11/18/2015 1034    HGB 11 9 09/14/2015 0923    HGB 11 0 (L) 06/21/2015 1129    INR 0 93 07/09/2021 0909    WBC 17 95 (H) 08/04/2021 0455    WBC 8 62 07/09/2021 0909    WBC 18 95 (H) 02/08/2019 1916    WBC 11 90 (H) 11/18/2015 1034    WBC 13 14 (H) 09/14/2015 0923    WBC 7 76 06/21/2015 1129       Meds:    Current Facility-Administered Medications:     acetaminophen (TYLENOL) tablet 975 mg, 975 mg, Oral, Q8H, Maureen Brands, PA-C, 975 mg at 08/04/21 0518    ARIPiprazole (ABILIFY) tablet 10 mg, 10 mg, Oral, QPM, Maureen Brands, PA-C, 10 mg at 08/03/21 2035    atorvastatin (LIPITOR) tablet 20 mg, 20 mg, Oral, After Denyce Bitters, PA-C    cholecalciferol (VITAMIN D3) tablet 1,000 Units, 1,000 Units, Oral, Daily, [employer], PA-C    dexamethasone (DECADRON) injection 10 mg, 10 mg, Intravenous, Q12H [address], [employer], PA-C, 10 mg at 08/03/21 2037    docusate sodium (COLACE) capsule 100 mg, 100 mg, Oral, BID, [employer], PA-C, 100 mg at 08/03/21 1707    fenofibrate (TRICOR) tablet 145 mg, 145 mg, Oral, Daily, [employer], PA-C    FLUoxetine (PROzac) capsule 20 mg, 20 mg, Oral, HS, [employer], PA-C, 20 mg at 08/03/21 2032    FLUoxetine (PROzac) capsule 40 mg, 40 mg, Oral, Daily, [employer], PA-C    gabapentin (NEURONTIN) capsule 300 mg, 300 mg, Oral, BID, [employer], PA-C, 300 mg at 08/03/21 1707    hydrALAZINE (APRESOLINE) tablet 25 mg, 25 mg, Oral, Q8H PRN, Amarilis Mora DO, 25 mg at 08/04/21 0442    HYDROmorphone (DILAUDID) injection 0 5 mg, 0 5 mg, Intravenous, Q2H PRN, Kaila Hill PA-C    insulin lispro (HumaLOG) 100 units/mL subcutaneous injection 1-5 Units, 1-5 Units, Subcutaneous, TID AC, 4 Units at 08/03/21 1630 **AND** Fingerstick Glucose (POCT), , , TID AC, RANDI Herring    insulin lispro (HumaLOG) 100 units/mL subcutaneous injection 1-5 Units, 1-5 Units, Subcutaneous, HS, RANDI Castillo, 3 Units at 08/03/21 2139    iron sucrose (VENOFER) 200 mg in sodium chloride 0 9 % 100 mL IVPB, 200 mg, Intravenous, Daily, RANDI Castillo, 200 mg at 08/03/21 1712    lactated ringers infusion, 75 mL/hr, Intravenous, Continuous, Alfonso Ortiz MD, Stopped at 08/03/21 1259    metFORMIN (GLUCOPHAGE) tablet 500 mg, 500 mg, Oral, BID With Meals, RANDI Castillo, 500 mg at 08/03/21 1707    methocarbamol (ROBAXIN) tablet 500 mg, 500 mg, Oral, Q6H [facility], Kaila Hill PA-C, 500 mg at 08/04/21 0518    methocarbamol (ROBAXIN) tablet 500 mg, 500 mg, Oral, Q6H PRN, RANDI Castillo    ondansetron (ZOFRAN) injection 4 mg, 4 mg, Intravenous, Q6H PRN, Kaila Hill PA-C    oxyCODONE (ROXICODONE) immediate release tablet 10 mg, 10 mg, Oral, Q4H PRN, Kaila Hill PA-C, 10 mg at 08/04/21 0442    oxyCODONE (ROXICODONE) IR tablet 5 mg, 5 mg, Oral, Q4H PRN, Kaila Hill PA-C, 5 mg at 08/03/21 2033    pantoprazole (PROTONIX) EC tablet 40 mg, 40 mg, Oral, Early Morning, Kalia Hill PA-C, 40 mg at 08/04/21 0518    senna (SENOKOT) tablet 8 6 mg, 1 tablet, Oral, Daily, Kaila Hill PA-C    Blood Culture:   No results found for: BLOODCX    Wound Culture:   No results found for: WOUNDCULT    Ins and Outs:  I/O last 24 hours:   In: 200 [P O :180; I V :3780; IV Piggyback:150]  Out: 4400 [Urine:4200; Blood:200]          Physical Exam:  Vitals:    08/04/21 0624   BP: 142/78   Pulse:    Resp:    Temp:    SpO2:      Cervical spine:  · Normal voice quality  · No swallowing issues  · c-collar in place  · Dressings C/D/I  · Neurovascularly intact C2-T1     · 5/5 motor to Musculocutaneous/Axillary/Radial/Ulna/Median nerves    Assessment/: 61 yo female POD 1 S/P FUSION CERVICAL ANTERIOR W DISCECTOMY C4-5, C5-6, C6-7 with allograft and neuromonitoring    Plan:  · Weight bearing as tolerated all extremities with C-Collar  · Up and out of bed  · DVT prophylaxis- mechanical  · Antibiotics: ancef x2 doses, bactrim on DC  · Pain control  · PT/OT  · Discharge planning       Jamee Villalba MD

## 2021-08-04 NOTE — PHYSICAL THERAPY NOTE
PHYSICAL THERAPY EVALUATION  NAME:  Jennifer Zavaleta  DATE: 08/04/21    AGE:   62 y o   Mrn:   [id number]  ADMIT DX:  Cervical myelopathy ([facility] [address] ) [G95 9]  Cervical radiculopathy [M54 12]  Cervical pain [M54 2]    Past Medical History:   Diagnosis Date    Abdominal pain     started 7/21/16- found something on the pancreas    Abnormal weight loss     last assessed [id number]    Acute sinusitis     Allergic rhinitis     last assessed [id number]    Anemia     Asthma     Cardiac murmur     Carpal tunnel syndrome     Cellulitis     Cervical disc disease     Colon polyp     CPAP (continuous positive airway pressure) dependence     no longer used    Degeneration of thoracic intervertebral disc     Degenerative arthritis of knee     Diabetes mellitus (HCC)     Diverticulosis     Esophagitis, reflux     last assessed 29Jan2014    Excessive sweating     Fatty liver     Frequent falls     Gait disturbance     GERD (gastroesophageal reflux disease)     Granuloma annulare     Head injury     Hyperlipidemia     Hypertension     Hypertensive heart disease     Hypoglycemia     last assessed [id number]    Kidney stone     Leg length discrepancy     Leucocytosis     Lumbar stress fracture     Multiple thyroid nodules     Neuropathy     Obesity     last assessed [id number]    Paronychia of toe     Patellofemoral dysfunction     Pneumonia     Postmenopausal atrophic vaginitis     Prepatellar bursitis     Sebaceous cyst     last assessed [id number]    Sleep apnea     no CPAP used    Suicide attempt ([facility] [address] )     Tinea corporis     Type 2 diabetes mellitus ([facility] [address] )     cont meds and monitor BS;  last assessed [id number]    Urinary frequency     Vertigo     Vitamin D deficiency     Dia-Parkinson-White syndrome        Past Surgical History:   Procedure Laterality Date    CERVICAL FUSION N/A 8/3/2021    Procedure: FUSION CERVICAL ANTERIOR W DISCECTOMY C4-5, C5-6, C6-7 with allograft and neuromonitoring case length 3 hours; Surgeon: Ford Robertson MD;  Location:  MAIN OR;  Service: Orthopedics    COLONOSCOPY      ESOPHAGOGASTRODUODENOSCOPY N/A 8/8/2016    Procedure: ESOPHAGOGASTRODUODENOSCOPY (EGD); Surgeon: Micah Caldera MD;  Location: [facility] GI LAB; Service:     GA ESOPHAGOGASTRODUODENOSCOPY TRANSORAL DIAGNOSTIC N/A 3/28/2019    Procedure: ESOPHAGOGASTRODUODENOSCOPY (EGD); Surgeon: Micah Caldera MD;  Location: [facility] GI LAB; Service: Gastroenterology    TONSILLECTOMY      TOOTH EXTRACTION      wisdom teeth removed       Length Of Stay: 1    PHYSICAL THERAPY EVALUATION:        08/04/21 0837   Note Type   Note type Evaluation   Pain Assessment   Pain Assessment Tool 0-10   Pain Score 4   Pain Location/Orientation Location: Neck   Pain Onset/Description Onset: Ongoing;Frequency: Constant/Continuous; Descriptor: Aching   Effect of Pain on Daily Activities increased pain with activity    Patient's Stated Pain Goal No pain   Hospital Pain Intervention(s) Ambulation/increased activity;Repositioned   Home Living   Type of [address] [place] [address] One level;Stairs to enter with rails  (3 WALTER )   Home Equipment   (none as per pt )   Additional Comments Patient reports living with supportive spouse who is able to assist as needed    Patient states her spouse has taken time off of work to be home with her at time of d/c    Prior Function   Level of [place] Independent with ADLs and functional mobility   Lives With Spouse   Receives Help From Family   ADL Assistance Independent   Falls in the last 6 months 1 to 4  (1 as per pt )   Comments Patient denies use of an assistive device for ambulation prior to admission   Restrictions/Precautions   Weight Bearing Precautions Per Order No   Braces or Orthoses C/S Collar   Other Precautions Pain;Multiple lines;Spinal precautions   General   Family/Caregiver Present No   Cognition   Overall Cognitive Status [facility]   Arousal/Participation Alert   Orientation Level Oriented X4   Memory Within functional limits   Following Commands Follows all commands and directions without difficulty   RUE Assessment   RUE Assessment WFL   LUE Assessment   LUE Assessment WFL   RLE Assessment   RLE Assessment WFL   Strength RLE   RLE Overall Strength 4+/5   LLE Assessment   LLE Assessment WFL   Strength LLE   LLE Overall Strength 4+/5   Bed Mobility   Additional Comments NA, Pt OOB in chair at time of PT eval    Transfers   Sit to Stand 4  Minimal assistance   Additional items Assist x 1; Increased time required;Verbal cues   Stand to Sit 4  Minimal assistance   Additional items Assist x 1; Increased time required;Verbal cues   Additional Comments VC and TC needed for hand placement during transfers    Ambulation/Elevation   Gait pattern Foward flexed; Short stride   Gait Assistance 5  Supervision   Assistive Device Rolling walker   Distance 65ft x 2    Stair Management Assistance 5  Supervision   Stair Management Technique Two rails   Number of Stairs 5   Balance   Static Sitting Fair +   Static Standing Fair   Ambulatory Fair -   Endurance Deficit   Endurance Deficit Yes   Endurance Deficit Description pain    Activity Tolerance   Activity Tolerance Patient limited by pain   Medical Staff Made Aware Chise; OT; OT present for co evaluation due to pts unstable presentation, new physical limitations/ restrictions, being s/p sx intervention, and decreased activity tolerance compared to baseline which all limit pts overall physical performance    Nurse Made Aware Patient appropriate to be seen and mobilized per nursing   Assessment   Prognosis Good   Problem List Decreased mobility;Pain;Orthopedic restrictions;Decreased endurance   Assessment Pt is 62 y o  female seen for PT evaluation s/p admit to [facility] on 8/3/2021  Two pt identifiers were used to confirm   Pt presented for scheduled FUSION CERVICAL ANTERIOR W DISCECTOMY C4-5, C5-6, C6-7 with allograft and neuromonitoring which was performed on 08/03/2021  Pt was admitted with a primary dx of:  Cervical myelopathy, cervical radiculopathy, cervical pain s/p FUSION CERVICAL ANTERIOR W DISCECTOMY C4-5, C5-6, C6-7 with allograft and neuromonitoring  PT now consulted for assessment of mobility and d/c needs  Pt with Activity as tolerated orders  Pts current co morbidities affecting treatment include: Anemia, cardiac murmur, DM, HLD, HTN, hypertensive heart disease, hypoglycemia, leukocytosis, neuropathy, sleep apnea, anemia, Dia-Parkinson-White syndrome, and personal factors and steps to manage at home  Pts current clinical presentation is Unstable/ Unpredictable (high complexity) due to Ongoing medical management for primary dx, Increased reliance on more restrictive AD compared to baseline, Decreased activity tolerance compared to baseline, Fall risk, Spinal precautions at current time, Continuous pulse oximetry monitoring , s/p surgical intervention    Upon evaluation, pt currently is requiring ; Min Ax1 for transfers and Supervision for ambulation w/ RW   Patient denies any lightheadedness or dizziness with ambulation  Pt presents at PT eval functioning below baseline and currently w/ overall mobility deficits 2* to: pain, decreased activity tolerance compared to baseline, fall risk, spinal precautions  At conclusion of PT session pt returned back in chair with phone and call bell within reach  Pt denies any further questions at this time  PT is currently recommending Home with increased family support and Outpatient PT when appropriate  D/C acute care PT at this time due to pt being supervision with all mobility and having supportive spouse who is able to assist pt as needed  Pt denies any mobility or safety concerns about returning home at d/c  Recommend pt continues to mobilize with nsg and restorative techs during hospital stay     Barriers to Discharge None   Barriers to Discharge Comments Patient denies any mobility or safety concerns about returning discharge  Patient states her supportive spouse is able to assist her as needed   Goals   Patient Goals " to go home"   Plan   PT Frequency   (DC IP PT )   Recommendation   PT Discharge Recommendation Home with outpatient rehabilitation  (home with increased support, OPPT when appropriate )   Equipment Recommended Walker; Other (Comment)  (RW, BSC)   Walker Package Recommended Wheeled walker   PT - OK to Discharge Yes  (When medically cleared )   AM-PAC Basic Mobility Inpatient   Turning in Bed Without Bedrails 4   Lying on Back to Sitting on Edge of Flat Bed 4   Moving Bed to Chair 4   Standing Up From Chair 3   Walk in Room 3   Climb 3-5 Stairs 3   Basic Mobility Inpatient Raw Score 21   Basic Mobility Standardized Score 45 55   Modified Gerald Scale   Modified Gerald Scale 4   Barthel Index   Feeding 10   Bathing 0   Grooming Score 5   Dressing Score 5   Bladder Score 10   Bowels Score 10   Toilet Use Score 10   Transfers (Bed/Chair) Score 10   Mobility (Level Surface) Score 10   Stairs Score 5   Barthel Index Score 75   Portions of the documentation may have been created using voice recognition software  Occasional wrong word or sound alike substitutions may have occurred due to the inherent limitations of the voice recognition software  Read the chart carefully and recognize, using context, where substitutions have occurred      Etienne Hector, PT, DPT

## 2021-08-04 NOTE — OCCUPATIONAL THERAPY NOTE
Occupational Therapy Evaluation     Patient Name: David Rodas  RWXTH'U Date: 8/4/2021  Problem List  Principal Problem:    Cervical disc disease    Past Medical History  Past Medical History:   Diagnosis Date    Abdominal pain     started 7/21/16- found something on the pancreas    Abnormal weight loss     last assessed [id number]    Acute sinusitis     Allergic rhinitis     last assessed [id number]    Anemia     Asthma     Cardiac murmur     Carpal tunnel syndrome     Cellulitis     Cervical disc disease     Colon polyp     CPAP (continuous positive airway pressure) dependence     no longer used    Degeneration of thoracic intervertebral disc     Degenerative arthritis of knee     Diabetes mellitus (HCC)     Diverticulosis     Esophagitis, reflux     last assessed 29Jan2014    Excessive sweating     Fatty liver     Frequent falls     Gait disturbance     GERD (gastroesophageal reflux disease)     Granuloma annulare     Head injury     Hyperlipidemia     Hypertension     Hypertensive heart disease     Hypoglycemia     last assessed [id number]    Kidney stone     Leg length discrepancy     Leucocytosis     Lumbar stress fracture     Multiple thyroid nodules     Neuropathy     Obesity     last assessed [id number]    Paronychia of toe     Patellofemoral dysfunction     Pneumonia     Postmenopausal atrophic vaginitis     Prepatellar bursitis     Sebaceous cyst     last assessed [id number]    Sleep apnea     no CPAP used    Suicide attempt ([address] )     Tinea corporis     Type 2 diabetes mellitus ([address] )     cont meds and monitor BS;  last assessed [id number]    Urinary frequency     Vertigo     Vitamin D deficiency     Dia-Parkinson-White syndrome      Past Surgical History  Past Surgical History:   Procedure Laterality Date    CERVICAL FUSION N/A 8/3/2021    Procedure: FUSION CERVICAL ANTERIOR W DISCECTOMY C4-5, C5-6, C6-7 with allograft and neuromonitoring  case length 3 hours; Surgeon: Crissy Iglesias MD;  Location:  MAIN OR;  Service: Orthopedics    COLONOSCOPY      ESOPHAGOGASTRODUODENOSCOPY N/A 8/8/2016    Procedure: ESOPHAGOGASTRODUODENOSCOPY (EGD); Surgeon: Marylene Alstrom, MD;  Location: [facility] GI LAB; Service:     SC ESOPHAGOGASTRODUODENOSCOPY TRANSORAL DIAGNOSTIC N/A 3/28/2019    Procedure: ESOPHAGOGASTRODUODENOSCOPY (EGD); Surgeon: Marylene Alstrom, MD;  Location: [facility] GI LAB; Service: Gastroenterology    TONSILLECTOMY      TOOTH EXTRACTION      wisdom teeth removed         08/04/21 0830   OT Last Visit   OT Visit Date 08/04/21   Note Type   Note type Evaluation   Restrictions/Precautions   Weight Bearing Precautions Per Order No   Braces or Orthoses C/S Collar   Other Precautions Pain; Fall Risk;Spinal precautions;Multiple lines   Pain Assessment   Pain Assessment Tool 0-10   Pain Score 4   Pain Location/Orientation Location: Neck   Patient's Stated Pain Goal No pain   Hospital Pain Intervention(s) Repositioned; Ambulation/increased activity; Emotional support   Home Living   Type of [address] [place] [address] One level;Stairs to enter with rails; Laundry in basement  (3 WALTER)   Additional Comments NO USE OF DME AT BASELINE    Prior Function   Level of [place] Independent with ADLs and functional mobility   Lives With Spouse   Receives Help From Family   ADL Assistance Independent   IADLs Independent   Falls in the last 6 months 1 to 4   Vocational Retired   Lifestyle   Autonomy  [address] WITH ADLS/IADLS/DRIVING PTA    Reciprocal Relationships LIVES WITH SUPPORTIVE SPOUSE WHO IS ABLE TO ASSIST AS NEEDED    Service to Others RETIRED; PCA    Intrinsic Gratification ENJOYS READING    Psychosocial   Psychosocial (WDL) WDL   ADL   Eating Assistance 7  Independent   Grooming Assistance 5  Supervision/Setup   UB Bathing Assistance 5  Supervision/Setup   LB Bathing Assistance 4  Minimal Assistance   UB Dressing Assistance 5  Supervision/Setup   LB Dressing Assistance 4  Minimal Assistance   Toileting Assistance  4  Minimal Assistance   Functional Assistance 4  Minimal Assistance   Bed Mobility   Additional Comments PT SITTING OOB UPON ARRIVAL  RETURNED TO CHAIR WITH ALL NEEDS IN REACH    Transfers   Sit to Stand 4  Minimal assistance   Additional items Assist x 1; Increased time required;Verbal cues   Stand to Sit 4  Minimal assistance   Additional items Assist x 1; Increased time required;Verbal cues   Functional Mobility   Functional Mobility 5  Supervision   Additional items Rolling walker   Balance   Static Sitting Fair +   Static Standing Fair   Ambulatory Fair -   Activity Tolerance   Activity Tolerance Patient limited by pain   Medical Staff Made Aware PT SEEN FOR PARTIAL CO-SESSION WITH SKILLED PHYSICAL THERAPIST 2' CLINICALLY UNSTABLE PRESENTATION, NEW PRECAUTIONS/LIMITATIONS, LIMITED ACTIVITY TOLERANCE AND PRESENT IMPAIRMENTS WHICH ARE A REGRESSION FROM THE PT'S BASELINE AND IMPACTING OVERALL OCCUPATIONAL PERFORMANCE  Nurse Made Aware APPROPRIATE TO SEE    RUE Assessment   RUE Assessment WFL   LUE Assessment   LUE Assessment WFL   Hand Function   Gross Motor Coordination Functional   Fine Motor Coordination Functional   Sensation   Light Touch Partial deficits in the RUE;Partial deficits in the LUE  (DISTALLY; BASELINE )   Cognition   Overall Cognitive Status [facility]   Arousal/Participation Alert; Cooperative   Attention Within functional limits   Orientation Level Oriented X4   Memory Within functional limits   Following Commands Follows all commands and directions without difficulty   Comments PT IS PLEASANT AND COOPERATIVE  G RECALL/CARRY OVER OF LEARNED PRECAUTIONS    Assessment   Assessment 61 YO Female SEEN FOR INITIAL OCCUPATIONAL THERAPY EVALUATION S/P ANTERIOR CERVICAL DISKECTOMY AND FUSION C4/5, C5/6, C6/7 ON 8/3/21  PT CURRENTLY HAS THE FOLLOWING RESTRICTIONS;SPINAL PRECAUTIONS and C-COLLAR    PROBLEMS LIST INCLUDES Abdominal pain, Abnormal weight loss, Acute sinusitis, Allergic rhinitis, Anemia, Asthma, Cardiac murmur, Carpal tunnel syndrome, Cellulitis, Cervical disc disease, Colon polyp, CPAP (continuous positive airway pressure) dependence, Degeneration of thoracic intervertebral disc, Degenerative arthritis of knee, Diabetes mellitus ([facility] [address] ), Diverticulosis, Esophagitis, reflux, Excessive sweating, Fatty liver, Frequent falls, Gait disturbance, GERD (gastroesophageal reflux disease), Granuloma annulare, Head injury, Hyperlipidemia, Hypertension, Hypertensive heart disease, Hypoglycemia, Kidney stone, Leg length discrepancy, Leucocytosis, Lumbar stress fracture, Multiple thyroid nodules, Neuropathy, Obesity, Paronychia of toe, Patellofemoral dysfunction, Pneumonia, Postmenopausal atrophic vaginitis, Prepatellar bursitis, Sebaceous cyst, Sleep apnea, Suicide attempt ([facility] [address] ), Tinea corporis, Type 2 diabetes mellitus ([facility] [address] ), Urinary frequency, Vertigo, Vitamin D deficiency, and Dia-Parkinson-White syndrome  PT IS FROM HOME WITH SUPPORTIVE SPOUSE WHERE SHE REPORTS BEING INDEPENDENT WITH ADLS/IADLS/DRIVING PTA  PT CURRENTLY REQUIRES OVERALL SUPERVISION-MIN A WITH ADLS, TRANSFERS AND FUNCTIONAL MOBILITY WITH USE OF RW  PT IS EXPERIENCING EXPECTED LIMITATIONS 2' PAIN, FATIGUE, IMPAIRED BALANCE, FALL RISK , SPINAL PRECAUTIONS and OVERALL LIMITED ACTIVITY TOLERANCE  PT EDUCATED ON SPINAL PRECAUTIONS, C-COLLAR MANAGEMENT, DEEP BREATHING TECHNIQUES T/O ACTIVITY, SLOWING OF PACE, ENERGY CONSERVATION TECHNIQUES FOR CARRY OVER UPON D/C, INCREASED FAMILY SUPPORT and CONTINUE PARTICIPATION IN SELF-CARE/MOBILITY WITH STAFF [address]   The patient's raw score on the AM-PAC Daily Activity inpatient short form is 19, standardized score is 40 22, greater than 39 4  Patients at this level are likely to benefit from discharge to home  Please refer to the recommendation of the Occupational Therapist for safe discharge planning   FROM AN OCCUPATIONAL THERAPY PERSPECTIVE, PT CAN RETURN HOME WITH INCREASED FAMILY SUPPORT WHEN MEDICALLY CLEARED  DME RECS INCLUDE BSC/SC, PT AGREEABLE  ALL QUESTIONS/CONCERNS ADDRESSED AT THIS TIME  D/C OT      Goals   Patient Goals TO RETURN HOME    Recommendation   OT Discharge Recommendation No rehabilitation needs   Equipment Recommended Bedside commode  (/SC)   Commode Type Standard   OT - OK to Discharge Yes   AM-PAC Daily Activity Inpatient   Lower Body Dressing 3   Bathing 3   Toileting 3   Upper Body Dressing 3   Grooming 3   Eating 4   Daily Activity Raw Score 19   Daily Activity Standardized Score (Calc for Raw Score >=11) 40 22   AM-PAC Applied Cognition Inpatient   Following a Speech/Presentation 4   Understanding Ordinary Conversation 4   Taking Medications 4   Remembering Where Things Are Placed or Put Away 4   Remembering List of 4-5 Errands 4   Taking Care of Complicated Tasks 4   Applied Cognition Raw Score 24   Applied Cognition Standardized Score 62 21   Modified Gerald Scale   Modified [place] Scale 4       Documentation completed by TETO Graham, OTR/L  [facility] Certified ID# [id number]

## 2021-08-05 ENCOUNTER — TRANSITIONAL CARE MANAGEMENT (OUTPATIENT)
Dept: INTERNAL MEDICINE CLINIC | Facility: CLINIC | Age: 61
End: 2021-08-05

## 2021-08-05 LAB
ABO GROUP BLD BPU: NORMAL
BPU ID: NORMAL
CROSSMATCH: NORMAL
UNIT DISPENSE STATUS: NORMAL
UNIT PRODUCT CODE: NORMAL
UNIT PRODUCT VOLUME: 350 ML
UNIT RH: NORMAL

## 2021-08-05 NOTE — UTILIZATION REVIEW
Notification of Discharge   This is a Notification of Discharge from our facility [address]  Please be advised that this patient has been discharge from our facility  Below you will find the admission and discharge date and time including the patients disposition  UTILIZATION REVIEW CONTACT:  Tremayne Dill  Utilization   Network Utilization Review Department  Phone: [phone] x carefully listen to the prompts  All voicemails are confidential   Email: [email or web address]     PHYSICIAN ADVISORY SERVICES:  FOR [id number] REVIEW - MEDICAL NECESSITY DENIAL  Phone: [phone]  Fax: [phone]  Email: [email or web address]     PRESENTATION DATE: 8/3/2021  5:33 AM  OBERVATION ADMISSION DATE:   INPATIENT ADMISSION DATE: 8/3/21 12:47 PM   DISCHARGE DATE: 8/4/2021 12:10 PM  DISPOSITION: Home/Self Care Home/Self Care      IMPORTANT INFORMATION:  Send all requests for admission clinical reviews, approved or denied determinations and any other requests to dedicated fax number below belonging to the campus where the patient is receiving treatment   List of dedicated fax numbers:  [address] DENIALS (Administrative/Medical Necessity) [phone]   [address]  (Maternity/NICU/Pediatrics) [phone]   [facility] Charleen [phone]   Arby Fillers [phone]   Justo Stack [phone]   [address] [phone]   [facility]  [phone]   [address] [facility] [address]  [address] [facility] [address] [facility] [phone]

## 2021-08-12 ENCOUNTER — OFFICE VISIT (OUTPATIENT)
Dept: INTERNAL MEDICINE CLINIC | Facility: CLINIC | Age: 61
End: 2021-08-12
Payer: COMMERCIAL

## 2021-08-12 VITALS
DIASTOLIC BLOOD PRESSURE: 66 MMHG | OXYGEN SATURATION: 97 % | SYSTOLIC BLOOD PRESSURE: 170 MMHG | WEIGHT: 196 LBS | TEMPERATURE: 97.6 F | HEIGHT: 64 IN | HEART RATE: 97 BPM | BODY MASS INDEX: 33.46 KG/M2

## 2021-08-12 DIAGNOSIS — M50.90 CERVICAL DISC DISEASE: Primary | ICD-10-CM

## 2021-08-12 DIAGNOSIS — Z11.4 SCREENING FOR HIV (HUMAN IMMUNODEFICIENCY VIRUS): ICD-10-CM

## 2021-08-12 DIAGNOSIS — E11.9 CONTROLLED TYPE 2 DIABETES MELLITUS WITHOUT COMPLICATION, WITHOUT LONG-TERM CURRENT USE OF INSULIN (HCC): ICD-10-CM

## 2021-08-12 DIAGNOSIS — E66.9 OBESITY (BMI 30.0-34.9): ICD-10-CM

## 2021-08-12 DIAGNOSIS — M54.12 CERVICAL RADICULOPATHY: ICD-10-CM

## 2021-08-12 PROBLEM — E11.65 UNCONTROLLED TYPE 2 DIABETES MELLITUS WITH HYPERGLYCEMIA, WITH LONG-TERM CURRENT USE OF INSULIN (HCC): Status: RESOLVED | Noted: 2019-08-05 | Resolved: 2021-08-12

## 2021-08-12 PROBLEM — Z79.4 UNCONTROLLED TYPE 2 DIABETES MELLITUS WITH HYPERGLYCEMIA, WITH LONG-TERM CURRENT USE OF INSULIN (HCC): Status: RESOLVED | Noted: 2019-08-05 | Resolved: 2021-08-12

## 2021-08-12 PROCEDURE — 99495 TRANSJ CARE MGMT MOD F2F 14D: CPT | Performed by: INTERNAL MEDICINE

## 2021-08-12 RX ORDER — SEMAGLUTIDE 1.34 MG/ML
0.25 INJECTION, SOLUTION SUBCUTANEOUS WEEKLY
Qty: 1.5 ML | Refills: 3 | Status: SHIPPED | OUTPATIENT
Start: 2021-08-12 | End: 2021-11-17 | Stop reason: ALTCHOICE

## 2021-08-12 RX ORDER — GLIPIZIDE 2.5 MG/1
2.5 TABLET, EXTENDED RELEASE ORAL DAILY
Qty: 30 TABLET | Refills: 5 | Status: SHIPPED | OUTPATIENT
Start: 2021-08-12 | End: 2021-09-29

## 2021-08-12 NOTE — ASSESSMENT & PLAN NOTE
Body mass index is 33 64 kg/m²   Patient has changed diet and lost significant weight prior to surgery of the neck  (ACDF) on 8/3/21  Patient reports that her weight has plateud despite maintaing improved lifestyle habits  Her goal is to keep her blood sugar levels below 200  Currently not at goal     Plan:   Recommend incorporating a more whole foods plant-predominant diet along with decreasing consumption of red meats and processed foods   Per AHA guidelines, recommend moderate-vigorous intensity exercise for 30 minutes a day for 5 days a week or a total of 150 min/week   Consider transitioning to ozempic from glipizide if approved by insurance or pharmaceutical company plan  [facility] office pharmacist on board

## 2021-08-12 NOTE — ASSESSMENT & PLAN NOTE
· Patient is s/p ACDF of C4-7 on 8/3 /2021 by Dr Jamarcus Lyles  · Patient tolerated procedure well without complications  She is currently with a cervical collar  · She still has residual numbness and tingling sensation of the last 3 digits of the left hand and mild myalgias in both arms  · She admits to having no balance issues since surgery and is more steady on her feet now than before procedure  Plan:  · Continue follow up with Orthopedic team as scheduled  · No PT recommended at this time

## 2021-08-12 NOTE — ASSESSMENT & PLAN NOTE
Lab Results   Component Value Date    HGBA1C 7 9 (H) 07/09/2021     · Home FBS are ranging between 172-224 in the past week  · Currently on metformin 1000 mg BID and is compliant  Plan:  · Continue Metoformin 1000 mg BID  · Glipizide XL 2 5 mg qd added  · Consider transitioning to Ozempic if approved by insurance  Will loop in [facility] for authorization  Ozemoic ordered for prior auth  · Microablumin/Cr ratio pending  · F/U in 3 months

## 2021-08-12 NOTE — ASSESSMENT & PLAN NOTE
· S/S improving s/p ACDF surgery on 8/3, but still complains of residual numbness and tingling sensation  Plan:  · Continue f/u with outpatient Ortho clinic as scheduled

## 2021-08-12 NOTE — PROGRESS NOTES
Assessment/Plan:     Controlled type 2 diabetes mellitus without complication (HCC)    Lab Results   Component Value Date    HGBA1C 7 9 (H) 07/09/2021     · Home FBS are ranging between 172-224 in the past week  · Currently on metformin 1000 mg BID and is compliant  Plan:  · Continue Metoformin 1000 mg BID  · Glipizide XL 2 5 mg qd added  · Consider transitioning to Ozempic if approved by insurance  Will loop in [facility] for authorization  Ozemoic ordered for prior auth  · Microablumin/Cr ratio pending  · F/U in 3 months  Cervical disc disease  · Patient is s/p ACDF of C4-7 on 8/3 /2021 by Dr Butch Snell  · Patient tolerated procedure well without complications  She is currently with a cervical collar  · She still has residual numbness and tingling sensation of the last 3 digits of the left hand and mild myalgias in both arms  · She admits to having no balance issues since surgery and is more steady on her feet now than before procedure  Plan:  · Continue follow up with Orthopedic team as scheduled  · No PT recommended at this time  Obesity (BMI 30 0-34  9)  Body mass index is 33 64 kg/m²   Patient has changed diet and lost significant weight prior to surgery of the neck  (ACDF) on 8/3/21  Patient reports that her weight has plateud despite maintaing improved lifestyle habits  Her goal is to keep her blood sugar levels below 200  Currently not at goal     Plan:   Recommend incorporating a more whole foods plant-predominant diet along with decreasing consumption of red meats and processed foods   Per AHA guidelines, recommend moderate-vigorous intensity exercise for 30 minutes a day for 5 days a week or a total of 150 min/week   Consider transitioning to ozempic from glipizide if approved by insurance or pharmaceutical company plan  [facility] office pharmacist on board        Cervical radiculopathy  · S/S improving s/p ACDF surgery on 8/3, but still complains of residual numbness and tingling sensation  Plan:  · Continue f/u with outpatient Ortho clinic as scheduled  Diagnoses and all orders for this visit:    Cervical disc disease    Cervical radiculopathy    Controlled type 2 diabetes mellitus without complication, without long-term current use of insulin ([address] )  -     Ambulatory referral to Ophthalmology; Future  -     glipiZIDE (GLUCOTROL XL) 2 5 mg 24 hr tablet; Take 1 tablet (2 5 mg total) by mouth daily  -     Microalbumin / creatinine urine ratio  -     HEMOGLOBIN A1C W/ EAG ESTIMATION; Future  -     Semaglutide,0 25 or 0 5MG/DOS, (Ozempic, 0 25 or 0 5 MG/DOSE,) 2 MG/1 5ML SOPN; Inject 0 25 mg under the skin once a week    Screening for HIV (human immunodeficiency virus)  -     HIV 1/2 Antigen/Antibody (4th Generation) w Reflex [facility]; Future    Obesity (BMI 30 0-34  9)         Subjective:     Patient ID: Fahad Mena is a 62 y o  female  HPI  Patient is f/u from hospitalization for ACDF procedure on 8/3/21 by orthopedics Dr Lauren Thorne  She states that she had not complications from the procedure  She had PT inpatient that started on 8/4/21  She tolerated it well  Not currently having PT  Pain in the neck is 3/10 constant dull ache improved to a 1/10 by 1000 mg Tylenol TID  She has mild residual paraesthesias  She denies fever, chills , HA CP, SOB or abdominal pain  Her urinary incontinence is associated with elevated blood sugars  Review of Systems   Constitutional: Negative for activity change, appetite change, chills, fatigue, fever and unexpected weight change  HENT: Negative for congestion  Eyes: Negative for pain, discharge and visual disturbance  Respiratory: Negative for shortness of breath  Cardiovascular: Negative for chest pain and palpitations  Gastrointestinal: Negative for abdominal pain, constipation, diarrhea, nausea and vomiting  Endocrine: Negative for cold intolerance  Genitourinary: Positive for frequency  Negative for difficulty urinating  Musculoskeletal: Positive for neck pain (mild 3/10)  Negative for arthralgias, joint swelling and myalgias  Skin: Negative for pallor and rash  Neurological: Positive for numbness (left 3rd-5th digits chronic)  Negative for dizziness, weakness and headaches  Hematological: Does not bruise/bleed easily  Psychiatric/Behavioral: Negative for agitation, behavioral problems and confusion  Objective:     Physical Exam  Vitals and nursing note reviewed  Constitutional:       General: She is not in acute distress  Appearance: Normal appearance  She is obese  She is not ill-appearing, toxic-appearing or diaphoretic  HENT:      Head: Normocephalic and atraumatic  Nose: Nose normal       Mouth/Throat:      Mouth: Mucous membranes are moist       Pharynx: No oropharyngeal exudate or posterior oropharyngeal erythema  Eyes:      General: No scleral icterus  Right eye: No discharge  Left eye: No discharge  Pupils: Pupils are equal, round, and reactive to light  Neck:      Vascular: No carotid bruit  Comments: Cervical collar in place   Cardiovascular:      Rate and Rhythm: Normal rate and regular rhythm  Pulses: Normal pulses  Heart sounds: Normal heart sounds  No murmur heard  No friction rub  No gallop  Pulmonary:      Effort: Pulmonary effort is normal  No respiratory distress  Breath sounds: Normal breath sounds  No stridor  No wheezing, rhonchi or rales  Abdominal:      General: Bowel sounds are normal       Palpations: Abdomen is soft  Tenderness: There is no abdominal tenderness  There is no right CVA tenderness, left CVA tenderness, guarding or rebound  Musculoskeletal:         General: No swelling, tenderness or deformity  Cervical back: Neck supple  No rigidity  No muscular tenderness  Right lower leg: No edema  Left lower leg: No edema  Lymphadenopathy:      Cervical: No cervical adenopathy     Skin:     General: Skin is warm and dry  Capillary Refill: Capillary refill takes less than 2 seconds  Coloration: Skin is not jaundiced  Findings: No lesion or rash  Neurological:      General: No focal deficit present  Mental Status: She is alert and oriented to person, place, and time  Mental status is at baseline  Motor: No weakness  Psychiatric:         Mood and Affect: Mood normal          Behavior: Behavior normal          Thought Content: Thought content normal          Judgment: Judgment normal            Vitals:    08/12/21 1335   BP: 170/66   Pulse: 97   Temp: 97 6 °F (36 4 °C)   TempSrc: Temporal   SpO2: 97%   Weight: 88 9 kg (196 lb)   Height: 5' 4" (1 626 m)       Transitional Care Management Review:  Samson Lin is a 62 y o  female here for TCM follow up  During the TCM phone call patient stated:    TCM Call (since 7/12/2021)     Date and time call was made  8/5/2021 10:16 AM    Hospital care reviewed  Records not available    Patient was hospitialized at  [facility]        Date of Admission  08/03/21    Date of discharge  08/04/21    Diagnosis  Cervical disc disease    Disposition  Home    Were the patients medications reviewed and updated  No    Current Symptoms  None      TCM Call (since 7/12/2021)     Post hospital issues  None    Should patient be enrolled in anticoag monitoring? No    Scheduled for follow up?   Yes    Did you obtain your prescribed medications  Yes    Do you need help managing your prescriptions or medications  No    Is transportation to your appointment needed  No    I have advised the patient to call PCP with any new or worsening symptoms  [id number]    Are you recieving any outpatient services  No    Are you recieving home care services  No    Have you fallen in the last 12 months  Yes    How many times  3    Interperter language line needed  No          Steve San MD

## 2021-08-12 NOTE — PATIENT INSTRUCTIONS
Please remember to get your Dexa Scan test done in the near future  Please continue to monitor blood sugars  Please stake Glipizide every morning for your diabetes  This medication can be taken together with metformin  Please continue to follow up with Orthopedics as scheduled

## 2021-08-16 ENCOUNTER — TELEPHONE (OUTPATIENT)
Dept: INTERNAL MEDICINE CLINIC | Facility: CLINIC | Age: 61
End: 2021-08-16

## 2021-08-16 LAB
DME PARACHUTE DELIVERY DATE ACTUAL: NORMAL
DME PARACHUTE DELIVERY DATE REQUESTED: NORMAL
DME PARACHUTE ITEM DESCRIPTION: NORMAL
DME PARACHUTE ITEM DESCRIPTION: NORMAL
DME PARACHUTE ORDER STATUS: NORMAL
DME PARACHUTE SUPPLIER NAME: NORMAL
DME PARACHUTE SUPPLIER PHONE: NORMAL

## 2021-08-16 NOTE — TELEPHONE ENCOUNTER
Semaglutide,0 25 or 0 5MG/DOS, (Ozempic, 0 25 or 0 5 MG/DOSE,) 2 MG/1 5ML SOPN     Patient was prescribed medication, calling to state she cannot afford it  Is there an alternative medication she can be prescribed? Please advise

## 2021-08-17 LAB
ALBUMIN/CREAT UR: 10 MCG/MG CREAT
CHOLEST SERPL-MCNC: 160 MG/DL
CHOLEST/HDLC SERPL: 3.7 (CALC)
CREAT UR-MCNC: 82 MG/DL (ref 20–275)
HDLC SERPL-MCNC: 43 MG/DL
LDLC SERPL CALC-MCNC: 74 MG/DL (CALC)
MICROALBUMIN UR-MCNC: 0.8 MG/DL
NONHDLC SERPL-MCNC: 117 MG/DL (CALC)
TRIGL SERPL-MCNC: 361 MG/DL

## 2021-08-17 PROCEDURE — 3061F NEG MICROALBUMINURIA REV: CPT | Performed by: NURSE PRACTITIONER

## 2021-08-17 NOTE — TELEPHONE ENCOUNTER
Plan Per PCP note on 8/12/2021:   · Continue Metoformin 1000 mg BID  · Glipizide XL 2 5 mg qd added  · Consider transitioning to Ozempic if approved by insurance  Will loop in [facility] for authorization  Ozempic ordered for prior auth  Staff: please call patient and confirm if she has met rx deductible (if she has not, she will likely have elevated rx cost until deductible has been met)  Also verify if she has started glipizide  If patient would like to discuss further, please see if she would like to meet with me in person/virtually to discuss other options

## 2021-08-17 NOTE — TELEPHONE ENCOUNTER
Spoke to patient  She does not know if she met her rx deductible  She did not start the glipizide  She did not know it was at the pharmacy  She will have  check to see if it is there  Advised if she needed to discuss other options to schedule an appointment

## 2021-08-18 ENCOUNTER — APPOINTMENT (OUTPATIENT)
Dept: RADIOLOGY | Facility: AMBULARY SURGERY CENTER | Age: 61
End: 2021-08-18
Attending: ORTHOPAEDIC SURGERY
Payer: COMMERCIAL

## 2021-08-18 ENCOUNTER — OFFICE VISIT (OUTPATIENT)
Dept: OBGYN CLINIC | Facility: CLINIC | Age: 61
End: 2021-08-18

## 2021-08-18 VITALS
HEART RATE: 89 BPM | BODY MASS INDEX: 33.12 KG/M2 | SYSTOLIC BLOOD PRESSURE: 159 MMHG | DIASTOLIC BLOOD PRESSURE: 71 MMHG | HEIGHT: 64 IN | WEIGHT: 194 LBS

## 2021-08-18 DIAGNOSIS — M54.2 NECK PAIN: Primary | ICD-10-CM

## 2021-08-18 DIAGNOSIS — M54.2 NECK PAIN: ICD-10-CM

## 2021-08-18 DIAGNOSIS — Z98.1 S/P CERVICAL SPINAL FUSION: ICD-10-CM

## 2021-08-18 PROCEDURE — 72040 X-RAY EXAM NECK SPINE 2-3 VW: CPT

## 2021-08-18 PROCEDURE — 99024 POSTOP FOLLOW-UP VISIT: CPT | Performed by: ORTHOPAEDIC SURGERY

## 2021-08-18 PROCEDURE — 3008F BODY MASS INDEX DOCD: CPT | Performed by: NURSE PRACTITIONER

## 2021-08-18 NOTE — PROGRESS NOTES
Assessment:    2 weeks status post ACDF C4-C7 done on 8/3/2021  Done for cervical myelopathy  Overall doing well  X-rays stable  Plan:    Weight bearing as tolerated all extremities  Cervical collar at all times  Maintain cervical spine precautions  Ok to remove collar for gentle range of motion exercises, with avoiding deep flexion extension of the cervical spine  Continue Tylenol as needed for pain control  Can refill oxycodone if needed until next visit however she is no longer taking this  Follow-up 4 weeks for re-evaluation and new cervical spine x-rays  Problem List Items Addressed This Visit        Other    S/P cervical spinal fusion      Other Visit Diagnoses     Neck pain    -  Primary    Relevant Orders    XR spine cervical 2 or 3 vw injury                   Subjective:     Patient ID:  Linette Paris is a 62 y o  female    HPI    81-year-old female presenting for postop appointment  She is roughly two weeks status post ACDF C4-C7 done on 8/3/2021  Done for cervical myelopathy  Today the patient states that overall she is doing well  She has minimal neck pain  She has mild LUE pain that is controlled with Tylenol  She denies any acute issues since being home  No issues with incision  The following portions of the patient's history were reviewed and updated as appropriate: allergies, current medications, past family history, past medical history, past social history, past surgical history and problem list     Review of Systems     Objective:    Imaging:  Cervical spine x-rays performed today demonstrate stable cervical fusion C4-7  Hardware intact  Loss of lordosis noted        Physical Exam     No acute distress  Gait is without assistance  Anterior cervical incision is clean dry intact, no erythema drainage or wound dehiscence  Healing nicely  Strength is 5/5 C5-T1 bilaterally, sensation equal intact   Palpable radial pulse bilaterally      Upper extremities are warm well perfused

## 2021-08-23 DIAGNOSIS — Z98.1 S/P CERVICAL SPINAL FUSION: ICD-10-CM

## 2021-09-10 ENCOUNTER — HOSPITAL ENCOUNTER (OUTPATIENT)
Dept: RADIOLOGY | Age: 61
Discharge: HOME | End: 2021-09-10
Payer: COMMERCIAL

## 2021-09-10 DIAGNOSIS — M54.2 CERVICAL PAIN: ICD-10-CM

## 2021-09-10 DIAGNOSIS — G95.9 CERVICAL MYELOPATHY (HCC): ICD-10-CM

## 2021-09-10 DIAGNOSIS — M54.12 CERVICAL RADICULOPATHY: ICD-10-CM

## 2021-09-10 PROCEDURE — 77080 DXA BONE DENSITY AXIAL: CPT

## 2021-09-15 ENCOUNTER — APPOINTMENT (OUTPATIENT)
Dept: RADIOLOGY | Facility: AMBULARY SURGERY CENTER | Age: 61
End: 2021-09-15
Attending: ORTHOPAEDIC SURGERY
Payer: COMMERCIAL

## 2021-09-15 ENCOUNTER — OFFICE VISIT (OUTPATIENT)
Dept: OBGYN CLINIC | Facility: CLINIC | Age: 61
End: 2021-09-15

## 2021-09-15 ENCOUNTER — TELEPHONE (OUTPATIENT)
Dept: OBGYN CLINIC | Facility: CLINIC | Age: 61
End: 2021-09-15

## 2021-09-15 VITALS
BODY MASS INDEX: 33.12 KG/M2 | DIASTOLIC BLOOD PRESSURE: 70 MMHG | HEIGHT: 64 IN | SYSTOLIC BLOOD PRESSURE: 122 MMHG | WEIGHT: 194 LBS | HEART RATE: 70 BPM

## 2021-09-15 DIAGNOSIS — Z98.1 S/P CERVICAL SPINAL FUSION: Primary | ICD-10-CM

## 2021-09-15 DIAGNOSIS — M54.2 CERVICAL PAIN: ICD-10-CM

## 2021-09-15 PROCEDURE — 72040 X-RAY EXAM NECK SPINE 2-3 VW: CPT

## 2021-09-15 PROCEDURE — 99024 POSTOP FOLLOW-UP VISIT: CPT | Performed by: ORTHOPAEDIC SURGERY

## 2021-09-15 NOTE — PROGRESS NOTES
Assessment:   Diagnosis ICD-10-CM Associated Orders   1  S/P cervical spinal fusion  Z98 1    2  Cervical pain  M54 2 XR spine cervical 2 or 3 vw injury       Plan:    Status post ACDF C4-C7 done on 8/3/2021 overall doing well  She is to continue with cervical collar for another 4 weeks, continue with cervical spine precautions  She can vacuum for 15 minutes max at this time with small vacuum  She has 5/5 strength bilateral and neurologically stable  She continue to note improvement in presurgical symptoms, improved balance  She will see Dr Chilango Zambrano in 6 weeks with imaging  To do next visit:  Return in about 6 weeks (around 10/27/2021) for Recheck with Dr Chilango Zambrano possible virtual visit   The above stated was discussed in layman's terms and the patient expressed understanding  All questions were answered to the patient's satisfaction  Scribe Attestation    I,:  Buffy Mendoza am acting as a scribe while in the presence of the attending physician :       I,:  Harry Hernandez MD personally performed the services described in this documentation    as scribed in my presence :             Subjective:   Shannan Ramirez is a 62 y o  female who presents postop status post ACDF C4-C7 done on 8/3/2021  She presents in cervical collar and following post op restrictions  She is removing collar for gentle range of motion only  She only has mild ache in cervical spine today, she has noticed improvement in pre operative radicular symptoms, no longer has numbness in small and ring fingers, starting to notice improvement in long fingers, still has numbness in index-thumbs  Her balance is much better following surgery  She is using walker to ambulate  She is only using tylenol as needed for pain  Review of systems negative unless otherwise specified in HPI  Review of Systems   Constitutional: Negative for chills and fever  HENT: Negative for ear pain and sore throat      Eyes: Negative for pain and visual disturbance  Respiratory: Negative for cough and shortness of breath  Cardiovascular: Negative for chest pain and palpitations  Gastrointestinal: Negative for abdominal pain and vomiting  Genitourinary: Negative for dysuria and hematuria  Musculoskeletal: Negative for arthralgias and back pain  Skin: Negative for color change and rash  Neurological: Negative for seizures and syncope  All other systems reviewed and are negative        Past Medical History:   Diagnosis Date    Abdominal pain     started 7/21/16- found something on the pancreas    Abnormal weight loss     last assessed [id number]    Acute sinusitis     Allergic rhinitis     last assessed [id number]    Anemia     Asthma     Cardiac murmur     Carpal tunnel syndrome     Cellulitis     Cervical disc disease     Colon polyp     CPAP (continuous positive airway pressure) dependence     no longer used    Degeneration of thoracic intervertebral disc     Degenerative arthritis of knee     Diabetes mellitus (HCC)     Diverticulosis     Esophagitis, reflux     last assessed [id number]    Excessive sweating     Fatty liver     Frequent falls     Gait disturbance     GERD (gastroesophageal reflux disease)     Granuloma annulare     Head injury     Hyperlipidemia     Hypertension     Hypertensive heart disease     Hypoglycemia     last assessed [id number]    Kidney stone     Leg length discrepancy     Leucocytosis     Lumbar stress fracture     Multiple thyroid nodules     Neuropathy     Obesity     last assessed [id number]    Paronychia of toe     Patellofemoral dysfunction     Pneumonia     Postmenopausal atrophic vaginitis     Prepatellar bursitis     Sebaceous cyst     last assessed [id number]    Sleep apnea     no CPAP used    Suicide attempt ([address] )     Tinea corporis     Type 2 diabetes mellitus ([facility] [address] )     cont meds and monitor BS;  last assessed [id number]    Urinary frequency     Vertigo     Vitamin D deficiency     Dia-Parkinson-White syndrome        Past Surgical History:   Procedure Laterality Date    CERVICAL FUSION N/A 8/3/2021    Procedure: FUSION CERVICAL ANTERIOR W DISCECTOMY C4-5, C5-6, C6-7 with allograft and neuromonitoring  case length 3 hours; Surgeon: Rizwan Lipscomb MD;  Location[facility] OR;  Service: Orthopedics    COLONOSCOPY      ESOPHAGOGASTRODUODENOSCOPY N/A 8/8/2016    Procedure: ESOPHAGOGASTRODUODENOSCOPY (EGD); Surgeon: Radha Freeman MD;  Location: [facility] GI LAB; Service:     GA ESOPHAGOGASTRODUODENOSCOPY TRANSORAL DIAGNOSTIC N/A 3/28/2019    Procedure: ESOPHAGOGASTRODUODENOSCOPY (EGD); Surgeon: Radha Freeman MD;  Location: [facility] GI LAB;   Service: Gastroenterology    TONSILLECTOMY      TOOTH EXTRACTION      wisdom teeth removed       Family History   Problem Relation Age of Onset    Cancer Mother         lung    Heart disease Mother     Coronary artery disease Mother         CABG    Diabetes Mother     Lung cancer Mother 76        smoker    Stroke Mother     Thyroid disease Mother     Other Mother         tobacco use    Parkinsonism Father     Intestinal polyp Father     Bipolar disorder Father     Heart disease Father     Prostate cancer Father 62    Hypertension Sister     Alcohol abuse Sister     Depression Brother     Heart disease Brother     Diabetes Maternal Grandmother     Thyroid disease Maternal Grandmother     Hypertension Family     Suicide Attempts Paternal Aunt     Depression Paternal Aunt     Endometrial cancer Maternal Aunt         unknown age   Mollie Sizer Ovarian cancer Cousin 36        maternal    Heart disease Brother     Seizures Brother        Social History     Occupational History    Occupation: retired   Tobacco Use    Smoking status: Never Smoker    Smokeless tobacco: Never Used   Vaping Use    Vaping Use: Never used   Substance and Sexual Activity    Alcohol use: Not Currently     Alcohol/week: 1 0 standard drinks     Types: 1 Glasses of wine per week     Comment: rarely    Drug use: No    Sexual activity: Not Currently     Partners: Male         Current Outpatient Medications:     ARIPiprazole (ABILIFY) 10 mg tablet, Take 1 tablet (10 mg total) by mouth every evening, Disp: 90 tablet, Rfl: 2    atorvastatin (LIPITOR) 20 mg tablet, Take 1 tablet (20 mg total) by mouth daily, Disp: 90 tablet, Rfl: 1    Cholecalciferol 4000 units CAPS, Take 1 capsule (4,000 Units total) by mouth daily (Patient taking differently: Take 1,000 Units by mouth daily ), Disp: , Rfl:     fenofibrate (TRICOR) 145 mg tablet, Take 1 tablet (145 mg total) by mouth daily, Disp: 90 tablet, Rfl: 3    FLUoxetine (PROzac) 20 mg capsule, Take 1 capsule (20 mg total) by mouth daily Take with the 40 mg cap for a total dose of 60 mg daily, Disp: 90 capsule, Rfl: 2    FLUoxetine (PROzac) 40 MG capsule, Take 1 capsule (40 mg total) by mouth daily Take with the 20 mg cap for a total dose of 60 mg daily, Disp: 90 capsule, Rfl: 2    gabapentin (NEURONTIN) 300 mg capsule, Take 1 capsule (300 mg total) by mouth 2 (two) times a day, Disp: 60 capsule, Rfl: 0    glipiZIDE (GLUCOTROL XL) 2 5 mg 24 hr tablet, Take 1 tablet (2 5 mg total) by mouth daily, Disp: 30 tablet, Rfl: 5    lisinopril (ZESTRIL) 5 mg tablet, Take 1 tablet (5 mg total) by mouth daily, Disp: 90 tablet, Rfl: 2    metFORMIN (GLUCOPHAGE) 1000 MG tablet, Take 1 tablet (1,000 mg total) by mouth 2 (two) times a day with meals, Disp: 180 tablet, Rfl: 1    methocarbamol (ROBAXIN) 500 mg tablet, Take 1 tablet (500 mg total) by mouth 4 (four) times a day as needed for muscle spasms, Disp: 30 tablet, Rfl: 0    Omega 3 1200 MG CAPS, Take 1,200 mg by mouth 2 (two) times a day  , Disp: , Rfl:     omeprazole (PriLOSEC) 40 MG capsule, Take 1 capsule (40 mg total) by mouth daily, Disp: 90 capsule, Rfl: 1    polyethylene glycol (MIRALAX) 17 g packet, Take 17 g by mouth daily, Disp: , Rfl:     Semaglutide,0 25 or 0 5MG/DOS, (Ozempic, 0 25 or 0 5 MG/DOSE,) 2 MG/1 5ML SOPN, Inject 0 25 mg under the skin once a week, Disp: 1 5 mL, Rfl: 3    oxyCODONE (ROXICODONE) 5 mg immediate release tablet, Take 1 tablet (5 mg total) by mouth every 6 (six) hours as needed for severe painMax Daily Amount: 20 mg (Patient not taking: Reported on 8/12/2021), Disp: 30 tablet, Rfl: 0    Allergies   Allergen Reactions    Bee Venom Swelling     Local swelling at sting site    Clarithromycin Itching    Depakote Er [Divalproex Sodium Er]      pancreatitis    Shellfish-Derived Products - Food Allergy Other (See Comments)     Ears and cheeks got red    Tanzeum [Albiglutide]      pancreatitis    Carbamazepine Other (See Comments)     Reaction Date:Unknown    Lamotrigine Rash            Vitals:    09/15/21 0915   BP: 122/70   Pulse: 70       Objective:                    Back Exam     Comments:  Cervical spine  Presents in cervical collar with walker, well healing anterior incision  No acute distress, no tenderness to palpation  C2-T1 5/5 strength bilateral   C2-T1 sensation intact, diminished sensation index, thumb bilateral              Diagnostics, reviewed and taken today if performed as documented: The attending physician has personally reviewed the pertinent films in PACS and interpretation is as follows: xr cervical spine demonstrates stable hardware fixation post ACDF C4-7      Procedures, if performed today:    Procedures    None performed      Portions of the record may have been created with voice recognition software  Occasional wrong word or "sound a like" substitutions may have occurred due to the inherent limitations of voice recognition software  Read the chart carefully and recognize, using context, where substitutions have occurred

## 2021-09-15 NOTE — TELEPHONE ENCOUNTER
Pt saw Dr Jason Soni today for a post op check  Forgot to ask if she is now able to lift her hands above her head       Call back - [phone]

## 2021-09-28 ENCOUNTER — NURSE TRIAGE (OUTPATIENT)
Dept: OTHER | Facility: OTHER | Age: 61
End: 2021-09-28

## 2021-09-28 ENCOUNTER — TELEPHONE (OUTPATIENT)
Dept: OTHER | Facility: OTHER | Age: 61
End: 2021-09-28

## 2021-09-28 NOTE — TELEPHONE ENCOUNTER
Reason for Disposition   Patient wants to be seen    Answer Assessment - Initial Assessment Questions  1  BLOOD GLUCOSE: "What is your blood glucose level?"       Running 120-150 for about a month  147 @ 0700 today  2  ONSET: "When did you check the blood glucose?"      For last month  3  USUAL RANGE: "What is your glucose level usually?" (e g , usual fasting morning value, usual evening value)      Not established yet  5  TYPE 1 or 2:  "Do you know what type of diabetes you have?"  (e g , Type 1, Type 2, Gestational; doesn't know)       Type II Diabetes  6  INSULIN: "Do you take insulin?" "What type of insulin(s) do you use? What is the mode of delivery? (syringe, pen; injection or pump)? "       Denied  7  DIABETES PILLS: "Do you take any pills for your diabetes?" If yes, ask: "Have you missed taking any pills recently?"      Metformin, 1000 mg, BID; Glipizide 2 5 mg, once a day  8  OTHER SYMPTOMS: "Do you have any symptoms?" (e g , fever, frequent urination, difficulty breathing, dizziness, weakness, vomiting)      Increased thirst  Increased urination  9   PREGNANCY: "Is there any chance you are pregnant?" "When was your last menstrual period?"      N/A    Protocols used: DIABETES - HIGH BLOOD SUGAR-ADULT-OH

## 2021-09-28 NOTE — TELEPHONE ENCOUNTER
Regarding: Blood sugar between 120-150 can not get it below 120   ----- Message from Juliana Vargas sent at 9/28/2021  3:21 PM EDT -----  " My blood sugar is between 120-150 and I can not get it below 120 "

## 2021-09-29 ENCOUNTER — OFFICE VISIT (OUTPATIENT)
Dept: INTERNAL MEDICINE CLINIC | Facility: CLINIC | Age: 61
End: 2021-09-29
Payer: COMMERCIAL

## 2021-09-29 VITALS
DIASTOLIC BLOOD PRESSURE: 78 MMHG | SYSTOLIC BLOOD PRESSURE: 140 MMHG | WEIGHT: 202.2 LBS | HEART RATE: 80 BPM | HEIGHT: 64 IN | OXYGEN SATURATION: 99 % | BODY MASS INDEX: 34.52 KG/M2

## 2021-09-29 DIAGNOSIS — M25.561 ACUTE PAIN OF RIGHT KNEE: ICD-10-CM

## 2021-09-29 DIAGNOSIS — E11.9 CONTROLLED TYPE 2 DIABETES MELLITUS WITHOUT COMPLICATION, WITHOUT LONG-TERM CURRENT USE OF INSULIN (HCC): Primary | ICD-10-CM

## 2021-09-29 DIAGNOSIS — M54.12 CERVICAL RADICULOPATHY: ICD-10-CM

## 2021-09-29 PROCEDURE — 3077F SYST BP >= 140 MM HG: CPT | Performed by: GENERAL ACUTE CARE HOSPITAL

## 2021-09-29 PROCEDURE — 1036F TOBACCO NON-USER: CPT | Performed by: GENERAL ACUTE CARE HOSPITAL

## 2021-09-29 PROCEDURE — 99214 OFFICE O/P EST MOD 30 MIN: CPT | Performed by: GENERAL ACUTE CARE HOSPITAL

## 2021-09-29 PROCEDURE — 3008F BODY MASS INDEX DOCD: CPT | Performed by: GENERAL ACUTE CARE HOSPITAL

## 2021-09-29 PROCEDURE — 3078F DIAST BP <80 MM HG: CPT | Performed by: GENERAL ACUTE CARE HOSPITAL

## 2021-09-29 RX ORDER — GLIPIZIDE 5 MG/1
5 TABLET ORAL
Qty: 90 TABLET | Refills: 3 | Status: SHIPPED | OUTPATIENT
Start: 2021-09-29 | End: 2022-04-25

## 2021-09-29 NOTE — ASSESSMENT & PLAN NOTE
Lab Results   Component Value Date    HGBA1C 7 9 (H) 07/09/2021     Increase glipizide to 5 mg daily  I discussed with patient that now she is on oral medications stairs no need to check her glucose 3 times a day  She can have her A1c rechecked in October prior to her next PCP visit  On previous note PCP recommended Ozempic  She found out there is more than 600 dollars co-pay every month  If A1c is still not at target with maximal dose of metformin and glipizide could consider SGLT2 inhibitor or DPP4 inhibitor

## 2021-09-29 NOTE — ASSESSMENT & PLAN NOTE
Exam is unremarkable  Recommend maximize Tylenol does use heat or ice whichever helps to alleviate symptoms and Voltaren gel  Continue to monitor

## 2021-09-29 NOTE — PROGRESS NOTES
She probably sprained her right knee several weeks ago  Continue to have pain while walking  The following portions of the patient's history were reviewed and updated as appropriate: allergies, past family history, past social history and past surgical history      Review of Systems      Objective:      /78 (BP Location: Left arm, Patient Position: Sitting, Cuff Size: Large)   Pulse 80   Ht 5' 4" (1 626 m)   Wt 91 7 kg (202 lb 3 2 oz)   SpO2 99%   BMI 34 71 kg/m²          Physical Exam

## 2021-09-29 NOTE — ASSESSMENT & PLAN NOTE
Patient is status post cervical surgery in August   Currently pain is well controlled she no longer takes oxycodone or gabapentin or Robaxin  Follow-up with ortho

## 2021-10-27 ENCOUNTER — OFFICE VISIT (OUTPATIENT)
Dept: OBGYN CLINIC | Facility: CLINIC | Age: 61
End: 2021-10-27
Payer: COMMERCIAL

## 2021-10-27 ENCOUNTER — APPOINTMENT (OUTPATIENT)
Dept: RADIOLOGY | Facility: CLINIC | Age: 61
End: 2021-10-27
Payer: COMMERCIAL

## 2021-10-27 VITALS
DIASTOLIC BLOOD PRESSURE: 76 MMHG | HEIGHT: 63 IN | SYSTOLIC BLOOD PRESSURE: 150 MMHG | HEART RATE: 90 BPM | WEIGHT: 200 LBS | BODY MASS INDEX: 35.44 KG/M2

## 2021-10-27 DIAGNOSIS — M54.2 NECK PAIN, CHRONIC: Primary | ICD-10-CM

## 2021-10-27 DIAGNOSIS — Z98.1 S/P CERVICAL SPINAL FUSION: ICD-10-CM

## 2021-10-27 DIAGNOSIS — G89.29 NECK PAIN, CHRONIC: ICD-10-CM

## 2021-10-27 DIAGNOSIS — G89.29 NECK PAIN, CHRONIC: Primary | ICD-10-CM

## 2021-10-27 DIAGNOSIS — M54.2 NECK PAIN, CHRONIC: ICD-10-CM

## 2021-10-27 PROCEDURE — 99214 OFFICE O/P EST MOD 30 MIN: CPT | Performed by: ORTHOPAEDIC SURGERY

## 2021-10-27 PROCEDURE — 3078F DIAST BP <80 MM HG: CPT | Performed by: ORTHOPAEDIC SURGERY

## 2021-10-27 PROCEDURE — 3008F BODY MASS INDEX DOCD: CPT | Performed by: ORTHOPAEDIC SURGERY

## 2021-10-27 PROCEDURE — 3077F SYST BP >= 140 MM HG: CPT | Performed by: ORTHOPAEDIC SURGERY

## 2021-10-27 PROCEDURE — 72040 X-RAY EXAM NECK SPINE 2-3 VW: CPT

## 2021-10-27 PROCEDURE — 1036F TOBACCO NON-USER: CPT | Performed by: ORTHOPAEDIC SURGERY

## 2021-11-03 ENCOUNTER — EVALUATION (OUTPATIENT)
Dept: PHYSICAL THERAPY | Facility: CLINIC | Age: 61
End: 2021-11-03
Payer: COMMERCIAL

## 2021-11-03 DIAGNOSIS — G56.03 BILATERAL CARPAL TUNNEL SYNDROME: Primary | ICD-10-CM

## 2021-11-03 DIAGNOSIS — Z98.1 S/P CERVICAL SPINAL FUSION: ICD-10-CM

## 2021-11-03 PROCEDURE — 97162 PT EVAL MOD COMPLEX 30 MIN: CPT | Performed by: PHYSICAL THERAPIST

## 2021-11-03 PROCEDURE — 97112 NEUROMUSCULAR REEDUCATION: CPT | Performed by: PHYSICAL THERAPIST

## 2021-11-03 PROCEDURE — 97140 MANUAL THERAPY 1/> REGIONS: CPT | Performed by: PHYSICAL THERAPIST

## 2021-11-04 ENCOUNTER — OFFICE VISIT (OUTPATIENT)
Dept: PHYSICAL THERAPY | Facility: CLINIC | Age: 61
End: 2021-11-04
Payer: COMMERCIAL

## 2021-11-04 ENCOUNTER — RA CDI HCC (OUTPATIENT)
Dept: OTHER | Facility: HOSPITAL | Age: 61
End: 2021-11-04

## 2021-11-04 DIAGNOSIS — G56.03 BILATERAL CARPAL TUNNEL SYNDROME: ICD-10-CM

## 2021-11-04 DIAGNOSIS — Z98.1 S/P CERVICAL SPINAL FUSION: Primary | ICD-10-CM

## 2021-11-04 PROCEDURE — 97140 MANUAL THERAPY 1/> REGIONS: CPT | Performed by: PHYSICAL THERAPIST

## 2021-11-04 PROCEDURE — 97112 NEUROMUSCULAR REEDUCATION: CPT | Performed by: PHYSICAL THERAPIST

## 2021-11-04 PROCEDURE — 97110 THERAPEUTIC EXERCISES: CPT | Performed by: PHYSICAL THERAPIST

## 2021-11-09 ENCOUNTER — OFFICE VISIT (OUTPATIENT)
Dept: PHYSICAL THERAPY | Facility: CLINIC | Age: 61
End: 2021-11-09
Payer: COMMERCIAL

## 2021-11-09 DIAGNOSIS — Z98.1 S/P CERVICAL SPINAL FUSION: Primary | ICD-10-CM

## 2021-11-09 DIAGNOSIS — G56.03 BILATERAL CARPAL TUNNEL SYNDROME: ICD-10-CM

## 2021-11-09 PROCEDURE — 97110 THERAPEUTIC EXERCISES: CPT

## 2021-11-09 PROCEDURE — 97112 NEUROMUSCULAR REEDUCATION: CPT

## 2021-11-09 PROCEDURE — 97140 MANUAL THERAPY 1/> REGIONS: CPT

## 2021-11-11 ENCOUNTER — NEW PATIENT COMPREHENSIVE (OUTPATIENT)
Dept: URBAN - METROPOLITAN AREA CLINIC 6 | Facility: CLINIC | Age: 61
End: 2021-11-11

## 2021-11-11 ENCOUNTER — OFFICE VISIT (OUTPATIENT)
Dept: PHYSICAL THERAPY | Facility: CLINIC | Age: 61
End: 2021-11-11
Payer: COMMERCIAL

## 2021-11-11 DIAGNOSIS — H43.813: ICD-10-CM

## 2021-11-11 DIAGNOSIS — Z98.1 S/P CERVICAL SPINAL FUSION: Primary | ICD-10-CM

## 2021-11-11 DIAGNOSIS — H25.813: ICD-10-CM

## 2021-11-11 DIAGNOSIS — G56.03 BILATERAL CARPAL TUNNEL SYNDROME: ICD-10-CM

## 2021-11-11 DIAGNOSIS — E11.9: ICD-10-CM

## 2021-11-11 DIAGNOSIS — H16.223: ICD-10-CM

## 2021-11-11 LAB
LEFT EYE DIABETIC RETINOPATHY: NORMAL
RIGHT EYE DIABETIC RETINOPATHY: NORMAL
SEVERITY (EYE EXAM): NORMAL

## 2021-11-11 PROCEDURE — 97112 NEUROMUSCULAR REEDUCATION: CPT

## 2021-11-11 PROCEDURE — 97116 GAIT TRAINING THERAPY: CPT

## 2021-11-11 PROCEDURE — G8427 DOCREV CUR MEDS BY ELIG CLIN: HCPCS

## 2021-11-11 PROCEDURE — 92134 CPTRZ OPH DX IMG PST SGM RTA: CPT | Mod: NC

## 2021-11-11 PROCEDURE — 2022F DILAT RTA XM EVC RTNOPTHY: CPT

## 2021-11-11 PROCEDURE — 97140 MANUAL THERAPY 1/> REGIONS: CPT

## 2021-11-11 PROCEDURE — 1036F TOBACCO NON-USER: CPT

## 2021-11-11 PROCEDURE — 92004 COMPRE OPH EXAM NEW PT 1/>: CPT

## 2021-11-11 ASSESSMENT — TONOMETRY
OS_IOP_MMHG: 18
OD_IOP_MMHG: 15

## 2021-11-11 ASSESSMENT — VISUAL ACUITY
OS_CC: 20/25+1
OD_CC: 20/20-1
OU_CC: J1+

## 2021-11-12 ENCOUNTER — OFFICE VISIT (OUTPATIENT)
Dept: INTERNAL MEDICINE CLINIC | Facility: CLINIC | Age: 61
End: 2021-11-12
Payer: COMMERCIAL

## 2021-11-12 VITALS
HEIGHT: 63 IN | RESPIRATION RATE: 16 BRPM | SYSTOLIC BLOOD PRESSURE: 132 MMHG | WEIGHT: 204 LBS | OXYGEN SATURATION: 97 % | BODY MASS INDEX: 36.14 KG/M2 | HEART RATE: 68 BPM | DIASTOLIC BLOOD PRESSURE: 72 MMHG

## 2021-11-12 DIAGNOSIS — F31.76 BIPOLAR I DISORDER, MOST RECENT EPISODE DEPRESSED, IN FULL REMISSION (HCC): Chronic | ICD-10-CM

## 2021-11-12 DIAGNOSIS — I10 BENIGN ESSENTIAL HYPERTENSION: ICD-10-CM

## 2021-11-12 DIAGNOSIS — E11.9 CONTROLLED TYPE 2 DIABETES MELLITUS WITHOUT COMPLICATION, WITHOUT LONG-TERM CURRENT USE OF INSULIN (HCC): ICD-10-CM

## 2021-11-12 DIAGNOSIS — G47.62 NOCTURNAL LEG CRAMPS: Primary | ICD-10-CM

## 2021-11-12 DIAGNOSIS — E78.1 ESSENTIAL HYPERTRIGLYCERIDEMIA: ICD-10-CM

## 2021-11-12 DIAGNOSIS — G56.03 BILATERAL CARPAL TUNNEL SYNDROME: ICD-10-CM

## 2021-11-12 DIAGNOSIS — Z23 NEED FOR SHINGLES VACCINE: ICD-10-CM

## 2021-11-12 DIAGNOSIS — E66.09 CLASS 1 OBESITY DUE TO EXCESS CALORIES WITH SERIOUS COMORBIDITY AND BODY MASS INDEX (BMI) OF 32.0 TO 32.9 IN ADULT: ICD-10-CM

## 2021-11-12 DIAGNOSIS — E66.9 OBESITY (BMI 30.0-34.9): ICD-10-CM

## 2021-11-12 PROCEDURE — 99214 OFFICE O/P EST MOD 30 MIN: CPT | Performed by: INTERNAL MEDICINE

## 2021-11-12 RX ORDER — ZOSTER VACCINE RECOMBINANT, ADJUVANTED 50 MCG/0.5
0.5 KIT INTRAMUSCULAR ONCE
Qty: 1 EACH | Refills: 1 | Status: SHIPPED | OUTPATIENT
Start: 2021-11-12 | End: 2021-11-12

## 2021-11-14 PROBLEM — G47.62 NOCTURNAL LEG CRAMPS: Status: ACTIVE | Noted: 2021-11-14

## 2021-11-14 PROBLEM — G56.03 BILATERAL CARPAL TUNNEL SYNDROME: Status: ACTIVE | Noted: 2021-11-14

## 2021-11-16 ENCOUNTER — OFFICE VISIT (OUTPATIENT)
Dept: PHYSICAL THERAPY | Facility: CLINIC | Age: 61
End: 2021-11-16
Payer: COMMERCIAL

## 2021-11-16 DIAGNOSIS — G56.03 BILATERAL CARPAL TUNNEL SYNDROME: ICD-10-CM

## 2021-11-16 DIAGNOSIS — Z98.1 S/P CERVICAL SPINAL FUSION: Primary | ICD-10-CM

## 2021-11-16 PROCEDURE — 97116 GAIT TRAINING THERAPY: CPT

## 2021-11-16 PROCEDURE — 97112 NEUROMUSCULAR REEDUCATION: CPT

## 2021-11-16 PROCEDURE — 97140 MANUAL THERAPY 1/> REGIONS: CPT

## 2021-11-17 ENCOUNTER — OFFICE VISIT (OUTPATIENT)
Dept: PSYCHIATRY | Facility: CLINIC | Age: 61
End: 2021-11-17
Payer: COMMERCIAL

## 2021-11-17 VITALS
HEIGHT: 63 IN | SYSTOLIC BLOOD PRESSURE: 155 MMHG | DIASTOLIC BLOOD PRESSURE: 73 MMHG | WEIGHT: 207 LBS | HEART RATE: 83 BPM | BODY MASS INDEX: 36.68 KG/M2

## 2021-11-17 DIAGNOSIS — F31.76 BIPOLAR I DISORDER, MOST RECENT EPISODE DEPRESSED, IN FULL REMISSION (HCC): Primary | Chronic | ICD-10-CM

## 2021-11-17 DIAGNOSIS — Z79.899 LONG-TERM USE OF HIGH-RISK MEDICATION: Chronic | ICD-10-CM

## 2021-11-17 DIAGNOSIS — F41.1 GAD (GENERALIZED ANXIETY DISORDER): Chronic | ICD-10-CM

## 2021-11-17 DIAGNOSIS — F60.9 PERSONALITY DISORDER (HCC): Chronic | ICD-10-CM

## 2021-11-17 LAB
25(OH)D3 SERPL-MCNC: 31 NG/ML (ref 30–100)
ALBUMIN SERPL-MCNC: 4.3 G/DL (ref 3.6–5.1)
ALBUMIN/GLOB SERPL: 1.7 (CALC) (ref 1–2.5)
ALP SERPL-CCNC: 89 U/L (ref 37–153)
ALT SERPL-CCNC: 29 U/L (ref 6–29)
AST SERPL-CCNC: 27 U/L (ref 10–35)
BASOPHILS # BLD AUTO: 60 CELLS/UL (ref 0–200)
BASOPHILS NFR BLD AUTO: 0.7 %
BILIRUB SERPL-MCNC: 0.3 MG/DL (ref 0.2–1.2)
BUN SERPL-MCNC: 17 MG/DL (ref 7–25)
BUN/CREAT SERPL: ABNORMAL (CALC) (ref 6–22)
CALCIUM SERPL-MCNC: 9.2 MG/DL (ref 8.6–10.4)
CHLORIDE SERPL-SCNC: 104 MMOL/L (ref 98–110)
CO2 SERPL-SCNC: 25 MMOL/L (ref 20–32)
CREAT SERPL-MCNC: 0.66 MG/DL (ref 0.5–0.99)
EOSINOPHIL # BLD AUTO: 179 CELLS/UL (ref 15–500)
EOSINOPHIL NFR BLD AUTO: 2.1 %
ERYTHROCYTE [DISTWIDTH] IN BLOOD BY AUTOMATED COUNT: 14.3 % (ref 11–15)
GLOBULIN SER CALC-MCNC: 2.5 G/DL (CALC) (ref 1.9–3.7)
GLUCOSE SERPL-MCNC: 162 MG/DL (ref 65–99)
HCT VFR BLD AUTO: 35.1 % (ref 35–45)
HGB BLD-MCNC: 11 G/DL (ref 11.7–15.5)
LYMPHOCYTES # BLD AUTO: 1666 CELLS/UL (ref 850–3900)
LYMPHOCYTES NFR BLD AUTO: 19.6 %
MAGNESIUM SERPL-MCNC: 1.4 MG/DL (ref 1.5–2.5)
MCH RBC QN AUTO: 26 PG (ref 27–33)
MCHC RBC AUTO-ENTMCNC: 31.3 G/DL (ref 32–36)
MCV RBC AUTO: 83 FL (ref 80–100)
MONOCYTES # BLD AUTO: 502 CELLS/UL (ref 200–950)
MONOCYTES NFR BLD AUTO: 5.9 %
NEUTROPHILS # BLD AUTO: 6095 CELLS/UL (ref 1500–7800)
NEUTROPHILS NFR BLD AUTO: 71.7 %
PLATELET # BLD AUTO: 330 THOUSAND/UL (ref 140–400)
PMV BLD REES-ECKER: 10.3 FL (ref 7.5–12.5)
POTASSIUM SERPL-SCNC: 4.9 MMOL/L (ref 3.5–5.3)
PROT SERPL-MCNC: 6.8 G/DL (ref 6.1–8.1)
RBC # BLD AUTO: 4.23 MILLION/UL (ref 3.8–5.1)
SL AMB EGFR AFRICAN AMERICAN: 111 ML/MIN/1.73M2
SL AMB EGFR NON AFRICAN AMERICAN: 96 ML/MIN/1.73M2
SODIUM SERPL-SCNC: 138 MMOL/L (ref 135–146)
TSH SERPL-ACNC: 2.43 MIU/L (ref 0.4–4.5)
WBC # BLD AUTO: 8.5 THOUSAND/UL (ref 3.8–10.8)

## 2021-11-17 PROCEDURE — 99214 OFFICE O/P EST MOD 30 MIN: CPT | Performed by: PSYCHIATRY & NEUROLOGY

## 2021-11-17 PROCEDURE — 3725F SCREEN DEPRESSION PERFORMED: CPT | Performed by: PSYCHIATRY & NEUROLOGY

## 2021-11-17 PROCEDURE — 90833 PSYTX W PT W E/M 30 MIN: CPT | Performed by: PSYCHIATRY & NEUROLOGY

## 2021-11-17 RX ORDER — FLUOXETINE HYDROCHLORIDE 40 MG/1
40 CAPSULE ORAL DAILY
Qty: 90 CAPSULE | Refills: 1 | Status: SHIPPED | OUTPATIENT
Start: 2021-11-17 | End: 2022-05-16 | Stop reason: SDUPTHER

## 2021-11-17 RX ORDER — FLUOXETINE HYDROCHLORIDE 20 MG/1
20 CAPSULE ORAL DAILY
Qty: 90 CAPSULE | Refills: 1 | Status: SHIPPED | OUTPATIENT
Start: 2021-11-17 | End: 2022-05-16 | Stop reason: SDUPTHER

## 2021-11-17 RX ORDER — ARIPIPRAZOLE 10 MG/1
10 TABLET ORAL EVERY EVENING
Qty: 90 TABLET | Refills: 2 | Status: SHIPPED | OUTPATIENT
Start: 2021-11-17 | End: 2022-05-16 | Stop reason: SDUPTHER

## 2021-11-18 ENCOUNTER — OFFICE VISIT (OUTPATIENT)
Dept: PHYSICAL THERAPY | Facility: CLINIC | Age: 61
End: 2021-11-18
Payer: COMMERCIAL

## 2021-11-18 ENCOUNTER — TELEPHONE (OUTPATIENT)
Dept: PSYCHIATRY | Facility: CLINIC | Age: 61
End: 2021-11-18

## 2021-11-18 ENCOUNTER — OFFICE VISIT (OUTPATIENT)
Dept: OBGYN CLINIC | Facility: HOSPITAL | Age: 61
End: 2021-11-18
Payer: COMMERCIAL

## 2021-11-18 VITALS
DIASTOLIC BLOOD PRESSURE: 80 MMHG | WEIGHT: 204.8 LBS | HEART RATE: 86 BPM | HEIGHT: 63 IN | BODY MASS INDEX: 36.29 KG/M2 | SYSTOLIC BLOOD PRESSURE: 148 MMHG

## 2021-11-18 DIAGNOSIS — G56.03 BILATERAL CARPAL TUNNEL SYNDROME: ICD-10-CM

## 2021-11-18 DIAGNOSIS — Z98.1 S/P CERVICAL SPINAL FUSION: Primary | ICD-10-CM

## 2021-11-18 DIAGNOSIS — G56.02 CARPAL TUNNEL SYNDROME OF LEFT WRIST: Primary | ICD-10-CM

## 2021-11-18 DIAGNOSIS — G56.01 CARPAL TUNNEL SYNDROME OF RIGHT WRIST: ICD-10-CM

## 2021-11-18 PROCEDURE — 97112 NEUROMUSCULAR REEDUCATION: CPT

## 2021-11-18 PROCEDURE — 3077F SYST BP >= 140 MM HG: CPT | Performed by: PHYSICIAN ASSISTANT

## 2021-11-18 PROCEDURE — 97140 MANUAL THERAPY 1/> REGIONS: CPT

## 2021-11-18 PROCEDURE — 3008F BODY MASS INDEX DOCD: CPT | Performed by: PHYSICIAN ASSISTANT

## 2021-11-18 PROCEDURE — 1036F TOBACCO NON-USER: CPT | Performed by: PHYSICIAN ASSISTANT

## 2021-11-18 PROCEDURE — 3079F DIAST BP 80-89 MM HG: CPT | Performed by: PHYSICIAN ASSISTANT

## 2021-11-18 PROCEDURE — 99213 OFFICE O/P EST LOW 20 MIN: CPT | Performed by: PHYSICIAN ASSISTANT

## 2021-11-18 PROCEDURE — 97116 GAIT TRAINING THERAPY: CPT

## 2021-11-19 DIAGNOSIS — I10 BENIGN ESSENTIAL HYPERTENSION: ICD-10-CM

## 2021-11-19 DIAGNOSIS — E83.42 HYPOMAGNESEMIA: Primary | ICD-10-CM

## 2021-11-19 PROCEDURE — 4010F ACE/ARB THERAPY RXD/TAKEN: CPT | Performed by: PHYSICIAN ASSISTANT

## 2021-11-19 RX ORDER — LISINOPRIL 5 MG/1
TABLET ORAL
Qty: 90 TABLET | Refills: 2 | Status: SHIPPED | OUTPATIENT
Start: 2021-11-19 | End: 2022-01-31 | Stop reason: SDUPTHER

## 2021-11-23 ENCOUNTER — APPOINTMENT (OUTPATIENT)
Dept: PHYSICAL THERAPY | Facility: CLINIC | Age: 61
End: 2021-11-23
Payer: COMMERCIAL

## 2021-11-24 ENCOUNTER — VBI (OUTPATIENT)
Dept: ADMINISTRATIVE | Facility: OTHER | Age: 61
End: 2021-11-24

## 2021-11-26 ENCOUNTER — OFFICE VISIT (OUTPATIENT)
Dept: PHYSICAL THERAPY | Facility: CLINIC | Age: 61
End: 2021-11-26
Payer: COMMERCIAL

## 2021-11-26 ENCOUNTER — TELEPHONE (OUTPATIENT)
Dept: INTERNAL MEDICINE CLINIC | Facility: CLINIC | Age: 61
End: 2021-11-26

## 2021-11-26 DIAGNOSIS — Z98.1 S/P CERVICAL SPINAL FUSION: ICD-10-CM

## 2021-11-26 DIAGNOSIS — G56.03 BILATERAL CARPAL TUNNEL SYNDROME: Primary | ICD-10-CM

## 2021-11-26 PROCEDURE — 97140 MANUAL THERAPY 1/> REGIONS: CPT | Performed by: PHYSICAL THERAPIST

## 2021-11-26 PROCEDURE — 97116 GAIT TRAINING THERAPY: CPT | Performed by: PHYSICAL THERAPIST

## 2021-11-26 PROCEDURE — 97112 NEUROMUSCULAR REEDUCATION: CPT | Performed by: PHYSICAL THERAPIST

## 2021-11-26 PROCEDURE — 97110 THERAPEUTIC EXERCISES: CPT | Performed by: PHYSICAL THERAPIST

## 2021-11-29 LAB
FERRITIN SERPL-MCNC: 154 NG/ML (ref 16–232)
IRON SATN MFR SERPL: 11 % (CALC) (ref 16–45)
IRON SERPL-MCNC: 41 MCG/DL (ref 45–160)
MAGNESIUM SERPL-MCNC: 1.5 MG/DL (ref 1.5–2.5)
TIBC SERPL-MCNC: 359 MCG/DL (CALC) (ref 250–450)

## 2021-11-30 ENCOUNTER — OFFICE VISIT (OUTPATIENT)
Dept: PHYSICAL THERAPY | Facility: CLINIC | Age: 61
End: 2021-11-30
Payer: COMMERCIAL

## 2021-11-30 DIAGNOSIS — Z98.1 S/P CERVICAL SPINAL FUSION: ICD-10-CM

## 2021-11-30 DIAGNOSIS — G56.03 BILATERAL CARPAL TUNNEL SYNDROME: Primary | ICD-10-CM

## 2021-11-30 PROCEDURE — 97110 THERAPEUTIC EXERCISES: CPT

## 2021-11-30 PROCEDURE — 97116 GAIT TRAINING THERAPY: CPT

## 2021-11-30 PROCEDURE — 97112 NEUROMUSCULAR REEDUCATION: CPT

## 2021-11-30 PROCEDURE — 97140 MANUAL THERAPY 1/> REGIONS: CPT

## 2021-12-02 ENCOUNTER — OFFICE VISIT (OUTPATIENT)
Dept: PHYSICAL THERAPY | Facility: CLINIC | Age: 61
End: 2021-12-02
Payer: COMMERCIAL

## 2021-12-02 DIAGNOSIS — Z98.1 S/P CERVICAL SPINAL FUSION: ICD-10-CM

## 2021-12-02 DIAGNOSIS — G56.03 BILATERAL CARPAL TUNNEL SYNDROME: Primary | ICD-10-CM

## 2021-12-02 PROCEDURE — 97110 THERAPEUTIC EXERCISES: CPT | Performed by: PHYSICAL THERAPIST

## 2021-12-02 PROCEDURE — 97140 MANUAL THERAPY 1/> REGIONS: CPT | Performed by: PHYSICAL THERAPIST

## 2021-12-02 PROCEDURE — 97112 NEUROMUSCULAR REEDUCATION: CPT | Performed by: PHYSICAL THERAPIST

## 2021-12-07 ENCOUNTER — OFFICE VISIT (OUTPATIENT)
Dept: PHYSICAL THERAPY | Facility: CLINIC | Age: 61
End: 2021-12-07
Payer: COMMERCIAL

## 2021-12-07 DIAGNOSIS — G56.03 BILATERAL CARPAL TUNNEL SYNDROME: Primary | ICD-10-CM

## 2021-12-07 DIAGNOSIS — Z98.1 S/P CERVICAL SPINAL FUSION: ICD-10-CM

## 2021-12-07 PROCEDURE — 97112 NEUROMUSCULAR REEDUCATION: CPT

## 2021-12-07 PROCEDURE — 97140 MANUAL THERAPY 1/> REGIONS: CPT

## 2021-12-07 PROCEDURE — 97110 THERAPEUTIC EXERCISES: CPT

## 2021-12-07 PROCEDURE — 97116 GAIT TRAINING THERAPY: CPT

## 2021-12-09 ENCOUNTER — OFFICE VISIT (OUTPATIENT)
Dept: PHYSICAL THERAPY | Facility: CLINIC | Age: 61
End: 2021-12-09
Payer: COMMERCIAL

## 2021-12-09 DIAGNOSIS — G56.03 BILATERAL CARPAL TUNNEL SYNDROME: Primary | ICD-10-CM

## 2021-12-09 DIAGNOSIS — Z98.1 S/P CERVICAL SPINAL FUSION: ICD-10-CM

## 2021-12-09 PROCEDURE — 97112 NEUROMUSCULAR REEDUCATION: CPT

## 2021-12-09 PROCEDURE — 97116 GAIT TRAINING THERAPY: CPT

## 2021-12-09 PROCEDURE — 97140 MANUAL THERAPY 1/> REGIONS: CPT

## 2021-12-13 ENCOUNTER — EVALUATION (OUTPATIENT)
Dept: PHYSICAL THERAPY | Facility: CLINIC | Age: 61
End: 2021-12-13
Payer: COMMERCIAL

## 2021-12-13 DIAGNOSIS — G56.03 BILATERAL CARPAL TUNNEL SYNDROME: Primary | ICD-10-CM

## 2021-12-13 DIAGNOSIS — E83.42 HYPOMAGNESEMIA: ICD-10-CM

## 2021-12-13 DIAGNOSIS — Z98.1 S/P CERVICAL SPINAL FUSION: ICD-10-CM

## 2021-12-13 PROCEDURE — 97140 MANUAL THERAPY 1/> REGIONS: CPT | Performed by: PHYSICAL THERAPIST

## 2021-12-13 PROCEDURE — 97116 GAIT TRAINING THERAPY: CPT | Performed by: PHYSICAL THERAPIST

## 2021-12-13 PROCEDURE — 97112 NEUROMUSCULAR REEDUCATION: CPT | Performed by: PHYSICAL THERAPIST

## 2021-12-13 PROCEDURE — 97110 THERAPEUTIC EXERCISES: CPT | Performed by: PHYSICAL THERAPIST

## 2021-12-13 RX ORDER — MAGNESIUM OXIDE 400 MG/1
TABLET ORAL
Qty: 30 TABLET | Refills: 1 | Status: SHIPPED | OUTPATIENT
Start: 2021-12-13 | End: 2022-04-11 | Stop reason: SDUPTHER

## 2021-12-16 ENCOUNTER — OFFICE VISIT (OUTPATIENT)
Dept: PHYSICAL THERAPY | Facility: CLINIC | Age: 61
End: 2021-12-16
Payer: COMMERCIAL

## 2021-12-16 DIAGNOSIS — G56.03 BILATERAL CARPAL TUNNEL SYNDROME: Primary | ICD-10-CM

## 2021-12-16 DIAGNOSIS — Z98.1 S/P CERVICAL SPINAL FUSION: ICD-10-CM

## 2021-12-16 PROCEDURE — 97112 NEUROMUSCULAR REEDUCATION: CPT

## 2021-12-16 PROCEDURE — 97140 MANUAL THERAPY 1/> REGIONS: CPT

## 2021-12-20 ENCOUNTER — HOSPITAL ENCOUNTER (OUTPATIENT)
Dept: RADIOLOGY | Facility: HOSPITAL | Age: 61
Discharge: HOME | End: 2021-12-20
Admitting: PHYSICIAN ASSISTANT
Payer: COMMERCIAL

## 2021-12-20 DIAGNOSIS — G56.03 BILATERAL CARPAL TUNNEL SYNDROME: ICD-10-CM

## 2021-12-20 PROCEDURE — 76882 US LMTD JT/FCL EVL NVASC XTR: CPT

## 2021-12-21 ENCOUNTER — OFFICE VISIT (OUTPATIENT)
Dept: PHYSICAL THERAPY | Facility: CLINIC | Age: 61
End: 2021-12-21
Payer: COMMERCIAL

## 2021-12-21 DIAGNOSIS — Z98.1 S/P CERVICAL SPINAL FUSION: ICD-10-CM

## 2021-12-21 DIAGNOSIS — G56.03 BILATERAL CARPAL TUNNEL SYNDROME: Primary | ICD-10-CM

## 2021-12-21 PROCEDURE — 97112 NEUROMUSCULAR REEDUCATION: CPT

## 2021-12-21 PROCEDURE — 97140 MANUAL THERAPY 1/> REGIONS: CPT

## 2021-12-23 ENCOUNTER — OFFICE VISIT (OUTPATIENT)
Dept: PHYSICAL THERAPY | Facility: CLINIC | Age: 61
End: 2021-12-23
Payer: COMMERCIAL

## 2021-12-23 DIAGNOSIS — G56.03 BILATERAL CARPAL TUNNEL SYNDROME: Primary | ICD-10-CM

## 2021-12-23 DIAGNOSIS — Z98.1 S/P CERVICAL SPINAL FUSION: ICD-10-CM

## 2021-12-23 PROCEDURE — 97140 MANUAL THERAPY 1/> REGIONS: CPT | Performed by: PHYSICAL THERAPIST

## 2021-12-23 PROCEDURE — 97112 NEUROMUSCULAR REEDUCATION: CPT | Performed by: PHYSICAL THERAPIST

## 2021-12-23 PROCEDURE — 97110 THERAPEUTIC EXERCISES: CPT | Performed by: PHYSICAL THERAPIST

## 2021-12-28 ENCOUNTER — OFFICE VISIT (OUTPATIENT)
Dept: PHYSICAL THERAPY | Facility: CLINIC | Age: 61
End: 2021-12-28
Payer: COMMERCIAL

## 2021-12-28 DIAGNOSIS — G56.03 BILATERAL CARPAL TUNNEL SYNDROME: Primary | ICD-10-CM

## 2021-12-28 DIAGNOSIS — Z98.1 S/P CERVICAL SPINAL FUSION: ICD-10-CM

## 2021-12-28 PROCEDURE — 97112 NEUROMUSCULAR REEDUCATION: CPT

## 2021-12-28 PROCEDURE — 97140 MANUAL THERAPY 1/> REGIONS: CPT

## 2021-12-28 PROCEDURE — 97110 THERAPEUTIC EXERCISES: CPT

## 2021-12-30 ENCOUNTER — EVALUATION (OUTPATIENT)
Dept: PHYSICAL THERAPY | Facility: CLINIC | Age: 61
End: 2021-12-30
Payer: COMMERCIAL

## 2021-12-30 DIAGNOSIS — G56.03 BILATERAL CARPAL TUNNEL SYNDROME: ICD-10-CM

## 2021-12-30 DIAGNOSIS — Z98.1 S/P CERVICAL SPINAL FUSION: Primary | ICD-10-CM

## 2021-12-30 PROCEDURE — 97140 MANUAL THERAPY 1/> REGIONS: CPT | Performed by: PHYSICAL THERAPIST

## 2021-12-30 PROCEDURE — 97116 GAIT TRAINING THERAPY: CPT | Performed by: PHYSICAL THERAPIST

## 2021-12-30 PROCEDURE — 97110 THERAPEUTIC EXERCISES: CPT | Performed by: PHYSICAL THERAPIST

## 2021-12-30 PROCEDURE — 97112 NEUROMUSCULAR REEDUCATION: CPT | Performed by: PHYSICAL THERAPIST

## 2022-01-04 ENCOUNTER — OFFICE VISIT (OUTPATIENT)
Dept: PHYSICAL THERAPY | Facility: CLINIC | Age: 62
End: 2022-01-04
Payer: COMMERCIAL

## 2022-01-04 DIAGNOSIS — G56.03 BILATERAL CARPAL TUNNEL SYNDROME: ICD-10-CM

## 2022-01-04 DIAGNOSIS — Z98.1 S/P CERVICAL SPINAL FUSION: Primary | ICD-10-CM

## 2022-01-04 PROCEDURE — 97140 MANUAL THERAPY 1/> REGIONS: CPT

## 2022-01-04 PROCEDURE — 97110 THERAPEUTIC EXERCISES: CPT

## 2022-01-04 PROCEDURE — 97116 GAIT TRAINING THERAPY: CPT

## 2022-01-04 PROCEDURE — 97112 NEUROMUSCULAR REEDUCATION: CPT

## 2022-01-05 ENCOUNTER — TELEPHONE (OUTPATIENT)
Dept: PSYCHIATRY | Facility: CLINIC | Age: 62
End: 2022-01-05

## 2022-01-05 NOTE — TELEPHONE ENCOUNTER
LVM for the patient to make her aware that her appt on 3/30/2022 has been canceled and to give us a call back to r/s due to the provider being out

## 2022-01-06 ENCOUNTER — OFFICE VISIT (OUTPATIENT)
Dept: PHYSICAL THERAPY | Facility: CLINIC | Age: 62
End: 2022-01-06
Payer: COMMERCIAL

## 2022-01-06 DIAGNOSIS — G56.03 BILATERAL CARPAL TUNNEL SYNDROME: ICD-10-CM

## 2022-01-06 DIAGNOSIS — Z98.1 S/P CERVICAL SPINAL FUSION: Primary | ICD-10-CM

## 2022-01-06 PROCEDURE — 97112 NEUROMUSCULAR REEDUCATION: CPT

## 2022-01-06 PROCEDURE — 97110 THERAPEUTIC EXERCISES: CPT

## 2022-01-06 PROCEDURE — 97140 MANUAL THERAPY 1/> REGIONS: CPT

## 2022-01-06 PROCEDURE — 97116 GAIT TRAINING THERAPY: CPT

## 2022-01-06 NOTE — PROGRESS NOTES
Daily Note     Today's date: 2022  Patient name: Bibi Cartagena  : 1960  MRN: [id number]  Referring provider: Hari Eng  Dx:   Encounter Diagnosis     ICD-10-CM    1  S/P cervical spinal fusion  Z98 1    2  Bilateral carpal tunnel syndrome  G56 03        Start Time: 1800  Stop Time: 1845  Total time in clinic (min): 45 minutes    Subjective: Patient states, "I was really tired after last visit"  No complaints of neck pain prior to therapy  Patient had 4/10 R and 2/10 L wrist symptoms  Hands felt better after therapy  Objective: See treatment diary below    Assessment: Tolerated treatment well  Patient showed good endurance throughout session with min LOB  During Biodex activity increase difficulty noted with EC - Increased posterior sway noted  Good eccentric control noted during STS  Patient demonstrated fatigue post treatment, exhibited good technique with therapeutic exercises and would benefit from continued PT  Plan: Continue per plan of care        Precautions: asthma, anemia, depression, DM II, HTN  Dx: s/p C4-7 fusion 8/3/21, B CTS    Manuals           Graston B carpal tunnel 3 min ea 4 min ea 4min ea          Manual medial nerve glides             B UT Graston 6 min                         Neuro Re-Ed             Seated c/s retraction 1x15  1x20                       Seated c/s retraction with rotation 1x15  1x20          DNF 3"x5  3"x10                                                              BioDex FT EC L12  60"x2 L12  60"x2 L12  60"x2          BioDex FT EO head turns             BioDex SLS EO L=S  60"x2 L=S  60"x2 L=S  60"x2                       Ther Ex             Median nerve glides HEP            Carpal tunnel glides HEP            STS 2x10 2x10 2x10                                                                           Ther Activity                                       Gait Training             Gait training for endurance 700 ft 500 ft 700 ft Step-ups/  downs 8"/  2x10 ea 8"  2x10 ea 8"  2x10 ea          Obstacle course: cone weaving, low hurdles, tandem walking nv 400 ft NV          Modalities

## 2022-01-11 ENCOUNTER — OFFICE VISIT (OUTPATIENT)
Dept: PHYSICAL THERAPY | Facility: CLINIC | Age: 62
End: 2022-01-11
Payer: COMMERCIAL

## 2022-01-11 DIAGNOSIS — Z98.1 S/P CERVICAL SPINAL FUSION: Primary | ICD-10-CM

## 2022-01-11 DIAGNOSIS — G56.03 BILATERAL CARPAL TUNNEL SYNDROME: ICD-10-CM

## 2022-01-11 PROCEDURE — 97140 MANUAL THERAPY 1/> REGIONS: CPT

## 2022-01-11 PROCEDURE — 97116 GAIT TRAINING THERAPY: CPT

## 2022-01-11 PROCEDURE — 97110 THERAPEUTIC EXERCISES: CPT

## 2022-01-11 PROCEDURE — 97112 NEUROMUSCULAR REEDUCATION: CPT

## 2022-01-11 NOTE — PROGRESS NOTES
Daily Note     Today's date: 2022  Patient name: Carina Mancilla  : 1960  MRN: [id number]  Referring provider: Donis Valera  Dx:   Encounter Diagnosis     ICD-10-CM    1  S/P cervical spinal fusion  Z98 1    2  Bilateral carpal tunnel syndrome  G56 03        Start Time: 1800  Stop Time: 1850  Total time in clinic (min): 50 minutes    Subjective: Patient states, "I have no neck pain  The wrist feel better after therapy but it doesn't last long  I believe my balance is getting better"  Objective: See treatment diary below    Assessment: Tolerated treatment well  Patient demonstrated fatigue post treatment, exhibited good technique with therapeutic exercises and would benefit from continued PT  Plan: Continue per plan of care        Precautions: asthma, anemia, depression, DM II, HTN  Dx: s/p C4-7 fusion 8/3/21, B CTS    Manuals          Graston B carpal tunnel 3 min ea 4 min ea 4min ea 4 min ea         Manual medial nerve glides             B UT Gr[place] 6 min                         Neuro Re-Ed             Seated c/s retraction 1x15  1x20 1x20                      Seated c/s retraction with rotation 1x15  1x20 1x20         DNF 3"x5  3"x10                                                              BioDex FT EC L12  60"x2 L12  60"x2 L12  60"x2 L12  60"x2         BioDex FT EO head turns             BioDex SLS EO L=S  60"x2 L=S  60"x2 L=S  60"x2 L=S  60"x2                      Ther Ex             Median nerve glides HEP            Carpal tunnel glides HEP            STS 2x10 2x10 2x10 2x10                                                                          Ther Activity                                       Gait Training             Gait training for endurance 700 ft 500 ft 700 ft 700 ft         Step-ups/  downs 8"/  2x10 ea 8"  2x10 ea 8"  2x10 ea 8"  2x10 ea         Obstacle course: cone weaving, low hurdles, tandem walking nv 400 ft  ft         Modalities

## 2022-01-13 ENCOUNTER — OFFICE VISIT (OUTPATIENT)
Dept: PHYSICAL THERAPY | Facility: CLINIC | Age: 62
End: 2022-01-13
Payer: COMMERCIAL

## 2022-01-13 DIAGNOSIS — Z98.1 S/P CERVICAL SPINAL FUSION: ICD-10-CM

## 2022-01-13 DIAGNOSIS — G56.03 BILATERAL CARPAL TUNNEL SYNDROME: Primary | ICD-10-CM

## 2022-01-13 PROCEDURE — 97116 GAIT TRAINING THERAPY: CPT | Performed by: PHYSICAL THERAPIST

## 2022-01-13 PROCEDURE — 97140 MANUAL THERAPY 1/> REGIONS: CPT | Performed by: PHYSICAL THERAPIST

## 2022-01-13 PROCEDURE — 97112 NEUROMUSCULAR REEDUCATION: CPT | Performed by: PHYSICAL THERAPIST

## 2022-01-13 PROCEDURE — 97110 THERAPEUTIC EXERCISES: CPT | Performed by: PHYSICAL THERAPIST

## 2022-01-13 NOTE — PROGRESS NOTES
Daily Note     Today's date: 2022  Patient name: Milla Leslie  : 1960  MRN: [id number]  Referring provider: Alia Hathaway PA-C  Dx:   Encounter Diagnosis     ICD-10-CM    1  Bilateral carpal tunnel syndrome  G56 03    2  S/P cervical spinal fusion  Z98 1                   Subjective: Pt reports a resolution of neck pain, 2/10 L and 4/10 R hand pain, and improved functional endurance  Objective: See treatment diary below    Assessment: Pt demonstrated improved ambulation tolerance  Plan: Continue per plan of care        Precautions: asthma, anemia, depression, DM II, HTN  Dx: s/p C4-7 fusion 8/3/21, B CTS    Manuals         Mahad B carpal tunnel 3 min ea 4 min ea 4min ea 4 min ea 4 min ea        Manual medial nerve glides             B UT [place] 6 min                         Neuro Re-Ed             Seated c/s retraction 1x15  1x20 1x20 1x15                     Seated c/s retraction with rotation 1x15  1x20 1x20 1x15        DNF 3"x5  3"x10  4"x10                                                            BioDex FT EC L12  60"x2 L12  60"x2 L12  60"x2 L12  60"x2 L12  60"x2        BioDex FT EO head turns             BioDex SLS EO L=S  60"x2 L=S  60"x2 L=S  60"x2 L=S  60"x2 L=S  60"x2                     Ther Ex             Median nerve glides HEP            Carpal tunnel glides HEP            STS 2x10 2x10 2x10 2x10 2x10                                                                         Ther Activity                                       Gait Training             Gait training for endurance 700 ft 500 ft 700 ft 700 ft 800 ft        Step-ups/  downs 8"/  2x10 ea 8"  2x10 ea 8"  2x10 ea 8"  2x10 ea 8"/  2x10 ea        Obstacle course: cone weaving, low hurdles, tandem walking nv 400 ft  ft 200 ft        Modalities

## 2022-01-18 ENCOUNTER — OFFICE VISIT (OUTPATIENT)
Dept: PHYSICAL THERAPY | Facility: CLINIC | Age: 62
End: 2022-01-18
Payer: COMMERCIAL

## 2022-01-18 DIAGNOSIS — Z98.1 S/P CERVICAL SPINAL FUSION: ICD-10-CM

## 2022-01-18 DIAGNOSIS — G56.03 BILATERAL CARPAL TUNNEL SYNDROME: Primary | ICD-10-CM

## 2022-01-18 PROCEDURE — 97140 MANUAL THERAPY 1/> REGIONS: CPT

## 2022-01-18 PROCEDURE — 97112 NEUROMUSCULAR REEDUCATION: CPT

## 2022-01-18 PROCEDURE — 97116 GAIT TRAINING THERAPY: CPT

## 2022-01-18 PROCEDURE — 97110 THERAPEUTIC EXERCISES: CPT

## 2022-01-18 NOTE — PROGRESS NOTES
Daily Note     Today's date: 2022  Patient name: Todd Varela  : 1960  MRN: [id number]  Referring provider: Liam Harp PA-C  Dx:   Encounter Diagnosis     ICD-10-CM    1  Bilateral carpal tunnel syndrome  G56 03    2  S/P cervical spinal fusion  Z98 1        Start Time: 1750  Stop Time: 1845  Total time in clinic (min): 55 minutes    Subjective: Patient notes no change in her B/L wrist  Better ambulation tolerance and improved balance at times  Objective: See treatment diary below    Assessment: Patient fatigued quickly to the current PT POC today vs other days  No LOB t/o therapy  Plan: Continue per plan of care        Precautions: asthma, anemia, depression, DM II, HTN  Dx: s/p C4-7 fusion 8/3/21, B CTS    Manuals        Mhaad B carpal tunnel 3 min ea 4 min ea 4min ea 4 min ea 4 min ea 4 min ea       Manual medial nerve glides             B UT Graston 6 min                         Neuro Re-Ed             Seated c/s retraction 1x15  1x20 1x20 1x15 1x15                    Seated c/s retraction with rotation 1x15  1x20 1x20 1x15 1x15       DNF 3"x5  3"x10  4"x10 4"x10                                                           BioDex FT EC L12  60"x2 L12  60"x2 L12  60"x2 L12  60"x2 L12  60"x2 L12  60"x2       BioDex FT EO head turns             BioDex SLS EO L=S  60"x2 L=S  60"x2 L=S  60"x2 L=S  60"x2 L=S  60"x2 L=S  60"x2                    Ther Ex             Median nerve glides HEP            Carpal tunnel glides HEP            STS 2x10 2x10 2x10 2x10 2x10 2x10                                                                        Ther Activity                                       Gait Training             Gait training for endurance 700 ft 500 ft 700 ft 700 ft 800 ft 800 ft       Step-ups/  downs 8"/  2x10 ea 8"  2x10 ea 8"  2x10 ea 8"  2x10 ea 8"/  2x10 ea 8"/  2x10 ea       Obstacle course: cone weaving, low hurdles, tandem walking nv 400 ft  ft 200 ft 200 ft       Modalities

## 2022-01-20 ENCOUNTER — OFFICE VISIT (OUTPATIENT)
Dept: PHYSICAL THERAPY | Facility: CLINIC | Age: 62
End: 2022-01-20
Payer: COMMERCIAL

## 2022-01-20 DIAGNOSIS — Z98.1 S/P CERVICAL SPINAL FUSION: ICD-10-CM

## 2022-01-20 DIAGNOSIS — G56.03 BILATERAL CARPAL TUNNEL SYNDROME: Primary | ICD-10-CM

## 2022-01-20 PROCEDURE — 97110 THERAPEUTIC EXERCISES: CPT | Performed by: PHYSICAL THERAPIST

## 2022-01-20 PROCEDURE — 97116 GAIT TRAINING THERAPY: CPT | Performed by: PHYSICAL THERAPIST

## 2022-01-20 PROCEDURE — 97140 MANUAL THERAPY 1/> REGIONS: CPT | Performed by: PHYSICAL THERAPIST

## 2022-01-20 PROCEDURE — 97112 NEUROMUSCULAR REEDUCATION: CPT | Performed by: PHYSICAL THERAPIST

## 2022-01-21 NOTE — PROGRESS NOTES
Daily Note     Today's date: 2022  Patient name: Elizabeth Dunn  : 1960  MRN: [id number]  Referring provider: Betito Olmos PA-C  Dx:   Encounter Diagnosis     ICD-10-CM    1  Bilateral carpal tunnel syndrome  G56 03    2  S/P cervical spinal fusion  Z98 1                   Subjective: Pt reports improved functional endurance  Objective: See treatment diary below    Assessment: Pt demonstrated improved ambulation tolerance  Plan: Continue per plan of care        Precautions: asthma, anemia, depression, DM II, HTN  Dx: s/p C4-7 fusion 8/3/21, B CTS    Manuals       Graston B carpal tunnel 3 min ea 4 min ea 4min ea 4 min ea 4 min ea 4 min ea 4 min ea      Manual medial nerve glides             B UT Graston 6 min                         Neuro Re-Ed             Seated c/s retraction 1x15  1x20 1x20 1x15 1x15 1x15                   Seated c/s retraction with rotation 1x15  1x20 1x20 1x15 1x15 1x15      DNF 3"x5  3"x10  4"x10 4"x10 4"x10 5"x10                                                         BioDex FT EC L12  60"x2 L12  60"x2 L12  60"x2 L12  60"x2 L12  60"x2 L12  60"x2 L12  60"x2      BioDex FT EO head turns             BioDex SLS EO L=S  60"x2 L=S  60"x2 L=S  60"x2 L=S  60"x2 L=S  60"x2 L=S  60"x2 L=S  60"x2                   Ther Ex             Median nerve glides HEP            Carpal tunnel glides HEP            STS 2x10 2x10 2x10 2x10 2x10 2x10 2x10                                                                       Ther Activity                                       Gait Training             Gait training for endurance 700 ft 500 ft 700 ft 700 ft 800 ft 800 ft 900 ft      Step-ups/  downs 8"/  2x10 ea 8"  2x10 ea 8"  2x10 ea 8"  2x10 ea 8"/  2x10 ea 8"/  2x10 ea 8"/  2x10 ea      Obstacle course: cone weaving, low hurdles, tandem walking nv 400 ft  ft 200 ft 200 ft np      Modalities

## 2022-01-24 ENCOUNTER — OFFICE VISIT (OUTPATIENT)
Dept: OBGYN CLINIC | Facility: CLINIC | Age: 62
End: 2022-01-24
Payer: COMMERCIAL

## 2022-01-24 ENCOUNTER — APPOINTMENT (OUTPATIENT)
Dept: RADIOLOGY | Facility: CLINIC | Age: 62
End: 2022-01-24
Payer: COMMERCIAL

## 2022-01-24 VITALS
WEIGHT: 212 LBS | SYSTOLIC BLOOD PRESSURE: 151 MMHG | HEIGHT: 63 IN | HEART RATE: 80 BPM | BODY MASS INDEX: 37.56 KG/M2 | DIASTOLIC BLOOD PRESSURE: 77 MMHG

## 2022-01-24 DIAGNOSIS — M54.2 NECK PAIN: Primary | ICD-10-CM

## 2022-01-24 DIAGNOSIS — Z98.1 S/P CERVICAL SPINAL FUSION: ICD-10-CM

## 2022-01-24 DIAGNOSIS — M54.2 NECK PAIN: ICD-10-CM

## 2022-01-24 PROCEDURE — 72040 X-RAY EXAM NECK SPINE 2-3 VW: CPT

## 2022-01-24 PROCEDURE — 99213 OFFICE O/P EST LOW 20 MIN: CPT | Performed by: ORTHOPAEDIC SURGERY

## 2022-01-24 NOTE — PROGRESS NOTES
[address] Bill Yang MD  [address] [facility] [address]  [phone]    HISTORY OF PRESENT ILLNESS:  Patient is a 81-year-old female who presents for follow-up of anterior cervical fusion with diskectomy of C4-5, C5-6, C6-7 performed by Dr Yaritza Solitario on 8/3/2021  Last seen in the office on 10/27/2021 where patient was advised to discontinue use of collar and start physical therapy  Patient states she has been attending physical therapy and feels she is improving  Patient feels occasional pain in her neck when turning her head to the left side, which is relieved with tylenol  Patient continues to have numbness in first 3 digits of bilateral hands  Patient sees Dr Vicki Neville for carpal tunnel syndrome         ALLERGIES:   Allergies   Allergen Reactions    Bee Venom Swelling     Local swelling at sting site    Clarithromycin Itching    Depakote Er [Divalproex Sodium Er]      pancreatitis    Shellfish-Derived Products - Food Allergy Other (See Comments)     Ears and cheeks got red    Tanzeum [Albiglutide]      pancreatitis    Carbamazepine Other (See Comments)     Reaction Date:Unknown    Lamotrigine Rash       MEDICATIONS:    Current Outpatient Medications:     ARIPiprazole (ABILIFY) 10 mg tablet, Take 1 tablet (10 mg total) by mouth every evening, Disp: 90 tablet, Rfl: 2    atorvastatin (LIPITOR) 20 mg tablet, Take 1 tablet (20 mg total) by mouth daily, Disp: 90 tablet, Rfl: 1    Cholecalciferol 4000 units CAPS, Take 1 capsule (4,000 Units total) by mouth daily (Patient taking differently: Take 1,000 Units by mouth daily ), Disp: , Rfl:     fenofibrate (TRICOR) 145 mg tablet, Take 1 tablet (145 mg total) by mouth daily, Disp: 90 tablet, Rfl: 3    FLUoxetine (PROzac) 20 mg capsule, Take 1 capsule (20 mg total) by mouth daily Take with the 40 mg cap for a total dose of 60 mg daily, Disp: 90 capsule, Rfl: 1    FLUoxetine (PROzac) 40 MG capsule, Take 1 capsule (40 mg total) by mouth daily Take with the 20 mg cap for a total dose of 60 mg daily, Disp: 90 capsule, Rfl: 1    glipiZIDE (GLUCOTROL) 5 mg tablet, Take 1 tablet (5 mg total) by mouth daily with breakfast, Disp: 90 tablet, Rfl: 3    lisinopril (ZESTRIL) 5 mg tablet, TAKE 1 TABLET BY MOUTH EVERY DAY, Disp: 90 tablet, Rfl: 2    magnesium oxide (MAG-OX) 400 mg tablet, TAKE 1 TABLET BY MOUTH EVERY DAY, Disp: 30 tablet, Rfl: 1    metFORMIN (GLUCOPHAGE) 1000 MG tablet, Take 1 tablet (1,000 mg total) by mouth 2 (two) times a day with meals, Disp: 180 tablet, Rfl: 1    Omega 3 1200 MG CAPS, Take 1,200 mg by mouth 2 (two) times a day  , Disp: , Rfl:     omeprazole (PriLOSEC) 40 MG capsule, Take 1 capsule (40 mg total) by mouth daily, Disp: 90 capsule, Rfl: 1     PAST MEDICAL HISTORY:   Past Medical History:   Diagnosis Date    Abdominal pain     started 7/21/16- found something on the pancreas    Abnormal weight loss     last assessed [id number]    Acute sinusitis     Allergic rhinitis     last assessed [id number]    Anemia     Asthma     Cardiac murmur     Carpal tunnel syndrome     Cellulitis     Cervical disc disease     Colon polyp     CPAP (continuous positive airway pressure) dependence     no longer used    Degeneration of thoracic intervertebral disc     Degenerative arthritis of knee     Diabetes mellitus (HCC)     Diverticulosis     Esophagitis, reflux     last assessed [id number]    Excessive sweating     Fatty liver     Frequent falls     Gait disturbance     GERD (gastroesophageal reflux disease)     Granuloma annulare     Head injury     Hyperlipidemia     Hypertension     Hypertensive heart disease     Hypoglycemia     last assessed [id number]    Kidney stone     Leg length discrepancy     Leucocytosis     Lumbar stress fracture     Multiple thyroid nodules     Neuropathy     Obesity     last assessed [id number]    Paronychia of toe     Patellofemoral dysfunction     Pneumonia  Postmenopausal atrophic vaginitis     Prepatellar bursitis     Sebaceous cyst     last assessed [id number]    Sleep apnea     no CPAP used    Suicide attempt ([facility] [address] [facility]     Tinea corporis     Type 2 diabetes mellitus ([facility] [address] )     cont meds and monitor BS;  last assessed [id number]    Urinary frequency     Vertigo     Vitamin D deficiency     Dia-Parkinson-White syndrome        PAST SURGICAL HISTORY:  Past Surgical History:   Procedure Laterality Date    CERVICAL FUSION N/A 8/3/2021    Procedure: FUSION CERVICAL ANTERIOR W DISCECTOMY C4-5, C5-6, C6-7 with allograft and neuromonitoring  case length 3 hours; Surgeon: Bonny Freedman MD;  Location:  MAIN OR;  Service: Orthopedics    COLONOSCOPY      ESOPHAGOGASTRODUODENOSCOPY N/A 8/8/2016    Procedure: ESOPHAGOGASTRODUODENOSCOPY (EGD); Surgeon: Nova King MD;  Location: [facility] GI LAB; Service:     NE ESOPHAGOGASTRODUODENOSCOPY TRANSORAL DIAGNOSTIC N/A 3/28/2019    Procedure: ESOPHAGOGASTRODUODENOSCOPY (EGD); Surgeon: Nova King MD;  Location: [facility]; Service: Gastroenterology    TONSILLECTOMY      TOOTH EXTRACTION      wisdom teeth removed       SOCIAL HISTORY:  Social History     Tobacco Use    Smoking status: Never Smoker    Smokeless tobacco: Never Used   Vaping Use    Vaping Use: Never used   Substance Use Topics    Alcohol use: Not Currently     Alcohol/week: 1 0 standard drink     Types: 1 Glasses of wine per week     Comment: rarely    Drug use: No       ROS:  Review of Systems   Constitutional: Negative for chills, diaphoresis and fever  HENT: Negative for congestion, drooling, ear discharge, facial swelling, nosebleeds, rhinorrhea, sneezing, trouble swallowing and voice change  Eyes: Negative for discharge, redness and itching  Respiratory: Negative for cough, choking, shortness of breath, wheezing and stridor  Cardiovascular: Negative for chest pain and palpitations     Gastrointestinal: Negative for abdominal pain, constipation, diarrhea and vomiting  Endocrine: Negative for cold intolerance and heat intolerance  Genitourinary: Negative for dysuria and flank pain  Musculoskeletal:        See HPI and PE  Skin: Negative for color change and rash  Neurological: Negative for dizziness, syncope and facial asymmetry  Psychiatric/Behavioral: Negative for agitation, self-injury and suicidal ideas  PHYSICAL EXAM:  Patient is a 62-year-old female sitting comfortably in no acute distress  Anterior incision is well healed without signs of infection  Sagittal/coronal balance intact  No tenderness to palpation along cervical, thoracic, or lumbar spine  5/5 motor strength with normal sensation in bilateral upper extremities  Negative Jade  No signs of hyperreflexia  Rapid alternating movements are close to normal      RADIOGRAPHIC STUDIES:  1  X-ray, C-spine, 10/27/2021:  Status post anterior cervical diskectomy and fusion at C4-5 C5-6 and C6-7  No hardware complication      2  MRI, C-spine, 6/16/2021:   Multilevel cervical degenerative disc disease  Disc protrusion spinal cord compression involving multiple levels, at C6-7      3  Xrays, C-spine, 1/24/2021: Status post anterior cervical diskectomy and fusion at C4-5 C5-6 and C6-7  No hardware complication  I compared these films with prior films on 01/24/2022 and so no significant difference between the films  There was evidence of plate disease at the superior level C3-4  ASSESSMENT:  Problem List Items Addressed This Visit        Other    S/P cervical spinal fusion      Other Visit Diagnoses     Neck pain    -  Primary    Relevant Orders    XR spine cervical 2 or 3 vw injury            PLAN: Patient is a 62-year-old female with cervical myelopathy status post anterior cervical fusion with diskectomy of C4-5, C5-6, C6-7 performed by Dr Perry Hernandez on 8/3/2021       Xrays of cervical spine were obtained and personally reviewed in the office today showing orthopedic hardware is stable in alignment compared to xrays of cervical spine in 10/27/2021  Patient is doing well overall  Patient may follow-up in August 2022 for 1 year post-op follow-up  New xrays of cervical spine will be obtained at that time  Patient will follow-up every six months after that  We will order an MRI of cervical spine when patient is three years out from surgery          Scribe Attestation    I,:  Kenney Faulkner PA-C am acting as a scribe while in the presence of the attending physician :       I,:  Zoe Quezada MD personally performed the services described in this documentation    as scribed in my presence :

## 2022-01-25 ENCOUNTER — OFFICE VISIT (OUTPATIENT)
Dept: PHYSICAL THERAPY | Facility: CLINIC | Age: 62
End: 2022-01-25
Payer: COMMERCIAL

## 2022-01-25 DIAGNOSIS — G56.03 BILATERAL CARPAL TUNNEL SYNDROME: Primary | ICD-10-CM

## 2022-01-25 DIAGNOSIS — Z98.1 S/P CERVICAL SPINAL FUSION: ICD-10-CM

## 2022-01-25 PROCEDURE — 97112 NEUROMUSCULAR REEDUCATION: CPT

## 2022-01-25 PROCEDURE — 97140 MANUAL THERAPY 1/> REGIONS: CPT

## 2022-01-25 PROCEDURE — 97110 THERAPEUTIC EXERCISES: CPT

## 2022-01-25 PROCEDURE — 97530 THERAPEUTIC ACTIVITIES: CPT

## 2022-01-25 NOTE — PROGRESS NOTES
Daily Note     Today's date: 2022  Patient name: Kristin Dolan  : 1960  MRN: [id number]  Referring provider: April Canela PA-C  Dx:   Encounter Diagnosis     ICD-10-CM    1  Bilateral carpal tunnel syndrome  G56 03    2  S/P cervical spinal fusion  Z98 1        Start Time: 1800  Stop Time: 1836  Total time in clinic (min): 36 minutes    Subjective: Pt reports she is feeling really good and was cleared to start driving again  Objective: See treatment diary below      Assessment: Pt did well with all interventions only having fatigue with step ups and ambulation for distance however may have been able to perform more repetitions/laps  Pt demonstrates decent balance on the biodex and during step ups as she did not need her UE for assistance with exception of a few instances  Patient demonstrated fatigue post treatment, exhibited good technique with therapeutic exercises and would benefit from continued PT      Plan: Continue per plan of care        Precautions: asthma, anemia, depression, DM II, HTN  Dx: s/p C4-7 fusion 8/3/21, B CTS    Manuals      [place] B carpal tunnel 3 min ea 4 min ea 4min ea 4 min ea 4 min ea 4 min ea 4 min ea 4 min ea     Manual medial nerve glides             B UT [place] 6 min                         Neuro Re-Ed             Seated c/s retraction 1x15  1x20 1x20 1x15 1x15 1x15 1x15                  Seated c/s retraction with rotation 1x15  1x20 1x20 1x15 1x15 1x15 1x15     DNF 3"x5  3"x10  4"x10 4"x10 4"x10 5"x10                                                         BioDex FT EC L12  60"x2 L12  60"x2 L12  60"x2 L12  60"x2 L12  60"x2 L12  60"x2 L12  60"x2 L12 60"x2     BioDex FT EO head turns             BioDex SLS EO L=S  60"x2 L=S  60"x2 L=S  60"x2 L=S  60"x2 L=S  60"x2 L=S  60"x2 L=S  60"x2 L=S 60"x2                  Ther Ex             Median nerve glides HEP            Carpal tunnel glides HEP            STS 2x10 2x10 2x10 2x10 2x10 2x10 2x10 2x10                                                                      Ther Activity                                       Gait Training             Gait training for endurance 700 ft 500 ft 700 ft 700 ft 800 ft 800 ft 900 ft 900 ft     Step-ups/  downs 8"/  2x10 ea 8"  2x10 ea 8"  2x10 ea 8"  2x10 ea 8"/  2x10 ea 8"/  2x10 ea 8"/  2x10 ea 8"/ 2x10 ea     Obstacle course: cone weaving, low hurdles, tandem walking nv 400 ft  ft 200 ft 200 ft np      Modalities

## 2022-01-27 ENCOUNTER — OFFICE VISIT (OUTPATIENT)
Dept: PHYSICAL THERAPY | Facility: CLINIC | Age: 62
End: 2022-01-27
Payer: COMMERCIAL

## 2022-01-27 ENCOUNTER — OFFICE VISIT (OUTPATIENT)
Dept: OBGYN CLINIC | Facility: CLINIC | Age: 62
End: 2022-01-27
Payer: COMMERCIAL

## 2022-01-27 VITALS
HEART RATE: 78 BPM | WEIGHT: 209 LBS | SYSTOLIC BLOOD PRESSURE: 129 MMHG | HEIGHT: 63 IN | BODY MASS INDEX: 37.03 KG/M2 | RESPIRATION RATE: 17 BRPM | DIASTOLIC BLOOD PRESSURE: 73 MMHG

## 2022-01-27 DIAGNOSIS — G56.03 BILATERAL CARPAL TUNNEL SYNDROME: Primary | ICD-10-CM

## 2022-01-27 DIAGNOSIS — Z98.1 S/P CERVICAL SPINAL FUSION: ICD-10-CM

## 2022-01-27 DIAGNOSIS — Z01.812 PRE-PROCEDURE LAB EXAM: ICD-10-CM

## 2022-01-27 DIAGNOSIS — E11.9 CONTROLLED TYPE 2 DIABETES MELLITUS WITHOUT COMPLICATION, WITHOUT LONG-TERM CURRENT USE OF INSULIN (HCC): ICD-10-CM

## 2022-01-27 DIAGNOSIS — G56.01 CARPAL TUNNEL SYNDROME ON RIGHT: ICD-10-CM

## 2022-01-27 PROCEDURE — 99244 OFF/OP CNSLTJ NEW/EST MOD 40: CPT | Performed by: SURGERY

## 2022-01-27 PROCEDURE — 3078F DIAST BP <80 MM HG: CPT | Performed by: SURGERY

## 2022-01-27 PROCEDURE — 3074F SYST BP LT 130 MM HG: CPT | Performed by: SURGERY

## 2022-01-27 PROCEDURE — 1036F TOBACCO NON-USER: CPT | Performed by: SURGERY

## 2022-01-27 PROCEDURE — 97140 MANUAL THERAPY 1/> REGIONS: CPT

## 2022-01-27 PROCEDURE — 97112 NEUROMUSCULAR REEDUCATION: CPT

## 2022-01-27 PROCEDURE — 97116 GAIT TRAINING THERAPY: CPT

## 2022-01-27 PROCEDURE — 3008F BODY MASS INDEX DOCD: CPT | Performed by: SURGERY

## 2022-01-27 RX ORDER — CHLORHEXIDINE GLUCONATE 0.12 MG/ML
15 RINSE ORAL ONCE
Status: CANCELLED | OUTPATIENT
Start: 2022-01-27 | End: 2022-01-27

## 2022-01-27 NOTE — PROGRESS NOTES
ASSESSMENT/PLAN:      62 y o  female with bilateral carpal tunnel syndrome  Ultrasound and EMG results were reviewed in the office today  The etiology of carpal tunnel syndrome was discussed along with treatment options  Right carpal tunnel release was discussed at length including risks and benefits  Risks of surgery consist of but not limited to bleeding, infection, stiffness, pillar pain, injury to nerves and surrounding structures, incomplete resolution of paraesthesia's, etc  Sisi Willettyers elected to proceed with a right carpal tunnel release and informed surgical consent was signed  Numbness/tingling may never completely resolve as some of this can still be coming from her cervical spine as well as possible due to being a diabetic  Post operative instructions/expectations were reviewed and provided in AVS  Lab work and EKG will be obtained prior to surgical intervention  Follow up in the office 10-14 days after surgery for suture removal      The patient verbalized understanding of exam findings and treatment plan  We engaged in the shared decision-making process and treatment options were discussed at length with the patient  Surgical and conservative management discussed today along with risks and benefits  Diagnoses and all orders for this visit:    Bilateral carpal tunnel syndrome    S/P cervical spinal fusion      Open Carpal Tunnel Release: The anatomy and physiology of carpal tunnel syndrome was discussed with the patient today  Increase pressure localized under the transverse carpal ligament can cause pain, numbness, tingling, or dysesthesias within the median nerve distribution as well as feelings of fatigue, clumsiness, or awkwardness  These symptoms typically occur at night and worse in the morning upon waking  Eventually, untreated carpal tunnel syndrome can result in weakness and permanent loss of muscle within the thenar compartment of the hand    Treatment options were discussed with the patient  Conservative treatment includes nocturnal resting splints to keep the nerve in a neutral position, ergonomic changes within the work or home environment, activity modification, and tendon gliding exercises  Vitamin B6 one tablet daily over the counter may helpful to reduce symptoms  Steroid injections within the carpal canal can help a majority of patients, however this is often self-limited in a majority of patients  Surgical intervention to divide the transverse carpal ligament typically results in a long-lasting relief of the patient's complaints, with the recurrence rate of less than 1%  The patient has elected to undergo an open carpal tunnel release  The palmar incision technique was discussed in the office with the patient today  In the postoperative period, light activities are allowed immediately, driving is allowed when narcotic medication has stopped, and the bandages may be removed and incision may get wet after 2 days  Heavy activities (lifting more than approximately 10 pounds) will be allowed after follow up appointment in 1-2 weeks  While night symptoms (waking from sleep, pain, and discomfort in the hands) generally improves rapidly, the numbness and tingling as well as the strength will slowly improve over weeks to months depending on the chronicity and severity of the carpal tunnel syndrome  Pillar pain and scar discomfort were discussed with the patient which are self-limiting conditions  The risks of bleeding and infection from the surgery are less than 1%  Risk of recurrence is approximately 0 5%  The risks of nerve injury or nerve damage or damage to the blood vessels is approximately 1 in 1200  The patient has an understanding of the above mentioned discussion  The risks and benefits of the procedure were explained to the patient, which include, but are not limited to: Bleeding, infection, recurrence, pain, scar, damage to tendons, damage to nerves, and damage to blood vessels, failure to give desired results and complications related to anesthesia  These risks, along with alternative conservative treatment options, and postoperative protocols were voiced back and understood by the patient  All questions were answered to the patient's satisfaction  The patient agrees to comply with a standard postoperative protocol, and is willing to proceed  Education was provided via written and auditory forms  There were no barriers to learning  Written handouts regarding wound care, incision and scar care, and general preoperative information was provided to the patient  Prior to surgery, the patient may be requested to stop all anti-inflammatory medications  Prophylactic aspirin, Plavix, and Coumadin may be allowed to be continued  Medications including vitamin E , ginkgo, and fish oil are requested to be stopped approximately one week prior to surgery  Follow Up:  Return for post op, 10-14 days after sx   To Do Next Visit:  Re-evaluation of current issue and Sutures out    ____________________________________________________________________________________________________________________________________________      CHIEF COMPLAINT:  Chief Complaint   Patient presents with    Left Hand - Pain, Numbness, Tingling    Right Hand - Pain, Numbness, Tingling       SUBJECTIVE:  Todd Varela is a 62y o  year old RHD female who presents to the office today for bilateral hand numbness/tingling  I am seeing Vj in consultation at the request of Kevin Tony PA-C  She notes mostly constant numbness/tingling to her radial 3 digits bilaterally  This has been ongoing for aprox  1 year  She underwent ACDF C4-7 on 8/3/21, with Dr Rayray Navarro  She notes this did resolve her arm numbness as well as some hand numbness  She notes continued numbness to her radial 3 digits bilaterally  She is bracing at night, without significant improvement   Numbness/tingling is not waking her from sleep at night  She crochets and notes difficulty holding the needle  I have personally reviewed all the relevant PMH, PSH, SH, FH, Medications and allergies       PAST MEDICAL HISTORY:  Past Medical History:   Diagnosis Date    Abdominal pain     started 7/21/16- found something on the pancreas    Abnormal weight loss     last assessed [id number]    Acute sinusitis     Allergic rhinitis     last assessed [id number]    Anemia     Asthma     Cardiac murmur     Carpal tunnel syndrome     Cellulitis     Cervical disc disease     Colon polyp     CPAP (continuous positive airway pressure) dependence     no longer used    Degeneration of thoracic intervertebral disc     Degenerative arthritis of knee     Diabetes mellitus (HCC)     Diverticulosis     Esophagitis, reflux     last assessed [id number]    Excessive sweating     Fatty liver     Frequent falls     Gait disturbance     GERD (gastroesophageal reflux disease)     Granuloma annulare     Head injury     Hyperlipidemia     Hypertension     Hypertensive heart disease     Hypoglycemia     last assessed [id number]    Kidney stone     Leg length discrepancy     Leucocytosis     Lumbar stress fracture     Multiple thyroid nodules     Neuropathy     Obesity     last assessed [id number]    Paronychia of toe     Patellofemoral dysfunction     Pneumonia     Postmenopausal atrophic vaginitis     Prepatellar bursitis     Sebaceous cyst     last assessed [id number]    Sleep apnea     no CPAP used    Suicide attempt ([address] )     Tinea corporis     Type 2 diabetes mellitus (HCC)     cont meds and monitor BS;  last assessed [id number]    Urinary frequency     Vertigo     Vitamin D deficiency     Dia-Parkinson-White syndrome        PAST SURGICAL HISTORY:  Past Surgical History:   Procedure Laterality Date    CERVICAL FUSION N/A 8/3/2021    Procedure: FUSION CERVICAL ANTERIOR W DISCECTOMY C4-5, C5-6, C6-7 with allograft and neuromonitoring  case length 3 hours; Surgeon: Redd Jacques MD;  Location:  MAIN OR;  Service: Orthopedics    COLONOSCOPY      ESOPHAGOGASTRODUODENOSCOPY N/A 8/8/2016    Procedure: ESOPHAGOGASTRODUODENOSCOPY (EGD); Surgeon: Eunice Adams MD;  Location: [facility] GI LAB; Service:     NV ESOPHAGOGASTRODUODENOSCOPY TRANSORAL DIAGNOSTIC N/A 3/28/2019    Procedure: ESOPHAGOGASTRODUODENOSCOPY (EGD); Surgeon: Eunice Adams MD;  Location[facility];   Service: Gastroenterology    TONSILLECTOMY      TOOTH EXTRACTION      wisdom teeth removed       FAMILY HISTORY:  Family History   Problem Relation Age of Onset   Shravan Calvin Cancer Mother         lung    Heart disease Mother     Coronary artery disease Mother         CABG    Diabetes Mother     Lung cancer Mother 76        smoker    Stroke Mother     Thyroid disease Mother     Other Mother         tobacco use    Parkinsonism Father     Intestinal polyp Father     Bipolar disorder Father     Heart disease Father     Prostate cancer Father 62    Hypertension Sister     Alcohol abuse Sister     Depression Brother     Heart disease Brother     Diabetes Maternal Grandmother     Thyroid disease Maternal Grandmother     Hypertension Family     Suicide Attempts Paternal Aunt     Depression Paternal Aunt     Endometrial cancer Maternal Aunt         unknown age   Shravan Calvin Ovarian cancer Cousin 36        maternal    Heart disease Brother     Seizures Brother        SOCIAL HISTORY:  Social History     Tobacco Use    Smoking status: Never Smoker    Smokeless tobacco: Never Used   Vaping Use    Vaping Use: Never used   Substance Use Topics    Alcohol use: Not Currently     Alcohol/week: 1 0 standard drink     Types: 1 Glasses of wine per week     Comment: rarely    Drug use: No       MEDICATIONS:    Current Outpatient Medications:     ARIPiprazole (ABILIFY) 10 mg tablet, Take 1 tablet (10 mg total) by mouth every evening, Disp: 90 tablet, Rfl: 2    atorvastatin (LIPITOR) 20 mg tablet, Take 1 tablet (20 mg total) by mouth daily, Disp: 90 tablet, Rfl: 1    Cholecalciferol 4000 units CAPS, Take 1 capsule (4,000 Units total) by mouth daily (Patient taking differently: Take 1,000 Units by mouth daily ), Disp: , Rfl:     fenofibrate (TRICOR) 145 mg tablet, Take 1 tablet (145 mg total) by mouth daily, Disp: 90 tablet, Rfl: 3    FLUoxetine (PROzac) 20 mg capsule, Take 1 capsule (20 mg total) by mouth daily Take with the 40 mg cap for a total dose of 60 mg daily, Disp: 90 capsule, Rfl: 1    FLUoxetine (PROzac) 40 MG capsule, Take 1 capsule (40 mg total) by mouth daily Take with the 20 mg cap for a total dose of 60 mg daily, Disp: 90 capsule, Rfl: 1    glipiZIDE (GLUCOTROL) 5 mg tablet, Take 1 tablet (5 mg total) by mouth daily with breakfast, Disp: 90 tablet, Rfl: 3    lisinopril (ZESTRIL) 5 mg tablet, TAKE 1 TABLET BY MOUTH EVERY DAY, Disp: 90 tablet, Rfl: 2    magnesium oxide (MAG-OX) 400 mg tablet, TAKE 1 TABLET BY MOUTH EVERY DAY, Disp: 30 tablet, Rfl: 1    metFORMIN (GLUCOPHAGE) 1000 MG tablet, Take 1 tablet (1,000 mg total) by mouth 2 (two) times a day with meals, Disp: 180 tablet, Rfl: 1    Omega 3 1200 MG CAPS, Take 1,200 mg by mouth 2 (two) times a day  , Disp: , Rfl:     omeprazole (PriLOSEC) 40 MG capsule, Take 1 capsule (40 mg total) by mouth daily, Disp: 90 capsule, Rfl: 1    ALLERGIES:  Allergies   Allergen Reactions    Bee Venom Swelling     Local swelling at sting site    Clarithromycin Itching    Depakote Er [Divalproex Sodium Er]      pancreatitis    Shellfish-Derived Products - Food Allergy Other (See Comments)     Ears and cheeks got red    Tanzeum [Albiglutide]      pancreatitis    Carbamazepine Other (See Comments)     Reaction Date:Unknown    Lamotrigine Rash       REVIEW OF SYSTEMS:  Review of Systems   Constitutional: Negative for chills, fever and unexpected weight change  HENT: Negative for hearing loss, nosebleeds and sore throat      Eyes: Negative for pain, redness and visual disturbance  Respiratory: Negative for cough, shortness of breath and wheezing  Cardiovascular: Negative for chest pain, palpitations and leg swelling  Gastrointestinal: Negative for abdominal pain, nausea and vomiting  Endocrine: Negative for polydipsia and polyuria  Genitourinary: Negative for difficulty urinating and hematuria  Musculoskeletal: Negative for arthralgias, joint swelling and myalgias  Skin: Negative for rash and wound  Neurological: Positive for numbness  Negative for dizziness and headaches  Psychiatric/Behavioral: Negative for decreased concentration, dysphoric mood and suicidal ideas  The patient is not nervous/anxious  VITALS:  Vitals:    01/27/22 0807   BP: 129/73   Pulse: 78   Resp: 17       LABS:  HgA1c:   Lab Results   Component Value Date    HGBA1C 7 2 10/12/2021     BMP:   Lab Results   Component Value Date    GLUCOSE 120 09/06/2015    CALCIUM 9 2 11/17/2021     09/06/2015    K 4 9 11/17/2021    CO2 25 11/17/2021     11/17/2021    BUN 17 11/17/2021    CREATININE 0 66 11/17/2021       _____________________________________________________  PHYSICAL EXAMINATION:  General: well developed and well nourished, alert, oriented times 3 and appears comfortable  Psychiatric: Normal  HEENT: Normocephalic, Atraumatic Trachea Midline, No torticollis  Pulmonary: No audible wheezing or respiratory distress   Cardiovascular: No pitting edema, 2+ radial pulse   Abdominal/GI: abdomen non tender, non distended   Skin: No masses, erythema, lacerations, fluctation, ulcerations  Neurovascular: Sensation Intact to the Median, Ulnar, Radial Nerve, Motor Intact to the Median, Ulnar, Radial Nerve and Pulses Intact  Musculoskeletal: Normal, except as noted in detailed exam and in HPI  MUSCULOSKELETAL EXAMINATION:    Bilateral Carpal Tunnel Exam:    Negative thenar atrophy  Negative phalen's test  Negative carpal tunnel compression   Positive tinels over median nerve at the wrist, more so on the right  APB strength 5/5 bilaterally  Full composite fist bilaterally       ___________________________________________________  STUDIES REVIEWED:  I personally reviewed EMG nerve conduction studies of bilateral wrist which demonstrates bilateral carpal tunnel syndrome left slightly worse than right          PROCEDURES PERFORMED:  Procedures  No Procedures performed today    _____________________________________________________      Brandin Faustino    I,:  Rosa Valiente am acting as a scribe while in the presence of the attending physician :       I,:  Garrett Ag MD personally performed the services described in this documentation    as scribed in my presence :

## 2022-01-27 NOTE — H&P
ASSESSMENT/PLAN:      62 y o  female with bilateral carpal tunnel syndrome  Ultrasound and EMG results were reviewed in the office today  The etiology of carpal tunnel syndrome was discussed along with treatment options  Right carpal tunnel release was discussed at length including risks and benefits  Risks of surgery consist of but not limited to bleeding, infection, stiffness, pillar pain, injury to nerves and surrounding structures, incomplete resolution of paraesthesia's, etc  Dafne Duffy elected to proceed with a right carpal tunnel release and informed surgical consent was signed  Numbness/tingling may never completely resolve as some of this can still be coming from her cervical spine as well as possible due to being a diabetic  Post operative instructions/expectations were reviewed and provided in AVS  Lab work and EKG will be obtained prior to surgical intervention  Follow up in the office 10-14 days after surgery for suture removal      The patient verbalized understanding of exam findings and treatment plan  We engaged in the shared decision-making process and treatment options were discussed at length with the patient  Surgical and conservative management discussed today along with risks and benefits  Diagnoses and all orders for this visit:    Bilateral carpal tunnel syndrome    S/P cervical spinal fusion      Open Carpal Tunnel Release: The anatomy and physiology of carpal tunnel syndrome was discussed with the patient today  Increase pressure localized under the transverse carpal ligament can cause pain, numbness, tingling, or dysesthesias within the median nerve distribution as well as feelings of fatigue, clumsiness, or awkwardness  These symptoms typically occur at night and worse in the morning upon waking  Eventually, untreated carpal tunnel syndrome can result in weakness and permanent loss of muscle within the thenar compartment of the hand    Treatment options were discussed with the patient  Conservative treatment includes nocturnal resting splints to keep the nerve in a neutral position, ergonomic changes within the work or home environment, activity modification, and tendon gliding exercises  Vitamin B6 one tablet daily over the counter may helpful to reduce symptoms  Steroid injections within the carpal canal can help a majority of patients, however this is often self-limited in a majority of patients  Surgical intervention to divide the transverse carpal ligament typically results in a long-lasting relief of the patient's complaints, with the recurrence rate of less than 1%  The patient has elected to undergo an open carpal tunnel release  The palmar incision technique was discussed in the office with the patient today  In the postoperative period, light activities are allowed immediately, driving is allowed when narcotic medication has stopped, and the bandages may be removed and incision may get wet after 2 days  Heavy activities (lifting more than approximately 10 pounds) will be allowed after follow up appointment in 1-2 weeks  While night symptoms (waking from sleep, pain, and discomfort in the hands) generally improves rapidly, the numbness and tingling as well as the strength will slowly improve over weeks to months depending on the chronicity and severity of the carpal tunnel syndrome  Pillar pain and scar discomfort were discussed with the patient which are self-limiting conditions  The risks of bleeding and infection from the surgery are less than 1%  Risk of recurrence is approximately 0 5%  The risks of nerve injury or nerve damage or damage to the blood vessels is approximately 1 in 1200  The patient has an understanding of the above mentioned discussion  The risks and benefits of the procedure were explained to the patient, which include, but are not limited to: Bleeding, infection, recurrence, pain, scar, damage to tendons, damage to nerves, and damage to blood vessels, failure to give desired results and complications related to anesthesia  These risks, along with alternative conservative treatment options, and postoperative protocols were voiced back and understood by the patient  All questions were answered to the patient's satisfaction  The patient agrees to comply with a standard postoperative protocol, and is willing to proceed  Education was provided via written and auditory forms  There were no barriers to learning  Written handouts regarding wound care, incision and scar care, and general preoperative information was provided to the patient  Prior to surgery, the patient may be requested to stop all anti-inflammatory medications  Prophylactic aspirin, Plavix, and Coumadin may be allowed to be continued  Medications including vitamin E , ginkgo, and fish oil are requested to be stopped approximately one week prior to surgery  Follow Up:  Return for post op, 10-14 days after sx   To Do Next Visit:  Re-evaluation of current issue and Sutures out    ____________________________________________________________________________________________________________________________________________      CHIEF COMPLAINT:  Chief Complaint   Patient presents with    Left Hand - Pain, Numbness, Tingling    Right Hand - Pain, Numbness, Tingling       SUBJECTIVE:  Art Hunt is a 62y o  year old RHD female who presents to the office today for bilateral hand numbness/tingling  I am seeing Luisito Epstein in consultation at the request of Anjelica Akhtar PA-C  She notes mostly constant numbness/tingling to her radial 3 digits bilaterally  This has been ongoing for aprox  1 year  She underwent ACDF C4-7 on 8/3/21, with Dr Luis Odom  She notes this did resolve her arm numbness as well as some hand numbness  She notes continued numbness to her radial 3 digits bilaterally  She is bracing at night, without significant improvement   Numbness/tingling is not waking her from sleep at night  She crochets and notes difficulty holding the needle  I have personally reviewed all the relevant PMH, PSH, SH, FH, Medications and allergies       PAST MEDICAL HISTORY:  Past Medical History:   Diagnosis Date    Abdominal pain     started 7/21/16- found something on the pancreas    Abnormal weight loss     last assessed [id number]    Acute sinusitis     Allergic rhinitis     last assessed [id number]    Anemia     Asthma     Cardiac murmur     Carpal tunnel syndrome     Cellulitis     Cervical disc disease     Colon polyp     CPAP (continuous positive airway pressure) dependence     no longer used    Degeneration of thoracic intervertebral disc     Degenerative arthritis of knee     Diabetes mellitus (HCC)     Diverticulosis     Esophagitis, reflux     last assessed [id number]    Excessive sweating     Fatty liver     Frequent falls     Gait disturbance     GERD (gastroesophageal reflux disease)     Granuloma annulare     Head injury     Hyperlipidemia     Hypertension     Hypertensive heart disease     Hypoglycemia     last assessed [id number]    Kidney stone     Leg length discrepancy     Leucocytosis     Lumbar stress fracture     Multiple thyroid nodules     Neuropathy     Obesity     last assessed [id number]    Paronychia of toe     Patellofemoral dysfunction     Pneumonia     Postmenopausal atrophic vaginitis     Prepatellar bursitis     Sebaceous cyst     last assessed [id number]    Sleep apnea     no CPAP used    Suicide attempt ([address] )     Tinea corporis     Type 2 diabetes mellitus (HCC)     cont meds and monitor BS;  last assessed [id number]    Urinary frequency     Vertigo     Vitamin D deficiency     Dia-Parkinson-White syndrome        PAST SURGICAL HISTORY:  Past Surgical History:   Procedure Laterality Date    CERVICAL FUSION N/A 8/3/2021    Procedure: FUSION CERVICAL ANTERIOR W DISCECTOMY C4-5, C5-6, C6-7 with allograft and neuromonitoring  case length 3 hours; Surgeon: Josephine Sow MD;  Location:  MAIN OR;  Service: Orthopedics    COLONOSCOPY      ESOPHAGOGASTRODUODENOSCOPY N/A 8/8/2016    Procedure: ESOPHAGOGASTRODUODENOSCOPY (EGD); Surgeon: Sav Torres MD;  Location: [facility] GI LAB; Service:     MI ESOPHAGOGASTRODUODENOSCOPY TRANSORAL DIAGNOSTIC N/A 3/28/2019    Procedure: ESOPHAGOGASTRODUODENOSCOPY (EGD); Surgeon: Sav Torres MD;  Location: [facility] GI LAB;   Service: Gastroenterology    TONSILLECTOMY      TOOTH EXTRACTION      wisdom teeth removed       FAMILY HISTORY:  Family History   Problem Relation Age of Onset   Mikki Diones Cancer Mother         lung    Heart disease Mother     Coronary artery disease Mother         CABG    Diabetes Mother     Lung cancer Mother 76        smoker    Stroke Mother     Thyroid disease Mother     Other Mother         tobacco use    Parkinsonism Father     Intestinal polyp Father     Bipolar disorder Father     Heart disease Father     Prostate cancer Father 62    Hypertension Sister     Alcohol abuse Sister     Depression Brother     Heart disease Brother     Diabetes Maternal Grandmother     Thyroid disease Maternal Grandmother     Hypertension Family     Suicide Attempts Paternal Aunt     Depression Paternal Aunt     Endometrial cancer Maternal Aunt         unknown age   Mikki Diones Ovarian cancer Cousin 36        maternal    Heart disease Brother     Seizures Brother        SOCIAL HISTORY:  Social History     Tobacco Use    Smoking status: Never Smoker    Smokeless tobacco: Never Used   Vaping Use    Vaping Use: Never used   Substance Use Topics    Alcohol use: Not Currently     Alcohol/week: 1 0 standard drink     Types: 1 Glasses of wine per week     Comment: rarely    Drug use: No       MEDICATIONS:    Current Outpatient Medications:     ARIPiprazole (ABILIFY) 10 mg tablet, Take 1 tablet (10 mg total) by mouth every evening, Disp: 90 tablet, Rfl: 2    atorvastatin (LIPITOR) 20 mg tablet, Take 1 tablet (20 mg total) by mouth daily, Disp: 90 tablet, Rfl: 1    Cholecalciferol 4000 units CAPS, Take 1 capsule (4,000 Units total) by mouth daily (Patient taking differently: Take 1,000 Units by mouth daily ), Disp: , Rfl:     fenofibrate (TRICOR) 145 mg tablet, Take 1 tablet (145 mg total) by mouth daily, Disp: 90 tablet, Rfl: 3    FLUoxetine (PROzac) 20 mg capsule, Take 1 capsule (20 mg total) by mouth daily Take with the 40 mg cap for a total dose of 60 mg daily, Disp: 90 capsule, Rfl: 1    FLUoxetine (PROzac) 40 MG capsule, Take 1 capsule (40 mg total) by mouth daily Take with the 20 mg cap for a total dose of 60 mg daily, Disp: 90 capsule, Rfl: 1    glipiZIDE (GLUCOTROL) 5 mg tablet, Take 1 tablet (5 mg total) by mouth daily with breakfast, Disp: 90 tablet, Rfl: 3    lisinopril (ZESTRIL) 5 mg tablet, TAKE 1 TABLET BY MOUTH EVERY DAY, Disp: 90 tablet, Rfl: 2    magnesium oxide (MAG-OX) 400 mg tablet, TAKE 1 TABLET BY MOUTH EVERY DAY, Disp: 30 tablet, Rfl: 1    metFORMIN (GLUCOPHAGE) 1000 MG tablet, Take 1 tablet (1,000 mg total) by mouth 2 (two) times a day with meals, Disp: 180 tablet, Rfl: 1    Omega 3 1200 MG CAPS, Take 1,200 mg by mouth 2 (two) times a day  , Disp: , Rfl:     omeprazole (PriLOSEC) 40 MG capsule, Take 1 capsule (40 mg total) by mouth daily, Disp: 90 capsule, Rfl: 1    ALLERGIES:  Allergies   Allergen Reactions    Bee Venom Swelling     Local swelling at sting site    Clarithromycin Itching    Depakote Er [Divalproex Sodium Er]      pancreatitis    Shellfish-Derived Products - Food Allergy Other (See Comments)     Ears and cheeks got red    Tanzeum [Albiglutide]      pancreatitis    Carbamazepine Other (See Comments)     Reaction Date:Unknown    Lamotrigine Rash       REVIEW OF SYSTEMS:  Review of Systems   Constitutional: Negative for chills, fever and unexpected weight change  HENT: Negative for hearing loss, nosebleeds and sore throat      Eyes: Negative for pain, redness and visual disturbance  Respiratory: Negative for cough, shortness of breath and wheezing  Cardiovascular: Negative for chest pain, palpitations and leg swelling  Gastrointestinal: Negative for abdominal pain, nausea and vomiting  Endocrine: Negative for polydipsia and polyuria  Genitourinary: Negative for difficulty urinating and hematuria  Musculoskeletal: Negative for arthralgias, joint swelling and myalgias  Skin: Negative for rash and wound  Neurological: Positive for numbness  Negative for dizziness and headaches  Psychiatric/Behavioral: Negative for decreased concentration, dysphoric mood and suicidal ideas  The patient is not nervous/anxious  VITALS:  Vitals:    01/27/22 0807   BP: 129/73   Pulse: 78   Resp: 17       LABS:  HgA1c:   Lab Results   Component Value Date    HGBA1C 7 2 10/12/2021     BMP:   Lab Results   Component Value Date    GLUCOSE 120 09/06/2015    CALCIUM 9 2 11/17/2021     09/06/2015    K 4 9 11/17/2021    CO2 25 11/17/2021     11/17/2021    BUN 17 11/17/2021    CREATININE 0 66 11/17/2021       _____________________________________________________  PHYSICAL EXAMINATION:  General: well developed and well nourished, alert, oriented times 3 and appears comfortable  Psychiatric: Normal  HEENT: Normocephalic, Atraumatic Trachea Midline, No torticollis  Pulmonary: No audible wheezing or respiratory distress   Cardiovascular: No pitting edema, 2+ radial pulse   Abdominal/GI: abdomen non tender, non distended   Skin: No masses, erythema, lacerations, fluctation, ulcerations  Neurovascular: Sensation Intact to the Median, Ulnar, Radial Nerve, Motor Intact to the Median, Ulnar, Radial Nerve and Pulses Intact  Musculoskeletal: Normal, except as noted in detailed exam and in HPI  MUSCULOSKELETAL EXAMINATION:    Bilateral Carpal Tunnel Exam:    Negative thenar atrophy  Negative phalen's test  Negative carpal tunnel compression   Positive tinels over median nerve at the wrist, more so on the right  APB strength 5/5 bilaterally  Full composite fist bilaterally       ___________________________________________________  STUDIES REVIEWED:  I personally reviewed EMG nerve conduction studies of bilateral wrist which demonstrates bilateral carpal tunnel syndrome left slightly worse than right          PROCEDURES PERFORMED:  Procedures  No Procedures performed today    _____________________________________________________      Plasencia Blinks    I,:  Iliana Valiente am acting as a scribe while in the presence of the attending physician :       I,:  Hailey Green MD personally performed the services described in this documentation    as scribed in my presence :

## 2022-01-27 NOTE — LETTER
January 27, 2022     Ardia Canavan, [address] [facility] [address]    Patient: [facility]   YOB: 1960   Date of Visit: 1/27/2022       Dear Dr Gemini Hernandez: Thank you for referring Kesha Black to me for evaluation  Below are my notes for this consultation  If you have questions, please do not hesitate to call me  I look forward to following your patient along with you  Sincerely,        Savannah Daley MD        CC: No Recipients  Savannah Daley MD  1/27/2022 10:31 AM  Signed  ASSESSMENT/PLAN:      62 y o  female with bilateral carpal tunnel syndrome  Ultrasound and EMG results were reviewed in the office today  The etiology of carpal tunnel syndrome was discussed along with treatment options  Right carpal tunnel release was discussed at length including risks and benefits  Risks of surgery consist of but not limited to bleeding, infection, stiffness, pillar pain, injury to nerves and surrounding structures, incomplete resolution of paraesthesia's, etc  Dafne Duffy elected to proceed with a right carpal tunnel release and informed surgical consent was signed  Numbness/tingling may never completely resolve as some of this can still be coming from her cervical spine as well as possible due to being a diabetic  Post operative instructions/expectations were reviewed and provided in AVS  Lab work and EKG will be obtained prior to surgical intervention  Follow up in the office 10-14 days after surgery for suture removal      The patient verbalized understanding of exam findings and treatment plan  We engaged in the shared decision-making process and treatment options were discussed at length with the patient  Surgical and conservative management discussed today along with risks and benefits  Diagnoses and all orders for this visit:    Bilateral carpal tunnel syndrome    S/P cervical spinal fusion      Open Carpal Tunnel Release:    The anatomy and physiology of carpal tunnel syndrome was discussed with the patient today  Increase pressure localized under the transverse carpal ligament can cause pain, numbness, tingling, or dysesthesias within the median nerve distribution as well as feelings of fatigue, clumsiness, or awkwardness  These symptoms typically occur at night and worse in the morning upon waking  Eventually, untreated carpal tunnel syndrome can result in weakness and permanent loss of muscle within the thenar compartment of the hand  Treatment options were discussed with the patient  Conservative treatment includes nocturnal resting splints to keep the nerve in a neutral position, ergonomic changes within the work or home environment, activity modification, and tendon gliding exercises  Vitamin B6 one tablet daily over the counter may helpful to reduce symptoms  Steroid injections within the carpal canal can help a majority of patients, however this is often self-limited in a majority of patients  Surgical intervention to divide the transverse carpal ligament typically results in a long-lasting relief of the patient's complaints, with the recurrence rate of less than 1%  The patient has elected to undergo an open carpal tunnel release  The palmar incision technique was discussed in the office with the patient today  In the postoperative period, light activities are allowed immediately, driving is allowed when narcotic medication has stopped, and the bandages may be removed and incision may get wet after 2 days  Heavy activities (lifting more than approximately 10 pounds) will be allowed after follow up appointment in 1-2 weeks  While night symptoms (waking from sleep, pain, and discomfort in the hands) generally improves rapidly, the numbness and tingling as well as the strength will slowly improve over weeks to months depending on the chronicity and severity of the carpal tunnel syndrome    Pillar pain and scar discomfort were discussed with the patient which are self-limiting conditions  The risks of bleeding and infection from the surgery are less than 1%  Risk of recurrence is approximately 0 5%  The risks of nerve injury or nerve damage or damage to the blood vessels is approximately 1 in 1200  The patient has an understanding of the above mentioned discussion  The risks and benefits of the procedure were explained to the patient, which include, but are not limited to: Bleeding, infection, recurrence, pain, scar, damage to tendons, damage to nerves, and damage to blood vessels, failure to give desired results and complications related to anesthesia  These risks, along with alternative conservative treatment options, and postoperative protocols were voiced back and understood by the patient  All questions were answered to the patient's satisfaction  The patient agrees to comply with a standard postoperative protocol, and is willing to proceed  Education was provided via written and auditory forms  There were no barriers to learning  Written handouts regarding wound care, incision and scar care, and general preoperative information was provided to the patient  Prior to surgery, the patient may be requested to stop all anti-inflammatory medications  Prophylactic aspirin, Plavix, and Coumadin may be allowed to be continued  Medications including vitamin E , ginkgo, and fish oil are requested to be stopped approximately one week prior to surgery  Follow Up:  Return for post op, 10-14 days after sx         To Do Next Visit:  Re-evaluation of current issue and Sutures out    ____________________________________________________________________________________________________________________________________________      CHIEF COMPLAINT:  Chief Complaint   Patient presents with    Left Hand - Pain, Numbness, Tingling    Right Hand - Pain, Numbness, Tingling       SUBJECTIVE:  Parul Gomez is a 62y o  year old RHD female who presents to the office today for bilateral hand numbness/tingling  I am seeing Chidi Torres in consultation at the request of Maxime [place] SHERRIE  She notes mostly constant numbness/tingling to her radial 3 digits bilaterally  This has been ongoing for aprox  1 year  She underwent ACDF C4-7 on 8/3/21, with Dr Marie Learn  She notes this did resolve her arm numbness as well as some hand numbness  She notes continued numbness to her radial 3 digits bilaterally  She is bracing at night, without significant improvement  Numbness/tingling is not waking her from sleep at night  She crochets and notes difficulty holding the needle  I have personally reviewed all the relevant PMH, PSH, SH, FH, Medications and allergies       PAST MEDICAL HISTORY:  Past Medical History:   Diagnosis Date    Abdominal pain     started 7/21/16- found something on the pancreas    Abnormal weight loss     last assessed [id number]    Acute sinusitis     Allergic rhinitis     last assessed [id number]    Anemia     Asthma     Cardiac murmur     Carpal tunnel syndrome     Cellulitis     Cervical disc disease     Colon polyp     CPAP (continuous positive airway pressure) dependence     no longer used    Degeneration of thoracic intervertebral disc     Degenerative arthritis of knee     Diabetes mellitus (HCC)     Diverticulosis     Esophagitis, reflux     last assessed [id number]    Excessive sweating     Fatty liver     Frequent falls     Gait disturbance     GERD (gastroesophageal reflux disease)     Granuloma annulare     Head injury     Hyperlipidemia     Hypertension     Hypertensive heart disease     Hypoglycemia     last assessed [id number]    Kidney stone     Leg length discrepancy     Leucocytosis     Lumbar stress fracture     Multiple thyroid nodules     Neuropathy     Obesity     last assessed [id number]    Paronychia of toe     Patellofemoral dysfunction     Pneumonia     Postmenopausal atrophic vaginitis     Prepatellar bursitis     Sebaceous cyst     last assessed [id number]    Sleep apnea     no CPAP used    Suicide attempt ([facility] [address] )     Tinea corporis     Type 2 diabetes mellitus ([facility] [address] )     cont meds and monitor BS;  last assessed [id number]    Urinary frequency     Vertigo     Vitamin D deficiency     Dia-Parkinson-White syndrome        PAST SURGICAL HISTORY:  Past Surgical History:   Procedure Laterality Date    CERVICAL FUSION N/A 8/3/2021    Procedure: FUSION CERVICAL ANTERIOR W DISCECTOMY C4-5, C5-6, C6-7 with allograft and neuromonitoring  case length 3 hours; Surgeon: Charles Duffy MD;  Location:  MAIN OR;  Service: Orthopedics    COLONOSCOPY      ESOPHAGOGASTRODUODENOSCOPY N/A 8/8/2016    Procedure: ESOPHAGOGASTRODUODENOSCOPY (EGD); Surgeon: Agnieszka Astorga MD;  Location: [facility] GI LAB; Service:     NE ESOPHAGOGASTRODUODENOSCOPY TRANSORAL DIAGNOSTIC N/A 3/28/2019    Procedure: ESOPHAGOGASTRODUODENOSCOPY (EGD); Surgeon: Agnieszka Astorga MD;  Location: [facility] GI LAB;   Service: Gastroenterology    TONSILLECTOMY      TOOTH EXTRACTION      wisdom teeth removed       FAMILY HISTORY:  Family History   Problem Relation Age of Onset    Cancer Mother         lung    Heart disease Mother     Coronary artery disease Mother         CABG    Diabetes Mother     Lung cancer Mother 76        smoker    Stroke Mother     Thyroid disease Mother     Other Mother         tobacco use    Parkinsonism Father     Intestinal polyp Father     Bipolar disorder Father     Heart disease Father     Prostate cancer Father 62    Hypertension Sister     Alcohol abuse Sister     Depression Brother     Heart disease Brother     Diabetes Maternal Grandmother     Thyroid disease Maternal Grandmother     Hypertension Family     Suicide Attempts Paternal Aunt     Depression Paternal Aunt     Endometrial cancer Maternal Aunt         unknown age   Kate Pearl Ovarian cancer Cousin 36        maternal    Heart disease Brother     Seizures Brother        SOCIAL HISTORY:  Social History     Tobacco Use    Smoking status: Never Smoker    Smokeless tobacco: Never Used   Vaping Use    Vaping Use: Never used   Substance Use Topics    Alcohol use: Not Currently     Alcohol/week: 1 0 standard drink     Types: 1 Glasses of wine per week     Comment: rarely    Drug use: No       MEDICATIONS:    Current Outpatient Medications:     ARIPiprazole (ABILIFY) 10 mg tablet, Take 1 tablet (10 mg total) by mouth every evening, Disp: 90 tablet, Rfl: 2    atorvastatin (LIPITOR) 20 mg tablet, Take 1 tablet (20 mg total) by mouth daily, Disp: 90 tablet, Rfl: 1    Cholecalciferol 4000 units CAPS, Take 1 capsule (4,000 Units total) by mouth daily (Patient taking differently: Take 1,000 Units by mouth daily ), Disp: , Rfl:     fenofibrate (TRICOR) 145 mg tablet, Take 1 tablet (145 mg total) by mouth daily, Disp: 90 tablet, Rfl: 3    FLUoxetine (PROzac) 20 mg capsule, Take 1 capsule (20 mg total) by mouth daily Take with the 40 mg cap for a total dose of 60 mg daily, Disp: 90 capsule, Rfl: 1    FLUoxetine (PROzac) 40 MG capsule, Take 1 capsule (40 mg total) by mouth daily Take with the 20 mg cap for a total dose of 60 mg daily, Disp: 90 capsule, Rfl: 1    glipiZIDE (GLUCOTROL) 5 mg tablet, Take 1 tablet (5 mg total) by mouth daily with breakfast, Disp: 90 tablet, Rfl: 3    lisinopril (ZESTRIL) 5 mg tablet, TAKE 1 TABLET BY MOUTH EVERY DAY, Disp: 90 tablet, Rfl: 2    magnesium oxide (MAG-OX) 400 mg tablet, TAKE 1 TABLET BY MOUTH EVERY DAY, Disp: 30 tablet, Rfl: 1    metFORMIN (GLUCOPHAGE) 1000 MG tablet, Take 1 tablet (1,000 mg total) by mouth 2 (two) times a day with meals, Disp: 180 tablet, Rfl: 1    Omega 3 1200 MG CAPS, Take 1,200 mg by mouth 2 (two) times a day  , Disp: , Rfl:     omeprazole (PriLOSEC) 40 MG capsule, Take 1 capsule (40 mg total) by mouth daily, Disp: 90 capsule, Rfl: 1    ALLERGIES:  Allergies   Allergen Reactions    Bee Venom Swelling     Local swelling at sting site    Clarithromycin Itching    Depakote Er [Divalproex Sodium Er]      pancreatitis    Shellfish-Derived Products - Food Allergy Other (See Comments)     Ears and cheeks got red    Tanzeum [Albiglutide]      pancreatitis    Carbamazepine Other (See Comments)     Reaction Date:Unknown    Lamotrigine Rash       REVIEW OF SYSTEMS:  Review of Systems   Constitutional: Negative for chills, fever and unexpected weight change  HENT: Negative for hearing loss, nosebleeds and sore throat  Eyes: Negative for pain, redness and visual disturbance  Respiratory: Negative for cough, shortness of breath and wheezing  Cardiovascular: Negative for chest pain, palpitations and leg swelling  Gastrointestinal: Negative for abdominal pain, nausea and vomiting  Endocrine: Negative for polydipsia and polyuria  Genitourinary: Negative for difficulty urinating and hematuria  Musculoskeletal: Negative for arthralgias, joint swelling and myalgias  Skin: Negative for rash and wound  Neurological: Positive for numbness  Negative for dizziness and headaches  Psychiatric/Behavioral: Negative for decreased concentration, dysphoric mood and suicidal ideas  The patient is not nervous/anxious          VITALS:  Vitals:    01/27/22 0807   BP: 129/73   Pulse: 78   Resp: 17       LABS:  HgA1c:   Lab Results   Component Value Date    HGBA1C 7 2 10/12/2021     BMP:   Lab Results   Component Value Date    GLUCOSE 120 09/06/2015    CALCIUM 9 2 11/17/2021     09/06/2015    K 4 9 11/17/2021    CO2 25 11/17/2021     11/17/2021    BUN 17 11/17/2021    CREATININE 0 66 11/17/2021       _____________________________________________________  PHYSICAL EXAMINATION:  General: well developed and well nourished, alert, oriented times 3 and appears comfortable  Psychiatric: Normal  HEENT: Normocephalic, Atraumatic Trachea Midline, No torticollis  Pulmonary: No audible wheezing or respiratory distress   Cardiovascular: No pitting edema, 2+ radial pulse   Abdominal/GI: abdomen non tender, non distended   Skin: No masses, erythema, lacerations, fluctation, ulcerations  Neurovascular: Sensation Intact to the Median, Ulnar, Radial Nerve, Motor Intact to the Median, Ulnar, Radial Nerve and Pulses Intact  Musculoskeletal: Normal, except as noted in detailed exam and in HPI  MUSCULOSKELETAL EXAMINATION:    Bilateral Carpal Tunnel Exam:    Negative thenar atrophy  Negative phalen's test  Negative carpal tunnel compression  Positive tinels over median nerve at the wrist, more so on the right  APB strength 5/5 bilaterally  Full composite fist bilaterally       ___________________________________________________  STUDIES REVIEWED:  I personally reviewed EMG nerve conduction studies of bilateral wrist which demonstrates bilateral carpal tunnel syndrome left slightly worse than right          PROCEDURES PERFORMED:  Procedures  No Procedures performed today    _____________________________________________________      Luis Antonio Cure    I,:  Colby Valiente am acting as a scribe while in the presence of the attending physician :       I,:  Argenis Alvarez MD personally performed the services described in this documentation    as scribed in my presence :

## 2022-01-27 NOTE — PROGRESS NOTES
Daily Note     Today's date: 2022  Patient name: Art Hunt  : 1960  MRN: [id number]  Referring provider: Tara Duarte PA-C  Dx:   Encounter Diagnosis     ICD-10-CM    1  Bilateral carpal tunnel syndrome  G56 03    2  S/P cervical spinal fusion  Z98 1                   Subjective: Pt reports she is feeling really good and was cleared to start driving again  Objective: See treatment diary below      Assessment: Pt did well with all interventions only having fatigue with step ups and ambulation for distance however may have been able to perform more repetitions/laps  Pt demonstrates decent balance on the biodex and during step ups as she did not need her UE for assistance with exception of a few instances  Patient demonstrated fatigue post treatment, exhibited good technique with therapeutic exercises and would benefit from continued PT      Plan: Continue per plan of care        Precautions: asthma, anemia, depression, DM II, HTN  Dx: s/p C4-7 fusion 8/3/21, B CTS    Manuals      Graston B carpal tunnel 3 min ea 4 min ea 4min ea 4 min ea 4 min ea 4 min ea 4 min ea 4 min ea     Manual medial nerve glides             B UT Graston 6 min                         Neuro Re-Ed             Seated c/s retraction 1x15  1x20 1x20 1x15 1x15 1x15 1x15                  Seated c/s retraction with rotation 1x15  1x20 1x20 1x15 1x15 1x15 1x15     DNF 3"x5  3"x10  4"x10 4"x10 4"x10 5"x10                                                         BioDex FT EC L12  60"x2 L12  60"x2 L12  60"x2 L12  60"x2 L12  60"x2 L12  60"x2 L12  60"x2 L12 60"x2     BioDex FT EO head turns             BioDex SLS EO L=S  60"x2 L=S  60"x2 L=S  60"x2 L=S  60"x2 L=S  60"x2 L=S  60"x2 L=S  60"x2 L=S 60"x2                  Ther Ex             Median nerve glides HEP            Carpal tunnel glides HEP            STS 2x10 2x10 2x10 2x10 2x10 2x10 2x10 2x10 Ther Activity                                       Gait Training             Gait training for endurance 700 ft 500 ft 700 ft 700 ft 800 ft 800 ft 900 ft 900 ft     Step-ups/  downs 8"/  2x10 ea 8"  2x10 ea 8"  2x10 ea 8"  2x10 ea 8"/  2x10 ea 8"/  2x10 ea 8"/  2x10 ea 8"/ 2x10 ea     Obstacle course: cone weaving, low hurdles, tandem walking nv 400 ft  ft 200 ft 200 ft np      Modalities                                            Daily Note     Today's date: 2022  Patient name: Neeta Heading  : 1960  MRN: [id number]  Referring provider: Florin Alberto PA-C  Dx:   Encounter Diagnosis     ICD-10-CM    1  Bilateral carpal tunnel syndrome  G56 03    2  S/P cervical spinal fusion  Z98 1                   Subjective: Pt reports she will be having R carpal tunnel surgery on 5/3/2022  Objective: See treatment diary below      Assessment: Pt demonstrated increased ambulation endurance, fair SLS balance  Plan: Continue per plan of care        Precautions: asthma, anemia, depression, DM II, HTN  Dx: s/p C4-7 fusion 8/3/21, B CTS    Manuals     Grasjordan B carpal tunnel 3 min ea 4 min ea 4min ea 4 min ea 4 min ea 4 min ea 4 min ea 4 min ea 4 min ea    Manual medial nerve glides             B UT [place] 6 min                         Neuro Re-Ed             Seated c/s retraction 1x15  1x20 1x20 1x15 1x15 1x15 1x15 1x15                 Seated c/s retraction with rotation 1x15  1x20 1x20 1x15 1x15 1x15 1x15 1x15    DNF 3"x5  3"x10  4"x10 4"x10 4"x10 5"x10 5"x10                                                        BioDex FT EC L12  60"x2 L12  60"x2 L12  60"x2 L12  60"x2 L12  60"x2 L12  60"x2 L12  60"x2 L12 60"x2 L12 60"x2    BioDex FT EO head turns             BioDex SLS EO L=S  60"x2 L=S  60"x2 L=S  60"x2 L=S  60"x2 L=S  60"x2 L=S  60"x2 L=S  60"x2 L=S 60"x2 L=S 60"x2                 Ther Ex             Median nerve glides HEP Carpal tunnel glides HEP            STS 2x10 2x10 2x10 2x10 2x10 2x10 2x10 2x10 2x12                                                                     Ther Activity                                       Gait Training             Gait training for endurance 700 ft 500 ft 700 ft 700 ft 800 ft 800 ft 900 ft 900 ft 1000 ft    Step-ups/  downs 8"/  2x10 ea 8"  2x10 ea 8"  2x10 ea 8"  2x10 ea 8"/  2x10 ea 8"/  2x10 ea 8"/  2x10 ea 8"/ 2x10 ea 8"/ 2x10 ea    Obstacle course: cone weaving, low hurdles, tandem walking nv 400 ft  ft 200 ft 200 ft np      Modalities ambulatory climbs 1 flight of stairs, ADLs. Pt has trouble walking.

## 2022-01-31 DIAGNOSIS — I10 BENIGN ESSENTIAL HYPERTENSION: ICD-10-CM

## 2022-01-31 PROCEDURE — 4010F ACE/ARB THERAPY RXD/TAKEN: CPT | Performed by: SURGERY

## 2022-01-31 RX ORDER — LISINOPRIL 5 MG/1
5 TABLET ORAL DAILY
Qty: 90 TABLET | Refills: 0 | Status: SHIPPED | OUTPATIENT
Start: 2022-01-31 | End: 2022-03-07

## 2022-02-01 ENCOUNTER — OFFICE VISIT (OUTPATIENT)
Dept: PHYSICAL THERAPY | Facility: CLINIC | Age: 62
End: 2022-02-01
Payer: COMMERCIAL

## 2022-02-01 DIAGNOSIS — Z98.1 S/P CERVICAL SPINAL FUSION: ICD-10-CM

## 2022-02-01 DIAGNOSIS — G56.03 BILATERAL CARPAL TUNNEL SYNDROME: Primary | ICD-10-CM

## 2022-02-01 PROCEDURE — 97112 NEUROMUSCULAR REEDUCATION: CPT

## 2022-02-01 PROCEDURE — 97140 MANUAL THERAPY 1/> REGIONS: CPT

## 2022-02-01 PROCEDURE — 97116 GAIT TRAINING THERAPY: CPT

## 2022-02-01 NOTE — PROGRESS NOTES
Daily Note     Today's date: 2022  Patient name: Terral Essex  : 1960  MRN: [id number]  Referring provider: Cameron Arnold PA-C  Dx:   Encounter Diagnosis     ICD-10-CM    1  Bilateral carpal tunnel syndrome  G56 03    2  S/P cervical spinal fusion  Z98 1        Start Time: 1800  Stop Time: 1850  Total time in clinic (min): 50 minutes    Subjective: Patient reports R > L carpel tunnel symptoms  Notes her balance is doing well  Objective: See treatment diary below    Assessment: Patient demonstrated good form t/o therapy  No LOB t/o session  However, she has difficulty with SLS and FTEC  Plan: Continue per plan of care        Precautions: asthma, anemia, depression, DM II, HTN  Dx: s/p C4-7 fusion 8/3/21, B CTS    Manuals    Graston B carpal tunnel 3 min ea 4 min ea 4min ea 4 min ea 4 min ea 4 min ea 4 min ea 4 min ea 4 min ea 4 min ea   Manual medial nerve glides             B UT [place] 6 min                         Neuro Re-Ed             Seated c/s retraction 1x15  1x20 1x20 1x15 1x15 1x15 1x15 1x15 1x15                Seated c/s retraction with rotation 1x15  1x20 1x20 1x15 1x15 1x15 1x15 1x15 1x15   DNF 3"x5  3"x10  4"x10 4"x10 4"x10 5"x10 5"x10 5"x10                                                       BioDex FT EC L12  60"x2 L12  60"x2 L12  60"x2 L12  60"x2 L12  60"x2 L12  60"x2 L12  60"x2 L12 60"x2 L12 60"x2 L12 60"x2   BioDex FT EO head turns             BioDex SLS EO L=S  60"x2 L=S  60"x2 L=S  60"x2 L=S  60"x2 L=S  60"x2 L=S  60"x2 L=S  60"x2 L=S 60"x2 L=S 60"x2 L=S 60"x2                Ther Ex             Median nerve glides HEP            Carpal tunnel glides HEP            STS 2x10 2x10 2x10 2x10 2x10 2x10 2x10 2x10 2x12 2x12                                                                    Ther Activity                                       Gait Training             Gait training for endurance 700 ft 500 ft 700 ft 700 ft 800 ft 800 ft 900 ft 900 ft 1000 ft 1000 ft   Step-ups/  downs 8"/  2x10 ea 8"  2x10 ea 8"  2x10 ea 8"  2x10 ea 8"/  2x10 ea 8"/  2x10 ea 8"/  2x10 ea 8"/ 2x10 ea 8"/ 2x10 ea 8"/ 2x10 ea   Obstacle course: cone weaving, low hurdles, tandem walking nv 400 ft  ft 200 ft 200 ft np      Modalities

## 2022-02-03 ENCOUNTER — OFFICE VISIT (OUTPATIENT)
Dept: PHYSICAL THERAPY | Facility: CLINIC | Age: 62
End: 2022-02-03
Payer: COMMERCIAL

## 2022-02-03 DIAGNOSIS — Z98.1 S/P CERVICAL SPINAL FUSION: ICD-10-CM

## 2022-02-03 DIAGNOSIS — G56.03 BILATERAL CARPAL TUNNEL SYNDROME: Primary | ICD-10-CM

## 2022-02-03 PROCEDURE — 97140 MANUAL THERAPY 1/> REGIONS: CPT

## 2022-02-03 PROCEDURE — 97530 THERAPEUTIC ACTIVITIES: CPT

## 2022-02-03 PROCEDURE — 97112 NEUROMUSCULAR REEDUCATION: CPT

## 2022-02-03 NOTE — PROGRESS NOTES
Daily Note     Today's date: 2/3/2022  Patient name: Kristy Cordova  : 1960  MRN: [id number]  Referring provider: Callie Urias PA-C  Dx:   Encounter Diagnosis     ICD-10-CM    1  Bilateral carpal tunnel syndrome  G56 03    2  S/P cervical spinal fusion  Z98 1                   Subjective: Patient reports she is pleased with her progression with her balance  Has been able to complete shopping activities with improved balance and confidence  Patient denies any recent falls  Objective: See treatment diary below    Assessment: Patient demonstrated good form t/o therapy  Good balance reaction in order to correct balance  Demonstrated good technique with additional balance activities  Improved functional activities at home  Plan: Continue per plan of care        Precautions: asthma, anemia, depression, DM II, HTN  Dx: s/p C4-7 fusion 8/3/21, B CTS    Manuals /3 1/4 1/6 1/11 1/13 1/18 1/20 1/25 1/27 2/1   Graston B carpal tunnel 4 min ea 4 min ea 4min ea 4 min ea 4 min ea 4 min ea 4 min ea 4 min ea 4 min ea 4 min ea   Manual medial nerve glides             B UT [place] 6 min                         Neuro Re-Ed             Seated c/s retraction 1x15  1x20 1x20 1x15 1x15 1x15 1x15 1x15 1x15                Seated c/s retraction with rotation 1x15  1x20 1x20 1x15 1x15 1x15 1x15 1x15 1x15   DNF 3"x5  3"x10  4"x10 4"x10 4"x10 5"x10 5"x10 5"x10   Tandem stance 2x30" ea            Static st on foam 30" x2  With head turns                                      BioDex FT EC L12  60"x2 L12  60"x2 L12  60"x2 L12  60"x2 L12  60"x2 L12  60"x2 L12  60"x2 L12 60"x2 L12 60"x2 L12 60"x2   BioDex FT EO head turns             BioDex SLS EO L=S  60"x2 L=S  60"x2 L=S  60"x2 L=S  60"x2 L=S  60"x2 L=S  60"x2 L=S  60"x2 L=S 60"x2 L=S 60"x2 L=S 60"x2                Ther Ex             Median nerve glides HEP            Carpal tunnel glides HEP            STS 2x12 2x10 2x10 2x10 2x10 2x10 2x10 2x10 2x12 2x12 Ther Activity                                       Gait Training             Gait training for endurance 1000 feet 500 ft 700 ft 700 ft 800 ft 800 ft 900 ft 900 ft 1000 ft 1000 ft   Step-ups/  downs 8"/  2x10 ea 8"  2x10 ea 8"  2x10 ea 8"  2x10 ea 8"/  2x10 ea 8"/  2x10 ea 8"/  2x10 ea 8"/ 2x10 ea 8"/ 2x10 ea 8"/ 2x10 ea   Obstacle course: cone weaving, low hurdles, tandem walking nv 400 ft  ft 200 ft 200 ft np      Modalities

## 2022-02-08 ENCOUNTER — OFFICE VISIT (OUTPATIENT)
Dept: PHYSICAL THERAPY | Facility: CLINIC | Age: 62
End: 2022-02-08
Payer: COMMERCIAL

## 2022-02-08 DIAGNOSIS — Z98.1 S/P CERVICAL SPINAL FUSION: ICD-10-CM

## 2022-02-08 DIAGNOSIS — G56.03 BILATERAL CARPAL TUNNEL SYNDROME: Primary | ICD-10-CM

## 2022-02-08 PROCEDURE — 97140 MANUAL THERAPY 1/> REGIONS: CPT

## 2022-02-08 PROCEDURE — 97530 THERAPEUTIC ACTIVITIES: CPT

## 2022-02-08 PROCEDURE — 97112 NEUROMUSCULAR REEDUCATION: CPT

## 2022-02-08 NOTE — PROGRESS NOTES
Daily Note     Today's date: 2022  Patient name: Parul Gomez  : 1960  MRN: [id number]  Referring provider: Michael Reynolds PA-C  Dx:   Encounter Diagnosis     ICD-10-CM    1  Bilateral carpal tunnel syndrome  G56 03    2  S/P cervical spinal fusion  Z98 1                   Subjective: Pt reports her neck has continued to feel good  Notices radicular symptoms and stiffness in 1st and 2nd digits of L hand for about the past week  Objective: See treatment diary below      Assessment: Tolerated treatment well  Most challenged with FT EC balance, requiring frequent use of handrail for stabilization  Fatigues with endurance ambulation around clinic, but was able to complete assigned distance  Patient would benefit from continued PT  Plan: Continue per plan of care        Precautions: asthma, anemia, depression, DM II, HTN  Dx: s/p C4-7 fusion 8/3/21, B CTS    Manuals 2/3 2/8  1/11 1/13 1/18 1/20 1/25 1/27 2/1   [facility] OWENS carpal tunnel 4 min ea 4 min ea  4 min ea 4 min ea 4 min ea 4 min ea 4 min ea 4 min ea 4 min ea   Manual medial nerve glides             [facility]                          Neuro Re-Ed             Seated c/s retraction 1x15 1x15  1x20 1x15 1x15 1x15 1x15 1x15 1x15                Seated c/s retraction with rotation 1x15 1x15  1x20 1x15 1x15 1x15 1x15 1x15 1x15   DNF 3"x5    4"x10 4"x10 4"x10 5"x10 5"x10 5"x10   Tandem stance 2x30" ea            Static st on foam 30" x2  With head turns NBOS  Nods/head turns  30"x2 ea                                     BioDex FT EC L12  60"x2 L12  60"x2  L12  60"x2 L12  60"x2 L12  60"x2 L12  60"x2 L12 60"x2 L12 60"x2 L12 60"x2   BioDex FT EO head turns             BioDex SLS EO L=S  60"x2 L=S  60"x2  L=S  60"x2 L=S  60"x2 L=S  60"x2 L=S  60"x2 L=S 60"x2 L=S 60"x2 L=S 60"x2                Ther Ex             Median nerve glides HEP            Carpal tunnel glides HEP            STS 2x12 2x12  2x10 2x10 2x10 2x10 2x10 2x12 2x12 Ther Activity                                       Gait Training             Gait training for endurance 1000 feet 1000 feet  700 ft 800 ft 800 ft 900 ft 900 ft 1000 ft 1000 ft   Step-ups/  downs 8"/  2x10 ea 8"/  2x10 ea  8"  2x10 ea 8"/  2x10 ea 8"/  2x10 ea 8"/  2x10 ea 8"/ 2x10 ea 8"/ 2x10 ea 8"/ 2x10 ea   Obstacle course: cone weaving, low hurdles, tandem walking nv   200 ft 200 ft 200 ft np      Modalities

## 2022-02-10 ENCOUNTER — EVALUATION (OUTPATIENT)
Dept: PHYSICAL THERAPY | Facility: CLINIC | Age: 62
End: 2022-02-10
Payer: COMMERCIAL

## 2022-02-10 DIAGNOSIS — G56.03 BILATERAL CARPAL TUNNEL SYNDROME: ICD-10-CM

## 2022-02-10 DIAGNOSIS — Z98.1 S/P CERVICAL SPINAL FUSION: Primary | ICD-10-CM

## 2022-02-10 PROCEDURE — 97116 GAIT TRAINING THERAPY: CPT | Performed by: PHYSICAL THERAPIST

## 2022-02-10 PROCEDURE — 97112 NEUROMUSCULAR REEDUCATION: CPT | Performed by: PHYSICAL THERAPIST

## 2022-02-10 PROCEDURE — 97140 MANUAL THERAPY 1/> REGIONS: CPT | Performed by: PHYSICAL THERAPIST

## 2022-02-11 NOTE — PROGRESS NOTES
Daily Note     Today's date: 2/10/2022  Patient name: Varney Scheuermann  : 1960  MRN: [id number]  Referring provider: Henny Tubbs  Dx:   Encounter Diagnosis     ICD-10-CM    1  S/P cervical spinal fusion  Z98 1    2  Bilateral carpal tunnel syndrome  G56 03                   Subjective: Pt reports improved balance and endurance but notes difficulty with EC or while turning her head  Objective: See treatment diary below    Assessment: Pt demonstrated greatest difficulty with trunk or head rotation with FT EC  Plan: Cont  POC with emphasis on EC/head ROM balance activities       Precautions: asthma, anemia, depression, DM II, HTN  Dx: s/p C4-7 fusion 8/3/21, B CTS    Manuals 2/3 2/8 2/10          Mahad OWENS carpal tunnel 4 min ea 4 min ea 4 min ea          Manual medial nerve glides             [facility]                          Neuro Re-Ed             Seated c/s retraction 1x15 1x15 1x15                       Seated c/s retraction with rotation 1x15 1x15 1x15          DNF 3"x5  nv 3"x10         Tandem stance 2x30" ea            Static st on foam 30" x2  With head turns NBOS  Nods/head turns  30"x2 ea           FT EC B/L shoulder FROM   1x3 ea          FT EC L/S FF AROM   1x3          FT EC B/L L/S rotation AROM   1x3 ea          BioDex FT EC L12  60"x2 L12  60"x2 L=S  60"x2          BioDex FT EO head turns   L=S  Gaze stab  60"x2  VOR cx  60"x1          BioDex SLS EO L=S  60"x2 L=S  60"x2 L=S  60"x1                       Ther Ex             Median nerve glides HEP            Carpal tunnel glides HEP            STS 2x12 2x12                                                                            Ther Activity                                       Gait Training             Gait training for endurance 1000 feet 1000 feet 6MWT  850 ft          Step-ups/  downs 8"/  2x10 ea 8"/  2x10 ea np          Gait training with horizontal gaze stab   7x20 ft          Obstacle course: cone weaving, low hurdles, tandem walking nv  nv          Modalities

## 2022-02-15 ENCOUNTER — OFFICE VISIT (OUTPATIENT)
Dept: PHYSICAL THERAPY | Facility: CLINIC | Age: 62
End: 2022-02-15
Payer: COMMERCIAL

## 2022-02-15 DIAGNOSIS — K29.80 DUODENITIS: ICD-10-CM

## 2022-02-15 DIAGNOSIS — G56.03 BILATERAL CARPAL TUNNEL SYNDROME: ICD-10-CM

## 2022-02-15 DIAGNOSIS — Z98.1 S/P CERVICAL SPINAL FUSION: Primary | ICD-10-CM

## 2022-02-15 DIAGNOSIS — Z98.1 S/P CERVICAL SPINAL FUSION: ICD-10-CM

## 2022-02-15 DIAGNOSIS — R10.13 EPIGASTRIC PAIN: ICD-10-CM

## 2022-02-15 PROCEDURE — 97112 NEUROMUSCULAR REEDUCATION: CPT

## 2022-02-15 PROCEDURE — 97116 GAIT TRAINING THERAPY: CPT

## 2022-02-15 PROCEDURE — 97140 MANUAL THERAPY 1/> REGIONS: CPT

## 2022-02-15 RX ORDER — OMEPRAZOLE 40 MG/1
CAPSULE, DELAYED RELEASE ORAL
Qty: 90 CAPSULE | Refills: 1 | Status: SHIPPED | OUTPATIENT
Start: 2022-02-15 | End: 2022-07-10

## 2022-02-15 NOTE — PROGRESS NOTES
Daily Note     Today's date: 2/15/2022  Patient name: Neeta Camp  : 1960  MRN: [id number]  Referring provider: Remigio Lowery  Dx:   Encounter Diagnosis     ICD-10-CM    1  S/P cervical spinal fusion  Z98 1    2  Bilateral carpal tunnel syndrome  G56 03        Start Time: 1800  Stop Time: 1850  Total time in clinic (min): 50 minutes    Subjective: Patient reports R medial ankle discomfort prior to therapy  Objective: See treatment diary below    Assessment: Tolerated treatment well  Patient demonstrated fatigue post treatment, exhibited good technique with therapeutic exercises and would benefit from continued PT  CG t/o therapy  No LOB this visit  Plan: Continue per plan of care        Precautions: asthma, anemia, depression, DM II, HTN  Dx: s/p C4-7 fusion 8/3/21, B CTS    Manuals 2/3 2/8 2/10 2/15         [place] B carpal tunnel 4 min ea 4 min ea 4 min ea 4 min ea         Manual medial nerve glides             [facility]                          Neuro Re-Ed             Seated c/s retraction 1x15 1x15 1x15 1x15                      Seated c/s retraction with rotation 1x15 1x15 1x15 1x15         DNF 3"x5  nv 3"x10         Tandem stance 2x30" ea            Static st on foam 30" x2  With head turns NBOS  Nods/head turns  30"x2 ea           FT EC B/L shoulder FROM   1x3 ea 1x10 ea         FT EC L/S FF AROM   1x3 1x10          FT EC B/L L/S rotation AROM   1x3 ea 1x10 ea         BioDex FT EC L12  60"x2 L12  60"x2 L=S  60"x2 L=S  60"x2         BioDex FT EO head turns   L=S  Gaze stab  60"x2  VOR cx  60"x1 L=S  Gaze stab  60"x2  VOR cx  60"x1         BioDex SLS EO L=S  60"x2 L=S  60"x2 L=S  60"x1 L=S  60"x1                      Ther Ex             Median nerve glides HEP            Carpal tunnel glides HEP            STS 2x12 2x12  2x12                                                                          Ther Activity                                       Gait Training             Gait training for endurance 1000 feet 1000 feet 6MWT  850 ft 1000  ft         Step-ups/  downs 8"/  2x10 ea 8"/  2x10 ea np          Gait training with horizontal gaze stab   7x20 ft 7x20 ft         Obstacle course: cone weaving, low hurdles, tandem walking nv  nv 200  ft         Modalities

## 2022-02-16 LAB
FERRITIN SERPL-MCNC: 110 NG/ML (ref 16–232)
IRON SATN MFR SERPL: 10 % (CALC) (ref 16–45)
IRON SERPL-MCNC: 38 MCG/DL (ref 45–160)
MAGNESIUM SERPL-MCNC: 1.4 MG/DL (ref 1.5–2.5)
TIBC SERPL-MCNC: 380 MCG/DL (CALC) (ref 250–450)

## 2022-02-17 ENCOUNTER — OFFICE VISIT (OUTPATIENT)
Dept: PHYSICAL THERAPY | Facility: CLINIC | Age: 62
End: 2022-02-17
Payer: COMMERCIAL

## 2022-02-17 DIAGNOSIS — G56.03 BILATERAL CARPAL TUNNEL SYNDROME: ICD-10-CM

## 2022-02-17 DIAGNOSIS — Z98.1 S/P CERVICAL SPINAL FUSION: Primary | ICD-10-CM

## 2022-02-17 PROCEDURE — 97140 MANUAL THERAPY 1/> REGIONS: CPT

## 2022-02-17 PROCEDURE — 97112 NEUROMUSCULAR REEDUCATION: CPT

## 2022-02-17 PROCEDURE — 97116 GAIT TRAINING THERAPY: CPT

## 2022-02-17 NOTE — PROGRESS NOTES
Daily Note     Today's date: 2022  Patient name: Ismael Greene  : 1960  MRN: [id number]  Referring provider: Alejandro Palmer  Dx:   Encounter Diagnosis     ICD-10-CM    1  S/P cervical spinal fusion  Z98 1    2  Bilateral carpal tunnel syndrome  G56 03                   Subjective: Patient reports decreased right ankle discomfort, no falls  Objective: See treatment diary below    Assessment: Pt demonstrated multiple episodes of LOB with tandem walking, improved SLS  Plan: Continue per plan of care        Precautions: asthma, anemia, depression, DM II, HTN  Dx: s/p C4-7 fusion 8/3/21, B CTS    Manuals 2/3 2/8 2/10 2/15 2/17        [facility]ad OWENS carpal tunnel 4 min ea 4 min ea 4 min ea 4 min ea 4 min ea        Manual medial nerve glides             [facility]                          Neuro Re-Ed             Seated c/s retraction 1x15 1x15 1x15 1x15 1x15                     Seated c/s retraction with rotation 1x15 1x15 1x15 1x15 1x15        DNF 3"x5  nv 3"x10 nv        Tandem stance 2x30" ea            Static st on foam 30" x2  With head turns NBOS  Nods/head turns  30"x2 ea           FT EC B/L shoulder FROM   1x3 ea 1x10 ea 1x10 ea        FT EC L/S FF AROM   1x3 1x10  1x10        FT EC B/L L/S rotation AROM   1x3 ea 1x10 ea 1x10 ea        BioDex FT EC L12  60"x2 L12  60"x2 L=S  60"x2 L=S  60"x2 L=S  60"x2        BioDex FT EO head turns   L=S  Gaze stab  60"x2  VOR cx  60"x1 L=S  Gaze stab  60"x2  VOR cx  60"x1 L=S  Gaze stab  60"x2  VOR cx  60"x1        BioDex SLS EO L=S  60"x2 L=S  60"x2 L=S  60"x1 L=S  60"x1 L=S  60"x1                     Ther Ex             Median nerve glides HEP            Carpal tunnel glides HEP            STS 2x12 2x12  2x12 2x12                                                                         Ther Activity                                       Gait Training             Gait training for endurance 1000 feet 1000 feet 6MWT  850 ft 1000  ft 1050 ft Step-ups/  downs 8"/  2x10 ea 8"/  2x10 ea np          Gait training with horizontal gaze stab   7x20 ft 7x20 ft 7x20 ft        Obstacle course: cone weaving, low hurdles, tandem walking nv  nv 200  ft 200 ft        Modalities

## 2022-02-22 ENCOUNTER — OFFICE VISIT (OUTPATIENT)
Dept: PHYSICAL THERAPY | Facility: CLINIC | Age: 62
End: 2022-02-22
Payer: COMMERCIAL

## 2022-02-22 DIAGNOSIS — Z98.1 S/P CERVICAL SPINAL FUSION: Primary | ICD-10-CM

## 2022-02-22 DIAGNOSIS — G56.03 BILATERAL CARPAL TUNNEL SYNDROME: ICD-10-CM

## 2022-02-22 PROCEDURE — 97116 GAIT TRAINING THERAPY: CPT

## 2022-02-22 PROCEDURE — 97140 MANUAL THERAPY 1/> REGIONS: CPT

## 2022-02-22 PROCEDURE — 97112 NEUROMUSCULAR REEDUCATION: CPT

## 2022-02-22 NOTE — PROGRESS NOTES
Daily Note     Today's date: 2022  Patient name: Matti Shin  : 1960  MRN: [id number]  Referring provider: Cecilia Amezquita  Dx:   Encounter Diagnosis     ICD-10-CM    1  S/P cervical spinal fusion  Z98 1    2  Bilateral carpal tunnel syndrome  G56 03        Start Time: 1800  Stop Time: 1855  Total time in clinic (min): 55 minutes    Subjective: Patient states that she has been drying herself post shower standing up unsupported  She states the activity is a challenge but she has had no LOB  She states that  and Monday she felt moderate stiffness in her c/s and was not able to complete her HEP  She states that prior to the start of this session that she is less stiff in her c/s with more stiffness (L)>(R)  Objective: See treatment diary below    Assessment: Improved SLS this visit L>R  Patient showed improved tandem walking this session with no LOB  Mild episodes of LOB during hurdles this session  Patient would benefit from continued PT  Plan: Continue per plan of care        Precautions: asthma, anemia, depression, DM II, HTN  Dx: s/p C4-7 fusion 8/3/21, B CTS    Manuals 2/3 2/8 2/10 2/15 2/17 2/22       [facility] B carpal tunnel 4 min ea 4 min ea 4 min ea 4 min ea 4 min ea 4 min   ea       Manual medial nerve glides             [facility]                          Neuro Re-Ed             Seated c/s retraction 1x15 1x15 1x15 1x15 1x15 1x15                    Seated c/s retraction with rotation 1x15 1x15 1x15 1x15 1x15 1x15       DNF 3"x5  nv 3"x10 nv 3"x10       Tandem stance 2x30" ea            Static st on foam 30" x2  With head turns NBOS  Nods/head turns  30"x2 ea    NP       FT EC B/L shoulder FROM   1x3 ea 1x10 ea 1x10 ea 1x10 ea       FT EC L/S FF AROM   1x3 1x10  1x10 1x10       FT EC B/L L/S rotation AROM   1x3 ea 1x10 ea 1x10 ea 1x10 ea       BioDex FT EC L12  60"x2 L12  60"x2 L=S  60"x2 L=S  60"x2 L=S  60"x2 L=S  60"x2       BioDex FT EO head turns   L=S  Gaze stab  60"x2  VOR cx  60"x1 L=S  Gaze stab  60"x2  VOR cx  60"x1 L=S  Gaze stab  60"x2  VOR cx  60"x1 L=S  Gaze stab  60"x2  VOR cx  60"x1       BioDex SLS EO L=S  60"x2 L=S  60"x2 L=S  60"x1 L=S  60"x1 L=S  60"x1 L=S  60"x1                    Ther Ex             Median nerve glides HEP            Carpal tunnel glides HEP            STS 2x12 2x12  2x12 2x12 2x12                                                                        Ther Activity                                       Gait Training             Gait training for endurance 1000 feet 1000 feet 6MWT  850 ft 1000  ft 1050 ft NP       Step-ups/  downs 8"/  2x10 ea 8"/  2x10 ea np   NP       Gait training with horizontal gaze stab   7x20 ft 7x20 ft 7x20 ft 7x20 ft       Obstacle course: cone weaving, low hurdles, tandem walking nv  nv 200  ft 200 ft 400ft       Modalities

## 2022-02-24 ENCOUNTER — OFFICE VISIT (OUTPATIENT)
Dept: PHYSICAL THERAPY | Facility: CLINIC | Age: 62
End: 2022-02-24
Payer: COMMERCIAL

## 2022-02-24 DIAGNOSIS — Z98.1 S/P CERVICAL SPINAL FUSION: Primary | ICD-10-CM

## 2022-02-24 DIAGNOSIS — G56.03 BILATERAL CARPAL TUNNEL SYNDROME: ICD-10-CM

## 2022-02-24 PROCEDURE — 97112 NEUROMUSCULAR REEDUCATION: CPT | Performed by: PHYSICAL THERAPIST

## 2022-02-24 PROCEDURE — 97140 MANUAL THERAPY 1/> REGIONS: CPT | Performed by: PHYSICAL THERAPIST

## 2022-02-25 NOTE — PROGRESS NOTES
Daily Note     Today's date: 2022  Patient name: Christella Skiff  : 1960  MRN: [id number]  Referring provider: Dhara Fraga  Dx:   Encounter Diagnosis     ICD-10-CM    1  S/P cervical spinal fusion  Z98 1    2  Bilateral carpal tunnel syndrome  G56 03                   Subjective: Pt reports improved balance when getting dressed or taking a shower  Objective: See treatment diary below    Assessment: Pt demonstrated improved EC balance during functional trunk and UE movement  Plan: Continue per plan of care        Precautions: asthma, anemia, depression, DM II, HTN  Dx: s/p C4-7 fusion 8/3/21, B CTS    Manuals 2/3 2/8 2/10 2/15 2/17 2/22 2/24      Mahad OWENS carpal tunnel 4 min ea 4 min ea 4 min ea 4 min ea 4 min ea 4 min   ea 4 min ea      Manual medial nerve glides             [facility]                          Neuro Re-Ed             Seated c/s retraction 1x15 1x15 1x15 1x15 1x15 1x15 1x15                   Seated c/s retraction with rotation 1x15 1x15 1x15 1x15 1x15 1x15 1x15      DNF 3"x5  nv 3"x10 nv 3"x10 4"x10      Tandem stance 2x30" ea            Static st on foam 30" x2  With head turns NBOS  Nods/head turns  30"x2 ea    NP       FT EC B/L shoulder FROM   1x3 ea 1x10 ea 1x10 ea 1x10 ea Foam  ABD only  1x10      FT EC L/S FF AROM   1x3 1x10  1x10 1x10 Foam  FA EC  1x10      FT EC B/L L/S rotation AROM   1x3 ea 1x10 ea 1x10 ea 1x10 ea Foam  1x10      BioDex FT EC L12  60"x2 L12  60"x2 L=S  60"x2 L=S  60"x2 L=S  60"x2 L=S  60"x2 L12  60"x2      BioDex FT EO head turns   L=S  Gaze stab  60"x2  VOR cx  60"x1 L=S  Gaze stab  60"x2  VOR cx  60"x1 L=S  Gaze stab  60"x2  VOR cx  60"x1 L=S  Gaze stab  60"x2  VOR cx  60"x1 L=S  Gaze stab  60"x1  VOR cx  60"x1      BioDex SLS EO L=S  60"x2 L=S  60"x2 L=S  60"x1 L=S  60"x1 L=S  60"x1 L=S  60"x1 L=S  60"x1                   Ther Ex             Median nerve glides HEP            Carpal tunnel glides HEP            STS 2x12 2x12  2x12 2x12 2x12 Ther Activity                                       Gait Training             Gait training for endurance 1000 feet 1000 feet 6MWT  850 ft 1000  ft 1050 ft NP       Step-ups/  downs 8"/  2x10 ea 8"/  2x10 ea np   NP       Gait training with horizontal gaze stab   7x20 ft 7x20 ft 7x20 ft 7x20 ft 6x20 ft      Obstacle course: cone weaving, low hurdles, tandem walking nv  nv 200  ft 200 ft 400ft 300 ft      Modalities

## 2022-03-01 ENCOUNTER — APPOINTMENT (OUTPATIENT)
Dept: PHYSICAL THERAPY | Facility: CLINIC | Age: 62
End: 2022-03-01
Payer: COMMERCIAL

## 2022-03-01 ENCOUNTER — OFFICE VISIT (OUTPATIENT)
Dept: INTERNAL MEDICINE CLINIC | Facility: CLINIC | Age: 62
End: 2022-03-01
Payer: COMMERCIAL

## 2022-03-01 ENCOUNTER — NURSE TRIAGE (OUTPATIENT)
Dept: OTHER | Facility: OTHER | Age: 62
End: 2022-03-01

## 2022-03-01 VITALS
RESPIRATION RATE: 18 BRPM | TEMPERATURE: 98.4 F | HEART RATE: 80 BPM | WEIGHT: 210.4 LBS | DIASTOLIC BLOOD PRESSURE: 72 MMHG | SYSTOLIC BLOOD PRESSURE: 148 MMHG | BODY MASS INDEX: 37.28 KG/M2 | HEIGHT: 63 IN | OXYGEN SATURATION: 98 %

## 2022-03-01 DIAGNOSIS — J01.41 ACUTE RECURRENT PANSINUSITIS: Primary | ICD-10-CM

## 2022-03-01 PROCEDURE — 3077F SYST BP >= 140 MM HG: CPT | Performed by: INTERNAL MEDICINE

## 2022-03-01 PROCEDURE — 3078F DIAST BP <80 MM HG: CPT | Performed by: INTERNAL MEDICINE

## 2022-03-01 PROCEDURE — 99213 OFFICE O/P EST LOW 20 MIN: CPT | Performed by: INTERNAL MEDICINE

## 2022-03-01 RX ORDER — AMOXICILLIN AND CLAVULANATE POTASSIUM 875; 125 MG/1; MG/1
1 TABLET, FILM COATED ORAL EVERY 12 HOURS SCHEDULED
Qty: 20 TABLET | Refills: 0 | Status: SHIPPED | OUTPATIENT
Start: 2022-03-01 | End: 2022-03-11

## 2022-03-01 NOTE — PROGRESS NOTES
Assessment/Plan:  Continue Tussin  May use humidifier  Call if symptoms do not improve or worsen       Problem List Items Addressed This Visit     None      Visit Diagnoses     Acute recurrent pansinusitis    -  Primary    Relevant Medications    amoxicillin-clavulanate (Augmentin) 875-125 mg per tablet            Subjective:      Patient ID: Carina Mancilla is a 62 y o  female  HPI  2 days of a cough sinus congestion thick clear  drainage sore throat  Symptoms she get yearly  No fever chills  No SOB  Taking Tussin and cough drops  Home COVID test negative  Boosted and vaccinated    The following portions of the patient's history were reviewed and updated as appropriate: allergies, current medications, past family history, past medical history, past social history, past surgical history and problem list     Review of Systems   Constitutional: Negative for chills and fever  HENT: Positive for congestion, sinus pressure and sore throat  Respiratory: Positive for cough  Negative for shortness of breath  Gastrointestinal: Negative for diarrhea, nausea and vomiting  Objective:      /72   Pulse 80   Temp 98 4 °F (36 9 °C)   Resp 18   Ht 5' 3" (1 6 m)   Wt 95 4 kg (210 lb 6 4 oz)   SpO2 98%   BMI 37 27 kg/m²          Physical Exam  Constitutional:       General: She is not in acute distress  Appearance: She is well-developed  She is obese  She is not ill-appearing, toxic-appearing or diaphoretic  HENT:      Head: Normocephalic and atraumatic  Right Ear: External ear normal  There is no impacted cerumen  Left Ear: External ear normal  There is no impacted cerumen  Eyes:      Conjunctiva/sclera: Conjunctivae normal    Cardiovascular:      Rate and Rhythm: Normal rate and regular rhythm  Heart sounds: Normal heart sounds  No murmur heard  Pulmonary:      Effort: Pulmonary effort is normal  No respiratory distress  Breath sounds: Normal breath sounds   No wheezing or rales  Abdominal:      Palpations: Abdomen is soft  Tenderness: There is no abdominal tenderness  Musculoskeletal:      Cervical back: Neck supple  Right lower leg: No edema  Left lower leg: No edema  Skin:     General: Skin is warm and dry  Neurological:      Mental Status: She is alert and oriented to person, place, and time  Psychiatric:         Mood and Affect: Mood normal          Behavior: Behavior normal          Thought Content:  Thought content normal          Judgment: Judgment normal

## 2022-03-01 NOTE — TELEPHONE ENCOUNTER
Regarding: Sore throat runny nose fatigue  ----- Message from Rodolfo Naranjo sent at 3/1/2022  8:55 AM EST -----  "I have a sore throat, runny nose, and fatigue   I took an at home covid test and it's negative "

## 2022-03-01 NOTE — TELEPHONE ENCOUNTER
Reason for Disposition   Lots of coughing    Additional Information   Negative: [1] Sinus pain (not just congestion) AND [2] fever    Answer Assessment - Initial Assessment Questions  1  LOCATION: "Where does it hurt?"       Upper teeth and around her eyes  2  ONSET: "When did the sinus pain start?"  (e g , hours, days)       Sunday  3  SEVERITY: "How bad is the pain?"   (Scale 1-10; mild, moderate or severe)    - MILD (1-3): doesn't interfere with normal activities     - MODERATE (4-7): interferes with normal activities (e g , work or school) or awakens from sleep    - SEVERE (8-10): excruciating pain and patient unable to do any normal activities         Moderate- Taking Motrin and Tylenol with no relief  4  RECURRENT SYMPTOM: "Have you ever had sinus problems before?" If Yes, ask: "When was the last time?" and "What happened that time?"       Yes-feel like a sinus infection again  5  NASAL CONGESTION: "Is the nose blocked?" If Yes, ask: "Can you open it or must you breathe through the mouth?"      Yes- tried OTC meds  6  NASAL DISCHARGE: "Do you have discharge from your nose?" If so ask, "What color?"      clear  7  FEVER: "Do you have a fever?" If Yes, ask: "What is it, how was it measured, and when did it start?"       No fever  8  OTHER SYMPTOMS: "Do you have any other symptoms?" (e g , sore throat, cough, earache, difficulty breathing)      Sore throat, dry cough  9   PREGNANCY: "Is there any chance you are pregnant?" "When was your last menstrual period?"      N/A    Protocols used: SINUS PAIN OR CONGESTION-ADULT-

## 2022-03-03 ENCOUNTER — APPOINTMENT (OUTPATIENT)
Dept: PHYSICAL THERAPY | Facility: CLINIC | Age: 62
End: 2022-03-03
Payer: COMMERCIAL

## 2022-03-05 DIAGNOSIS — I10 BENIGN ESSENTIAL HYPERTENSION: ICD-10-CM

## 2022-03-05 DIAGNOSIS — E78.5 DYSLIPIDEMIA: ICD-10-CM

## 2022-03-07 PROCEDURE — 4010F ACE/ARB THERAPY RXD/TAKEN: CPT | Performed by: INTERNAL MEDICINE

## 2022-03-07 RX ORDER — ATORVASTATIN CALCIUM 20 MG/1
TABLET, FILM COATED ORAL
Qty: 90 TABLET | Refills: 1 | Status: SHIPPED | OUTPATIENT
Start: 2022-03-07 | End: 2022-07-10

## 2022-03-07 RX ORDER — LISINOPRIL 5 MG/1
5 TABLET ORAL DAILY
Qty: 90 TABLET | Refills: 0 | Status: SHIPPED | OUTPATIENT
Start: 2022-03-07 | End: 2022-04-10

## 2022-03-08 ENCOUNTER — OFFICE VISIT (OUTPATIENT)
Dept: PHYSICAL THERAPY | Facility: CLINIC | Age: 62
End: 2022-03-08
Payer: COMMERCIAL

## 2022-03-08 DIAGNOSIS — G56.03 BILATERAL CARPAL TUNNEL SYNDROME: ICD-10-CM

## 2022-03-08 DIAGNOSIS — Z98.1 S/P CERVICAL SPINAL FUSION: Primary | ICD-10-CM

## 2022-03-08 PROCEDURE — 97116 GAIT TRAINING THERAPY: CPT

## 2022-03-08 PROCEDURE — 97140 MANUAL THERAPY 1/> REGIONS: CPT

## 2022-03-08 PROCEDURE — 97112 NEUROMUSCULAR REEDUCATION: CPT

## 2022-03-08 NOTE — PROGRESS NOTES
Daily Note     Today's date: 3/8/2022  Patient name: Kristin Dolan  : 1960  MRN: [id number]  Referring provider: Jessy Hansen  Dx:   Encounter Diagnosis     ICD-10-CM    1  S/P cervical spinal fusion  Z98 1    2  Bilateral carpal tunnel syndrome  G56 03        Start Time: 1730  Stop Time: 1815  Total time in clinic (min): 45 minutes    Subjective: Patient reports, "My L hand has been feeling good but my R is the same"  Objective: See treatment diary below    Assessment: Patient required CG t/o therapy for safety  Therapist min a required during tandem walking  Otherwise, Teresa Najera was able to self correct balance with steppage or utilizing the hand railing  Plan: Continue per plan of care        Precautions: asthma, anemia, depression, DM II, HTN  Dx: s/p C4-7 fusion 8/3/21, B CTS    Manuals 2/3 2/8 2/10 2/15 2/17 2/22 2/24 3/8     [place] B carpal tunnel 4 min ea 4 min ea 4 min ea 4 min ea 4 min ea 4 min   ea 4 min ea 4 min ea     Manual medial nerve glides             [facility]                          Neuro Re-Ed             Seated c/s retraction 1x15 1x15 1x15 1x15 1x15 1x15 1x15 1x15                  Seated c/s retraction with rotation 1x15 1x15 1x15 1x15 1x15 1x15 1x15 1x15     DNF 3"x5  nv 3"x10 nv 3"x10 4"x10 4"x10     Tandem stance 2x30" ea            Static st on foam 30" x2  With head turns NBOS  Nods/head turns  30"x2 ea    NP       FT EC B/L shoulder FROM   1x3 ea 1x10 ea 1x10 ea 1x10 ea Foam  ABD only  1x10 Foam  ABD only  1x10     FT EC L/S FF AROM   1x3 1x10  1x10 1x10 Foam  FA EC  1x10 Foam  FA EC  1x10     FT EC B/L L/S rotation AROM   1x3 ea 1x10 ea 1x10 ea 1x10 ea Foam  1x10 Foam  1x10     BioDex FT EC L12  60"x2 L12  60"x2 L=S  60"x2 L=S  60"x2 L=S  60"x2 L=S  60"x2 L12  60"x2 L12  60"x2     BioDex FT EO head turns   L=S  Gaze stab  60"x2  VOR cx  60"x1 L=S  Gaze stab  60"x2  VOR cx  60"x1 L=S  Gaze stab  60"x2  VOR cx  60"x1 L=S  Gaze stab  60"x2  VOR cx  60"x1 L=S  Gaze stab  60"x1  VOR cx  60"x1 L=S  Gaze stab  60"x1  VOR cx  60"x1     BioDex SLS EO L=S  60"x2 L=S  60"x2 L=S  60"x1 L=S  60"x1 L=S  60"x1 L=S  60"x1 L=S  60"x1 L=S  60"x1                  Ther Ex             Median nerve glides HEP            Carpal tunnel glides HEP            STS 2x12 2x12  2x12 2x12 2x12                                                                        Ther Activity                                       Gait Training             Gait training for endurance 1000 feet 1000 feet 6MWT  850 ft 1000  ft 1050 ft NP       Step-ups/  downs 8"/  2x10 ea 8"/  2x10 ea np   NP       Gait training with horizontal gaze stab   7x20 ft 7x20 ft 7x20 ft 7x20 ft 6x20 ft 6x20 ft     Obstacle course: cone weaving, low hurdles, tandem walking nv  nv 200  ft 200 ft 400ft 300 ft 300 ft     Modalities

## 2022-03-09 ENCOUNTER — RA CDI HCC (OUTPATIENT)
Dept: OTHER | Facility: HOSPITAL | Age: 62
End: 2022-03-09

## 2022-03-09 NOTE — PROGRESS NOTES
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

## 2022-03-10 ENCOUNTER — OFFICE VISIT (OUTPATIENT)
Dept: PHYSICAL THERAPY | Facility: CLINIC | Age: 62
End: 2022-03-10
Payer: COMMERCIAL

## 2022-03-10 DIAGNOSIS — Z98.1 S/P CERVICAL SPINAL FUSION: Primary | ICD-10-CM

## 2022-03-10 DIAGNOSIS — G56.03 BILATERAL CARPAL TUNNEL SYNDROME: ICD-10-CM

## 2022-03-10 PROCEDURE — 97110 THERAPEUTIC EXERCISES: CPT | Performed by: PHYSICAL THERAPIST

## 2022-03-10 PROCEDURE — 97140 MANUAL THERAPY 1/> REGIONS: CPT | Performed by: PHYSICAL THERAPIST

## 2022-03-10 PROCEDURE — 97112 NEUROMUSCULAR REEDUCATION: CPT | Performed by: PHYSICAL THERAPIST

## 2022-03-11 NOTE — PROGRESS NOTES
Daily Note     Today's date: 3/10/2022  Patient name: Parul Gomez  : 1960  MRN: [id number]  Referring provider: Girish Cunningham  Dx:   Encounter Diagnosis     ICD-10-CM    1  S/P cervical spinal fusion  Z98 1    2  Bilateral carpal tunnel syndrome  G56 03                   Subjective: Pt reports improved L hand numbness, now able to stand to dry off after a shower  Objective: See treatment diary below    Assessment: Pt demonstrated improved balance during functional activities, occasional mild staggering gait during gaze stabilization  Plan: Prepare for possible d/c in 2-4 visits       Precautions: asthma, anemia, depression, DM II, HTN  Dx: s/p C4-7 fusion 8/3/21, B CTS    Manuals 2/3 2/8 2/10 2/15 2/17 2/22 2/24 3/8 3/10    [place] B carpal tunnel 4 min ea 4 min ea 4 min ea 4 min ea 4 min ea 4 min   ea 4 min ea 4 min ea 4 min ea    Manual medial nerve glides             [facility]                          Neuro Re-Ed             Seated c/s retraction 1x15 1x15 1x15 1x15 1x15 1x15 1x15 1x15 1x15                 Seated c/s retraction with rotation 1x15 1x15 1x15 1x15 1x15 1x15 1x15 1x15 1x15    DNF 3"x5  nv 3"x10 nv 3"x10 4"x10 4"x10 5"x10    Tandem stance 2x30" ea            Static st on foam 30" x2  With head turns NBOS  Nods/head turns  30"x2 ea    NP       FT EC B/L shoulder FROM   1x3 ea 1x10 ea 1x10 ea 1x10 ea Foam  ABD only  1x10 Foam  ABD only  1x10     FT EC L/S FF AROM   1x3 1x10  1x10 1x10 Foam  FA EC  1x10 Foam  FA EC  1x10 Foam  FA EC  1x10    FT EC B/L L/S rotation AROM   1x3 ea 1x10 ea 1x10 ea 1x10 ea Foam  1x10 Foam  1x10     BioDex FT EC L12  60"x2 L12  60"x2 L=S  60"x2 L=S  60"x2 L=S  60"x2 L=S  60"x2 L12  60"x2 L12  60"x2 L12  60"x2    BioDex FT EO head turns   L=S  Gaze stab  60"x2  VOR cx  60"x1 L=S  Gaze stab  60"x2  VOR cx  60"x1 L=S  Gaze stab  60"x2  VOR cx  60"x1 L=S  Gaze stab  60"x2  VOR cx  60"x1 L=S  Gaze stab  60"x1  VOR cx  60"x1 L=S  Gaze stab  60"x1  VOR cx  60"x1 L=S  Gaze stab  60"x1  VOR cx  60"x1    BioDex SLS EO L=S  60"x2 L=S  60"x2 L=S  60"x1 L=S  60"x1 L=S  60"x1 L=S  60"x1 L=S  60"x1 L=S  60"x1 L=S  60"x1                 Ther Ex             Median nerve glides HEP            Carpal tunnel glides HEP            STS 2x12 2x12  2x12 2x12 2x12                                                                        Ther Activity                                       Gait Training             Gait training for endurance 1000 feet 1000 feet 6MWT  850 ft 1000  ft 1050 ft NP       Step-ups/  downs 8"/  2x10 ea 8"/  2x10 ea np   NP       Gait training with horizontal gaze stab and cone pick-ups   7x20 ft 7x20 ft 7x20 ft 7x20 ft 6x20 ft 6x20 ft 8x20 ft    Obstacle course: cone weaving, low hurdles, tandem walking nv  nv 200  ft 200 ft 400ft 300 ft 300 ft     Modalities

## 2022-03-15 ENCOUNTER — OFFICE VISIT (OUTPATIENT)
Dept: PHYSICAL THERAPY | Facility: CLINIC | Age: 62
End: 2022-03-15
Payer: COMMERCIAL

## 2022-03-15 DIAGNOSIS — Z98.1 S/P CERVICAL SPINAL FUSION: Primary | ICD-10-CM

## 2022-03-15 DIAGNOSIS — G56.03 BILATERAL CARPAL TUNNEL SYNDROME: ICD-10-CM

## 2022-03-15 PROCEDURE — 97116 GAIT TRAINING THERAPY: CPT

## 2022-03-15 PROCEDURE — 97140 MANUAL THERAPY 1/> REGIONS: CPT

## 2022-03-15 PROCEDURE — 97112 NEUROMUSCULAR REEDUCATION: CPT

## 2022-03-15 NOTE — PROGRESS NOTES
Daily Note     Today's date: 3/15/2022  Patient name: Jose Ricks  : 1960  MRN: [id number]  Referring provider: Marcos Stapleton  Dx:   Encounter Diagnosis     ICD-10-CM    1  S/P cervical spinal fusion  Z98 1    2  Bilateral carpal tunnel syndrome  G56 03                   Subjective: Pt reports improved 2nd and 3rd digit numbness in L hand, and increased numbness in R hand today  Objective: See treatment diary below    Assessment: Pt demonstrates increased difficulty with eyes closed balance interventions indicating increased visual reliance  Pt would benefit from continued proprioceptive and habituation training  Plan: Prepare for possible d/c in 2-4 visits       Precautions: asthma, anemia, depression, DM II, HTN  Dx: s/p C4-7 fusion 8/3/21, B CTS    Manuals 2/3 2/8 2/10 2/15 2/17 2/22 2/24 3/8 3/10 3/15   [place] B carpal tunnel 4 min ea 4 min ea 4 min ea 4 min ea 4 min ea 4 min   ea 4 min ea 4 min ea 4 min ea 4 min ea   Manual medial nerve glides             [facility]                          Neuro Re-Ed             Seated c/s retraction 1x15 1x15 1x15 1x15 1x15 1x15 1x15 1x15 1x15 1x15   Seated c/s retraction with rotation 1x15 1x15 1x15 1x15 1x15 1x15 1x15 1x15 1x15 1x15   DNF 3"x5  nv 3"x10 nv 3"x10 4"x10 4"x10 5"x10    Tandem stance 2x30" ea            Static st on foam 30" x2  With head turns NBOS  Nods/head turns  30"x2 ea    NP       FT EC B/L shoulder FROM   1x3 ea 1x10 ea 1x10 ea 1x10 ea Foam  ABD only  1x10 Foam  ABD only  1x10  Foam  ABD only  1x10   FT EC L/S FF AROM   1x3 1x10  1x10 1x10 Foam  FA EC  1x10 Foam  FA EC  1x10 Foam  FA EC  1x10 Foam  FA EC  1x10   FT EC B/L L/S rotation AROM   1x3 ea 1x10 ea 1x10 ea 1x10 ea Foam  1x10 Foam  1x10  Foam  1x10   BioDex FT EC L12  60"x2 L12  60"x2 L=S  60"x2 L=S  60"x2 L=S  60"x2 L=S  60"x2 L12  60"x2 L12  60"x2 L12  60"x2 L12  60"x2   BioDex FT EO head turns   L=S  Gaze stab  60"x2  VOR cx  60"x1 L=S  Gaze stab  60"x2  VOR cx  60"x1 L=S  Gaze stab  60"x2  VOR cx  60"x1 L=S  Gaze stab  60"x2  VOR cx  60"x1 L=S  Gaze stab  60"x1  VOR cx  60"x1 L=S  Gaze stab  60"x1  VOR cx  60"x1 L=S  Gaze stab  60"x1  VOR cx  60"x1 L=S  Gaze stab  60"x1  VOR cx  60"x1   BioDex SLS EO L=S  60"x2 L=S  60"x2 L=S  60"x1 L=S  60"x1 L=S  60"x1 L=S  60"x1 L=S  60"x1 L=S  60"x1 L=S  60"x1 L=S  60"x1                Ther Ex             Median nerve glides HEP            Carpal tunnel glides HEP            STS 2x12 2x12  2x12 2x12 2x12                                                                        Ther Activity                                       Gait Training             Gait training for endurance 1000 feet 1000 feet 6MWT  850 ft 1000  ft 1050 ft NP       Step-ups/  downs 8"/  2x10 ea 8"/  2x10 ea np   NP       Gait training with horizontal gaze stab and cone pick-ups   7x20 ft 7x20 ft 7x20 ft 7x20 ft 6x20 ft 6x20 ft 8x20 ft 8x20 ft   Obstacle course: cone weaving, low hurdles, tandem walking nv  nv 200  ft 200 ft 400ft 300 ft 300 ft     Modalities

## 2022-03-16 ENCOUNTER — OFFICE VISIT (OUTPATIENT)
Dept: INTERNAL MEDICINE CLINIC | Facility: CLINIC | Age: 62
End: 2022-03-16
Payer: COMMERCIAL

## 2022-03-16 VITALS
HEIGHT: 63 IN | BODY MASS INDEX: 37.07 KG/M2 | OXYGEN SATURATION: 92 % | WEIGHT: 209.2 LBS | DIASTOLIC BLOOD PRESSURE: 88 MMHG | HEART RATE: 78 BPM | RESPIRATION RATE: 16 BRPM | SYSTOLIC BLOOD PRESSURE: 138 MMHG

## 2022-03-16 DIAGNOSIS — E11.9 CONTROLLED TYPE 2 DIABETES MELLITUS WITHOUT COMPLICATION, WITHOUT LONG-TERM CURRENT USE OF INSULIN (HCC): ICD-10-CM

## 2022-03-16 DIAGNOSIS — E61.1 LOW IRON: ICD-10-CM

## 2022-03-16 DIAGNOSIS — E66.09 CLASS 1 OBESITY DUE TO EXCESS CALORIES WITH SERIOUS COMORBIDITY AND BODY MASS INDEX (BMI) OF 32.0 TO 32.9 IN ADULT: ICD-10-CM

## 2022-03-16 DIAGNOSIS — R79.0 LOW MAGNESIUM LEVEL: ICD-10-CM

## 2022-03-16 DIAGNOSIS — Z00.00 ANNUAL PHYSICAL EXAM: Primary | ICD-10-CM

## 2022-03-16 PROCEDURE — 3008F BODY MASS INDEX DOCD: CPT | Performed by: INTERNAL MEDICINE

## 2022-03-16 PROCEDURE — 99396 PREV VISIT EST AGE 40-64: CPT | Performed by: INTERNAL MEDICINE

## 2022-03-16 PROCEDURE — 1036F TOBACCO NON-USER: CPT | Performed by: INTERNAL MEDICINE

## 2022-03-16 NOTE — PATIENT INSTRUCTIONS

## 2022-03-16 NOTE — PROGRESS NOTES
Lana    NAME: Bibi Cartagena  AGE: 62 y o  SEX: female  : 1960     DATE: 3/16/2022     Assessment and Plan:     Problem List Items Addressed This Visit        Endocrine    Controlled type 2 diabetes mellitus without complication ([facility] [address] )       Lab Results   Component Value Date    HGBA1C 7 2 10/12/2021   Patient does report me increase in her blood sugars in the 150-200 range on average reports me not doing as well on the diet and less exercise at this point time I would like her to meet with pharmacist Orly for consideration of Ozempic which could be helpful for her diabetes and also weight loss  I will be ordering diabetic laboratories including comprehensive metabolic panel, hemoglobin A1c, urine microalbumin, lipid panel  Relevant Orders    Ambulatory referral to clinical pharmacy       Other    Class 1 obesity due to excess calories with serious comorbidity and body mass index (BMI) of 32 0 to 32 9 in adult     Obesity -I have counseled patient following healthy and balanced diet, I would like the patient to lose weight, I would like the patient exercise routinely; we will continue monitor the patient's progress  Annual physical exam - Primary     Assessment and plan 1  Health maintenance annual wellness examination overall the patient is clinically stable and doing well, we encouraged the patient to follow a healthy and balanced diet  We recommend that the patient exercise routinely approximately 30 minutes 5 times per week   We have reviewed the patient's vaccines and have made recommendations for updates if necessary   consider the shingles vaccine     We will be ordering screening laboratories which are age appropriate  Return to the office in   three-month   call if any problems           Low iron     Currently on over-the-counter iron will have patient see GI for workup including colonoscopy         Relevant Orders    Ambulatory Referral to Gastroenterology    Low magnesium level     Restart magnesium oxide 400 mg once daily check magnesium level in 3 month         Relevant Orders    Magnesium        bs 180 avg  Immunizations and preventive care screenings were discussed with patient today  Appropriate education was printed on patient's after visit summary  Counseling:  · Exercise: the importance of regular exercise/physical activity was discussed  Recommend exercise 3-5 times per week for at least 30 minutes  No follow-ups on file  Chief Complaint:     Chief Complaint   Patient presents with    Annual Exam      History of Present Illness:     Adult Annual Physical   Patient here for a comprehensive physical exam  The patient reports no problems  Diet and Physical Activity  · Diet/Nutrition: poor diet  · Exercise: no formal exercise  Depression Screening  PHQ-2/9 Depression Screening         General Health  · Sleep: gets more than 8 hours of sleep on average  · Hearing: normal - bilateral   · Vision: goes for regular eye exams  · Dental: regular dental visits  /GYN Health  · Patient is: postmenopausal  · Last menstrual period: 7yrs approx    · Contraceptive method:      Review of Systems:     Review of Systems   Past Medical History:     Past Medical History:   Diagnosis Date    Abdominal pain     started 7/21/16- found something on the pancreas    Abnormal weight loss     last assessed [id number]    Acute sinusitis     Allergic rhinitis     last assessed [id number]    Anemia     Asthma     Cardiac murmur     Carpal tunnel syndrome     Cellulitis     Cervical disc disease     Colon polyp     CPAP (continuous positive airway pressure) dependence     no longer used    Degeneration of thoracic intervertebral disc     Degenerative arthritis of knee     Diabetes mellitus (HCC)     Diverticulosis     Esophagitis, reflux     last assessed [id number]    Excessive sweating     Fatty liver     Frequent falls     Gait disturbance     GERD (gastroesophageal reflux disease)     Granuloma annulare     Head injury     Hyperlipidemia     Hypertension     Hypertensive heart disease     Hypoglycemia     last assessed [id number]    Kidney stone     Leg length discrepancy     Leucocytosis     Lumbar stress fracture     Multiple thyroid nodules     Neuropathy     Obesity     last assessed [id number]    Paronychia of toe     Patellofemoral dysfunction     Pneumonia     Postmenopausal atrophic vaginitis     Prepatellar bursitis     Sebaceous cyst     last assessed [id number]    Sleep apnea     no CPAP used    Suicide attempt ([facility] [address] )     Tinea corporis     Type 2 diabetes mellitus ([facility] [address] )     cont meds and monitor BS;  last assessed [id number]    Urinary frequency     Vertigo     Vitamin D deficiency     Dia-Parkinson-White syndrome       Past Surgical History:     Past Surgical History:   Procedure Laterality Date    CERVICAL FUSION N/A 8/3/2021    Procedure: FUSION CERVICAL ANTERIOR W DISCECTOMY C4-5, C5-6, C6-7 with allograft and neuromonitoring  case length 3 hours; Surgeon: Sydney Salcido MD;  Location:  MAIN OR;  Service: Orthopedics    COLONOSCOPY      ESOPHAGOGASTRODUODENOSCOPY N/A 8/8/2016    Procedure: ESOPHAGOGASTRODUODENOSCOPY (EGD); Surgeon: Adamaris Taylor MD;  Location: [facility] GI LAB; Service:     AK ESOPHAGOGASTRODUODENOSCOPY TRANSORAL DIAGNOSTIC N/A 3/28/2019    Procedure: ESOPHAGOGASTRODUODENOSCOPY (EGD); Surgeon: Adamaris Taylor MD;  Location: [facility] GI LAB;   Service: Gastroenterology    TONSILLECTOMY      TOOTH EXTRACTION      wisdom teeth removed      Social History:     Social History     Socioeconomic History    Marital status: /Civil Union     Spouse name: None    Number of children: 0    Years of education: 12    Highest education level: 12th grade   Occupational History    Occupation: retired Tobacco Use    Smoking status: Never Smoker    Smokeless tobacco: Never Used   Vaping Use    Vaping Use: Never used   Substance and Sexual Activity    Alcohol use: Not Currently     Alcohol/week: 1 0 standard drink     Types: 1 Glasses of wine per week     Comment: rarely    Drug use: No    Sexual activity: Not Currently     Partners: Male   Other Topics Concern    None   Social History Narrative    Education: high school graduate    Learning Disabilities: none    Marital History:     Children: none    Living Arrangement: lives in home with     Occupational History: retired, 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[place] [address]: good support system,  is supportive    Legal History: none     History: None        Caffeine use    Denied home environment domestic violence    Job physical requirements heavy lifting     Social Determinants of Health     Financial Resource Strain: Low Risk     Difficulty of Paying Living Expenses: Not hard at all   Food Insecurity: No Food Insecurity    Worried About [facility] in the Last Year: Never true    [facility] in the Last Year: Never true   Transportation Needs: No Transportation Needs    Lack of Transportation (Medical): No    Lack of Transportation (Non-Medical): No   Physical Activity: Inactive    Days of Exercise per Week: 0 days    Minutes of Exercise per Session: 0 min   Stress: Stress Concern Present    Feeling of Stress : To some extent   Social Connections: Moderately Isolated    Frequency of Communication with Friends and Family: Once a week    Frequency of Social Gatherings with Friends and Family: Once a week    Attends Synagogue Services: Never    Active Member of Clubs or Organizations:  Yes    Attends Club or Organization Meetings: More than 4 times per year    Marital Status:    Intimate Partner Violence: Not At Risk    Fear of Current or Ex-Partner: No    Emotionally Abused: No    Physically Abused: No    Sexually Abused: No   Housing Stability: Low Risk     Unable to Pay for Housing in the Last Year: No    Number of Places Lived in the Last Year: 1    Unstable Housing in the Last Year: No      Family History:     Family History   Problem Relation Age of Onset    Cancer Mother         lung    Heart disease Mother     Coronary artery disease Mother         CABG    Diabetes Mother     Lung cancer Mother 76        smoker    Stroke Mother     Thyroid disease Mother     Other Mother         tobacco use    Parkinsonism Father     Intestinal polyp Father     Bipolar disorder Father     Heart disease Father     Prostate cancer Father 62    Hypertension Sister     Alcohol abuse Sister     Depression Brother     Heart disease Brother     Diabetes Maternal Grandmother     Thyroid disease Maternal Grandmother     Hypertension Family     Suicide Attempts Paternal Aunt     Depression Paternal Aunt     Endometrial cancer Maternal Aunt         unknown age   [facility] Ovarian cancer Cousin 36        maternal    Heart disease Brother     Seizures Brother       Current Medications:     Current Outpatient Medications   Medication Sig Dispense Refill    ARIPiprazole (ABILIFY) 10 mg tablet Take 1 tablet (10 mg total) by mouth every evening 90 tablet 2    atorvastatin (LIPITOR) 20 mg tablet TAKE 1 TABLET BY MOUTH EVERY DAY 90 tablet 1    Cholecalciferol 4000 units CAPS Take 1 capsule (4,000 Units total) by mouth daily (Patient taking differently: Take 1,000 Units by mouth daily )      fenofibrate (TRICOR) 145 mg tablet Take 1 tablet (145 mg total) by mouth daily 90 tablet 3    FLUoxetine (PROzac) 20 mg capsule Take 1 capsule (20 mg total) by mouth daily Take with the 40 mg cap for a total dose of 60 mg daily 90 capsule 1    FLUoxetine (PROzac) 40 MG capsule Take 1 capsule (40 mg total) by mouth daily Take with the 20 mg cap for a total dose of 60 mg daily 90 capsule 1    lisinopril (ZESTRIL) 5 mg tablet TAKE 1 TABLET (5 MG TOTAL) BY MOUTH DAILY  90 tablet 0    metFORMIN (GLUCOPHAGE) 1000 MG tablet TAKE 1 TABLET BY MOUTH TWICE A DAY WITH MEALS 180 tablet 1    Omega 3 1200 MG CAPS Take 1,200 mg by mouth 2 (two) times a day        omeprazole (PriLOSEC) 40 MG capsule TAKE 1 CAPSULE BY MOUTH EVERY DAY 90 capsule 1    glipiZIDE (GLUCOTROL) 5 mg tablet Take 1 tablet (5 mg total) by mouth daily with breakfast (Patient not taking: Reported on 3/16/2022 ) 90 tablet 3    magnesium oxide (MAG-OX) 400 mg tablet TAKE 1 TABLET BY MOUTH EVERY DAY (Patient not taking: Reported on 3/1/2022) 30 tablet 1     No current facility-administered medications for this visit  Allergies: Allergies   Allergen Reactions    Bee Venom Swelling     Local swelling at sting site    Clarithromycin Itching    Depakote Er [Divalproex Sodium Er]      pancreatitis    Shellfish-Derived Products - Food Allergy Other (See Comments)     Ears and cheeks got red    Tanzeum [Albiglutide]      pancreatitis    Carbamazepine Other (See Comments)     Reaction Date:Unknown    Lamotrigine Rash      Physical Exam:     /88   Pulse 78   Resp 16   Ht 5' 3" (1 6 m)   Wt 94 9 kg (209 lb 3 2 oz)   SpO2 92%   BMI 37 06 kg/m²     Physical Exam  Constitutional:       Appearance: She is well-developed  HENT:      Head: Normocephalic and atraumatic  Right Ear: External ear normal       Left Ear: External ear normal       Nose: Nose normal    Eyes:      Pupils: Pupils are equal, round, and reactive to light  Cardiovascular:      Rate and Rhythm: Normal rate and regular rhythm  Heart sounds: Normal heart sounds  No murmur heard  Pulmonary:      Effort: Pulmonary effort is normal       Breath sounds: Normal breath sounds  Abdominal:      General: There is no distension  Palpations: Abdomen is soft  Tenderness: There is no abdominal tenderness  There is no guarding  Rylie Cardoso DO  [facility]

## 2022-03-16 NOTE — ASSESSMENT & PLAN NOTE
Lab Results   Component Value Date    HGBA1C 7 2 10/12/2021   Patient does report me increase in her blood sugars in the 150-200 range on average reports me not doing as well on the diet and less exercise at this point time I would like her to meet with pharmacist [facility] for consideration of Ozempic which could be helpful for her diabetes and also weight loss  I will be ordering diabetic laboratories including comprehensive metabolic panel, hemoglobin A1c, urine microalbumin, lipid panel

## 2022-03-16 NOTE — ASSESSMENT & PLAN NOTE
Assessment and plan 1  Health maintenance annual wellness examination overall the patient is clinically stable and doing well, we encouraged the patient to follow a healthy and balanced diet  We recommend that the patient exercise routinely approximately 30 minutes 5 times per week   We have reviewed the patient's vaccines and have made recommendations for updates if necessary   consider the shingles vaccine     We will be ordering screening laboratories which are age appropriate  Return to the office in   three-month   call if any problems

## 2022-03-17 ENCOUNTER — OFFICE VISIT (OUTPATIENT)
Dept: PHYSICAL THERAPY | Facility: CLINIC | Age: 62
End: 2022-03-17
Payer: COMMERCIAL

## 2022-03-17 DIAGNOSIS — G56.03 BILATERAL CARPAL TUNNEL SYNDROME: ICD-10-CM

## 2022-03-17 DIAGNOSIS — Z98.1 S/P CERVICAL SPINAL FUSION: Primary | ICD-10-CM

## 2022-03-17 PROCEDURE — 97116 GAIT TRAINING THERAPY: CPT | Performed by: PHYSICAL THERAPIST

## 2022-03-17 PROCEDURE — 97140 MANUAL THERAPY 1/> REGIONS: CPT | Performed by: PHYSICAL THERAPIST

## 2022-03-17 PROCEDURE — 97112 NEUROMUSCULAR REEDUCATION: CPT | Performed by: PHYSICAL THERAPIST

## 2022-03-17 NOTE — PROGRESS NOTES
Daily Note     Today's date: 3/17/2022  Patient name: Nata Freeman  : 1960  MRN: [id number]  Referring provider: Rashmi Malcolm  Dx:   Encounter Diagnosis     ICD-10-CM    1  S/P cervical spinal fusion  Z98 1    2  Bilateral carpal tunnel syndrome  G56 03                   Subjective: Pt reports a resolution of L hand numbness except in her thumb and 1st finger, no change on the R  She reports no falls and good balance during functional mobility and ADLs  Objective: See treatment diary below    Assessment: Pt demonstrated good balance with forward bending tasks and improved L hand irritability  Plan: Prepare for possible d/c in 4 visits       Precautions: asthma, anemia, depression, DM II, HTN  Dx: s/p C4-7 fusion 8/3/21, B CTS    Manuals 3/17         3/15   Mahad B carpal tunnel 4 min ea         4 min ea   Manual medial nerve glides             B [facility]                          Neuro Re-Ed             Seated c/s retraction HEP         1x15   Seated c/s retraction with rotation HEP         1x15   DNF 7"x10            BioDex tandem EO horizontal gaze stab L=S  60"x1                                   Foam  ABD only  1x10   FT EC L/S FF AROM Foam  FA EC  1x10         Foam  FA EC  1x10             Foam  1x10   BioDex FT EC L12  60"x2         L12  60"x2   BioDex FT EO head turns          L=S  Gaze stab  60"x1  VOR cx  60"x1   BioDex SLS EO L=S  60"x1         L=S  60"x1                Ther Ex                                                                                                                     Ther Activity                                       Gait Training             Gait training for endurance             Step-ups/  downs             Gait training: horizontal gaze stab, tandem walk, cone pick-ups 12x20 ft         8x20 ft   Obstacle course: cone weaving, low hurdles, tandem walking             Modalities

## 2022-03-22 ENCOUNTER — OFFICE VISIT (OUTPATIENT)
Dept: PHYSICAL THERAPY | Facility: CLINIC | Age: 62
End: 2022-03-22
Payer: COMMERCIAL

## 2022-03-22 DIAGNOSIS — G56.03 BILATERAL CARPAL TUNNEL SYNDROME: ICD-10-CM

## 2022-03-22 DIAGNOSIS — Z98.1 S/P CERVICAL SPINAL FUSION: Primary | ICD-10-CM

## 2022-03-22 PROCEDURE — 97112 NEUROMUSCULAR REEDUCATION: CPT

## 2022-03-22 PROCEDURE — 97140 MANUAL THERAPY 1/> REGIONS: CPT

## 2022-03-22 NOTE — PROGRESS NOTES
Daily Note     Today's date: 3/22/2022  Patient name: Yoshi Cardona  : 1960  MRN: [id number]  Referring provider: Carlene Yancey  Dx:   Encounter Diagnosis     ICD-10-CM    1  S/P cervical spinal fusion  Z98 1    2  Bilateral carpal tunnel syndrome  G56 03        Start Time: 1800  Stop Time: 1840  Total time in clinic (min): 40 minutes    Subjective: Patient states, "My L hand is doing well  I only have a little numbness  My R side is a 5/10 but better after therapy"  Objective: See treatment diary below    Assessment: Patient most challenged with EC tasks this visit  Did well during balance course with little therapist assistance required  Continue to progress as able  Plan: Prepare for possible d/c in 3 visits       Precautions: asthma, anemia, depression, DM II, HTN  Dx: s/p C4-7 fusion 8/3/21, B CTS    Manuals 3/17 3/22        3/15   Mahad B carpal tunnel 4 min ea 4 min ea        4 min ea   Manual medial nerve glides             [facility]                          Neuro Re-Ed             Seated c/s retraction HEP         1x15   Seated c/s retraction with rotation HEP         1x15   DNF 7"x10 7"x10            BioDex tandem EO horizontal gaze stab L=S  60"x1 L=S  60"x1                                  Foam  ABD only  1x10   FT EC L/S FF AROM Foam  FA EC  1x10 Foam  FA EC  1x10        Foam  FA EC  1x10             Foam  1x10   BioDex FT EC L12  60"x2 L12  60"x2        L12  60"x2   BioDex FT EO head turns          L=S  Gaze stab  60"x1  VOR cx  60"x1   BioDex SLS EO L=S  60"x1 L=S  60"x1        L=S  60"x1                Ther Ex                                                                                                                     Ther Activity                                       Gait Training             Gait training for endurance             Step-ups/  downs             Gait training: horizontal gaze stab, tandem walk, cone pick-ups 12x20 ft 12x20 ft        8x20 ft Obstacle course: cone weaving, low hurdles, tandem walking             Modalities

## 2022-03-24 ENCOUNTER — OFFICE VISIT (OUTPATIENT)
Dept: PHYSICAL THERAPY | Facility: CLINIC | Age: 62
End: 2022-03-24
Payer: COMMERCIAL

## 2022-03-24 DIAGNOSIS — G56.03 BILATERAL CARPAL TUNNEL SYNDROME: Primary | ICD-10-CM

## 2022-03-24 DIAGNOSIS — Z98.1 S/P CERVICAL SPINAL FUSION: ICD-10-CM

## 2022-03-24 PROCEDURE — 97140 MANUAL THERAPY 1/> REGIONS: CPT | Performed by: PHYSICAL THERAPIST

## 2022-03-24 PROCEDURE — 97112 NEUROMUSCULAR REEDUCATION: CPT | Performed by: PHYSICAL THERAPIST

## 2022-03-24 NOTE — PROGRESS NOTES
Daily Note     Today's date: 3/24/2022  Patient name: Derek Cheema  : 1960  MRN: [id number]  Referring provider: Shelia Healy PA-C  Dx:   Encounter Diagnosis     ICD-10-CM    1  Bilateral carpal tunnel syndrome  G56 03    2  S/P cervical spinal fusion  Z98 1                   Subjective: Pt reports good functional balance, mild L hand numbness  Objective: See treatment diary below    Assessment: Pt demonstrated improved gait stability in tandem and with head turns  Plan: Prepare for d/c in 2 visits       Precautions: asthma, anemia, depression, DM II, HTN  Dx: s/p C4-7 fusion 8/3/21, B CTS    Manuals 3/17 3/22 3/24       3/15   Mahad OWENS carpal tunnel 4 min ea 4 min ea 4 min ea       4 min ea   Manual medial nerve glides             [facility]                          Neuro Re-Ed             Seated c/s retraction HEP         1x15   Seated c/s retraction with rotation HEP         1x15   DNF 7"x10 7"x10  8"x10          BioDex tandem EO horizontal gaze stab L=S  60"x1 L=S  60"x1 L=S  60"x1                                 Foam  ABD only  1x10   FT EC L/S FF AROM Foam  FA EC  1x10 Foam  FA EC  1x10 Foam  FA EC  1x10       Foam  FA EC  1x10             Foam  1x10   BioDex FT EC L12  60"x2 L12  60"x2 L12  60"x2       L12  60"x2   BioDex FT EO head turns          L=S  Gaze stab  60"x1  VOR cx  60"x1   BioDex SLS EO L=S  60"x1 L=S  60"x1 L=S  60"X1       L=S  60"x1                Ther Ex                                                                                                                     Ther Activity                                       Gait Training             Gait training for endurance             Step-ups/  downs             Gait training: horizontal gaze stab, tandem walk, cone pick-ups 12x20 ft 12x20 ft 4x45 ft       8x20 ft   Obstacle course: cone weaving, low hurdles, tandem walking             Modalities

## 2022-03-28 ENCOUNTER — CLINICAL SUPPORT (OUTPATIENT)
Dept: INTERNAL MEDICINE CLINIC | Facility: CLINIC | Age: 62
End: 2022-03-28
Payer: COMMERCIAL

## 2022-03-28 DIAGNOSIS — E11.9 CONTROLLED TYPE 2 DIABETES MELLITUS WITHOUT COMPLICATION, WITHOUT LONG-TERM CURRENT USE OF INSULIN (HCC): ICD-10-CM

## 2022-03-28 LAB — SL AMB POCT HEMOGLOBIN AIC: 8.9 (ref ?–6.5)

## 2022-03-28 PROCEDURE — 3052F HG A1C>EQUAL 8.0%<EQUAL 9.0%: CPT | Performed by: INTERNAL MEDICINE

## 2022-03-28 PROCEDURE — 83036 HEMOGLOBIN GLYCOSYLATED A1C: CPT | Performed by: PHARMACIST

## 2022-03-28 RX ORDER — MULTIVIT-MIN/IRON FUM/FOLIC AC 7.5 MG-4
1 TABLET ORAL DAILY
COMMUNITY

## 2022-03-28 RX ORDER — ACETAMINOPHEN 500 MG
500 TABLET ORAL 3 TIMES DAILY
COMMUNITY

## 2022-03-28 RX ORDER — IBUPROFEN 200 MG
400 TABLET ORAL
COMMUNITY

## 2022-03-28 NOTE — PROGRESS NOTES
Maryjo Pharmacist    Reason for visit: Appointment with 62y o  year old for management of T2DM  Current DM Regimen:   Metformin    glipizide    ASSESSMENT/PLAN                                                                                     1  Type 2 diabetes: goal A1c <6 5% based on AACE/ACE guidelines  Most recent A1c above goal     Reported FBG elevated without hypoglycemia  Patient with hyperglycemia signs/symptoms  Patient with possible adverse drug reaction to Tanzeum; per chart review, patient continued to have gastric signs/symptoms after stopping medication so uncertain if it was related to Tanzeum use  Endoscopy in 2016 showed stomach polyps which could have been contributing to signs/symptoms  Could consider retrial with GLP1 but recommend close monitoring  Could also consider retrial with SGLT2i  Possibility for limited benefit with glipizide given duration  Room for lifestyle modifications  BMP shows hyperglycemia     Duration: > 10 years  Microvascular complications: microalbuminuria, mild retinopathy  Macrovascular complications: none  (No) Aspirin (Yes ) Statin (Yes ) ACEI/ARB  Eye Exam:    Lab Results   Component Value Date    LEFTDIABRET Mild 2020    RIGHTDIABRET None 2020   Foot Exam: 2021     Most recent labs and diabetes goals discussed with patient in clinic today   MEDICATIONS: patient would prefer to trial SGLT2i over GLP1 as she previously tolerated Jardiance  Patient would be willing to consider retrial of GLP1 in future if PCP is, aware risks and need for close monitoring    o If PCP is in agreeance, will start Jardiance 25mg daily; continue metformin and glipizide  o Will pend rx for PCP signature/concurrence   SMBx/day, fasting - will work up to more frequent monitoring   TLCs: Patient will try increasing physical activity by walking around block    2   Hyperlipidemia without clinical ASCVD event: 2018 ACC/AHA lipid guidelines recommend at least moderate statin  Patient currently on moderate intensity (started 2014); with new muscle aches  Also on fenofibrate but has been on this since 2012  Lipid panel/LFTs show elevated TGs; due to be updated   Continue as prescribed; may consider trial off fenofibrate in future  3  Medication Reconciliation: Medication list reviewed with pt at today's visit  Discrepancies as discussed below   Medication list updated to reflect medications pt is currently taking    Follow-Up: 1 month  _x_ Home Monitoring Records, BG      SUBJECTIVE                                                                                                            Diagnosed with diabetes: > 10 years  ASCVD History: denies    Previous DM medications/tolerability:    Tanzeum: discontinued 7/2016 due to pancreatitis    Jardiance    1  Medication Adherence: Medication list reviewed with patient, reports the following discrepancies/problems:   Acetaminophen: three times daily    Ibuprofen: takes every other day for breakthrough pain through acetaminophen    magnesium oxide: continues to take daily, previously helped with muscle cramps but no longer helping over the past few weeks   MVI: takes daily for iron component, contains around 18mg elemental iron  Has appointment with GI in 5/2022 to review possible etiologies for reduced blood counts    2  Medication Efficacy:    Review of Systems   Constitutional: Positive for fatigue  Negative for activity change and appetite change  Gastrointestinal: Positive for diarrhea (but tolerable, usually every day)  Negative for constipation, nausea and vomiting  Endocrine: Positive for polydipsia and polyuria  Negative for polyphagia  Musculoskeletal: Positive for myalgias (slight increase over the past few weeks)  Negative for back pain, neck pain and neck stiffness       SMBG readings (no log, per memory): has not been checking regularly   FB this morning - was 170-200 prior to restarting glipizide    SMBG:   Glucometer: Yes, Brand: Accu-chek    Hypoglycemia history: 0 SMBG readings < 70mg/dL since last appt   Has glucose tablets: No   Knows how to treat: Yes   Unsymptomatic hypoglycemia: No, reports hypoglycemia s/sx x0 since last appt    3  Lifestyle: retired, previously nursing assistant  Reports limited physical activity due to pain  Currently participating in physical therapy 2x/week but no other exercise besides this  Family Support:     Diet Recall      B - Food: cheerios + banana + coffee (cream/Splenda)     L - Food: sandwich + soup     D - Food: broccoli + chicken + mayra pasta     Daily Beverages: tea (crystal light)     Snacks: usually has snacks prior to bed but didn't have any last night     Exercise: no formal routine - stopped because of balance issues previously     Social History     Tobacco Use   Smoking Status Never Smoker   Smokeless Tobacco Never Used     Social History     Substance and Sexual Activity   Alcohol Use Not Currently    Alcohol/week: 1 0 standard drink    Types: 1 Glasses of wine per week    Comment: rarely          OBJECTIVE                                                                                                      Pertinent Lab Data:    Lab Results   Component Value Date    SODIUM 138 2021    K 4 9 2021    EGFR 97 2021    CREATININE 0 66 2021    GLUF 100 (H) 10/17/2018    [id number] 344 07/10/2016    MICROALBCRE 61 (H) 10/17/2018     Lab Results   Component Value Date    HGBA1C 7 2 10/12/2021    HGBA1C 7 9 (H) 2021    HGBA1C 6 2 (H) 2021         Reason For Outreach  Embedded Pharmacist    Demographics  Interaction Method: Face to Face  Type of Intervention: New    Topic(s) Addressed  Diabetes;  Dyslipidemia    Intervention(s) Made    Pharmacologic:    -- Medication Initiation: Jardiance    Non-Pharmacologic:    -- Care coordination  -- Chart update  -- Home monitoring discussed or provided  -- Labs ordered: a1c  -- Lifestyle education    Tool(s) Used  Not Applicable    Time Spent:   Time Spent in Direct Patient Care: 35 minutes  Time Spent in Care Coordination: 25 minutes    Recommendations  Recipient: Patient/caregiver  Outcome:  All Accepted

## 2022-03-28 NOTE — PATIENT INSTRUCTIONS
BLOOD SUGAR TESTING   Please test your blood sugar:  1Times per day     When to test your blood sugar:   Before Breakfast   Target Blood sugar range: Before Meals: , 2 hours after meals: < 180   Call if your blood sugar is less than 60     Glipizide: continue taking once daily  Metformin: continue taking twice daily  Jardiance: start taking in morning

## 2022-03-29 ENCOUNTER — OFFICE VISIT (OUTPATIENT)
Dept: PHYSICAL THERAPY | Facility: CLINIC | Age: 62
End: 2022-03-29
Payer: COMMERCIAL

## 2022-03-29 DIAGNOSIS — Z98.1 S/P CERVICAL SPINAL FUSION: ICD-10-CM

## 2022-03-29 DIAGNOSIS — G56.03 BILATERAL CARPAL TUNNEL SYNDROME: Primary | ICD-10-CM

## 2022-03-29 PROCEDURE — 97140 MANUAL THERAPY 1/> REGIONS: CPT

## 2022-03-29 PROCEDURE — 97112 NEUROMUSCULAR REEDUCATION: CPT

## 2022-03-29 NOTE — PROGRESS NOTES
Daily Note     Today's date: 3/29/2022  Patient name: Yoshi Cardona  : 1960  MRN: [id number]  Referring provider: Kd Sanders PA-C  Dx:   Encounter Diagnosis     ICD-10-CM    1  Bilateral carpal tunnel syndrome  G56 03    2  S/P cervical spinal fusion  Z98 1        Start Time: 1755  Stop Time: 1835  Total time in clinic (min): 40 minutes    Subjective: Patient reports R > L hand symptoms  Notes standing while showering with no LOB  Objective: See treatment diary below    Assessment: Patient demonstrated good balance t/o therapy with no LOB this visit  Plan: Prepare for d/c in 1 visit       Precautions: asthma, anemia, depression, DM II, HTN  Dx: s/p C4-7 fusion 8/3/21, B CTS    Manuals 3/17 3/22 3/24 3/29      3/15   [facility] carpal tunnel 4 min ea 4 min ea 4 min ea 4 min ea      4 min ea   Manual medial nerve glides             [facility]                          Neuro Re-Ed             Seated c/s retraction HEP         1x15   Seated c/s retraction with rotation HEP         1x15   DNF 7"x10 7"x10  8"x10 8"x10         BioDex tandem EO horizontal gaze stab L=S  60"x1 L=S  60"x1 L=S  60"x1 L=S  60"x1                                Foam  ABD only  1x10   FT EC L/S FF AROM Foam  FA EC  1x10 Foam  FA EC  1x10 Foam  FA EC  1x10 Foam  FA EC  1x10      Foam  FA EC  1x10             Foam  1x10   BioDex FT EC L12  60"x2 L12  60"x2 L12  60"x2 L12  60"x2      L12  60"x2   BioDex FT EO head turns          L=S  Gaze stab  60"x1  VOR cx  60"x1   BioDex SLS EO L=S  60"x1 L=S  60"x1 L=S  60"X1 L=S  60"x1      L=S  60"x1                Ther Ex                                                                                                                     Ther Activity                                       Gait Training             Gait training for endurance             Step-ups/  downs             Gait training: horizontal gaze stab, tandem walk, cone pick-ups 12x20 ft 12x20 ft 4x45 ft 4x45 ft      8x20 ft Obstacle course: cone weaving, low hurdles, tandem walking             Modalities

## 2022-03-31 ENCOUNTER — EVALUATION (OUTPATIENT)
Dept: PHYSICAL THERAPY | Facility: CLINIC | Age: 62
End: 2022-03-31
Payer: COMMERCIAL

## 2022-03-31 DIAGNOSIS — Z98.1 S/P CERVICAL SPINAL FUSION: ICD-10-CM

## 2022-03-31 DIAGNOSIS — G56.03 BILATERAL CARPAL TUNNEL SYNDROME: Primary | ICD-10-CM

## 2022-03-31 PROCEDURE — 97140 MANUAL THERAPY 1/> REGIONS: CPT | Performed by: PHYSICAL THERAPIST

## 2022-03-31 PROCEDURE — 97112 NEUROMUSCULAR REEDUCATION: CPT | Performed by: PHYSICAL THERAPIST

## 2022-03-31 NOTE — PROGRESS NOTES
Daily Note     Today's date: 3/31/2022  Patient name: Freeman Richards  : 1960  MRN: [id number]  Referring provider: Marcello Lynn PA-C  Dx:   Encounter Diagnosis     ICD-10-CM    1  Bilateral carpal tunnel syndrome  G56 03    2  S/P cervical spinal fusion  Z98 1                   Subjective: Pt no falls and good functional balance  She reports no c/s pain and improved L hand paraesthesias  Objective: See treatment diary below  Updated HEP    Assessment: Pt is appropriate for d/c from skilled PT services at this time  Plan: D/C to HEP       Precautions: asthma, anemia, depression, DM II, HTN  Dx: s/p C4-7 fusion 8/3/21, B CTS    Manuals 3/17 3/22 3/24 3/29 3/31        Mahad OWENS carpal tunnel 4 min ea 4 min ea 4 min ea 4 min ea 4 min ea        Manual medial nerve glides             [facility]                          Neuro Re-Ed             Seated c/s retraction HEP            Seated c/s retraction with rotation HEP            DNF 7"x10 7"x10  8"x10 8"x10 9"x10        BioDex tandem EO horizontal gaze stab L=S  60"x1 L=S  60"x1 L=S  60"x1 L=S  60"x1 L=S  60"x1                                  FT EC L/S FF AROM Foam  FA EC  1x10 Foam  FA EC  1x10 Foam  FA EC  1x10 Foam  FA EC  1x10 Foam  FA EC  1x10                     BioDex FT EC L12  60"x2 L12  60"x2 L12  60"x2 L12  60"x2 L12  60"x2        BioDex FT EO head turns             BioDex SLS EO L=S  60"x1 L=S  60"x1 L=S  60"X1 L=S  60"x1 L=S  60"x1                     Ther Ex                                                                                                                     Ther Activity                                       Gait Training             Gait training for endurance             Step-ups/  downs             Gait training: horizontal gaze stab, tandem walk, cone pick-ups 12x20 ft 12x20 ft 4x45 ft 4x45 ft      8x20 ft   Obstacle course: cone weaving, low hurdles, tandem walking             Modalities

## 2022-04-10 DIAGNOSIS — I10 BENIGN ESSENTIAL HYPERTENSION: ICD-10-CM

## 2022-04-10 PROCEDURE — 4010F ACE/ARB THERAPY RXD/TAKEN: CPT | Performed by: INTERNAL MEDICINE

## 2022-04-10 RX ORDER — LISINOPRIL 5 MG/1
5 TABLET ORAL DAILY
Qty: 90 TABLET | Refills: 0 | Status: SHIPPED | OUTPATIENT
Start: 2022-04-10

## 2022-04-11 DIAGNOSIS — E83.42 HYPOMAGNESEMIA: ICD-10-CM

## 2022-04-12 ENCOUNTER — APPOINTMENT (OUTPATIENT)
Dept: LAB | Facility: CLINIC | Age: 62
End: 2022-04-12
Payer: COMMERCIAL

## 2022-04-12 ENCOUNTER — OFFICE VISIT (OUTPATIENT)
Dept: LAB | Facility: CLINIC | Age: 62
End: 2022-04-12
Payer: COMMERCIAL

## 2022-04-12 DIAGNOSIS — Z01.812 PRE-PROCEDURE LAB EXAM: ICD-10-CM

## 2022-04-12 DIAGNOSIS — R79.0 LOW MAGNESIUM LEVEL: ICD-10-CM

## 2022-04-12 DIAGNOSIS — G56.01 CARPAL TUNNEL SYNDROME ON RIGHT: ICD-10-CM

## 2022-04-12 DIAGNOSIS — E11.9 CONTROLLED TYPE 2 DIABETES MELLITUS WITHOUT COMPLICATION, WITHOUT LONG-TERM CURRENT USE OF INSULIN (HCC): ICD-10-CM

## 2022-04-12 LAB
ANION GAP SERPL CALCULATED.3IONS-SCNC: 13 MMOL/L (ref 4–13)
BUN SERPL-MCNC: 14 MG/DL (ref 5–25)
CALCIUM SERPL-MCNC: 8.8 MG/DL (ref 8.3–10.1)
CHLORIDE SERPL-SCNC: 100 MMOL/L (ref 100–108)
CO2 SERPL-SCNC: 25 MMOL/L (ref 21–32)
CREAT SERPL-MCNC: 0.71 MG/DL (ref 0.6–1.3)
EST. AVERAGE GLUCOSE BLD GHB EST-MCNC: 197 MG/DL
GFR SERPL CREATININE-BSD FRML MDRD: 92 ML/MIN/1.73SQ M
GLUCOSE P FAST SERPL-MCNC: 154 MG/DL (ref 65–99)
HBA1C MFR BLD: 8.5 %
MAGNESIUM SERPL-MCNC: 1.6 MG/DL (ref 1.6–2.6)
POTASSIUM SERPL-SCNC: 4.4 MMOL/L (ref 3.5–5.3)
SODIUM SERPL-SCNC: 138 MMOL/L (ref 136–145)

## 2022-04-12 PROCEDURE — 36415 COLL VENOUS BLD VENIPUNCTURE: CPT

## 2022-04-12 PROCEDURE — 83735 ASSAY OF MAGNESIUM: CPT

## 2022-04-12 PROCEDURE — 80048 BASIC METABOLIC PNL TOTAL CA: CPT

## 2022-04-12 PROCEDURE — 83036 HEMOGLOBIN GLYCOSYLATED A1C: CPT

## 2022-04-12 PROCEDURE — 3052F HG A1C>EQUAL 8.0%<EQUAL 9.0%: CPT | Performed by: INTERNAL MEDICINE

## 2022-04-12 PROCEDURE — 93005 ELECTROCARDIOGRAM TRACING: CPT

## 2022-04-12 RX ORDER — MAGNESIUM OXIDE 400 MG/1
1 TABLET ORAL DAILY
Qty: 30 TABLET | Refills: 0 | Status: SHIPPED | OUTPATIENT
Start: 2022-04-12 | End: 2022-05-12 | Stop reason: SDUPTHER

## 2022-04-14 LAB
ATRIAL RATE: 70 BPM
P AXIS: 53 DEGREES
PR INTERVAL: 162 MS
QRS AXIS: -1 DEGREES
QRSD INTERVAL: 86 MS
QT INTERVAL: 406 MS
QTC INTERVAL: 438 MS
T WAVE AXIS: 37 DEGREES
VENTRICULAR RATE: 70 BPM

## 2022-04-14 PROCEDURE — 93010 ELECTROCARDIOGRAM REPORT: CPT | Performed by: INTERNAL MEDICINE

## 2022-04-25 ENCOUNTER — CLINICAL SUPPORT (OUTPATIENT)
Dept: INTERNAL MEDICINE CLINIC | Facility: CLINIC | Age: 62
End: 2022-04-25

## 2022-04-25 VITALS — BODY MASS INDEX: 36.03 KG/M2 | WEIGHT: 203.4 LBS

## 2022-04-25 DIAGNOSIS — E11.9 TYPE 2 DIABETES MELLITUS WITHOUT COMPLICATION, WITHOUT LONG-TERM CURRENT USE OF INSULIN (HCC): Primary | ICD-10-CM

## 2022-04-25 NOTE — PROGRESS NOTES
Maryjo, Pharmacist    Reason for visit: Appointment with 62y o  year old for management of T2DM  Current DM Regimen:  Metformin   Glipizide  Jardiance    ASSESSMENT/PLAN                                                                                     Type 2 diabetes: goal A1c <6 5% based on AACE/ACE guidelines  Most recent A1c above goal     FBG elevated but improving without true hypoglycemia episodes  Has had weight loss since last appointment due to lifestyle modifications  Patient with possible hyperglycemia signs/symptoms but improving   Patient with possible adverse drug reaction to Tanzeum; Could consider retrial with GLP1 but recommend close monitoring  May benefit from Pro CGM   BMP shows hyperglycemia    Carpal tunnel surgery in August     Duration: > 10 years  Microvascular complications: microalbuminuria, mild retinopathy  Macrovascular complications: none  (No) Aspirin (Yes ) Statin (Yes ) ACEI/ARB  Eye Exam:  Dr Zeeshan Hoffmann, Winter 2021  Lab Results   Component Value Date    LEFTDIABRET Mild 2020    RIGHTDIABRET None 2020   Foot Exam: 2021    Most recent labs and diabetes goals discussed with patient in clinic today  MEDICATIONS: Patient would like to continue current regimen for now but consider Trulicity in future if additional glycemic control is needed  Continue Jardiance and metformin  SMBx/day, fasting - will obtain some bedtime readings  BMI Counseling: Body mass index is 36 03 kg/m²  The BMI is above normal  Nutrition recommendations include reducing portion sizes and consuming healthier snacks  Exercise recommendations include exercising 3-5 times per week  Pharmacotherapy was ordered for patient to aid in weight loss  2  Hyperlipidemia without clinical ASCVD event: 2018 ACC/AHA lipid guidelines recommend at least moderate statin     Patient currently on moderate intensity (started ); with new muscle aches but improving  Also on fenofibrate but has been on this since 2012  Lipid panel/LFTs show elevated TGs; due to be updated  Continue as prescribed; may consider trial off fenofibrate in future  3  Medication Reconciliation: Medication list reviewed with pt at today's visit  Discrepancies as discussed below  Medication list updated to reflect medications pt is currently taking    Follow-Up: 4-6 weeks  _x_ Home Monitoring Records, BG      SUBJECTIVE                                                                                                              Medication Adherence: Medication list reviewed with patient, reports the following discrepancies/problems:  Glipizide: no longer taking as she thought she was supposed to stop    2  Medication Efficacy:    Review of Systems   Constitutional: Positive for appetite change and fatigue  Negative for activity change and unexpected weight change  Feels better overall since last appointment    Gastrointestinal: Positive for diarrhea (but tolerable, usually every day)  Negative for constipation, nausea and vomiting  Endocrine: Positive for polydipsia (tolerable, has this at baseline) and polyuria (slight increase in daytime since starting Jardiance but tolerable)  Negative for polyphagia  Musculoskeletal: Positive for myalgias (slightly improved but still present)  Negative for back pain, neck pain and neck stiffness  SMBG:  Glucometer: Yes, Brand: Accu-chek    Hypoglycemia history: 0 SMBG readings < 70mg/dL since last appt   Has glucose tablets: No   Knows how to treat: Yes   Unsymptomatic hypoglycemia: No, reports hypoglycemia s/sx x2 since last appointment - checked blood glucose mid-morning as she was feeling signs/symptoms      3  Lifestyle: retired, previously nursing assistant  Reports limited physical activity due to pain  Currently participating in physical therapy 2x/week but no other exercise besides this        Family Support:     Diet Recall - yesterday      B - Food: cheerios + banana + coffee (cream/Splenda)     L - Food: sandwich +/- fruit or soup     D - Food: chicken parmasagne pasta + broccoli + chicken     Daily Beverages: tea (crystal light)     Snacks: usually has snacks prior to bed but didn't have any last night - used to do veggie chips + dip but now doing fruit     Exercise: no formal routine - stopped because of balance issues previously     Social History     Tobacco Use   Smoking Status Never Smoker   Smokeless Tobacco Never Used     Social History     Substance and Sexual Activity   Alcohol Use Not Currently    Alcohol/week: 1 0 standard drink    Types: 1 Glasses of wine per week    Comment: rarely          OBJECTIVE                                                                                                      Wt Readings from Last 3 Encounters:   22 92 3 kg (203 lb 6 4 oz)   22 94 9 kg (209 lb 3 2 oz)   22 95 4 kg (210 lb 6 4 oz)     SMBG Reading (-)  FB (out to eat night prior), 155, 138, 151, 147, 157, 158, 191, 143, 146, 136, 164, 136, 174  Mid-mornin, 121    Previous DM medications/tolerability:   Tanzeum: discontinued 2016 due to pancreatitis however uncertain if related per chart review  Jardiance    Pertinent Lab Data:    Lab Results   Component Value Date    SODIUM 138 2022    K 4 4 2022    EGFR 92 2022    CREATININE 0 71 2022    GLUF 154 (H) 2022    [id number] 344 07/10/2016    MICROALBCRE 61 (H) 10/17/2018     Lab Results   Component Value Date    HGBA1C 8 5 (H) 2022    HGBA1C 8 9 (A) 2022    HGBA1C 7 2 10/12/2021          Reason For Outreach  Embedded Pharmacist    Demographics  Interaction Method: Face to Face  Type of Intervention: Follow-Up    Topic(s) Addressed  Diabetes;  Dyslipidemia    Intervention(s) Made      Non-Pharmacologic:    -- Care coordination  -- Chart update  -- Disease state education  -- Home monitoring discussed or provided  -- Lifestyle education  -- Preventive care gap closure recommendation    Tool(s) Used  Not Applicable    Time Spent:   Time Spent in Direct Patient Care: 25 minutes  Time Spent in Care Coordination: 10 minutes    Recommendations  Recipient: Patient/caregiver  Outcome:  All Accepted

## 2022-04-25 NOTE — PATIENT INSTRUCTIONS
BLOOD SUGAR TESTING   Please test your blood sugar:  1 Times per day   When to test your blood sugar:   o Before Breakfast   o Try to also get some bedtime readings   Target Blood sugar range: Before Meals: , 2 hours after meals: < 180   Call if your blood sugar is less than 60     Continue metformin and Jardiance  Eating Low-Glycemic Foods  Eating low-glycemic foods is one tool to help keep your diabetes under control and improve your weight  The glycemic index is a rating system for foods that contain carbohydrate  It helps you know how quickly a food with carbohydrate raises blood sugar, so you can focus on eating foods that raise blood sugar slowly   Foods that raise blood sugar slowly have a low glycemic index  Most of the carbohydrate-rich foods that you eat with this plan should be low or medium on the glycemic index   Eating low-glycemic foods is most helpful when used along with another eating plan for diabetes, such as carbohydrate counting or the plate format  Counting carbs helps you know how much carbohydrate you're eating  The amount of carbohydrate you eat is more important than the glycemic index of foods in helping you control your blood sugar  The plate format helps you control portions and choose from a variety of foods   The glycemic index of a food can change depending on the variety of the food (for example, red potato or white potato), its ripeness, how it is prepared (for example, juiced, mashed, or ground), how it is cooked, and how long it is stored   People respond differently to the glycemic content of foods  And because many things affect the glycemic index, the only way to know for sure how a food affects your blood sugar is to check your blood sugar before and 2-hours after you eat that food   High-glycemic foods are rarely eaten by themselves, so the glycemic index might not be helpful unless you're eating a food by itself   Eating foods together changes their glycemic index   Look at the overall nutrition in foods--and not just their glycemic index--when you plan meals  Some low-glycemic foods, such as ice cream, are high in saturated fat and should be eaten only now and then  And some high-glycemic foods, such as potatoes, have nutrients like vitamin C, potassium, and fiber   Eating low-glycemic foods along with high-glycemic foods also can help keep your blood sugar from rising quickly  How do you follow a low-glycemic eating plan? You don't have to deny yourself certain food groups or favorite dishes when you follow a low-glycemic eating plan  You focus on eating measured amounts of low- or medium-glycemic foods and trying to eat a balanced diet  1  Write down what you eat now  The first step is to look at the kinds of foods you're eating now  Write down the carbohydrate-rich foods you eat over several days  2  Find the glycemic index of these foods  Under columns labeled low, medium, or high, list the different foods you eat, according to their glycemic index  You can see at a glance how many high-, medium-, and low-glycemic foods you eat  You may find that you already are eating many foods that are low or medium on the index  But you also may find many foods that are high-glycemic or on the high end of medium  3  Swap out foods  Look at your list for high-glycemic foods that you eat only now and then or that you wouldn't mind removing from your diet  Trying changing those high-glycemic foods to low-glycemic choices that you could eat in place of those high-glycemic foods  So, for example, if you like baked potatoes, try having a baked yam instead  If you often eat a plain bagel for breakfast, try a slice of multi-grain toast instead  Watermelon is a fine treat once in a while in the summer  But you could limit how much of it you eat  Or you could have strawberries or other low-glycemic berries instead      Foods in the index are given a number from 0 to 100  The higher the number, the higher the glycemic index  Foods are compared to glucose, which is sugar  It has a rank of 100   Foods that raise blood sugar quickly are high  They are rated 70 or more   Foods that raise blood sugar moderately are medium  They are rated 56 to 69   Foods that raise blood sugar slowly are low  They are rated 54 or less  Glycemic Index of Common Foods   Fruits Glycemic Index   Apples Low   Oranges Low   Cantaloupe High   Watermelon High   Vegetables Glycemic Index   Baked Russet Potato High   Pumpkin High   Sweet Potato Low   Beans, Legumes Glycemic Index   Kidney Beans Low   Lentils Low   Peanuts Low   Cereals and Grains Glycemic Index   Brown Rice Medium   White Rice High   [place] Low   Instant Oatmeal High   Breads Glycemic Index   Whole Grain bread Low   White Bread High   Pasta Glycemic Index   Whole Wheat Spaghetti Low   White Spaghetti Low   Macaroni Low     Tips to make low-glycemic choices   Eat unprocessed food as often as you can  Whole, unprocessed food usually (but not always) has a lower glycemic index than the same food when it's processed  For example, white bread is more processed than whole wheat bread   Don't overcook pasta  Cooking pasta until well done raises its glycemic index  For a lower glycemic index, pull the pasta out of the water when it's still a little firm--but not hard--when you bite it   Choose high-fiber foods  Most food that is high in fiber takes longer to digest and raises blood sugar slowly   Eat measured portions of high-glycemic foods  You can still eat food with a high glycemic index  Many of these foods have nutrients that you need  But try to eat small portions   Eat a low-glycemic food along with a high-glycemic food  The low-glycemic food will help counter the effect of the high-glycemic food, so your blood sugar may rise more slowly   Adding a healthy fat to your meal also will slow the rise of your blood sugar  For example, add a small amount of olive oil when you roast potatoes  Although sticky rice has a high glycemic index, eating it with chicken and vegetables will lower its glycemic index   Choose whole grains  Use whole-grain bread for your toast in the morning, and eat whole grains at lunch  Whole grains include barley, brown rice, and 100% whole-grain bread   When eating out, choose dishes with non-starchy vegetables  Most non-starchy vegetables are low on the glycemic index

## 2022-05-02 ENCOUNTER — TELEPHONE (OUTPATIENT)
Dept: INTERNAL MEDICINE CLINIC | Facility: CLINIC | Age: 62
End: 2022-05-02

## 2022-05-02 NOTE — TELEPHONE ENCOUNTER
Patient went to get refill of Cedric Sharp and the payne was over $[phone], she would like to know what to do next      Please Advise  cb - [phone]

## 2022-05-04 NOTE — TELEPHONE ENCOUNTER
Call placed to patient to review Jardiance, unable to reach  VM left with the following information: asked patient to contact insurance company to see if she has not met medication deductible or if she is in donut hole  Based on insurance findings, could consider retrial with glipizide

## 2022-05-05 ENCOUNTER — TELEPHONE (OUTPATIENT)
Dept: OBGYN CLINIC | Facility: MEDICAL CENTER | Age: 62
End: 2022-05-05

## 2022-05-06 NOTE — TELEPHONE ENCOUNTER
Patient came into the office and wanted to let you know she has: Glipizide 5 mg at home and has 15-20 pills left and she started taking them yesterday (Thursday)  Please Advise if you will be calling in another script to the pharmacy      Rubén silence - [phone]

## 2022-05-12 DIAGNOSIS — E83.42 HYPOMAGNESEMIA: ICD-10-CM

## 2022-05-12 RX ORDER — MAGNESIUM OXIDE 400 MG/1
1 TABLET ORAL DAILY
Qty: 30 TABLET | Refills: 0 | Status: SHIPPED | OUTPATIENT
Start: 2022-05-12 | End: 2022-08-04 | Stop reason: ALTCHOICE

## 2022-05-16 ENCOUNTER — OFFICE VISIT (OUTPATIENT)
Dept: PSYCHIATRY | Facility: CLINIC | Age: 62
End: 2022-05-16
Payer: COMMERCIAL

## 2022-05-16 VITALS
HEIGHT: 63 IN | DIASTOLIC BLOOD PRESSURE: 71 MMHG | SYSTOLIC BLOOD PRESSURE: 146 MMHG | BODY MASS INDEX: 36.5 KG/M2 | WEIGHT: 206 LBS | HEART RATE: 91 BPM

## 2022-05-16 DIAGNOSIS — Z79.899 LONG-TERM USE OF HIGH-RISK MEDICATION: Chronic | ICD-10-CM

## 2022-05-16 DIAGNOSIS — F41.1 GAD (GENERALIZED ANXIETY DISORDER): Chronic | ICD-10-CM

## 2022-05-16 DIAGNOSIS — F31.76 BIPOLAR I DISORDER, MOST RECENT EPISODE DEPRESSED, IN FULL REMISSION (HCC): Primary | Chronic | ICD-10-CM

## 2022-05-16 DIAGNOSIS — F60.9 PERSONALITY DISORDER (HCC): Chronic | ICD-10-CM

## 2022-05-16 PROCEDURE — 3725F SCREEN DEPRESSION PERFORMED: CPT | Performed by: PSYCHIATRY & NEUROLOGY

## 2022-05-16 PROCEDURE — 90833 PSYTX W PT W E/M 30 MIN: CPT | Performed by: PSYCHIATRY & NEUROLOGY

## 2022-05-16 PROCEDURE — 99214 OFFICE O/P EST MOD 30 MIN: CPT | Performed by: PSYCHIATRY & NEUROLOGY

## 2022-05-16 RX ORDER — ARIPIPRAZOLE 10 MG/1
10 TABLET ORAL EVERY EVENING
Qty: 90 TABLET | Refills: 2 | Status: SHIPPED | OUTPATIENT
Start: 2022-05-16 | End: 2023-02-10

## 2022-05-16 RX ORDER — FLUOXETINE HYDROCHLORIDE 40 MG/1
40 CAPSULE ORAL DAILY
Qty: 90 CAPSULE | Refills: 2 | Status: SHIPPED | OUTPATIENT
Start: 2022-05-16 | End: 2023-02-10

## 2022-05-16 RX ORDER — FLUOXETINE HYDROCHLORIDE 20 MG/1
20 CAPSULE ORAL DAILY
Qty: 90 CAPSULE | Refills: 2 | Status: SHIPPED | OUTPATIENT
Start: 2022-05-16 | End: 2023-02-10

## 2022-05-16 NOTE — PSYCH
MEDICATION MANAGEMENT NOTE        [address]      Name and Date of Birth:  Nimesh Kaufman 62 y o  1960 MRN: [id number]    Date of Visit: May 16, 2022    Reason for Visit:   Chief Complaint   Patient presents with    Medication Management    Follow-up       SUBJECTIVE:    Cydney De La Cruz is seen today for a follow up for Bipolar Disorder, Generalized Anxiety Disorder and personality disorder  She has done well since the last visit  She states that mood continues to be stable, denies any significant depressive symptoms or manic symptoms  She reports that anxiety symptoms are controlled  She continues to struggle with medical issues "I am trying to get my blood sugar down before my carpal tunnel surgery"  She denies any suicidal ideation, intent or plan at present; denies any homicidal ideation, intent or plan at present  She has no auditory hallucinations, denies any visual hallucinations, has no delusional thinking  She denies any side effects from current psychiatric medications  HPI ROS Appetite Changes and Sleep:     She reports normal sleep, adequate number of sleep hours (8 hours), normal appetite, recent weight loss (1 lbs), low energy    Current Rating Scores:     Current PHQ-9   PHQ-2/9 Depression Screening    Little interest or pleasure in doing things: 0 - not at all  Feeling down, depressed, or hopeless: 0 - not at all  Trouble falling or staying asleep, or sleeping too much: 0 - not at all  Feeling tired or having little energy: 1 - several days  Poor appetite or overeatin - several days  Feeling bad about yourself - or that you are a failure or have let yourself or your family down: 0 - not at all  Trouble concentrating on things, such as reading the newspaper or watching television: 0 - not at all  Moving or speaking so slowly that other people could have noticed   Or the opposite - being so fidgety or restless that you have been moving around a lot more than usual: 0 - not at all  Thoughts that you would be better off dead, or of hurting yourself in some way: 0 - not at all  PHQ-9 Score: 2   PHQ-9 Interpretation: No or Minimal depression        Current PHQ-9 score is decreased from 4 at the last visit)  Review Of Systems:      Constitutional low energy and recent weight loss (1 lbs)   ENT negative   Cardiovascular negative   Respiratory negative   Gastrointestinal negative   Genitourinary negative   Musculoskeletal neck pain   Integumentary negative   Neurological negative   Endocrine negative   Other Symptoms none, all other systems are negative       Past Psychiatric History: (unchanged information from previous note copied and updated)    Past Inpatient Psychiatric Treatment:   One past inpatient psychiatric admission at [place] [address]  Past Outpatient Psychiatric Treatment:    In outpatient treatment at [address] [place] [address] for many years    Past Suicide Attempts: yes, 2 attempts by overdose years ago  Past Violent Behavior: no  Past Psychiatric Medication Trials: Prozac, Depakote, Lamictal (rash), Tegretol, Abilify and Latuda    Traumatic History: (unchanged information from previous note copied and updated)    Abuse: no history of sexual abuse, no history of physical abuse  Other Traumatic Events: none     Past Medical History:    Past Medical History:   Diagnosis Date    Abdominal pain     started 7/21/16- found something on the pancreas    Abnormal weight loss     last assessed [id number]    Acute sinusitis     Allergic rhinitis     last assessed [id number]    Anemia     Asthma     Cardiac murmur     Carpal tunnel syndrome     Cellulitis     Cervical disc disease     Colon polyp     CPAP (continuous positive airway pressure) dependence     no longer used    Degeneration of thoracic intervertebral disc     Degenerative arthritis of knee     Diabetes mellitus ([facility]     Diverticulosis     Esophagitis, reflux     last assessed [id number]    Excessive sweating     Fatty liver     Frequent falls     Gait disturbance     GERD (gastroesophageal reflux disease)     Granuloma annulare     Head injury     Hyperlipidemia     Hypertension     Hypertensive heart disease     Hypoglycemia     last assessed [id number]    Kidney stone     Leg length discrepancy     Leucocytosis     Lumbar stress fracture     Multiple thyroid nodules     Neuropathy     Obesity     last assessed [id number]    Paronychia of toe     Patellofemoral dysfunction     Pneumonia     Postmenopausal atrophic vaginitis     Prepatellar bursitis     Sebaceous cyst     last assessed [id number]    Sleep apnea     no CPAP used    Suicide attempt ([facility] )     Tinea corporis     Type 2 diabetes mellitus ([facility] )     cont meds and monitor BS;  last assessed [id number]    Urinary frequency     Vertigo     Vitamin D deficiency     Dia-Parkinson-White syndrome      Past Medical History Pertinent Negatives:   Diagnosis Date Noted    Seizures ([facility] ) 11/17/2021     Past Surgical History:   Procedure Laterality Date    CERVICAL FUSION N/A 8/3/2021    Procedure: FUSION CERVICAL ANTERIOR W DISCECTOMY C4-5, C5-6, C6-7 with allograft and neuromonitoring  case length 3 hours; Surgeon: Michele Ambriz MD;  [facility];  Service: Orthopedics    COLONOSCOPY      ESOPHAGOGASTRODUODENOSCOPY N/A 8/8/2016    Procedure: ESOPHAGOGASTRODUODENOSCOPY (EGD); Surgeon: Aicha Bautista MD;  Location: [facility] GI [facility]; Service:     FL ESOPHAGOGASTRODUODENOSCOPY TRANSORAL DIAGNOSTIC N/A 3/28/2019    Procedure: ESOPHAGOGASTRODUODENOSCOPY (EGD); Surgeon: Aicha Bautista MD;  Location: [facility] GI LAB;   Service: Gastroenterology    TONSILLECTOMY      TOOTH EXTRACTION      wisdom teeth removed     Allergies   Allergen Reactions    Bee Venom Swelling     Local swelling at sting site    Clarithromycin Itching    Depakote Er [Divalproex Sodium Er] pancreatitis    Shellfish-Derived Products - Food Allergy Other (See Comments)     Ears and cheeks got red    Tanzeum [Albiglutide]      pancreatitis    Carbamazepine Other (See Comments)     Reaction Date:Unknown    Lamotrigine Rash       Substance Abuse History:    Social History     Substance and Sexual Activity   Alcohol Use Not Currently    Alcohol/week: 1 0 standard drink    Types: 1 Glasses of wine per week    Comment: rarely     Social History     Substance and Sexual Activity   Drug Use No       Social History:    Social History     Socioeconomic History    Marital status: /Civil Union     Spouse name: Not on file    Number of children: 0    Years of education: 12    Highest education level: 12th grade   Occupational History    Occupation: retired   Tobacco Use    Smoking status: Never Smoker    Smokeless tobacco: Never Used   Vaping Use    Vaping Use: Never used   Substance and Sexual Activity    Alcohol use: Not Currently     Alcohol/week: 1 0 standard drink     Types: 1 Glasses of wine per week     Comment: rarely    Drug use: No    Sexual activity: Not Currently     Partners: Male   Other Topics Concern    Not on file   Social History Narrative    Education: high school graduate    Learning Disabilities: none    Marital History:     Children: none    Living Arrangement: lives in home with     Occupational History: retired, 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 in the Last Year: Never true   [employer] of Food in the Last Year: Never true   Transportation Needs: No Transportation Needs    Lack of Transportation (Medical): No    Lack of Transportation (Non-Medical): No   Physical Activity: Inactive    Days of Exercise per Week: 0 days    Minutes of Exercise per Session: 0 min   Stress: No Stress Concern Present    Feeling of Stress : Only a little   Social Connections: Moderately Isolated    Frequency of Communication with Friends and Family: Once a week    Frequency of Social Gatherings with Friends and Family: Once a week    Attends Orthodoxy Services: Never    Active Member of Clubs or Organizations:  Yes    Attends Club or Organization Meetings: More than 4 times per year    Marital Status:    Intimate Partner Violence: Not At Risk    Fear of Current or Ex-Partner: No    Emotionally Abused: No    Physically Abused: No    Sexually Abused: No   Housing Stability: Low Risk     Unable to Pay for Housing in the Last Year: No    Number of [place] in the Last Year: 1   [address] in the Last Year: No       Family Psychiatric History:     Family History   Problem Relation Age of Onset    Cancer Mother         lung    Heart disease Mother     Coronary artery disease Mother         CABG    Diabetes Mother     Lung cancer Mother 76        smoker    Stroke Mother     Thyroid disease Mother     Other Mother         tobacco use    Parkinsonism Father     Intestinal polyp Father     Bipolar disorder Father     Heart disease Father     Prostate cancer Father 62    Hypertension Sister     Alcohol abuse Sister     Depression Brother     Heart disease Brother     Diabetes Maternal Grandmother     Thyroid disease Maternal Grandmother     Hypertension Family     Suicide Attempts Paternal Aunt     Depression Paternal Aunt     Endometrial cancer Maternal Aunt         unknown age   Lisy Grullon Ovarian cancer Cousin 36        maternal    Heart disease Brother     Seizures Brother History Review:  The following portions of the patient's history were reviewed and updated as appropriate: allergies, current medications, past family history, past medical history, past social history, past surgical history and problem list          OBJECTIVE:     Vital signs in last 24 hours:    Vitals:    05/16/22 1506   BP: 146/71   Pulse: 91   Weight: 93 4 kg (206 lb)   Height: 5' 3" (1 6 m)       Mental Status Evaluation:    Appearance age appropriate, casually dressed   Behavior cooperative, calm   Speech normal rate, normal volume, normal pitch   Mood euthymic   Affect normal range and intensity, appropriate   Thought Processes organized, goal directed   Associations intact associations   Thought Content no overt delusions   Perceptual Disturbances: no auditory hallucinations, no visual hallucinations   Abnormal Thoughts  Risk Potential Suicidal ideation - None  Homicidal ideation - None  Potential for aggression - No   Orientation oriented to person, place, time/date and situation   Memory recent and remote memory grossly intact   Consciousness alert and awake   Attention Span Concentration Span attention span and concentration are age appropriate   Intellect appears to be of average intelligence   Insight intact   Judgement intact   Muscle Strength and  Gait normal muscle strength and normal muscle tone, normal gait and normal balance   Motor activity no abnormal movements   Language no difficulty naming common objects, no difficulty repeating a phrase, no difficulty writing a sentence   Fund of Knowledge adequate knowledge of current events  adequate fund of knowledge regarding past history  adequate fund of knowledge regarding vocabulary    Pain none   Pain Scale 0       Laboratory Results: I have personally reviewed all pertinent laboratory/tests results    Recent Labs (last 12 months):   Appointment on 04/12/2022   Component Date Value    Sodium 04/12/2022 138     Potassium 04/12/2022 4 4     Chloride 04/12/2022 100     CO2 04/12/2022 25     ANION GAP 04/12/2022 13     BUN 04/12/2022 14     Creatinine 04/12/2022 0 71     Glucose, Fasting 04/12/2022 154 (A)    Calcium 04/12/2022 8 8     eGFR 04/12/2022 92     Hemoglobin A1C 04/12/2022 8 5 (A)    EAG 04/12/2022 197     Magnesium 04/12/2022 1 6    Office Visit on 04/12/2022   Component Date Value    Ventricular Rate 04/12/2022 70     Atrial Rate 04/12/2022 70     MN Interval 04/12/2022 162     QRSD Interval 04/12/2022 86     QT Interval 04/12/2022 406     QTC Interval 04/12/2022 438     P Axis 04/12/2022 53     QRS Axis 04/12/2022 -1     T Wave Axis 04/12/2022 37    Clinical Support on 03/28/2022   Component Date Value    Hemoglobin A1C 03/28/2022 8 9 (A)   Orders Only on 02/16/2022   Component Date Value    Magnesium, Serum 02/16/2022 1 4 (A)    Iron, Serum 02/16/2022 38 (A)    Total Iron Binding Morris* 02/16/2022 380     Iron Saturation 02/16/2022 10 (A)    Ferritin 02/16/2022 110    Orders Only on 11/29/2021   Component Date Value    Magnesium, Serum 11/29/2021 1 5     Iron, Serum 11/29/2021 41 (A)    Total Iron Binding Morris* 11/29/2021 359     Iron Saturation 11/29/2021 11 (A)    Ferritin 11/29/2021 154    Orders Only on 11/17/2021   Component Date Value    Magnesium, Serum 11/17/2021 1 4 (A)    Glucose, Random 11/17/2021 162 (A)    BUN 11/17/2021 17     Creatinine 11/17/2021 0 66     eGFR Non  11/17/2021 96     eGFR  11/17/2021 111     SL AMB BUN/CREATININE RA* 87/91/0845 NOT APPLICABLE     Sodium 62/27/1587 138     Potassium 11/17/2021 4 9     Chloride 11/17/2021 104     CO2 11/17/2021 25     Calcium 11/17/2021 9 2     Protein, Total 11/17/2021 6 8     Albumin 11/17/2021 4 3     Globulin 11/17/2021 2 5     Albumin/Globulin Ratio 11/17/2021 1 7     TOTAL BILIRUBIN 11/17/2021 0 3     Alkaline Phosphatase 11/17/2021 89     AST 11/17/2021 27     ALT 11/17/2021 29     White Blood Cell Count 11/17/2021 8 5     Red Blood Cell Count 11/17/2021 4 23     Hemoglobin 11/17/2021 11 0 (A)    HCT 11/17/2021 35 1     MCV 11/17/2021 83 0     MCH 11/17/2021 26 0 (A)    MCHC 11/17/2021 31 3 (A)    RDW 11/17/2021 14 3     Platelet Count 51/33/3444 330     SL AMB MPV 11/17/2021 10 3     Neutrophils (Absolute) 11/17/2021 6,095     Lymphocytes (Absolute) 11/17/2021 1,666     Monocytes (Absolute) 11/17/2021 502     Eosinophils (Absolute) 11/17/2021 179     Basophils ABS 11/17/2021 60     Neutrophils 11/17/2021 71 7     Lymphocytes 11/17/2021 19 6     Monocytes 11/17/2021 5 9     Eosinophils 11/17/2021 2 1     Basophils PCT 11/17/2021 0 7     TSH 11/17/2021 2 43     Vitamin D, 25-Hydroxy, S* 11/17/2021 31    Orders Only on 10/12/2021   Component Date Value    Hemoglobin A1C 10/12/2021 7 2     HIV Screen 10/12/2021 Non-Reactive    Orders Only on 08/16/2021   Component Date Value    Total Cholesterol 08/16/2021 160     HDL 08/16/2021 43 (A)    Triglycerides 08/16/2021 361 (A)    LDL Calculated 08/16/2021 74     Chol HDLC Ratio 08/16/2021 3 7     Non-HDL Cholesterol 08/16/2021 117     Creatinine, Urine 08/16/2021 82     Microalbum  ,U,Random 08/16/2021 0 8     Microalb/Creat Ratio 08/16/2021 10    Admission on 08/03/2021, Discharged on 08/04/2021   Component Date Value    ABO Grouping 08/03/2021 A     Rh Factor 08/03/2021 Negative     POC Glucose 08/03/2021 254 (A)    Unit Product Code 08/05/2021 [id number]     Unit Number 08/05/2021 [id number]     Unit ABO 08/05/2021 A     Unit [facility] 08/05/2021 NEG     Crossmatch 08/05/2021 Compatible     Unit Dispense Status 08/05/2021 Return to Inv     Unit Product Volume 08/05/2021 350     POC Glucose 08/03/2021 271 (A)    POC Glucose 08/03/2021 385 (A)    POC Glucose 08/03/2021 317 (A)    Sodium 08/04/2021 134 (A)    Potassium 08/04/2021 4 0     Chloride 08/04/2021 101     CO2 08/04/2021 25     ANION GAP 08/04/2021 8     BUN 08/04/2021 21     Creatinine 08/04/2021 0 67     Glucose 08/04/2021 278 (A)    Calcium 08/04/2021 8 9     eGFR 08/04/2021 97     WBC 08/04/2021 17 95 (A)    RBC 08/04/2021 3 81     Hemoglobin 08/04/2021 10 2 (A)    Hematocrit 08/04/2021 32 9 (A)    MCV 08/04/2021 86     MCH 08/04/2021 26 8     MCHC 08/04/2021 31 0 (A)    RDW 08/04/2021 13 9     MPV 08/04/2021 10 7     Platelets 45/71/2792 353     nRBC 08/04/2021 0     POC Glucose 08/04/2021 263 (A)    Segmented % 08/04/2021 97 (A)    Lymphocytes % 08/04/2021 2 (A)    Monocytes % 08/04/2021 1 (A)    Eosinophils, % 08/04/2021 0     Basophils % 08/04/2021 0     Absolute Neutrophils 08/04/2021 17 41 (A)    Lymphocytes Absolute 08/04/2021 0 36 (A)    Monocytes Absolute 08/04/2021 0 18     Eosinophils Absolute 08/04/2021 0 00     Basophils Absolute 08/04/2021 0 00     Smudge Cells 08/04/2021 Present     RBC Morphology 08/04/2021 Present     Anisocytosis 08/04/2021 Present     Microcytes 08/04/2021 Present     Polychromasia 08/04/2021 Present     Platelet Estimate 78/46/4000 Adequate     POC Glucose 08/03/2021 163 (A)   Callie Slipper Supplier Name 08/04/2021 [employer] - [address] [facility] Supplier Phone Number 08/04/2021 [phone]     Order Status 08/04/2021 Delivery Successful     Delivery Request Date 08/04/2021 08/04/2021     Date Delivered  08/04/2021 08/16/2021     Item Description 08/04/2021 Diego Suyapa, Adult     Item Description 08/04/2021 3 in 1 Commode     POC Glucose 08/04/2021 279 (A)   Lab Requisition on 07/09/2021   Component Date Value    ABO Grouping 07/09/2021 A     Rh Factor 07/09/2021 Negative     Antibody Screen 07/09/2021 Negative     Specimen Expiration Date 07/09/2021 [id number]    There may be more visits with results that are not included         Suicide/Homicide Risk Assessment:    Risk of Harm to Self:  Demographic risk factors include: , age: over 48 or older  Historical Risk Factors include: history of anxiety, history of mood disorder, history of suicide attempts  Recent Specific Risk Factors include: diagnosis of mood disorder, health problems  Protective Factors: no current suicidal ideation, being , compliant with medications, compliant with mental health treatment, responsibilities and duties to others, stable living environment, supportive   Weapons: none  The following steps have been taken to ensure weapons are properly secured: not applicable  Based on today's assessment, Lemuel Bahenael presents the following risk of harm to self: minimal    Risk of Harm to Others: The following ratings are based on assessment at the time of the interview  Based on today's assessment, Lemuel Bahenael presents the following risk of harm to others: none    The following interventions are recommended: no intervention changes needed    Assessment/Plan:       Diagnoses and all orders for this visit:    Bipolar I disorder, most recent episode depressed, in full remission ([facility] )  -     ARIPiprazole (ABILIFY) 10 mg tablet; Take 1 tablet (10 mg total) by mouth every evening  -     FLUoxetine (PROzac) 20 mg capsule; Take 1 capsule (20 mg total) by mouth in the morning  Take with the 40 mg cap for a total dose of 60 mg daily   -     FLUoxetine (PROzac) 40 MG capsule; Take 1 capsule (40 mg total) by mouth in the morning  Take with the 20 mg cap for a total dose of 60 mg daily  LOURDES (generalized anxiety disorder)  -     FLUoxetine (PROzac) 20 mg capsule; Take 1 capsule (20 mg total) by mouth in the morning  Take with the 40 mg cap for a total dose of 60 mg daily   -     FLUoxetine (PROzac) 40 MG capsule; Take 1 capsule (40 mg total) by mouth in the morning  Take with the 20 mg cap for a total dose of 60 mg daily      Personality disorder ([facility] )    Long-term use of high-risk medication          Treatment Recommendations/Precautions:    Continue Prozac 60 mg every evening to improve depressive symptoms  Continue Abilify 10 mg at bedtime to help with mood  Medication management every 4 months  Follows with family physician for glucose and lipid monitoring due to current therapy with antipsychotic medication  Follows with family physician for yearly physical exam, asthma, diabetes, hyperlipidemia and hypertension  Aware of 24 hour and weekend coverage for urgent situations accessed by calling [facility] main practice number  Monitor lipid profile and hemoglobin A1C yearly due to current therapy with antipsychotic medication - gets labs done with PCP    Medications Risks/Benefits      Risks, Benefits And Possible Side Effects Of Medications:    Risks, benefits, and possible side effects of medications explained to Vj including risk of parkinsonian symptoms, Tardive Dyskinesia and metabolic syndrome related to treatment with antipsychotic medications and risk of suicidality and serotonin syndrome related to treatment with antidepressants  She verbalizes understanding and agreement for treatment  Controlled Medication Discussion:     Not applicable    Psychotherapy Provided:     Individual psychotherapy provided: Yes  Counseling was provided during the session today for 16 minutes  Medications, treatment progress and treatment plan reviewed with Vj  Goals discussed during in session: maintain control of anxiety and maintain mood stability  Discussed with Vj coping with occasional anxiety and chronic mental illness  Coping strategies including crafts, staying calm, playing with pets and taking time for self reviewed with Vj  Supportive therapy provided  Treatment Plan:    Completed and signed during the session: Yes - with Vj    Note Share: This note was shared with patient      Sue Thomas MD 05/16/22

## 2022-05-16 NOTE — BH TREATMENT PLAN
TREATMENT PLAN (Medication Management Only)        [facility]    Name/Date of Birth/MRN:  Becka Cooper 62 y o  1960 MRN: [id number]  Date of Treatment Plan: May 16, 2022  Diagnosis/Diagnoses:   1  Bipolar I disorder, most recent episode depressed, in full remission ([facility] )    2  LOURDES (generalized anxiety disorder)    3  Personality disorder ([facility] )    4  Long-term use of high-risk medication      Strengths/Personal Resources for Self-Care: "I am crafty, I am doing crocheting, I am good at keeping calm"  Area/Areas of need (in own words): "my weight"  1  Long Term Goal:   maintain control of anxiety, maintain mood stability  Target Date: 4 months - 9/16/2022  Person/Persons responsible for completion of goal: Hayley Soto  2  Short Term Objective (s) - How will we reach this goal?:   A  Provider new recommended medication/dosage changes and/or continue medication(s): continue current medications as prescribed (Prozac and Abilify)  B   N/A   C   N/A  Target Date: 4 months - 9/16/2022  Person/Persons Responsible for Completion of Goal: Hayley Soto   Progress Towards Goals: stable  Treatment Modality: medication management every 4 months  Review due 180 days from date of this plan: 6 months - 11/16/2022  Expected length of service: maintenance unless revised  My Physician/PA/NP and I have developed this plan together and I agree to work on the goals and objectives  I understand the treatment goals that were developed for my treatment    Electronic Signatures: on file (unless signed below)    Petey Ding MD 05/16/22

## 2022-05-23 ENCOUNTER — CLINICAL SUPPORT (OUTPATIENT)
Dept: INTERNAL MEDICINE CLINIC | Facility: CLINIC | Age: 62
End: 2022-05-23

## 2022-05-23 VITALS
DIASTOLIC BLOOD PRESSURE: 75 MMHG | BODY MASS INDEX: 36.77 KG/M2 | WEIGHT: 207.6 LBS | HEART RATE: 72 BPM | SYSTOLIC BLOOD PRESSURE: 132 MMHG

## 2022-05-23 DIAGNOSIS — E11.9 TYPE 2 DIABETES MELLITUS WITHOUT COMPLICATION, WITHOUT LONG-TERM CURRENT USE OF INSULIN (HCC): Primary | ICD-10-CM

## 2022-05-23 NOTE — PROGRESS NOTES
[place], Pharmacist    Reason for visit: Appointment with 62y o  year old for management of T2DM  Note was written by Madelyn Franco, pharmacy Resident  ALVAREZ MADRID, Pharmacist was present during the patient appointment and was available as needed  Neeta Alvarado Pharmacist  have reviewed and discussed the case with the resident and agree with the findings and plan as documented  Current DM Regimen:  Metformin  Glipizide    ASSESSMENT/PLAN                                                                                     Type 2 diabetes: goal A1c <6 5% based on AACE/ACE guidelines; <7% for carpal tunnel surgery  Most recent A1c above goal    FBG readings elevated, pre-lunch and post-supper readings to goal since restarting glipizide; no hypoglycemia episodes in last week  May benefit from increasing glipizide however uncertain benefit  Would benefit from Pro CGM  Room for lifestyle modifications  BMP shows hyperglycemia; patient was fasting  Duration: > 10 years  Microvascular complications: mild retinopathy, neuropathy  Macrovascular complications: none  (No) Aspirin (Yes ) Statin (Yes ) ACEI/ARB  Eye Exam:    Lab Results   Component Value Date    LEFTDIABRET Mild 2020    RIGHTDIABRET None 2020   Foot Exam: 2021    Most recent labs and diabetes goals discussed with patient in clinic today  MEDICATIONS: If PCP is in agreeance, increase glipizide 5mg to BID with breakfast and dinner  If feeling hypoglycemia symptoms or readings <100, call PharmD  Orders placed under CPA  SMBx daily, alternating days FBG/before lunch then pre/post-dinner  CGM: would like to hold off on Pro CGM until next appointment, will be blinded as patient does not have smart phone  BMI Counseling: Body mass index is 36 77 kg/m²  The BMI is above normal  Nutrition recommendations include reducing portion sizes      2  Hyperlipidemia without clinical ASCVD event: 2018 ACC/AHA lipid guidelines recommend moderate intensity statin  Patient currently on moderate intensity statin (started 2014) and fenofibrate (since 2012)  Lipid panel/LFTs shows elevated TG, but due to be updated  MEDICATIONS: continue atorvastatin and fenofibrate until labs have been updated  3  HTN: BP goal less than 130/80 mmHg  In office BP slightly above goal but acceptable, HR acceptable  Serum K+ WNL  MEDICATIONS: continue lisinopril  4  Medication Reconciliation: Medication list reviewed with pt at today's visit  Discrepancies as discussed below  Medication list updated to reflect medications pt is currently taking  Follow-Up: 2-4 weeks  _x_ Home Monitoring Records, BG      SUBJECTIVE                                                                                                              Medication Adherence: Medication list reviewed with patient, reports the following discrepancies/problems:  Cholecalciferol Caps: 5000 units daily  Empagliflozin: Stopped taking on 4/29, cost prohibitive  Magnesium oxide: plans to switch to OTC brand  Glipizide: Started taking 5/4 as she noticed blood sugar readings were becoming elevated without Jardiance; 5mg once daily with breakfast    Insurance: deductible reached and is in coverage gap    Undergoing carpal tunnel surgery in July, believes she needs A1c to be less than 7 for surgery    Took all DM and BP medications this AM: Yes      2  Medication Efficacy:    Review of Systems   Constitutional: Positive for appetite change (feels portion size has increased)  Negative for activity change  Gastrointestinal: Positive for diarrhea (happens when she takes Motrin)  Negative for constipation, nausea and vomiting  Endocrine: Positive for polydipsia, polyphagia and polyuria  Musculoskeletal: Positive for neck pain (worse in mornings but goes away after neck exercises and Tylenol)   Negative for back pain and myalgias  SMBG:  Glucometer: Yes, Brand: Accu-chek    Hypoglycemia history: x0 SMBG readings < 70mg/dL since last appt   Knows how to treat: Yes   Unsymptomatic hypoglycemia: No, reports hypoglycemia s/sx x3 since last appointment prior to lunch (see pre-lunch readings below); denies episodes in past week    3   Lifestyle:    Largest Meal: dinner, usually alternates between pasta/rice/potatoes each night     Diet Recall: yesterday/today      B - Food: egg casserole made of eggs, milk, bread, ham and cheese     L - Food: sandwich with Asian coleslaw     D - Food: chicken and ashley with broccoli and potatoes     Daily Beverages: coffee in AM with crystal lite water throughout day      Exercise: neck PT exercises but no formal routine otherwise        OBJECTIVE                                                                                                      SMBG Readings (Metformin, Jardiance)  FB, 139, 117  Pre-lunch: 113, 125    SMBG Readings (Metformin)  FB, 135, 174, 165, 164    SMBG Readings (Metformin, Glipizide)  FB, 181, 227, 204, 258, 208, 186, 1788, 174, 165, 167, 168, 151, 192, 159, 184, 159, 178, 174, 223  Pre-lunch: 108, 133, 113  After-dinner: 173        Vitals:    22 0906   BP: 132/75   Pulse: 72   Weight: 94 2 kg (207 lb 9 6 oz)       Previous DM medications trialed:  Tanzeum - discontinued 2016 due to pancreatitis however per chart review uncertain if related  Jardiance - discontinued 2022 due to cost    Thyroid US 2018 - nodules benign       Pertinent Lab Data:   Patient was fasting for most recent labs  Lab Results   Component Value Date    SODIUM 138 2022    K 4 4 2022    EGFR 92 2022    CREATININE 0 71 2022    GLUF 154 (H) 2022    [id number] 344 07/10/2016    MICROALBCRE 61 (H) 10/17/2018       Lab Results   Component Value Date    HGBA1C 8 5 (H) 2022    HGBA1C 8 9 (A) 2022    HGBA1C 7 2 10/12/2021 Reason For Outreach  Embedded Pharmacist    Demographics  Interaction Method: Face to Face  Type of Intervention: Follow-Up    Topic(s) Addressed  Diabetes; Dyslipidemia; Hypertension    Intervention(s) Made    Pharmacologic:    -- Medication Adjustment - Dose or Frequency    Non-Pharmacologic:    -- Care coordination  -- Cost  -- Disease state education  -- Home monitoring discussed or provided  -- Lifestyle education    Tool(s) Used  Not Applicable    Time Spent:   Time Spent in Direct Patient Care: 40 minutes  Time Spent in Care Coordination: 45 minutes    Recommendations  Recipient: Patient/caregiver  Outcome:  All Accepted

## 2022-05-24 RX ORDER — GLIPIZIDE 5 MG/1
5 TABLET ORAL
Qty: 180 TABLET | Refills: 3 | Status: SHIPPED | OUTPATIENT
Start: 2022-05-24 | End: 2022-07-14

## 2022-05-25 ENCOUNTER — CONSULT (OUTPATIENT)
Dept: GASTROENTEROLOGY | Facility: AMBULARY SURGERY CENTER | Age: 62
End: 2022-05-25
Payer: COMMERCIAL

## 2022-05-25 VITALS
WEIGHT: 207 LBS | OXYGEN SATURATION: 95 % | BODY MASS INDEX: 36.68 KG/M2 | HEART RATE: 80 BPM | HEIGHT: 63 IN | DIASTOLIC BLOOD PRESSURE: 80 MMHG | SYSTOLIC BLOOD PRESSURE: 132 MMHG

## 2022-05-25 DIAGNOSIS — D50.8 OTHER IRON DEFICIENCY ANEMIA: Primary | ICD-10-CM

## 2022-05-25 DIAGNOSIS — Z83.71 FAMILY HISTORY OF COLONIC POLYPS: ICD-10-CM

## 2022-05-25 DIAGNOSIS — K21.9 GASTROESOPHAGEAL REFLUX DISEASE WITHOUT ESOPHAGITIS: ICD-10-CM

## 2022-05-25 DIAGNOSIS — E61.1 LOW IRON: ICD-10-CM

## 2022-05-25 PROBLEM — Z83.719 FAMILY HISTORY OF COLONIC POLYPS: Status: ACTIVE | Noted: 2022-05-25

## 2022-05-25 PROCEDURE — 3075F SYST BP GE 130 - 139MM HG: CPT | Performed by: INTERNAL MEDICINE

## 2022-05-25 PROCEDURE — 3079F DIAST BP 80-89 MM HG: CPT | Performed by: INTERNAL MEDICINE

## 2022-05-25 PROCEDURE — 3008F BODY MASS INDEX DOCD: CPT | Performed by: INTERNAL MEDICINE

## 2022-05-25 PROCEDURE — 99244 OFF/OP CNSLTJ NEW/EST MOD 40: CPT | Performed by: INTERNAL MEDICINE

## 2022-05-25 RX ORDER — SODIUM PICOSULFATE, MAGNESIUM OXIDE, AND ANHYDROUS CITRIC ACID 10; 3.5; 12 MG/160ML; G/160ML; G/160ML
1 LIQUID ORAL SEE ADMIN INSTRUCTIONS
Qty: 320 ML | Refills: 0 | Status: SHIPPED | OUTPATIENT
Start: 2022-05-25

## 2022-05-25 NOTE — ASSESSMENT & PLAN NOTE
Rule out chronic occult GI blood loss  Also possible from anemia of chronic disease     -Schedule for both EGD and colonoscopy  -High-fiber diet     -Patient was given instructions about the colonoscopy prep     -Patient was explained about  the risks and benefits of the procedure  Risks including but not limited to bleeding, infection, perforation were explained in detail  Also explained about less than 100% sensitivity with the exam and other alternatives

## 2022-05-25 NOTE — ASSESSMENT & PLAN NOTE
Gastroesophageal reflux disease - Patient has the symptoms of chronic acid reflux for a long time  Possible hiatal hernia or LES weakness  Should rule out Lynn's esophagus because of chronic symptoms         -discussed about long-term side effects of omeprazole and advised her to try Pepcid again  Patient tried in the past without any help  -schedule for EGD    -Patient was explained about the lifestyle and dietary modifications  Advised to avoid fatty foods, chocolates, caffeine, alcohol and any other triggering foods  Avoid eating for at least 3 hours before going to bed

## 2022-05-25 NOTE — PROGRESS NOTES
Consultation - [facility]  Mike Maddyin 1960 female         Chief Complaint:  Anemia    HPI:  60-year-old female with history of hypertension, diabetes mellitus, hyperlipidemia, depression, neck pain, GERD was referred because of low iron counts and anemia  Iron saturation was 11 percent and hemoglobin was 11  Patient reports having regular bowel movements and denies any blood or mucus in the stool  Good appetite, no recent weight loss  She has problems with acid reflux for which she takes omeprazole regularly  She tried taking Pepcid in the past but had significant recurrent symptoms and went back on omeprazole  Denies abdominal pain, nausea or vomiting  Her brother had colon polyps removed  She had colonoscopy 5 years ago  Chaperon- Ms  Victoria Addie: Review of Systems   Constitutional: Negative for activity change, appetite change, chills, diaphoresis, fatigue, fever and unexpected weight change  HENT: Negative for ear discharge, ear pain, facial swelling, hearing loss, nosebleeds, sore throat, tinnitus and voice change  Eyes: Negative for pain, discharge, redness, itching and visual disturbance  Respiratory: Negative for apnea, cough, chest tightness, shortness of breath and wheezing  Cardiovascular: Negative for chest pain and palpitations  Gastrointestinal:        As noted in HPI   Endocrine: Negative for cold intolerance, heat intolerance and polyuria  Genitourinary: Positive for urgency  Negative for difficulty urinating, dysuria, flank pain and hematuria  Musculoskeletal: Positive for neck pain  Negative for arthralgias, back pain, gait problem, joint swelling and myalgias  Skin: Positive for rash  Negative for wound  Neurological: Negative for dizziness, tremors, seizures, speech difficulty, light-headedness, numbness and headaches  Hematological: Negative for adenopathy  Does not bruise/bleed easily  Psychiatric/Behavioral: Negative for agitation, behavioral problems and confusion  The patient is not nervous/anxious  Past Medical History:   Diagnosis Date    Abdominal pain     started 7/21/16- found something on the pancreas    Abnormal weight loss     last assessed [id number]    Acute sinusitis     Allergic rhinitis     last assessed [id number]    Anemia     Asthma     Cardiac murmur     Carpal tunnel syndrome     Cellulitis     Cervical disc disease     Colon polyp     CPAP (continuous positive airway pressure) dependence     no longer used    Degeneration of thoracic intervertebral disc     Degenerative arthritis of knee     Diabetes mellitus (HCC)     Diverticulosis     Esophagitis, reflux     last assessed [id number]    Excessive sweating     Fatty liver     Frequent falls     Gait disturbance     GERD (gastroesophageal reflux disease)     Granuloma annulare     Head injury     Hyperlipidemia     Hypertension     Hypertensive heart disease     Hypoglycemia     last assessed [id number]    Kidney stone     Leg length discrepancy     Leucocytosis     Lumbar stress fracture     Multiple thyroid nodules     Neuropathy     Obesity     last assessed [id number]    Paronychia of toe     Patellofemoral dysfunction     Pneumonia     Postmenopausal atrophic vaginitis     Prepatellar bursitis     Sebaceous cyst     last assessed [id number]    Sleep apnea     no CPAP used    Suicide attempt ([facility] [address] )     Tinea corporis     Type 2 diabetes mellitus (HCC)     cont meds and monitor BS;  last assessed [id number]    Urinary frequency     Vertigo     Vitamin D deficiency     Dia-Parkinson-White syndrome       Past Surgical History:   Procedure Laterality Date    CERVICAL FUSION N/A 8/3/2021    Procedure: FUSION CERVICAL ANTERIOR W DISCECTOMY C4-5, C5-6, C6-7 with allograft and neuromonitoring  case length 3 hours;   Surgeon: Erika Berger MD;  [facility];  Service: Orthopedics    COLONOSCOPY      ESOPHAGOGASTRODUODENOSCOPY N/A 8/8/2016    Procedure: ESOPHAGOGASTRODUODENOSCOPY (EGD); Surgeon: Bib Chavira MD;  Location[facility]; Service:     TN ESOPHAGOGASTRODUODENOSCOPY TRANSORAL DIAGNOSTIC N/A 3/28/2019    Procedure: ESOPHAGOGASTRODUODENOSCOPY (EGD); Surgeon: Bib Chavira MD;  Location: [facility];   Service: Gastroenterology    TONSILLECTOMY      TOOTH EXTRACTION      wisdom teeth removed     Social History     Socioeconomic History    Marital status: /Civil Union     Spouse name: Not on file    Number of children: 0    Years of education: 15    Highest education level: 12th grade   Occupational History    Occupation: retired   Tobacco Use    Smoking status: Never Smoker    Smokeless tobacco: Never Used   Vaping Use    Vaping Use: Never used   Substance and Sexual Activity    Alcohol use: Not Currently     Alcohol/week: 1 0 standard drink     Types: 1 Glasses of wine per week     Comment: rarely    Drug use: No    Sexual activity: Not Currently     Partners: Male   Other Topics Concern    Not on file   Social History Narrative    Education: high school graduate    Learning Disabilities: none    Marital History:     Children: none    Living Arrangement: lives in home with     Occupational History: retired, 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 in the Last Year: Never true    Keyla of Food in the Last Year: Never true   Transportation Needs: No Transportation Needs  Lack of Transportation (Medical): No    Lack of Transportation (Non-Medical): No   Physical Activity: Inactive    Days of Exercise per Week: 0 days    Minutes of Exercise per Session: 0 min   Stress: No Stress Concern Present    Feeling of Stress : Only a little   Social Connections: Moderately Isolated    Frequency of Communication with Friends and Family: Once a week    Frequency of Social Gatherings with Friends and Family: Once a week    Attends Synagogue Services: Never    Active Member of Clubs or Organizations:  Yes    Attends Club or Organization Meetings: More than 4 times per year    Marital Status:    Intimate Partner Violence: Not At Risk    Fear of Current or Ex-Partner: No    Emotionally Abused: No    Physically Abused: No    Sexually Abused: No   Housing Stability: Low Risk     Unable to Pay for Housing in the Last Year: No    Number of [place] in the Last Year: 1    Unstable Housing in the Last Year: No     Family History   Problem Relation Age of Onset    Cancer Mother         lung    Heart disease Mother     Coronary artery disease Mother         CABG    Diabetes Mother     Lung cancer Mother 76        smoker    Stroke Mother     Thyroid disease Mother     Other Mother         tobacco use    Parkinsonism Father     Intestinal polyp Father     Bipolar disorder Father     Heart disease Father     Prostate cancer Father 62    Hypertension Sister     Alcohol abuse Sister     Depression Brother     Heart disease Brother     Diabetes Maternal Grandmother     Thyroid disease Maternal Grandmother     Hypertension Family     Suicide Attempts Paternal Aunt     Depression Paternal Aunt     Endometrial cancer Maternal Aunt         unknown age   [place] Ovarian cancer Cousin 36        maternal    Heart disease Brother     Seizures Brother      Bee venom, Clarithromycin, Depakote er [divalproex sodium er], Shellfish-derived products - food allergy, Tanzeum [albiglutide], Carbamazepine, and Lamotrigine  Current Outpatient Medications   Medication Sig Dispense Refill    acetaminophen (TYLENOL) 500 mg tablet Take 500 mg by mouth 3 (three) times a day      ARIPiprazole (ABILIFY) 10 mg tablet Take 1 tablet (10 mg total) by mouth every evening 90 tablet 2    atorvastatin (LIPITOR) 20 mg tablet TAKE 1 TABLET BY MOUTH EVERY DAY 90 tablet 1    Cholecalciferol (VITAMIN D3 PO) Take by mouth      fenofibrate (TRICOR) 145 mg tablet Take 1 tablet (145 mg total) by mouth daily 90 tablet 3    FLUoxetine (PROzac) 20 mg capsule Take 1 capsule (20 mg total) by mouth in the morning  Take with the 40 mg cap for a total dose of 60 mg daily  90 capsule 2    FLUoxetine (PROzac) 40 MG capsule Take 1 capsule (40 mg total) by mouth in the morning  Take with the 20 mg cap for a total dose of 60 mg daily  90 capsule 2    glipiZIDE (GLUCOTROL) 5 mg tablet Take 1 tablet (5 mg total) by mouth in the morning and 1 tablet (5 mg total) in the evening  Take before meals  180 tablet 3    lisinopril (ZESTRIL) 5 mg tablet TAKE 1 TABLET (5 MG TOTAL) BY MOUTH DAILY  90 tablet 0    magnesium oxide (MAG-OX) 400 mg tablet Take 1 tablet (400 mg total) by mouth in the morning   30 tablet 0    metFORMIN (GLUCOPHAGE) 1000 MG tablet TAKE 1 TABLET BY MOUTH TWICE A DAY WITH MEALS 180 tablet 1    Multiple Vitamins-Minerals (multivitamin with minerals) tablet Take 1 tablet by mouth daily      Omega 3 1200 MG CAPS Take 1,200 mg by mouth 2 (two) times a day        omeprazole (PriLOSEC) 40 MG capsule TAKE 1 CAPSULE BY MOUTH EVERY DAY 90 capsule 1    Sod Picosulfate-Mag Ox-Cit Acd (Clenpiq) 10-3 5-12 MG-GM -GM/160ML SOLN Take 1 Package by mouth see administration instructions 320 mL 0    Cholecalciferol 4000 units CAPS Take 1 capsule (4,000 Units total) by mouth daily (Patient not taking: Reported on 5/25/2022)      ibuprofen (MOTRIN) 200 mg tablet Take 400 mg by mouth Every other day as needed when pain not alleviated by acetaminophen (Patient not taking: Reported on 5/25/2022)       No current facility-administered medications for this visit  Blood pressure 132/80, pulse 80, height 5' 3" (1 6 m), weight 93 9 kg (207 lb), SpO2 95 %, not currently breastfeeding  PHYSICAL EXAM: Physical Exam  Constitutional:       Appearance: Normal appearance  She is well-developed  HENT:      Head: Normocephalic and atraumatic  Nose: Nose normal    Eyes:      Conjunctiva/sclera: Conjunctivae normal    Neck:      Thyroid: No thyromegaly  Vascular: No JVD  Trachea: No tracheal deviation  Cardiovascular:      Rate and Rhythm: Normal rate and regular rhythm  Heart sounds: Normal heart sounds  No murmur heard  No friction rub  No gallop  Pulmonary:      Effort: Pulmonary effort is normal  No respiratory distress  Breath sounds: Normal breath sounds  No wheezing or rales  Abdominal:      General: Bowel sounds are normal  There is no distension  Palpations: Abdomen is soft  There is no mass  Tenderness: There is no abdominal tenderness  There is no guarding  Hernia: No hernia is present  Musculoskeletal:         General: No tenderness or deformity  Cervical back: Neck supple  Right lower leg: No edema  Left lower leg: No edema  Lymphadenopathy:      Cervical: No cervical adenopathy  Skin:     General: Skin is warm and dry  Findings: No erythema or rash  Neurological:      Mental Status: She is alert and oriented to person, place, and time  Psychiatric:         Mood and Affect: Mood normal          Behavior: Behavior normal          Thought Content:  Thought content normal           Lab Results   Component Value Date    WBC 8 5 11/17/2021    HGB 11 0 (L) 11/17/2021    HCT 35 1 11/17/2021    MCV 83 0 11/17/2021     11/17/2021     Lab Results   Component Value Date    GLUCOSE 120 09/06/2015    CALCIUM 8 8 04/12/2022     09/06/2015    K 4 4 04/12/2022    CO2 25 04/12/2022     04/12/2022    BUN 14 04/12/2022    CREATININE 0 71 04/12/2022     Lab Results   Component Value Date    ALT 29 11/17/2021    AST 27 11/17/2021    ALKPHOS 89 11/17/2021    BILITOT 0 30 09/06/2015     Lab Results   Component Value Date    INR 0 93 07/09/2021    PROTIME 12 5 07/09/2021       US MSK limited    Result Date: 12/20/2021  Impression: RIGHT SIDE: Findings suspicious for carpal tunnel syndrome based on increased cross sectional area of median nerve (CSA >/= 11 sq cm) and nerve hyperemia  There is a bifid right median nerve, which bifurcates very proximally at the level of the mid forearm  LEFT SIDE: Findings suspicious for carpal tunnel syndrome based on increased cross sectional area of median nerve (CSA >/= 11 sq cm) and nerve hyperemia  Workstation performed: [id number]     US MSK limited    Result Date: 12/20/2021  Impression: RIGHT SIDE: Findings suspicious for carpal tunnel syndrome based on increased cross sectional area of median nerve (CSA >/= 11 sq cm) and nerve hyperemia  There is a bifid right median nerve, which bifurcates very proximally at the level of the mid forearm  LEFT SIDE: Findings suspicious for carpal tunnel syndrome based on increased cross sectional area of median nerve (CSA >/= 11 sq cm) and nerve hyperemia  Workstation performed: [id number]       ASSESSMENT & PLAN:    Iron deficiency anemia, unspecified  Rule out chronic occult GI blood loss  Also possible from anemia of chronic disease     -Schedule for both EGD and colonoscopy  -High-fiber diet     -Patient was given instructions about the colonoscopy prep     -Patient was explained about  the risks and benefits of the procedure  Risks including but not limited to bleeding, infection, perforation were explained in detail  Also explained about less than 100% sensitivity with the exam and other alternatives            Family history of colonic polyps  Patient is at increased risk for colon cancer screening     -colonoscopy    Gastroesophageal reflux disease  Gastroesophageal reflux disease - Patient has the symptoms of chronic acid reflux for a long time  Possible hiatal hernia or LES weakness  Should rule out Lynn's esophagus because of chronic symptoms         -discussed about long-term side effects of omeprazole and advised her to try Pepcid again  Patient tried in the past without any help  -schedule for EGD    -Patient was explained about the lifestyle and dietary modifications  Advised to avoid fatty foods, chocolates, caffeine, alcohol and any other triggering foods  Avoid eating for at least 3 hours before going to bed

## 2022-06-09 ENCOUNTER — ANNUAL EXAM (OUTPATIENT)
Dept: OBGYN CLINIC | Facility: CLINIC | Age: 62
End: 2022-06-09
Payer: COMMERCIAL

## 2022-06-09 VITALS
DIASTOLIC BLOOD PRESSURE: 74 MMHG | WEIGHT: 206 LBS | HEIGHT: 63 IN | SYSTOLIC BLOOD PRESSURE: 124 MMHG | BODY MASS INDEX: 36.5 KG/M2

## 2022-06-09 DIAGNOSIS — N95.2 ATROPHIC VAGINITIS: ICD-10-CM

## 2022-06-09 DIAGNOSIS — Z12.31 ENCOUNTER FOR SCREENING MAMMOGRAM FOR MALIGNANT NEOPLASM OF BREAST: ICD-10-CM

## 2022-06-09 DIAGNOSIS — Z01.419 ENCNTR FOR GYN EXAM (GENERAL) (ROUTINE) W/O ABN FINDINGS: Primary | ICD-10-CM

## 2022-06-09 DIAGNOSIS — Z01.419 PAP SMEAR, AS PART OF ROUTINE GYNECOLOGICAL EXAMINATION: ICD-10-CM

## 2022-06-09 PROCEDURE — 3008F BODY MASS INDEX DOCD: CPT | Performed by: INTERNAL MEDICINE

## 2022-06-09 PROCEDURE — G0145 SCR C/V CYTO,THINLAYER,RESCR: HCPCS | Performed by: OBSTETRICS & GYNECOLOGY

## 2022-06-09 PROCEDURE — S0612 ANNUAL GYNECOLOGICAL EXAMINA: HCPCS | Performed by: OBSTETRICS & GYNECOLOGY

## 2022-06-09 NOTE — PROGRESS NOTES
Assessment/Plan:    No problem-specific Assessment & Plan notes found for this encounter  Diagnoses and all orders for this visit:    Encntr for gyn exam (general) (routine) w/o abn findings  -     Liquid-based pap, screening    Pap smear, as part of routine gynecological examination    Encounter for screening mammogram for malignant neoplasm of breast  -     Mammo screening bilateral w 3d & cad; Future      Atrophic vaginitis  -  Continue use of coconut oil topically for symptoms of vaginal dryness  Advise patient to contact office if she feels like symptoms worsen or require topical estrogen cream      Normal gynecological physical examination  Self-breast examination stressed  Mammogram ordered  Discussed regular exercise, healthy diet, importance of vitamin D and calcium supplements  Discussed importance of sun block use during periods of prolonged sun exposure  Patient will be seen in 1 year for routine gynecologic and medical examination  Patient will call office for any problems, concerns, or issues which may arise during the interim  Subjective:          HPI    Patient ID: Inderjit Maier is a 62 y o  female who presents today for her annual gynecologic and medical examination  She states that she is doing well and denies any specific complaints  She has a PMH significant for HTN, HLD, DM2, and iron deficiency anemia  She is planning for a carpal tunnel surgery in Aug 2022  She also has an endoscopy and colonoscopy scheduled for Sept 2022 with the  GI specialists for evaluation of iron deficiency anemia etiology and GERD  Patient also notes she's been experiencing vaginal dryness, for which she uses coconut oil topically  She also notes frequent urinary urgency and occasional incontinence  She attributes these symptoms to her recent weight gain, DM2, and menopause  Menstrual status: She is post-menopausal  Denies any episodes of intermittent vaginal bleeding       Vasomotor symptoms: Denies any vasomotor symptoms, including hot flashes, night sweats, insomnia, and mood changes  Patient reports normal appetite    Patient reports normal bowel and bladder habits    Patient denies any significant pelvic or abdominal pain    Patient denies any headaches, chest pain, shortness of breath fever shakes or chills    Patient denies any COVID 19 symptoms including cough or loss of taste or smell    COVID vaccine status: Vaccinated with 4 doses    Medical problems: HTN, HLD, DM2, iron deficiency anemia    Colonoscopy status: Up to date, last colonoscopy was in 2019  Patient is scheduled for another colonoscopy with an EGD in Sept 2022  Mammogram status: Last mammogram was Oct 2020  Screening mammogram ordered at today's visit  Importance of self-breast examination was emphasized as well  The following portions of the patient's history were reviewed and updated as appropriate: allergies, current medications, past family history, past medical history, past social history, past surgical history and problem list     Review of Systems   Constitutional: Negative  Negative for appetite change, diaphoresis, fatigue, fever and unexpected weight change  HENT: Negative  Eyes: Negative  Respiratory: Negative  Cardiovascular: Negative  Followed for HTN and HLD   Gastrointestinal: Negative  Negative for abdominal pain, blood in stool, constipation, diarrhea, nausea and vomiting  Endocrine: Negative  Negative for cold intolerance and heat intolerance  Followed for DM2   Genitourinary: Positive for urgency (with occasional urinary incontinence)  Negative for dysuria, frequency, hematuria, vaginal bleeding, vaginal discharge and vaginal pain  Vaginal dryness   Musculoskeletal: Negative  Skin: Negative  Allergic/Immunologic: Negative  Neurological: Negative  Hematological: Negative  Negative for adenopathy          Followed for iron deficiency anemia Psychiatric/Behavioral: Negative  Objective:      /74   Ht 5' 3" (1 6 m)   Wt 93 4 kg (206 lb)   BMI 36 49 kg/m²          Physical Exam  Exam conducted with a chaperone present  Constitutional:       General: She is not in acute distress  Appearance: Normal appearance  She is well-developed  She is not diaphoretic  HENT:      Head: Normocephalic and atraumatic  Eyes:      Pupils: Pupils are equal, round, and reactive to light  Cardiovascular:      Rate and Rhythm: Normal rate and regular rhythm  Heart sounds: Normal heart sounds  No murmur heard  No friction rub  No gallop  Pulmonary:      Effort: Pulmonary effort is normal       Breath sounds: Normal breath sounds  Chest:   Breasts: Breasts are symmetrical       Right: No inverted nipple, mass, nipple discharge, skin change, tenderness or supraclavicular adenopathy  Left: No inverted nipple, mass, nipple discharge, skin change, tenderness or supraclavicular adenopathy  Abdominal:      Palpations: Abdomen is soft  Genitourinary:     Exam position: Supine  Labia:         Right: No rash, tenderness or lesion  Left: No rash, tenderness or lesion  Urethra: No prolapse  Vagina: Normal  No vaginal discharge, erythema, tenderness, bleeding or prolapsed vaginal walls  Cervix: No discharge or friability  Uterus: Not enlarged, not tender and no uterine prolapse  Adnexa:         Right: No mass, tenderness or fullness  Left: No mass, tenderness or fullness  Rectum: Normal  Guaiac result negative  Comments: Moderate atrophic vaginitis noted on pelvic exam  Good pelvic support confirmed  Musculoskeletal:         General: Normal range of motion  Cervical back: Normal range of motion and neck supple  Lymphadenopathy:      Cervical: No cervical adenopathy  Upper Body:      Right upper body: No supraclavicular adenopathy        Left upper body: No supraclavicular adenopathy  Skin:     General: Skin is warm and dry  Findings: No rash  Neurological:      General: No focal deficit present  Mental Status: She is alert and oriented to person, place, and time  Psychiatric:         Mood and Affect: Mood normal          Speech: Speech normal          Behavior: Behavior normal          Thought Content:  Thought content normal          Judgment: Judgment normal

## 2022-06-15 LAB
LAB AP GYN PRIMARY INTERPRETATION: NORMAL
Lab: NORMAL

## 2022-06-20 NOTE — PROGRESS NOTES
Assessment/Plan:    Wellness examination  Assessment and plan 1  Health maintenance annual wellness examination overall the patient is clinically stable and doing well, we encouraged the patient to follow a healthy and balanced diet  We recommend that the patient exercise routinely approximately 30 minutes 5 times per week   We have reviewed the patient's vaccines and have made recommendations for updates if necessary  annual flu vaccine recommended, consider new shingles vaccine      We will be ordering screening laboratories which are age appropriate  Return to the office in  6 months    call if any problems  Problem List Items Addressed This Visit        Other    Dyslipidemia - Primary    Relevant Orders    Lipid Panel with Direct LDL reflex    Wellness examination     Assessment and plan 1  Health maintenance annual wellness examination overall the patient is clinically stable and doing well, we encouraged the patient to follow a healthy and balanced diet  We recommend that the patient exercise routinely approximately 30 minutes 5 times per week   We have reviewed the patient's vaccines and have made recommendations for updates if necessary  annual flu vaccine recommended, consider new shingles vaccine      We will be ordering screening laboratories which are age appropriate  Return to the office in  6 months    call if any problems  Return to office 6  months  call if any problems  Subjective:      Patient ID: Hamzah Chaudhry is a 63 y o  female  HPI 76-year-old female coming in for annual wellness examination she reports me major lifestyle changes including exercising routinely she is following healthy/balance diet along with her   Overall she is feeling well she is up-to-date with her colonoscopy, mammogram   She drives a car safely and wears a seatbelt  She has a smoke alarm and fire extinguisher within her household    she is now exercizing 4/ weekBMI Normal breast and pelvic exams except as noted. Pap up to date, STD swabs collected. Continue breast self awareness, recommend 30 minutes of exercise 5 times weekly. RTC 1 year for annual exam, sooner PRN.     Recommend taking a prenatal vitamin if not actively using contraception, plans to continue NFP.    Counseling: Body mass index is 27 12 kg/m²  The BMI is above normal  Nutrition recommendations include decreasing portion sizes and encouraging healthy choices of fruits and vegetables  Exercise recommendations include exercising 3-5 times per week  the pt reports me approximately a month ago she had flu like symptoms of for about 10 days that resolved on their own  Reports me more recently after eating some check in she developed some vomiting x1 episode symptoms did resolve no fever no body aches no chills and no diarrhea  The following portions of the patient's history were reviewed and updated as appropriate: allergies, current medications, past family history, past medical history, past social history, past surgical history and problem list     Review of Systems   Constitutional: Negative for activity change, appetite change and unexpected weight change  HENT: Negative for congestion and postnasal drip  Eyes: Negative for visual disturbance  Respiratory: Negative for cough and shortness of breath  Cardiovascular: Negative for chest pain  Gastrointestinal: Negative for abdominal pain, diarrhea, nausea and vomiting  Neurological: Negative for dizziness, light-headedness and headaches  Hematological: Negative for adenopathy  Objective:    No follow-ups on file  No results found        Allergies   Allergen Reactions    Depakote Er [Divalproex Sodium Er]      pancreatitis    Tanzeum [Albiglutide]      pancreatitis    Bee Venom     Carbamazepine Other (See Comments)     Reaction Date:Unknown    Clarithromycin Itching    Shellfish-Derived Products     Lamotrigine Rash       Past Medical History:   Diagnosis Date    Abdominal pain     started 7/21/16- found something on the pancreas    Abnormal weight loss     last assessed [id number]    Acute sinusitis     Allergic rhinitis     last assessed [id number]    Anemia     Asthma     Cardiac murmur     Cellulitis     Cervical disc disease     Colon polyp     Degeneration of thoracic intervertebral disc     Degenerative arthritis of knee     Diabetes mellitus [facility] [address] [facility]     Diverticulosis     Esophagitis, reflux     last assessed [id number]    Excessive sweating     Fatty liver     Gait disturbance     Granuloma annulare     Head injury     Hyperlipidemia     Hypertension     Hypertensive heart disease     Hypoglycemia     last assessed [id number]    Leg length discrepancy     Leucocytosis     Multiple thyroid nodules     Neuropathy     Obesity     last assessed [id number]    Paronychia of toe     Patellofemoral dysfunction     Postmenopausal atrophic vaginitis     Prepatellar bursitis     Sebaceous cyst     last assessed [id number]    Sleep apnea     no CPAP used    Suicide attempt ([facility] [address] [facility]     Tinea corporis     Type 2 diabetes mellitus (HCC)     cont meds and monitor BS;  last assessed [id number]    Urinary frequency     Vertigo     Vitamin D deficiency     Dia-Parkinson-White syndrome      Past Surgical History:   Procedure Laterality Date    COLONOSCOPY      ESOPHAGOGASTRODUODENOSCOPY N/A 8/8/2016    Procedure: ESOPHAGOGASTRODUODENOSCOPY (EGD); Surgeon: Oleg Pinzon MD;  Location: [facility]; Service:     FL ESOPHAGOGASTRODUODENOSCOPY TRANSORAL DIAGNOSTIC N/A 3/28/2019    Procedure: ESOPHAGOGASTRODUODENOSCOPY (EGD); Surgeon: Oleg Pinzon MD;  Location: [facility] GI [facility];   Service: Gastroenterology    TONSILLECTOMY      TOOTH EXTRACTION      wisdom teeth removed     Current Outpatient Medications on File Prior to Visit   Medication Sig Dispense Refill    atorvastatin (LIPITOR) 20 mg tablet TAKE 1 TABLET (20 MG TOTAL) BY MOUTH DAILY 30 tablet 5    Cholecalciferol 4000 units CAPS Take 1 capsule (4,000 Units total) by mouth daily (Patient taking differently: Take 5,000 Units by mouth daily )      fenofibrate (TRICOR) 145 mg tablet Take 1 tablet (145 mg total) by mouth daily 90 tablet 1    FLUoxetine (PROzac) 20 mg capsule Take 1 capsule (20 mg total) by mouth daily Take with the 40 mg cap for a total dose of 60 mg daily 90 capsule 1    FLUoxetine (PROzac) 40 MG capsule Take 1 capsule (40 mg total) by mouth daily Take with the 20 mg cap for a total dose of 60 mg daily 90 capsule 1    lisinopril (ZESTRIL) 5 mg tablet Take 1 tablet (5 mg total) by mouth daily 30 tablet 6    lurasidone (LATUDA) 80 mg tablet Take 1 tablet (80 mg total) by mouth daily with dinner 90 tablet 1    metFORMIN (GLUCOPHAGE) 500 mg tablet Take 1 tablet (500 mg total) by mouth daily 90 tablet 1    Omega 3 1200 MG CAPS Take 1,200 mg by mouth 2 (two) times a day        omeprazole (PriLOSEC) 40 MG capsule Take 1 capsule (40 mg total) by mouth daily 30 capsule 5    polyethylene glycol (MIRALAX) 17 g packet Take 17 g by mouth daily       No current facility-administered medications on file prior to visit        Family History   Problem Relation Age of Onset   Robinson Chanel Cancer Mother         lung    Heart disease Mother     Coronary artery disease Mother         CABG    Diabetes Mother     Lung cancer Mother 76        smoker    Stroke Mother     Thyroid disease Mother     Other Mother         tobacco use    Parkinsonism Father     Intestinal polyp Father     Bipolar disorder Father     Heart disease Father     Prostate cancer Father 62    Hypertension Sister     Alcohol abuse Sister     Depression Brother     Diabetes Maternal Grandmother     Thyroid disease Maternal Grandmother     Hypertension Family     Suicide Attempts Paternal Aunt     Depression Paternal Aunt     Endometrial cancer Maternal Aunt         unknown age   Robinson Chanel Ovarian cancer Cousin 36        maternal     Social History     Socioeconomic History    Marital status: /Civil Union     Spouse name: Not on file    Number of children: 0    Years of education: 15    Highest education level: 12th grade   Occupational History    Occupation: retired   Social Needs  Financial resource strain: Not hard at all   [employer] insecurity:     Worry: Never true     Inability: Never true   EpiSensor needs:     Medical: No     Non-medical: No   Tobacco Use    Smoking status: Never Smoker    Smokeless tobacco: Never Used   Substance and Sexual Activity    Alcohol use: Not Currently     Alcohol/week: 1 0 standard drinks     Types: 1 Glasses of wine per week     Frequency: Monthly or less     Drinks per session: 1 or 2     Binge frequency: Never     Comment: rarely    Drug use: No    Sexual activity: Not Currently     Partners: Male   Lifestyle    Physical activity:     Days per week: 5 days     Minutes per session: 40 min    Stress:  Only a little   Relationships    Social connections:     Talks on phone: Once a week     Gets together: Once a week     Attends Synagogue service: Never     Active member of club or organization: Yes     Attends meetings of clubs or organizations: More than 4 times per year     Relationship status:     Intimate partner violence:     Fear of current or ex partner: No     Emotionally abused: No     Physically abused: No     Forced sexual activity: No   Other Topics Concern    Not on file   Social History Narrative    Education: high school graduate    Learning Disabilities: none    Marital History:     Children: none    Living Arrangement: lives in home with     Occupational History: retired, 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[place] [address]: good support system,  is supportive    Legal History: none     History: None        Caffeine use    Denied home environment domestic violence    Job physical requirements heavy lifting     Vitals:    02/12/20 1124   BP: 138/82   Pulse: 68   Resp: 20   SpO2: 98%   Weight: 71 7 kg (158 lb)   Height: 5' 4" (1 626 m)     Results for orders placed or performed in visit on 12/13/19   Lipid panel   Result Value Ref Range    Total Cholesterol 145 <200 mg/dL    HDL 52 >50 mg/dL    Triglycerides 134 <150 mg/dL    LDL Direct 71 mg/dL (calc)    Chol HDLC Ratio 2 8 <5 0 (calc)    Non-HDL Cholesterol 93 <130 mg/dL (calc)   Microalbumin, Random Urine (W/Creatinine)   Result Value Ref Range    Creatinine, Urine 59 20 - 275 mg/dL    Microalbum  ,U,Random 0 6 See Note: mg/dL    Microalb/Creat Ratio 10 <30 mcg/mg creat   Basic metabolic panel   Result Value Ref Range    Glucose, Random 81 65 - 99 mg/dL    BUN 24 7 - 25 mg/dL    Creatinine 0 77 0 50 - 1 05 mg/dL    eGFR Non  85 > OR = 60 mL/min/1 73m2    eGFR  99 > OR = 60 mL/min/1 73m2    SL AMB BUN/CREATININE RATIO NOT APPLICABLE 6 - 22 (calc)    Sodium 139 135 - 146 mmol/L    Potassium 5 0 3 5 - 5 3 mmol/L    Chloride 104 98 - 110 mmol/L    CO2 27 20 - 32 mmol/L    SL AMB CALCIUM 10 3 8 6 - 10 4 mg/dL   Hemoglobin A1c (w/out EAG)   Result Value Ref Range    Hemoglobin A1C 5 6 <5 7 % of total Hgb     Weight (last 2 days)     None        Body mass index is 27 12 kg/m²  BP      Temp      Pulse     Resp      SpO2        Vitals:    02/12/20 1124   Weight: 71 7 kg (158 lb)     Vitals:    02/12/20 1124   Weight: 71 7 kg (158 lb)       /82   Pulse 68   Resp 20   Ht 5' 4" (1 626 m)   Wt 71 7 kg (158 lb)   SpO2 98%   BMI 27 12 kg/m²          Physical Exam   Constitutional: She appears well-developed and well-nourished  HENT:   Head: Normocephalic  Mouth/Throat: Oropharynx is clear and moist    Eyes: Pupils are equal, round, and reactive to light  Conjunctivae are normal  Right eye exhibits no discharge  Left eye exhibits no discharge  No scleral icterus  Neck: Neck supple  Cardiovascular: Normal rate, regular rhythm, normal heart sounds and intact distal pulses  Exam reveals no gallop and no friction rub  No murmur heard  Pulmonary/Chest: Breath sounds normal  No respiratory distress  She has no wheezes  She has no rales  Abdominal: Soft   Bowel sounds are normal  She exhibits no distension and no mass  There is no tenderness  There is no rebound and no guarding  Musculoskeletal: She exhibits no edema or deformity  Lymphadenopathy:     She has no cervical adenopathy  Neurological: She is alert   Coordination normal

## 2022-07-10 DIAGNOSIS — K29.80 DUODENITIS: ICD-10-CM

## 2022-07-10 DIAGNOSIS — R10.13 EPIGASTRIC PAIN: ICD-10-CM

## 2022-07-10 RX ORDER — OMEPRAZOLE 40 MG/1
CAPSULE, DELAYED RELEASE ORAL
Qty: 90 CAPSULE | Refills: 1 | Status: SHIPPED | OUTPATIENT
Start: 2022-07-10

## 2022-07-14 ENCOUNTER — CLINICAL SUPPORT (OUTPATIENT)
Dept: INTERNAL MEDICINE CLINIC | Facility: CLINIC | Age: 62
End: 2022-07-14
Payer: COMMERCIAL

## 2022-07-14 VITALS — WEIGHT: 204.6 LBS | BODY MASS INDEX: 36.24 KG/M2

## 2022-07-14 DIAGNOSIS — E11.9 CONTROLLED TYPE 2 DIABETES MELLITUS WITHOUT COMPLICATION, WITHOUT LONG-TERM CURRENT USE OF INSULIN (HCC): Primary | ICD-10-CM

## 2022-07-14 DIAGNOSIS — E11.9 TYPE 2 DIABETES MELLITUS WITHOUT COMPLICATION, WITHOUT LONG-TERM CURRENT USE OF INSULIN (HCC): ICD-10-CM

## 2022-07-14 LAB — SL AMB POCT HEMOGLOBIN AIC: 7.8 (ref ?–6.5)

## 2022-07-14 PROCEDURE — 83036 HEMOGLOBIN GLYCOSYLATED A1C: CPT | Performed by: PHARMACIST

## 2022-07-14 RX ORDER — GLIPIZIDE 5 MG/1
TABLET ORAL
Qty: 360 TABLET | Refills: 1 | Status: SHIPPED | OUTPATIENT
Start: 2022-07-14

## 2022-07-14 RX ORDER — IBUPROFEN 200 MG
1 CAPSULE ORAL DAILY
COMMUNITY
End: 2022-09-07

## 2022-07-14 NOTE — PROGRESS NOTES
Maryjo, Leanna    Reason for visit: Appointment with 62y o  year old for management of T2DM  Pharmacy student present with patient's permission  Current DM Regimen:  Metformin   Glipizide    ASSESSMENT/PLAN                                                                                     Type 2 diabetes: goal A1c <6 5% based on AACE/ACE guidelines  Most recent A1c above goal     SMBG elevated due to stress  No hypoglycemia  Hyperglycemia signs/symptoms  May benefit from Pro CGM   BMP shows hyperglycemia    Carpal tunnel surgery in August     Duration: > 10 years  Microvascular complications: microalbuminuria, mild retinopathy  Macrovascular complications: none  (No) Aspirin (Yes ) Statin (Yes ) ACEI/ARB  Eye Exam:  Dr Russell Nathan, Winter 2021 - follow-up appointment scheduled for Nov  Lab Results   Component Value Date    LEFTDIABRET Mild 12/26/2020    RIGHTDIABRET None 12/26/2020   Foot Exam: 7/2021    Most recent labs and diabetes goals discussed with patient in clinic today  Medications: will have patient adjust glipizide to 5mg breakfast/lunch and 10mg supper; continue metformin  Home Monitoring: twice daily - alternating  BMI Counseling: Body mass index is 36 24 kg/m²  The BMI is above normal  Nutrition recommendations include reducing portion sizes and consuming healthier snacks  Exercise recommendations include exercising 3-5 times per week  Pharmacotherapy was ordered for patient to aid in weight loss  2  Hyperlipidemia without clinical ASCVD event: 2018 ACC/AHA lipid guidelines recommend at least moderate statin  Patient currently on moderate intensity (started 2014); tolerating well  Lipid panel/LFTs show elevated TGs; due to be updated  Continue as prescribed; may consider trial off fenofibrate in future  3  Medication Reconciliation: Medication list reviewed with pt at today's visit   Discrepancies as discussed below   Medication list updated to reflect medications pt is currently taking    Follow-Up: 4-6 weeks, prior to surgery      SUBJECTIVE                                                                                                              Medication Adherence: Medication list reviewed with patient, reports the following discrepancies/problems:  MVI: now taking one with iron  Calcium: started taking daily    Currently in donut hole but uncertain how muchc she has remaining  2  Medication Efficacy:    Review of Systems   Constitutional: Positive for appetite change and fatigue  Negative for activity change and unexpected weight change  Gastrointestinal: Positive for diarrhea (but tolerable, usually every day)  Negative for constipation, nausea and vomiting  Endocrine: Positive for polydipsia (worsened), polyphagia and polyuria (slight increase)  Musculoskeletal: Negative for myalgias (denies recently)  Home Monitoring:  Glucometer: Yes, Brand: Accu-chek    Hypoglycemia history: 0 SMBG readings < 70mg/dL since last appt   Has glucose tablets: No   Knows how to treat: Yes   Unsymptomatic hypoglycemia: No, reports hypoglycemia s/sx x1 since July 1     3  Lifestyle: retired, previously nursing assistant  Reports limited physical activity due to pain   Feels portions size has increased, increased stress as well due to health changes in family members; stress eats     Family Support:     Diet Recall - yesterday      B - Food: cheerios ([place] milk) + banana + coffee (cream/Splenda)     L - Food: sandwich (tuna)     D - Food: hot dog + tater tots + baked beans     Daily Beverages: tea (crystal light), water (more water over the past couple of days)     Snacks: usually has snacks prior to bed but didn't have any last night - used to do veggie chips + dip but now doing fruit     Exercise: no formal routine - stopped because of balance issues previously     Social History     Tobacco Use   Smoking Status Never Smoker   Smokeless Tobacco Never Used     Social History     Substance and Sexual Activity   Alcohol Use Not Currently    Alcohol/week: 1 0 standard drink    Types: 1 Glasses of wine per week    Comment: rarely          OBJECTIVE                                                                                                      Blood glucose readings (-)  FB, 174, 208, 197, 231, 261, 184  Pre-lunch: 172, 108, 181, 173, 167, 226, 150  Pre-supper: 163, 305, 276, 215, 170, 163, 269  Bedtime: 213, 209, 233, 213      Wt Readings from Last 3 Encounters:   22 92 8 kg (204 lb 9 6 oz)   22 93 4 kg (206 lb)   22 93 9 kg (207 lb)     Previous DM medications/tolerability:   Tanzeum: discontinued 2016 due to pancreatitis however uncertain if related per chart review  Jardiance: stopped 2022    Pertinent Lab Data:    Lab Results   Component Value Date    SODIUM 138 2022    K 4 4 2022    EGFR 92 2022    CREATININE 0 71 2022    GLUF 154 (H) 2022    [id number] 344 07/10/2016    MICROALBCRE 61 (H) 10/17/2018     Lab Results   Component Value Date    HGBA1C 7 8 (A) 2022    HGBA1C 8 5 (H) 2022    HGBA1C 8 9 (A) 2022      Pharmacist Tracking Tool  Reason For Outreach: Embedded Pharmacist  Demographics:  Intervention Method:  In Person  Type of Intervention: Follow-Up  Topics Addressed: Diabetes and Dyslipidemia  Pharmacologic Interventions: Dose or Frequency Adjusted and Drug Interaction   Non-Pharmacologic Interventions: Care coordination, Cost, Disease state education, Labs and Medication Monitoring  Time:  Direct Patient Care: 30 mins  Care Coordination: 30 mins  Recommendation Recipient: Patient/Caregiver  Outcome: Accepted

## 2022-07-15 ENCOUNTER — VBI (OUTPATIENT)
Dept: ADMINISTRATIVE | Facility: OTHER | Age: 62
End: 2022-07-15

## 2022-07-15 ENCOUNTER — TELEPHONE (OUTPATIENT)
Dept: ADMINISTRATIVE | Facility: OTHER | Age: 62
End: 2022-07-15

## 2022-07-15 NOTE — TELEPHONE ENCOUNTER
----- Message from Ollie Cuellar, Pharmacist sent at 7/14/2022  3:51 PM EDT -----  Regarding: care gap  07/14/22 3:51 PM    Hello, our patient Yoshi Cardona has had Diabetic Eye Exam completed/performed  Please assist in updating the patient chart by making an External outreach to Dr Geneva Hdz facility located in [place], [place]  The date of service is Winter 2021      Thank you,  Ollie Cuellar, Pharmacist  [facility]

## 2022-07-15 NOTE — TELEPHONE ENCOUNTER
Upon review of the In Basket request we were able to locate, review, and update the patient chart as requested for Diabetic Eye Exam     Any additional questions or concerns should be emailed to the Practice Liaisons via [email or web address] email, please do not reply via In Basket      Thank you  Wade Fleming

## 2022-07-26 ENCOUNTER — ANESTHESIA EVENT (OUTPATIENT)
Dept: PERIOP | Facility: AMBULARY SURGERY CENTER | Age: 62
End: 2022-07-26
Payer: COMMERCIAL

## 2022-08-04 NOTE — PRE-PROCEDURE INSTRUCTIONS
Pre-Surgery Instructions:   Medication Instructions    acetaminophen (TYLENOL) 500 mg tablet Uses PRN- OK to take day of surgery    ARIPiprazole (ABILIFY) 10 mg tablet Take day of surgery   atorvastatin (LIPITOR) 20 mg tablet Take day of surgery   calcium carbonate (OS-KIRSTEN) 1250 (500 Ca) MG tablet Hold day of surgery   Cholecalciferol 4000 units CAPS Hold day of surgery   fenofibrate (TRICOR) 145 mg tablet Take day of surgery   FLUoxetine (PROzac) 20 mg capsule Take day of surgery   FLUoxetine (PROzac) 40 MG capsule Take day of surgery   glipiZIDE (GLUCOTROL) 5 mg tablet Hold day of surgery   lisinopril (ZESTRIL) 5 mg tablet Hold day of surgery   metFORMIN (GLUCOPHAGE) 1000 MG tablet Hold day of surgery   Multiple Vitamins-Minerals (multivitamin with minerals) tablet Hold day of surgery   omeprazole (PriLOSEC) 40 MG capsule Take day of surgery  You will receive a phone call from hospital for arrival time  Please call surgeons office if any changes in your condition  Wear easy on/off clothing; consider type of surgery;  Valuables, jewelry, piercing's please keep at home  **COVID-19  education/surgical guidelines  Updated covid    Visitation policy  Please: No contact lenses or eye make up, artificial eyelashes    Please secure transportation     Follow pre surgery showering or cleaning instructions as  Reviewed by nurse or surgeons office      Questions answered and concerns addressed

## 2022-08-08 ENCOUNTER — OFFICE VISIT (OUTPATIENT)
Dept: OBGYN CLINIC | Facility: CLINIC | Age: 62
End: 2022-08-08
Payer: COMMERCIAL

## 2022-08-08 ENCOUNTER — APPOINTMENT (OUTPATIENT)
Dept: RADIOLOGY | Facility: CLINIC | Age: 62
End: 2022-08-08
Payer: COMMERCIAL

## 2022-08-08 VITALS
DIASTOLIC BLOOD PRESSURE: 69 MMHG | BODY MASS INDEX: 36.64 KG/M2 | WEIGHT: 206.8 LBS | SYSTOLIC BLOOD PRESSURE: 128 MMHG | HEART RATE: 77 BPM | HEIGHT: 63 IN

## 2022-08-08 DIAGNOSIS — Z98.1 S/P CERVICAL SPINAL FUSION: Primary | ICD-10-CM

## 2022-08-08 DIAGNOSIS — Z98.1 S/P CERVICAL SPINAL FUSION: ICD-10-CM

## 2022-08-08 PROCEDURE — 99214 OFFICE O/P EST MOD 30 MIN: CPT | Performed by: ORTHOPAEDIC SURGERY

## 2022-08-08 PROCEDURE — 72050 X-RAY EXAM NECK SPINE 4/5VWS: CPT

## 2022-08-08 NOTE — PROGRESS NOTES
[address] Yang MD  [address] [facility] [address]  [phone]    HISTORY OF PRESENT ILLNESS:    Patient is a 69-year-old female who presents for follow-up of anterior cervical fusion with diskectomy of C4-5, C5-6, C6-7 performed by Dr Geronimo Vicente on 8/3/2021  Last seen in the office on 1/24/2022  Patient is doing well today without complaints  Patient states she has having numbness/tingling in bilateral upper extremities with difficulty walking prior to surgery  Since surgery all symptoms have resolved except numbness/tingling in right hand which patient is having carpal tunnel surgery on tomorrow  Patient is a diabetic  ALLERGIES:   Allergies   Allergen Reactions    Bee Venom Swelling     Local swelling at sting site    Clarithromycin Itching    Depakote Er [Divalproex Sodium Er]      pancreatitis    Shellfish-Derived Products - Food Allergy Other (See Comments)     Ears and cheeks got red    Tanzeum [Albiglutide]      pancreatitis    Carbamazepine Other (See Comments)     Reaction Date:Unknown    Lamotrigine Rash       MEDICATIONS:    Current Outpatient Medications:     acetaminophen (TYLENOL) 500 mg tablet, Take 500 mg by mouth 3 (three) times a day, Disp: , Rfl:     ARIPiprazole (ABILIFY) 10 mg tablet, Take 1 tablet (10 mg total) by mouth every evening, Disp: 90 tablet, Rfl: 2    atorvastatin (LIPITOR) 20 mg tablet, TAKE 1 TABLET BY MOUTH EVERY DAY, Disp: 90 tablet, Rfl: 1    calcium carbonate (OS-KIRSTEN) 1250 (500 Ca) MG tablet, Take 1 tablet by mouth daily, Disp: , Rfl:     Cholecalciferol 4000 units CAPS, Take 1 capsule (4,000 Units total) by mouth daily, Disp: , Rfl:     fenofibrate (TRICOR) 145 mg tablet, Take 1 tablet (145 mg total) by mouth daily, Disp: 90 tablet, Rfl: 3    FLUoxetine (PROzac) 20 mg capsule, Take 1 capsule (20 mg total) by mouth in the morning  Take with the 40 mg cap for a total dose of 60 mg daily  , Disp: 90 capsule, Rfl: 2    FLUoxetine (PROzac) 40 MG capsule, Take 1 capsule (40 mg total) by mouth in the morning  Take with the 20 mg cap for a total dose of 60 mg daily  , Disp: 90 capsule, Rfl: 2    glipiZIDE (GLUCOTROL) 5 mg tablet, Take 1 tablet with breakfast, 1 tablet with lunch, 2 tablets with supper for diabetes, Disp: 360 tablet, Rfl: 1    ibuprofen (MOTRIN) 200 mg tablet, Take 400 mg by mouth Every other day as needed when pain not alleviated by acetaminophen, Disp: , Rfl:     lisinopril (ZESTRIL) 5 mg tablet, TAKE 1 TABLET (5 MG TOTAL) BY MOUTH DAILY  , Disp: 90 tablet, Rfl: 0    metFORMIN (GLUCOPHAGE) 1000 MG tablet, TAKE 1 TABLET BY MOUTH TWICE A DAY WITH MEALS, Disp: 180 tablet, Rfl: 0    Multiple Vitamins-Minerals (multivitamin with minerals) tablet, Take 1 tablet by mouth daily, Disp: , Rfl:     Omega 3 1200 MG CAPS, Take 1,200 mg by mouth 2 (two) times a day  , Disp: , Rfl:     omeprazole (PriLOSEC) 40 MG capsule, TAKE 1 CAPSULE BY MOUTH EVERY DAY, Disp: 90 capsule, Rfl: 1    Sod Picosulfate-Mag Ox-Cit Acd (Clenpiq) 10-3 5-12 MG-GM -GM/160ML SOLN, Take 1 Package by mouth see administration instructions, Disp: 320 mL, Rfl: 0     PAST MEDICAL HISTORY:   Past Medical History:   Diagnosis Date    Abdominal pain     started 7/21/16- found something on the pancreas    Abnormal weight loss     last assessed [id number]    Acute sinusitis     Allergic rhinitis     last assessed [id number]    Anemia     had iron infusion    Asthma     Cardiac murmur     Carpal tunnel syndrome     Cellulitis     Cervical disc disease     Colon polyp     CPAP (continuous positive airway pressure) dependence     no longer used    Degeneration of thoracic intervertebral disc     Degenerative arthritis of knee     Diabetes mellitus (HCC)     Diverticulosis     Esophagitis, reflux     last assessed [id number]    Excessive sweating     Fatty liver     Frequent falls     Gait disturbance     GERD (gastroesophageal reflux disease)     Granuloma annulare     Head injury     Hyperlipidemia     Hypertension     Hypertensive heart disease     Hypoglycemia     last assessed [id number]    Kidney stone     Leg length discrepancy     Leucocytosis     Lumbar stress fracture     Multiple thyroid nodules     Neuropathy     Obesity     last assessed [id number]    Paronychia of toe     Patellofemoral dysfunction     Pneumonia     Postmenopausal atrophic vaginitis     Prepatellar bursitis     Sebaceous cyst     last assessed [id number]    Sleep apnea     no CPAP used    Suicide attempt ([facility] [address] [facility]     Tinea corporis     Type 2 diabetes mellitus ([facility] [address] )     cont meds and monitor BS;  last assessed [id number]    Urinary frequency     Vertigo     Vitamin D deficiency     Dia-Parkinson-White syndrome        PAST SURGICAL HISTORY:  Past Surgical History:   Procedure Laterality Date    CERVICAL FUSION N/A 08/03/2021    Procedure: FUSION CERVICAL ANTERIOR W DISCECTOMY C4-5, C5-6, C6-7 with allograft and neuromonitoring  case length 3 hours; Surgeon: Fernandez Menezes MD;  Location: [facility] OR;  Service: Orthopedics    COLONOSCOPY      ESOPHAGOGASTRODUODENOSCOPY N/A 08/08/2016    Procedure: ESOPHAGOGASTRODUODENOSCOPY (EGD); Surgeon: Mandy Caputo MD;  Location: [facility] GI LAB; Service:     LUMBAR FUSION      VT ESOPHAGOGASTRODUODENOSCOPY TRANSORAL DIAGNOSTIC N/A 03/28/2019    Procedure: ESOPHAGOGASTRODUODENOSCOPY (EGD); Surgeon: Mandy Caputo MD;  Location: [facility] GI [facility];   Service: Gastroenterology    TONSILLECTOMY      TOOTH EXTRACTION      wisdom teeth removed       SOCIAL HISTORY:  Social History     Tobacco Use    Smoking status: Never Smoker    Smokeless tobacco: Never Used   Vaping Use    Vaping Use: Never used   Substance Use Topics    Alcohol use: Not Currently     Alcohol/week: 1 0 standard drink     Types: 1 Glasses of wine per week     Comment: rarely    Drug use: No       ROS:  Review of Systems Constitutional: Negative for chills, diaphoresis and fever  HENT: Negative for congestion, drooling, ear discharge, facial swelling, nosebleeds, rhinorrhea, sneezing, trouble swallowing and voice change  Eyes: Negative for discharge, redness and itching  Respiratory: Negative for cough, choking, shortness of breath, wheezing and stridor  Cardiovascular: Negative for chest pain and palpitations  Gastrointestinal: Negative for abdominal pain, constipation, diarrhea and vomiting  Endocrine: Negative for cold intolerance and heat intolerance  Genitourinary: Negative for dysuria and flank pain  Musculoskeletal:        See HPI and PE  Skin: Negative for color change and rash  Neurological: Negative for dizziness, syncope and facial asymmetry  Psychiatric/Behavioral: Negative for agitation, self-injury and suicidal ideas  PHYSICAL EXAM:  Patient is a 62-year-old female sitting comfortably on exam chair in no acute distress  Ambulates with normal gait  Sagittal/coronal balance intact  No tenderness to palpation along cervical or thoracic spine  5/5 motor strength with normal sensation in bilateral upper extremities  FROM in bilateral upper extremities  Incision is well healed without erythema, ecchymosis, drainage, or signs of acute infection  RADIOGRAPHIC STUDIES:  1  X-ray, C-spine, 10/27/2021:  Status post anterior cervical diskectomy and fusion at C4-5 C5-6 and C6-7   No hardware complication      2  MRI, C-spine, 6/16/2021:   Multilevel cervical degenerative disc disease   Disc protrusion spinal cord compression involving multiple levels, at C6-7      3  Xrays, C-spine, 1/24/2021: Status post anterior cervical diskectomy and fusion at C4-5 C5-6 and C6-7   No hardware complication  I compared these films with prior films on 01/24/2022 and so no significant difference between the films    There was evidence of plate disease at the superior level C3-4     4  Xrays, cervical spine, 8/08/2022:  Status post anterior cervical diskectomy fusion from C4 through C7  I compared the films to prior x-rays  There is evidence of plate disease at D3-8  There is also evidence of pseudoarthrosis at C4-5 and C6-7  There is no evidence of hardware complication  ASSESSMENT:  Problem List Items Addressed This Visit        Other    S/P cervical spinal fusion - Primary    Relevant Orders    XR spine cervical complete 4 or 5 vw non injury            PLAN:  Patient is a 28-year-old female with cervical myelopathy status post anterior cervical fusion with diskectomy of C4-5, C5-6, C6-7 performed by Dr Alex Lockwood on 8/3/2021  Patient is doing well overall s/p surgical intervention  Xrays of cervical spine obtained today were personally reviewed in the office today showing hardware in stable alignment  It was discussed that the spine did not fuse at level of C6-7 likely related to diabetes  Patient is not symptomatic at this time with normal exam  We will follow patient closely every six months at this time  I did review the radiographic studies with the patient  I did explain to her the findings are noted that are suggestive of pseudoarthrosis  Duration of surgery was performed for treatment of cervical myelopathy  She has done well with respect to surgery and clinically is not symptomatic  I am going to keep a very close eye on her on  She will be evaluated in 6 months with both x-rays and clinical exam   I did explain to her my protocol respect to cervical myelopathy and follow-ups  Also explained to her natural history of cervical pseudoarthrosis following anterior cervical diskectomy and fusion  I did explain to her the majority of patients are asymptomatic and do not require any treatment for the pseudoarthrosis  As long as there is no hardware complication, pseudoarthrosis can be left alone  Patient will follow-up in 6 months   Xrays of cervical spine will be obtained at that time         Scribe Attestation    I,:  Jose Soliz PA-C am acting as a scribe while in the presence of the attending physician :       I,:  Rachel Falcon MD personally performed the services described in this documentation    as scribed in my presence :

## 2022-08-09 ENCOUNTER — ANESTHESIA (OUTPATIENT)
Dept: PERIOP | Facility: AMBULARY SURGERY CENTER | Age: 62
End: 2022-08-09
Payer: COMMERCIAL

## 2022-08-09 ENCOUNTER — HOSPITAL ENCOUNTER (OUTPATIENT)
Facility: AMBULARY SURGERY CENTER | Age: 62
Setting detail: OUTPATIENT SURGERY
Discharge: HOME | End: 2022-08-09
Attending: SURGERY | Admitting: SURGERY
Payer: COMMERCIAL

## 2022-08-09 VITALS
RESPIRATION RATE: 18 BRPM | SYSTOLIC BLOOD PRESSURE: 137 MMHG | OXYGEN SATURATION: 98 % | DIASTOLIC BLOOD PRESSURE: 64 MMHG | TEMPERATURE: 97.2 F | HEART RATE: 70 BPM

## 2022-08-09 DIAGNOSIS — Z47.89 AFTERCARE FOLLOWING SURGERY OF THE MUSCULOSKELETAL SYSTEM: ICD-10-CM

## 2022-08-09 DIAGNOSIS — G56.01 CARPAL TUNNEL SYNDROME ON RIGHT: Primary | ICD-10-CM

## 2022-08-09 PROBLEM — G56.02 LEFT CARPAL TUNNEL SYNDROME: Status: ACTIVE | Noted: 2021-04-20

## 2022-08-09 LAB — GLUCOSE SERPL-MCNC: 170 MG/DL (ref 65–140)

## 2022-08-09 PROCEDURE — 64721 CARPAL TUNNEL SURGERY: CPT | Performed by: SURGERY

## 2022-08-09 PROCEDURE — 82948 REAGENT STRIP/BLOOD GLUCOSE: CPT

## 2022-08-09 PROCEDURE — 99024 POSTOP FOLLOW-UP VISIT: CPT | Performed by: SURGERY

## 2022-08-09 PROCEDURE — 64721 CARPAL TUNNEL SURGERY: CPT | Performed by: PHYSICIAN ASSISTANT

## 2022-08-09 RX ORDER — PROPOFOL 10 MG/ML
INJECTION, EMULSION INTRAVENOUS CONTINUOUS PRN
Status: DISCONTINUED | OUTPATIENT
Start: 2022-08-09 | End: 2022-08-09

## 2022-08-09 RX ORDER — MAGNESIUM HYDROXIDE 1200 MG/15ML
LIQUID ORAL AS NEEDED
Status: DISCONTINUED | OUTPATIENT
Start: 2022-08-09 | End: 2022-08-09 | Stop reason: HOSPADM

## 2022-08-09 RX ORDER — HYDROCODONE BITARTRATE AND ACETAMINOPHEN 5; 325 MG/1; MG/1
1 TABLET ORAL EVERY 6 HOURS PRN
Qty: 5 TABLET | Refills: 0 | Status: SHIPPED | OUTPATIENT
Start: 2022-08-09 | End: 2022-08-19

## 2022-08-09 RX ORDER — CHLORHEXIDINE GLUCONATE 0.12 MG/ML
15 RINSE ORAL ONCE
Status: DISCONTINUED | OUTPATIENT
Start: 2022-08-09 | End: 2022-08-09 | Stop reason: HOSPADM

## 2022-08-09 RX ORDER — CEFAZOLIN SODIUM 2 G/50ML
SOLUTION INTRAVENOUS AS NEEDED
Status: DISCONTINUED | OUTPATIENT
Start: 2022-08-09 | End: 2022-08-09

## 2022-08-09 RX ORDER — FENTANYL CITRATE 50 UG/ML
INJECTION, SOLUTION INTRAMUSCULAR; INTRAVENOUS AS NEEDED
Status: DISCONTINUED | OUTPATIENT
Start: 2022-08-09 | End: 2022-08-09

## 2022-08-09 RX ORDER — PROPOFOL 10 MG/ML
INJECTION, EMULSION INTRAVENOUS AS NEEDED
Status: DISCONTINUED | OUTPATIENT
Start: 2022-08-09 | End: 2022-08-09

## 2022-08-09 RX ORDER — ONDANSETRON 2 MG/ML
4 INJECTION INTRAMUSCULAR; INTRAVENOUS ONCE AS NEEDED
Status: DISCONTINUED | OUTPATIENT
Start: 2022-08-09 | End: 2022-08-09 | Stop reason: HOSPADM

## 2022-08-09 RX ORDER — SODIUM CHLORIDE, SODIUM LACTATE, POTASSIUM CHLORIDE, CALCIUM CHLORIDE 600; 310; 30; 20 MG/100ML; MG/100ML; MG/100ML; MG/100ML
INJECTION, SOLUTION INTRAVENOUS CONTINUOUS PRN
Status: DISCONTINUED | OUTPATIENT
Start: 2022-08-09 | End: 2022-08-09

## 2022-08-09 RX ORDER — CEFAZOLIN SODIUM 2 G/50ML
2000 SOLUTION INTRAVENOUS ONCE
Status: DISCONTINUED | OUTPATIENT
Start: 2022-08-09 | End: 2022-08-09 | Stop reason: HOSPADM

## 2022-08-09 RX ORDER — FENTANYL CITRATE/PF 50 MCG/ML
25 SYRINGE (ML) INJECTION
Status: DISCONTINUED | OUTPATIENT
Start: 2022-08-09 | End: 2022-08-09 | Stop reason: HOSPADM

## 2022-08-09 RX ORDER — MIDAZOLAM HYDROCHLORIDE 2 MG/2ML
INJECTION, SOLUTION INTRAMUSCULAR; INTRAVENOUS AS NEEDED
Status: DISCONTINUED | OUTPATIENT
Start: 2022-08-09 | End: 2022-08-09

## 2022-08-09 RX ADMIN — FENTANYL CITRATE 25 MCG: 50 INJECTION, SOLUTION INTRAMUSCULAR; INTRAVENOUS at 07:34

## 2022-08-09 RX ADMIN — PROPOFOL 30 MG: 10 INJECTION, EMULSION INTRAVENOUS at 07:26

## 2022-08-09 RX ADMIN — FENTANYL CITRATE 50 MCG: 50 INJECTION, SOLUTION INTRAMUSCULAR; INTRAVENOUS at 07:26

## 2022-08-09 RX ADMIN — CEFAZOLIN SODIUM 2000 MG: 2 SOLUTION INTRAVENOUS at 07:20

## 2022-08-09 RX ADMIN — SODIUM CHLORIDE, SODIUM LACTATE, POTASSIUM CHLORIDE, AND CALCIUM CHLORIDE: .6; .31; .03; .02 INJECTION, SOLUTION INTRAVENOUS at 07:17

## 2022-08-09 RX ADMIN — PROPOFOL 80 MCG/KG/MIN: 10 INJECTION, EMULSION INTRAVENOUS at 07:27

## 2022-08-09 RX ADMIN — FENTANYL CITRATE 25 MCG: 50 INJECTION, SOLUTION INTRAMUSCULAR; INTRAVENOUS at 07:43

## 2022-08-09 RX ADMIN — MIDAZOLAM HYDROCHLORIDE 2 MG: 1 INJECTION, SOLUTION INTRAMUSCULAR; INTRAVENOUS at 07:22

## 2022-08-09 RX ADMIN — PROPOFOL 20 MG: 10 INJECTION, EMULSION INTRAVENOUS at 07:27

## 2022-08-09 NOTE — DISCHARGE INSTRUCTIONS
Post Operative Instructions    You have had surgery on your arm today, please read and follow the information below:  Elevate your hand above your elbow during the next 24-48 hours to help with swelling  Place your hand and arm over your head with motion at your shoulder three times a day  Do not apply any cream/ointment/oil to your incisions including antibiotics  Do not soak your hands in standing water (dishwater, tubs, Jacuzzi's, pools, etc ) until given permission (typically 2-3 weeks after injury)    Call the office if you notice any:  Increased numbness or tingling of your hand or fingers that is not relieved with elevation  Increasing pain that is not controlled with medication  Difficulty chewing, breathing, swallowing  Chest pains or shortness of breath  Fever over 101 4 degrees  Bandage: Please keep bandages clean and dry  Remove bandage after 5 days  Once bandages are removed you may wash hands with soap and water  Short showers are okay as well, but please avoid soaking the hand as described above (ie no pools, baths, dirty dish water, hot tubs, ocean/lake water, etc)  Sutures will be removed in the office at your first follow up visit, please do not remove them yourself  Please do NOT put any topical agents on the surgical wound including neosporin, peroxide, tea tree oil, vitamin E, etc  as these can delay wound healing  Motion: Move fingers into a fist 5 times a day, DO NOT move any splinted fingers  Weight bearing status: Avoid heavy lifting (>5 pounds) with the extremity that was operated on until follow up appointment  Normal activities of daily living are OK  Ice: Ice for 10 minutes every hour as needed for swelling x 24 hours  Sling: No sling necessary      Pain medication:   Naproxen 220 mg two time a day (do not take this medication if you were told by your doctor that you cannot take anti-inflammatories or NSAIDS)  Tylenol Extended Release 650 mg every 8 hours  Norco/Hydrocodone one tab every 6 hours ONLY AS NEEDED for severe pain         Follow-up Appointment: 10-14 days with Dr Laurita Kinney        Please call the office if you have any questions or concerns regarding your post-operative care

## 2022-08-09 NOTE — H&P
ASSESSMENT/PLAN:       62 y o  female with bilateral carpal tunnel syndrome       Ultrasound and EMG results were reviewed in the office today  The etiology of carpal tunnel syndrome was discussed along with treatment options  Right carpal tunnel release was discussed at length including risks and benefits  Risks of surgery consist of but not limited to bleeding, infection, stiffness, pillar pain, injury to nerves and surrounding structures, incomplete resolution of paraesthesia's, etc  Lidya Zeng elected to proceed with a right carpal tunnel release and informed surgical consent was signed  Numbness/tingling may never completely resolve as some of this can still be coming from her cervical spine as well as possible due to being a diabetic  Post operative instructions/expectations were reviewed and provided in AVS  Lab work and EKG will be obtained prior to surgical intervention  Follow up in the office 10-14 days after surgery for suture removal       The patient verbalized understanding of exam findings and treatment plan  We engaged in the shared decision-making process and treatment options were discussed at length with the patient  Surgical and conservative management discussed today along with risks and benefits      Diagnoses and all orders for this visit:     Bilateral carpal tunnel syndrome     S/P cervical spinal fusion       Open Carpal Tunnel Release: The anatomy and physiology of carpal tunnel syndrome was discussed with the patient today  Increase pressure localized under the transverse carpal ligament can cause pain, numbness, tingling, or dysesthesias within the median nerve distribution as well as feelings of fatigue, clumsiness, or awkwardness  These symptoms typically occur at night and worse in the morning upon waking  Eventually, untreated carpal tunnel syndrome can result in weakness and permanent loss of muscle within the thenar compartment of the hand    Treatment options were discussed with the patient  Conservative treatment includes nocturnal resting splints to keep the nerve in a neutral position, ergonomic changes within the work or home environment, activity modification, and tendon gliding exercises  Vitamin B6 one tablet daily over the counter may helpful to reduce symptoms  Steroid injections within the carpal canal can help a majority of patients, however this is often self-limited in a majority of patients  Surgical intervention to divide the transverse carpal ligament typically results in a long-lasting relief of the patient's complaints, with the recurrence rate of less than 1%  The patient has elected to undergo an open carpal tunnel release  The palmar incision technique was discussed in the office with the patient today  In the postoperative period, light activities are allowed immediately, driving is allowed when narcotic medication has stopped, and the bandages may be removed and incision may get wet after 2 days  Heavy activities (lifting more than approximately 10 pounds) will be allowed after follow up appointment in 1-2 weeks  While night symptoms (waking from sleep, pain, and discomfort in the hands) generally improves rapidly, the numbness and tingling as well as the strength will slowly improve over weeks to months depending on the chronicity and severity of the carpal tunnel syndrome  Pillar pain and scar discomfort were discussed with the patient which are self-limiting conditions  The risks of bleeding and infection from the surgery are less than 1%  Risk of recurrence is approximately 0 5%  The risks of nerve injury or nerve damage or damage to the blood vessels is approximately 1 in 1200  The patient has an understanding of the above mentioned discussion  The risks and benefits of the procedure were explained to the patient, which include, but are not limited to: Bleeding, infection, recurrence, pain, scar, damage to tendons, damage to nerves, and damage to blood vessels, failure to give desired results and complications related to anesthesia  These risks, along with alternative conservative treatment options, and postoperative protocols were voiced back and understood by the patient  All questions were answered to the patient's satisfaction  The patient agrees to comply with a standard postoperative protocol, and is willing to proceed  Education was provided via written and auditory forms  There were no barriers to learning  Written handouts regarding wound care, incision and scar care, and general preoperative information was provided to the patient  Prior to surgery, the patient may be requested to stop all anti-inflammatory medications  Prophylactic aspirin, Plavix, and Coumadin may be allowed to be continued  Medications including vitamin E , ginkgo, and fish oil are requested to be stopped approximately one week prior to surgery           Follow Up:  Return for post op, 10-14 days after sx          To Do Next Visit:  Re-evaluation of current issue and Sutures out     ____________________________________________________________________________________________________________________________________________        CHIEF COMPLAINT:      Chief Complaint   Patient presents with    Left Hand - Pain, Numbness, Tingling    Right Hand - Pain, Numbness, Tingling         SUBJECTIVE:  Yoshi Cardona is a 62y o  year old RHD female who presents to the office today for bilateral hand numbness/tingling  I am seeing Deborah Hernandez in consultation at the request of Riley Freeman PA-C  She notes mostly constant numbness/tingling to her radial 3 digits bilaterally  This has been ongoing for aprox  1 year  She underwent ACDF C4-7 on 8/3/21, with Dr Hussain Aguirre  She notes this did resolve her arm numbness as well as some hand numbness  She notes continued numbness to her radial 3 digits bilaterally  She is bracing at night, without significant improvement   Numbness/tingling is not waking her from sleep at night   She crochets and notes difficulty holding the needle          I have personally reviewed all the relevant PMH, PSH, SH, FH, Medications and allergies       PAST MEDICAL HISTORY:       Past Medical History:   Diagnosis Date    Abdominal pain       started 7/21/16- found something on the pancreas    Abnormal weight loss       last assessed [id number]    Acute sinusitis      Allergic rhinitis       last assessed [id number]    Anemia      Asthma      Cardiac murmur      Carpal tunnel syndrome      Cellulitis      Cervical disc disease      Colon polyp      CPAP (continuous positive airway pressure) dependence       no longer used    Degeneration of thoracic intervertebral disc      Degenerative arthritis of knee      Diabetes mellitus (HCC)      Diverticulosis      Esophagitis, reflux       last assessed [id number]    Excessive sweating      Fatty liver      Frequent falls      Gait disturbance      GERD (gastroesophageal reflux disease)      Granuloma annulare      Head injury      Hyperlipidemia      Hypertension      Hypertensive heart disease      Hypoglycemia       last assessed [id number]    Kidney stone      Leg length discrepancy      Leucocytosis      Lumbar stress fracture      Multiple thyroid nodules      Neuropathy      Obesity       last assessed [id number]    Paronychia of toe      Patellofemoral dysfunction      Pneumonia      Postmenopausal atrophic vaginitis      Prepatellar bursitis      Sebaceous cyst       last assessed [id number]    Sleep apnea       no CPAP used    Suicide attempt ([address] )      Tinea corporis      Type 2 diabetes mellitus (HCC)       cont meds and monitor BS;  last assessed [id number]    Urinary frequency      Vertigo      Vitamin D deficiency      Dia-Parkinson-White syndrome           PAST SURGICAL HISTORY:        Past Surgical History:   Procedure Laterality Date    CERVICAL FUSION N/A 8/3/2021     Procedure: FUSION CERVICAL ANTERIOR W DISCECTOMY C4-5, C5-6, C6-7 with allograft and neuromonitoring  case length 3 hours; Surgeon: Lee Ann Narvaez MD;  Location:  MAIN OR;  Service: Orthopedics    COLONOSCOPY        ESOPHAGOGASTRODUODENOSCOPY N/A 8/8/2016     Procedure: ESOPHAGOGASTRODUODENOSCOPY (EGD); Surgeon: Clover Barajas MD;  Location[facility] GI [facility]; Service:     NM ESOPHAGOGASTRODUODENOSCOPY TRANSORAL DIAGNOSTIC N/A 3/28/2019     Procedure: ESOPHAGOGASTRODUODENOSCOPY (EGD); Surgeon: Clover Barajas MD;  Location: [facility] GI LAB; Service: Gastroenterology    TONSILLECTOMY        TOOTH EXTRACTION         wisdom teeth removed         FAMILY HISTORY:        Family History   Problem Relation Age of Onset    Cancer Mother           lung    Heart disease Mother      Coronary artery disease Mother           CABG   Ardeth Needs Diabetes Mother      Lung cancer Mother 76         smoker    Stroke Mother      Thyroid disease Mother      Other Mother           tobacco use    Parkinsonism Father      Intestinal polyp Father      Bipolar disorder Father      Heart disease Father      Prostate cancer Father 62    Hypertension Sister      Alcohol abuse Sister      Depression Brother      Heart disease Brother      Diabetes Maternal Grandmother      Thyroid disease Maternal Grandmother      Hypertension Family      Suicide Attempts Paternal Aunt      Depression Paternal Aunt      Endometrial cancer Maternal Aunt           unknown age   Ardeth Needs Ovarian cancer Cousin 36         maternal    Heart disease Brother      Seizures Brother           SOCIAL HISTORY:  Social History            Tobacco Use    Smoking status: Never Smoker    Smokeless tobacco: Never Used   Vaping Use    Vaping Use: Never used   Substance Use Topics    Alcohol use: Not Currently       Alcohol/week: 1 0 standard drink       Types: 1 Glasses of wine per week       Comment: rarely    Drug use:  No         MEDICATIONS:     Current Outpatient Medications:     ARIPiprazole (ABILIFY) 10 mg tablet, Take 1 tablet (10 mg total) by mouth every evening, Disp: 90 tablet, Rfl: 2    atorvastatin (LIPITOR) 20 mg tablet, Take 1 tablet (20 mg total) by mouth daily, Disp: 90 tablet, Rfl: 1    Cholecalciferol 4000 units CAPS, Take 1 capsule (4,000 Units total) by mouth daily (Patient taking differently: Take 1,000 Units by mouth daily ), Disp: , Rfl:     fenofibrate (TRICOR) 145 mg tablet, Take 1 tablet (145 mg total) by mouth daily, Disp: 90 tablet, Rfl: 3    FLUoxetine (PROzac) 20 mg capsule, Take 1 capsule (20 mg total) by mouth daily Take with the 40 mg cap for a total dose of 60 mg daily, Disp: 90 capsule, Rfl: 1    FLUoxetine (PROzac) 40 MG capsule, Take 1 capsule (40 mg total) by mouth daily Take with the 20 mg cap for a total dose of 60 mg daily, Disp: 90 capsule, Rfl: 1    glipiZIDE (GLUCOTROL) 5 mg tablet, Take 1 tablet (5 mg total) by mouth daily with breakfast, Disp: 90 tablet, Rfl: 3    lisinopril (ZESTRIL) 5 mg tablet, TAKE 1 TABLET BY MOUTH EVERY DAY, Disp: 90 tablet, Rfl: 2    magnesium oxide (MAG-OX) 400 mg tablet, TAKE 1 TABLET BY MOUTH EVERY DAY, Disp: 30 tablet, Rfl: 1    metFORMIN (GLUCOPHAGE) 1000 MG tablet, Take 1 tablet (1,000 mg total) by mouth 2 (two) times a day with meals, Disp: 180 tablet, Rfl: 1    Omega 3 1200 MG CAPS, Take 1,200 mg by mouth 2 (two) times a day  , Disp: , Rfl:     omeprazole (PriLOSEC) 40 MG capsule, Take 1 capsule (40 mg total) by mouth daily, Disp: 90 capsule, Rfl: 1     ALLERGIES:        Allergies   Allergen Reactions    Bee Venom Swelling       Local swelling at sting site    Clarithromycin Itching    Depakote Er [Divalproex Sodium Er]         pancreatitis    Shellfish-Derived Products - Food Allergy Other (See Comments)       Ears and cheeks got red    Tanzeum [Albiglutide]         pancreatitis    Carbamazepine Other (See Comments)       Reaction Date:Unknown    Lamotrigine Rash       REVIEW OF SYSTEMS:  Review of Systems   Constitutional: Negative for chills, fever and unexpected weight change  HENT: Negative for hearing loss, nosebleeds and sore throat  Eyes: Negative for pain, redness and visual disturbance  Respiratory: Negative for cough, shortness of breath and wheezing  Cardiovascular: Negative for chest pain, palpitations and leg swelling  Gastrointestinal: Negative for abdominal pain, nausea and vomiting  Endocrine: Negative for polydipsia and polyuria  Genitourinary: Negative for difficulty urinating and hematuria  Musculoskeletal: Negative for arthralgias, joint swelling and myalgias  Skin: Negative for rash and wound  Neurological: Positive for numbness  Negative for dizziness and headaches  Psychiatric/Behavioral: Negative for decreased concentration, dysphoric mood and suicidal ideas   The patient is not nervous/anxious           VITALS:      Vitals:     01/27/22 0807   BP: 129/73   Pulse: 78   Resp: 17         LABS:  HgA1c:         Lab Results   Component Value Date     HGBA1C 7 2 10/12/2021      BMP:         Lab Results   Component Value Date     GLUCOSE 120 09/06/2015     CALCIUM 9 2 11/17/2021      09/06/2015     K 4 9 11/17/2021     CO2 25 11/17/2021      11/17/2021     BUN 17 11/17/2021     CREATININE 0 66 11/17/2021         _____________________________________________________  PHYSICAL EXAMINATION:  General: well developed and well nourished, alert, oriented times 3 and appears comfortable  Psychiatric: Normal  HEENT: Normocephalic, Atraumatic Trachea Midline, No torticollis  Pulmonary: No audible wheezing or respiratory distress   Cardiovascular: No pitting edema, 2+ radial pulse   Abdominal/GI: abdomen non tender, non distended   Skin: No masses, erythema, lacerations, fluctation, ulcerations  Neurovascular: Sensation Intact to the Median, Ulnar, Radial Nerve, Motor Intact to the Median, Ulnar, Radial Nerve and Pulses Intact  Musculoskeletal: Normal, except as noted in detailed exam and in HPI         MUSCULOSKELETAL EXAMINATION:     Bilateral Carpal Tunnel Exam:     Negative thenar atrophy  Negative phalen's test  Negative carpal tunnel compression  Positive tinels over median nerve at the wrist, more so on the right  APB strength 5/5 bilaterally   Full composite fist bilaterally       ___________________________________________________  STUDIES REVIEWED:  I personally reviewed EMG nerve conduction studies of bilateral wrist which demonstrates bilateral carpal tunnel syndrome left slightly worse than right

## 2022-08-09 NOTE — ANESTHESIA POSTPROCEDURE EVALUATION
Post-Op Assessment Note    CV Status:  Stable  Pain Score: 0    Pain management: adequate     Mental Status:  Alert and awake   Hydration Status:  Euvolemic   PONV Controlled:  Controlled   Airway Patency:  Patent      Post Op Vitals Reviewed: Yes      Staff: Anesthesiologist, CRNA         No complications documented      BP   141/66   Temp  98 4   Pulse  76   Resp   16   SpO2   96

## 2022-08-09 NOTE — OP NOTE
OPERATIVE REPORT  PATIENT NAME: Christella Skiff  :  1960  MRN: [id number]  Pt Location: [facility]    SURGERY DATE: 22    Surgeon(s) and Role:     * Caridad De Leon MD - Primary     * Kassanrda Velez PA-C - Assisting    Pre-Op Diagnosis:  Carpal tunnel syndrome on right [G56 01]    Post-Op Diagnosis Codes:     * Carpal tunnel syndrome on right [G56 01]    Procedure(s):  RELEASE CARPAL TUNNEL (Right)    Specimen(s):  * No orders in the log *    Estimated Blood Loss:   Minimal    Anesthesia Type:   Conscious Sedation     IMPLANTS:  * No implants in log *    PERIOPERATIVE ANTIBIOTICS:    cefazolin, 2 grams    Tourniquet Time: 4 min  at 250 mmHg          Operative Indications: The patient has a history of  Right carpal tunnel that was recalcitrant to conservative management  The decision was made to bring the patient to the opCarpal Tunnel Syndrome  righterating room for Open Carpal Tunnel Release  right  Risks of the procedure were explained which include, but are not limited to bleeding; infection; damage to nerves, arteries,veins, tendons; scar; pain; need for reoperation; failure to give desired result; and risks of anaesthesia  All questions were answered to satisfaction and they were willing to proceed  Operative Findings:  Hypertrophy TCL     Complications:   None    Procedure and Technique:  After the patient, site, and procedure were identified, the patient was brought into the operating room in a supine position  Local anaesthesia and sedation were provided  A well padded tourniquet was applied to the extremity, set at 250 mmHg  The  right upper extremity was then prepped and drapped in a normal, sterile, orthopedic fashion  An anterior approach to the carpal tunnel was undertaken  A 2 cm incision was made in line with the fourth digit, proximal to Hunter's line  The skin and the subcutaneous tissue were sharply incised    Under loupe magnification, dissection was carried down to the palmar fascia and it was incised  The transverse carpal ligament was visualized and  Released distally with a #64 blade  A freer was was passed above and below the TCL in a retrograde fashion, freeing up any adhesions  A Knife Lite was used to release the proximal portion of the transverse carpal ligament under direct visualization  Distally the superficial arch was identified  A complete release was carried out and exploration of the carpal canal revealed no significant tenosynovitis  The median nerve and its branches were intact  At the completion of the procedure, hemostasis was obtained with cautery and direct pressure  The wounds were copiously irrigated with sterile solution  The wounds were closed with Prolene  Sterile dressings were applied, including Xeroform, gauze, tweeners, webril, ACE  Please note, all sponge, needle, and instrument counts were correct prior to closure  Loupe magnification was utilized  The patient tolerated the procedure well       I was present for the entire procedure, A qualified resident physician was not available and A physician assistant was required during the procedure for retraction tissue handling,dissection and suturing    Patient Disposition:  PACU     SIGNATURE: Elise Lucio MD  DATE: 08/09/22  TIME: 8:05 AM

## 2022-08-09 NOTE — ANESTHESIA PREPROCEDURE EVALUATION
Procedure:  RELEASE CARPAL TUNNEL (Right Wrist)    Relevant Problems   ANESTHESIA (within normal limits)      CARDIO   (+) Anomalous atrioventricular excitation   (+) Benign essential hypertension   (+) Cardiac murmur   (+) Essential hypertension   (+) Essential hypertriglyceridemia      ENDO   (+) Controlled type 2 diabetes mellitus without complication (HCC)      GI/HEPATIC   (+) BRBPR (bright red blood per rectum)   (+) Fatty liver   (+) Gastroesophageal reflux disease      /RENAL   (+) Kidney stone      GYN (within normal limits)      HEMATOLOGY   (+) Iron deficiency anemia, unspecified      MUSCULOSKELETAL   (+) Degeneration of thoracic intervertebral disc   (+) Degenerative arthritis of knee, bilateral   (+) Prepatellar bursitis      NEURO/PSYCH   (+) LOURDES (generalized anxiety disorder)   (+) History of colon polyps      PULMONARY   (+) Asthma   (+) Obstructive sleep apnea      Other   (+) Bipolar I disorder, most recent episode depressed, in full remission (HCC)   (+) Class 1 obesity due to excess calories with serious comorbidity and body mass index (BMI) of 32 0 to 32 9 in adult        Physical Exam    Airway    Mallampati score: III  TM Distance: >3 FB  Neck ROM: full     Dental       Cardiovascular      Pulmonary      Other Findings        Anesthesia Plan  ASA Score- 3     Anesthesia Type- IV sedation with anesthesia with ASA Monitors  Additional Monitors:   Airway Plan:           Plan Factors-Exercise tolerance (METS): >4 METS  Chart reviewed  EKG reviewed  Existing labs reviewed  Patient summary reviewed  Patient is not a current smoker  Induction- intravenous  Postoperative Plan- Plan for postoperative opioid use  Informed Consent- Anesthetic plan and risks discussed with patient and spouse  I personally reviewed this patient with the CRNA  Discussed and agreed on the Anesthesia Plan with the CRNA  Oc Morris

## 2022-08-12 NOTE — PROGRESS NOTES
PT Re-Evaluation     Today's date: 2021  Patient name: Soniya Mendez  : 1960  MRN: [id number]  Referring provider: Kilo Reyez DO  Dx:   Encounter Diagnosis     ICD-10-CM    1  Cervical radiculopathy  M54 12    2  Acute right-sided low back pain without sciatica  M54 5                   Assessment  Assessment details: Pt has demonstrated improvements in cervical and low back pain, posture, strength, and balance since beginning physical therapy, however, pt continues to demonstrate bilateral hand paraesthesia, decreased balance, decreased strength, and decreased functional mobility  Pt will benefit from skilled physical therapy in order to continue to improve in balance, strength, safety, functional mobility, quality of life, and return to prior level of function  Impairments: abnormal gait, abnormal or restricted ROM, activity intolerance, impaired physical strength, lacks appropriate home exercise program, pain with function and poor posture     Goals  ST-4 weeks  1   Pt will decrease pain > 50%  met  2  Pt will centralize c/s radiculopathy  3   Pt will decrease pain > 50% with SL     LT-8 weeks  1   Pt will decrease pain > 75%  met  2  Pt will walk her dog for 20 minutes without pain  met  3  Pt will sleep t/o the night without pain  Met  4  Pt will increase FT EC balance > 30 sec  5   Pt will increase SLS B > 15 sec  6   Pt will safely negotiate stairs  Plan  Plan details: Awaiting surgeon recommendations for continuation of physical therapy 21    Planned modality interventions: traction  Planned therapy interventions: abdominal trunk stabilization, neuromuscular re-education, patient education, postural training, strengthening, stretching, therapeutic activities, therapeutic exercise, therapeutic training, flexibility, functional ROM exercises, gait training, home exercise program and body mechanics training  Frequency: 2x week        Subjective Evaluation    History of Present Illness  Date of onset: 12/15/2020  Mechanism of injury: Pt reports 3/10 neck pain and 1/10 back pain today  Pt reports approximately 30% improvement in neck and low back symptoms since beginning physical therapy  Pt reports continued bilateral hand paraesthesia and balance deficits  Pt reports falling down the stairs backward on Saturday but denies pain, dizziness, visual changes, difficulty with speech or confusion  Pain  Current pain rating: 3  At best pain ratin  At worst pain rating: 3  Quality: dull ache  Relieving factors: change in position, heat and medications      Diagnostic Tests  Abnormal x-ray: taken 21, results not yet available  MRI studies: abnormal (Severe central canal stenosis at the C4-5 level secondary to degenerative disc disease and a moderately large left paramedian disc extrusion  Cord is compressed and there is faint increased signal noted within the cord consistent with edema )  Treatments  Previous treatment: medication  Patient Goals  Patient goals for therapy: decreased edema, increased motion, increased strength, independence with ADLs/IADLs and return to sport/leisure activities          Objective     Postural Observations  Seated posture: poor  Standing posture: fair  Correction of posture: makes symptoms better    Additional Postural Observation Details  After her fall, pt demonstrated L shoulder and anterior upper arm bruising, R sided LBP bruising and L knee scratches  Pt also demonstrated a small bump on the back of her head      Neurological Testing     Sensation   Cervical/Thoracic   Left   Intact: light touch    Right   Intact: light touch    Reflexes   Left   Biceps (C5/C6): trace (1+)  Brachioradialis (C6): trace (1+)  Triceps (C7): trace (1+)    Right   Biceps (C5/C6): trace (1+)  Brachioradialis (C6): trace (1+)  Triceps (C7): trace (1+)    Active Range of Motion   Cervical/Thoracic Spine       Cervical  Subcranial protraction:  WFL   Subcranial retraction:   Restriction level: minimal  Flexion:  WFL  Extension:  with pain Restriction level: minimal  Left lateral flexion:  with pain Restriction level: moderate  Right lateral flexion:  Restriction level moderate  Left rotation:  with pain Restriction level: moderate  Right rotation:  Restriction level: moderate  Left Shoulder   Flexion: WFL  Abduction: WFL  External rotation BTH: WFL  Internal rotation BTB: WFL    Right Shoulder   Flexion: WFL  Abduction: WFL  External rotation BTH: WFL  Internal rotation BTB: WFL    Lumbar   Flexion:  WFL  Extension:  WFL  Left lateral flexion:  WFL  Right lateral flexion:  WFL  Left rotation:  WFL  Right rotation:  [facility]    Additional Active Range of Motion Details  Balance: FT EC: 15 sec  Tandem stance R: 25 sec, L: 25 sec, SLS: R: 10 sec, L: 10 sec        Passive Range of Motion   Left Hip   Flexion: 110 degrees   External rotation (90/90): 40 degrees   Internal rotation (90/90): 15 degrees     Right Hip   Flexion: 110 degrees   External rotation (90/90): 35 degrees   Internal rotation (90/90): 10 degrees     Joint Play     Hypomobile: C1, C2, C3, C4, C5, C6 and C7   Mechanical Assessment    Cervical    Seated Extension: repeated movements  Pain location: peripheralized    Thoracic      Lumbar    Standing extension: repeated movements  Pain location: centralized  Pain intensity: better  Pain level: abolished    Strength/Myotome Testing     Left Shoulder     Planes of Motion   Flexion: 4+   Abduction: 4+   External rotation at 0°: 5   Internal rotation at 0°: 5     Right Shoulder     Planes of Motion   Flexion: 4+   Abduction: 4+   External rotation at 0°: 5   Internal rotation at 0°: 5     Left Elbow   Flexion: 5  Extension: 5    Right Elbow   Flexion: 5  Extension: 5    Left Wrist/Hand   Wrist extension: 5  Wrist flexion: 5  Thumb extension: 5    Right Wrist/Hand   Wrist extension: 5  Wrist flexion: 5  Thumb extension: 5    Left Hip   Planes of Motion   Flexion: 4  Extension: 4  Abduction: 4-  Adduction: 4    Right Hip   Planes of Motion   Flexion: 4-  Extension: 4  Abduction: 4-  Adduction: 4    Tests   Cervical   Negative alar ligament test and Sharp-Prerna test      Left   Negative Spurling's Test A and Spurling's Test B  Right   Positive Spurling's Test B  Negative Spurling's Test A  Left Shoulder   Positive ULTT1 and ULTT4  Right Shoulder   Positive ULTT1 and ULTT4  Negative Hawkin's and Neer's  Left Elbow   Positive Cozen's  Right Elbow   Negative Cozen's  Left Wrist/Hand   Positive Tinel's sign (medial nerve)  Right Wrist/Hand   Positive Tinel's sign (medial nerve)  Left Hip   Negative Georgette and scour  Right Hip   Positive Georgette  Negative scour  Additional Tests Details   strength: R: 40, L: 45#      Precautions: none      Dx: C5-7 radiculopathy with an opening preference, R subacromial impingement, R GT bursitis,      Manuals 6/17 6/21 6/24 6/28 7/1        Manual c/s distraction               R sh PROM              R ITB foam roller              Mechanical traction 5 min  25# 5 min 25# 5 min 25# 5 min 25# 5 min 25#        Graston B carpal tunnel 3 min ea 3 min ea 3 min ea 3 min ea 3 min ea        Graston L UT/LS 5 min 5 min 5 min np         Laser L3 6000 J nv 6000J 6000 J 4000 J 6000 J       Neuro Re-Ed              C/s retraction 3x10 3x10 3x10 3x10 3x10        C/s retraction, extension 1x10 1x10 1x10 1x10 Hold        scap retraction iso 5"x15 5"x15 5"x15 5"x15 5"x15        B c/s Rot 1x15 ea 1x15 ea 1x15 ea 1x15 ea 1x15ea        B c/s SB 1x15 ea 1x15 ea 1x15 ea 1x15 ea 1x15ea        Bridges nv 3x25 3x25 3x28         Median nerve flossing 1x10 ea 1x10 ea 1x10 ea 1x10 ea 1x10 ea        Ulnar nerve flossing 1x10 ea 1x10 ea 1x10 ea 1x10 ea 1x10 ea        Carpal tunnel flossing 1x10 ea 1x10 ea 1x10ea 1x10 ea 1x10 ea        Standing DLS Hip ABD 1K25 ea  7I99 ea 3x15 ea 3x15 ea 3x15 ea        Feet together eyes closed 1x45" 1x45" NV NV NP        FT EO Lvl 8 1x45" Lvl 8 1x45" NV Lvl 8 1x45" NV        SLS             SLS 30"x1 ea  Lvl 12 30"x1 ea  NV 30"x1 ea 30"x1 ea        Ther Ex               Standing lumbar ext 3x10 3x10  3x10 3x10 3x10                                                          Ther Activity                                                  Gait Training                                                                 Modalities                                                               Pf[employer] dose 1 and 2

## 2022-08-16 DIAGNOSIS — E11.9 CONTROLLED TYPE 2 DIABETES MELLITUS WITHOUT COMPLICATION, WITHOUT LONG-TERM CURRENT USE OF INSULIN (HCC): ICD-10-CM

## 2022-08-16 DIAGNOSIS — E78.5 DYSLIPIDEMIA: ICD-10-CM

## 2022-08-17 RX ORDER — FENOFIBRATE 145 MG/1
TABLET, COATED ORAL
Qty: 90 TABLET | Refills: 3 | Status: SHIPPED | OUTPATIENT
Start: 2022-08-17

## 2022-08-22 DIAGNOSIS — Z98.1 S/P CERVICAL SPINAL FUSION: ICD-10-CM

## 2022-08-22 NOTE — TELEPHONE ENCOUNTER
Medication Refill Request     Name metFORMIN (GLUCOPHAGE) 1000 MG tablet  Dose/Frequency TAKE 1 TABLET BY MOUTH TWICE A DAY WITH MEALS  Quantity 80  Verified pharmacy   [x]  Verified ordering Provider   [x]  Does patient have enough for the next 3 days?  Yes [] No [x]

## 2022-08-24 ENCOUNTER — OFFICE VISIT (OUTPATIENT)
Dept: OBGYN CLINIC | Facility: CLINIC | Age: 62
End: 2022-08-24

## 2022-08-24 VITALS
BODY MASS INDEX: 35.97 KG/M2 | SYSTOLIC BLOOD PRESSURE: 148 MMHG | HEIGHT: 63 IN | HEART RATE: 86 BPM | WEIGHT: 203 LBS | DIASTOLIC BLOOD PRESSURE: 72 MMHG

## 2022-08-24 DIAGNOSIS — Z98.890 S/P CARPAL TUNNEL RELEASE: Primary | ICD-10-CM

## 2022-08-24 PROCEDURE — 99024 POSTOP FOLLOW-UP VISIT: CPT | Performed by: PHYSICIAN ASSISTANT

## 2022-08-24 NOTE — PROGRESS NOTES
Assessment/Plan:  Patient ID: Juanita Ward 62 y o  female   Surgery: Release Carpal Tunnel - Right  Date of Surgery: 8/9/2022    Wound is healing well but still seems to be open in the middle of the incision  No signs of infection  2 stitiches not removed yet, would like to leave them in for a few more days  I would like her to see joel on Monday for wound check and suture removal as we are not back in the office until Wednesday   Once sutures removed she may begin scar massage and resume activity as tolerated     Follow Up:  Monday with Sheri SOLO    To Do Next Visit:         CHIEF COMPLAINT:  Chief Complaint   Patient presents with    Right Wrist - Post-op, Suture / Staple Removal         SUBJECTIVE:  Juanita Ward is a 62y o  year old female who presents for follow up after Release Carpal Tunnel - Right  Today patient has some soreness and residual numbness in the median nerve distribution  Overall feeling better after surgery         PHYSICAL EXAMINATION:  General: well developed and well nourished, alert, oriented times 3 and appears comfortable  Psychiatric: Normal    MUSCULOSKELETAL EXAMINATION:  Incision: Clean, dry, intact and slow to heal in the middle of the incision  Surgery Site: normal, no evidence of infection   Range of Motion: opposition intact and full composite fist possible  Neurovascular status: continued decreased sensation in the median nerve distribution   Activity Restrictions: suture restrictions  Done today: several sutures removed, 2 remaining       STUDIES REVIEWED:  No Studies to review      PROCEDURES PERFORMED:  Procedures  No Procedures performed today    Khris Lopez PA-C

## 2022-08-25 ENCOUNTER — TELEPHONE (OUTPATIENT)
Dept: INTERNAL MEDICINE CLINIC | Facility: CLINIC | Age: 62
End: 2022-08-25

## 2022-08-25 NOTE — TELEPHONE ENCOUNTER
Blood glucose readings received from patient via mail  Called patient to review, pre-supper blood glucose readings are elevated since surgery, likely related to change in pain  Denies hypoglycemia  Since couple off weeks, blood glucose readings range from 135-217  Will have patient continue current regimen for now as blood glucose readings may start improving as pain from surgery resolves  Follow-up as scheduled  Agrees to contact PharmD if blood glucose readings stay in 200s in a couple weeks       PCP: CHETAN, blood glucose readings scanned into chart    Pharmacist Tracking Tool  Reason For Outreach: Embedded Pharmacist  Demographics:  Intervention Method: Phone  Type of Intervention: Follow-Up  Topics Addressed: Diabetes  Pharmacologic Interventions: N/A  Non-Pharmacologic Interventions: Care coordination  Time:  Direct Patient Care: 5 mins  Care Coordination: 5 mins  Recommendation Recipient: Patient/Caregiver  Outcome: Accepted

## 2022-08-29 ENCOUNTER — RA CDI HCC (OUTPATIENT)
Dept: OTHER | Facility: HOSPITAL | Age: 62
End: 2022-08-29

## 2022-08-29 NOTE — PROGRESS NOTES
[facility]  coding opportunities       Chart reviewed, no opportunity found: CHART REVIEWED, NO OPPORTUNITY FOUND        Patients Insurance        Commercial Insurance: [address] [place] [address]

## 2022-09-01 ENCOUNTER — OFFICE VISIT (OUTPATIENT)
Dept: OBGYN CLINIC | Facility: HOSPITAL | Age: 62
End: 2022-09-01

## 2022-09-01 VITALS
HEIGHT: 63 IN | BODY MASS INDEX: 35.97 KG/M2 | SYSTOLIC BLOOD PRESSURE: 133 MMHG | DIASTOLIC BLOOD PRESSURE: 78 MMHG | HEART RATE: 80 BPM | WEIGHT: 203 LBS

## 2022-09-01 DIAGNOSIS — Z98.890 S/P CARPAL TUNNEL RELEASE: Primary | ICD-10-CM

## 2022-09-01 PROCEDURE — 99024 POSTOP FOLLOW-UP VISIT: CPT | Performed by: PHYSICIAN ASSISTANT

## 2022-09-01 NOTE — PROGRESS NOTES
Status post right carpal tunnel release procedure 8/9/2022 Dr Yamini Thrasher  Presenting for wound check and suture removal   Today, the patient states she has had no issues with her incision or sutures since her last visit  She denies any wound drainage or abnormal appearance or erythema of the wound  No fever or chills  On examination, the right hand incision has healed with two sutures remaining  There is no wound dehiscence, no erythema, and no warmth to the skin  Sutures removed without issue    She can continue range of motion to tolerance and increase activities as able with avoiding heavy lifting over the next week or so  Continue to cleanse the area and do not submerge in water over the next 2 weeks  Can follow-up on an as-needed basis  She states she will call when she is ready for workup of her left carpal tunnel symptoms  Suture removal    Date/Time: 9/1/2022 9:24 AM  Performed by: Raven Escudero PA-C  Authorized by: Raven Escudero PA-C   Universal Protocol:  Consent: Verbal consent obtained  Risks and benefits: risks, benefits and alternatives were discussed  Consent given by: patient  Patient identity confirmed: verbally with patient        Patient location:  Clinic  Location:     Laterality:  Right    Location:  Upper extremity    Upper extremity location:  Hand    Hand location:  R hand  Procedure details: Tools used:  Suture removal kit    Wound appearance:  No sign(s) of infection, good wound healing and clean  Post-procedure details:     Post-removal:  Steri-Strips applied    Patient tolerance of procedure:   Tolerated well, no immediate complications

## 2022-09-02 ENCOUNTER — HOSPITAL ENCOUNTER (OUTPATIENT)
Dept: GASTROENTEROLOGY | Facility: AMBULARY SURGERY CENTER | Age: 62
Setting detail: OUTPATIENT SURGERY
End: 2022-09-02
Attending: INTERNAL MEDICINE
Payer: COMMERCIAL

## 2022-09-02 ENCOUNTER — ANESTHESIA (OUTPATIENT)
Dept: GASTROENTEROLOGY | Facility: AMBULARY SURGERY CENTER | Age: 62
End: 2022-09-02

## 2022-09-02 ENCOUNTER — ANESTHESIA EVENT (OUTPATIENT)
Dept: GASTROENTEROLOGY | Facility: AMBULARY SURGERY CENTER | Age: 62
End: 2022-09-02

## 2022-09-02 VITALS
OXYGEN SATURATION: 97 % | SYSTOLIC BLOOD PRESSURE: 133 MMHG | RESPIRATION RATE: 20 BRPM | TEMPERATURE: 97 F | HEART RATE: 61 BPM | HEIGHT: 63 IN | DIASTOLIC BLOOD PRESSURE: 60 MMHG | BODY MASS INDEX: 36.14 KG/M2 | WEIGHT: 204 LBS

## 2022-09-02 DIAGNOSIS — D50.8 OTHER IRON DEFICIENCY ANEMIA: ICD-10-CM

## 2022-09-02 DIAGNOSIS — K21.9 GASTROESOPHAGEAL REFLUX DISEASE WITHOUT ESOPHAGITIS: ICD-10-CM

## 2022-09-02 DIAGNOSIS — Z83.71 FAMILY HISTORY OF COLONIC POLYPS: ICD-10-CM

## 2022-09-02 PROCEDURE — 45380 COLONOSCOPY AND BIOPSY: CPT | Performed by: INTERNAL MEDICINE

## 2022-09-02 PROCEDURE — 88305 TISSUE EXAM BY PATHOLOGIST: CPT | Performed by: PATHOLOGY

## 2022-09-02 PROCEDURE — 43239 EGD BIOPSY SINGLE/MULTIPLE: CPT | Performed by: INTERNAL MEDICINE

## 2022-09-02 RX ORDER — PROPOFOL 10 MG/ML
INJECTION, EMULSION INTRAVENOUS CONTINUOUS PRN
Status: DISCONTINUED | OUTPATIENT
Start: 2022-09-02 | End: 2022-09-02

## 2022-09-02 RX ORDER — SODIUM CHLORIDE 9 MG/ML
INJECTION, SOLUTION INTRAVENOUS CONTINUOUS PRN
Status: DISCONTINUED | OUTPATIENT
Start: 2022-09-02 | End: 2022-09-02

## 2022-09-02 RX ORDER — LIDOCAINE HYDROCHLORIDE 10 MG/ML
INJECTION, SOLUTION EPIDURAL; INFILTRATION; INTRACAUDAL; PERINEURAL AS NEEDED
Status: DISCONTINUED | OUTPATIENT
Start: 2022-09-02 | End: 2022-09-02

## 2022-09-02 RX ORDER — PROPOFOL 10 MG/ML
INJECTION, EMULSION INTRAVENOUS AS NEEDED
Status: DISCONTINUED | OUTPATIENT
Start: 2022-09-02 | End: 2022-09-02

## 2022-09-02 RX ADMIN — PROPOFOL 100 MCG/KG/MIN: 10 INJECTION, EMULSION INTRAVENOUS at 08:11

## 2022-09-02 RX ADMIN — SODIUM CHLORIDE: 0.9 INJECTION, SOLUTION INTRAVENOUS at 07:58

## 2022-09-02 RX ADMIN — PROPOFOL 120 MG: 10 INJECTION, EMULSION INTRAVENOUS at 08:07

## 2022-09-02 RX ADMIN — Medication 40 MG: at 08:18

## 2022-09-02 RX ADMIN — PROPOFOL 50 MG: 10 INJECTION, EMULSION INTRAVENOUS at 08:10

## 2022-09-02 RX ADMIN — LIDOCAINE HYDROCHLORIDE 100 MG: 10 INJECTION, SOLUTION EPIDURAL; INFILTRATION; INTRACAUDAL at 08:07

## 2022-09-02 RX ADMIN — PROPOFOL 40 MG: 10 INJECTION, EMULSION INTRAVENOUS at 08:16

## 2022-09-02 NOTE — ANESTHESIA POSTPROCEDURE EVALUATION
Post-Op Assessment Note    CV Status:  Stable    Pain management: adequate     Mental Status:  Alert and awake   Hydration Status:  Euvolemic   PONV Controlled:  Controlled   Airway Patency:  Patent      Post Op Vitals Reviewed: Yes      Staff: CRNA         No complications documented      /74 (09/02/22 0829)    Temp 97 2    Pulse 73 (09/02/22 0829)   Resp 16 (09/02/22 0829)    SpO2 97 % (09/02/22 0829)

## 2022-09-02 NOTE — H&P
History and Physical - [facility]  Jeff Valladares 62 y o  female MRN: [id number]        HPI: 81-year-old female with history of hypertension, diabetes mellitus, hyperlipidemia, depression, neck pain, GERD was referred because of low iron counts and anemia  Iron saturation was 11 percent and hemoglobin was 11  Patient reports having regular bowel movements and denies any blood or mucus in the stool        Historical Information   Past Medical History:   Diagnosis Date    Abdominal pain     started 7/21/16- found something on the pancreas    Abnormal weight loss     last assessed [id number]    Acute sinusitis     Allergic rhinitis     last assessed [id number]    Anemia     had iron infusion    Asthma     Cardiac murmur     Carpal tunnel syndrome     Cellulitis     Cervical disc disease     Colon polyp     CPAP (continuous positive airway pressure) dependence     not using at this time    Degeneration of thoracic intervertebral disc     Degenerative arthritis of knee     Diabetes mellitus (HCC)     Diverticulosis     Esophagitis, reflux     last assessed [id number]    Excessive sweating     Fatty liver     Frequent falls     Gait disturbance     GERD (gastroesophageal reflux disease)     Granuloma annulare     Head injury     Hyperlipidemia     Hypertension     Hypertensive heart disease     Hypoglycemia     last assessed [id number]    Kidney stone     Leg length discrepancy     Leucocytosis     Lumbar stress fracture     Multiple thyroid nodules     Neuropathy     Obesity     last assessed [id number]    Paronychia of toe     Patellofemoral dysfunction     Pneumonia     Postmenopausal atrophic vaginitis     Prepatellar bursitis     Sebaceous cyst     last assessed [id number]    Sleep apnea     no CPAP used    Suicide attempt ([address] )     Tinea corporis     Type 2 diabetes mellitus ([address] )     cont meds and monitor BS;  last assessed [id number]    Urinary frequency     Vertigo     Vitamin D deficiency     Dia-Parkinson-White syndrome      Past Surgical History:   Procedure Laterality Date    CERVICAL FUSION N/A 08/03/2021    Procedure: FUSION CERVICAL ANTERIOR W DISCECTOMY C4-5, C5-6, C6-7 with allograft and neuromonitoring  case length 3 hours; Surgeon: Redd Jacques MD;  Location:  MAIN OR;  Service: Orthopedics    COLONOSCOPY      ESOPHAGOGASTRODUODENOSCOPY N/A 08/08/2016    Procedure: ESOPHAGOGASTRODUODENOSCOPY (EGD); Surgeon: Eunice dAams MD;  Location: [facility] GI LAB; Service:     LUMBAR FUSION      OK ESOPHAGOGASTRODUODENOSCOPY TRANSORAL DIAGNOSTIC N/A 03/28/2019    Procedure: ESOPHAGOGASTRODUODENOSCOPY (EGD); Surgeon: Eunice Adams MD;  [facility];   Service: Gastroenterology    OK REVISE MEDIAN N/CARPAL TUNNEL SURG Right 8/9/2022    Procedure: RELEASE CARPAL TUNNEL;  Surgeon: Kam Fierro MD;  [facility];  Service: Orthopedics    TONSILLECTOMY      TOOTH EXTRACTION      wisdom teeth removed     Social History   Social History     Substance and Sexual Activity   Alcohol Use Not Currently    Alcohol/week: 1 0 standard drink    Types: 1 Glasses of wine per week    Comment: rarely     Social History     Substance and Sexual Activity   Drug Use No     Social History     Tobacco Use   Smoking Status Never Smoker   Smokeless Tobacco Never Used     Family History   Problem Relation Age of Onset    Cancer Mother         lung    Heart disease Mother     Coronary artery disease Mother         CABG    Diabetes Mother     Lung cancer Mother 76        smoker    Stroke Mother     Thyroid disease Mother     Other Mother         tobacco use    Parkinsonism Father     Intestinal polyp Father     Bipolar disorder Father     Heart disease Father     Prostate cancer Father 62    Hypertension Sister     Alcohol abuse Sister     Depression Brother     Heart disease Brother     Diabetes Maternal Grandmother  Thyroid disease Maternal Grandmother     Hypertension Family     Suicide Attempts Paternal Aunt     Depression Paternal Aunt     Endometrial cancer Maternal Aunt         unknown age   Sofy Courser Ovarian cancer Cousin 36        maternal    Heart disease Brother     Seizures Brother        Meds/Allergies     (Not in a hospital admission)      Allergies   Allergen Reactions    Bee Venom Swelling     Local swelling at sting site    Biaxin [Clarithromycin] Itching    Depakote Er [Divalproex Sodium Er]      pancreatitis    Shellfish-Derived Products - Food Allergy Other (See Comments)     Ears and cheeks got red    Tanzeum [Albiglutide]      pancreatitis    Carbamazepine Other (See Comments)     Reaction Date:Unknown    Lamotrigine Rash       Objective     Blood pressure 147/67, pulse 66, temperature (!) 97 1 °F (36 2 °C), temperature source Temporal, resp  rate 20, height 5' 3" (1 6 m), weight 92 5 kg (204 lb), SpO2 96 %, not currently breastfeeding      PHYSICAL EXAM:    Gen: NAD  CV: S1 & S2 normal, RRR  CHEST: Clear to auscultate  ABD: soft, NT/ND, good bowel sounds  EXT: no edema    ASSESSMENT:     Anemia, family history of colon polyps in brother, GERD    PLAN:    EGD and colonoscopy

## 2022-09-02 NOTE — ANESTHESIA PREPROCEDURE EVALUATION
Procedure:  COLONOSCOPY  EGD    Relevant Problems   CARDIO   (+) Anomalous atrioventricular excitation   (+) Benign essential hypertension   (+) Cardiac murmur   (+) Essential hypertension   (+) Essential hypertriglyceridemia      ENDO   (+) Controlled type 2 diabetes mellitus without complication (HCC)      GI/HEPATIC   (+) BRBPR (bright red blood per rectum)   (+) Fatty liver   (+) Gastroesophageal reflux disease      /RENAL   (+) Kidney stone      HEMATOLOGY   (+) Iron deficiency anemia, unspecified      MUSCULOSKELETAL   (+) Degeneration of thoracic intervertebral disc   (+) Degenerative arthritis of knee, bilateral   (+) Prepatellar bursitis      NEURO/PSYCH   (+) LOURDES (generalized anxiety disorder)   (+) History of colon polyps      PULMONARY   (+) Asthma   (+) Obstructive sleep apnea        Physical Exam    Airway    Mallampati score: II  TM Distance: >3 FB  Neck ROM: full     Dental   No notable dental hx     Cardiovascular  Cardiovascular exam normal    Pulmonary  Pulmonary exam normal     Other Findings        Anesthesia Plan  ASA Score- 3     Anesthesia Type- regional with ASA Monitors  Additional Monitors:   Airway Plan:           Plan Factors-Exercise tolerance (METS): >4 METS  Chart reviewed  EKG reviewed  Imaging results reviewed  Existing labs reviewed  Patient summary reviewed  Patient is not a current smoker  Patient not instructed to abstain from smoking on day of procedure  Patient did not smoke on day of surgery  Induction-     Postoperative Plan-     Informed Consent- Anesthetic plan and risks discussed with patient  I personally reviewed this patient with the CRNA  Discussed and agreed on the Anesthesia Plan with the CRNA  Danyel Salmon

## 2022-09-07 ENCOUNTER — OFFICE VISIT (OUTPATIENT)
Dept: INTERNAL MEDICINE CLINIC | Facility: CLINIC | Age: 62
End: 2022-09-07
Payer: COMMERCIAL

## 2022-09-07 VITALS
SYSTOLIC BLOOD PRESSURE: 124 MMHG | HEART RATE: 74 BPM | BODY MASS INDEX: 37.67 KG/M2 | HEIGHT: 63 IN | OXYGEN SATURATION: 97 % | WEIGHT: 212.6 LBS | DIASTOLIC BLOOD PRESSURE: 52 MMHG

## 2022-09-07 DIAGNOSIS — I10 BENIGN ESSENTIAL HYPERTENSION: ICD-10-CM

## 2022-09-07 DIAGNOSIS — E66.09 CLASS 1 OBESITY DUE TO EXCESS CALORIES WITH SERIOUS COMORBIDITY AND BODY MASS INDEX (BMI) OF 32.0 TO 32.9 IN ADULT: ICD-10-CM

## 2022-09-07 DIAGNOSIS — E11.9 CONTROLLED TYPE 2 DIABETES MELLITUS WITHOUT COMPLICATION, WITHOUT LONG-TERM CURRENT USE OF INSULIN (HCC): Primary | ICD-10-CM

## 2022-09-07 DIAGNOSIS — G95.9 CERVICAL MYELOPATHY (HCC): ICD-10-CM

## 2022-09-07 DIAGNOSIS — R10.13 EPIGASTRIC PAIN: ICD-10-CM

## 2022-09-07 PROCEDURE — 99214 OFFICE O/P EST MOD 30 MIN: CPT | Performed by: INTERNAL MEDICINE

## 2022-09-07 RX ORDER — CALCIUM CARBONATE 300MG(750)
1 TABLET,CHEWABLE ORAL DAILY
COMMUNITY

## 2022-09-07 NOTE — PROGRESS NOTES
Assessment/Plan:    Controlled type 2 diabetes mellitus without complication (HCC)    Lab Results   Component Value Date    HGBA1C 7 8 (A) 07/14/2022   I have counselled the pt to follow a healthy and balanced diet ,and recommend routine exercise  I will be ordering diabetic laboratories including comprehensive metabolic panel, hemoglobin A1c, urine microalbumin, lipid panel  Weight loss encourage target A1c under 7    Benign essential hypertension  Hypertension - controlled, I have counseled patient following healthy balance diet, I would like the patient reduce sodium, exercise routinely, I would like the patient continued the med current medical regiment and we will continue to monitor  Blood pressure 124/ 52    Cervical myelopathy (HCC)  Currently doing very good stable status post surgical intervention symptoms have since improved    Class 1 obesity due to excess calories with serious comorbidity and body mass index (BMI) of 32 0 to 32 9 in adult  Obesity -I have counseled patient following healthy and balanced diet, I would like the patient to lose weight, I would like the patient exercise routinely; we will continue monitor the patient's progress  Epigastric pain  EGD negative will check CT scan abdomen          Problem List Items Addressed This Visit        Endocrine    Controlled type 2 diabetes mellitus without complication ([facility] [address] ) - Primary       Lab Results   Component Value Date    HGBA1C 7 8 (A) 07/14/2022   I have counselled the pt to follow a healthy and balanced diet ,and recommend routine exercise  I will be ordering diabetic laboratories including comprehensive metabolic panel, hemoglobin A1c, urine microalbumin, lipid panel    Weight loss encourage target A1c under 7         Relevant Orders    Comprehensive metabolic panel    Hemoglobin A1C    Lipid Panel with Direct LDL reflex    Microalbumin / creatinine urine ratio       Cardiovascular and Mediastinum    Benign essential hypertension Hypertension - controlled, I have counseled patient following healthy balance diet, I would like the patient reduce sodium, exercise routinely, I would like the patient continued the med current medical regiment and we will continue to monitor  Blood pressure 124/ 52            Nervous and Auditory    Cervical myelopathy (HCC)     Currently doing very good stable status post surgical intervention symptoms have since improved            Other    Class 1 obesity due to excess calories with serious comorbidity and body mass index (BMI) of 32 0 to 32 9 in adult     Obesity -I have counseled patient following healthy and balanced diet, I would like the patient to lose weight, I would like the patient exercise routinely; we will continue monitor the patient's progress  Epigastric pain     EGD negative will check CT scan abdomen          Relevant Orders    CT abdomen pelvis wo contrast          Return to office 6  months  call if any problems  Subjective:      Patient ID: Varney Scheuermann is a 65 y o  female  HPI 61-year old female coming in for a follow up office visit regarding type 2 diabetes, benign essential hypertension, epigastric pain, status post cervical myelopathy and obesity; The patient reports me compliant taking medications without untoward side effects the  The patient is here to review his medical condition, update me on the medical condition and the patient reports me no hospitalizations and no ER visits  Reports me her diet could be better he is plane to exercise more here to review laboratories following portions of the patient's history were reviewed and updated as appropriate: allergies, current medications, past family history, past medical history, past social history, past surgical history and problem list   Patient reports me epigastric pain has had upper endoscopy that did not reveal source of symptoms    Review of Systems   Constitutional: Negative for activity change, appetite change and unexpected weight change  HENT: Negative for congestion and postnasal drip  Eyes: Negative for visual disturbance  Respiratory: Negative for cough and shortness of breath  Cardiovascular: Negative for chest pain  Gastrointestinal: Positive for abdominal pain  Negative for diarrhea, nausea and vomiting  Neurological: Negative for dizziness, light-headedness and headaches  Objective:    Return in about 6 months (around 3/7/2023)  Procedure: EGD    Result Date: 9/2/2022  Narrative: [address] [phone] DATE OF SERVICE: 9/02/22 PHYSICIAN(S): Attending: Nya Payne MD Fellow: No Staff Documented INDICATION: Gastroesophageal reflux disease without esophagitis, Other iron deficiency anemia POST-OP DIAGNOSIS: See the impression below  PREPROCEDURE: Informed consent was obtained for the procedure, including sedation  Risks of perforation, hemorrhage, adverse drug reaction and aspiration were discussed  The patient was placed in the left lateral decubitus position  Patient was explained about the risks and benefits of the procedure  Risks including but not limited to bleeding, infection, and perforation were explained in detail  Also explained about less than 100% sensitivity with the exam and other alternatives  DETAILS OF PROCEDURE: Patient was taken to the procedure room where a time out was performed to confirm correct patient and correct procedure  The patient underwent monitored anesthesia care, which was administered by an anesthesia professional  The patient's blood pressure, heart rate, level of consciousness, respirations and oxygen were monitored throughout the procedure  The scope was advanced to the second part of the duodenum  Retroflexion was performed in the fundus  The patient experienced no blood loss  The procedure was not difficult  The patient tolerated the procedure well   There were no apparent complications  ANESTHESIA INFORMATION: ASA: III Anesthesia Type: Regional MEDICATIONS: simethicone (MYLICON) 40 mg in sterile water 60 mL 40 mg (Totals for administrations occurring from 0759 to 0827 on 09/02/22) FINDINGS: The esophagus and stomach appeared normal  The duodenum appeared normal  Performed random biopsy using biopsy forceps  SPECIMENS: ID Type Source Tests Collected by Time Destination 1 :  Tissue Duodenum TISSUE EXAM Kristen Lindsey MD 9/2/2022  8:10 AM  2 : splenic flexure Tissue Polyp, Colorectal TISSUE EXAM Kristen Lindsey MD 9/2/2022  8:23 AM      Impression: The esophagus and stomach appeared normal  The duodenum appeared normal  Performed random biopsy using biopsy forceps  RECOMMENDATION: Await pathology results   Kristen Lindsey MD     Procedure: Colonoscopy    Result Date: 9/2/2022  Narrative: [address]  [phone] DATE OF SERVICE: 9/02/22 PHYSICIAN(S): Attending: Kristen Lindsey MD Fellow: No Staff Documented INDICATION: Family history of colonic polyps, Other iron deficiency anemia POST-OP DIAGNOSIS: See the impression below  HISTORY: Prior colonoscopy: 3 years ago  BOWEL PREPARATION: Clenpiq PREPROCEDURE: Informed consent was obtained for the procedure, including sedation  Risks including but not limited to bleeding, infection, perforation, adverse drug reaction and aspiration were explained in detail  Also explained about less than 100% sensitivity with the exam and other alternatives  The patient was placed in the left lateral decubitus position  DETAILS OF PROCEDURE: Patient was taken to the procedure room where a time out was performed to confirm correct patient and correct procedure   The patient underwent monitored anesthesia care, which was administered by an anesthesia professional  The patient's blood pressure, heart rate, level of consciousness, oxygen and respirations were monitored throughout the procedure  A digital rectal exam was performed  The scope was introduced through the anus and advanced to the cecum  Retroflexion was performed in the rectum  The quality of bowel preparation was evaluated using the [facility] Bowel Preparation Scale with scores of: right colon = 2, transverse colon = 2, left colon = 2  The total BBPS score was 6  Bowel prep was adequate  The patient experienced no blood loss  The procedure was not difficult  The patient tolerated the procedure well  There were no apparent complications   ANESTHESIA INFORMATION: ASA: III Anesthesia Type: Regional MEDICATIONS: simethicone (MYLICON) 40 mg in sterile water 60 mL 40 mg (Totals for administrations occurring from 0759 to 0827 on 09/02/22) FINDINGS: Normal terminal ileum The cecum appeared normal  One polyp measuring smaller than 5 mm in the splenic flexure; performed cold forceps biopsy Small, internal hemorrhoids EVENTS: Procedure Events Event Event Time ENDO CECUM REACHED 9/2/2022  8:18 AM ENDO SCOPE OUT TIME 9/2/2022  8:25 AM SPECIMENS: ID Type Source Tests Collected by Time Destination 1 :  Tissue Duodenum TISSUE EXAM Sulema Flores MD 9/2/2022  8:10 AM  2 : splenic flexure Tissue Polyp, Colorectal TISSUE EXAM Sulema Flores MD 9/2/2022  8:23 AM  EQUIPMENT: Colonoscope -[id number]     Impression: Normal terminal ileum The cecum appeared normal  One polyp measuring smaller than 5 mm in the splenic flexure; performed cold forceps biopsy Small, internal hemorrhoids RECOMMENDATION: Repeat colonoscopy in 5 years due to a personal history of colon polyps and a family history of colon polyps  Sulema Flores MD          Allergies   Allergen Reactions    Bee Venom Swelling     Local swelling at sting site    Biaxin [Clarithromycin] Itching    Depakote Er [Divalproex Sodium Er]      pancreatitis    Shellfish-Derived Products - Food Allergy Other (See Comments)     Ears and cheeks got red    Tanzeum [Albiglutide]      pancreatitis    Carbamazepine Other (See Comments)     Reaction Date:Unknown    Lamotrigine Rash       Past Medical History:   Diagnosis Date    Abdominal pain     started 7/21/16- found something on the pancreas    Abnormal weight loss     last assessed [id number]    Acute sinusitis     Allergic rhinitis     last assessed [id number]    Anemia     had iron infusion    Asthma     Cardiac murmur     Carpal tunnel syndrome     Cellulitis     Cervical disc disease     Colon polyp     CPAP (continuous positive airway pressure) dependence     not using at this time    Degeneration of thoracic intervertebral disc     Degenerative arthritis of knee     Diabetes mellitus (HCC)     Diverticulosis     Esophagitis, reflux     last assessed [id number]    Excessive sweating     Fatty liver     Frequent falls     Gait disturbance     GERD (gastroesophageal reflux disease)     Granuloma annulare     Head injury     Hyperlipidemia     Hypertension     Hypertensive heart disease     Hypoglycemia     last assessed [id number]    Kidney stone     Leg length discrepancy     Leucocytosis     Lumbar stress fracture     Multiple thyroid nodules     Neuropathy     Obesity     last assessed [id number]    Paronychia of toe     Patellofemoral dysfunction     Pneumonia     Postmenopausal atrophic vaginitis     Prepatellar bursitis     Sebaceous cyst     last assessed [id number]    Sleep apnea     no CPAP used    Suicide attempt ([address] )     Tinea corporis     Type 2 diabetes mellitus (HCC)     cont meds and monitor BS;  last assessed [id number]    Urinary frequency     Vertigo     Vitamin D deficiency     Dia-Parkinson-White syndrome      Past Surgical History:   Procedure Laterality Date    CERVICAL FUSION N/A 08/03/2021    Procedure: FUSION CERVICAL ANTERIOR W DISCECTOMY C4-5, C5-6, C6-7 with allograft and neuromonitoring  case length 3 hours;   Surgeon: Luisa Arnold MD;  [facility];  Service: Orthopedics    COLONOSCOPY      ESOPHAGOGASTRODUODENOSCOPY N/A 08/08/2016    Procedure: ESOPHAGOGASTRODUODENOSCOPY (EGD); Surgeon: Eunice Adams MD;  Location: [facility] GI LAB; Service:     LUMBAR FUSION      SD ESOPHAGOGASTRODUODENOSCOPY TRANSORAL DIAGNOSTIC N/A 03/28/2019    Procedure: ESOPHAGOGASTRODUODENOSCOPY (EGD); Surgeon: Eunice Adams MD;  [facility]  GI [facility]; Service: Gastroenterology    SD REVISE MEDIAN N/CARPAL TUNNEL SURG Right 8/9/2022    Procedure: RELEASE CARPAL TUNNEL;  Surgeon: Kam Fierro MD;  [facility];  Service: Orthopedics    TONSILLECTOMY      TOOTH EXTRACTION      wisdom teeth removed     Current Outpatient Medications on File Prior to Visit   Medication Sig Dispense Refill    acetaminophen (TYLENOL) 500 mg tablet Take 500 mg by mouth 3 (three) times a day      ARIPiprazole (ABILIFY) 10 mg tablet Take 1 tablet (10 mg total) by mouth every evening 90 tablet 2    atorvastatin (LIPITOR) 20 mg tablet TAKE 1 TABLET BY MOUTH EVERY DAY 90 tablet 1    Cholecalciferol 4000 units CAPS Take 1 capsule (4,000 Units total) by mouth daily      fenofibrate (TRICOR) 145 mg tablet TAKE 1 TABLET BY MOUTH EVERY DAY 90 tablet 3    FLUoxetine (PROzac) 20 mg capsule Take 1 capsule (20 mg total) by mouth in the morning  Take with the 40 mg cap for a total dose of 60 mg daily  90 capsule 2    FLUoxetine (PROzac) 40 MG capsule Take 1 capsule (40 mg total) by mouth in the morning  Take with the 20 mg cap for a total dose of 60 mg daily  90 capsule 2    glipiZIDE (GLUCOTROL) 5 mg tablet Take 1 tablet with breakfast, 1 tablet with lunch, 2 tablets with supper for diabetes 360 tablet 1    ibuprofen (MOTRIN) 200 mg tablet Take 400 mg by mouth Every other day as needed when pain not alleviated by acetaminophen      lisinopril (ZESTRIL) 5 mg tablet TAKE 1 TABLET (5 MG TOTAL) BY MOUTH DAILY   90 tablet 0    Magnesium 400 MG TABS Take 1 tablet by mouth in the morning      metFORMIN (GLUCOPHAGE) 1000 MG tablet Take 1 tablet (1,000 mg total) by mouth 2 (two) times a day with meals 180 tablet 3    Multiple Vitamins-Minerals (multivitamin with minerals) tablet Take 1 tablet by mouth daily      Omega 3 1200 MG CAPS Take 1,200 mg by mouth 2 (two) times a day        omeprazole (PriLOSEC) 40 MG capsule TAKE 1 CAPSULE BY MOUTH EVERY DAY 90 capsule 1     No current facility-administered medications on file prior to visit       Family History   Problem Relation Age of Onset   Estevan Morton Cancer Mother         lung    Heart disease Mother     Coronary artery disease Mother         CABG    Diabetes Mother     Lung cancer Mother 76        smoker    Stroke Mother     Thyroid disease Mother     Other Mother         tobacco use    Parkinsonism Father     Intestinal polyp Father     Bipolar disorder Father     Heart disease Father     Prostate cancer Father 62    Hypertension Sister     Alcohol abuse Sister     Depression Brother     Heart disease Brother     Diabetes Maternal Grandmother     Thyroid disease Maternal Grandmother     Hypertension Family     Suicide Attempts Paternal Aunt     Depression Paternal Aunt     Endometrial cancer Maternal Aunt         unknown age   Estevan Morton Ovarian cancer Cousin 36        maternal    Heart disease Brother     Seizures Brother      Social History     Socioeconomic History    Marital status: /Civil Union     Spouse name: Not on file    Number of children: 0    Years of education: 15    Highest education level: 12th grade   Occupational History    Occupation: retired   Tobacco Use    Smoking status: Never Smoker    Smokeless tobacco: Never Used   Vaping Use    Vaping Use: Never used   Substance and Sexual Activity    Alcohol use: Not Currently     Comment: rarely    Drug use: No    Sexual activity: Not Currently     Partners: Male   Other Topics Concern    Not on file   Social History Narrative    Education: high school graduate Learning Disabilities: none    Marital History:     Children: none    Living Arrangement: lives in home with     Occupational History: retired, 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[employer]: good support system,  is supportive    Legal History: none     History: None        Caffeine use    Denied home environment domestic violence    Job physical requirements heavy lifting     Social Determinants of Health     Financial Resource Strain: Low Risk     Difficulty of Paying Living Expenses: Not hard at all   Food Insecurity: No Food Insecurity    Worried About [address] in the Last Year: Never true    [facility] [employer] [address] in the Last Year: Never true   Transportation Needs: No Transportation Needs    Lack of Transportation (Medical): No    Lack of Transportation (Non-Medical): No   Physical Activity: Inactive    Days of Exercise per Week: 0 days    Minutes of Exercise per Session: 0 min   Stress: No Stress Concern Present    Feeling of Stress : Only a little   Social Connections: Moderately Isolated    Frequency of Communication with Friends and Family: Once a week    Frequency of Social Gatherings with Friends and Family: Once a week    Attends [employer] Services: Never    Active Member of Clubs or Organizations:  Yes    Attends Club or Organization Meetings: More than 4 times per year    Marital Status:    Intimate Partner Violence: Not At Risk    Fear of Current or Ex-Partner: No    Emotionally Abused: No    Physically Abused: No    Sexually Abused: No   Housing Stability: Low Risk     Unable to Pay for Housing in the Last Year: No    Number of Places Lived in the Last Year: 1    Unstable Housing in the Last Year: No     Vitals:    09/07/22 1557   BP: 124/52   Pulse: 74   SpO2: 97%   Weight: 96 4 kg (212 lb 9 6 oz)   Height: 5' 3" (1 6 m)     Results for orders placed or performed during the hospital encounter of 09/02/22   Tissue Exam   Result Value Ref Range    Case Report       Surgical Pathology Report                         Case: [id number]                                   Authorizing Provider:  Maryann Barnett MD          Collected:           09/02/2022 0810              Ordering Location:     [facility]        Received:            09/02/2022 [address] [facility]                                                    Pathologist:           Tino Soto MD                                                       Specimens:   A) - Duodenum                                                                                       B) - Polyp, Colorectal, splenic flexure                                                    Final Diagnosis       A  Duodenum, Biopsy:   -Small intestinal mucosa with no pathological changes  -No evidence of celiac disease   -No evidence of malignancy     B  Polyp, Colorectal, splenic flexure:  -Tubular adenoma  -No evidence of high grade dysplasia or malignancy seen  Note       Interpretation performed at [address] [place] [address], [address] [place] [address], [place] [address]        Additional Information       All reported additional testing was performed with appropriately reactive controls  These tests were developed and their performance characteristics determined by [facility] or appropriate performing facility, though some tests may be performed on tissues which have not been validated for performance characteristics (such as staining performed on alcohol exposed cell blocks and decalcified tissues)  Results should be interpreted with caution and in the context of the patients clinical condition  These tests may not be cleared or approved by the [place]  Food and Drug Administration, though the FDA has determined that such clearance or approval is not necessary   These tests are used for clinical purposes and they should not be regarded as investigational or for research  This laboratory has been approved by [place], designated as a high-complexity laboratory and is qualified to perform these tests         Synoptic Checklist          COLON/RECTUM POLYP FORM - GI - B          :    Adenoma(s)      Gross Description          A  The specimen is received in formalin, labeled with the patient's name and hospital number, and is designated "duodenum "  The specimen consists of 5 tan-pink soft tissue fragments ranging from 0 2 cm to 0 4 cm in greatest dimension  Entirely submitted  One screened cassette  B  The specimen is received in formalin, labeled with the patient's name and hospital number, and is designated "splenic flexure polyp"  The specimen consists of 1 tan-pink, polypoid , soft tissue fragments measuring 0 4 cm in greatest dimension  Entirely submitted  One screened cassette  Note: The estimated total formalin fixation time based upon information provided by the submitting clinician and the standard processing schedule is under 72 hours  MSequino           Weight (last 2 days)     Date/Time Weight    09/07/22 1557 96 4 (212 6)        Body mass index is 37 66 kg/m²  BP      Temp      Pulse     Resp      SpO2        Vitals:    09/07/22 1557   Weight: 96 4 kg (212 lb 9 6 oz)     Vitals:    09/07/22 1557   Weight: 96 4 kg (212 lb 9 6 oz)       /52   Pulse 74   Ht 5' 3" (1 6 m)   Wt 96 4 kg (212 lb 9 6 oz)   SpO2 97%   BMI 37 66 kg/m²          Physical Exam  Vitals and nursing note reviewed  Constitutional:       General: She is not in acute distress  Appearance: Normal appearance  She is well-developed  She is obese  She is not ill-appearing, toxic-appearing or diaphoretic  HENT:      Head: Normocephalic  Eyes:      General: No scleral icterus  Right eye: No discharge  Left eye: No discharge        Conjunctiva/sclera: Conjunctivae normal       Pupils: Pupils are equal, round, and reactive to light  Cardiovascular:      Rate and Rhythm: Normal rate and regular rhythm  Heart sounds: Normal heart sounds  No murmur heard  No friction rub  No gallop  Pulmonary:      Effort: No respiratory distress  Breath sounds: Normal breath sounds  No wheezing or rales  Abdominal:      General: Bowel sounds are normal  There is no distension  Palpations: Abdomen is soft  There is no mass  Tenderness: There is abdominal tenderness (Epigastric)  There is no guarding or rebound  Musculoskeletal:         General: No deformity  Cervical back: Neck supple  Lymphadenopathy:      Cervical: No cervical adenopathy  Neurological:      Mental Status: She is alert        Coordination: Coordination normal

## 2022-09-08 PROCEDURE — 88305 TISSUE EXAM BY PATHOLOGIST: CPT | Performed by: PATHOLOGY

## 2022-09-09 NOTE — ASSESSMENT & PLAN NOTE
Lab Results   Component Value Date    HGBA1C 7 8 (A) 07/14/2022   I have counselled the pt to follow a healthy and balanced diet ,and recommend routine exercise  I will be ordering diabetic laboratories including comprehensive metabolic panel, hemoglobin A1c, urine microalbumin, lipid panel    Weight loss encourage target A1c under 7

## 2022-09-09 NOTE — ASSESSMENT & PLAN NOTE
Hypertension - controlled, I have counseled patient following healthy balance diet, I would like the patient reduce sodium, exercise routinely, I would like the patient continued the med current medical regiment and we will continue to monitor    Blood pressure 124/ 52

## 2022-09-12 DIAGNOSIS — I10 BENIGN ESSENTIAL HYPERTENSION: ICD-10-CM

## 2022-09-12 PROCEDURE — 4010F ACE/ARB THERAPY RXD/TAKEN: CPT | Performed by: INTERNAL MEDICINE

## 2022-09-12 RX ORDER — LISINOPRIL 5 MG/1
5 TABLET ORAL DAILY
Qty: 90 TABLET | Refills: 0 | Status: SHIPPED | OUTPATIENT
Start: 2022-09-12

## 2022-09-22 ENCOUNTER — TELEPHONE (OUTPATIENT)
Dept: INTERNAL MEDICINE CLINIC | Facility: CLINIC | Age: 62
End: 2022-09-22

## 2022-09-22 NOTE — TELEPHONE ENCOUNTER
[address] [place] [address], Pharmacist    PCP: CHETAN    ____________________________________________________________________    Communication with patient: Yes, Phone call with patient    Reason for documentation: contacted as blood glucose readings were dropped off at office    Subjective/Objective:     FBG this morning at 6am was 210, blood glucose at 1030a was 211; had Cheerios with milk for breakfast  yesterday for lunch went to diner ([employer] sandwich + soup), supper had fish, potatoes and stuffed mushrooms  Feels her blood glucose readings are significantly impacted by diet, has not recently met with dietician  Insurance plan has an annual medication allotment, once allotment is met then insurance providers minimal medication coverage; uncertain where she is at with allotment but does not believe much is left    Denies Medicare/Medicaid  Reports [facility] was going to take up significant amount of allotment    BG log scanned into chart  Assessment/Plan:  Diabetes: blood glucose readings elevated, no hypoglycemia; post-breakfast reading shows patient is on adequate glipizide dose  May benefit from [place] however could use up patient's allotment  Could also consider pioglitazone or basal insulin  Would benefit from dietician referral   · Patient will contact insurance to verify medication cost    · Agrees to meeting with dietician; would prefer [facility] dietician, phone number provided       Follow-up: rescheduled for Oct when she is due for A1c      Pharmacist Tracking Tool  Reason For Outreach: Embedded Pharmacist    Demographics:  Intervention Method: Phone  Type of Intervention: Follow-Up  Topics Addressed: Diabetes  Pharmacologic Interventions: N/A  Non-Pharmacologic Interventions: Care coordination, Cost and Disease state education  Time:  Direct Patient Care: 10 mins   Care Coordination: 10 mins  Recommendation Recipient: Patient/Caregiver  Outcome: Accepted

## 2022-09-27 ENCOUNTER — HOSPITAL ENCOUNTER (OUTPATIENT)
Dept: MAMMOGRAPHY | Facility: HOSPITAL | Age: 62
Discharge: HOME | End: 2022-09-27
Attending: OBSTETRICS & GYNECOLOGY
Payer: COMMERCIAL

## 2022-09-27 VITALS — BODY MASS INDEX: 37.66 KG/M2 | WEIGHT: 212.52 LBS | HEIGHT: 63 IN

## 2022-09-27 DIAGNOSIS — Z12.31 ENCOUNTER FOR SCREENING MAMMOGRAM FOR MALIGNANT NEOPLASM OF BREAST: ICD-10-CM

## 2022-09-27 PROCEDURE — 77067 SCR MAMMO BI INCL CAD: CPT

## 2022-09-27 PROCEDURE — 77063 BREAST TOMOSYNTHESIS BI: CPT

## 2022-09-27 NOTE — ASSESSMENT & PLAN NOTE
..  Time: 1721    Old EKG Present:    [x] Yes   [] No    Type:    [x] Standard Left   [] Right Sided   [] Posterior Sided   [] Rhythm Strip    Shown to: Dr Skylar Biswas     BP elevated, will continue to monitor, continue current regimen

## 2022-10-03 ENCOUNTER — VBI (OUTPATIENT)
Dept: ADMINISTRATIVE | Facility: OTHER | Age: 62
End: 2022-10-03

## 2022-10-09 ENCOUNTER — HOSPITAL ENCOUNTER (OUTPATIENT)
Dept: CT IMAGING | Facility: HOSPITAL | Age: 62
Discharge: HOME | End: 2022-10-09
Payer: COMMERCIAL

## 2022-10-09 DIAGNOSIS — R10.13 EPIGASTRIC PAIN: ICD-10-CM

## 2022-10-09 PROCEDURE — 74176 CT ABD & PELVIS W/O CONTRAST: CPT

## 2022-10-09 PROCEDURE — G1004 CDSM NDSC: HCPCS

## 2022-10-17 ENCOUNTER — CLINICAL SUPPORT (OUTPATIENT)
Dept: INTERNAL MEDICINE CLINIC | Facility: CLINIC | Age: 62
End: 2022-10-17
Payer: COMMERCIAL

## 2022-10-17 VITALS — WEIGHT: 210 LBS | BODY MASS INDEX: 37.2 KG/M2

## 2022-10-17 DIAGNOSIS — E11.9 CONTROLLED TYPE 2 DIABETES MELLITUS WITHOUT COMPLICATION, WITHOUT LONG-TERM CURRENT USE OF INSULIN (HCC): Primary | ICD-10-CM

## 2022-10-17 LAB — SL AMB POCT HEMOGLOBIN AIC: 8.4 (ref ?–6.5)

## 2022-10-17 PROCEDURE — 83036 HEMOGLOBIN GLYCOSYLATED A1C: CPT | Performed by: PHARMACIST

## 2022-10-17 NOTE — PROGRESS NOTES
[address] [place] [address], [facility]    Current Diabetes Regimen:  Metformin   Glipizide    ASSESSMENT/PLAN                                                                                     Type 2 diabetes: goal A1c <6 5% based on AACE/ACE guidelines  Most recent A1c above goal     SMBG improving due to lifestyle modifications; no hypoglycemia  Hyperglycemia signs/symptoms improving   May benefit from Pro CGM   Cost concerns with brand name medications as patient has medication allotment to follow  BMP shows hyperglycemia    Cost issues with SGLT2i  Duration: > 10 years  Microvascular complications: microalbuminuria, mild retinopathy  Macrovascular complications: none  (No) Aspirin (Yes ) Statin (Yes ) ACEI/ARB  Eye Exam: follow-up appointment scheduled for Nov 2022  Lab Results   Component Value Date    LEFTDIABRET None 11/11/2021    RIGHTDIABRET None 11/11/2021   Foot Exam: 7/2021    Most recent labs and diabetes goals discussed with patient in clinic today  Medications: will have patient adjust glipizide to 5mg breakfast/lunch and 10mg supper; continue metformin  Home Monitoring: twice daily - alternating  Blood glucose log provided  Patient would like to hold off on pro cgm placement today but agrees to placement at next appointment if blood glucose readings remain elevated  BMI Counseling: Body mass index is 37 2 kg/m²  The BMI is above normal  Nutrition recommendations include reducing portion sizes and consuming healthier snacks  Exercise recommendations include exercising 3-5 times per week  2  Hyperlipidemia without clinical ASCVD event: 2018 ACC/AHA lipid guidelines recommend at least moderate statin  Patient currently on moderate intensity (started 2014); tolerating well  Lipid panel/LFTs show elevated TGs; due to be updated  Continue as prescribed; consider trial off fenofibrate in future after patient has updated labs        Follow-Up: 4-6 weeks SUBJECTIVE                                                                                                              Medication Adherence: Medication list reviewed with patient, reports the following discrepancies/problems:  None    Received her flu vaccine through [employer] last week    Has medication allotment through insurance, uncertain how it will change next year    2  Medication Efficacy:    Review of Systems   Constitutional: Positive for fatigue (slightly improved)  Negative for activity change, appetite change and unexpected weight change  Eyes: Positive for visual disturbance (has upcoming eye exam)  Gastrointestinal: Positive for diarrhea (but tolerable, usually every day)  Negative for constipation, nausea and vomiting  Endocrine: Positive for polydipsia (slightly improved), polyphagia (improved) and polyuria (improved)  Musculoskeletal: Negative for myalgias  Home Monitoring:  Glucometer: Yes, Brand: Accu-chek    Hypoglycemia history: 0 SMBG readings < 70mg/dL since last appt   Has glucose tablets: No   Knows how to treat: Yes   Unsymptomatic hypoglycemia: No, reports hypoglycemia s/sx x0 in the past week     3  Lifestyle: retired, previously nursing assistant  No formal exercise routine but has been active caring for family  Feels portions size has decreased since meeting with [facility] dietician last week and has also been working on eating healthier snack options; will be meeting again in 11/2022       Family Support:     Diet Recall - yesterday      B - Food: egg frittata (homemade) + coffee (cream/Splenda)     L - Food: egg wrap + home fries     D - Food: broiled seafood, mashed potatoes, ice cream     Daily Beverages: tea (crystal light), water (more water over the past couple of days)     Snacks: apples, pears, walnuts, trail mix (without candies)       Social History     Tobacco Use   Smoking Status Never Smoker   Smokeless Tobacco Never Used Social History     Substance and Sexual Activity   Alcohol Use Not Currently    Comment: rarely          OBJECTIVE                                                                                                      Blood glucose readings: (10/12-10/17, since dietician)  FB, 211, 197, 177, 174, 167  Pre-lunch: 182, 133, 153  Pre-supper: 184, 186, 181, 131, 194      Wt Readings from Last 3 Encounters:   10/17/22 95 3 kg (210 lb)   22 96 4 kg (212 lb 8 4 oz)   22 96 4 kg (212 lb 9 6 oz)     Previous DM medications/tolerability:   Tanzeum: discontinued 2016 due to pancreatitis however uncertain if related per chart review  Jardiance: stopped 2022    Pertinent Lab Data:    Lab Results   Component Value Date    SODIUM 138 2022    K 4 4 2022    EGFR 92 2022    CREATININE 0 71 2022    GLUF 154 (H) 2022    METBPCLO65 344 07/10/2016    MICROALBCRE 61 (H) 10/17/2018     Lab Results   Component Value Date    HGBA1C 8 4 (A) 10/17/2022    HGBA1C 7 8 (A) 2022    HGBA1C 8 5 (H) 2022     Pharmacist Tracking Tool  Reason For Outreach: Embedded Pharmacist  Demographics:  Intervention Method:  In Person  Type of Intervention: Follow-Up  Topics Addressed: Diabetes  Pharmacologic Interventions: Med Rec  Non-Pharmacologic Interventions: Care coordination, Disease state education, Immunization, Labs and Pro CGM  Time:  Direct Patient Care: 20 mins  Care Coordination: 10 mins  Recommendation Recipient: Patient/Caregiver  Outcome: Partially Accepted

## 2022-11-01 NOTE — PSYCH
MEDICATION MANAGEMENT NOTE        [address]      Name and Date of Birth:  Margy Godoy 65 y o  1960 MRN: [id number]    Date of Visit: 2022    Reason for Visit:   Chief Complaint   Patient presents with   • Medication Management   • Follow-up       SUBJECTIVE:    Renae Bangura is seen today for a follow up for Bipolar Disorder, Generalized Anxiety Disorder and personality disorder  She continues to do fairly well since the last visit  She states that mood remains stable, denies any significant depressive symptoms or manic symptoms  She reports that anxiety symptoms are controlled "my anxiety is good"  She is glad that her carpal tunnel surgery in august went well without complications  She continues to struggle with elevated blood glucose "I work on keeping it below 200 with my diet"  She worries about her niece who has emotional issues, but feels that sometimes she needs to step away due to "too much negativity"  She denies any suicidal ideation, intent or plan at present; denies any homicidal ideation, intent or plan at present  She has no auditory hallucinations, denies any visual hallucinations, has no delusional thinking  She denies any side effects from current psychiatric medications      HPI ROS Appetite Changes and Sleep:     She reports normal sleep, adequate number of sleep hours (8 hours), normal appetite, recent weight gain (5 lbs), normal energy level    Current Rating Scores:     Current PHQ-9   PHQ-2/9 Depression Screening    Little interest or pleasure in doing things: 0 - not at all  Feeling down, depressed, or hopeless: 0 - not at all  Trouble falling or staying asleep, or sleeping too much: 0 - not at all  Feeling tired or having little energy: 1 - several days  Poor appetite or overeatin - several days  Feeling bad about yourself - or that you are a failure or have let yourself or your family down: 0 - not at all  Trouble concentrating on things, such as reading the newspaper or watching television: 0 - not at all  Moving or speaking so slowly that other people could have noticed  Or the opposite - being so fidgety or restless that you have been moving around a lot more than usual: 0 - not at all  Thoughts that you would be better off dead, or of hurting yourself in some way: 0 - not at all  PHQ-9 Score: 2   PHQ-9 Interpretation: No or Minimal depression        Current PHQ-9 score is same as at the last visit)  Review Of Systems:      Constitutional recent weight gain (5 lbs)   ENT negative   Cardiovascular elevated blood pressure   Respiratory negative   Gastrointestinal negative   Genitourinary negative   Musculoskeletal negative   Integumentary negative   Neurological negative   Endocrine negative   Other Symptoms none, all other systems are negative       Past Psychiatric History: (unchanged information from previous note copied and updated)    Past Inpatient Psychiatric Treatment:   One past inpatient psychiatric admission at [place] [address]  Past Outpatient Psychiatric Treatment:    In outpatient treatment at [address] [place] [address] for many years    Past Suicide Attempts: yes, 2 attempts by overdose years ago  Past Violent Behavior: no  Past Psychiatric Medication Trials: Prozac, Depakote, Lamictal (rash), Tegretol, Abilify and Latuda    Traumatic History: (unchanged information from previous note copied and updated)    Abuse: no history of sexual abuse, no history of physical abuse  Other Traumatic Events: none     Past Medical History:    Past Medical History:   Diagnosis Date   • Abdominal pain     started 7/21/16- found something on the pancreas   • Abnormal weight loss     last assessed [id number]   • Acute sinusitis    • Allergic rhinitis     last assessed [id number]   • Anemia     had iron infusion   • Asthma    • Cardiac murmur    • Carpal tunnel syndrome    • Cellulitis    • Cervical disc disease    • Colon polyp    • CPAP (continuous positive airway pressure) dependence     not using at this time   • Degeneration of thoracic intervertebral disc    • Degenerative arthritis of knee    • Diabetes mellitus (HCC)    • Diverticulosis    • Esophagitis, reflux     last assessed [id number]   • Excessive sweating    • Fatty liver    • Frequent falls    • Gait disturbance    • GERD (gastroesophageal reflux disease)    • Granuloma annulare    • Head injury    • Hyperlipidemia    • Hypertension    • Hypertensive heart disease    • Hypoglycemia     last assessed [id number]   • Kidney stone    • Leg length discrepancy    • Leucocytosis    • Lumbar stress fracture    • Multiple thyroid nodules    • Neuropathy    • Obesity     last assessed [id number]   • Paronychia of toe    • Patellofemoral dysfunction    • Pneumonia    • Postmenopausal atrophic vaginitis    • Prepatellar bursitis    • Sebaceous cyst     last assessed [id number]   • Sleep apnea     no CPAP used   • Suicide attempt [facility])    • Tinea corporis    • Type 2 diabetes mellitus ([facility] )     cont meds and monitor BS;  last assessed [id number]   • Urinary frequency    • Vertigo    • Vitamin D deficiency    • Dia-Parkinson-White syndrome         Past Surgical History:   Procedure Laterality Date   • CERVICAL FUSION N/A 08/03/2021    Procedure: FUSION CERVICAL ANTERIOR W DISCECTOMY C4-5, C5-6, C6-7 with allograft and neuromonitoring  case length 3 hours; Surgeon: Joselyn Lombardo MD;  Location: [facility] MAIN [facility];  Service: Orthopedics   • COLONOSCOPY     • ESOPHAGOGASTRODUODENOSCOPY N/A 08/08/2016    Procedure: ESOPHAGOGASTRODUODENOSCOPY (EGD); Surgeon: Vern Solomon MD;  Location: [facility] GI LAB; Service:    • LUMBAR FUSION     • WA ESOPHAGOGASTRODUODENOSCOPY TRANSORAL DIAGNOSTIC N/A 03/28/2019    Procedure: ESOPHAGOGASTRODUODENOSCOPY (EGD); Surgeon: Vern Solomon MD;  Location: [facility]  GI LAB;   Service: Gastroenterology   • WA REVISE MEDIAN N/CARPAL TUNNEL SURG Right 8/9/2022    Procedure: RELEASE CARPAL TUNNEL;  Surgeon: Gretchen Askew MD;  Location: [facility] MA[facility] OR;  Service: Orthopedics   • TONSILLECTOMY     • TOOTH EXTRACTION      wisdom teeth removed     Allergies   Allergen Reactions   • Bee Venom Swelling     Local swelling at sting site   • Biaxin [Clarithromycin] Itching   • Depakote Er [Divalproex Sodium Er]      pancreatitis   • Shellfish-Derived Products - Food Allergy Other (See Comments)     Ears and cheeks got red   • Tanzeum [Albiglutide]      pancreatitis   • Carbamazepine Other (See Comments)     Reaction Date:Unknown   • Lamotrigine Rash       Substance Abuse History:    Social History     Substance and Sexual Activity   Alcohol Use Not Currently    Comment: rarely     Social History     Substance and Sexual Activity   Drug Use No       Social History:    Social History     Socioeconomic History   • Marital status: /Civil Union     Spouse name: Not on file   • Number of children: 0   • Years of education: 12   • Highest education level: 12th grade   Occupational History   • Occupation: retired   Tobacco Use   • Smoking status: Never Smoker   • Smokeless tobacco: Never Used   Vaping Use   • Vaping Use: Never used   Substance and Sexual Activity   • Alcohol use: Not Currently     Comment: rarely   • Drug use: No   • Sexual activity: Not Currently     Partners: Male   Other Topics Concern   • Not on file   Social History Narrative    Education: high school graduate    Learning Disabilities: none    Marital History:     Children: none    Living Arrangement: lives in home with     Occupational History: retired, 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[place] [address]: good support system,  is supportive    Legal History: none     History: None        Caffeine use    Denied home environment domestic violence    Job physical requirements heavy lifting     Social Determinants of Health Financial Resource Strain: Low Risk    • Difficulty of Paying Living Expenses: Not hard at all   Food Insecurity: No Food Insecurity   • Worried About Running Out of Food in the Last Year: Never true   • Ran Out of Food in the Last Year: Never true   Transportation Needs: No Transportation Needs   • Lack of Transportation (Medical): No   • Lack of Transportation (Non-Medical): No   Physical Activity: Inactive   • Days of Exercise per Week: 0 days   • Minutes of Exercise per Session: 0 min   Stress: No Stress Concern Present   • Feeling of Stress : Only a little   Social Connections: Moderately Isolated   • Frequency of Communication with Friends and Family: Once a week   • Frequency of Social Gatherings with Friends and Family: Once a week   • Attends Restoration Services: Never   • Active Member of Clubs or Organizations:  Yes   • Attends Club or Organization Meetings: More than 4 times per year   • Marital Status:    Intimate Partner Violence: Not At Risk   • Fear of Current or Ex-Partner: No   • Emotionally Abused: No   • Physically Abused: No   • Sexually Abused: No   Housing Stability: Low Risk    • Unable to Pay for Housing in the Last Year: No   • Number of Places Lived in the Last Year: 1   • Unstable Housing in the Last Year: No       Family Psychiatric History:     Family History   Problem Relation Age of Onset   • Cancer Mother         lung   • Heart disease Mother    • Coronary artery disease Mother         CABG   • Diabetes Mother    • Lung cancer Mother 76        smoker   • Stroke Mother    • Thyroid disease Mother    • Other Mother         tobacco use   • Parkinsonism Father    • Intestinal polyp Father    • Bipolar disorder Father    • Heart disease Father    • Prostate cancer Father 62   • Hypertension Sister    • Alcohol abuse Sister    • Depression Brother    • Heart disease Brother    • Diabetes Maternal Grandmother    • Thyroid disease Maternal Grandmother    • Hypertension Family    • Suicide Attempts Paternal Aunt    • Depression Paternal Aunt    • Endometrial cancer Maternal Aunt         unknown age   • Ovarian cancer Cousin 36        maternal   • Heart disease Brother    • Seizures Brother        History Review:  The following portions of the patient's history were reviewed and updated as appropriate: allergies, current medications, past family history, past medical history, past social history, past surgical history and problem list          OBJECTIVE:     Vital signs in last 24 hours:    Vitals:    11/07/22 1545   BP: 158/75   Pulse: 94   Weight: 95 7 kg (211 lb)   Height: 5' 3" (1 6 m)       Mental Status Evaluation:    Appearance age appropriate, casually dressed   Behavior cooperative, calm   Speech normal rate, normal volume, normal pitch   Mood euthymic   Affect normal range and intensity, appropriate   Thought Processes organized, goal directed   Associations intact associations   Thought Content no overt delusions   Perceptual Disturbances: no auditory hallucinations, no visual hallucinations   Abnormal Thoughts  Risk Potential Suicidal ideation - None  Homicidal ideation - None  Potential for aggression - No   Orientation oriented to person, place, time/date and situation   Memory recent and remote memory grossly intact   Consciousness alert and awake   Attention Span Concentration Span attention span and concentration are age appropriate   Intellect appears to be of average intelligence   Insight intact   Judgement intact   Muscle Strength and  Gait normal muscle strength and normal muscle tone, normal gait and normal balance   Motor activity no abnormal movements   Language no difficulty naming common objects, no difficulty repeating a phrase, no difficulty writing a sentence   Fund of Knowledge adequate knowledge of current events  adequate fund of knowledge regarding past history  adequate fund of knowledge regarding vocabulary    Pain none   Pain Scale 0       Laboratory Results: I have personally reviewed all pertinent laboratory/tests results    Recent Labs (last 12 months):   Clinical Support on 10/17/2022   Component Date Value   • Hemoglobin A1C 10/17/2022 8 4 (A)   Hospital Outpatient Visit on 09/02/2022   Component Date Value   • Case Report 09/02/2022                      Value:Surgical Pathology Report                         Case: [id number]                                   Authorizing Provider:  Jc Haney MD          Collected:           09/02/2022 0810              Ordering Location:     [facility]        Received:            09/02/2022 [facility]                                                    Pathologist:           Naila Rust MD                                                       Specimens:   A) - Duodenum                                                                                       B) - Polyp, Colorectal, splenic flexure                                                   • Final Diagnosis 09/02/2022                      Value: This result contains rich text formatting which cannot be displayed here  • Note 09/02/2022                      Value: This result contains rich text formatting which cannot be displayed here  • Additional Information 09/02/2022                      Value: This result contains rich text formatting which cannot be displayed here  • Synoptic Checklist 09/02/2022                      Value:                            COLON/RECTUM POLYP FORM - GI - B                                                                                     :    Adenoma(s)     • Gross Description 09/02/2022                      Value: This result contains rich text formatting which cannot be displayed here     Admission on 08/09/2022, Discharged on 08/09/2022   Component Date Value   • POC Glucose 08/09/2022 170 (A)   Telephone on 07/15/2022   Component Date Value   • Severity 11/11/2021 Normal    • Right Eye Diabetic Retin* 11/11/2021 None    • Left Eye Diabetic Retino* 11/11/2021 None    Clinical Support on 07/14/2022   Component Date Value   • Hemoglobin A1C 07/14/2022 7 8 (A)   Annual Exam on 06/09/2022   Component Date Value   • Case Report 06/09/2022                      Value:Gynecologic Cytology Report                       Case: [id number]                                  Authorizing Provider:  Ta Gavin MD   Collected:           06/09/2022 1229              Ordering Location:     [address] [place] [address] OB Received:            06/09/2022 1229                                     GYN                                                                          First Screen:          Jaemarlon Gan                                                                  Specimen:    LIQUID-BASED PAP, SCREENING, Cervix                                                       • Primary Interpretation 06/09/2022 Negative for intraepithelial lesion or malignancy    • Specimen Adequacy 06/09/2022 Satisfactory for evaluation  (See note)    • Note 06/09/2022                      Value: This result contains rich text formatting which cannot be displayed here  • Additional Information 06/09/2022                      Value: This result contains rich text formatting which cannot be displayed here     • LMP 06/09/2022     Appointment on 04/12/2022   Component Date Value   • Sodium 04/12/2022 138    • Potassium 04/12/2022 4 4    • Chloride 04/12/2022 100    • CO2 04/12/2022 25    • ANION GAP 04/12/2022 13    • BUN 04/12/2022 14    • Creatinine 04/12/2022 0 71    • Glucose, Fasting 04/12/2022 154 (A)   • Calcium 04/12/2022 8 8    • eGFR 04/12/2022 92    • Hemoglobin A1C 04/12/2022 8 5 (A)   • EAG 04/12/2022 197    • Magnesium 04/12/2022 1 6    Office Visit on 04/12/2022   Component Date Value   • Ventricular Rate 04/12/2022 70    • Atrial Rate 04/12/2022 70    • IN Interval 04/12/2022 162    • QRSD Interval 04/12/2022 86    • QT Interval 04/12/2022 406    • QTC Interval 04/12/2022 438    • P Axis 04/12/2022 53    • QRS Axis 04/12/2022 -1    • T Wave Axis 04/12/2022 37    Clinical Support on 03/28/2022   Component Date Value   • Hemoglobin A1C 03/28/2022 8 9 (A)   Orders Only on 02/16/2022   Component Date Value   • Magnesium, Serum 02/16/2022 1 4 (A)   • Iron, Serum 02/16/2022 38 (A)   • Total Iron Binding [place]* 02/16/2022 380    • Iron Saturation 02/16/2022 10 (A)   • Ferritin 02/16/2022 110    There may be more visits with results that are not included  Lipid Profile:   Lab Results   Component Value Date    CHOL 144 06/06/2015    CHOLESTEROL 160 08/16/2021    TRIG 361 (H) 08/16/2021    HDL 43 (L) 08/16/2021    LDLCALC 74 08/16/2021    [place] 77 07/11/2018       Suicide/Homicide Risk Assessment:    Risk of Harm to Self:  Demographic risk factors include: , age: over 48 or older  Historical Risk Factors include: history of anxiety, history of mood disorder, history of suicide attempts  Recent Specific Risk Factors include: diagnosis of mood disorder, health problems  Protective Factors: no current suicidal ideation, being , compliant with medications, compliant with mental health treatment, responsibilities and duties to others, stable living environment, supportive   Weapons: none  The following steps have been taken to ensure weapons are properly secured: not applicable  Based on today's assessment, Jimmie Taylor presents the following risk of harm to self: minimal    Risk of Harm to Others:   The following ratings are based on assessment at the time of the interview  Based on today's assessmentJimmie presents the following risk of harm to others: none    The following interventions are recommended: no intervention changes needed    Assessment/Plan:       Diagnoses and all orders for this visit:    Bipolar I disorder, most recent episode depressed, in full remission ([facility] )    LOURDES (generalized anxiety disorder)    Personality disorder ([facility] )    Long-term use of high-risk medication          Treatment Recommendations/Precautions:    Continue Prozac 60 mg daily to improve depressive symptoms  Continue Abilify 10 mg at bedtime to help with mood  Medication management every 4 months  Patient's stability of symptoms warrant this length of time or no significant changes to treatment plan   Follows with family physician for glucose and lipid monitoring due to current therapy with antipsychotic medication  Follows with family physician for yearly physical exam, asthma, diabetes, hyperlipidemia and hypertension  Aware of 24 hour and weekend coverage for urgent situations accessed by calling [facility] main practice number  Monitor lipid profile and hemoglobin A1C yearly due to current therapy with antipsychotic medication - gets labs done with PCP  Has a slip for lipid profile from PCP  Medications Risks/Benefits      Risks, Benefits And Possible Side Effects Of Medications:    Risks, benefits, and possible side effects of medications explained to [facility] including risk of parkinsonian symptoms, Tardive Dyskinesia and metabolic syndrome related to treatment with antipsychotic medications and risk of suicidality and serotonin syndrome related to treatment with antidepressants  She verbalizes understanding and agreement for treatment  Controlled Medication Discussion:     Not applicable    Psychotherapy Provided:     Individual psychotherapy provided: Yes  Counseling was provided during the session today for 16 minutes  Medications, treatment progress and treatment plan reviewed with [facility]  Goals discussed during in session: maintain control of anxiety and maintain mood stability  Discussed with [facility] coping with family issues, health issues, medical problems, occasional anxiety and chronic mental illness  Coping mechanisms including taking time for self and talking to family reviewed with [facility]  Supportive therapy provided  Treatment Plan:    Completed and signed during the session: Yes - with Duglas Marrero    Note Share: This note was shared with patient      Visit Time    Visit Start Time: 3:44 Pm  Visit Stop Time: 4:08 PM  Total Visit Duration: 24 minutes    Roque Gutierrez MD 11/07/22

## 2022-11-07 ENCOUNTER — OFFICE VISIT (OUTPATIENT)
Dept: PSYCHIATRY | Facility: CLINIC | Age: 62
End: 2022-11-07

## 2022-11-07 VITALS
BODY MASS INDEX: 37.39 KG/M2 | DIASTOLIC BLOOD PRESSURE: 75 MMHG | HEART RATE: 94 BPM | HEIGHT: 63 IN | SYSTOLIC BLOOD PRESSURE: 158 MMHG | WEIGHT: 211 LBS

## 2022-11-07 DIAGNOSIS — F60.9 PERSONALITY DISORDER (HCC): Chronic | ICD-10-CM

## 2022-11-07 DIAGNOSIS — F31.76 BIPOLAR I DISORDER, MOST RECENT EPISODE DEPRESSED, IN FULL REMISSION (HCC): Primary | Chronic | ICD-10-CM

## 2022-11-07 DIAGNOSIS — Z79.899 LONG-TERM USE OF HIGH-RISK MEDICATION: Chronic | ICD-10-CM

## 2022-11-07 DIAGNOSIS — F41.1 GAD (GENERALIZED ANXIETY DISORDER): Chronic | ICD-10-CM

## 2022-11-07 NOTE — BH TREATMENT PLAN
TREATMENT PLAN (Medication Management Only)        [facility]    Name/Date of Birth/MRN:  Daniel Stewart 65 y o  1960 MRN: [id number]  Date of Treatment Plan: November 7, 2022  Diagnosis/Diagnoses:   1  Bipolar I disorder, most recent episode depressed, in full remission ([facility] )    2  LOURDES (generalized anxiety disorder)    3  Personality disorder ([facility] )    4  Long-term use of high-risk medication      Strengths/Personal Resources for Self-Care: "I know when to quit"  Area/Areas of need (in own words): "my weight and my sugar"  1  Long Term Goal:   maintain control of anxiety, maintain mood stability  Target Date: 4 months - 3/7/2023  Person/Persons responsible for completion of goal: Duglas Marrero  2  Short Term Objective (s) - How will we reach this goal?:   A  Provider new recommended medication/dosage changes and/or continue medication(s): continue current medications as prescribed (Prozac and Abilify)  B   N/A   C   N/A  Target Date: 4 months - 3/7/2023  Person/Persons Responsible for Completion of Goal: Duglas Marrero   Progress Towards Goals: stable  Treatment Modality: medication management every 4 months  Review due 180 days from date of this plan: 6 months - 5/7/2023  Expected length of service: maintenance unless revised  My Physician/PA/NP and I have developed this plan together and I agree to work on the goals and objectives  I understand the treatment goals that were developed for my treatment    Electronic Signatures: on file (unless signed below)    Roque Gutierrez MD 11/07/22

## 2022-11-12 ENCOUNTER — NURSE TRIAGE (OUTPATIENT)
Dept: OTHER | Facility: OTHER | Age: 62
End: 2022-11-12

## 2022-11-12 NOTE — TELEPHONE ENCOUNTER
Reason for Disposition  • [1] QBURE-12 diagnosed by positive lab test (e g , PCR, rapid self-test kit) AND [2] mild symptoms (e g , cough, fever, others) AND [9] no complications or SOB    Answer Assessment - Initial Assessment Questions  1  COVID-19 DIAGNOSIS: "Who made your COVID-19 diagnosis?" "Was it confirmed by a positive lab test or self-test?" If not diagnosed by a doctor (or NP/PA), ask "Are there lots of cases (community spread) where you live?" Note: See public health department website, if unsure  Home test  2  COVID-19 EXPOSURE: "Was there any known exposure to COVID before the symptoms began?" CDC Definition of close contact: within 6 feet (2 meters) for a total of 15 minutes or more over a 24-hour period  Unsure   3  ONSET: "When did the COVID-19 symptoms start?"       Today   4  WORST SYMPTOM: "What is your worst symptom?" (e g , cough, fever, shortness of breath, muscle aches)      Runny   5  COUGH: "Do you have a cough?" If Yes, ask: "How bad is the cough?"        Mild   6  FEVER: "Do you have a fever?" If Yes, ask: "What is your temperature, how was it measured, and when did it start?"      No  7  RESPIRATORY STATUS: "Describe your breathing?" (e g , shortness of breath, wheezing, unable to speak)       Normal   8  BETTER-SAME-WORSE: "Are you getting better, staying the same or getting worse compared to yesterday?"  If getting worse, ask, "In what way?"      worse  9  HIGH RISK DISEASE: "Do you have any chronic medical problems?" (e g , asthma, heart or lung disease, weak immune system, obesity, etc )      diabetes  10  VACCINE: "Have you had the COVID-19 vaccine?" If Yes, ask: "Which one, how many shots, when did you get it?"        Yes  11  BOOSTER: "Have you received your COVID-19 booster?" If Yes, ask: "Which one and when did you get it?"        3 boosters  12  PREGNANCY: "Is there any chance you are pregnant?" "When was your last menstrual period?"        No   13   OTHER SYMPTOMS: "Do you have any other symptoms?"  (e g , chills, fatigue, headache, loss of smell or taste, muscle pain, sore throat)        Congestion  Sore throat, headache  14   O2 SATURATION MONITOR:  "Do you use an oxygen saturation monitor (pulse oximeter) at home?" If Yes, ask "What is your reading (oxygen level) today?" "What is your usual oxygen saturation reading?" (e g , 95%)        No    Protocols used: CORONAVIRUS (COVID-19) DIAGNOSED OR SUSPECTED-ADULT-

## 2022-11-12 NOTE — TELEPHONE ENCOUNTER
Regarding: Covid+ sore throat headache (1/2)  ----- Message from Noah Tuttle sent at 11/12/2022 12:01 PM EST -----  " I tested positive for Covid today, I am having a sore throat, congestion,  runny nose, and a  headache   I'm not sure where we go from here "

## 2022-11-14 ENCOUNTER — TELEPHONE (OUTPATIENT)
Dept: INTERNAL MEDICINE CLINIC | Facility: CLINIC | Age: 62
End: 2022-11-14

## 2022-11-14 NOTE — TELEPHONE ENCOUNTER
I spoke with the patient  I asked her if she wanted to schedule a virtual visit today  She stated she is starting to feel better  She will quarantine for 5 days and test again  She will call if she gets worse   This is a FYI Refill request received for Trulicity 1.5mg    Last office visit: 7/20/2021  Next office visit: 10/20/2021  Last refill: 7/26/21  Last labs: 7/20/21

## 2022-11-17 ENCOUNTER — TELEMEDICINE (OUTPATIENT)
Dept: INTERNAL MEDICINE CLINIC | Facility: CLINIC | Age: 62
End: 2022-11-17

## 2022-11-17 DIAGNOSIS — U07.1 COVID-19: Primary | ICD-10-CM

## 2022-11-17 NOTE — PROGRESS NOTES
COVID-19 Outpatient Progress Note    Assessment/Plan:    Problem List Items Addressed This Visit        Other    COVID-19 - Primary     Symptoms are currently improving she would like to hold off on monoclonal antibodies at this point time because of the symptom improvement I did explain to her the current recommendations of in-home quarantine x5 days and if symptoms have improved and no temperature on day 5  May leave the home with a high quality mask  Plenty of fluids, rest call if any change worsening or symptoms not jessica  Disposition:     I have spent 10 minutes directly with the patient  Greater than 50% of this time was spent in counseling/coordination of care regarding: prognosis, risks and benefits of treatment options, instructions for management, patient and family education, risk factor reductions and impressions  Encounter provider: Adarsh Lu DO     Provider located at: [address] [address] [address]     Recent Visits  Date Type Provider Dept   11/14/22 Telephone [facility] recent visits within past 7 days and meeting all other requirements  Today's Visits  Date Type Provider Dept   [facility] [id number] [facility] visits and meeting all other requirements  Future Appointments  No visits were found meeting these conditions  Showing future appointments within next 150 days and meeting all other requirements     This virtual check-in was done via telephone and she agrees to proceed  Patient agrees to participate in a virtual check in via telephone or video visit instead of presenting to the office to address urgent/immediate medical needs  Patient is aware this is a billable service  She acknowledged consent and understanding of privacy and security of the video platform   The patient has agreed to participate and understands they can discontinue the visit at any time  After connecting through Telephone, the patient was identified by name and date of birth  Nata Freeman was informed that this was a telemedicine visit and that the exam was being conducted confidentially over secure lines  My office door was closed  No one else was in the room  Nata Freeman acknowledged consent and understanding of privacy and security of the telemedicine visit  I informed the patient that I have reviewed her record in Epic and presented the opportunity for her to ask any questions regarding the visit today  The patient agreed to participate  It was my intent to perform this visit via video technology but the patient was not able to do a video connection so the visit was completed via audio telephone only  Verification of patient location:  Patient is located in the following state in which I hold an active license: [place]    Subjective:   Nata Freeman is a 65 y o  female who is concerned about COVID-19  Patient's symptoms include nasal congestion, rhinorrhea and cough  Patient denies fever, chills, fatigue, malaise, sore throat, anosmia, loss of taste, shortness of breath, chest tightness, abdominal pain, nausea, vomiting, diarrhea, myalgias and headaches       - Date of symptom onset: 11/12/2022  - Date of exposure: 11/9/2022      COVID-19 vaccination status: Fully vaccinated with booster    Exposure:   Contact with a person who is under investigation (PUI) for or who is positive for COVID-19 within the last 14 days?: Yes    Hospitalized recently for fever and/or lower respiratory symptoms?: No      Currently a healthcare worker that is involved in direct patient care?: No      Works in a special setting where the risk of COVID-19 transmission may be high? (this may include long-term care, correctional and long term facilities; homeless shelters; assisted-living facilities and group homes ): No      Resident in a special setting where the risk of COVID-19 transmission may be high? (this may include long-term care, correctional and prison facilities; homeless shelters; assisted-living facilities and group homes ): No      Symptoms improving patient would like to hold off on monoclonal antibody she reports me her symptoms are improving she repeated the COVID home test and was still positive she wanted to know what to do next-I did explain to patient that she should remain in a home quarantine x5 days if his symptoms are improved and no temperature on day 5 she may leave the home using a high-quality mask x5 days     No results found for: [facility], SARSCORONAVI, CORONAVIRUSR, SARSCOVAG, [facility]    Review of Systems   Constitutional: Negative for chills, fatigue and fever  HENT: Positive for congestion and rhinorrhea  Negative for sore throat  Respiratory: Positive for cough  Negative for chest tightness and shortness of breath  Gastrointestinal: Negative for abdominal pain, diarrhea, nausea and vomiting  Musculoskeletal: Negative for myalgias  Neurological: Negative for headaches  Current Outpatient Medications on File Prior to Visit   Medication Sig   • acetaminophen (TYLENOL) 500 mg tablet Take 500 mg by mouth 3 (three) times a day   • ARIPiprazole (ABILIFY) 10 mg tablet Take 1 tablet (10 mg total) by mouth every evening   • atorvastatin (LIPITOR) 20 mg tablet TAKE 1 TABLET BY MOUTH EVERY DAY   • Cholecalciferol 4000 units CAPS Take 1 capsule (4,000 Units total) by mouth daily   • fenofibrate (TRICOR) 145 mg tablet TAKE 1 TABLET BY MOUTH EVERY DAY   • FLUoxetine (PROzac) 20 mg capsule Take 1 capsule (20 mg total) by mouth in the morning  Take with the 40 mg cap for a total dose of 60 mg daily  • FLUoxetine (PROzac) 40 MG capsule Take 1 capsule (40 mg total) by mouth in the morning  Take with the 20 mg cap for a total dose of 60 mg daily     • glipiZIDE (GLUCOTROL) 5 mg tablet Take 1 tablet with breakfast, 1 tablet with lunch, 2 tablets with supper for diabetes   • ibuprofen (MOTRIN) 200 mg tablet Take 400 mg by mouth Every other day as needed when pain not alleviated by acetaminophen   • lisinopril (ZESTRIL) 5 mg tablet TAKE 1 TABLET (5 MG TOTAL) BY MOUTH DAILY  • Magnesium 400 MG TABS Take 1 tablet by mouth in the morning   • metFORMIN (GLUCOPHAGE) 1000 MG tablet Take 1 tablet (1,000 mg total) by mouth 2 (two) times a day with meals   • Multiple Vitamins-Minerals (multivitamin with minerals) tablet Take 1 tablet by mouth daily   • Omega 3 1200 MG CAPS Take 1,200 mg by mouth 2 (two) times a day     • omeprazole (PriLOSEC) 40 MG capsule TAKE 1 CAPSULE BY MOUTH EVERY DAY       Objective: There were no vitals taken for this visit       Physical Exam  Stephanie An DO

## 2022-11-17 NOTE — TELEPHONE ENCOUNTER
Patient called today regarding after 5 days she is still testing Positive for Covid  Patient needs Care Advise  Please call Patient back

## 2022-11-17 NOTE — ASSESSMENT & PLAN NOTE
Symptoms are currently improving she would like to hold off on monoclonal antibodies at this point time because of the symptom improvement I did explain to her the current recommendations of in-home quarantine x5 days and if symptoms have improved and no temperature on day 5  May leave the home with a high quality mask  Plenty of fluids, rest call if any change worsening or symptoms not jessica

## 2022-11-18 DIAGNOSIS — E11.9 TYPE 2 DIABETES MELLITUS WITHOUT COMPLICATION, WITHOUT LONG-TERM CURRENT USE OF INSULIN (HCC): ICD-10-CM

## 2022-11-18 RX ORDER — GLIPIZIDE 5 MG/1
TABLET ORAL
Qty: 360 TABLET | Refills: 1 | Status: SHIPPED | OUTPATIENT
Start: 2022-11-18

## 2022-11-21 ENCOUNTER — CLINICAL SUPPORT (OUTPATIENT)
Dept: INTERNAL MEDICINE CLINIC | Facility: CLINIC | Age: 62
End: 2022-11-21

## 2022-11-21 VITALS — WEIGHT: 209.6 LBS | BODY MASS INDEX: 37.13 KG/M2

## 2022-11-21 DIAGNOSIS — E11.9 TYPE 2 DIABETES MELLITUS WITHOUT COMPLICATION, WITHOUT LONG-TERM CURRENT USE OF INSULIN (HCC): Primary | ICD-10-CM

## 2022-11-21 NOTE — PROGRESS NOTES
[address] [place] [address], [facility]    Current Diabetes Regimen:  Metformin   Glipizide    ASSESSMENT/PLAN                                                                                     Type 2 diabetes: goal A1c <6 5% based on AACE/ACE guidelines  Most recent A1c above goal     SMBG improving due to lifestyle modifications, were elevated due to sickness; no hypoglycemia  May benefit from Pro CGM   Cost concerns with brand name medications as patient has medication allotment to follow  BMP shows hyperglycemia    Duration: > 10 years  Microvascular complications: microalbuminuria, mild retinopathy  Macrovascular complications: none  (No) Aspirin (Yes ) Statin (Yes ) ACEI/ARB  Eye Exam: follow-up appointment scheduled for 11/23/2022, patient will fax to pcp  Lab Results   Component Value Date    LEFTDIABRET None 11/11/2021    RIGHTDIABRET None 11/11/2021   Foot Exam: 7/2021 - due    Most recent labs and diabetes goals discussed with patient in clinic today  Medications: will continue current regimen  Home Monitoring: twice daily - alternating  Blood glucose log provided  Patient would like to hold off on pro cgm placement today but agrees to placement at next appointment if blood glucose readings remain elevated  BMI Counseling: Body mass index is 37 13 kg/m²  The BMI is above normal  Nutrition recommendations include reducing portion sizes and consuming healthier snacks  Exercise recommendations include exercising 3-5 times per week  2  Hyperlipidemia without clinical ASCVD event: 2018 ACC/AHA lipid guidelines recommend at least moderate statin  Patient currently on moderate intensity (started 2014); tolerating well  Lipid panel/LFTs show elevated TGs; due to be updated  Continue as prescribed; consider trial off fenofibrate in future after patient has updated labs        Follow-Up: 4-6 weeks       SUBJECTIVE Medication Adherence: Medication list reviewed with patient, reports the following discrepancies/problems:  None    Has medication allotment through insurance, uncertain how it will change next year    2  Medication Efficacy:    Review of Systems   Constitutional: Positive for fatigue (slightly improved, back to normal)  Negative for activity change, appetite change and unexpected weight change  Eyes: Positive for visual disturbance (has upcoming eye exam on 11/23)  Gastrointestinal: Positive for diarrhea (but tolerable, usually every day)  Negative for constipation, nausea and vomiting  Endocrine: Positive for polydipsia (slightly improved), polyphagia (improved) and polyuria (improved)  Musculoskeletal: Negative for myalgias  Home Monitoring:  Glucometer: Yes, Brand: Accu-chek    Hypoglycemia history: 0 SMBG readings < 70mg/dL since last appt   Has glucose tablets: No   Knows how to treat: Yes   Unsymptomatic hypoglycemia: No, reports hypoglycemia s/sx x0 in the past week     3  Lifestyle: retired, previously nursing assistant  No formal exercise routine but has been active caring for family  Continues with lifestyle modifications as recommended by dietician, continues to eat healthier snacks/meals; will be meeting again on 11/26/2022       Family Support:     Diet Recall - yesterday      B - Food: [place] [employer] + coffee (cream/Splenda)     L - Food: sandwich (647)     D - Food: Gan (Oreo Cheesecake) + shrimp/rice     Daily Beverages: tea (crystal light), water (more water over the past couple of days)     Snacks: apples, pears, walnuts, trail mix (without candies)       Social History     Tobacco Use   Smoking Status Never   Smokeless Tobacco Never     Social History     Substance and Sexual Activity   Alcohol Use Not Currently    Comment: rarely          OBJECTIVE Home Monitoring - blood sugar   Before Covid (-)   FB, 194, 208, 179, 185, 173, 175  Pre-lunch: 128, 198, 141, 143, 140, 127, 128  Pre-supper: 221, 275, 172, 270, 185, 266    Covid (10/10-)  FB, 245, 194, 201, 205  Pre-lunch: 132, 161, 201, 183, 200, 153, 122  Pre-supper: 145, 166, 140, 151    After Covid (-)  FBG : 148, 168  Pre-lunch : 128      Wt Readings from Last 3 Encounters:   22 95 1 kg (209 lb 9 6 oz)   22 95 7 kg (211 lb)   10/17/22 95 3 kg (210 lb)     Previous DM medications/tolerability:   Tanzeum: discontinued 2016 due to pancreatitis however uncertain if related per chart review  Jardiance: stopped 2022    Pertinent Lab Data:    Lab Results   Component Value Date    SODIUM 138 2022    K 4 4 2022    EGFR 92 2022    CREATININE 0 71 2022    GLUF 154 (H) 2022    [id number] 344 07/10/2016    MICROALBCRE 61 (H) 10/17/2018     Lab Results   Component Value Date    HGBA1C 8 4 (A) 10/17/2022    HGBA1C 7 8 (A) 2022    HGBA1C 8 5 (H) 2022     Pharmacist Tracking Tool  Reason For Outreach: Embedded Pharmacist  Demographics:  Intervention Method:  In Person  Type of Intervention: Follow-Up  Topics Addressed: Diabetes and Obesity  Pharmacologic Interventions: Med Rec  Non-Pharmacologic Interventions: Care coordination, Disease state education, Care Gap and Pro CGM  Time:  Direct Patient Care: 20 mins  Care Coordination: 10 mins  Recommendation Recipient: Patient/Caregiver  Outcome: Partially Accepted

## 2022-11-23 ENCOUNTER — ESTABLISHED COMPREHENSIVE EXAM (OUTPATIENT)
Dept: URBAN - METROPOLITAN AREA CLINIC 6 | Facility: CLINIC | Age: 62
End: 2022-11-23

## 2022-11-23 DIAGNOSIS — H25.813: ICD-10-CM

## 2022-11-23 DIAGNOSIS — H16.223: ICD-10-CM

## 2022-11-23 DIAGNOSIS — H43.813: ICD-10-CM

## 2022-11-23 DIAGNOSIS — E11.9: ICD-10-CM

## 2022-11-23 LAB
LEFT EYE DIABETIC RETINOPATHY: NORMAL
RIGHT EYE DIABETIC RETINOPATHY: NORMAL

## 2022-11-23 PROCEDURE — 92014 COMPRE OPH EXAM EST PT 1/>: CPT

## 2022-11-23 PROCEDURE — 92015 DETERMINE REFRACTIVE STATE: CPT

## 2022-11-23 ASSESSMENT — VISUAL ACUITY
OS_CC: 20/30
OU_CC: J2
OD_CC: 20/25

## 2022-11-23 ASSESSMENT — TONOMETRY
OD_IOP_MMHG: 9
OS_IOP_MMHG: 13

## 2022-12-07 ENCOUNTER — VBI (OUTPATIENT)
Dept: ADMINISTRATIVE | Facility: OTHER | Age: 62
End: 2022-12-07

## 2022-12-08 LAB
ALBUMIN SERPL-MCNC: 4.1 G/DL (ref 3.6–5.1)
ALBUMIN/GLOB SERPL: 1.5 (CALC) (ref 1–2.5)
ALP SERPL-CCNC: 72 U/L (ref 37–153)
ALT SERPL-CCNC: 20 U/L (ref 6–29)
AST SERPL-CCNC: 24 U/L (ref 10–35)
BILIRUB SERPL-MCNC: 0.3 MG/DL (ref 0.2–1.2)
BUN SERPL-MCNC: 19 MG/DL (ref 7–25)
BUN/CREAT SERPL: ABNORMAL (CALC) (ref 6–22)
CALCIUM SERPL-MCNC: 9.3 MG/DL (ref 8.6–10.4)
CHLORIDE SERPL-SCNC: 102 MMOL/L (ref 98–110)
CHOLEST SERPL-MCNC: 147 MG/DL
CHOLEST/HDLC SERPL: 3.6 (CALC)
CO2 SERPL-SCNC: 28 MMOL/L (ref 20–32)
CREAT SERPL-MCNC: 0.64 MG/DL (ref 0.5–1.05)
GFR/BSA.PRED SERPLBLD CYS-BASED-ARV: 100 ML/MIN/1.73M2
GLOBULIN SER CALC-MCNC: 2.8 G/DL (CALC) (ref 1.9–3.7)
GLUCOSE SERPL-MCNC: 181 MG/DL (ref 65–99)
HBA1C MFR BLD: 8.4 % OF TOTAL HGB
HDLC SERPL-MCNC: 41 MG/DL
LDLC SERPL CALC-MCNC: 60 MG/DL (CALC)
NONHDLC SERPL-MCNC: 106 MG/DL (CALC)
POTASSIUM SERPL-SCNC: 4.6 MMOL/L (ref 3.5–5.3)
PROT SERPL-MCNC: 6.9 G/DL (ref 6.1–8.1)
SODIUM SERPL-SCNC: 140 MMOL/L (ref 135–146)
TRIGL SERPL-MCNC: 385 MG/DL

## 2022-12-09 DIAGNOSIS — I10 BENIGN ESSENTIAL HYPERTENSION: ICD-10-CM

## 2022-12-09 RX ORDER — LISINOPRIL 5 MG/1
5 TABLET ORAL DAILY
Qty: 90 TABLET | Refills: 0 | Status: SHIPPED | OUTPATIENT
Start: 2022-12-09

## 2022-12-28 ENCOUNTER — VBI (OUTPATIENT)
Dept: ADMINISTRATIVE | Facility: OTHER | Age: 62
End: 2022-12-28

## 2023-01-05 ENCOUNTER — CLINICAL SUPPORT (OUTPATIENT)
Dept: INTERNAL MEDICINE CLINIC | Facility: CLINIC | Age: 63
End: 2023-01-05

## 2023-01-05 ENCOUNTER — TELEPHONE (OUTPATIENT)
Dept: ADMINISTRATIVE | Facility: OTHER | Age: 63
End: 2023-01-05

## 2023-01-05 VITALS
BODY MASS INDEX: 37.52 KG/M2 | HEART RATE: 76 BPM | DIASTOLIC BLOOD PRESSURE: 78 MMHG | SYSTOLIC BLOOD PRESSURE: 154 MMHG | WEIGHT: 211.8 LBS

## 2023-01-05 DIAGNOSIS — E11.9 CONTROLLED TYPE 2 DIABETES MELLITUS WITHOUT COMPLICATION, WITHOUT LONG-TERM CURRENT USE OF INSULIN (HCC): Primary | ICD-10-CM

## 2023-01-05 RX ORDER — PIOGLITAZONEHYDROCHLORIDE 30 MG/1
30 TABLET ORAL DAILY
Qty: 90 TABLET | Refills: 1 | Status: SHIPPED | OUTPATIENT
Start: 2023-01-05

## 2023-01-05 NOTE — TELEPHONE ENCOUNTER
Upon review of the In Basket request and the patient's chart, initial outreach has been made via fax to facility  Please see Contacts section for details       Thank you  Karla Olivier MA

## 2023-01-05 NOTE — PROGRESS NOTES
[address] [facility], [facility]    Current Diabetes Regimen:  Metformin   Glipizide    ASSESSMENT/PLAN                                                                                     Type 2 diabetes: goal A1c <6 5% based on AACE/ACE guidelines  Most recent A1c above goal, no change  SMBG above goal due to lifestyle  Cost concerns with brand name medications as patient has medication allotment to follow; could consider pioglitazone vs insulin  BMP shows hyperglycemia    Duration: > 10 years  Microvascular complications: microalbuminuria, mild retinopathy  Macrovascular complications: none  (No) Aspirin (Yes ) Statin (Yes ) ACEI/ARB  Eye Exam: , Dr Iain Murphy; [place]; 11/2022  Lab Results   Component Value Date    LEFTDIABRET None 11/11/2021    RIGHTDIABRET None 11/11/2021   Foot Exam: 7/2021 - due    Medications: reviewed insulin vs pioglitazone; patient would prefer pioglitazone, aware to monitor for side effect  If PCP is in agreeance, will start pioglitazone 30mg/day; continue metformin and glipizide for now  Home Monitoring: twice daily - alternating  BMI Counseling: Body mass index is 37 52 kg/m²  The BMI is above normal  Nutrition recommendations include reducing portion sizes and consuming healthier snacks  Exercise recommendations include exercising 3-5 times per week  2  Hyperlipidemia without clinical ASCVD event: 2018 ACC/AHA lipid guidelines recommend at least moderate statin  Patient currently on moderate intensity (started 2014); tolerating well  Lipid panel/LFTs show elevated TGs  Has fenofibrate supply she would like to use up; will have patient use up fenofibrate supply then we will increase atorvastatin to high intensity  2  HTN: BP goal less than 130/80 mmHg  In office BP above goal, HR acceptable  No home readings to review  Patient with room for lifestyle modifications  Serum K+ WNL       Medications: will continue for now  Consider dose increase if blood pressure remains elevated at next appointment  Patient will bring in home blood pressure cuff to next appointment    Follow-Up: 4-6 weeks       SUBJECTIVE                                                                                                              Medication Adherence: Medication list reviewed with patient, reports the following discrepancies/problems:  Fish oil: stopped a while ago, unable to recall when    2  Medication Efficacy:    Review of Systems   Constitutional: Positive for fatigue (slightly improved, back to normal)  Negative for activity change, appetite change and unexpected weight change  Eyes: Positive for visual disturbance (informed changes were related to hyperglycemia during eye exam 11/2022)  Gastrointestinal: Positive for diarrhea (but tolerable, usually every day)  Negative for constipation, nausea and vomiting  Endocrine: Positive for polydipsia (worse since last appt), polyphagia (improved) and polyuria (improved)  Musculoskeletal: Negative for myalgias  Home Monitoring:  Glucometer: Yes, Brand: Accu-chek    Hypoglycemia history: 0 SMBG readings < 70mg/dL since last appt   Has glucose tablets: No   Knows how to treat: Yes   Unsymptomatic hypoglycemia: No, reports hypoglycemia s/sx x0 in the past week     Has wrist home blood pressure cuff; has not used for a while as blood pressure readings did not correlate with in office readings  3  Lifestyle: retired, previously nursing assistant  No formal exercise routine; more fatigued during the day has caused her to have limited interest in daily activities  Meets with [employer] dietician monthly, snacking more during the day  Considering rejoining weight watchers this year, location she was interested in is moving but they are uncertain of new location; hopes to start Feb 2023  Has been snacking more on salami and cheese; limited fruits/vegetables        OBJECTIVE Home Monitoring - blood sugar (22-23)  FB, 208, 230, 240, 200, 211  Pre-lunch: 199, 252, 155, 171  Pre-supper: 271, 241, 266  Bedtime: 243, 287, 197, 131, 227, 246, 187, 177, 218, 252      Vitals:    23 0901 23 0916   BP: 154/74 154/78   Pulse: 76    Weight: 96 1 kg (211 lb 12 8 oz)          Previous DM medications/tolerability:   Tanzeum: discontinued 2016 due to pancreatitis however uncertain if related per chart review  Jardiance: stopped 2022    Pertinent Lab Data:    Lab Results   Component Value Date    SODIUM 140 2022    K 4 6 2022    EGFR 100 2022    CREATININE 0 64 2022    GLUF 154 (H) 2022    [id number] 344 07/10/2016    MICROALBCRE 61 (H) 10/17/2018     Lab Results   Component Value Date    HGBA1C 8 4 (H) 2022    HGBA1C 8 4 (A) 10/17/2022    HGBA1C 7 8 (A) 2022     Pharmacist Tracking Tool  Reason For Outreach: Embedded Pharmacist  Demographics:  Intervention Method:  In Person  Type of Intervention: Follow-Up  Topics Addressed: Diabetes, Dyslipidemia, Hypertension and Polypharmacy  Pharmacologic Interventions: Medication Initiation, Dose or Frequency Adjusted and Med Rec  Non-Pharmacologic Interventions: Adherence addressed, Care coordination, Chart update, Disease state education, Medication Monitoring and Care Gap  Time:  Direct Patient Care: 20 mins  Care Coordination: 15 mins  Recommendation Recipient: Patient/Caregiver  Outcome: Accepted

## 2023-01-05 NOTE — LETTER
Diabetic Eye Exam Form    Date Requested: 23  Patient: Tanya Kruger  Patient : 1960   Referring Provider: Maren Banks DO      DIABETIC Eye Exam Date _______________________________      Type of Exam MUST be documented for Diabetic Eye Exams  Please CHECK ONE  Retinal Exam       Dilated Retinal Exam       OCT       Optomap-Iris Exam      Fundus Photography       Left Eye - Please check Retinopathy or No Retinopathy        Exam did show retinopathy    Exam did not show retinopathy       Right Eye - Please check Retinopathy or No Retinopathy       Exam did show retinopathy    Exam did not show retinopathy       Comments __________________________________________________________    Practice Providing Exam ______________________________________________    Exam Performed By (print name) _______________________________________      Provider Signature ___________________________________________________      These reports are needed for  compliance  Please fax this completed form and a copy of the Diabetic Eye Exam report to our office located at [place] [address] as soon as possible via [phone] doris Schwartz: Phone [phone]  We thank you for your assistance in treating our mutual patient

## 2023-01-05 NOTE — TELEPHONE ENCOUNTER
----- Message from Paul Vieyra, Pharmacist sent at 1/5/2023  9:30 AM EST -----  Regarding: eye exam  01/05/23 9:30 AM    Hello, our patient Bebo Grullon has had Diabetic Eye Exam completed/performed  Please assist in updating the patient chart by making an External outreach to [facility] located in [place], [place]  The date of service is 11/2022      Thank you,  Paul Vieyra, Pharmacist  [facility]

## 2023-01-09 DIAGNOSIS — F41.1 GAD (GENERALIZED ANXIETY DISORDER): Chronic | ICD-10-CM

## 2023-01-09 DIAGNOSIS — R10.13 EPIGASTRIC PAIN: ICD-10-CM

## 2023-01-09 DIAGNOSIS — I10 BENIGN ESSENTIAL HYPERTENSION: ICD-10-CM

## 2023-01-09 DIAGNOSIS — K29.80 DUODENITIS: ICD-10-CM

## 2023-01-09 DIAGNOSIS — F31.76 BIPOLAR I DISORDER, MOST RECENT EPISODE DEPRESSED, IN FULL REMISSION (HCC): Primary | Chronic | ICD-10-CM

## 2023-01-09 RX ORDER — FLUOXETINE HYDROCHLORIDE 40 MG/1
40 CAPSULE ORAL DAILY
Qty: 90 CAPSULE | Refills: 0 | Status: SHIPPED | OUTPATIENT
Start: 2023-01-09 | End: 2023-04-09

## 2023-01-09 RX ORDER — LISINOPRIL 5 MG/1
5 TABLET ORAL DAILY
Qty: 90 TABLET | Refills: 0 | Status: SHIPPED | OUTPATIENT
Start: 2023-01-09

## 2023-01-09 RX ORDER — FLUOXETINE HYDROCHLORIDE 20 MG/1
20 CAPSULE ORAL DAILY
Qty: 90 CAPSULE | Refills: 0 | Status: SHIPPED | OUTPATIENT
Start: 2023-01-09 | End: 2023-04-09

## 2023-01-09 RX ORDER — OMEPRAZOLE 40 MG/1
CAPSULE, DELAYED RELEASE ORAL
Qty: 90 CAPSULE | Refills: 1 | Status: SHIPPED | OUTPATIENT
Start: 2023-01-09

## 2023-01-27 NOTE — TELEPHONE ENCOUNTER
As a final attempt, a third outreach has been made via telephone call to facility  Please see Contacts section for details  This encounter will be closed and completed by end of day  Should we receive the requested information because of previous outreach attempts, the requested patient's chart will be updated appropriately       Thank you  Josue Cabrera MA

## 2023-02-08 DIAGNOSIS — E78.5 DYSLIPIDEMIA: ICD-10-CM

## 2023-02-08 RX ORDER — ATORVASTATIN CALCIUM 20 MG/1
TABLET, FILM COATED ORAL
Qty: 90 TABLET | Refills: 1 | Status: SHIPPED | OUTPATIENT
Start: 2023-02-08

## 2023-02-13 ENCOUNTER — OFFICE VISIT (OUTPATIENT)
Dept: OBGYN CLINIC | Facility: CLINIC | Age: 63
End: 2023-02-13

## 2023-02-13 ENCOUNTER — APPOINTMENT (OUTPATIENT)
Dept: RADIOLOGY | Facility: CLINIC | Age: 63
End: 2023-02-13

## 2023-02-13 VITALS
DIASTOLIC BLOOD PRESSURE: 74 MMHG | HEIGHT: 63 IN | SYSTOLIC BLOOD PRESSURE: 158 MMHG | BODY MASS INDEX: 38.09 KG/M2 | WEIGHT: 215 LBS | HEART RATE: 82 BPM

## 2023-02-13 DIAGNOSIS — M54.2 NECK PAIN: ICD-10-CM

## 2023-02-13 DIAGNOSIS — Z98.1 S/P CERVICAL SPINAL FUSION: Primary | ICD-10-CM

## 2023-02-13 DIAGNOSIS — Z98.890 S/P CARPAL TUNNEL RELEASE: ICD-10-CM

## 2023-02-13 NOTE — PROGRESS NOTES
5355 Bill Yang MD  [address] [place] [address] [place] [address]  [phone]    HISTORY OF PRESENT ILLNESS:    Patient is a 71-year-old female who presents for follow-up of anterior cervical fusion with diskectomy of C4-5, C5-6, C6-7 performed by Dr Patricia Coleman on 8/3/2021  Today patient reports occasional discomfort in the neck  Patient is doing her at home exercises  Patient denies any upper extremity pain  Patient reports she had bilateral carpal tunnel syndrome and had release on right side  Patient has some numbness in the index finger of right hand and numbness and tingling in the first two digits of left hand        ALLERGIES:   Allergies   Allergen Reactions   • Bee Venom Swelling     Local swelling at sting site   • Biaxin [Clarithromycin] Itching   • Depakote Er [Divalproex Sodium Er]      pancreatitis   • Shellfish-Derived Products - Food Allergy Other (See Comments)     Ears and cheeks got red   • Tanzeum [Albiglutide]      pancreatitis   • Carbamazepine Other (See Comments)     Reaction Date:Unknown   • Lamotrigine Rash       MEDICATIONS:    Current Outpatient Medications:   •  acetaminophen (TYLENOL) 500 mg tablet, Take 500 mg by mouth 3 (three) times a day, Disp: , Rfl:   •  atorvastatin (LIPITOR) 20 mg tablet, TAKE 1 TABLET BY MOUTH EVERY DAY, Disp: 90 tablet, Rfl: 1  •  Cholecalciferol 4000 units CAPS, Take 1 capsule (4,000 Units total) by mouth daily, Disp: , Rfl:   •  fenofibrate (TRICOR) 145 mg tablet, TAKE 1 TABLET BY MOUTH EVERY DAY, Disp: 90 tablet, Rfl: 3  •  FLUoxetine (PROzac) 20 mg capsule, Take 1 capsule (20 mg total) by mouth daily Take with Prozac 40 mg for a total dose of 60 mg daily, Disp: 90 capsule, Rfl: 0  •  FLUoxetine (PROzac) 40 MG capsule, Take 1 capsule (40 mg total) by mouth daily Take with Prozac 20 mg for a total dose of 60 mg daily, Disp: 90 capsule, Rfl: 0  •  glipiZIDE (GLUCOTROL) 5 mg tablet, TAKE 1 TABLET WITH BREAKFAST, 1 TABLET WITH LUNCH, 2 TABLETS WITH SUPPER FOR DIABETES, Disp: 360 tablet, Rfl: 1  •  ibuprofen (MOTRIN) 200 mg tablet, Take 400 mg by mouth Every other day as needed when pain not alleviated by acetaminophen, Disp: , Rfl:   •  lisinopril (ZESTRIL) 5 mg tablet, TAKE 1 TABLET (5 MG TOTAL) BY MOUTH DAILY  , Disp: 90 tablet, Rfl: 0  •  Magnesium 400 MG TABS, Take 1 tablet by mouth in the morning, Disp: , Rfl:   •  Multiple Vitamins-Minerals (multivitamin with minerals) tablet, Take 1 tablet by mouth daily, Disp: , Rfl:   •  omeprazole (PriLOSEC) 40 MG capsule, TAKE 1 CAPSULE BY MOUTH EVERY DAY, Disp: 90 capsule, Rfl: 1  •  pioglitazone (ACTOS) 30 mg tablet, Take 1 tablet (30 mg total) by mouth daily For diabetes, Disp: 90 tablet, Rfl: 1  •  ARIPiprazole (ABILIFY) 10 mg tablet, Take 1 tablet (10 mg total) by mouth every evening, Disp: 90 tablet, Rfl: 2  •  metFORMIN (GLUCOPHAGE) 1000 MG tablet, Take 1 tablet (1,000 mg total) by mouth 2 (two) times a day with meals, Disp: 180 tablet, Rfl: 3     PAST MEDICAL HISTORY:   Past Medical History:   Diagnosis Date   • Abdominal pain     started 7/21/16- found something on the pancreas   • Abnormal weight loss     last assessed [id number]   • Acute sinusitis    • Allergic rhinitis     last assessed 08Jul2015   • Anemia     had iron infusion   • Asthma    • Cardiac murmur    • Carpal tunnel syndrome    • Cellulitis    • Cervical disc disease    • Colon polyp    • CPAP (continuous positive airway pressure) dependence     not using at this time   • Degeneration of thoracic intervertebral disc    • Degenerative arthritis of knee    • Diabetes mellitus (HCC)    • Diverticulosis    • Esophagitis, reflux     last assessed 29Jan2014   • Excessive sweating    • Fatty liver    • Frequent falls    • Gait disturbance    • GERD (gastroesophageal reflux disease)    • Granuloma annulare    • Head injury    • Hyperlipidemia    • Hypertension    • Hypertensive heart disease    • Hypoglycemia     last assessed [id number]   • Kidney stone    • Leg length discrepancy    • Leucocytosis    • Lumbar stress fracture    • Multiple thyroid nodules    • Neuropathy    • Obesity     last assessed [id number]   • Paronychia of toe    • Patellofemoral dysfunction    • Pneumonia    • Postmenopausal atrophic vaginitis    • Prepatellar bursitis    • Sebaceous cyst     last assessed [id number]   • Sleep apnea     no CPAP used   • Suicide attempt [facility])    • Tinea corporis    • Type 2 diabetes mellitus ([facility] [address] )     cont meds and monitor BS;  last assessed [id number]   • Urinary frequency    • Vertigo    • Vitamin D deficiency    • Dia-Parkinson-White syndrome        PAST SURGICAL HISTORY:  Past Surgical History:   Procedure Laterality Date   • CERVICAL FUSION N/A 08/03/2021    Procedure: FUSION CERVICAL ANTERIOR W DISCECTOMY C4-5, C5-6, C6-7 with allograft and neuromonitoring  case length 3 hours; Surgeon: Jared Delong MD;  Location: [facility] OR;  Service: Orthopedics   • COLONOSCOPY     • ESOPHAGOGASTRODUODENOSCOPY N/A 08/08/2016    Procedure: ESOPHAGOGASTRODUODENOSCOPY (EGD); Surgeon: Arturo Cano MD;  Location: [facility] GI LAB; Service:    • LUMBAR FUSION     • HI ESOPHAGOGASTRODUODENOSCOPY TRANSORAL DIAGNOSTIC N/A 03/28/2019    Procedure: ESOPHAGOGASTRODUODENOSCOPY (EGD); Surgeon: Arturo Cano MD;  Location: [facility] GI LAB; Service: Gastroenterology   • HI NEUROPLASTY &/TRANSPOS MEDIAN NRV CARPAL TUNNE Right 8/9/2022    Procedure: RELEASE CARPAL TUNNEL;  Surgeon: Andrzej Tran MD;  [facility] MAIN [facility];  Service: Orthopedics   • TONSILLECTOMY     • TOOTH EXTRACTION      wisdom teeth removed       SOCIAL HISTORY:  Social History     Tobacco Use   • Smoking status: Never   • Smokeless tobacco: Never   Vaping Use   • Vaping Use: Never used   Substance Use Topics   • Alcohol use: Not Currently     Comment: rarely   • Drug use: No       ROS:  Review of Systems   Constitutional: Negative for chills, diaphoresis and fever     HENT: Negative for congestion, drooling, ear discharge, facial swelling, nosebleeds, rhinorrhea, sneezing, trouble swallowing and voice change  Eyes: Negative for discharge, redness and itching  Respiratory: Negative for cough, choking, shortness of breath, wheezing and stridor  Cardiovascular: Negative for chest pain and palpitations  Gastrointestinal: Negative for abdominal pain, constipation, diarrhea and vomiting  Endocrine: Negative for cold intolerance and heat intolerance  Genitourinary: Negative for dysuria and flank pain  Musculoskeletal:        See HPI and PE  Skin: Negative for color change and rash  Neurological: Negative for dizziness, syncope and facial asymmetry  Psychiatric/Behavioral: Negative for agitation, self-injury and suicidal ideas  PHYSICAL EXAM:  Patient is a 78-year-old female sitting comfortably on exam chair  Ambulates with normal gait  Able to go up on toes  Able to balance on one leg  No TTP over cervical, thoracic, or lumbar spine  5/5 motor strength in bilateral upper extremities with normal sensation  2+ deep tendon reflexes symmetric in bilateral upper extremities  Negative Jdae sign bilaterally     RADIOGRAPHIC STUDIES:  1  X-ray, C-spine, 10/27/2021:  Status post anterior cervical diskectomy and fusion at C4-5 C5-6 and C6-7   No hardware complication      2  MRI, C-spine, 6/16/2021:   Multilevel cervical degenerative disc disease   Disc protrusion spinal cord compression involving multiple levels, at C6-7      3  Xrays, C-spine, 1/24/2021: Status post anterior cervical diskectomy and fusion at C4-5 C5-6 and C6-7   No hardware complication   I compared these films with prior films on 01/24/2022 and so no significant difference between the films   There was evidence of plate disease at the superior level C3-4      4  Xrays, cervical spine, 8/08/2022:  Status post anterior cervical diskectomy fusion from C4 through C7    I compared the films to prior x-rays  There is evidence of plate disease at W7-1  There is also evidence of pseudoarthrosis at C4-5 and C6-7  There is no evidence of hardware complication      5  Xrays, cervical spine, 2/13/2023:  Status post anterior cervical diskectomy fusion from C4 through C7  There is evidence of plate disease at K4-7  There is also evidence of pseudoarthrosis at C4-5 and C6-7  There is no evidence of hardware complication  No cnanges compared to prior X-rays  ASSESSMENT:  Problem List Items Addressed This Visit    None  Visit Diagnoses     S/P carpal tunnel release    -  Primary    Neck pain        Relevant Orders    XR spine cervical complete 4 or 5 vw non injury            PLAN:  Patient is a 78-year-old female with cervical myelopathy status post anterior cervical fusion with diskectomy of C4-5, C5-6, C6-7 performed by Dr Shay Bower on 8/3/2021       Xrays of cervical spine shows hardware in stable alignment  Patient should continue with at home exercises and follow-up with hand surgery for carpal tunnel syndrome  No evidence of myelopathy on exam today  Patient wishes to follow-up with Dr Lonia Gottron in [place] due to location being closer to her home  There is no evidence of progressive myelopathy  The pseudoarthrosis does not appear to be symptomatic  Patient should follow-up with Dr Lonia Gottron in 6 months for cervical myelopathy check  She lives close to [place] and needs her  to drive to this office        Scribe Attestation    I,:  Piero Addison PA-C am acting as a scribe while in the presence of the attending physician :       I,:  Allan Branch MD personally performed the services described in this documentation    as scribed in my presence :

## 2023-02-16 ENCOUNTER — CLINICAL SUPPORT (OUTPATIENT)
Dept: INTERNAL MEDICINE CLINIC | Facility: CLINIC | Age: 63
End: 2023-02-16

## 2023-02-16 VITALS
BODY MASS INDEX: 37.09 KG/M2 | DIASTOLIC BLOOD PRESSURE: 74 MMHG | WEIGHT: 209.4 LBS | OXYGEN SATURATION: 95 % | HEART RATE: 72 BPM | SYSTOLIC BLOOD PRESSURE: 152 MMHG

## 2023-02-16 DIAGNOSIS — E78.5 DYSLIPIDEMIA: Primary | ICD-10-CM

## 2023-02-16 DIAGNOSIS — I10 BENIGN ESSENTIAL HYPERTENSION: ICD-10-CM

## 2023-02-16 RX ORDER — LISINOPRIL 10 MG/1
10 TABLET ORAL DAILY
Qty: 90 TABLET | Refills: 1 | Status: SHIPPED | OUTPATIENT
Start: 2023-02-16

## 2023-02-16 NOTE — PROGRESS NOTES
[address] [facility], Pharmacist    Current Diabetes Regimen:  Metformin   Glipizide  pioglitazone    ASSESSMENT/PLAN                                                                                     Type 2 diabetes: goal A1c <6 5% based on AACE/ACE guidelines  Most recent A1c above goal, no change  SMBG improving since started weight watchers; no hypoglycemia  BMP shows hyperglycemia    Duration: > 10 years  Microvascular complications: microalbuminuria, mild retinopathy  Macrovascular complications: none  (No) Aspirin (Yes ) Statin (Yes ) ACEI/ARB  Eye Exam: , Dr Erasmo Frost; Esperanza Bermudez; 11/2022; Joann Gomez not obtained  Lab Results   Component Value Date    LEFTDIABRET None 11/11/2021    RIGHTDIABRET None 11/11/2021   Foot Exam: 7/2021 - due    Medications: continue regimen as prescribed  Home Monitoring: twice daily - alternating  BMI Counseling: Body mass index is 37 09 kg/m²  The BMI is above normal  Nutrition recommendations include reducing portion sizes and consuming healthier snacks  Exercise recommendations include exercising 3-5 times per week  2  Hyperlipidemia without clinical ASCVD event: 2018 ACC/AHA lipid guidelines recommend at least moderate statin  Patient currently on moderate intensity (started 2014); tolerating well  Lipid panel/LFTs show elevated TGs  Has fenofibrate supply she would like to use up; will have patient use up fenofibrate supply then we will increase atorvastatin to high intensity  2  HTN: BP goal less than 130/80 mmHg  In office BP above goal, HR acceptable  No home readings to review  Patient with room for lifestyle modifications  Serum K+ WNL  Medications: increase lisinopril to 10mg/day     Patient will bring in home blood pressure cuff to next appointment    Follow-Up: 4-6 weeks after PCP       SUBJECTIVE Medication Adherence: Medication list reviewed with patient, reports the following discrepancies/problems:  Fish oil: stopped a while ago, unable to recall when  Fenofibrate: recently received from pharmacy    2  Medication Efficacy:    Review of Systems   Constitutional: Positive for fatigue (slightly improved since last appt)  Negative for activity change, appetite change and unexpected weight change  Eyes: Positive for visual disturbance (informed changes were related to hyperglycemia during eye exam 2022)  Cardiovascular: Negative for leg swelling  Gastrointestinal: Positive for diarrhea (but tolerable, usually every day)  Negative for constipation, nausea and vomiting  Endocrine: Negative for polydipsia (improve), polyphagia (improved) and polyuria (improved)  Neurological: Negative for dizziness, light-headedness and headaches  Home Monitoring:  Glucometer: Yes, Brand: Accu-chek    Hypoglycemia history: 0 SMBG readings < 70mg/dL since last appt   Has glucose tablets: No   Knows how to treat: Yes   Unsymptomatic hypoglycemia: No, reports hypoglycemia s/sx x0 in the past week     Has wrist home blood pressure cuff; has not used for a while as blood pressure readings did not correlate with in office readings  3  Lifestyle: started weight watchers on , alloted 23 points per day   1 more appointment with Cecilio Mcclelland Monitoring - blood sugar (-)  FB, 138, 182 (sick), 151, 184  Pre-lunch: 151, 121, 118  Pre-supper: 100, 271 (fried foods), 111, 114  Bedtime: 194, 95, 169, 104, 160, 150, 140, 113, 107, 180, 157, 136        Vitals:    23 0901 23 0913   BP: 152/72 152/74   Pulse: 72    SpO2: 95%    Weight: 95 kg (209 lb 6 4 oz)          Previous DM medications/tolerability:   Tanzeum: discontinued 2016 due to pancreatitis however uncertain if related per chart review  Jardiance: stopped 4/2022    Pertinent Lab Data:    Lab Results   Component Value Date    SODIUM 140 12/08/2022    K 4 6 12/08/2022    EGFR 100 12/08/2022    CREATININE 0 64 12/08/2022    GLUF 154 (H) 04/12/2022    [id number] 344 07/10/2016    MICROALBCRE 61 (H) 10/17/2018     Lab Results   Component Value Date    HGBA1C 8 4 (H) 12/08/2022    HGBA1C 8 4 (A) 10/17/2022    HGBA1C 7 8 (A) 07/14/2022     Pharmacist Tracking Tool  Reason For Outreach: Embedded Pharmacist  Demographics:  Intervention Method:  In Person  Type of Intervention: Follow-Up  Topics Addressed: Diabetes, Dyslipidemia and Hypertension  Pharmacologic Interventions: Dose or Frequency Adjusted  Non-Pharmacologic Interventions: Care coordination, Disease state education, Home Monitoring and Care Gap  Time:  Direct Patient Care: 20 mins  Care Coordination: 10 mins  Recommendation Recipient: Patient/Caregiver  Outcome: Accepted

## 2023-02-16 NOTE — ASSESSMENT & PLAN NOTE
Lab Results   Component Value Date    HGBA1C 8 4 (H) 12/08/2022     • Medications: continue regimen as prescribed  • Home Monitoring: twice daily - alternating  • BMI Counseling: Body mass index is 37 09 kg/m²  The BMI is above normal  Nutrition recommendations include reducing portion sizes and consuming healthier snacks  Exercise recommendations include exercising 3-5 times per week

## 2023-02-16 NOTE — ASSESSMENT & PLAN NOTE
Continue fenofibrate + atorvastatin, no adverse drug reaction   When fenofibrate is used up, consider increasing atorvastatin and trial off fenofibrate

## 2023-02-16 NOTE — PATIENT INSTRUCTIONS
Lisinopril: increase to 10mg/day - you can take #2 - 5mg tablets per dose  Fenofibrate: continue taking until your supply is used up then stop

## 2023-02-22 ENCOUNTER — RA CDI HCC (OUTPATIENT)
Dept: OTHER | Facility: HOSPITAL | Age: 63
End: 2023-02-22

## 2023-02-22 NOTE — PROGRESS NOTES
[facility]  coding opportunities          Chart Reviewed number of suggestions sent to Provider: 1     Patients Insurance        Commercial Insurance: [employer]       [id number]: Unspecified asthma, uncomplicated    Found in active problem list - please review and assess and document per MEAT if applicable for [id number]

## 2023-02-27 LAB
ALBUMIN SERPL-MCNC: 4.3 G/DL (ref 3.6–5.1)
ALBUMIN/CREAT UR: 22 MCG/MG CREAT
ALBUMIN/GLOB SERPL: 1.5 (CALC) (ref 1–2.5)
ALP SERPL-CCNC: 60 U/L (ref 37–153)
ALT SERPL-CCNC: 20 U/L (ref 6–29)
AST SERPL-CCNC: 24 U/L (ref 10–35)
BILIRUB SERPL-MCNC: 0.3 MG/DL (ref 0.2–1.2)
BUN SERPL-MCNC: 20 MG/DL (ref 7–25)
BUN/CREAT SERPL: ABNORMAL (CALC) (ref 6–22)
CALCIUM SERPL-MCNC: 9.6 MG/DL (ref 8.6–10.4)
CHLORIDE SERPL-SCNC: 102 MMOL/L (ref 98–110)
CHOLEST SERPL-MCNC: 138 MG/DL
CHOLEST/HDLC SERPL: 3.3 (CALC)
CO2 SERPL-SCNC: 29 MMOL/L (ref 20–32)
CREAT SERPL-MCNC: 0.69 MG/DL (ref 0.5–1.05)
CREAT UR-MCNC: 65 MG/DL (ref 20–275)
GFR/BSA.PRED SERPLBLD CYS-BASED-ARV: 99 ML/MIN/1.73M2
GLOBULIN SER CALC-MCNC: 2.9 G/DL (CALC) (ref 1.9–3.7)
GLUCOSE SERPL-MCNC: 133 MG/DL (ref 65–99)
HBA1C MFR BLD: 7.6 % OF TOTAL HGB
HDLC SERPL-MCNC: 42 MG/DL
LDLC SERPL CALC-MCNC: 65 MG/DL (CALC)
MICROALBUMIN UR-MCNC: 1.4 MG/DL
NONHDLC SERPL-MCNC: 96 MG/DL (CALC)
POTASSIUM SERPL-SCNC: 4.9 MMOL/L (ref 3.5–5.3)
PROT SERPL-MCNC: 7.2 G/DL (ref 6.1–8.1)
SODIUM SERPL-SCNC: 140 MMOL/L (ref 135–146)
TRIGL SERPL-MCNC: 263 MG/DL

## 2023-03-07 ENCOUNTER — OFFICE VISIT (OUTPATIENT)
Dept: INTERNAL MEDICINE CLINIC | Facility: CLINIC | Age: 63
End: 2023-03-07

## 2023-03-07 VITALS
DIASTOLIC BLOOD PRESSURE: 80 MMHG | HEIGHT: 63 IN | BODY MASS INDEX: 37.46 KG/M2 | SYSTOLIC BLOOD PRESSURE: 130 MMHG | OXYGEN SATURATION: 97 % | WEIGHT: 211.4 LBS | HEART RATE: 79 BPM | RESPIRATION RATE: 18 BRPM | TEMPERATURE: 97.7 F

## 2023-03-07 DIAGNOSIS — Z79.4 UNCONTROLLED TYPE 2 DIABETES MELLITUS WITH HYPERGLYCEMIA, WITH LONG-TERM CURRENT USE OF INSULIN (HCC): ICD-10-CM

## 2023-03-07 DIAGNOSIS — F31.76 BIPOLAR I DISORDER, MOST RECENT EPISODE DEPRESSED, IN FULL REMISSION (HCC): ICD-10-CM

## 2023-03-07 DIAGNOSIS — E78.1 ESSENTIAL HYPERTRIGLYCERIDEMIA: ICD-10-CM

## 2023-03-07 DIAGNOSIS — E66.09 CLASS 1 OBESITY DUE TO EXCESS CALORIES WITH SERIOUS COMORBIDITY AND BODY MASS INDEX (BMI) OF 32.0 TO 32.9 IN ADULT: Primary | ICD-10-CM

## 2023-03-07 DIAGNOSIS — M92.523 OSGOOD-SCHLATTER'S DISEASE OF BOTH KNEES: ICD-10-CM

## 2023-03-07 DIAGNOSIS — E78.5 DYSLIPIDEMIA: ICD-10-CM

## 2023-03-07 DIAGNOSIS — I10 BENIGN ESSENTIAL HYPERTENSION: ICD-10-CM

## 2023-03-07 DIAGNOSIS — E11.65 UNCONTROLLED TYPE 2 DIABETES MELLITUS WITH HYPERGLYCEMIA, WITH LONG-TERM CURRENT USE OF INSULIN (HCC): ICD-10-CM

## 2023-03-07 NOTE — PROGRESS NOTES
Patient's shoes and socks removed  Right Foot/Ankle   Right Foot Inspection  Skin Exam: skin normal, skin intact, dry skin, callus and callus  No warmth, no erythema, no maceration, no abnormal color, no pre-ulcer and no ulcer  Toe Exam: ROM and strength within normal limits  Sensory   Proprioception: intact  Monofilament testing: intact    Vascular  Capillary refills: < 3 seconds          Left Foot/Ankle  Left Foot Inspection  Skin Exam: skin normal, skin intact, dry skin and callus  No warmth, no erythema, no maceration, normal color, no pre-ulcer and no ulcer  Toe Exam: ROM and strength within normal limits  Sensory   Proprioception: intact  Monofilament testing: intact    Vascular  Capillary refills: < 3 seconds              Assessment/Plan:    Uncontrolled type 2 diabetes mellitus with hyperglycemia, with long-term current use of insulin ([facility])    Lab Results   Component Value Date    HGBA1C 7 6 (H) 02/27/2023   I have counselled the pt to follow a healthy and balanced diet ,and recommend routine exercise  Target A1c under 7 patient is very motivated to make lifestyle changes she has just recently joined weight watchers in the past had lose significant amounts of weight, I did explain to the patient she will likely need to have reduction of her diabetic medications as her blood sugar reduces with weight loss she will monitor her blood pressure closely and when the blood pressure starts to go down she will notify me for reduction of medication I explained to her the first medication to discontinue it would be the glipizide    Benign essential hypertension  Hypertension - controlled, I have counseled patient following healthy balance diet, I would like the patient reduce sodium, exercise routinely, I would like the patient continued the med current medical regiment and we will continue to monitor    Blood pressure 130/80    Class 1 obesity due to excess calories with serious comorbidity and body mass index (BMI) of 32 0 to 32 9 in adult  Obesity -I have counseled patient following healthy and balanced diet, I would like the patient to lose weight, I would like the patient exercise routinely; we will continue monitor the patient's progress  She has recently joined [employer] she is very motivated at this point    Bipolar I disorder, most recent episode depressed, in full remission ([facility] [address] )  Clinically stable and doing well continue the current medical regiment will continue monitor  Working with psychiatry Dr Ceasar Dorsey    Dyslipidemia  Hyperlipidemia controlled continue with current medical regiment recommend a low-cholesterol diet and recommend routine exercise we will continue to monitor the progress  Continue Lipitor 20 mg once daily    Essential hypertriglyceridemia  Reduce carbohydrates/sweets weight loss continue to monitor    Osgood-Schlatter's disease of both knees  Patient reports me she has always had enlargement of the tibial plateau but most recently in the last month and a half she has noticed enlargement of the bony region of the tibial plateau we will check x-rays of bilateral knees for further evaluation         Problem List Items Addressed This Visit        Endocrine    Uncontrolled type 2 diabetes mellitus with hyperglycemia, with long-term current use of insulin ([facility] [address] )       Lab Results   Component Value Date    HGBA1C 7 6 (H) 02/27/2023   I have counselled the pt to follow a healthy and balanced diet ,and recommend routine exercise    Target A1c under 7 patient is very motivated to make lifestyle changes she has just recently joined weight watchers in the past had lose significant amounts of weight, I did explain to the patient she will likely need to have reduction of her diabetic medications as her blood sugar reduces with weight loss she will monitor her blood pressure closely and when the blood pressure starts to go down she will notify me for reduction of medication I explained to her the first medication to discontinue it would be the glipizide         Relevant Orders    Comprehensive metabolic panel    Hemoglobin A1C    Microalbumin / creatinine urine ratio    Lipid Panel with Direct LDL reflex    Microalbumin, Random Urine (W/Creatinine) ([facility] ONLY)       Cardiovascular and Mediastinum    Benign essential hypertension     Hypertension - controlled, I have counseled patient following healthy balance diet, I would like the patient reduce sodium, exercise routinely, I would like the patient continued the med current medical regiment and we will continue to monitor  Blood pressure 130/80            Musculoskeletal and Integument    Osgood-Schlatter's disease of both knees     Patient reports me she has always had enlargement of the tibial plateau but most recently in the last month and a half she has noticed enlargement of the bony region of the tibial plateau we will check x-rays of bilateral knees for further evaluation         Relevant Orders    XR knee 3 vw left non injury    XR knee 3 vw right non injury       Other    Bipolar I disorder, most recent episode depressed, in full remission ([facility] [address] ) (Chronic)     Clinically stable and doing well continue the current medical regiment will continue monitor  Working with psychiatry Dr Binu Jhaveri         Dyslipidemia     Hyperlipidemia controlled continue with current medical regiment recommend a low-cholesterol diet and recommend routine exercise we will continue to monitor the progress    Continue Lipitor 20 mg once daily         Essential hypertriglyceridemia     Reduce carbohydrates/sweets weight loss continue to monitor         Class 1 obesity due to excess calories with serious comorbidity and body mass index (BMI) of 32 0 to 32 9 in adult - Primary     Obesity -I have counseled patient following healthy and balanced diet, I would like the patient to lose weight, I would like the patient exercise routinely; we will continue monitor the patient's progress  She has recently joined [employer] she is very motivated at this point            Return to office 6  months  call if any problems  Subjective:      Patient ID: Tanya Kruger is a 65 y o  female  HPI 61-year old female coming in for a follow up office visit regarding obesity, hyperlipidemia, essential hypertriglyceridemia, uncontrolled type 2 diabetes, enlarging tibial plateaus bilaterally, bipolar disorder and benign essential hypertension; the patient reports me compliant taking medications without untoward side effects the  The patient is here to review his medical condition, update me on the medical condition and the patient reports me no hospitalizations and no ER visits  No injuries no illnesses overall doing well here to review laboratories she is very motivated to make lifestyle changes she has joined [employer] approximately 1 month ago with her brother who has medical conditions  She has noticed in the last month enlargement of her tibial plateaus bilaterally she reports me no injury she reports me that they were always large since a child but most recently have enlarged further in the last month and a half  [employer] wat[employer]     The following portions of the patient's history were reviewed and updated as appropriate: allergies, current medications, past family history, past medical history, past social history, past surgical history and problem list     Review of Systems   Constitutional: Negative for activity change, appetite change and unexpected weight change  HENT: Negative for congestion  Eyes: Negative for visual disturbance  Respiratory: Negative for cough and shortness of breath  Cardiovascular: Negative for chest pain  Gastrointestinal: Negative for abdominal pain, diarrhea, nausea and vomiting  Neurological: Negative for dizziness, light-headedness and headaches  Hematological: Negative for adenopathy           Objective:    Return in about 3 months (around 6/7/2023)  Procedure: XR spine cervical complete 4 or 5 vw non injury    Result Date: 2/16/2023  Narrative: CERVICAL SPINE INDICATION:   M54 2: Cervicalgia  COMPARISON:  August 8, 2022 VIEWS:  XR SPINE CERVICAL COMPLETE 4 OR 5 VW NON INJURY FINDINGS: No fracture  Straightening of the cervical lordosis without subluxation in neutral positioning  ACDF from C4 to C7 in alignment with no loosening of hardware  No subluxation on flexion-extension views  Anterior bony density at C3-C4 is increased  Anterior fusion of C4-C5 at C5-C6  The prevertebral soft tissues are within normal limits  The lung apices are clear  Impression: Surgical changes of anterior cervical discectomy and fixation from C4 to C7 with intact hardware  No dynamic instability in flexion or extension positioning   Workstation performed: [id number]          Allergies   Allergen Reactions   • Bee Venom Swelling     Local swelling at sting site   • Biaxin [Clarithromycin] Itching   • Depakote Er [Divalproex Sodium Er]      pancreatitis   • Shellfish-Derived Products - Food Allergy Other (See Comments)     Ears and cheeks got red   • Tanzeum [Albiglutide]      pancreatitis   • Carbamazepine Other (See Comments)     Reaction Date:Unknown   • Lamotrigine Rash       Past Medical History:   Diagnosis Date   • Abdominal pain     started 7/21/16- found something on the pancreas   • Abnormal weight loss     last assessed [id number]   • Acute sinusitis    • Allergic rhinitis     last assessed 08Jul2015   • Anemia     had iron infusion   • Asthma    • Cardiac murmur    • Carpal tunnel syndrome    • Cellulitis    • Cervical disc disease    • Colon polyp    • CPAP (continuous positive airway pressure) dependence     not using at this time   • Degeneration of thoracic intervertebral disc    • Degenerative arthritis of knee    • Diabetes mellitus (HCC)    • Diverticulosis    • Esophagitis, reflux     last assessed 29Jan2014   • Excessive sweating • Fatty liver    • Frequent falls    • Gait disturbance    • GERD (gastroesophageal reflux disease)    • Granuloma annulare    • Head injury    • Hyperlipidemia    • Hypertension    • Hypertensive heart disease    • Hypoglycemia     last assessed [id number]   • Kidney stone    • Leg length discrepancy    • Leucocytosis    • Lumbar stress fracture    • Multiple thyroid nodules    • Neuropathy    • Obesity     last assessed [id number]   • Paronychia of toe    • Patellofemoral dysfunction    • Pneumonia    • Postmenopausal atrophic vaginitis    • Prepatellar bursitis    • Sebaceous cyst     last assessed [id number]   • Sleep apnea     no CPAP used   • Suicide attempt [facility])    • Tinea corporis    • Type 2 diabetes mellitus ([facility] [address] )     cont meds and monitor BS;  last assessed [id number]   • Urinary frequency    • Vertigo    • Vitamin D deficiency    • Dia-Parkinson-White syndrome      Past Surgical History:   Procedure Laterality Date   • CERVICAL FUSION N/A 08/03/2021    Procedure: FUSION CERVICAL ANTERIOR W DISCECTOMY C4-5, C5-6, C6-7 with allograft and neuromonitoring  case length 3 hours; Surgeon: Toni Duffy MD;  Location:  MAIN OR;  Service: Orthopedics   • COLONOSCOPY     • ESOPHAGOGASTRODUODENOSCOPY N/A 08/08/2016    Procedure: ESOPHAGOGASTRODUODENOSCOPY (EGD); Surgeon: Silvia Ward MD;  Location: [facility] GI LAB; Service:    • LUMBAR FUSION     • NC ESOPHAGOGASTRODUODENOSCOPY TRANSORAL DIAGNOSTIC N/A 03/28/2019    Procedure: ESOPHAGOGASTRODUODENOSCOPY (EGD); Surgeon: Silvia Ward MD;  Location: [facility]  GI LAB;   Service: Gastroenterology   • NC NEUROPLASTY &/TRANSPOS MEDIAN NRV CARPAL TUNNE Right 8/9/2022    Procedure: RELEASE CARPAL TUNNEL;  Surgeon: Orlando Hutton MD;  Location: [facility] MAIN OR;  Service: Orthopedics   • TONSILLECTOMY     • TOOTH EXTRACTION      wisdom teeth removed     Current Outpatient Medications on File Prior to Visit   Medication Sig Dispense Refill   • acetaminophen (TYLENOL) 500 mg tablet Take 500 mg by mouth 3 (three) times a day     • ARIPiprazole (ABILIFY) 10 mg tablet Take 1 tablet (10 mg total) by mouth every evening 90 tablet 2   • atorvastatin (LIPITOR) 20 mg tablet TAKE 1 TABLET BY MOUTH EVERY DAY 90 tablet 1   • Cholecalciferol 4000 units CAPS Take 1 capsule (4,000 Units total) by mouth daily     • fenofibrate (TRICOR) 145 mg tablet TAKE 1 TABLET BY MOUTH EVERY DAY 90 tablet 3   • FLUoxetine (PROzac) 20 mg capsule Take 1 capsule (20 mg total) by mouth daily Take with Prozac 40 mg for a total dose of 60 mg daily 90 capsule 0   • FLUoxetine (PROzac) 40 MG capsule Take 1 capsule (40 mg total) by mouth daily Take with Prozac 20 mg for a total dose of 60 mg daily 90 capsule 0   • glipiZIDE (GLUCOTROL) 5 mg tablet TAKE 1 TABLET WITH BREAKFAST, 1 TABLET WITH LUNCH, 2 TABLETS WITH SUPPER FOR DIABETES 360 tablet 1   • ibuprofen (MOTRIN) 200 mg tablet Take 400 mg by mouth Every other day as needed when pain not alleviated by acetaminophen     • lisinopril (ZESTRIL) 10 mg tablet Take 1 tablet (10 mg total) by mouth daily 90 tablet 1   • Magnesium 400 MG TABS Take 1 tablet by mouth in the morning     • metFORMIN (GLUCOPHAGE) 1000 MG tablet Take 1 tablet (1,000 mg total) by mouth 2 (two) times a day with meals 180 tablet 3   • Multiple Vitamins-Minerals (multivitamin with minerals) tablet Take 1 tablet by mouth daily     • omeprazole (PriLOSEC) 40 MG capsule TAKE 1 CAPSULE BY MOUTH EVERY DAY 90 capsule 1   • pioglitazone (ACTOS) 30 mg tablet Take 1 tablet (30 mg total) by mouth daily For diabetes 90 tablet 1     No current facility-administered medications on file prior to visit       Family History   Problem Relation Age of Onset   • Cancer Mother         lung   • Heart disease Mother    • Coronary artery disease Mother         CABG   • Diabetes Mother    • Lung cancer Mother 76        smoker   • Stroke Mother    • Thyroid disease Mother    • Other Mother         tobacco use   • Parkinsonism Father    • Intestinal polyp Father    • Bipolar disorder Father    • Heart disease Father    • Prostate cancer Father 62   • Hypertension Sister    • Alcohol abuse Sister    • Thyroid disease unspecified Sister    • Diabetes Brother    • Depression Brother    • Heart disease Brother    • Heart disease Brother    • Seizures Brother    • Diabetes Maternal Grandmother    • Thyroid disease Maternal Grandmother    • Endometrial cancer Maternal Aunt         unknown age   • Suicide Attempts Paternal [de-identified]    • Depression Paternal Aunt    • Ovarian cancer Cousin 36        maternal   • Hypertension Family      Social History     Socioeconomic History   • Marital status: /Civil Union     Spouse name: Not on file   • Number of children: 0   • Years of education: 12   • Highest education level: 12th grade   Occupational History   • Occupation: retired   Tobacco Use   • Smoking status: Never     Passive exposure: Never   • Smokeless tobacco: Never   Vaping Use   • Vaping Use: Never used   Substance and Sexual Activity   • Alcohol use: Not Currently   • Drug use: No   • Sexual activity: Not Currently     Partners: Male   Other Topics Concern   • Not on file   Social History Narrative    Education: high school graduate    Learning Disabilities: none    Marital History:     Children: none    Living Arrangement: lives in home with     Occupational History: retired, 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[place] [address]: good support system,  is supportive    Legal History: none     History: None        Caffeine use    Denied home environment domestic violence    Job physical requirements heavy lifting     Social Determinants of Health     Financial Resource Strain: Low Risk    • Difficulty of Paying Living Expenses: Not hard at all   Food Insecurity: No Food Insecurity   • Worried About [address] in the Last Year: Never true   • Ran Out of Food in the Last Year: Never true   Transportation Needs: No Transportation Needs   • Lack of Transportation (Medical): No   • Lack of Transportation (Non-Medical): No   Physical Activity: Inactive   • Days of Exercise per Week: 0 days   • Minutes of Exercise per Session: 0 min   Stress: No Stress Concern Present   • Feeling of Stress : Only a little   Social Connections: Moderately Isolated   • Frequency of Communication with Friends and Family: Once a week   • Frequency of Social Gatherings with Friends and Family: Once a week   • Attends Confucianist Services: Never   • Active Member of Clubs or Organizations: Yes   • Attends Club or Organization Meetings: More than 4 times per year   • Marital Status:    Intimate Partner Violence: Not At Risk   • Fear of Current or Ex-Partner: No   • Emotionally Abused: No   • Physically Abused: No   • Sexually Abused: No   Housing Stability: Low Risk    • Unable to Pay for Housing in the Last Year: No   • Number of Places Lived in the Last Year: 1   • Unstable Housing in the Last Year: No     Vitals:    03/07/23 0853   BP: 130/80   BP Location: Left arm   Patient Position: Sitting   Cuff Size: Large   Pulse: 79   Resp: 18   Temp: 97 7 °F (36 5 °C)   TempSrc: Tympanic   SpO2: 97%   Weight: 95 9 kg (211 lb 6 4 oz)   Height: 5' 3" (1 6 m)     Results for orders placed or performed in visit on 02/27/23   Lipid Panel with Direct LDL reflex   Result Value Ref Range    Total Cholesterol 138 <200 mg/dL    HDL 42 (L) > OR = 50 mg/dL    Triglycerides 263 (H) <150 mg/dL    LDL Calculated 65 mg/dL (calc)    Chol HDLC Ratio 3 3 <5 0 (calc)    Non-HDL Cholesterol 96 <130 mg/dL (calc)   Microalbumin, Random Urine (W/Creatinine)   Result Value Ref Range    Creatinine, Urine 65 20 - 275 mg/dL    Microalbum  ,U,Random 1 4 See Note: mg/dL    Microalb/Creat Ratio 22 <30 mcg/mg creat   Comprehensive metabolic panel   Result Value Ref Range    Glucose, Random 133 (H) 65 - 99 mg/dL    BUN 20 7 - 25 mg/dL    Creatinine 0 69 0 50 - 1 05 mg/dL    eGFR 99 > OR = 60 mL/min/1 73m2    SL AMB BUN/CREATININE RATIO NOT APPLICABLE 6 - 22 (calc)    Sodium 140 135 - 146 mmol/L    Potassium 4 9 3 5 - 5 3 mmol/L    Chloride 102 98 - 110 mmol/L    CO2 29 20 - 32 mmol/L    Calcium 9 6 8 6 - 10 4 mg/dL    Protein, Total 7 2 6 1 - 8 1 g/dL    Albumin 4 3 3 6 - 5 1 g/dL    Globulin 2 9 1 9 - 3 7 g/dL (calc)    Albumin/Globulin Ratio 1 5 1 0 - 2 5 (calc)    TOTAL BILIRUBIN 0 3 0 2 - 1 2 mg/dL    Alkaline Phosphatase 60 37 - 153 U/L    AST 24 10 - 35 U/L    ALT 20 6 - 29 U/L   Hemoglobin A1c (w/out EAG)   Result Value Ref Range    Hemoglobin A1C 7 6 (H) <5 7 % of total Hgb     Weight (last 2 days)     Date/Time Weight    03/07/23 0853 95 9 (211 4)        Body mass index is 37 45 kg/m²  BP      Temp      Pulse     Resp      SpO2        Vitals:    03/07/23 0853   Weight: 95 9 kg (211 lb 6 4 oz)     Vitals:    03/07/23 0853   Weight: 95 9 kg (211 lb 6 4 oz)       /80 (BP Location: Left arm, Patient Position: Sitting, Cuff Size: Large)   Pulse 79   Temp 97 7 °F (36 5 °C) (Tympanic)   Resp 18   Ht 5' 3" (1 6 m)   Wt 95 9 kg (211 lb 6 4 oz)   SpO2 97%   BMI 37 45 kg/m²     Bilateral tibial plateau prominent bony region minimally tender no erythema no swelling bony prominence     Physical Exam  Vitals and nursing note reviewed  Constitutional:       General: She is not in acute distress  Appearance: Normal appearance  She is well-developed  She is obese  She is not ill-appearing, toxic-appearing or diaphoretic  HENT:      Head: Normocephalic  Eyes:      General: No scleral icterus  Right eye: No discharge  Left eye: No discharge  Conjunctiva/sclera: Conjunctivae normal       Pupils: Pupils are equal, round, and reactive to light  Cardiovascular:      Rate and Rhythm: Normal rate and regular rhythm  Heart sounds: Normal heart sounds  No murmur heard  No friction rub   No gallop  Pulmonary:      Effort: No respiratory distress  Breath sounds: Normal breath sounds  No wheezing or rales  Abdominal:      General: Bowel sounds are normal  There is no distension  Palpations: Abdomen is soft  There is no mass  Tenderness: There is no abdominal tenderness  There is no guarding or rebound  Musculoskeletal:         General: No deformity  Cervical back: Neck supple  Feet:    Feet:      Right foot:      Skin integrity: Callus and dry skin present  No ulcer, skin breakdown, erythema or warmth  Left foot:      Skin integrity: Callus and dry skin present  No ulcer, skin breakdown, erythema or warmth  Lymphadenopathy:      Cervical: No cervical adenopathy  Neurological:      Mental Status: She is alert        Coordination: Coordination normal

## 2023-03-07 NOTE — PATIENT INSTRUCTIONS
Type 2 Diabetes Management for Adults   AMBULATORY CARE:   Type 2 diabetes  is a disease that affects how your body uses glucose (sugar)  Either your body cannot make enough insulin, or it cannot use the insulin correctly  It is important to keep diabetes controlled to prevent damage to your heart, blood vessels, and other organs  Management will help you feel well and enjoy your daily activities  Your diabetes care team providers can help you make a plan to fit diabetes care into your schedule  Your plan can change over time to fit your needs and your family's needs  Have someone call your local emergency number (911 in the [address]) if:   • You cannot be woken  • You have signs of diabetic ketoacidosis:     ? confusion, fatigue    ? vomiting    ? rapid heartbeat    ? fruity smelling breath    ? extreme thirst    ? dry mouth and skin    • You have any of the following signs of a heart attack:      ? Squeezing, pressure, or pain in your chest    ? You may  also have any of the following:     - Discomfort or pain in your back, neck, jaw, stomach, or arm    - Shortness of breath    - Nausea or vomiting    - Lightheadedness or a sudden cold sweat    • You have any of the following signs of a stroke:      ? Numbness or drooping on one side of your face     ? Weakness in an arm or leg    ? Confusion or difficulty speaking    ? Dizziness, a severe headache, or vision loss    Call your doctor or diabetes care team provider if:   • You have a sore or wound that will not heal     • You have a change in the amount you urinate  • Your blood sugar levels are higher than your target goals  • You often have lower blood sugar levels than your target goals  • Your skin is red, dry, warm, or swollen  • You have trouble coping with diabetes, or you feel anxious or depressed  • You have questions or concerns about your condition or care      What you need to know about high blood sugar levels:  High blood sugar levels may not cause any symptoms  You may feel more thirsty or urinate more often than usual  Over time, high blood sugar levels can damage your nerves, blood vessels, tissues, and organs  The following can increase your blood sugar levels:  • Large meals or large amounts of carbohydrates at one time    • Less physical activity    • Stress    • Illness    • A lower dose of diabetes medicine or insulin, or a late dose    What you need to know about low blood sugar levels:  Symptoms include feeling shaky, dizzy, irritable, or confused  You can prevent symptoms by keeping your blood sugar levels from going too low  • Treat a low blood sugar level right away:      ? Drink 4 ounces of juice or have 1 tube of glucose gel  ? Check your blood sugar level again 10 to 15 minutes later  ? When the level goes back to normal, eat a meal or snack to prevent another decrease  • Keep glucose gel, raisins, or hard candy with you at all times to treat a low blood sugar level  • Your blood sugar level can get too low if you take diabetes medicine or insulin and do not eat enough food  • If you use insulin, check your blood sugar level before you exercise  ? If your blood sugar level is below 100 mg/dL, eat 4 crackers or 2 ounces of raisins, or drink 4 ounces of juice  ? Check your level every 30 minutes if you exercise longer than 1 hour  ? You may need a snack during or after exercise  What you can do to manage your blood sugar levels:   • Check your blood sugar levels as directed and as needed  Several items are available to use to check your levels  You may need to check by testing a drop of blood in a glucose monitor  You may instead be given a continuous glucose monitoring (CGM) device  The device is worn at all times  The CGM checks your blood sugar level every 5 minutes  It sends results to an electronic device such as a smart phone  A CGM can be used with or without an insulin pump   You and your diabetes care team providers will decide on the best method for you  The goal for blood sugar levels before meals  is between 80 and 130 mg/dL and 2 hours after eating  is lower than 180 mg/dL  • Make healthy food choices  Work with a dietitian to develop a meal plan that works for you and your schedule  A dietitian can help you learn how to eat the right amount of carbohydrates during your meals and snacks  Carbohydrates can raise your blood sugar level if you eat too many at one time  Examples of foods that contain carbohydrates are breads, cereals, rice, pasta, and sweets  • Eat high-fiber foods as directed  Fiber helps improve blood sugar levels  Fiber also lowers your risk for heart disease and other problems diabetes can cause  Examples of high-fiber foods include vegetables, whole-grain bread, and beans such as wong beans  Your dietitian can tell you how much fiber to have each day  • Get regular physical activity  Physical activity can help you get to your target blood sugar level goal and manage your weight  Get at least 150 minutes of moderate to vigorous aerobic physical activity each week  Do not miss more than 2 days in a row  Do not sit longer than 30 minutes at a time  Your healthcare provider can help you create an activity plan  The plan can include the best activities for you and can help you build your strength and endurance  • Maintain a healthy weight  Ask your team what a healthy weight is for you  A healthy weight can help you control diabetes and prevent heart disease  Ask your team to help you create a weight loss plan, if needed  Weight loss can help make a difference in managing diabetes  Your team will help you set a weight-loss goal, such as 10 to 15 pounds, or 5% of your extra weight  Together you and your team can set manageable weight loss goals  • Take your diabetes medicine or insulin as directed    You may need diabetes medicine, insulin, or both to help control your blood sugar levels  Your healthcare provider will teach you how and when to take your diabetes medicine or insulin  You will also be taught about side effects oral diabetes medicine can cause  Insulin may be injected or given through a pump or pen  You and your providers will decide on the best method for you:    ? An insulin pump  is an implanted device that gives your insulin 24 hours a day  An insulin pump prevents the need for multiple insulin injections in a day  ? An insulin pen  is a device prefilled with the right amount of insulin  ? You and your family members will be taught how to draw up and give insulin  if this is the best method for you  Your providers will also teach you how to dispose of needles and syringes  ? You will learn how much insulin you need  and when to give it  You will be taught when not to give insulin  You will also be taught what to do if your blood sugar level drops too low  This may happen if you take insulin and do not eat the right amount of carbohydrates  More ways to manage type 2 diabetes:   • Wear medical alert identification  Wear medical alert jewelry or carry a card that says you have diabetes  Ask your provider where to get these items  • Do not smoke  Nicotine and other chemicals in cigarettes and cigars can cause lung and blood vessel damage  It also makes it more difficult to manage your diabetes  Ask your provider for information if you currently smoke and need help to quit  Do not use e-cigarettes or smokeless tobacco in place of cigarettes or to help you quit  They still contain nicotine  • Check your feet each day for cuts, scratches, calluses, or other wounds  Look for redness and swelling, and feel for warmth  Wear shoes that fit well  Check your shoes for rocks or other objects that can hurt your feet  Do not walk barefoot or wear shoes without socks   Wear cotton socks to help keep your feet dry  • Ask about vaccines you may need  You have a higher risk for serious illness if you get the flu, pneumonia, COVID-19, or hepatitis  Ask your provider if you should get vaccines to prevent these or other diseases, and when to get the vaccines  • Talk to your provider if you become stressed about diabetes care  Sometimes being able to fit diabetes care into your life can cause increased stress  The stress can cause you not to take care of yourself properly  Your care team providers can help by offering tips about self-care  Your providers may suggest you talk to a mental health provider who can listen and offer help with self-care issues  • Have your A1c checked as directed  Your provider may check your A1c every 3 months, or 2 times each year if your diabetes is controlled  An A1c test shows the average amount of sugar in your blood over the past 2 to 3 months  Your provider will tell you what your A1c level should be  • Have screening tests as directed  Your provider may recommend screening for complications of diabetes and other conditions that may develop  Some screenings may begin right away and some may happen within the first 5 years of diagnosis:    ? Examples of diabetes complications  include kidney problems, high cholesterol, high blood pressure, blood vessel problems, eye problems, and sleep apnea  ? You may be screened for a low vitamin B level  if you take oral diabetes medicine for a long time  ? Women of childbearing years may be screened  for polycystic ovarian syndrome (PCOS)  Follow up with your doctor or diabetes care team providers as directed: You may need to have blood tests done before your follow-up visit  The test results will show if changes need to be made in your treatment or self-care  Talk to your provider if you cannot afford your medicine  Write down your questions so you remember to ask them during your visits    © Copyright [employer] 2022 Information is for End User's use only and may not be sold, redistributed or otherwise used for commercial purposes  The above information is an  only  It is not intended as medical advice for individual conditions or treatments  Talk to your doctor, nurse or pharmacist before following any medical regimen to see if it is safe and effective for you

## 2023-03-08 NOTE — ASSESSMENT & PLAN NOTE
Patient reports me she has always had enlargement of the tibial plateau but most recently in the last month and a half she has noticed enlargement of the bony region of the tibial plateau we will check x-rays of bilateral knees for further evaluation

## 2023-03-08 NOTE — ASSESSMENT & PLAN NOTE
Clinically stable and doing well continue the current medical regiment will continue monitor    Working with psychiatry Dr Elias Mlils

## 2023-03-08 NOTE — ASSESSMENT & PLAN NOTE
Lab Results   Component Value Date    HGBA1C 7 6 (H) 02/27/2023   I have counselled the pt to follow a healthy and balanced diet ,and recommend routine exercise    Target A1c under 7 patient is very motivated to make lifestyle changes she has just recently joined weight [employer] in the past had lose significant amounts of weight, I did explain to the patient she will likely need to have reduction of her diabetic medications as her blood sugar reduces with weight loss she will monitor her blood pressure closely and when the blood pressure starts to go down she will notify me for reduction of medication I explained to her the first medication to discontinue it would be the glipizide

## 2023-03-08 NOTE — ASSESSMENT & PLAN NOTE
Hypertension - controlled, I have counseled patient following healthy balance diet, I would like the patient reduce sodium, exercise routinely, I would like the patient continued the med current medical regiment and we will continue to monitor    Blood pressure 130/80 Statement Selected

## 2023-03-08 NOTE — ASSESSMENT & PLAN NOTE
Obesity -I have counseled patient following healthy and balanced diet, I would like the patient to lose weight, I would like the patient exercise routinely; we will continue monitor the patient's progress    She has recently joined [employer] she is very motivated at this point

## 2023-03-10 ENCOUNTER — HOSPITAL ENCOUNTER (OUTPATIENT)
Dept: RADIOLOGY | Facility: HOSPITAL | Age: 63
Discharge: HOME | End: 2023-03-10

## 2023-03-10 DIAGNOSIS — M92.523 OSGOOD-SCHLATTER'S DISEASE OF BOTH KNEES: ICD-10-CM

## 2023-03-10 NOTE — PSYCH
MEDICATION MANAGEMENT NOTE        [facility]      Name and Date of Birth:  Audrey Dandy 65 y o  1960 MRN: [id number]    Date of Visit: 2023    Reason for Visit:   Chief Complaint   Patient presents with   • Medication Management   • Follow-up       SUBJECTIVE:    Daja Hamilton is seen today for a follow up for Bipolar Disorder, Generalized Anxiety Disorder and personality disorder  She has done relatively well since the last visit  She states that mood continues to be stable, denies any significant depressive symptoms or manic symptoms  She reports that anxiety symptoms are controlled  She has been worrying less about her niece "I put it to the side, I can't do anything about it"  She started Weight Watchers in February and has been working on maintaining healthy weight    She denies any suicidal ideation, intent or plan at present; denies any homicidal ideation, intent or plan at present  She has no auditory hallucinations, denies any visual hallucinations, has no delusional thinking  She denies any side effects from current psychiatric medications      HPI ROS Appetite Changes and Sleep:     She reports normal sleep, adequate number of sleep hours (8 hours), normal appetite, recent weight gain (4 lbs), low energy    Current Rating Scores:     Current PHQ-9   PHQ-2/9 Depression Screening    Little interest or pleasure in doing things: 0 - not at all  Feeling down, depressed, or hopeless: 0 - not at all  Trouble falling or staying asleep, or sleeping too much: 0 - not at all  Feeling tired or having little energy: 1 - several days  Poor appetite or overeatin - several days  Feeling bad about yourself - or that you are a failure or have let yourself or your family down: 0 - not at all  Trouble concentrating on things, such as reading the newspaper or watching television: 0 - not at all  Moving or speaking so slowly that other people could have noticed  Or the opposite - being so fidgety or restless that you have been moving around a lot more than usual: 0 - not at all  Thoughts that you would be better off dead, or of hurting yourself in some way: 0 - not at all  PHQ-9 Score: 2   PHQ-9 Interpretation: No or Minimal depression        Current PHQ-9 score is same as at the last visit)  Review Of Systems:      Constitutional low energy and recent weight gain (4 lbs)   ENT negative   Cardiovascular negative   Respiratory negative   Gastrointestinal negative   Genitourinary negative   Musculoskeletal negative   Integumentary negative   Neurological negative   Endocrine negative   Other Symptoms none, all other systems are negative       Past Psychiatric History: (unchanged information from previous note copied and updated)    Past Inpatient Psychiatric Treatment:   One past inpatient psychiatric admission at [place] [address]  Past Outpatient Psychiatric Treatment:    In outpatient treatment at [address] [place] [address] for many years    Past Suicide Attempts: yes, 2 attempts by overdose years ago  Past Violent Behavior: no  Past Psychiatric Medication Trials: Prozac, Depakote, Lamictal (rash), Tegretol, Abilify and Latuda    Traumatic History: (unchanged information from previous note copied and updated)    Abuse: no history of sexual abuse, no history of physical abuse  Other Traumatic Events: none     Past Medical History:    Past Medical History:   Diagnosis Date   • Abdominal pain     started 7/21/16- found something on the pancreas   • Abnormal weight loss     last assessed 26feb2014   • Acute sinusitis    • Allergic rhinitis     last assessed 08Jul2015   • Anemia     had iron infusion   • Asthma    • Cardiac murmur    • Carpal tunnel syndrome    • Cellulitis    • Cervical disc disease    • Colon polyp    • CPAP (continuous positive airway pressure) dependence     not using at this time   • Degeneration of thoracic intervertebral disc    • Degenerative arthritis of knee    • Diabetes mellitus ([facility] [address] )    • Diverticulosis    • Esophagitis, reflux     last assessed 29Jan2014   • Excessive sweating    • Fatty liver    • Frequent falls    • Gait disturbance    • GERD (gastroesophageal reflux disease)    • Granuloma annulare    • Head injury    • Hyperlipidemia    • Hypertension    • Hypertensive heart disease    • Hypoglycemia     last assessed [id number]   • Kidney stone    • Leg length discrepancy    • Leucocytosis    • Lumbar stress fracture    • Multiple thyroid nodules    • Neuropathy    • Obesity     last assessed [id number]   • Paronychia of toe    • Patellofemoral dysfunction    • Pneumonia    • Postmenopausal atrophic vaginitis    • Prepatellar bursitis    • Sebaceous cyst     last assessed 21Feb2014   • Sleep apnea     no CPAP used   • Suicide attempt [facility])    • Tinea corporis    • Type 2 diabetes mellitus [facility] )     cont meds and monitor BS;  last assessed [id number]   • Urinary frequency    • Vertigo    • Vitamin D deficiency    • Dia-Parkinson-White syndrome         Past Surgical History:   Procedure Laterality Date   • CERVICAL FUSION N/A 08/03/2021    Procedure: FUSION CERVICAL ANTERIOR W DISCECTOMY C4-5, C5-6, C6-7 with allograft and neuromonitoring  case length 3 hours; Surgeon: Josephine Sow MD;  Location:  MAIN OR;  Service: Orthopedics   • COLONOSCOPY     • ESOPHAGOGASTRODUODENOSCOPY N/A 08/08/2016    Procedure: ESOPHAGOGASTRODUODENOSCOPY (EGD); Surgeon: Sav Torres MD;  Location: [facility] GI LAB; Service:    • LUMBAR FUSION     • ME ESOPHAGOGASTRODUODENOSCOPY TRANSORAL DIAGNOSTIC N/A 03/28/2019    Procedure: ESOPHAGOGASTRODUODENOSCOPY (EGD); Surgeon: Sav Torres MD;  Location: [facility]  GI LAB;   Service: Gastroenterology   • ME NEUROPLASTY &/TRANSPOS MEDIAN NRV CARPAL TUNNE Right 8/9/2022    Procedure: RELEASE CARPAL TUNNEL;  Surgeon: Deborrah Hodgkins, MD;  Location: [facility] MAIN OR;  Service: Orthopedics   • TONSILLECTOMY     • TOOTH EXTRACTION      wisdom teeth removed     Allergies   Allergen Reactions   • Bee Venom Swelling     Local swelling at sting site   • Biaxin [Clarithromycin] Itching   • Depakote Er [Divalproex Sodium Er]      pancreatitis   • Shellfish-Derived Products - Food Allergy Other (See Comments)     Ears and cheeks got red   • Tanzeum [Albiglutide]      pancreatitis   • Carbamazepine Other (See Comments)     Reaction Date:Unknown   • Lamotrigine Rash       Substance Abuse History:    Social History     Substance and Sexual Activity   Alcohol Use Not Currently     Social History     Substance and Sexual Activity   Drug Use No       Social History:    Social History     Socioeconomic History   • Marital status: /Civil Union     Spouse name: Not on file   • Number of children: 0   • Years of education: 12   • Highest education level: 12th grade   Occupational History   • Occupation: retired   Tobacco Use   • Smoking status: Never     Passive exposure: Never   • Smokeless tobacco: Never   Vaping Use   • Vaping Use: Never used   Substance and Sexual Activity   • Alcohol use: Not Currently   • Drug use: No   • Sexual activity: Not Currently     Partners: Male   Other Topics Concern   • Not on file   Social History Narrative    Education: high school graduate    Learning Disabilities: none    Marital History:     Children: none    Living Arrangement: lives in home with     Occupational History: retired, 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[place] [address]: good support system,  is supportive    Legal History: none     History: None        Caffeine use    Denied home environment domestic violence    Job physical requirements heavy lifting     Social Determinants of Health     Financial Resource Strain: Low Risk    • Difficulty of Paying Living Expenses: Not hard at all   Food Insecurity: No Food Insecurity   • Worried About Running Out of Food in the Last Year: Never true   • Ran Out of Food in the Last Year: Never true   Transportation Needs: No Transportation Needs   • Lack of Transportation (Medical): No   • Lack of Transportation (Non-Medical): No   Physical Activity: Inactive   • Days of Exercise per Week: 0 days   • Minutes of Exercise per Session: 0 min   Stress: No Stress Concern Present   • Feeling of Stress : Only a little   Social Connections: Moderately Isolated   • Frequency of Communication with Friends and Family: Once a week   • Frequency of Social Gatherings with Friends and Family: Once a week   • Attends Judaism Services: Never   • Active Member of Clubs or Organizations:  Yes   • Attends Club or Organization Meetings: More than 4 times per year   • Marital Status:    Intimate Partner Violence: Not At Risk   • Fear of Current or Ex-Partner: No   • Emotionally Abused: No   • Physically Abused: No   • Sexually Abused: No   Housing Stability: Low Risk    • Unable to Pay for Housing in the Last Year: No   • Number of Places Lived in the Last Year: 1   • Unstable Housing in the Last Year: No       Family Psychiatric History:     Family History   Problem Relation Age of Onset   • Cancer Mother         lung   • Heart disease Mother    • Coronary artery disease Mother         CABG   • Diabetes Mother    • Lung cancer Mother 76        smoker   • Stroke Mother    • Thyroid disease Mother    • Other Mother         tobacco use   • Parkinsonism Father    • Intestinal polyp Father    • Bipolar disorder Father    • Heart disease Father    • Prostate cancer Father 62   • Hypertension Sister    • Alcohol abuse Sister    • Thyroid disease unspecified Sister    • Diabetes Brother    • Depression Brother    • Heart disease Brother    • Heart disease Brother    • Seizures Brother    • Diabetes Maternal Grandmother    • Thyroid disease Maternal Grandmother    • Endometrial cancer Maternal Aunt         unknown age   • Suicide Attempts Paternal Aunt    • Depression Paternal Aunt    • Ovarian cancer Cousin 36        maternal   • Hypertension Family        History Review:  The following portions of the patient's history were reviewed and updated as appropriate: allergies, current medications, past family history, past medical history, past social history, past surgical history and problem list          OBJECTIVE:     Vital signs in last 24 hours:    Vitals:    03/20/23 1533   BP: 149/69   Pulse: 80   Weight: 98 kg (216 lb)   Height: 5' 3" (1 6 m)       Mental Status Evaluation:    Appearance age appropriate, casually dressed   Behavior cooperative, calm   Speech normal rate, normal volume, normal pitch   Mood euthymic   Affect normal range and intensity, appropriate   Thought Processes organized, goal directed   Associations intact associations   Thought Content no overt delusions   Perceptual Disturbances: no auditory hallucinations, no visual hallucinations   Abnormal Thoughts  Risk Potential Suicidal ideation - None  Homicidal ideation - None  Potential for aggression - No   Orientation oriented to person, place, time/date and situation   Memory recent and remote memory grossly intact   Consciousness alert and awake   Attention Span Concentration Span attention span and concentration appear shorter than expected for age   Intellect appears to be of average intelligence   Insight intact   Judgement intact   Muscle Strength and  Gait normal muscle strength and normal muscle tone, normal gait and normal balance   Motor activity no abnormal movements   Language no difficulty naming common objects, no difficulty repeating a phrase, no difficulty writing a sentence   Fund of Knowledge adequate knowledge of current events  adequate fund of knowledge regarding past history  adequate fund of knowledge regarding vocabulary    Pain none   Pain Scale 0       Laboratory Results: I have personally reviewed all pertinent laboratory/tests results    Recent Labs (last 4 months):   Orders Only on 02/27/2023   Component Date Value   • Total Cholesterol 02/27/2023 138    • HDL 02/27/2023 42 (L)    • Triglycerides 02/27/2023 263 (H)    • LDL Calculated 02/27/2023 65    • Chol HDLC Ratio 02/27/2023 3 3    • Non-HDL Cholesterol 02/27/2023 96    • Creatinine, Urine 02/27/2023 65    • Microalbum  ,U,Random 02/27/2023 1 4    • Microalb/Creat Ratio 02/27/2023 22    • Glucose, Random 02/27/2023 133 (H)    • BUN 02/27/2023 20    • Creatinine 02/27/2023 0 69    • eGFR 02/27/2023 99    • SL AMB BUN/CREATININE RA* 35/12/1517 NOT APPLICABLE    • Sodium 96/98/1325 140    • Potassium 02/27/2023 4 9    • Chloride 02/27/2023 102    • CO2 02/27/2023 29    • Calcium 02/27/2023 9 6    • Protein, Total 02/27/2023 7 2    • Albumin 02/27/2023 4 3    • Globulin 02/27/2023 2 9    • Albumin/Globulin Ratio 02/27/2023 1 5    • TOTAL BILIRUBIN 02/27/2023 0 3    • Alkaline Phosphatase 02/27/2023 60    • AST 02/27/2023 24    • ALT 02/27/2023 20    • Hemoglobin A1C 02/27/2023 7 6 (H)    Orders Only on 12/08/2022   Component Date Value   • Total Cholesterol 12/08/2022 147    • HDL 12/08/2022 41 (L)    • Triglycerides 12/08/2022 385 (H)    • LDL Calculated 12/08/2022 60    • Chol HDLC Ratio 12/08/2022 3 6    • Non-HDL Cholesterol 12/08/2022 106    • Glucose, Random 12/08/2022 181 (H)    • BUN 12/08/2022 19    • Creatinine 12/08/2022 0 64    • eGFR 12/08/2022 100    • SL AMB BUN/CREATININE RA* 76/20/0193 NOT APPLICABLE    • Sodium 55/71/9601 140    • Potassium 12/08/2022 4 6    • Chloride 12/08/2022 102    • CO2 12/08/2022 28    • Calcium 12/08/2022 9 3    • Protein, Total 12/08/2022 6 9    • Albumin 12/08/2022 4 1    • Globulin 12/08/2022 2 8    • Albumin/Globulin Ratio 12/08/2022 1 5    • TOTAL BILIRUBIN 12/08/2022 0 3    • Alkaline Phosphatase 12/08/2022 72    • AST 12/08/2022 24    • ALT 12/08/2022 20    • Hemoglobin A1C 12/08/2022 8 4 (H)    Orders Only on 11/23/2022   Component Date Value   • Right Eye Diabetic Retin* 11/23/2022 None    • Left Eye Diabetic Retino* 11/23/2022 None        Suicide/Homicide Risk Assessment:    Risk of Harm to Self:  Demographic risk factors include: , age: over 48 or older  Historical Risk Factors include: history of anxiety, history of mood disorder, history of serious suicide attempts  Recent Specific Risk Factors include: diagnosis of mood disorder, health problems  Protective Factors: no current suicidal ideation, compliant with medications, compliant with mental health treatment, responsibilities and duties to others, stable living environment, supportive   Weapons: none  The following steps have been taken to ensure weapons are properly secured: not applicable  Based on today's assessment, Claude Loh presents the following risk of harm to self: none    Risk of Harm to Others: The following ratings are based on assessment at the time of the interview  Based on today's assessment, Claude Loh presents the following risk of harm to others: none    The following interventions are recommended: no intervention changes needed    Assessment/Plan:       Diagnoses and all orders for this visit:    Bipolar I disorder, most recent episode depressed, [facility]  -     ARIPiprazole (ABILIFY) 10 mg tablet; Take 1 tablet (10 mg total) by mouth every evening  -     FLUoxetine (PROzac) 20 mg capsule; Take 1 capsule (20 mg total) by mouth daily Take with Prozac 40 mg for a total dose of 60 mg daily  -     FLUoxetine (PROzac) 40 MG capsule; Take 1 capsule (40 mg total) by mouth daily Take with Prozac 20 mg for a total dose of 60 mg daily    LOURDES (generalized anxiety disorder)  -     FLUoxetine (PROzac) 20 mg capsule; Take 1 capsule (20 mg total) by mouth daily Take with Prozac 40 mg for a total dose of 60 mg daily  -     FLUoxetine (PROzac) 40 MG capsule;  Take 1 capsule (40 mg total) by mouth daily Take with Prozac 20 mg for a total dose of 60 mg daily    Personality disorder ([facility] [address] )    Long-term use of high-risk medication          Treatment Recommendations/Precautions:    Continue Prozac 60 mg daily to improve depressive symptoms  Continue Abilify 10 mg at bedtime to help with mood  Medication management every 4 months  Patient's stability of symptoms warrant this length of time or no significant changes to treatment plan   Follows with family physician for glucose and lipid monitoring due to current therapy with antipsychotic medication  Follows with family physician for yearly physical exam, asthma, diabetes, hyperlipidemia and hypertension  Aware of 24 hour and weekend coverage for urgent situations accessed by calling [facility] main practice number  Monitor lipid profile and hemoglobin A1C yearly due to current therapy with antipsychotic medication - gets labs done with PCP   I am scheduling this patient out for greater than 3 months: Yes - Patient's stability of symptoms warrant this length of time or no significant changes to treatment plan    Medications Risks/Benefits      Risks, Benefits And Possible Side Effects Of Medications:    Risks, benefits, and possible side effects of medications explained to Vj including risk of parkinsonian symptoms, Tardive Dyskinesia and metabolic syndrome related to treatment with antipsychotic medications and risk of suicidality and serotonin syndrome related to treatment with antidepressants  She verbalizes understanding and agreement for treatment  Controlled Medication Discussion:     Not applicable    Psychotherapy Provided:     Individual psychotherapy provided: Yes  Counseling was provided during the session today for 16 minutes  Medications, treatment progress and treatment plan reviewed with Vj  Goals discussed during in session: maintain control of anxiety and maintain mood stability     Discussed with Vj coping with health issues, occasional anxiety, chronic mental illness and difficulty with maintaining healthy weight  Coping techniques including deep/slow breathing and maintain healthy diet reviewed with Daja Hamilton  Supportive therapy provided  Treatment Plan:    Completed and signed during the session: Yes - with Daja Hamilton    Note Share: This note was shared with patient      Visit Time    Visit Start Time: 3:32 PM  Visit Stop Time: 3:54 PM  Total Visit Duration: 22 minutes    Ngozi Dunne MD 03/20/23

## 2023-03-20 ENCOUNTER — OFFICE VISIT (OUTPATIENT)
Dept: PSYCHIATRY | Facility: CLINIC | Age: 63
End: 2023-03-20

## 2023-03-20 VITALS
HEIGHT: 63 IN | SYSTOLIC BLOOD PRESSURE: 149 MMHG | WEIGHT: 216 LBS | HEART RATE: 80 BPM | BODY MASS INDEX: 38.27 KG/M2 | DIASTOLIC BLOOD PRESSURE: 69 MMHG

## 2023-03-20 DIAGNOSIS — Z79.899 LONG-TERM USE OF HIGH-RISK MEDICATION: Chronic | ICD-10-CM

## 2023-03-20 DIAGNOSIS — F31.76 BIPOLAR I DISORDER, MOST RECENT EPISODE DEPRESSED, IN FULL REMISSION (HCC): Primary | Chronic | ICD-10-CM

## 2023-03-20 DIAGNOSIS — F60.9 PERSONALITY DISORDER (HCC): Chronic | ICD-10-CM

## 2023-03-20 DIAGNOSIS — F41.1 GAD (GENERALIZED ANXIETY DISORDER): Chronic | ICD-10-CM

## 2023-03-20 RX ORDER — FLUOXETINE HYDROCHLORIDE 20 MG/1
20 CAPSULE ORAL DAILY
Qty: 90 CAPSULE | Refills: 2 | Status: SHIPPED | OUTPATIENT
Start: 2023-03-20 | End: 2023-12-15

## 2023-03-20 RX ORDER — ARIPIPRAZOLE 10 MG/1
10 TABLET ORAL EVERY EVENING
Qty: 90 TABLET | Refills: 2 | Status: SHIPPED | OUTPATIENT
Start: 2023-03-20 | End: 2023-12-15

## 2023-03-20 RX ORDER — FLUOXETINE HYDROCHLORIDE 40 MG/1
40 CAPSULE ORAL DAILY
Qty: 90 CAPSULE | Refills: 2 | Status: SHIPPED | OUTPATIENT
Start: 2023-03-20 | End: 2023-12-15

## 2023-03-20 NOTE — BH TREATMENT PLAN
TREATMENT PLAN (Medication Management Only)        [facility] [employer]    Name/Date of Birth/MRN:  Christella Skiff 65 y o  1960 MRN: [id number]  Date of Treatment Plan: March 20, 2023  Diagnosis/Diagnoses:   1  Bipolar I disorder, most recent episode depressed, [facility]    2  LOURDES (generalized anxiety disorder)    3  Personality disorder ([facility] )    4  Long-term use of high-risk medication      Strengths/Personal Resources for Self-Care: "I am more laid back than I used to be"  Area/Areas of need (in own words): "my weight"  1  Long Term Goal:   maintain control of anxiety, maintain mood stability  Target Date: 4 months - 7/20/2023  Person/Persons responsible for completion of goal: Colette Greenberg  2  Short Term Objective (s) - How will we reach this goal?:   A  Provider new recommended medication/dosage changes and/or continue medication(s): continue current medications as prescribed (Prozac and Abilify)  B   N/A   C   N/A  Target Date: 4 months - 7/20/2023  Person/Persons Responsible for Completion of Goal: Colette Greenberg   Progress Towards Goals: stable  Treatment Modality: medication management every 4 months  Review due 180 days from date of this plan: 6 months - 9/20/2023  Expected length of service: maintenance unless revised  My Physician/PA/NP and I have developed this plan together and I agree to work on the goals and objectives  I understand the treatment goals that were developed for my treatment    Electronic Signatures: on file (unless signed below)    Chaka García MD 03/20/23

## 2023-04-06 DIAGNOSIS — E11.9 TYPE 2 DIABETES MELLITUS WITHOUT COMPLICATION, WITHOUT LONG-TERM CURRENT USE OF INSULIN (HCC): ICD-10-CM

## 2023-04-06 RX ORDER — GLIPIZIDE 5 MG/1
TABLET ORAL
Qty: 360 TABLET | Refills: 1 | Status: SHIPPED | OUTPATIENT
Start: 2023-04-06

## 2023-04-20 PROBLEM — Z79.4 UNCONTROLLED TYPE 2 DIABETES MELLITUS WITH HYPERGLYCEMIA, WITH LONG-TERM CURRENT USE OF INSULIN (HCC): Status: RESOLVED | Noted: 2019-08-05 | Resolved: 2023-04-20

## 2023-04-20 PROBLEM — E11.65 UNCONTROLLED TYPE 2 DIABETES MELLITUS WITH HYPERGLYCEMIA, WITH LONG-TERM CURRENT USE OF INSULIN (HCC): Status: RESOLVED | Noted: 2019-08-05 | Resolved: 2023-04-20

## 2023-05-04 ENCOUNTER — OFFICE VISIT (OUTPATIENT)
Dept: PODIATRY | Facility: CLINIC | Age: 63
End: 2023-05-04

## 2023-05-04 VITALS
BODY MASS INDEX: 37.39 KG/M2 | WEIGHT: 211 LBS | HEART RATE: 71 BPM | SYSTOLIC BLOOD PRESSURE: 136 MMHG | DIASTOLIC BLOOD PRESSURE: 70 MMHG | HEIGHT: 63 IN

## 2023-05-04 DIAGNOSIS — L84 CORNS: ICD-10-CM

## 2023-05-04 DIAGNOSIS — E11.9 CONTROLLED TYPE 2 DIABETES MELLITUS WITHOUT COMPLICATION, WITHOUT LONG-TERM CURRENT USE OF INSULIN (HCC): Primary | ICD-10-CM

## 2023-05-04 NOTE — PROGRESS NOTES
"Assessment/Plan:    Discussed principles of diabetic foot care  Vascular status is within normal limits and sensorium is intact  Patient has a callused nail bed  Electrical debridement performed  Patient to contact me should she again have trouble  Urged patient to refrain from walking barefoot  Reappoint as needed    No problem-specific Assessment & Plan notes found for this encounter  There are no diagnoses linked to this encounter  Subjective:      Patient ID: Maggie Pavon is a 65 y o  female  HPI     Patient, a 59-year-old type II diabetic female presents for examination and care  Her chief complaint is a painful callus present on the right great toe nailbed  Patient had a total matrixectomy performed by me years ago  There has been no recurrence of the nail but a callus is present on the nailbed and is painful with shoe pressure  The patient denies numbness or tingling in her feet  I personally reviewed an A1c dated 2/27/2023  It was 7 6  I personally reviewed an A1c dated 12/8/2022  It was 8 4  The following portions of the patient's history were reviewed and updated as appropriate: allergies, current medications, past family history, past medical history, past social history, past surgical history and problem list     Review of Systems   Neurological: Negative for numbness  Psychiatric/Behavioral: Negative  Objective:      /70   Pulse 71   Ht 5' 3\" (1 6 m)   Wt 95 7 kg (211 lb)   BMI 37 38 kg/m²          Physical Exam  Cardiovascular:      Pulses: no weak pulses          Dorsalis pedis pulses are 2+ on the right side and 2+ on the left side  Posterior tibial pulses are 2+ on the right side and 2+ on the left side  Feet:      Right foot:      Skin integrity: No ulcer, skin breakdown, erythema, warmth, callus or dry skin  Left foot:      Skin integrity: No ulcer, skin breakdown, erythema, warmth, callus or dry skin         " Diabetic Foot Exam    Patient's shoes and socks removed  Right Foot/Ankle   Right Foot Inspection  Skin Exam: skin normal and skin intact  No dry skin, no warmth, no callus, no erythema, no maceration, no abnormal color, no pre-ulcer, no ulcer and no callus  Toe Exam: ROM and strength within normal limits  Sensory   Vibration: intact  Proprioception: intact  Monofilament testing: intact    Vascular  Capillary refills: < 3 seconds  The right DP pulse is 2+  The right PT pulse is 2+  Right Toe  - Comprehensive Exam  Ecchymosis: none  Arch: normal  Hammertoes: absent  Claw Toes: absent  Swelling: none   Tenderness: none         Left Foot/Ankle  Left Foot Inspection  Skin Exam: skin normal and skin intact  No dry skin, no warmth, no erythema, no maceration, normal color, no pre-ulcer, no ulcer and no callus  Toe Exam: ROM and strength within normal limits  Sensory   Vibration: intact  Proprioception: intact  Monofilament testing: intact    Vascular  Capillary refills: < 3 seconds  The left DP pulse is 2+  The left PT pulse is 2+       Left Toe  - Comprehensive Exam  Ecchymosis: none  Arch: normal  Hammertoes: absent  Claw toes: absent  Swelling: none   Tenderness: none           Assign Risk Category  No deformity present  No loss of protective sensation  No weak pulses  Risk: 0

## 2023-05-04 NOTE — LETTER
May 5, 2023     Odilia BatonJanes  [address] [facility] [address]    Patient: Kieran Ríos   YOB: 1960   Date of Visit: 5/4/2023       Dear Dr Bailey Serum: Thank you for referring Jim Zamora to me for evaluation  Below are my notes for this consultation  If you have questions, please do not hesitate to call me  I look forward to following your patient along with you  Sincerely,        Michael Orlando DPM        CC: No Recipients  [facility]  5/4/2023  3:39 PM  Signed  Assessment/Plan:    Discussed principles of diabetic foot care  Vascular status is within normal limits and sensorium is intact  Patient has a callused nail bed  Electrical debridement performed  Patient to contact me should she again have trouble  Urged patient to refrain from walking barefoot  Reappoint as needed    No problem-specific Assessment & Plan notes found for this encounter  There are no diagnoses linked to this encounter  Subjective:     Patient ID: Kieran Ríos is a 65 y o  female  HPI     Patient, a 80-year-old type II diabetic female presents for examination and care  Her chief complaint is a painful callus present on the right great toe nailbed  Patient had a total matrixectomy performed by me years ago  There has been no recurrence of the nail but a callus is present on the nailbed and is painful with shoe pressure  The patient denies numbness or tingling in her feet  I personally reviewed an A1c dated 2/27/2023  It was 7 6  I personally reviewed an A1c dated 12/8/2022  It was 8 4  The following portions of the patient's history were reviewed and updated as appropriate: allergies, current medications, past family history, past medical history, past social history, past surgical history and problem list     Review of Systems   Neurological: Negative for numbness  Psychiatric/Behavioral: Negative               Objective:      /70   Pulse "71   Ht 5' 3\" (1 6 m)   Wt 95 7 kg (211 lb)   BMI 37 38 kg/m²         Physical Exam  Cardiovascular:      Pulses: no weak pulses          Dorsalis pedis pulses are 2+ on the right side and 2+ on the left side  Posterior tibial pulses are 2+ on the right side and 2+ on the left side  Feet:      Right foot:      Skin integrity: No ulcer, skin breakdown, erythema, warmth, callus or dry skin  Left foot:      Skin integrity: No ulcer, skin breakdown, erythema, warmth, callus or dry skin  Diabetic Foot Exam    Patient's shoes and socks removed  Right Foot/Ankle   Right Foot Inspection  Skin Exam: skin normal and skin intact  No dry skin, no warmth, no callus, no erythema, no maceration, no abnormal color, no pre-ulcer, no ulcer and no callus  Toe Exam: ROM and strength within normal limits  Sensory   Vibration: intact  Proprioception: intact  Monofilament testing: intact    Vascular  Capillary refills: < 3 seconds  The right DP pulse is 2+  The right PT pulse is 2+  Right Toe  - Comprehensive Exam  Ecchymosis: none  Arch: normal  Hammertoes: absent  Claw Toes: absent  Swelling: none   Tenderness: none         Left Foot/Ankle  Left Foot Inspection  Skin Exam: skin normal and skin intact  No dry skin, no warmth, no erythema, no maceration, normal color, no pre-ulcer, no ulcer and no callus  Toe Exam: ROM and strength within normal limits  Sensory   Vibration: intact  Proprioception: intact  Monofilament testing: intact    Vascular  Capillary refills: < 3 seconds  The left DP pulse is 2+  The left PT pulse is 2+       Left Toe  - Comprehensive Exam  Ecchymosis: none  Arch: normal  Hammertoes: absent  Claw toes: absent  Swelling: none   Tenderness: none           Assign Risk Category  No deformity present  No loss of protective sensation  No weak pulses  Risk: 0      "

## 2023-06-09 ENCOUNTER — ANNUAL EXAM (OUTPATIENT)
Dept: OBGYN CLINIC | Facility: CLINIC | Age: 63
End: 2023-06-09
Payer: COMMERCIAL

## 2023-06-09 VITALS
SYSTOLIC BLOOD PRESSURE: 142 MMHG | DIASTOLIC BLOOD PRESSURE: 76 MMHG | HEIGHT: 63 IN | WEIGHT: 213.6 LBS | BODY MASS INDEX: 37.85 KG/M2

## 2023-06-09 DIAGNOSIS — Z01.419 PAP SMEAR, AS PART OF ROUTINE GYNECOLOGICAL EXAMINATION: ICD-10-CM

## 2023-06-09 DIAGNOSIS — Z01.419 ENCOUNTER FOR GYNECOLOGICAL EXAMINATION WITHOUT ABNORMAL FINDING: Primary | ICD-10-CM

## 2023-06-09 DIAGNOSIS — N95.2 ATROPHIC VAGINITIS: ICD-10-CM

## 2023-06-09 DIAGNOSIS — Z12.31 ENCOUNTER FOR SCREENING MAMMOGRAM FOR MALIGNANT NEOPLASM OF BREAST: ICD-10-CM

## 2023-06-09 PROCEDURE — S0612 ANNUAL GYNECOLOGICAL EXAMINA: HCPCS | Performed by: OBSTETRICS & GYNECOLOGY

## 2023-06-09 PROCEDURE — G0145 SCR C/V CYTO,THINLAYER,RESCR: HCPCS | Performed by: OBSTETRICS & GYNECOLOGY

## 2023-06-09 PROCEDURE — G0476 HPV COMBO ASSAY CA SCREEN: HCPCS | Performed by: OBSTETRICS & GYNECOLOGY

## 2023-06-09 NOTE — PATIENT INSTRUCTIONS
Normal gynecological physical examination  Pap smear completed in office today  Self-breast examination stressed  Mammogram ordered  Discussed regular exercise, healthy diet, importance of vitamin D and calcium supplements  Discussed importance of sun block use during periods of prolonged sun exposure  All patient questions answered in office  Patient will be seen in 1 year for routine gynecologic and medical examination  Patient will call office for any problems, concerns, or issues which may arise during the interim

## 2023-06-09 NOTE — PROGRESS NOTES
Assessment/Plan:    No problem-specific Assessment & Plan notes found for this encounter  Diagnoses and all orders for this visit:    Encounter for gynecological examination without abnormal finding    Pap smear, as part of routine gynecological examination  -     Liquid-based pap, screening    Encounter for screening mammogram for malignant neoplasm of breast  -     Mammo screening bilateral w 3d & cad; Future    Atrophic vaginitis          Normal gynecological physical examination  Self-breast examination stressed  Mammogram ordered  Discussed regular exercise, healthy diet, importance of vitamin D and calcium supplements  Discussed importance of sun block use during periods of prolonged sun exposure  Patient will be seen in 1 year for routine gynecologic and medical examination  Patient will call office for any problems, concerns, or issues which may arise during the interim  Subjective:          Georgia Ramirez is a 50-year-old female followed for type 2 diabetes, hypertension, bipolar disorder, and high cholesterol, presenting for her yearly appointment  She states that she has no concerns today and has not noted any changes since last year  She does not perform self breast exams at home but has not noticed any areas of pain, masses, discoloration, or nipple changes  Her breast exam completed in office was completely benign  Her last mammogram was last September, and was completely normal as well  She denies any vaginal bleeding, vaginal discharge, urinary symptoms, flank pain, chest pain, or shortness of breath  Pap smear completed in office today showed minimal atrophic vaginitis but pelvic floor has good support  She states that she recently started weight watchers diet plan and that her sugars have been improving  We will see her again in 1 year for her annual appointment        Patient ID: Demond Foster is a 65 y o  female who presents today for her annual gynecologic and medical "examination    Menstrual status: Postmenopausal    Vasomotor symptoms: None    Patient reports normal appetite    Patient reports normal bowel and bladder habits    Patient denies any significant pelvic or abdominal pain    Patient denies any headaches, chest pain, shortness of breath fever shakes or chills    Patient denies any COVID 19 symptoms including cough or loss of taste or smell    COVID vaccine status: Fully vaccinated    Medical problems: Followed for type 2 diabetes, hypertension, bipolar disorder, and high cholesterol  Colonoscopy status: Up-to-date, follow-up with PCP    Mammogram status: Up-to-date, order placed today  The following portions of the patient's history were reviewed and updated as appropriate: allergies, current medications, past family history, past medical history, past social history, past surgical history and problem list     Review of Systems   Constitutional: Negative  Negative for appetite change, diaphoresis, fatigue, fever and unexpected weight change  HENT: Negative  Eyes: Negative  Respiratory: Negative  Cardiovascular: Negative  Gastrointestinal: Negative  Negative for abdominal pain, blood in stool, constipation, diarrhea, nausea and vomiting  Endocrine: Negative  Negative for cold intolerance and heat intolerance  Genitourinary: Negative  Negative for dysuria, frequency, hematuria, urgency, vaginal bleeding, vaginal discharge and vaginal pain  Musculoskeletal: Negative  Skin: Negative  Allergic/Immunologic: Negative  Neurological: Negative  Hematological: Negative  Negative for adenopathy  Psychiatric/Behavioral: Negative  Objective:      /76   Ht 5' 3\" (1 6 m)   Wt 96 9 kg (213 lb 9 6 oz)   BMI 37 84 kg/m²          Physical Exam  Constitutional:       Appearance: Normal appearance  She is well-developed  HENT:      Head: Normocephalic  Eyes:      Pupils: Pupils are equal, round, and reactive to light   " Cardiovascular:      Rate and Rhythm: Normal rate and regular rhythm  Pulmonary:      Effort: Pulmonary effort is normal       Breath sounds: Normal breath sounds  Chest:   Breasts:     Breasts are symmetrical       Right: No inverted nipple, mass, nipple discharge, skin change or tenderness  Left: No inverted nipple, mass, nipple discharge, skin change or tenderness  Abdominal:      General: Bowel sounds are normal       Palpations: Abdomen is soft  Genitourinary:     General: Normal vulva  Exam position: Supine  Labia:         Right: No rash, tenderness, lesion or injury  Left: No rash, tenderness, lesion or injury  Vagina: Normal  No erythema, tenderness or bleeding  Cervix: No discharge or friability  Uterus: Not enlarged, not fixed and not tender  Adnexa:         Right: No mass, tenderness or fullness  Left: No mass, tenderness or fullness  Rectum: Normal  Guaiac result negative  Comments: Minimal atrophic vaginitis changes noted  Pelvic muscles have good support  Musculoskeletal:         General: Normal range of motion  Cervical back: Normal range of motion and neck supple  Lymphadenopathy:      Cervical: No cervical adenopathy  Upper Body:      Right upper body: No supraclavicular adenopathy  Left upper body: No supraclavicular adenopathy  Skin:     General: Skin is warm and dry  Findings: No rash  Neurological:      General: No focal deficit present  Mental Status: She is alert and oriented to person, place, and time  Psychiatric:         Mood and Affect: Mood normal          Speech: Speech normal          Behavior: Behavior normal          Thought Content:  Thought content normal          Judgment: Judgment normal  Additional Anesthesia Volume In Cc: 0.6

## 2023-06-12 ENCOUNTER — CLINICAL SUPPORT (OUTPATIENT)
Dept: INTERNAL MEDICINE CLINIC | Facility: CLINIC | Age: 63
End: 2023-06-12
Payer: COMMERCIAL

## 2023-06-12 VITALS
SYSTOLIC BLOOD PRESSURE: 130 MMHG | WEIGHT: 215.8 LBS | OXYGEN SATURATION: 98 % | HEART RATE: 72 BPM | DIASTOLIC BLOOD PRESSURE: 58 MMHG | BODY MASS INDEX: 38.23 KG/M2

## 2023-06-12 DIAGNOSIS — E11.29 TYPE 2 DIABETES MELLITUS WITH MICROALBUMINURIA, WITHOUT LONG-TERM CURRENT USE OF INSULIN (HCC): Primary | ICD-10-CM

## 2023-06-12 DIAGNOSIS — R80.9 TYPE 2 DIABETES MELLITUS WITH MICROALBUMINURIA, WITHOUT LONG-TERM CURRENT USE OF INSULIN (HCC): Primary | ICD-10-CM

## 2023-06-12 DIAGNOSIS — E78.5 DYSLIPIDEMIA: ICD-10-CM

## 2023-06-12 DIAGNOSIS — I10 ESSENTIAL HYPERTENSION: ICD-10-CM

## 2023-06-12 LAB — SL AMB POCT HEMOGLOBIN AIC: 6.1 (ref ?–6.5)

## 2023-06-12 PROCEDURE — 83036 HEMOGLOBIN GLYCOSYLATED A1C: CPT | Performed by: PHARMACIST

## 2023-06-12 NOTE — ASSESSMENT & PLAN NOTE
Blood pressure goal less than 130/80 mmHg  In office BP below goal, HR acceptable  No home readings  Lifestyle modifications improving control  Serum K+ WNL  • Medications: continue lisinopril as prescribed  • Home Monitoring: reviewed need for monitoring, patient will start checking 2x/week and will bring in to next appointment for review

## 2023-06-12 NOTE — ASSESSMENT & PLAN NOTE
Will assess for changing in lipid panel in 9/2023 when patient updates - will be reflective of only statin and no fibrate

## 2023-06-12 NOTE — PROGRESS NOTES
[address] [facility], Pharmacist    Current Diabetes Regimen:  Metformin   Glipizide  Pioglitazone    ASSESSMENT/PLAN                                                                                     1  Type 2 diabetes mellitus with microalbuminuria, without long-term current use of insulin ([facility])  Assessment & Plan:    Lab Results   Component Value Date    HGBA1C 6 1 06/12/2023   Most recent A1c below goal   Reported SMBG avg to goal; no true hypoglycemia episodes  Lifestyle modifications have impacted glycemic control     Medications:  Pioglitazone, metformin, glipizide: continue as prescribed  Home Monitoring: continue with alternating checking times  Reviewed need with patient to drink at least 1 glass of plain water during day especially in summer, patient will work on      Orders:  -     POCT hemoglobin A1c    2  Essential hypertension  Assessment & Plan:  Blood pressure goal less than 130/80 mmHg  In office BP below goal, HR acceptable  No home readings  Lifestyle modifications improving control  Serum K+ WNL  Medications: continue lisinopril as prescribed  Home Monitoring: reviewed need for monitoring, patient will start checking 2x/week and will bring in to next appointment for review  3  Dyslipidemia  Assessment & Plan: Will assess for changing in lipid panel in 9/2023 when patient updates - will be reflective of only statin and no fibrate  Follow-Up: 6 months for poct a1c, agrees to call for sooner appointment if hypolgycemia      SUBJECTIVE                                                                                                              Medication Adherence: Medication list reviewed with patient, reports the following discrepancies/problems:  Fenofibrate: no longer on  Atorvastatin: has increased dose  Glipizide: 1 with lunch and 2 tablets with supper    Did take all medications prior to appointment today    2   Medication Efficacy:    Review of Systems   Constitutional: Negative for activity change, appetite change, fatigue and unexpected weight change  Eyes: Negative for visual disturbance  Gastrointestinal: Positive for diarrhea (but tolerable, usually every day)  Negative for constipation, nausea and vomiting  Endocrine: Negative for polydipsia, polyphagia and polyuria  Neurological: Negative for dizziness, light-headedness and headaches  Home Monitoring:  Glucometer: Yes, Brand: Accu-chek    Hypoglycemia history: 0 SMBG readings < 70mg/dL since last appt   Has glucose tablets: No   Knows how to treat: Yes   Unsymptomatic hypoglycemia: No, reports hypoglycemia s/sx occasionally when blood sugar readings are dropping     Has wrist home blood pressure cuff; has not used since last appointment    Home blood sugar readings (per memory)  Yesterday blood sugar 3 hours after large lunch was 265; bedtime blood sugar was 96  fbg today was 171    3  Lifestyle: started weight watchers on Feb 1, alloted 28 points per day; needs to increase 0 point foods (ie vegetables)  Weight on 6/7 during [facility] meeting was in 212  Does not drink water, drinks 6 - 16oz bottles of diet iced tea during the tea  MIL no longer lives with them    No formal exercise regimen; has been more active taking care of MIL          OBJECTIVE                                                                                                      Vitals:    06/12/23 1306   BP: 130/58   Pulse: 72   SpO2: 98%   Weight: 97 9 kg (215 lb 12 8 oz)        Pertinent Lab Data:    Lab Results   Component Value Date    CREATININE 0 69 02/27/2023    EGFR 99 02/27/2023    GLUF 154 (H) 04/12/2022    K 4 9 02/27/2023    MICROALBCRE 61 (H) 10/17/2018    SODIUM 140 02/27/2023    FUOGQPZH32 344 07/10/2016     Lab Results   Component Value Date    HGBA1C 6 1 06/12/2023    HGBA1C 7 6 (H) 02/27/2023    HGBA1C 8 4 (H) 12/08/2022     Pharmacist Tracking Tool  Reason For Outreach: Embedded Pharmacist  Demographics:  Intervention Method:  In Person  Type of Intervention: Follow-Up  Topics Addressed: Diabetes, Dyslipidemia and Hypertension  Pharmacologic Interventions: Med Rec  Non-Pharmacologic Interventions: Care coordination, Disease state education, Labs and Care Gap  Time:  Direct Patient Care: 30 mins  Care Coordination: 15 mins  Recommendation Recipient: Patient/Caregiver  Outcome: Accepted

## 2023-06-12 NOTE — ASSESSMENT & PLAN NOTE
Lab Results   Component Value Date    HGBA1C 6 1 06/12/2023   Most recent A1c below goal   Reported SMBG avg to goal; no true hypoglycemia episodes  Lifestyle modifications have impacted glycemic control     • Medications:  o Pioglitazone, metformin, glipizide: continue as prescribed  • Home Monitoring: continue with alternating checking times    • Reviewed need with patient to drink at least 1 glass of plain water during day especially in summer, patient will work on

## 2023-06-20 LAB
LAB AP GYN PRIMARY INTERPRETATION: NORMAL
Lab: NORMAL

## 2023-06-27 DIAGNOSIS — E11.9 TYPE 2 DIABETES MELLITUS WITHOUT COMPLICATION, WITHOUT LONG-TERM CURRENT USE OF INSULIN (HCC): ICD-10-CM

## 2023-06-27 RX ORDER — GLIPIZIDE 5 MG/1
TABLET ORAL
Qty: 270 TABLET | Refills: 1 | Status: SHIPPED | OUTPATIENT
Start: 2023-06-27

## 2023-06-30 DIAGNOSIS — Z98.1 S/P CERVICAL SPINAL FUSION: ICD-10-CM

## 2023-06-30 DIAGNOSIS — I10 BENIGN ESSENTIAL HYPERTENSION: ICD-10-CM

## 2023-06-30 DIAGNOSIS — R10.13 EPIGASTRIC PAIN: ICD-10-CM

## 2023-06-30 DIAGNOSIS — K29.80 DUODENITIS: ICD-10-CM

## 2023-06-30 DIAGNOSIS — E11.9 CONTROLLED TYPE 2 DIABETES MELLITUS WITHOUT COMPLICATION, WITHOUT LONG-TERM CURRENT USE OF INSULIN (HCC): ICD-10-CM

## 2023-06-30 RX ORDER — LISINOPRIL 10 MG/1
TABLET ORAL
Qty: 90 TABLET | Refills: 1 | Status: SHIPPED | OUTPATIENT
Start: 2023-06-30

## 2023-06-30 RX ORDER — OMEPRAZOLE 40 MG/1
CAPSULE, DELAYED RELEASE ORAL
Qty: 90 CAPSULE | Refills: 1 | Status: SHIPPED | OUTPATIENT
Start: 2023-06-30

## 2023-06-30 RX ORDER — PIOGLITAZONEHYDROCHLORIDE 30 MG/1
30 TABLET ORAL DAILY
Qty: 90 TABLET | Refills: 1 | Status: SHIPPED | OUTPATIENT
Start: 2023-06-30

## 2023-08-14 NOTE — PROGRESS NOTES
900 NELLY Ma MD  [address] [place] [address]  [phone]    HISTORY OF PRESENT ILLNESS:    Serena Mak is a 65 y.o. female who presents for follow-up of cervical spine. Patient reports she has some numbness and tingling in the first two digits, more in the right compared to the left. Patient reports she has had carpal tunnel release. Patient states she is not dropping things at this time.      ALLERGIES:   Allergies   Allergen Reactions   • Bee Venom Swelling     Local swelling at sting site   • Biaxin [Clarithromycin] Itching   • Depakote Er [Divalproex Sodium Er]      pancreatitis   • Shellfish-Derived Products - Food Allergy Other (See Comments)     Ears and cheeks got red   • Tanzeum [Albiglutide]      pancreatitis   • Carbamazepine Other (See Comments)     Reaction Date:Unknown   • Lamotrigine Rash       MEDICATIONS:    Current Outpatient Medications:   •  acetaminophen (TYLENOL) 500 mg tablet, Take 500 mg by mouth 2 (two) times a day, Disp: , Rfl:   •  ARIPiprazole (ABILIFY) 10 mg tablet, Take 1 tablet (10 mg total) by mouth every evening, Disp: 90 tablet, Rfl: 2  •  atorvastatin (LIPITOR) 40 mg tablet, Take 1 tablet (40 mg total) by mouth daily *note dose increase* start when you finish your fenofibrate, Disp: 90 tablet, Rfl: 1  •  Cholecalciferol 4000 units CAPS, Take 1 capsule (4,000 Units total) by mouth daily, Disp: , Rfl:   •  FLUoxetine (PROzac) 20 mg capsule, Take 1 capsule (20 mg total) by mouth daily Take with Prozac 40 mg for a total dose of 60 mg daily, Disp: 90 capsule, Rfl: 2  •  FLUoxetine (PROzac) 40 MG capsule, Take 1 capsule (40 mg total) by mouth daily Take with Prozac 20 mg for a total dose of 60 mg daily, Disp: 90 capsule, Rfl: 2  •  glipiZIDE (GLUCOTROL) 5 mg tablet, Take by mouth 1 tablet with lunch and two tablets with supper for diabetes - fill when patient requests, Disp: 270 tablet, Rfl: 1  •  ibuprofen (MOTRIN) 200 mg tablet, Take 400 mg by mouth Every other day as needed when pain not alleviated by acetaminophen, Disp: , Rfl:   •  lisinopril (ZESTRIL) 10 mg tablet, TAKE 1 TABLET BY MOUTH EVERY DAY, Disp: 90 tablet, Rfl: 1  •  Magnesium 400 MG TABS, Take 1 tablet by mouth in the morning, Disp: , Rfl:   •  metFORMIN (GLUCOPHAGE) 1000 MG tablet, TAKE 1 TABLET BY MOUTH TWICE A DAY WITH MEALS, Disp: 180 tablet, Rfl: 3  •  Multiple Vitamins-Minerals (multivitamin with minerals) tablet, Take 1 tablet by mouth daily, Disp: , Rfl:   •  omeprazole (PriLOSEC) 40 MG capsule, TAKE 1 CAPSULE BY MOUTH EVERY DAY, Disp: 90 capsule, Rfl: 1  •  pioglitazone (ACTOS) 30 mg tablet, TAKE 1 TABLET (30 MG TOTAL) BY MOUTH DAILY FOR DIABETES, Disp: 90 tablet, Rfl: 1     PAST MEDICAL HISTORY:   Past Medical History:   Diagnosis Date   • Abdominal pain     started 7/21/16- found something on the pancreas   • Abnormal weight loss     last assessed 26feb2014   • Acute sinusitis    • Allergic rhinitis     last assessed 08Jul2015   • Anemia     had iron infusion   • Asthma    • Cardiac murmur    • Carpal tunnel syndrome    • Cellulitis    • Cervical disc disease    • Colon polyp    • CPAP (continuous positive airway pressure) dependence     not using at this time   • Degeneration of thoracic intervertebral disc    • Degenerative arthritis of knee    • Diabetes mellitus (HCC)    • Diverticulosis    • Esophagitis, reflux     last assessed 29Jan2014   • Excessive sweating    • Fatty liver    • Frequent falls    • Gait disturbance    • GERD (gastroesophageal reflux disease)    • Granuloma annulare    • Head injury    • Hyperlipidemia    • Hypertension    • Hypertensive heart disease    • Hypoglycemia     last assessed [id number]   • Kidney stone    • Leg length discrepancy    • Leucocytosis    • Lumbar stress fracture    • Multiple thyroid nodules    • Neuropathy    • Obesity     last assessed 25Dec2016   • Paronychia of toe    • Patellofemoral dysfunction    • Pneumonia    • Postmenopausal atrophic vaginitis    • Prepatellar bursitis    • Sebaceous cyst     last assessed [id number]   • Sleep apnea     no CPAP used   • Suicide attempt [facility])    • Tinea corporis    • Type 2 diabetes mellitus ([address])     cont meds and monitor BS;  last assessed [id number]   • Uncontrolled type 2 diabetes mellitus with hyperglycemia, with long-term current use of insulin ([address]) 8/5/2019   • Urinary frequency    • Vertigo    • Vitamin D deficiency    • Dia-Parkinson-White syndrome        PAST SURGICAL HISTORY:  Past Surgical History:   Procedure Laterality Date   • CERVICAL FUSION N/A 08/03/2021    Procedure: FUSION CERVICAL ANTERIOR W DISCECTOMY C4-5, C5-6, C6-7 with allograft and neuromonitoring  case length 3 hours; Surgeon: Nic Castillo MD;  Location:  MAIN OR;  Service: Orthopedics   • COLONOSCOPY     • ESOPHAGOGASTRODUODENOSCOPY N/A 08/08/2016    Procedure: ESOPHAGOGASTRODUODENOSCOPY (EGD); Surgeon: Mazin Moise MD;  Location: [facility] GI LAB; Service:    • LUMBAR FUSION     • IN ESOPHAGOGASTRODUODENOSCOPY TRANSORAL DIAGNOSTIC N/A 03/28/2019    Procedure: ESOPHAGOGASTRODUODENOSCOPY (EGD); Surgeon: Mazin Moise MD;  Location[facility]  GI LAB; Service: Gastroenterology   • IN NEUROPLASTY &/TRANSPOS MEDIAN NRV CARPAL TUNNE Right 8/9/2022    Procedure: RELEASE CARPAL TUNNEL;  Surgeon: Heber Suarez MD;  Location: [facility] MAIN OR;  Service: Orthopedics   • TONSILLECTOMY     • TOOTH EXTRACTION      wisdom teeth removed       SOCIAL HISTORY:  Social History     Tobacco Use   Smoking Status Never   • Passive exposure: Never   Smokeless Tobacco Never          PHYSICAL EXAM:  65 y.o. female sitting comfortably on exam chair in no apparent distress. Ambulates with normal gait  Loss of lumbar lordosis  Jade absent bilaterally  Deep tendon reflexes are 2+ and brisk bilateral upper and lower extremities  Radiorelease positive on left. Negative on right.    Normal rapid alternating movements      RADIOGRAPHIC STUDIES:  1. X-ray, C-spine, 10/27/2021:  Status post anterior cervical diskectomy and fusion at C4-5 C5-6 and C6-7.  No hardware complication.     2. MRI, C-spine, 6/16/2021:   Multilevel cervical degenerative disc disease.  Disc protrusion spinal cord compression involving multiple levels, at C6-7.     3. Xrays, C-spine, 1/24/2021: Status post anterior cervical diskectomy and fusion at C4-5 C5-6 and C6-7.  No hardware complication.  I compared these films with prior films on 01/24/2022 and so no significant difference between the films.  There was evidence of plate disease at the superior level C3-4.     4. Xrays, cervical spine, 8/08/2022:  Status post anterior cervical diskectomy fusion from C4 through C7.  I compared the films to prior x-rays.  There is evidence of plate disease at D5-3.  There is also evidence of pseudoarthrosis at C4-5 and C6-7.  There is no evidence of hardware complication.     5. Xrays, cervical spine, 2/13/2023:  Status post anterior cervical diskectomy fusion from C4 through C7.  There is evidence of plate disease at Q9-4.  There is also evidence of pseudoarthrosis at C4-5 and C6-7.  There is no evidence of hardware complication. No cnanges compared to prior X-rays. 6. Xrays, cervical spine, 8/15/2023: As per sensor cervical discectomy and fusion at C4-5, C5-6 and C6-7. There is evidence of subsidence and plate disease affecting the C3-4 level. The spine appears to be fused. ASSESSMENT:  1. S/P cervical spinal fusion  -     XR spine cervical 2 or 3 vw injury; Future; Expected date: 08/15/2023    2. Neck pain, chronic    3. Bilateral carpal tunnel syndrome    4. S/P carpal tunnel release        PLAN:  Patient is a 52-year-old female with cervical myelopathy status post anterior cervical fusion with diskectomy of C4-5, C5-6, C6-7 performed by Dr. Danial Guillory on 8/3/2021.      Patient is doing well at this time without concerning findings of myelopathy.  It was discussed that patient will require repeat neurologic examinations every 6 months to look for changes in function. On physical exam additionally, there was no objective evidence of progressive myelopathy. She is hyperreflexic. Patient will follow-up in 6 months for repeat neurological evaluation. No imaging required at that time. I did review the x-rays from today. X-rays do show evidence of plate disease at L5-5 and fusion at C4-5 through C6-7. Unless the patient is symptomatic, there is no need for additional x-rays in the near future. At this time she is not a surgical candidate. There is also no role for injections. She does complain of persistent numbness in the right index and thumb and intermittent numbness in the left index and thumb. She has been previously diagnosed with carpal tunnel syndrome and has had a release on the right side. At this time she does not have any pain and no evidence of muscle atrophy. We are going to continue to monitor this condition.     Follow-up in 6 months     Scribe Attestation    I,:  Patricia Ramirez PA-C am acting as a scribe while in the presence of the attending physician.:       I,:  Alfredo Marrero MD personally performed the services described in this documentation    as scribed in my presence.:

## 2023-08-15 ENCOUNTER — OFFICE VISIT (OUTPATIENT)
Dept: OBGYN CLINIC | Facility: CLINIC | Age: 63
End: 2023-08-15
Payer: COMMERCIAL

## 2023-08-15 VITALS
DIASTOLIC BLOOD PRESSURE: 58 MMHG | BODY MASS INDEX: 38.09 KG/M2 | SYSTOLIC BLOOD PRESSURE: 118 MMHG | WEIGHT: 215 LBS | RESPIRATION RATE: 12 BRPM | HEIGHT: 63 IN

## 2023-08-15 DIAGNOSIS — G56.03 BILATERAL CARPAL TUNNEL SYNDROME: ICD-10-CM

## 2023-08-15 DIAGNOSIS — G89.29 NECK PAIN, CHRONIC: ICD-10-CM

## 2023-08-15 DIAGNOSIS — Z98.890 S/P CARPAL TUNNEL RELEASE: ICD-10-CM

## 2023-08-15 DIAGNOSIS — M54.2 NECK PAIN, CHRONIC: ICD-10-CM

## 2023-08-15 DIAGNOSIS — Z98.1 S/P CERVICAL SPINAL FUSION: Primary | ICD-10-CM

## 2023-08-15 PROCEDURE — 99214 OFFICE O/P EST MOD 30 MIN: CPT | Performed by: ORTHOPAEDIC SURGERY

## 2023-09-01 LAB
ALBUMIN SERPL-MCNC: 4.1 G/DL (ref 3.6–5.1)
ALBUMIN/CREAT UR: 106 MCG/MG CREAT
ALBUMIN/GLOB SERPL: 1.6 (CALC) (ref 1–2.5)
ALP SERPL-CCNC: 92 U/L (ref 37–153)
ALT SERPL-CCNC: 16 U/L (ref 6–29)
AST SERPL-CCNC: 18 U/L (ref 10–35)
BILIRUB SERPL-MCNC: 0.3 MG/DL (ref 0.2–1.2)
BUN SERPL-MCNC: 20 MG/DL (ref 7–25)
BUN/CREAT SERPL: ABNORMAL (CALC) (ref 6–22)
CALCIUM SERPL-MCNC: 8.9 MG/DL (ref 8.6–10.4)
CHLORIDE SERPL-SCNC: 103 MMOL/L (ref 98–110)
CHOLEST SERPL-MCNC: 130 MG/DL
CHOLEST/HDLC SERPL: 3.3 (CALC)
CO2 SERPL-SCNC: 30 MMOL/L (ref 20–32)
CREAT SERPL-MCNC: 0.65 MG/DL (ref 0.5–1.05)
CREAT UR-MCNC: 115 MG/DL (ref 20–275)
GFR/BSA.PRED SERPLBLD CYS-BASED-ARV: 100 ML/MIN/1.73M2
GLOBULIN SER CALC-MCNC: 2.5 G/DL (CALC) (ref 1.9–3.7)
GLUCOSE SERPL-MCNC: 142 MG/DL (ref 65–99)
HBA1C MFR BLD: 6.3 % OF TOTAL HGB
HDLC SERPL-MCNC: 40 MG/DL
LDLC SERPL CALC-MCNC: 57 MG/DL (CALC)
MICROALBUMIN UR-MCNC: 12.2 MG/DL
NONHDLC SERPL-MCNC: 90 MG/DL (CALC)
POTASSIUM SERPL-SCNC: 4.6 MMOL/L (ref 3.5–5.3)
PROT SERPL-MCNC: 6.6 G/DL (ref 6.1–8.1)
SODIUM SERPL-SCNC: 141 MMOL/L (ref 135–146)
TRIGL SERPL-MCNC: 315 MG/DL

## 2023-09-05 ENCOUNTER — RA CDI HCC (OUTPATIENT)
Dept: OTHER | Facility: HOSPITAL | Age: 63
End: 2023-09-05

## 2023-09-05 NOTE — PSYCH
MEDICATION MANAGEMENT NOTE        [facility]      Name and Date of Birth:  Sangeeta Machuca 59 y.o. 1960 MRN: [id number]    Insurance: Payor: Deepthi Fay / Plan: [facility] PLAN 361 / Product Type: Dmitry Guzmán for Service /     Date of Visit: September 11, 2023    Reason for Visit:   Chief Complaint   Patient presents with   • Medication Management   • Follow-up       SUBJECTIVE:    Brandi Monge is seen today for a follow up for Bipolar Disorder, Generalized Anxiety Disorder and personality disorder. She has decompensated slightly since the last visit. She feels somewhat more depressed "I am a little bit on melancholic side". She rates mood as 3 on a scale of 1 (best mood) to 10 (worst mood). She states that anxiety symptoms are controlled, although she feels overwhelmed at times with family issues "my mother-in-law is in a nursing home and my father-in-law is also now in a nursing home". She worries about her brother who also had issues with his daughter recently. She has been frustrated with difficulty loosing weight despite going to [employer]. She denies any suicidal ideation, intent or plan at present; denies any homicidal ideation, intent or plan at present. She has no auditory hallucinations, denies any visual hallucinations, has no delusional thinking. She denies any side effects from current psychiatric medications.     HPI ROS Appetite Changes and Sleep:     She reports increased sleep, increase in number of sleep hours (10 hours), normal appetite, recent weight gain (6 lbs), low energy    Current Rating Scores:     Current PHQ-9   PHQ-2/9 Depression Screening    Little interest or pleasure in doing things: 0 - not at all  Feeling down, depressed, or hopeless: 1 - several days  Trouble falling or staying asleep, or sleeping too much: 2 - more than half the days  Feeling tired or having little energy: 3 - nearly every day  Poor appetite or overeating: 3 - nearly every day  Feeling bad about yourself - or that you are a failure or have let yourself or your family down: 1 - several days  Trouble concentrating on things, such as reading the newspaper or watching television: 1 - several days  Moving or speaking so slowly that other people could have noticed. Or the opposite - being so fidgety or restless that you have been moving around a lot more than usual: 0 - not at all  Thoughts that you would be better off dead, or of hurting yourself in some way: 0 - not at all  PHQ-9 Score: 11   PHQ-9 Interpretation: Moderate depression        Current PHQ-9 score is increased from 2 at the last visit). Review Of Systems:      Constitutional low energy and recent weight gain (6 lbs)   ENT negative   Cardiovascular elevated blood pressure   Respiratory negative   Gastrointestinal negative   Genitourinary negative   Musculoskeletal back pain and hip pain   Integumentary negative   Neurological negative   Endocrine negative   Other Symptoms none, all other systems are negative       Past Psychiatric History: (unchanged information from previous note copied and updated)    Past Inpatient Psychiatric Treatment:   One past inpatient psychiatric admission at [address] [place] [address]  Past Outpatient Psychiatric Treatment:    In outpatient treatment at [address] [place] [address] for many years.   Past Suicide Attempts: yes, 2 attempts by overdose years ago  Past Violent Behavior: no  Past Psychiatric Medication Trials: Prozac, Depakote, Lamictal (rash), Tegretol, Abilify and Latuda    Traumatic History: (unchanged information from previous note copied and updated)    Abuse: no history of sexual abuse, no history of physical abuse  Other Traumatic Events: none     Past Medical History:    Past Medical History:   Diagnosis Date   • Abdominal pain     started 7/21/16- found something on the pancreas   • Abnormal weight loss     last assessed 26feb2014   • Acute sinusitis    • Allergic rhinitis     last assessed 08Jul2015   • Anemia     had iron infusion   • Asthma    • Cardiac murmur    • Carpal tunnel syndrome    • Cellulitis    • Cervical disc disease    • Colon polyp    • CPAP (continuous positive airway pressure) dependence     not using at this time   • Degeneration of thoracic intervertebral disc    • Degenerative arthritis of knee    • Diabetes mellitus (HCC)    • Diverticulosis    • Esophagitis, reflux     last assessed 29Jan2014   • Excessive sweating    • Fatty liver    • Frequent falls    • Gait disturbance    • GERD (gastroesophageal reflux disease)    • Granuloma annulare    • Head injury    • Hyperlipidemia    • Hypertension    • Hypertensive heart disease    • Hypoglycemia     last assessed [id number]   • Kidney stone    • Leg length discrepancy    • Leucocytosis    • Lumbar stress fracture    • Multiple thyroid nodules    • Neuropathy    • Obesity     last assessed [id number]   • Paronychia of toe    • Patellofemoral dysfunction    • Pneumonia    • Postmenopausal atrophic vaginitis    • Prepatellar bursitis    • Sebaceous cyst     last assessed 21Feb2014   • Sleep apnea     no CPAP used   • Suicide attempt [facility])    • Tinea corporis    • Type 2 diabetes mellitus ([address])     cont meds and monitor BS;  last assessed [id number]   • Uncontrolled type 2 diabetes mellitus with hyperglycemia, with long-term current use of insulin ([address]) 8/5/2019   • Urinary frequency    • Vertigo    • Vitamin D deficiency    • Dia-Parkinson-White syndrome         Past Surgical History:   Procedure Laterality Date   • CERVICAL FUSION N/A 08/03/2021    Procedure: FUSION CERVICAL ANTERIOR W DISCECTOMY C4-5, C5-6, C6-7 with allograft and neuromonitoring  case length 3 hours; Surgeon: Adam Benson MD;  Location:  MAIN OR;  Service: Orthopedics   • COLONOSCOPY     • ESOPHAGOGASTRODUODENOSCOPY N/A 08/08/2016    Procedure: ESOPHAGOGASTRODUODENOSCOPY (EGD);   Surgeon: Russ Cordero MD;  Location: [address] [place] [address] [facility]; Service:    • LUMBAR FUSION     • ID ESOPHAGOGASTRODUODENOSCOPY TRANSORAL DIAGNOSTIC N/A 03/28/2019    Procedure: ESOPHAGOGASTRODUODENOSCOPY (EGD); Surgeon: Chuck Ordaz MD;  Location: AN  GI LAB;   Service: Gastroenterology   • ID NEUROPLASTY &/TRANSPOS MEDIAN NRV CARPAL TUNNE Right 8/9/2022    Procedure: RELEASE CARPAL TUNNEL;  Surgeon: Claudia Lucero MD;  Location: [facility] MA[facility] OR;  Service: Orthopedics   • TONSILLECTOMY     • TOOTH EXTRACTION      wisdom teeth removed     Allergies   Allergen Reactions   • Bee Venom Swelling     Local swelling at sting site   • Biaxin [Clarithromycin] Itching   • Depakote Er [Divalproex Sodium Er]      pancreatitis   • Shellfish-Derived Products - Food Allergy Other (See Comments)     Ears and cheeks got red   • Tanzeum [Albiglutide]      pancreatitis   • Carbamazepine Other (See Comments)     Reaction Date:Unknown   • Lamotrigine Rash       Substance Abuse History:    Social History     Substance and Sexual Activity   Alcohol Use Not Currently     Social History     Substance and Sexual Activity   Drug Use No       Social History:    Social History     Socioeconomic History   • Marital status: /Civil Union     Spouse name: Not on file   • Number of children: 0   • Years of education: 12   • Highest education level: 12th grade   Occupational History   • Occupation: retired   Tobacco Use   • Smoking status: Never     Passive exposure: Never   • Smokeless tobacco: Never   Vaping Use   • Vaping Use: Never used   Substance and Sexual Activity   • Alcohol use: Not Currently   • Drug use: No   • Sexual activity: Not Currently     Partners: Male   Other Topics Concern   • Not on file   Social History Narrative    Education: high school graduate    Learning Disabilities: none    Marital History:     Children: none    Living Arrangement: lives in home with     Occupational History: retired, 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    Functioning Relationships: good support system,  is supportive    Legal History: none     History: None        Caffeine use    Denied home environment domestic violence    Job physical requirements heavy lifting     Social Determinants of Health     Financial Resource Strain: Low Risk  (9/11/2023)    Overall Financial Resource Strain (CARDIA)    • Difficulty of Paying Living Expenses: Not hard at all   Food Insecurity: No Food Insecurity (9/11/2023)    Hunger Vital Sign    • Worried About Running Out of Food in the Last Year: Never true    • Ran Out of Food in the Last Year: Never true   Transportation Needs: No Transportation Needs (9/11/2023)    PRAPARE - Transportation    • Lack of Transportation (Medical): No    • Lack of Transportation (Non-Medical): No   Physical Activity: Inactive (9/11/2023)    Exercise Vital Sign    • Days of Exercise per Week: 0 days    • Minutes of Exercise per Session: 0 min   Stress: No Stress Concern Present (9/11/2023)    [facility]    • Feeling of Stress : Only a little   Social Connections: Moderately Isolated (9/11/2023)    Social Connection and Isolation Panel [NHANES]    • Frequency of Communication with Friends and Family: Once a week    • Frequency of Social Gatherings with Friends and Family: Once a week    • Attends Rastafarian Services: Never    • Active Member of Clubs or Organizations:  Yes    • Attends Club or Organization Meetings: More than 4 times per year    • Marital Status:    Intimate Partner Violence: Not At Risk (9/11/2023)    Humiliation, Afraid, Rape, and Kick questionnaire    • Fear of Current or Ex-Partner: No    • Emotionally Abused: No    • Physically Abused: No    • Sexually Abused: No   Housing Stability: Low Risk  (9/11/2023)    Housing Stability Vital Sign    • Unable to Pay for Housing in the Last Year: No    • Number of [address] in the Last Year: 1 • Unstable Housing in the Last Year: No       Family Psychiatric History:     Family History   Problem Relation Age of Onset   • Cancer Mother         lung   • Heart disease Mother    • Coronary artery disease Mother         CABG   • Diabetes Mother    • Lung cancer Mother 76        smoker   • Stroke Mother    • Thyroid disease Mother    • Other Mother         tobacco use   • Parkinsonism Father    • Intestinal polyp Father    • Bipolar disorder Father    • Heart disease Father    • Prostate cancer Father 62   • Hypertension Sister    • Alcohol abuse Sister    • Thyroid disease unspecified Sister    • Diabetes Brother    • Depression Brother    • Heart disease Brother    • Heart disease Brother    • Seizures Brother    • Diabetes Maternal Grandmother    • Thyroid disease Maternal Grandmother    • Endometrial cancer Maternal Aunt         unknown age   • Suicide Attempts Paternal Aunt    • Depression Paternal Aunt    • Ovarian cancer Cousin 36        maternal   • Hypertension Family        History Review:  The following portions of the patient's history were reviewed and updated as appropriate: allergies, current medications, past family history, past medical history, past social history, past surgical history and problem list.         OBJECTIVE:     Vital signs in last 24 hours:    Vitals:    09/11/23 1538   BP: 153/74   Pulse: 89   Weight: 101 kg (222 lb)   Height: 5' 3" (1.6 m)       Mental Status Evaluation:    Appearance age appropriate, casually dressed   Behavior cooperative, mildly anxious   Speech normal rate, normal volume, normal pitch   Mood mildly anxious, slightly more depressed   Affect constricted   Thought Processes organized, goal directed   Associations intact associations   Thought Content no overt delusions   Perceptual Disturbances: no auditory hallucinations, no visual hallucinations   Abnormal Thoughts  Risk Potential Suicidal ideation - None  Homicidal ideation - None  Potential for aggression - No   Orientation oriented to person, place, time/date and situation   Memory recent and remote memory grossly intact   Consciousness alert and awake   Attention Span Concentration Span attention span and concentration appear shorter than expected for age   Intellect appears to be of average intelligence   Insight intact   Judgement intact   Muscle Strength and  Gait normal muscle strength and normal muscle tone, normal gait and normal balance   Motor activity no abnormal movements   Language no difficulty naming common objects, no difficulty repeating a phrase, no difficulty writing a sentence   Fund of Knowledge adequate knowledge of current events  adequate fund of knowledge regarding past history  adequate fund of knowledge regarding vocabulary    Pain moderate   Pain Scale 5       Laboratory Results: I have personally reviewed all pertinent laboratory/tests results    Recent Labs (last 6 months):   Orders Only on 09/01/2023   Component Date Value   • Total Cholesterol 09/01/2023 130    • HDL 09/01/2023 40 (L)    • Triglycerides 09/01/2023 315 (H)    • LDL Calculated 09/01/2023 57    • Chol HDLC Ratio 09/01/2023 3.3    • Non-HDL Cholesterol 09/01/2023 90    • Creatinine, Urine 09/01/2023 115    • Albumin,U,Random 09/01/2023 12.2    • Microalb/Creat Ratio 09/01/2023 106 (H)    • Glucose, Random 09/01/2023 142 (H)    • BUN 09/01/2023 20    • Creatinine 09/01/2023 0.65    • eGFR 09/01/2023 100    • SL AMB BUN/CREATININE RA* 09/01/2023 SEE NOTE:    • Sodium 09/01/2023 141    • Potassium 09/01/2023 4.6    • Chloride 09/01/2023 103    • CO2 09/01/2023 30    • Calcium 09/01/2023 8.9    • Protein, Total 09/01/2023 6.6    • Albumin 09/01/2023 4.1    • Globulin 09/01/2023 2.5    • Albumin/Globulin Ratio 09/01/2023 1.6    • TOTAL BILIRUBIN 09/01/2023 0.3    • Alkaline Phosphatase 09/01/2023 92    • AST 09/01/2023 18    • ALT 09/01/2023 16    • Hemoglobin A1C 09/01/2023 6.3 (H)    Clinical Support on 06/12/2023 Component Date Value   • Hemoglobin A1C 06/12/2023 6.1    Annual Exam on 06/09/2023   Component Date Value   • Case Report 06/09/2023                      Value:Gynecologic Cytology Report                       Case: [id number]                                  Authorizing Provider:  Wilfred Briones MD   Collected:           06/09/2023 0946              Ordering Location:     [address] [place] [address] OB Received:            06/09/2023 2293                                     GYN                                                                          First Screen:          Enmaesthela Branham                                                               Specimen:    LIQUID-BASED PAP, SCREENING, Cervix                                                       • Primary Interpretation 06/09/2023 Negative for intraepithelial lesion or malignancy    • Specimen Adequacy 06/09/2023 Satisfactory for evaluation. (See note)    • Note 06/09/2023                      Value: This result contains rich text formatting which cannot be displayed here. • Additional Information 06/09/2023                      Value: This result contains rich text formatting which cannot be displayed here. • HPV Other HR 06/09/2023 Negative    • HPV16 06/09/2023 Negative    • HPV18 06/09/2023 Negative        Suicide/Homicide Risk Assessment:    Risk of Harm to Self:  Demographic risk factors include: , age: over 48 or older  Historical Risk Factors include: history of anxiety, history of mood disorder, history of suicide attempts  Recent Specific Risk Factors include: diagnosis of mood disorder, current depressive symptoms  Protective Factors: no current suicidal ideation, compliant with medications, compliant with mental health treatment, responsibilities and duties to others, stable living environment, supportive   Weapons: none.  The following steps have been taken to ensure weapons are properly secured: not applicable  Based on today's assessment, Jesus Abraham presents the following risk of harm to self: minimal    Risk of Harm to Others: The following ratings are based on assessment at the time of the interview  Based on today's assessment, Jesus Abraham presents the following risk of harm to others: none    The following interventions are recommended: no intervention changes needed    Assessment/Plan:       Diagnoses and all orders for this visit:    Bipolar I disorder, most recent episode depressed, moderate (HCC)  -     ARIPiprazole (ABILIFY) 10 mg tablet; Take 1 tablet (10 mg total) by mouth every evening  -     FLUoxetine (PROzac) 40 MG capsule; Take 2 capsules (80 mg total) by mouth daily    LOURDES (generalized anxiety disorder)  -     FLUoxetine (PROzac) 40 MG capsule;  Take 2 capsules (80 mg total) by mouth daily    Personality disorder (HCC)    Long-term use of high-risk medication          Treatment Recommendations/Precautions:    Increase Prozac to 80 mg daily to improve depressive symptoms  Continue Abilify 10 mg at bedtime to help with mood  Medication management every 2 months  Follows with family physician for glucose and lipid monitoring due to current therapy with antipsychotic medication  Follows with family physician for yearly physical exam, asthma, diabetes, hyperlipidemia and hypertension  Aware of 24 hour and weekend coverage for urgent situations accessed by calling [facility] main practice number  Monitor lipid profile and hemoglobin A1C yearly due to current therapy with antipsychotic medication - gets labs done with PCP  I am scheduling this patient out for greater than 3 months: No    Medications Risks/Benefits      Risks, Benefits And Possible Side Effects Of Medications:    Risks, benefits, and possible side effects of medications explained to Jesus Abraham including risk of parkinsonian symptoms, Tardive Dyskinesia and metabolic syndrome related to treatment with antipsychotic medications and risk of suicidality and serotonin syndrome related to treatment with antidepressants. She verbalizes understanding and agreement for treatment. Controlled Medication Discussion:     Not applicable    Psychotherapy Provided:     Individual psychotherapy provided: Yes  Counseling was provided during the session today for 16 minutes. Medications, treatment progress and treatment plan reviewed with Rene Dean. Medication changes discussed with Rene Dean. Medication education provided to Rene Dean. Goals discussed during in session: maintain control of anxiety, improve control of depression and improve mood stability. Discussed with Rene Dean coping with family issues, chronic mental illness and difficulty with maintaining healthy weight. Coping strategies including craftwork, deep/slow breathing, exercising, maintain healthy diet and taking time for self reviewed with Rene Dean. Supportive therapy provided. Treatment Plan:    Completed and signed during the session: Yes - with Rene Dean    Note Share: This note was shared with patient.     Visit Time    Visit Start Time: 3:35 PM  Visit Stop Time: 4:02 PM  Total Visit Duration: 27 minutes    Alexandria Bullard MD 09/11/23

## 2023-09-05 NOTE — PROGRESS NOTES
[facility] coding opportunities          Chart Reviewed number of suggestions sent to Provider: 1     Patients Insurance        Commercial Insurance: Kali Pearl       [id number]: Unspecified asthma, uncomplicated     Found in active problem list - please review and assess and document per MEAT if applicable for [id number]

## 2023-09-11 ENCOUNTER — OFFICE VISIT (OUTPATIENT)
Dept: PSYCHIATRY | Facility: CLINIC | Age: 63
End: 2023-09-11
Payer: COMMERCIAL

## 2023-09-11 VITALS
HEIGHT: 63 IN | DIASTOLIC BLOOD PRESSURE: 74 MMHG | WEIGHT: 222 LBS | SYSTOLIC BLOOD PRESSURE: 153 MMHG | HEART RATE: 89 BPM | BODY MASS INDEX: 39.34 KG/M2

## 2023-09-11 DIAGNOSIS — F41.1 GAD (GENERALIZED ANXIETY DISORDER): Chronic | ICD-10-CM

## 2023-09-11 DIAGNOSIS — F60.9 PERSONALITY DISORDER (HCC): Chronic | ICD-10-CM

## 2023-09-11 DIAGNOSIS — F31.32 BIPOLAR I DISORDER, MOST RECENT EPISODE DEPRESSED, MODERATE (HCC): Primary | Chronic | ICD-10-CM

## 2023-09-11 DIAGNOSIS — Z79.899 LONG-TERM USE OF HIGH-RISK MEDICATION: Chronic | ICD-10-CM

## 2023-09-11 PROCEDURE — 90833 PSYTX W PT W E/M 30 MIN: CPT | Performed by: PSYCHIATRY & NEUROLOGY

## 2023-09-11 PROCEDURE — 99213 OFFICE O/P EST LOW 20 MIN: CPT | Performed by: PSYCHIATRY & NEUROLOGY

## 2023-09-11 RX ORDER — FLUOXETINE HYDROCHLORIDE 40 MG/1
80 CAPSULE ORAL DAILY
Qty: 180 CAPSULE | Refills: 2 | Status: SHIPPED | OUTPATIENT
Start: 2023-09-11 | End: 2024-06-07

## 2023-09-11 RX ORDER — ARIPIPRAZOLE 10 MG/1
10 TABLET ORAL EVERY EVENING
Qty: 90 TABLET | Refills: 2 | Status: SHIPPED | OUTPATIENT
Start: 2023-09-11 | End: 2024-06-07

## 2023-09-11 NOTE — BH TREATMENT PLAN
TREATMENT PLAN (Medication Management Only)        [address] [facility]    Name/Date of Birth/MRN:  Gaby Saez 59 y.o. 1960 MRN: [id number]  Date of Treatment Plan: September 11, 2023  Diagnosis/Diagnoses:   1. Bipolar I disorder, most recent episode depressed, moderate ([address])    2. LOURDES (generalized anxiety disorder)    3. Personality disorder ([address])    4. Long-term use of high-risk medication      Strengths/Personal Resources for Self-Care: "I still have my crafts". Area/Areas of need (in own words): "my weight". 1. Long Term Goal:   maintain control of anxiety, improve control of depression, improve mood stability  Target Date: 2 months - 11/11/2023  Person/Persons responsible for completion of goal: Melinda Mathews  2. Short Term Objective (s) - How will we reach this goal?:   A. Provider new recommended medication/dosage changes and/or continue medication(s): increase Prozac, continue all other medications (Abilify). B.  N/A.  C.  N/A. Target Date: 2 months - 11/11/2023  Person/Persons Responsible for Completion of Goal: Melinda Mathews   Progress Towards Goals: regressed  Treatment Modality: medication management every 2 months  Review due 180 days from date of this plan: 6 months - 3/11/2024  Expected length of service: ongoing treatment unless revised  My Physician/PA/NP and I have developed this plan together and I agree to work on the goals and objectives. I understand the treatment goals that were developed for my treatment.   Electronic Signatures: on file (unless signed below)    Roberto Hernandes MD 09/11/23

## 2023-09-12 ENCOUNTER — OFFICE VISIT (OUTPATIENT)
Dept: INTERNAL MEDICINE CLINIC | Facility: CLINIC | Age: 63
End: 2023-09-12
Payer: COMMERCIAL

## 2023-09-12 VITALS
OXYGEN SATURATION: 96 % | HEART RATE: 79 BPM | SYSTOLIC BLOOD PRESSURE: 128 MMHG | DIASTOLIC BLOOD PRESSURE: 68 MMHG | BODY MASS INDEX: 38.59 KG/M2 | RESPIRATION RATE: 16 BRPM | WEIGHT: 217.8 LBS | HEIGHT: 63 IN

## 2023-09-12 DIAGNOSIS — E66.09 CLASS 1 OBESITY DUE TO EXCESS CALORIES WITH SERIOUS COMORBIDITY AND BODY MASS INDEX (BMI) OF 32.0 TO 32.9 IN ADULT: ICD-10-CM

## 2023-09-12 DIAGNOSIS — R07.81 RIB PAIN ON RIGHT SIDE: ICD-10-CM

## 2023-09-12 DIAGNOSIS — M25.562 CHRONIC PAIN OF BOTH KNEES: ICD-10-CM

## 2023-09-12 DIAGNOSIS — F31.32 BIPOLAR I DISORDER, MOST RECENT EPISODE DEPRESSED, MODERATE (HCC): Chronic | ICD-10-CM

## 2023-09-12 DIAGNOSIS — E66.9 OBESITY (BMI 30.0-34.9): ICD-10-CM

## 2023-09-12 DIAGNOSIS — G95.9 CERVICAL MYELOPATHY (HCC): ICD-10-CM

## 2023-09-12 DIAGNOSIS — E11.29 TYPE 2 DIABETES MELLITUS WITH MICROALBUMINURIA, WITHOUT LONG-TERM CURRENT USE OF INSULIN: ICD-10-CM

## 2023-09-12 DIAGNOSIS — R80.9 TYPE 2 DIABETES MELLITUS WITH MICROALBUMINURIA, WITHOUT LONG-TERM CURRENT USE OF INSULIN: ICD-10-CM

## 2023-09-12 DIAGNOSIS — R25.2 LEG CRAMP: ICD-10-CM

## 2023-09-12 DIAGNOSIS — R09.82 PND (POST-NASAL DRIP): ICD-10-CM

## 2023-09-12 DIAGNOSIS — I10 BENIGN ESSENTIAL HYPERTENSION: ICD-10-CM

## 2023-09-12 DIAGNOSIS — Z00.00 WELLNESS EXAMINATION: Primary | ICD-10-CM

## 2023-09-12 DIAGNOSIS — G47.62 NOCTURNAL LEG CRAMPS: ICD-10-CM

## 2023-09-12 DIAGNOSIS — L91.8 SKIN TAG: ICD-10-CM

## 2023-09-12 DIAGNOSIS — M25.552 PAIN OF LEFT HIP: ICD-10-CM

## 2023-09-12 DIAGNOSIS — M25.561 CHRONIC PAIN OF BOTH KNEES: ICD-10-CM

## 2023-09-12 DIAGNOSIS — G89.29 CHRONIC PAIN OF BOTH KNEES: ICD-10-CM

## 2023-09-12 DIAGNOSIS — E78.5 DYSLIPIDEMIA: ICD-10-CM

## 2023-09-12 PROCEDURE — 99396 PREV VISIT EST AGE 40-64: CPT | Performed by: INTERNAL MEDICINE

## 2023-09-12 PROCEDURE — 99214 OFFICE O/P EST MOD 30 MIN: CPT | Performed by: INTERNAL MEDICINE

## 2023-09-12 RX ORDER — FLUTICASONE PROPIONATE 50 MCG
2 SPRAY, SUSPENSION (ML) NASAL DAILY
Qty: 1 G | Refills: 0
Start: 2023-09-12

## 2023-09-12 NOTE — ASSESSMENT & PLAN NOTE
Hyperlipidemia controlled continue with current medical regiment recommend a low-cholesterol diet and recommend routine exercise we will continue to monitor the progress.

## 2023-09-12 NOTE — PROGRESS NOTES
[address]    NAME: Rebecca Rucker  AGE: 59 y.o. SEX: female  : 1960     DATE: 2023     Assessment and Plan:     Problem List Items Addressed This Visit        Endocrine    Type 2 diabetes mellitus with microalbuminuria, without long-term current use of insulin ([address])       Lab Results   Component Value Date    HGBA1C 6.3 (H) 2023   I have counselled the pt to follow a healthy and balanced diet ,and recommend routine exercise. I will be ordering diabetic laboratories including comprehensive metabolic panel, hemoglobin A1c, urine microalbumin, lipid panel. Annual eye examination recommended         Relevant Orders    Comprehensive metabolic panel    Albumin / creatinine urine ratio    Lipid Panel with Direct LDL reflex    Hemoglobin A1c (w/out EAG) ([facility] ONLY)       Cardiovascular and Mediastinum    Benign essential hypertension     Hypertension - controlled, I have counseled patient following healthy balance diet, I would like the patient reduce sodium, exercise routinely, I would like the patient continued the med current medical regiment and we will continue to monitor. Nervous and Auditory    RESOLVED: Cervical myelopathy (HCC)       Other    Bipolar I disorder, most recent episode depressed, moderate (HCC) (Chronic)     Clinically stable working with psychiatry         Dyslipidemia     Hyperlipidemia controlled continue with current medical regiment recommend a low-cholesterol diet and recommend routine exercise we will continue to monitor the progress. Class 1 obesity due to excess calories with serious comorbidity and body mass index (BMI) of 32.0 to 32.9 in adult     Obesity -I have counseled patient following healthy and balanced diet, I would like the patient to lose weight, I would like the patient exercise routinely; we will continue monitor the patient's progress.          Wellness examination - Primary     Assessment and plan 1. Health maintenance annual wellness examination overall the patient is clinically stable and doing well, we encouraged the patient to follow a healthy and balanced diet. We recommend that the patient exercise routinely approximately 30 minutes 5 times per week . We have reviewed the patient's vaccines and have made recommendations for updates if necessary annual flu shot    . We will be ordering screening laboratories which are age appropriate. Return to the office in   6 months   call if any problems. Hip pain     Rule out degenerative arthritis we will check x-ray of the left hip weight loss advised         Relevant Orders    XR hip/pelv 2-3 vws left if performed    Ambulatory Referral to Physical Therapy    Obesity (BMI 30.0-34.9)     Obesity -I have counseled patient following healthy and balanced diet, I would like the patient to lose weight, I would like the patient exercise routinely; we will continue monitor the patient's progress. Nocturnal leg cramps     We will check vitamin D and magnesium level proper hydration and propel water pickle juice, offered consult to specialist patient would like to hold off routine stretching        Other Visit Diagnoses     PND (post-nasal drip)        Relevant Medications    fluticasone (FLONASE) 50 mcg/act nasal spray    Leg cramp        Relevant Orders    Magnesium    Vitamin D 25 hydroxy    Rib pain on right side        Relevant Orders    XR ribs 2 vw right    Ambulatory Referral to Physical Therapy    Skin tag        Relevant Orders    Ambulatory Referral to Dermatology    Chronic pain of both knees        Relevant Orders    Ambulatory referral to Physical Therapy        RTO in 6 months call if any problems  Immunizations and preventive care screenings were discussed with patient today. Appropriate education was printed on patient's after visit summary.     Counseling:  Exercise: the importance of regular exercise/physical activity was discussed. Recommend exercise 3-5 times per week for at least 30 minutes. Return in about 6 months (around 3/12/2024). Chief Complaint:     Chief Complaint   Patient presents with   • Annual Exam      History of Present Illness:     Adult Annual Physical   Patient here for a comprehensive physical exam.  60-year old female coming in for a follow up office visit regarding hyperlipidemia, obesity, bipolar 1, benign essential hypertension, type 2 diabetes, postnasal drip, leg cramps, left hip pain, bilateral knee pain, skin tag; the patient reports me compliant taking medications without untoward side effects the. The patient is here to review his medical condition, update me on the medical condition and the patient reports me no hospitalizations and no ER visits. No injuries no illnesses she reports the bilateral knee pain had x-rays showing arthritis would like to attend physical therapy    Reports been going to weight watchers but not losing weight she reports her diet could be better also planning to join the gym she had done her best when she was going to gym routinely. Has noticed a small skin tag which gets irritated in the neck region like to have removed. Also reports me right posterior rib cage pain worse with movement reports me also chronic nocturnal intermittent leg cramps. . The pt reports leg cramp    Diet and Physical Activity  Diet/Nutrition: frequent junk food. Exercise: no formal exercise. Will be starting the community     Depression Screening  PHQ-2/9 Depression Screening       feels low the prozac increase yesterday, no si   General Health  Sleep: gets more than 8 hours of sleep on average. Hearing: normal - bilateral.  Vision: goes for regular eye exams. Using sunglass Dental: regular dental visits.        /GYN Health  Patient is: postmenopausal  Last menstrual period:   Contraceptive method: .     Review of Systems:     Review of Systems   Constitutional: Negative for activity change, appetite change and unexpected weight change. HENT: Negative for congestion and postnasal drip. Eyes: Negative for visual disturbance. Respiratory: Negative for cough and shortness of breath. Cardiovascular: Negative for chest pain. Gastrointestinal: Negative for abdominal pain, diarrhea, nausea and vomiting. Neurological: Negative for dizziness, light-headedness and headaches.    Chronic leg cramps, posterior right rib cage pain, irritated skin tag, left hip pain, bilateral knee pain   Past Medical History:     Past Medical History:   Diagnosis Date   • Abdominal pain     started 7/21/16- found something on the pancreas   • Abnormal weight loss     last assessed [id number]   • Acute sinusitis    • Allergic rhinitis     last assessed [id number]   • Anemia     had iron infusion   • Asthma    • Cardiac murmur    • Carpal tunnel syndrome    • Cellulitis    • Cervical disc disease    • Cervical myelopathy (HCC) 9/7/2022   • Colon polyp    • CPAP (continuous positive airway pressure) dependence     not using at this time   • Degeneration of thoracic intervertebral disc    • Degenerative arthritis of knee    • Diabetes mellitus (HCC)    • Diverticulosis    • Esophagitis, reflux     last assessed 29Jan2014   • Excessive sweating    • Fatty liver    • Frequent falls    • Gait disturbance    • GERD (gastroesophageal reflux disease)    • Granuloma annulare    • Head injury    • Hyperlipidemia    • Hypertension    • Hypertensive heart disease    • Hypoglycemia     last assessed [id number]   • Kidney stone    • Leg length discrepancy    • Leucocytosis    • Lumbar stress fracture    • Multiple thyroid nodules    • Neuropathy    • Obesity     last assessed [id number]   • Paronychia of toe    • Patellofemoral dysfunction    • Pneumonia    • Postmenopausal atrophic vaginitis    • Prepatellar bursitis    • Sebaceous cyst     last assessed 21Feb2014   • Sleep apnea no CPAP used   • Suicide attempt [facility])    • Tinea corporis    • Type 2 diabetes mellitus ([address])     cont meds and monitor BS;  last assessed [id number]   • Uncontrolled type 2 diabetes mellitus with hyperglycemia, with long-term current use of insulin ([address]) 8/5/2019   • Urinary frequency    • Vertigo    • Vitamin D deficiency    • Dia-Parkinson-White syndrome       Past Surgical History:     Past Surgical History:   Procedure Laterality Date   • CERVICAL FUSION N/A 08/03/2021    Procedure: FUSION CERVICAL ANTERIOR W DISCECTOMY C4-5, C5-6, C6-7 with allograft and neuromonitoring  case length 3 hours; Surgeon: Ankit Momin MD;  Location:  MAIN OR;  Service: Orthopedics   • COLONOSCOPY     • ESOPHAGOGASTRODUODENOSCOPY N/A 08/08/2016    Procedure: ESOPHAGOGASTRODUODENOSCOPY (EGD); Surgeon: Chuck Ordaz MD;  Location: [facility] GI LAB; Service:    • LUMBAR FUSION     • PA ESOPHAGOGASTRODUODENOSCOPY TRANSORAL DIAGNOSTIC N/A 03/28/2019    Procedure: ESOPHAGOGASTRODUODENOSCOPY (EGD); Surgeon: Chuck Ordaz MD;  Location: [facility]  GI LAB;   Service: Gastroenterology   • PA NEUROPLASTY &/TRANSPOS MEDIAN NRV CARPAL TUNNE Right 8/9/2022    Procedure: RELEASE CARPAL TUNNEL;  Surgeon: Claudia Lucero MD;  Location: [facility] MAIN OR;  Service: Orthopedics   • TONSILLECTOMY     • TOOTH EXTRACTION      wisdom teeth removed      Social History:     Social History     Socioeconomic History   • Marital status: /Civil Union     Spouse name: None   • Number of children: 0   • Years of education: 12   • Highest education level: 12th grade   Occupational History   • Occupation: retired   Tobacco Use   • Smoking status: Never     Passive exposure: Never   • Smokeless tobacco: Never   Vaping Use   • Vaping Use: Never used   Substance and Sexual Activity   • Alcohol use: Not Currently   • Drug use: No   • Sexual activity: Not Currently     Partners: Male   Other Topics Concern   • None   Social History Narrative    Education: high school graduate    Learning Disabilities: none    Marital History:     Children: none    Living Arrangement: lives in home with     Occupational History: retired, 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[address].: good support system,  is supportive    Legal History: none     History: None        Caffeine use    Denied home environment domestic violence    Job physical requirements heavy lifting     Social Determinants of Health     Financial Resource Strain: Low Risk  (9/11/2023)    Overall Financial Resource Strain (CARDIA)    • Difficulty of Paying Living Expenses: Not hard at all   Food Insecurity: No Food Insecurity (9/11/2023)    Hunger Vital Sign    • Worried About Running Out of Food in the Last Year: Never true    • Ran Out of Food in the Last Year: Never true   Transportation Needs: No Transportation Needs (9/11/2023)    PRAPARE - Transportation    • Lack of Transportation (Medical): No    • Lack of Transportation (Non-Medical): No   Physical Activity: Inactive (9/11/2023)    Exercise Vital Sign    • Days of Exercise per Week: 0 days    • Minutes of Exercise per Session: 0 min   Stress: No Stress Concern Present (9/11/2023)    [address] [place] [address]    • Feeling of Stress : Only a little   Social Connections: Moderately Isolated (9/11/2023)    Social Connection and Isolation Panel [NHANES]    • Frequency of Communication with Friends and Family: Once a week    • Frequency of Social Gatherings with Friends and Family: Once a week    • Attends Shinto Services: Never    • Active Member of Clubs or Organizations:  Yes    • Attends Club or Organization Meetings: More than 4 times per year    • Marital Status:    Intimate Partner Violence: Not At Risk (9/11/2023)    Humiliation, Afraid, Rape, and Kick questionnaire    • Fear of Current or Ex-Partner: No    • Emotionally Abused: No    • Physically Abused: No    • Sexually Abused: No   Housing Stability: Low Risk  (9/11/2023)    Housing Stability Vital Sign    • Unable to Pay for Housing in the Last Year: No    • Number of Places Lived in the Last Year: 1    • Unstable Housing in the Last Year: No      Family History:     Family History   Problem Relation Age of Onset   • Cancer Mother         lung   • Heart disease Mother    • Coronary artery disease Mother         CABG   • Diabetes Mother    • Lung cancer Mother 76        smoker   • Stroke Mother    • Thyroid disease Mother    • Other Mother         tobacco use   • Parkinsonism Father    • Intestinal polyp Father    • Bipolar disorder Father    • Heart disease Father    • Prostate cancer Father 62   • Hypertension Sister    • Alcohol abuse Sister    • Thyroid disease unspecified Sister    • Diabetes Brother    • Depression Brother    • Heart disease Brother    • Heart disease Brother    • Seizures Brother    • Diabetes Maternal Grandmother    • Thyroid disease Maternal Grandmother    • Endometrial cancer Maternal Aunt         unknown age   • Suicide Attempts Paternal Aunt    • Depression Paternal Aunt    • Ovarian cancer Cousin 36        maternal   • Hypertension Family       Current Medications:     Current Outpatient Medications   Medication Sig Dispense Refill   • acetaminophen (TYLENOL) 500 mg tablet Take 500 mg by mouth 2 (two) times a day     • ARIPiprazole (ABILIFY) 10 mg tablet Take 1 tablet (10 mg total) by mouth every evening 90 tablet 2   • atorvastatin (LIPITOR) 40 mg tablet Take 1 tablet (40 mg total) by mouth daily *note dose increase* start when you finish your fenofibrate 90 tablet 1   • Cholecalciferol 4000 units CAPS Take 1 capsule (4,000 Units total) by mouth daily     • FLUoxetine (PROzac) 40 MG capsule Take 2 capsules (80 mg total) by mouth daily 180 capsule 2   • fluticasone (FLONASE) 50 mcg/act nasal spray 2 sprays into each nostril daily 1 g 0   • glipiZIDE (GLUCOTROL) 5 mg tablet Take by mouth 1 tablet with lunch and two tablets with supper for diabetes - fill when patient requests 270 tablet 1   • ibuprofen (MOTRIN) 200 mg tablet Take 400 mg by mouth Every other day as needed when pain not alleviated by acetaminophen     • lisinopril (ZESTRIL) 10 mg tablet TAKE 1 TABLET BY MOUTH EVERY DAY 90 tablet 1   • Magnesium 400 MG TABS Take 1 tablet by mouth in the morning     • metFORMIN (GLUCOPHAGE) 1000 MG tablet TAKE 1 TABLET BY MOUTH TWICE A DAY WITH MEALS 180 tablet 3   • Multiple Vitamins-Minerals (multivitamin with minerals) tablet Take 1 tablet by mouth daily     • omeprazole (PriLOSEC) 40 MG capsule TAKE 1 CAPSULE BY MOUTH EVERY DAY 90 capsule 1   • pioglitazone (ACTOS) 30 mg tablet TAKE 1 TABLET (30 MG TOTAL) BY MOUTH DAILY FOR DIABETES 90 tablet 1     No current facility-administered medications for this visit. Allergies: Allergies   Allergen Reactions   • Bee Venom Swelling     Local swelling at sting site   • Biaxin [Clarithromycin] Itching   • Depakote Er [Divalproex Sodium Er]      pancreatitis   • Shellfish-Derived Products - Food Allergy Other (See Comments)     Ears and cheeks got red   • Tanzeum [Albiglutide]      pancreatitis   • Carbamazepine Other (See Comments)     Reaction Date:Unknown   • Lamotrigine Rash      Physical Exam:     /68   Pulse 79   Resp 16   Ht 5' 3" (1.6 m)   Wt 98.8 kg (217 lb 12.8 oz)   SpO2 96%   BMI 38.58 kg/m²     Physical Exam  Vitals and nursing note reviewed. Constitutional:       General: She is not in acute distress. Appearance: Normal appearance. She is well-developed. She is obese. She is not ill-appearing, toxic-appearing or diaphoretic. HENT:      Head: Normocephalic and atraumatic. Right Ear: External ear normal.      Left Ear: External ear normal.      Nose: Nose normal.   Eyes:      Pupils: Pupils are equal, round, and reactive to light.    Cardiovascular:      Rate and Rhythm: Normal rate and regular rhythm. Heart sounds: Normal heart sounds. No murmur heard. Pulmonary:      Effort: Pulmonary effort is normal.      Breath sounds: Normal breath sounds. Abdominal:      General: There is no distension. Palpations: Abdomen is soft. Tenderness: There is no abdominal tenderness. There is no guarding. Neurological:      Mental Status: She is alert.      Mild tenderness over the right posterior 10th rib    Javon Florian, [address]

## 2023-09-12 NOTE — ASSESSMENT & PLAN NOTE
We will check vitamin D and magnesium level proper hydration and propel water pickle juice, offered consult to specialist patient would like to hold off routine stretching

## 2023-09-12 NOTE — ASSESSMENT & PLAN NOTE
Assessment and plan 1. Health maintenance annual wellness examination overall the patient is clinically stable and doing well, we encouraged the patient to follow a healthy and balanced diet. We recommend that the patient exercise routinely approximately 30 minutes 5 times per week . We have reviewed the patient's vaccines and have made recommendations for updates if necessary annual flu shot    . We will be ordering screening laboratories which are age appropriate. Return to the office in   6 months   call if any problems.

## 2023-09-12 NOTE — ASSESSMENT & PLAN NOTE
Lab Results   Component Value Date    HGBA1C 6.3 (H) 09/01/2023   I have counselled the pt to follow a healthy and balanced diet ,and recommend routine exercise. I will be ordering diabetic laboratories including comprehensive metabolic panel, hemoglobin A1c, urine microalbumin, lipid panel.   Annual eye examination recommended

## 2023-09-21 ENCOUNTER — HOSPITAL ENCOUNTER (OUTPATIENT)
Dept: RADIOLOGY | Facility: HOSPITAL | Age: 63
Discharge: HOME | End: 2023-09-21
Payer: COMMERCIAL

## 2023-09-21 DIAGNOSIS — M25.552 PAIN OF LEFT HIP: ICD-10-CM

## 2023-09-21 DIAGNOSIS — R07.81 RIB PAIN ON RIGHT SIDE: ICD-10-CM

## 2023-09-21 PROCEDURE — 73502 X-RAY EXAM HIP UNI 2-3 VIEWS: CPT

## 2023-09-21 PROCEDURE — 71101 X-RAY EXAM UNILAT RIBS/CHEST: CPT

## 2023-09-25 DIAGNOSIS — E78.5 DYSLIPIDEMIA: ICD-10-CM

## 2023-09-26 ENCOUNTER — TELEPHONE (OUTPATIENT)
Age: 63
End: 2023-09-26

## 2023-09-26 RX ORDER — ATORVASTATIN CALCIUM 40 MG/1
TABLET, FILM COATED ORAL
Qty: 90 TABLET | Refills: 1 | Status: SHIPPED | OUTPATIENT
Start: 2023-09-26

## 2023-09-26 NOTE — TELEPHONE ENCOUNTER
Patient returned call in regards to XR results, gave patient results and states is unaware of anything in body states had fusion on back of neck 2 years ago, would like to know what is the metallic foreign body mean and what can be done about it.  Patient can be reached at [phone]

## 2023-09-26 NOTE — TELEPHONE ENCOUNTER
Rachel Carranza DO   9/23/2023  5:48 PM EDT       Notify the patient x-ray of the ribs no acute rib fracture no cardiopulmonary disease there is a 3 mm metallic foreign body in the left neck please check with patient if she is aware

## 2023-09-27 NOTE — TELEPHONE ENCOUNTER
LM on VM for patient that a referral can be placed and to CB if she would like Dr. Nate Dominguez to put in for her.

## 2023-09-28 ENCOUNTER — TELEPHONE (OUTPATIENT)
Age: 63
End: 2023-09-28

## 2023-09-28 NOTE — TELEPHONE ENCOUNTER
Patient returned call. Relayed messages in chart from both the rib xray and hip xray. Patient states she has exercises at home from previous PT she did for her hip. Should she start doing them again? Also she states she had a "neck fusion". She just had a cervical spine xray done 08/15/23 and surgeon said it was ok. Does she need to see a surgeon again. Someone left her a message about needing to see a surgeon.

## 2023-09-30 DIAGNOSIS — S13.4XXA NECK PAIN WITH NECK STIFFNESS AFTER WHIPLASH INJURY TO NECK: Primary | ICD-10-CM

## 2023-09-30 DIAGNOSIS — M54.2 NECK PAIN: ICD-10-CM

## 2023-10-12 NOTE — ASSESSMENT & PLAN NOTE
Patient presents as a new consult at the request of her PCP for the evaluation of possible foreign body  Concern for foreign body on XR cervical spine per PCP; wanted second opinion with NSX regarding this  Has residual numbness in the first 3 digits of her right hand, unchanged since carpal tunnel release surgery in August 2022 by Dr. Cecil Stroud  History of ACDF C4-7 8/2021 with Dr. Chuy Sullivan  Reports occasional right foot drop, worse in the morning but resolves as the day goes on    Imaging:  XR cervical spine 8/15/2023: stable ACDF C4-7. Personally discussed imaging with radiologist Dr. Rosita Dooley who feels foreign body is likely vascular clips    Plan:  Exam: Nonfocal examination  Imaging reviewed with patient, demonstrates stable hardware placement. In regards to the possible foreign body this is felt to be likely vascular clips. Clinically patient is doing well without new or worsening symptoms. No additional surgery is warranted at this time.   She is to continue following up with her orthopedic spine physician (Dr. Martine Valle)  Patient is to return to the office on an as-needed basis or sooner should patient develop worsening symptoms or red flag signs

## 2023-10-13 ENCOUNTER — CONSULT (OUTPATIENT)
Dept: NEUROSURGERY | Facility: CLINIC | Age: 63
End: 2023-10-13
Payer: COMMERCIAL

## 2023-10-13 VITALS
DIASTOLIC BLOOD PRESSURE: 58 MMHG | OXYGEN SATURATION: 91 % | SYSTOLIC BLOOD PRESSURE: 138 MMHG | WEIGHT: 217 LBS | HEIGHT: 63 IN | RESPIRATION RATE: 16 BRPM | HEART RATE: 80 BPM | TEMPERATURE: 98.1 F | BODY MASS INDEX: 38.45 KG/M2

## 2023-10-13 DIAGNOSIS — M54.2 NECK PAIN: ICD-10-CM

## 2023-10-13 DIAGNOSIS — Z98.1 S/P CERVICAL SPINAL FUSION: Primary | ICD-10-CM

## 2023-10-13 PROCEDURE — 99243 OFF/OP CNSLTJ NEW/EST LOW 30: CPT | Performed by: PHYSICIAN ASSISTANT

## 2023-10-13 NOTE — PATIENT INSTRUCTIONS
Outpatient follow-up on an as-needed basis or sooner if you develop worsening symptoms or red flag signs. Continue regular follow-up with Dr. Antelmo Hamilton for spine needs.

## 2023-10-13 NOTE — PROGRESS NOTES
Neurosurgery Office Note  Rey Escobar 59 y.o. female MRN: [id number]      Assessment/Plan     S/P cervical spinal fusion  Patient presents as a new consult at the request of her PCP for the evaluation of possible foreign body  Concern for foreign body on XR cervical spine per PCP; wanted second opinion with NSX regarding this  Has residual numbness in the first 3 digits of her right hand, unchanged since carpal tunnel release surgery in August 2022 by Dr. Mima Eden  History of ACDF C4-7 8/2021 with Dr. Chris Hernandez  Reports occasional right foot drop, worse in the morning but resolves as the day goes on    Imaging:  XR cervical spine 8/15/2023: stable ACDF C4-7. Personally discussed imaging with radiologist Dr. Mary Ma who feels foreign body is likely vascular clips    Plan:  Exam: Nonfocal examination  Imaging reviewed with patient, demonstrates stable hardware placement. In regards to the possible foreign body this is felt to be likely vascular clips. Clinically patient is doing well without new or worsening symptoms. No additional surgery is warranted at this time. She is to continue following up with her orthopedic spine physician (Dr. Renny Schaefer)  Patient is to return to the office on an as-needed basis or sooner should patient develop worsening symptoms or red flag signs       Diagnoses and all orders for this visit:    S/P cervical spinal fusion    Neck pain  -     Ambulatory Referral to Neurosurgery          I have spent a total time of 40 minutes on 10/13/23 in caring for this patient including Diagnostic results, Instructions for management, Patient and family education, Impressions, Counseling / Coordination of care, Documenting in the medical record, Reviewing / ordering tests, medicine, procedures  , Obtaining or reviewing history  , and Communicating with other healthcare professionals .       CHIEF COMPLAINT    Chief Complaint   Patient presents with    Consult       HISTORY    History of Present Illness       59y.o. year old female with past medical history significant for diabetes, history of cervical myelopathy status post C4-7 ACDF with Dr. Marie Grew 8/2021 now under the care of Dr. Belgica Luevano, right carpal tunnel release in 8/2022 with Dr. Jonatan Amaro who presents as a new consultation at the request of her PCP for evaluation of abnormal imaging. Patient states she had imaging of her neck done recently and her primary care provider was concerned about possible foreign body noted on x-ray. She was sent to the office for second opinion. In the past patient was having concerns for cervical myelopathy with frequent falls and neck pain. She underwent an ACDF C4-7 with orthopedics in August 2021 with resolution of her symptoms. A year later she was having carpal tunnel symptoms and underwent a right carpal tunnel release in August 2022 with orthopedics. She does have some residual numbness in the first 3 digits of her right hand as well as a bit in the left hand. She is also having some hip and knee pain for which she is supposed to start physical therapy. She reports about 1 month of possible right foot drop but reports in the morning she notices it more but as the day goes on it resolves on its own. She does have some neck stiffness in the morning but this also resolves after doing some neck mobility exercises. She does not have any new or worsening symptoms. See Discussion    REVIEW OF SYSTEMS    Review of Systems   Constitutional: Negative. HENT: Negative. Eyes: Negative. Respiratory: Negative. Cardiovascular: Negative. Gastrointestinal: Negative. Endocrine: Negative. Genitourinary: Negative. Musculoskeletal:  Positive for arthralgias and neck pain (mild pain in the am). Skin: Negative.     Neurological:  Positive for weakness (right foot droops at times, just started about a month ago), numbness (right hand first 3 digits, at times in left hand/fingers) and headaches (slight in the am). Carpal tunnel sx in right hand       ROS obtained by THALIA Phelan. See HPI. Meds/Allergies     Current Outpatient Medications   Medication Sig Dispense Refill    acetaminophen (TYLENOL) 500 mg tablet Take 500 mg by mouth 2 (two) times a day      ARIPiprazole (ABILIFY) 10 mg tablet Take 1 tablet (10 mg total) by mouth every evening (Patient taking differently: Take 10 mg by mouth daily) 90 tablet 2    atorvastatin (LIPITOR) 40 mg tablet TAKE 1 TABLET BY MOUTH DAILY *NOTE DOSE INCREASE* START WHEN YOU FINISH YOUR FENOFIBRATE 90 tablet 1    Cholecalciferol 4000 units CAPS Take 1 capsule (4,000 Units total) by mouth daily      FLUoxetine (PROzac) 40 MG capsule Take 2 capsules (80 mg total) by mouth daily 180 capsule 2    fluticasone (FLONASE) 50 mcg/act nasal spray 2 sprays into each nostril daily 1 g 0    glipiZIDE (GLUCOTROL) 5 mg tablet Take by mouth 1 tablet with lunch and two tablets with supper for diabetes - fill when patient requests 270 tablet 1    ibuprofen (MOTRIN) 200 mg tablet Take 400 mg by mouth Every other day as needed when pain not alleviated by acetaminophen      lisinopril (ZESTRIL) 10 mg tablet TAKE 1 TABLET BY MOUTH EVERY DAY 90 tablet 1    Magnesium 400 MG TABS Take 1 tablet by mouth in the morning      metFORMIN (GLUCOPHAGE) 1000 MG tablet TAKE 1 TABLET BY MOUTH TWICE A DAY WITH MEALS 180 tablet 3    Multiple Vitamins-Minerals (multivitamin with minerals) tablet Take 1 tablet by mouth daily      omeprazole (PriLOSEC) 40 MG capsule TAKE 1 CAPSULE BY MOUTH EVERY DAY 90 capsule 1    pioglitazone (ACTOS) 30 mg tablet TAKE 1 TABLET (30 MG TOTAL) BY MOUTH DAILY FOR DIABETES 90 tablet 1     No current facility-administered medications for this visit.        Allergies   Allergen Reactions    Bee Venom Swelling     Local swelling at sting site    Biaxin [Clarithromycin] Itching    Depakote Er [Divalproex Sodium Er]      pancreatitis    Shellfish-Derived Products - Food Allergy Other (See Comments)     Ears and cheeks got red    Tanzeum [Albiglutide]      pancreatitis    Carbamazepine Other (See Comments)     Reaction Date:Unknown    Lamotrigine Rash       PAST HISTORY    Past Medical History:   Diagnosis Date    Abdominal pain     started 7/21/16- found something on the pancreas    Abnormal weight loss     last assessed [id number]    Acute sinusitis     Allergic rhinitis     last assessed [id number]    Anemia     had iron infusion    Asthma     Cardiac murmur     Carpal tunnel syndrome     Cellulitis     Cervical disc disease     Cervical myelopathy (HCC) 9/7/2022    Colon polyp     CPAP (continuous positive airway pressure) dependence     not using at this time    Degeneration of thoracic intervertebral disc     Degenerative arthritis of knee     Diabetes mellitus (HCC)     Diverticulosis     Esophagitis, reflux     last assessed 29Jan2014    Excessive sweating     Fatty liver     Frequent falls     Gait disturbance     GERD (gastroesophageal reflux disease)     Granuloma annulare     Head injury     Hyperlipidemia     Hypertension     Hypertensive heart disease     Hypoglycemia     last assessed [id number]    Kidney stone     Leg length discrepancy     Leucocytosis     Lumbar stress fracture     Multiple thyroid nodules     Neuropathy     Obesity     last assessed [id number]    Paronychia of toe     Patellofemoral dysfunction     Pneumonia     Postmenopausal atrophic vaginitis     Prepatellar bursitis     Sebaceous cyst     last assessed [id number]    Sleep apnea     no CPAP used    Suicide attempt ([address])     Tinea corporis     Type 2 diabetes mellitus ([address])     cont meds and monitor BS;  last assessed [id number]    Uncontrolled type 2 diabetes mellitus with hyperglycemia, with long-term current use of insulin ([address]) 8/5/2019    Urinary frequency     Vertigo     Vitamin D deficiency     Dia-Parkinson-White syndrome        Past Surgical History:   Procedure Laterality Date    CERVICAL FUSION N/A 08/03/2021    Procedure: FUSION CERVICAL ANTERIOR W DISCECTOMY C4-5, C5-6, C6-7 with allograft and neuromonitoring  case length 3 hours; Surgeon: Pranay Renteria MD;  [facility];  Service: Orthopedics    COLONOSCOPY      ESOPHAGOGASTRODUODENOSCOPY N/A 08/08/2016    Procedure: ESOPHAGOGASTRODUODENOSCOPY (EGD); Surgeon: Silvia Hardin MD;  Location: [facility] GI LAB; Service:     LUMBAR FUSION      AK ESOPHAGOGASTRODUODENOSCOPY TRANSORAL DIAGNOSTIC N/A 03/28/2019    Procedure: ESOPHAGOGASTRODUODENOSCOPY (EGD); Surgeon: Silvia Hardin MD;  Location: [facility];   Service: Gastroenterology    AK NEUROPLASTY &/TRANSPOS MEDIAN NRV CARPAL TUNNE Right 8/9/2022    Procedure: RELEASE CARPAL TUNNEL;  Surgeon: Melony Ugarte MD;  [facility];  Service: Orthopedics    TONSILLECTOMY      TOOTH EXTRACTION      wisdom teeth removed       Social History     Tobacco Use    Smoking status: Never     Passive exposure: Never    Smokeless tobacco: Never   Vaping Use    Vaping Use: Never used   Substance Use Topics    Alcohol use: Not Currently    Drug use: No       Family History   Problem Relation Age of Onset    Cancer Mother         lung    Heart disease Mother     Coronary artery disease Mother         CABG    Diabetes Mother     Lung cancer Mother 76        smoker    Stroke Mother     Thyroid disease Mother     Other Mother         tobacco use    Parkinsonism Father     Intestinal polyp Father     Bipolar disorder Father     Heart disease Father     Prostate cancer Father 62    Hypertension Sister     Alcohol abuse Sister     Thyroid disease unspecified Sister     Diabetes Brother     Depression Brother     Heart disease Brother     Heart disease Brother     Seizures Brother     Diabetes Maternal Grandmother     Thyroid disease Maternal Grandmother     Endometrial cancer Maternal Aunt         unknown age    Suicide Attempts Paternal Aunt     Depression Paternal Aunt     Ovarian cancer Cousin 40        maternal    Hypertension Family          Above history personally reviewed. EXAM    Vitals:Blood pressure 138/58, pulse 80, temperature 98.1 °F (36.7 °C), temperature source Temporal, resp. rate 16, height 5' 3" (1.6 m), weight 98.4 kg (217 lb), SpO2 91 %, not currently breastfeeding. ,Body mass index is 38.44 kg/m². Physical Exam  Constitutional:       General: She is awake. Appearance: Normal appearance. HENT:      Head: Normocephalic and atraumatic. Eyes:      Conjunctiva/sclera: Conjunctivae normal.   Neck:     Cardiovascular:      Rate and Rhythm: Normal rate. Pulmonary:      Effort: Pulmonary effort is normal. No respiratory distress. Musculoskeletal:      Cervical back: Normal range of motion. Skin:     General: Skin is warm and dry. Neurological:      Mental Status: She is alert and oriented to person, place, and time. Motor: Motor strength is normal.     Coordination: Finger-Nose-Finger Test normal.      Gait: Gait is intact. Psychiatric:         Attention and Perception: Attention and perception normal.         Mood and Affect: Mood and affect normal.         Speech: Speech normal.         Behavior: Behavior normal. Behavior is cooperative. Thought Content: Thought content normal.         Cognition and Memory: Cognition and memory normal.         Judgment: Judgment normal.         Neurologic Exam     Mental Status   Oriented to person, place, and time. Follows 1 step commands. Attention: normal. Concentration: normal.   Speech: speech is normal   Level of consciousness: alert  Knowledge: good. Normal comprehension. Cranial Nerves     CN III, IV, VI   Conjugate gaze: present    CN VIII   Hearing: intact    Motor Exam   Muscle bulk: normal  Overall muscle tone: normal  Right arm pronator drift: absent  Left arm pronator drift: absent    Strength   Strength 5/5 throughout.    No appreciated right foot drop     Sensory Exam   Residual numbness in right first three digits     Gait, Coordination, and Reflexes     Gait  Gait: normal    Coordination   Finger to nose coordination: normal    Tremor   Resting tremor: absent  Intention tremor: absent  Action tremor: absent    Reflexes   Right : 2+  Left : 2+        MEDICAL DECISION MAKING    Imaging Studies:     XR hip/pelv 2-3 vws left if performed    Result Date: 9/26/2023  Narrative: LEFT HIP INDICATION:   M25.552: Pain in left hip. COMPARISON: Bilateral hip x-ray dated February 23, 2021. VIEWS:  XR HIP/PELV 2-3 VWS LEFT  W PELVIS IF PERFORMED FINDINGS: There is no acute fracture or dislocation. Moderate left hip osteoarthritis is seen. Mild osteoarthritic degenerative changes of the right hip joint are also present. No lytic or blastic osseous lesion. Soft tissues are unremarkable. Degenerative changes visualized lower lumbar spine. Impression: No acute osseous abnormality. Degenerative changes as described. Workstation performed: [id number]     XR ribs right w pa chest min 3 views    Result Date: 9/23/2023  Narrative: RIGHT RIBS AND CHEST INDICATION:   R07.81: Pleurodynia. Right rib pain for 1 month. No known injury. COMPARISON: Abdomen CT [address], CXR 7/9/2021, chest CT 1/14/2014. VIEWS:  XR RIBS RIGHT W PA CHEST MIN 3 VIEWS FINDINGS: No acute displaced rib fracture or pathologic bone lesion. Cervicothoracic fusion. 3 mm metallic foreign body left neck. No pneumothorax or pleural effusion/hemothorax. Lungs clear. Soft tissues normal. Cardiomediastinal silhouette normal.     Impression: No acute displaced rib fracture or pathologic bone lesion. No acute cardiopulmonary disease. Workstation performed: [id number]       I have personally reviewed pertinent reports.    and I have personally reviewed pertinent films in PACS

## 2023-10-20 ENCOUNTER — HOSPITAL ENCOUNTER (OUTPATIENT)
Dept: MAMMOGRAPHY | Facility: HOSPITAL | Age: 63
Discharge: HOME | End: 2023-10-20
Attending: OBSTETRICS & GYNECOLOGY
Payer: COMMERCIAL

## 2023-10-20 VITALS — WEIGHT: 216.93 LBS | HEIGHT: 63 IN | BODY MASS INDEX: 38.44 KG/M2

## 2023-10-20 DIAGNOSIS — Z12.31 ENCOUNTER FOR SCREENING MAMMOGRAM FOR MALIGNANT NEOPLASM OF BREAST: ICD-10-CM

## 2023-10-20 PROCEDURE — 77067 SCR MAMMO BI INCL CAD: CPT

## 2023-10-20 PROCEDURE — 77063 BREAST TOMOSYNTHESIS BI: CPT

## 2023-11-01 ENCOUNTER — TELEPHONE (OUTPATIENT)
Age: 63
End: 2023-11-01

## 2023-11-01 ENCOUNTER — VBI (OUTPATIENT)
Dept: ADMINISTRATIVE | Facility: OTHER | Age: 63
End: 2023-11-01

## 2023-11-01 DIAGNOSIS — R80.9 TYPE 2 DIABETES MELLITUS WITH MICROALBUMINURIA, WITHOUT LONG-TERM CURRENT USE OF INSULIN: Primary | ICD-10-CM

## 2023-11-01 DIAGNOSIS — E11.29 TYPE 2 DIABETES MELLITUS WITH MICROALBUMINURIA, WITHOUT LONG-TERM CURRENT USE OF INSULIN: Primary | ICD-10-CM

## 2023-11-01 RX ORDER — LANCETS 30 GAUGE
EACH MISCELLANEOUS
Qty: 1 KIT | Refills: 0 | Status: SHIPPED | OUTPATIENT
Start: 2023-11-01

## 2023-11-01 RX ORDER — LANCETS
EACH MISCELLANEOUS
Qty: 100 EACH | Refills: 3 | Status: SHIPPED | OUTPATIENT
Start: 2023-11-01

## 2023-11-01 RX ORDER — BLOOD SUGAR DIAGNOSTIC
STRIP MISCELLANEOUS
Qty: 100 EACH | Refills: 3 | Status: SHIPPED | OUTPATIENT
Start: 2023-11-01

## 2023-11-01 NOTE — TELEPHONE ENCOUNTER
Pt calling refill line, her blood sugar reader is no longer working and needs a rx for a new one. Pt uses [employer] and needs a new reader. Please advise. Pt uses [employer] [address] [place].

## 2023-11-01 NOTE — TELEPHONE ENCOUNTER
Patient needs a new glucometer, test strips and lancets. Her insurance allows [employer]. I sent the requests to you to authorize.

## 2023-11-09 NOTE — ANESTHESIA POSTPROCEDURE EVALUATION
"It was a pleasure taking care of you today.  I've included a brief summary of our discussion and care plan from today's visit below.  Please review this information with your primary care provider.  ______________________________________________________________________    My recommendations are summarized as follows:  -- Please decrease the dose omeprazole to  20 mg per day. You can continue this for 1-2 weeks.  -- Following the dose decrease to 20 mg, if you are feeling well without increased symptoms, you can try to stop the medication all together.  -- If you do experience any increased symptoms while reducing the dose or stopping the medication, you can use over-the-counter famotidine (Pepcid) 10-20 mg once to twice daily, as needed. You can also use over-the-counter Gaviscon. Look for the \"maximum\" or \"extra strength\" version. Take 1 ounce (2 tablespoons) before meals and before bed, as needed.  -- If you find that you continue to have bothersome symptoms while off of the medication, please let me know, we will plan for an upper endoscopy then.     --for your constipation, would recommend taking miralax 1 capful every day, if stools are too loose, every other day - you can take a max of 3 capfuls of miralax daily.  Other interventions for constipation:  -- Stay well hydrated with at least 64 oz fluids daily  -- Stay active with goal 150 minutes mild to moderate activity per week  -- Trial of a \"squatty potty\" - this is a glorified stool that you rest our feet on while having a bowel movement, which can help with improving defecation    Lifestyle modifications for gastroesophageal reflux disease (GERD).     1. Change your eating habits.  -- It's best to eat several small meals instead of two or three large meals.  -- After you eat, wait 2 to 3 hours before you lie down. Late-night snacks aren't a good idea.  -- Chocolate, mint, and alcohol can make GERD worse. They relax the valve between the esophagus and the " Post-Op Assessment Note    CV Status:  Stable    Pain management: adequate     Mental Status:  Alert and awake   Hydration Status:  Euvolemic   PONV Controlled:  Controlled   Airway Patency:  Patent   Post Op Vitals Reviewed: Yes      Staff: CRNA           BP      Temp     Pulse     Resp      SpO2 stomach.  -- Spicy foods, fatty foods, foods that have a lot of acid (like tomatoes and oranges), and coffee can make GERD symptoms worse in some people. If your symptoms are worse after you eat a certain food, you may want to stop eating that food to see if your symptoms get better.    2. Do not smoke or chew tobacco.    3. If you have GERD symptoms at night, raise the head of your bed 6 in. (15 cm) to 8 in. (20 cm) by putting the frame on blocks or placing a foam wedge under the head of your mattress. (Adding extra pillows does not work.)    4. Avoid or reduce pressure on your stomach. Don't wear tight clothing around your middle.    5. Lose weight if you need to. Losing just 5 to 10 pounds can help.          -- please see scheduling information provided below     Return to GI Clinic in 3 months to review your progress.    ______________________________________________________________________    How do I schedule labs, imaging studies, or procedures that were ordered in clinic today?     Labs: To schedule lab appointment at the [facility], use my chart or call [phone]. If you have a [place] lab closer to home where you are regularly seen you can give them a call.     Procedures: If a colonoscopy, upper endoscopy, breath test, esophageal manometry, or pH impedence was ordered today, our endoscopy team will call you to schedule this. If you have not heard from our endoscopy team within a week, please call [phone] to schedule.     Imaging Studies: If you were scheduled for a CT scan, X-ray, MRI, ultrasound, HIDA scan or other imaging study, please call [phone] to have this scheduled.     Referral: If a referral to another specialty was ordered, expect a phone call or follow instructions above. If you have not heard from anyone regarding your referral in a week, please call our clinic to check the status.     Who do I call with any questions after my  visit?  Please be in touch if there are any further questions that arise following today's visit.  There are multiple ways to contact your gastroenterology care team.      During business hours, you may reach a Gastroenterology nurse at [phone]    To schedule or reschedule an appointment, please call [phone].     You can always send a secure message through [employer].  [employer] messages are answered by your nurse or doctor typically within 24 hours.  Please allow extra time on weekends and holidays.      For urgent/emergent questions after business hours, you may reach the on-call GI Fellow by contacting the [facility]  at [phone].     How will I get the results of any tests ordered?    You will receive all of your results.  If you have signed up for [employer], any tests ordered at your visit will be available to you after your provider reviews them.  Typically this takes 1-2 weeks.  If there are urgent results that require a change in your care plan, your provider or nurse will call you to discuss the next steps.      What is [employer]?  [employer] is a secure way for you to access all of your healthcare records from the [facility].  It is a web based computer program, so you can sign on to it from any location.  It also allows you to send secure messages to your care team.  I recommend signing up for [employer] access if you have not already done so and are comfortable with using a computer.      How to I schedule a follow-up visit?  If you did not schedule a follow-up visit today, please call [phone] to schedule a follow-up office visit.      Sincerely,    John Schwartz PA-C  Division of Gastroenterology, Hepatology & Nutrition  [facility]

## 2023-11-27 ENCOUNTER — ESTABLISHED COMPREHENSIVE EXAM (OUTPATIENT)
Dept: URBAN - METROPOLITAN AREA CLINIC 6 | Facility: CLINIC | Age: 63
End: 2023-11-27

## 2023-11-27 DIAGNOSIS — H04.123: ICD-10-CM

## 2023-11-27 DIAGNOSIS — H43.813: ICD-10-CM

## 2023-11-27 DIAGNOSIS — E11.9: ICD-10-CM

## 2023-11-27 DIAGNOSIS — H25.813: ICD-10-CM

## 2023-11-27 LAB
LEFT EYE DIABETIC RETINOPATHY: NORMAL
RIGHT EYE DIABETIC RETINOPATHY: NORMAL

## 2023-11-27 PROCEDURE — 92014 COMPRE OPH EXAM EST PT 1/>: CPT

## 2023-11-27 ASSESSMENT — VISUAL ACUITY
OD_CC: 20/20-2
OS_CC: 20/30
OU_CC: 20/25
OU_CC: J1+

## 2023-11-27 ASSESSMENT — TONOMETRY
OD_IOP_MMHG: 11
OS_IOP_MMHG: 11

## 2023-11-29 NOTE — TELEPHONE ENCOUNTER
Patient is transferring care. Reviewed Ophthalmology note from October 24, 2018   Dilated retinal exam done- yes  Exam did not show retinopathy    Marcella Hilario MD

## 2023-12-11 ENCOUNTER — CLINICAL SUPPORT (OUTPATIENT)
Dept: INTERNAL MEDICINE CLINIC | Facility: CLINIC | Age: 63
End: 2023-12-11
Payer: COMMERCIAL

## 2023-12-11 VITALS — BODY MASS INDEX: 39.82 KG/M2 | WEIGHT: 224.8 LBS

## 2023-12-11 DIAGNOSIS — R80.9 TYPE 2 DIABETES MELLITUS WITH MICROALBUMINURIA, WITHOUT LONG-TERM CURRENT USE OF INSULIN: Primary | ICD-10-CM

## 2023-12-11 DIAGNOSIS — E11.29 TYPE 2 DIABETES MELLITUS WITH MICROALBUMINURIA, WITHOUT LONG-TERM CURRENT USE OF INSULIN: Primary | ICD-10-CM

## 2023-12-11 DIAGNOSIS — E78.5 DYSLIPIDEMIA: ICD-10-CM

## 2023-12-11 LAB — SL AMB POCT HEMOGLOBIN AIC: 7.1 (ref ?–6.5)

## 2023-12-11 PROCEDURE — 83036 HEMOGLOBIN GLYCOSYLATED A1C: CPT | Performed by: PHARMACIST

## 2023-12-11 RX ORDER — ATORVASTATIN CALCIUM 80 MG/1
80 TABLET, FILM COATED ORAL DAILY
Qty: 90 TABLET | Refills: 1 | Status: SHIPPED | OUTPATIENT
Start: 2023-12-11

## 2023-12-11 NOTE — PROGRESS NOTES
[facility], Pharmacist    Current Diabetes Regimen:  Metformin   Glipizide  Pioglitazone    ASSESSMENT/PLAN                                                                                     1. Type 2 diabetes mellitus with microalbuminuria, without long-term current use of insulin   Assessment & Plan:    Lab Results   Component Value Date    HGBA1C 7.1 (A) 12/11/2023   Most recent A1c above goal but slightly  SMBG fluctuates, no hypoglycemia  Room for lifestyle modifications. Medications: continue as prescribed. Home Monitoring: continue once daily, patient will alternate times. Blood sugar log provided  Patient will try to restart going back to the gym at least 2x/week    Orders:  -     POCT hemoglobin A1c    2. Dyslipidemia  Assessment & Plan:  Patient will increase physical activity, reduce sweets and increase atorvastatin dose. If Tgs remain elevated on high atorvastatin dose, will look to add on fish oil. Orders:  -     atorvastatin (LIPITOR) 80 mg tablet; Take 1 tablet (80 mg total) by mouth daily      Follow-Up: 6 months for poct a1c, agrees to call for sooner appointment if hypolgycemia      SUBJECTIVE                                                                                                              Medication Adherence: Medication list reviewed with patient, reports the following discrepancies/problems:  Atorvastatin: has increased dose  Fluticasone: has not used  Glipizide: 1 with lunch and 2 tablets with supper    Prefers Accu-chek lancets as it holds multiple lancets in the canister    2. Medication Efficacy:    Review of Systems   Constitutional:  Negative for activity change, appetite change, fatigue and unexpected weight change. Eyes:  Negative for visual disturbance. Gastrointestinal:  Positive for diarrhea (but tolerable, usually every day). Negative for constipation, nausea and vomiting.    Endocrine: Negative for polydipsia, polyphagia and polyuria. Musculoskeletal:  Negative for myalgias. Howard horse muscle spasm in leg   Neurological:  Negative for dizziness, light-headedness and headaches. Home Monitoring:  Glucometer: Yes, Brand: One Touch Ultra 2    Hypoglycemia history: 0 SMBG readings < 70mg/dL since last appt   Has glucose tablets: No   Knows how to treat: Yes   Unsymptomatic hypoglycemia: No, reports hypoglycemia s/sx occasionally when blood sugar readings are dropping     Has wrist home blood pressure cuff; has not used since last appointment     3. Lifestyle: started weight watchers on Feb 1, alloted 28 points per day; Supper is largest meal. Joined gym in Oct, was going 30 minutes/day but hasn't been going for a couple of weeks due to holidays. Has not been paying close attention to what she is eating currently       OBJECTIVE                                                                                                      Vitals:    12/11/23 1008   Weight: 102 kg (224 lb 12.8 oz)       Home blood sugar readings:   Fasting blood sugar: 181, 169, 142  Pre-lunch: 115, 247 (after egg nog), 154  Pre-supper: 136, 180, 132    Pertinent Lab Data:    Lab Results   Component Value Date    SODIUM 141 09/01/2023    K 4.6 09/01/2023    EGFR 100 09/01/2023    CREATININE 0.65 09/01/2023    GLUF 154 (H) 04/12/2022    FCCLUDXQ24 344 07/10/2016    MICROALBCRE 61 (H) 10/17/2018     Lab Results   Component Value Date    HGBA1C 7.1 (A) 12/11/2023    HGBA1C 6.3 (H) 09/01/2023    HGBA1C 6.1 06/12/2023     Pharmacist Tracking Tool  Reason For Outreach: Embedded Pharmacist  Demographics:  Intervention Method:  In Person  Type of Intervention: Follow-Up  Topics Addressed: Diabetes and Dyslipidemia  Pharmacologic Interventions: Dose or Frequency Adjusted  Non-Pharmacologic Interventions: Care coordination, Labs, and Care Gap  Time:  Direct Patient Care:  20  mins  Care Coordination:  10  mins  Recommendation Recipient: Patient/Caregiver  Outcome: Accepted

## 2023-12-11 NOTE — ASSESSMENT & PLAN NOTE
Patient will increase physical activity, reduce sweets and increase atorvastatin dose. If Tgs remain elevated on high atorvastatin dose, will look to add on fish oil.

## 2023-12-11 NOTE — ASSESSMENT & PLAN NOTE
Lab Results   Component Value Date    HGBA1C 7.1 (A) 12/11/2023   Most recent A1c above goal but slightly  SMBG fluctuates, no hypoglycemia  Room for lifestyle modifications. Medications: continue as prescribed. Home Monitoring: continue once daily, patient will alternate times.  Blood sugar log provided  Patient will try to restart going back to the gym at least 2x/week

## 2023-12-13 DIAGNOSIS — K29.80 DUODENITIS: ICD-10-CM

## 2023-12-13 DIAGNOSIS — R10.13 EPIGASTRIC PAIN: ICD-10-CM

## 2023-12-13 RX ORDER — OMEPRAZOLE 40 MG/1
CAPSULE, DELAYED RELEASE ORAL
Qty: 90 CAPSULE | Refills: 1 | Status: SHIPPED | OUTPATIENT
Start: 2023-12-13

## 2023-12-23 DIAGNOSIS — E11.9 TYPE 2 DIABETES MELLITUS WITHOUT COMPLICATION, WITHOUT LONG-TERM CURRENT USE OF INSULIN (HCC): ICD-10-CM

## 2023-12-26 RX ORDER — GLIPIZIDE 5 MG/1
TABLET ORAL
Qty: 270 TABLET | Refills: 1 | Status: SHIPPED | OUTPATIENT
Start: 2023-12-26

## 2023-12-28 DIAGNOSIS — E11.9 CONTROLLED TYPE 2 DIABETES MELLITUS WITHOUT COMPLICATION, WITHOUT LONG-TERM CURRENT USE OF INSULIN (HCC): ICD-10-CM

## 2023-12-28 RX ORDER — PIOGLITAZONEHYDROCHLORIDE 30 MG/1
30 TABLET ORAL DAILY
Qty: 90 TABLET | Refills: 1 | Status: SHIPPED | OUTPATIENT
Start: 2023-12-28

## 2024-01-05 ENCOUNTER — OFFICE VISIT (OUTPATIENT)
Dept: INTERNAL MEDICINE CLINIC | Facility: CLINIC | Age: 64
End: 2024-01-05
Payer: COMMERCIAL

## 2024-01-05 VITALS
SYSTOLIC BLOOD PRESSURE: 116 MMHG | DIASTOLIC BLOOD PRESSURE: 62 MMHG | WEIGHT: 224.4 LBS | BODY MASS INDEX: 39.75 KG/M2 | OXYGEN SATURATION: 97 % | HEART RATE: 89 BPM

## 2024-01-05 DIAGNOSIS — B97.89 VIRAL LARYNGITIS: Primary | ICD-10-CM

## 2024-01-05 DIAGNOSIS — J04.0 VIRAL LARYNGITIS: Primary | ICD-10-CM

## 2024-01-05 PROCEDURE — 99213 OFFICE O/P EST LOW 20 MIN: CPT | Performed by: INTERNAL MEDICINE

## 2024-01-05 NOTE — PROGRESS NOTES
Assessment/Plan:    Viral laryngitis  Recommend plenty of rest, fluids gargle with salt water or gargle with Listerine monitor symptoms if any change, worse or if symptoms do not jessica in the next 1 week she is to notify me for further evaluation including ENT evaluation for nasopharyngeal scope patient is a non-smoker no alcohol.  We did discuss possible Medrol Dosepak at this point in time because her symptoms are improving we have decided to hold off on starting the Medrol but will observe if her symptoms worsen she can call back for a prescription RTO as scheduled call if any problems.  Also recommended no spicy foods, no overuse of voice         Problem List Items Addressed This Visit        Respiratory    Viral laryngitis - Primary     Recommend plenty of rest, fluids gargle with salt water or gargle with Listerine monitor symptoms if any change, worse or if symptoms do not jessica in the next 1 week she is to notify me for further evaluation including ENT evaluation for nasopharyngeal scope patient is a non-smoker no alcohol.  We did discuss possible Medrol Dosepak at this point in time because her symptoms are improving we have decided to hold off on starting the Medrol but will observe if her symptoms worsen she can call back for a prescription RTO as scheduled call if any problems.  Also recommended no spicy foods, no overuse of voice        Length of visit 20 minutes reviewing patient's history counseling the patient regarding her condition documentation        Subjective:      Patient ID: Jessica Zhou is a 63 y.o. female.    HPI 63-year-old female coming in with a chief complaint of hoarse voice, patient reports me symptoms horse voice, for 1 week , no st , no sinus pain ,  no  gerd , no ear pain , cov neg no tx continuous  less , no difficuty with swallowing   Non-smoker and no alcohol  The following portions of the patient's history were reviewed and updated as appropriate: allergies, current  medications, past family history, past medical history, past social history, past surgical history and problem list.    Review of Systems   Constitutional:  Negative for appetite change, chills and fever.   HENT:  Positive for voice change. Negative for ear pain, rhinorrhea, sinus pressure, sinus pain, sneezing, sore throat and trouble swallowing.    Respiratory:  Negative for cough, shortness of breath and wheezing.    Gastrointestinal:  Negative for diarrhea, nausea and vomiting.   Neurological:  Negative for headaches.         Objective:    Return if symptoms worsen or fail to improve, for Next scheduled follow up.    No results found.      Allergies   Allergen Reactions   • Bee Venom Swelling     Local swelling at sting site   • Biaxin [Clarithromycin] Itching   • Depakote Er [Divalproex Sodium Er]      pancreatitis   • Shellfish-Derived Products - Food Allergy Other (See Comments)     Ears and cheeks got red   • Tanzeum [Albiglutide]      pancreatitis   • Carbamazepine Other (See Comments)     Reaction Date:Unknown   • Lamotrigine Rash       Past Medical History:   Diagnosis Date   • Abdominal pain     started 7/21/16- found something on the pancreas   • Abnormal weight loss     last assessed 26feb2014   • Acute sinusitis    • Allergic rhinitis     last assessed 08Jul2015   • Anemia     had iron infusion   • Asthma    • Cardiac murmur    • Carpal tunnel syndrome    • Cellulitis    • Cervical disc disease    • Cervical myelopathy (HCC) 9/7/2022   • Colon polyp    • CPAP (continuous positive airway pressure) dependence     not using at this time   • Degeneration of thoracic intervertebral disc    • Degenerative arthritis of knee    • Diabetes mellitus (HCC)    • Diverticulosis    • Esophagitis, reflux     last assessed 29Jan2014   • Excessive sweating    • Fatty liver    • Frequent falls    • Gait disturbance    • GERD (gastroesophageal reflux disease)    • Granuloma annulare    • Head injury    • Hyperlipidemia     • Hypertension    • Hypertensive heart disease    • Hypoglycemia     last assessed 08Feb2016   • Kidney stone    • Leg length discrepancy    • Leucocytosis    • Lumbar stress fracture    • Multiple thyroid nodules    • Neuropathy    • Obesity     last assessed [id number]   • Paronychia of toe    • Patellofemoral dysfunction    • Pneumonia    • Postmenopausal atrophic vaginitis    • Prepatellar bursitis    • Sebaceous cyst     last assessed 21Feb2014   • Sleep apnea     no CPAP used   • Suicide attempt ([facility])    • Tinea corporis    • Type 2 diabetes mellitus ([facility])     cont meds and monitor BS;  last assessed 08Nov2016   • Uncontrolled type 2 diabetes mellitus with hyperglycemia, with long-term current use of insulin ([facility]) 8/5/2019   • Urinary frequency    • Vertigo    • Vitamin D deficiency    • Dia-Parkinson-White syndrome      Past Surgical History:   Procedure Laterality Date   • CERVICAL FUSION N/A 08/03/2021    Procedure: FUSION CERVICAL ANTERIOR W DISCECTOMY C4-5, C5-6, C6-7 with allograft and neuromonitoring  case length 3 hours;  Surgeon: Kodak Wright MD;  Location:  MAIN OR;  Service: Orthopedics   • COLONOSCOPY     • ESOPHAGOGASTRODUODENOSCOPY N/A 08/08/2016    Procedure: ESOPHAGOGASTRODUODENOSCOPY (EGD);  Surgeon: Trey Bernstein MD;  Location: [facility] GI LAB;  Service:    • LUMBAR FUSION     • ID ESOPHAGOGASTRODUODENOSCOPY TRANSORAL DIAGNOSTIC N/A 03/28/2019    Procedure: ESOPHAGOGASTRODUODENOSCOPY (EGD);  Surgeon: Trey Bernstein MD;  Location: [facility] GI LAB;  Service: Gastroenterology   • ID NEUROPLASTY &/TRANSPOS MEDIAN NRV CARPAL TUNNE Right 8/9/2022    Procedure: RELEASE CARPAL TUNNEL;  Surgeon: Richie Borden MD;  Location: [facility] MAIN OR;  Service: Orthopedics   • TONSILLECTOMY     • TOOTH EXTRACTION      wisdom teeth removed     Current Outpatient Medications on File Prior to Visit   Medication Sig Dispense Refill   • acetaminophen (TYLENOL) 500 mg tablet Take 500 mg by mouth 2 (two)  times a day     • ARIPiprazole (ABILIFY) 10 mg tablet Take 1 tablet (10 mg total) by mouth every evening (Patient taking differently: Take 10 mg by mouth daily) 90 tablet 2   • atorvastatin (LIPITOR) 80 mg tablet Take 1 tablet (80 mg total) by mouth daily 90 tablet 1   • Cholecalciferol 4000 units CAPS Take 1 capsule (4,000 Units total) by mouth daily     • FLUoxetine (PROzac) 40 MG capsule Take 2 capsules (80 mg total) by mouth daily (Patient taking differently: Take 40 mg by mouth 2 (two) times a day) 180 capsule 2   • glipiZIDE (GLUCOTROL) 5 mg tablet TAKE BY MOUTH 1 TABLET WITH LUNCH AND TWO TABLETS WITH SUPPER FOR DIABETES - FILL WHEN PATIENT REQUESTS 270 tablet 1   • glucose blood (OneTouch Ultra) test strip Test blood sugar twice daily 100 each 3   • ibuprofen (MOTRIN) 200 mg tablet Take 400 mg by mouth Every other day as needed when pain not alleviated by acetaminophen     • Lancets (onetouch ultrasoft) lancets Test blood sugar twice daily 100 each 3   • lisinopril (ZESTRIL) 10 mg tablet TAKE 1 TABLET BY MOUTH EVERY DAY 90 tablet 1   • Magnesium 400 MG TABS Take 1 tablet by mouth in the morning     • metFORMIN (GLUCOPHAGE) 1000 MG tablet TAKE 1 TABLET BY MOUTH TWICE A DAY WITH MEALS 180 tablet 3   • Multiple Vitamins-Minerals (multivitamin with minerals) tablet Take 1 tablet by mouth daily     • omeprazole (PriLOSEC) 40 MG capsule TAKE 1 CAPSULE BY MOUTH EVERY DAY 90 capsule 1   • pioglitazone (ACTOS) 30 mg tablet TAKE 1 TABLET (30 MG TOTAL) BY MOUTH DAILY FOR DIABETES 90 tablet 1     No current facility-administered medications on file prior to visit.     Family History   Problem Relation Age of Onset   • Cancer Mother         lung   • Heart disease Mother    • Coronary artery disease Mother         CABG   • Diabetes Mother    • Lung cancer Mother 68        smoker   • Stroke Mother    • Thyroid disease Mother    • Other Mother         tobacco use   • Parkinsonism Father    • Intestinal polyp Father    •  Bipolar disorder Father    • Heart disease Father    • Prostate cancer Father 58   • Hypertension Sister    • Alcohol abuse Sister    • Thyroid disease unspecified Sister    • Diabetes Brother    • Depression Brother    • Heart disease Brother    • Heart disease Brother    • Seizures Brother    • Diabetes Maternal Grandmother    • Thyroid disease Maternal Grandmother    • Endometrial cancer Maternal Aunt         unknown age   • Suicide Attempts Paternal Aunt    • Depression Paternal Aunt    • Ovarian cancer Cousin 40        maternal   • Hypertension Family      Social History     Socioeconomic History   • Marital status: /Civil Union     Spouse name: Not on file   • Number of children: 0   • Years of education: 12   • Highest education level: 12th grade   Occupational History   • Occupation: retired   Tobacco Use   • Smoking status: Never     Passive exposure: Never   • Smokeless tobacco: Never   Vaping Use   • Vaping status: Never Used   Substance and Sexual Activity   • Alcohol use: Not Currently   • Drug use: No   • Sexual activity: Not Currently     Partners: Male   Other Topics Concern   • Not on file   Social History Narrative    Education: high school graduate    Learning Disabilities: none    Marital History:     Children: none    Living Arrangement: lives in home with     Occupational History: retired, 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[employer]    Functioning Relationships: good support system,  is supportive    Legal History: none     History: None        Caffeine use    Denied home environment domestic violence    Job physical requirements heavy lifting     Social Determinants of Health     Financial Resource Strain: Low Risk  (9/11/2023)    Overall Financial Resource Strain (CARDIA)    • Difficulty of Paying Living Expenses: Not hard at all   Food Insecurity: No Food Insecurity (9/11/2023)    Hunger Vital Sign    • Worried About Running Out of  Food in the Last Year: Never true    • Ran Out of Food in the Last Year: Never true   Transportation Needs: No Transportation Needs (9/11/2023)    PRAPARE - Transportation    • Lack of Transportation (Medical): No    • Lack of Transportation (Non-Medical): No   Physical Activity: Inactive (9/11/2023)    Exercise Vital Sign    • Days of Exercise per Week: 0 days    • Minutes of Exercise per Session: 0 min   Stress: No Stress Concern Present (9/11/2023)    [place] of Occupational Health - Occupational Stress Questionnaire    • Feeling of Stress : Only a little   Social Connections: Moderately Isolated (9/11/2023)    Social Connection and Isolation Panel [NHANES]    • Frequency of Communication with Friends and Family: Once a week    • Frequency of Social Gatherings with Friends and Family: Once a week    • Attends Sabianist Services: Never    • Active Member of Clubs or Organizations: Yes    • Attends Club or Organization Meetings: More than 4 times per year    • Marital Status:    Intimate Partner Violence: Not At Risk (9/11/2023)    Humiliation, Afraid, Rape, and Kick questionnaire    • Fear of Current or Ex-Partner: No    • Emotionally Abused: No    • Physically Abused: No    • Sexually Abused: No   Housing Stability: Low Risk  (9/11/2023)    Housing Stability Vital Sign    • Unable to Pay for Housing in the Last Year: No    • Number of Places Lived in the Last Year: 1    • Unstable Housing in the Last Year: No     Vitals:    01/05/24 1043   BP: 116/62   BP Location: Left arm   Patient Position: Sitting   Cuff Size: Large   Pulse: 89   SpO2: 97%   Weight: 102 kg (224 lb 6.4 oz)     Results for orders placed or performed in visit on 12/11/23   POCT hemoglobin A1c   Result Value Ref Range    Hemoglobin A1C 7.1 (A) 6.5     Weight (last 2 days)     Date/Time Weight    01/05/24 1043 102 (224.4)        Body mass index is 39.75 kg/m².  BP      Temp      Pulse     Resp      SpO2        Vitals:    01/05/24  1043   Weight: 102 kg (224 lb 6.4 oz)     Vitals:    01/05/24 1043   Weight: 102 kg (224 lb 6.4 oz)       /62 (BP Location: Left arm, Patient Position: Sitting, Cuff Size: Large)   Pulse 89   Wt 102 kg (224 lb 6.4 oz)   SpO2 97%   BMI 39.75 kg/m²       Posterior pharynx minimal erythema no exudate normal airway mild cobblestoning   Physical Exam  Vitals reviewed.   Constitutional:       General: She is not in acute distress.     Appearance: Normal appearance. She is well-developed. She is obese. She is not diaphoretic.   HENT:      Head: Normocephalic and atraumatic.      Right Ear: Tympanic membrane, ear canal and external ear normal. There is no impacted cerumen.      Left Ear: Tympanic membrane, ear canal and external ear normal. There is no impacted cerumen.      Nose: Nose normal.      Mouth/Throat:      Pharynx: No oropharyngeal exudate.   Eyes:      General: No scleral icterus.        Right eye: No discharge.         Left eye: No discharge.      Conjunctiva/sclera: Conjunctivae normal.      Pupils: Pupils are equal, round, and reactive to light.   Cardiovascular:      Heart sounds: Normal heart sounds. No murmur heard.  Pulmonary:      Effort: No respiratory distress.      Breath sounds: No wheezing or rales.   Lymphadenopathy:      Cervical: No cervical adenopathy.   Neurological:      Mental Status: She is alert.

## 2024-01-06 NOTE — ASSESSMENT & PLAN NOTE
Recommend plenty of rest, fluids gargle with salt water or gargle with Listerine monitor symptoms if any change, worse or if symptoms do not jessica in the next 1 week she is to notify me for further evaluation including ENT evaluation for nasopharyngeal scope patient is a non-smoker no alcohol.  We did discuss possible Medrol Dosepak at this point in time because her symptoms are improving we have decided to hold off on starting the Medrol but will observe if her symptoms worsen she can call back for a prescription RTO as scheduled call if any problems.  Also recommended no spicy foods, no overuse of voice

## 2024-01-16 ENCOUNTER — TELEPHONE (OUTPATIENT)
Dept: PSYCHIATRY | Facility: CLINIC | Age: 64
End: 2024-01-16

## 2024-01-16 NOTE — TELEPHONE ENCOUNTER
Patient is calling regarding cancelling an appointment.    Date/Time: 1/16 @3;30    Reason: Bad weather    Patient was rescheduled: YES [x] NO []  If yes, when was Patient reschedule for: 1/25 @2:30    Patient requesting call back to reschedule: YES [] NO [x]

## 2024-01-25 ENCOUNTER — OFFICE VISIT (OUTPATIENT)
Dept: PSYCHIATRY | Facility: CLINIC | Age: 64
End: 2024-01-25
Payer: COMMERCIAL

## 2024-01-25 VITALS
DIASTOLIC BLOOD PRESSURE: 74 MMHG | WEIGHT: 228 LBS | HEART RATE: 88 BPM | HEIGHT: 63 IN | SYSTOLIC BLOOD PRESSURE: 145 MMHG | BODY MASS INDEX: 40.4 KG/M2

## 2024-01-25 DIAGNOSIS — F60.9 PERSONALITY DISORDER (HCC): Chronic | ICD-10-CM

## 2024-01-25 DIAGNOSIS — F41.1 GAD (GENERALIZED ANXIETY DISORDER): Chronic | ICD-10-CM

## 2024-01-25 DIAGNOSIS — F31.76 BIPOLAR I DISORDER, MOST RECENT EPISODE DEPRESSED, IN REMISSION (HCC): Primary | Chronic | ICD-10-CM

## 2024-01-25 DIAGNOSIS — Z79.899 LONG-TERM USE OF HIGH-RISK MEDICATION: Chronic | ICD-10-CM

## 2024-01-25 PROCEDURE — 90833 PSYTX W PT W E/M 30 MIN: CPT | Performed by: PSYCHIATRY & NEUROLOGY

## 2024-01-25 PROCEDURE — 99214 OFFICE O/P EST MOD 30 MIN: CPT | Performed by: PSYCHIATRY & NEUROLOGY

## 2024-01-25 NOTE — PSYCH
MEDICATION MANAGEMENT NOTE        [facility] - PSYCHIATRIC ASSOCIATES      Name and Date of Birth:  Jessica Zhou 63 y.o. 1960 MRN: [id number]    Insurance: Payor: BLUE CROSS / Plan: [facility] PLAN 361 / Product Type: Blue Fee for Service /     Date of Visit: 2024    Reason for Visit:   Chief Complaint   Patient presents with    Medication Management    Follow-up       SUBJECTIVE:    Jessica is seen today for a follow up for Bipolar Disorder, Generalized Anxiety Disorder, and personality disorder. She has improved since the last visit. She states that depressive symptoms have resolved and reports that her mood has been more stable. She states that anxiety symptoms are controlled. She is glad that her brother is less overwhelmed with his daughter. She continues to worry about her parents-in-law who are both in a nursing home, but has been coping better with that situation.     She denies any suicidal ideation, intent or plan at present; denies any homicidal ideation, intent or plan at present.    She has no auditory hallucinations, denies any visual hallucinations, has no delusional thoughts.    She denies any side effects from current psychiatric medications.    HPI ROS Appetite Changes and Sleep:     She reports normal sleep, adequate number of sleep hours (8 hours), normal appetite, recent weight gain (6 lbs), low energy    Current Rating Scores:     Current PHQ-9   PHQ-2/9 Depression Screening    Little interest or pleasure in doing things: 1 - several days  Feeling down, depressed, or hopeless: 0 - not at all  Trouble falling or staying asleep, or sleeping too much: 0 - not at all  Feeling tired or having little energy: 1 - several days  Poor appetite or overeatin - several days  Feeling bad about yourself - or that you are a failure or have let yourself or your family down: 0 - not at all  Trouble concentrating on things, such as reading the newspaper or watching  television: 0 - not at all  Moving or speaking so slowly that other people could have noticed. Or the opposite - being so fidgety or restless that you have been moving around a lot more than usual: 0 - not at all  Thoughts that you would be better off dead, or of hurting yourself in some way: 0 - not at all  PHQ-9 Score: 3  PHQ-9 Interpretation: No or Minimal depression       Current PHQ-9 score is decreased from 11 at the last visit).    Review Of Systems:      Constitutional low energy and recent weight gain (6 lbs)   ENT negative   Cardiovascular negative   Respiratory negative   Gastrointestinal negative   Genitourinary negative   Musculoskeletal negative   Integumentary negative   Neurological negative   Endocrine negative   Other Symptoms none, all other systems are negative       Past Psychiatric History: (unchanged information from previous note copied and updated)    Past Inpatient Psychiatric Treatment:   One past inpatient psychiatric admission at [facility] abo  Past Outpatient Psychiatric Treatment:    In outpatient treatment at [facility] for many years.  Past Suicide Attempts: yes, 2 attempts by overdose years ago  Past Violent Behavior: no  Past Psychiatric Medication Trials: Prozac, Depakote, Lamictal (rash), Tegretol, Abilify and Latuda    Traumatic History: (unchanged information from previous note copied and updated)    Abuse: no history of sexual abuse, no history of physical abuse  Other Traumatic Events: none     Past Medical History:    Past Medical History:   Diagnosis Date    Abdominal pain     started 7/21/16- found something on the pancreas    Abnormal weight loss     last assessed [id number]    Acute sinusitis     Allergic rhinitis     last assessed [id number]    Anemia     had iron infusion    Asthma     Cardiac murmur     Carpal tunnel syndrome     Cellulitis     Cervical disc disease     Cervical myelopathy (HCC) 9/7/2022    Colon polyp     CPAP  (continuous positive airway pressure) dependence     not using at this time    Degeneration of thoracic intervertebral disc     Degenerative arthritis of knee     Diabetes mellitus (HCC)     Diverticulosis     Esophagitis, reflux     last assessed 29Jan2014    Excessive sweating     Fatty liver     Frequent falls     Gait disturbance     GERD (gastroesophageal reflux disease)     Granuloma annulare     Head injury     Hyperlipidemia     Hypertension     Hypertensive heart disease     Hypoglycemia     last assessed 08Feb2016    Kidney stone     Leg length discrepancy     Leucocytosis     Lumbar stress fracture     Multiple thyroid nodules     Neuropathy     Obesity     last assessed 25Dec2016    Paronychia of toe     Patellofemoral dysfunction     Pneumonia     Postmenopausal atrophic vaginitis     Prepatellar bursitis     Sebaceous cyst     last assessed 21Feb2014    Sleep apnea     no CPAP used    Suicide attempt ([facility])     Tinea corporis     Type 2 diabetes mellitus ([facility])     cont meds and monitor BS;  last assessed 08Nov2016    Uncontrolled type 2 diabetes mellitus with hyperglycemia, with long-term current use of insulin ([facility]) 8/5/2019    Urinary frequency     Vertigo     Vitamin D deficiency     Dia-Parkinson-White syndrome         Past Surgical History:   Procedure Laterality Date    CERVICAL FUSION N/A 08/03/2021    Procedure: FUSION CERVICAL ANTERIOR W DISCECTOMY C4-5, C5-6, C6-7 with allograft and neuromonitoring  case length 3 hours;  Surgeon: Kodak Wright MD;  Location:  MAIN OR;  Service: Orthopedics    COLONOSCOPY      ESOPHAGOGASTRODUODENOSCOPY N/A 08/08/2016    Procedure: ESOPHAGOGASTRODUODENOSCOPY (EGD);  Surgeon: Trey Bernstein MD;  Location: [facility] GI LAB;  Service:     LUMBAR FUSION      KY ESOPHAGOGASTRODUODENOSCOPY TRANSORAL DIAGNOSTIC N/A 03/28/2019    Procedure: ESOPHAGOGASTRODUODENOSCOPY (EGD);  Surgeon: Trey Bernstein MD;  Location: [facility] GI LAB;  Service: Gastroenterology    KY  NEUROPLASTY &/TRANSPOS MEDIAN NRV CARPAL TUNNE Right 8/9/2022    Procedure: RELEASE CARPAL TUNNEL;  Surgeon: Richie Borden MD;  Location: [facility] MAIN OR;  Service: Orthopedics    TONSILLECTOMY      TOOTH EXTRACTION      wisdom teeth removed     Allergies   Allergen Reactions    Bee Venom Swelling     Local swelling at sting site    Biaxin [Clarithromycin] Itching    Depakote Er [Divalproex Sodium Er]      pancreatitis    Shellfish-Derived Products - Food Allergy Other (See Comments)     Ears and cheeks got red    Tanzeum [Albiglutide]      pancreatitis    Carbamazepine Other (See Comments)     Reaction Date:Unknown    Lamotrigine Rash       Substance Abuse History:    Social History     Substance and Sexual Activity   Alcohol Use Not Currently     Social History     Substance and Sexual Activity   Drug Use No       Social History:    Social History     Socioeconomic History    Marital status: /Civil Union     Spouse name: Not on file    Number of children: 0    Years of education: 12    Highest education level: 12th grade   Occupational History    Occupation: retired   Tobacco Use    Smoking status: Never     Passive exposure: Never    Smokeless tobacco: Never   Vaping Use    Vaping status: Never Used   Substance and Sexual Activity    Alcohol use: Not Currently    Drug use: No    Sexual activity: Not Currently     Partners: Male   Other Topics Concern    Not on file   Social History Narrative    Education: high school graduate    Learning Disabilities: none    Marital History:     Children: none    Living Arrangement: lives in home with     Occupational History: retired, 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[employer]    Functioning Relationships: good support system,  is supportive    Legal History: none     History: None        Caffeine use    Denied home environment domestic violence    Job physical requirements heavy lifting     Social Determinants of  Health     Financial Resource Strain: Low Risk  (1/25/2024)    Overall Financial Resource Strain (CARDIA)     Difficulty of Paying Living Expenses: Not hard at all   Food Insecurity: No Food Insecurity (1/25/2024)    Hunger Vital Sign     Worried About Running Out of Food in the Last Year: Never true     Ran Out of Food in the Last Year: Never true   Transportation Needs: No Transportation Needs (1/25/2024)    PRAPARE - Transportation     Lack of Transportation (Medical): No     Lack of Transportation (Non-Medical): No   Physical Activity: Inactive (1/25/2024)    Exercise Vital Sign     Days of Exercise per Week: 0 days     Minutes of Exercise per Session: 0 min   Stress: No Stress Concern Present (1/25/2024)    [place] [employer] - Occupational Stress Questionnaire     Feeling of Stress : Only a little   Social Connections: Moderately Isolated (1/25/2024)    Social Connection and Isolation Panel [NHANES]     Frequency of Communication with Friends and Family: Once a week     Frequency of Social Gatherings with Friends and Family: Once a week     Attends Pentecostal Services: Never     Active Member of Clubs or Organizations: Yes     Attends Club or Organization Meetings: More than 4 times per year     Marital Status:    Intimate Partner Violence: Not At Risk (1/25/2024)    Humiliation, Afraid, Rape, and Kick questionnaire     Fear of Current or Ex-Partner: No     Emotionally Abused: No     Physically Abused: No     Sexually Abused: No   Housing Stability: Low Risk  (1/25/2024)    Housing Stability Vital Sign     Unable to Pay for Housing in the Last Year: No     Number of Places Lived in the Last Year: 1     Unstable Housing in the Last Year: No       Family Psychiatric History:     Family History   Problem Relation Age of Onset    Cancer Mother         lung    Heart disease Mother     Coronary artery disease Mother         CABG    Diabetes Mother     Lung cancer Mother 68        smoker  "   Stroke Mother     Thyroid disease Mother     Other Mother         tobacco use    Parkinsonism Father     Intestinal polyp Father     Bipolar disorder Father     Heart disease Father     Prostate cancer Father 58    Hypertension Sister     Alcohol abuse Sister     Thyroid disease unspecified Sister     Diabetes Brother     Depression Brother     Heart disease Brother     Heart disease Brother     Seizures Brother     Diabetes Maternal Grandmother     Thyroid disease Maternal Grandmother     Endometrial cancer Maternal Aunt         unknown age    Suicide Attempts Paternal Aunt     Depression Paternal Aunt     Ovarian cancer Cousin 40        maternal    Hypertension Family        History Review: The following portions of the patient's history were reviewed and updated as appropriate: allergies, current medications, past family history, past medical history, past social history, past surgical history, and problem list.         OBJECTIVE:     Vital signs in last 24 hours:    Vitals:    01/25/24 1431   BP: 145/74   Pulse: 88   Weight: 103 kg (228 lb)   Height: 5' 3\" (1.6 m)       Mental Status Evaluation:    Appearance age appropriate, casually dressed   Behavior cooperative, calm   Speech normal rate, normal volume, normal pitch   Mood euthymic   Affect normal range and intensity, appropriate   Thought Processes organized, goal directed   Associations intact associations   Thought Content no overt delusions   Perceptual Disturbances: no auditory hallucinations, no visual hallucinations   Abnormal Thoughts  Risk Potential Suicidal ideation - None  Homicidal ideation - None  Potential for aggression - No   Orientation oriented to person, place, time/date, and situation   Memory recent and remote memory grossly intact   Consciousness alert and awake   Attention Span Concentration Span attention span and concentration are age appropriate   Intellect appears to be of average intelligence   Insight intact   Judgement intact "   Muscle Strength and  Gait normal muscle strength and normal muscle tone, normal gait and normal balance   Motor activity no abnormal movements   Language no difficulty naming common objects, no difficulty repeating a phrase, no difficulty writing a sentence   Fund of Knowledge adequate knowledge of current events  adequate fund of knowledge regarding past history  adequate fund of knowledge regarding vocabulary    Pain none   Pain Scale 0       Laboratory Results: I have personally reviewed all pertinent laboratory/tests results    Recent Labs (last 6 months):   Clinical Support on 12/11/2023   Component Date Value    Hemoglobin A1C 12/11/2023 7.1 (A)    Orders Only on 11/27/2023   Component Date Value    Right Eye Diabetic Retin* 11/27/2023 None     Left Eye Diabetic Retino* 11/27/2023 None    Orders Only on 09/01/2023   Component Date Value    Total Cholesterol 09/01/2023 130     HDL 09/01/2023 40 (L)     Triglycerides 09/01/2023 315 (H)     LDL Calculated 09/01/2023 57     Chol HDLC Ratio 09/01/2023 3.3     Non-HDL Cholesterol 09/01/2023 90     Creatinine, Urine 09/01/2023 115     Albumin,U,Random 09/01/2023 12.2     Microalb/Creat Ratio 09/01/2023 106 (H)     Glucose, Random 09/01/2023 142 (H)     BUN 09/01/2023 20     Creatinine 09/01/2023 0.65     eGFR 09/01/2023 100     SL AMB BUN/CREATININE RA* 09/01/2023 SEE NOTE:     Sodium 09/01/2023 141     Potassium 09/01/2023 4.6     Chloride 09/01/2023 103     CO2 09/01/2023 30     Calcium 09/01/2023 8.9     Protein, Total 09/01/2023 6.6     Albumin 09/01/2023 4.1     Globulin 09/01/2023 2.5     Albumin/Globulin Ratio 09/01/2023 1.6     TOTAL BILIRUBIN 09/01/2023 0.3     Alkaline Phosphatase 09/01/2023 92     AST 09/01/2023 18     ALT 09/01/2023 16     Hemoglobin A1C 09/01/2023 6.3 (H)        Suicide/Homicide Risk Assessment:    Risk of Harm to Self:  Demographic risk factors include: , age: over 50 or older  Historical Risk Factors include: history of  anxiety, history of mood disorder, history of suicide attempts  Recent Specific Risk Factors include: diagnosis of mood disorder  Protective Factors: no current suicidal ideation, being , compliant with medications, compliant with mental health treatment, responsibilities and duties to others, stable living environment, supportive   Weapons: none. The following steps have been taken to ensure weapons are properly secured: not applicable  Based on today's assessment, Jessica presents the following risk of harm to self: none    Risk of Harm to Others:  The following ratings are based on assessment at the time of the interview  Based on today's assessment, Jessica presents the following risk of harm to others: none    The following interventions are recommended: no intervention changes needed    Assessment/Plan:       Diagnoses and all orders for this visit:    Bipolar I disorder, most recent episode depressed, in remission (HCC)    LOURDES (generalized anxiety disorder)    Personality disorder (HCC)    Long-term use of high-risk medication          Treatment Recommendations/Precautions:    Continue Prozac 40 mg bid to improve depressive symptoms (she has been taking 40 mg bid instead of 80 mg daily)  Continue Abilify 10 mg daily to help with mood (she has been taking Abilify in the morning instead of evening)  Medication management every 4 months  Follows with family physician for glucose and lipid monitoring due to current therapy with antipsychotic medication  Follows with family physician for yearly physical exam, asthma, diabetes, hyperlipidemia, and hypertension  Aware of 24 hour and weekend coverage for urgent situations accessed by calling [facility] main practice number  Monitor lipid profile and hemoglobin A1C yearly due to current therapy with antipsychotic medication - gets labs done with PCP  Crisis Plan was completed during the session and Crisis Plan Note was provided to the  patient  I am scheduling this patient out for greater than 3 months: Yes - Patient's stability of symptoms warrant this length of time or no significant changes to treatment plan    Medications Risks/Benefits      Risks, Benefits And Possible Side Effects Of Medications:    Risks, benefits, and possible side effects of medications explained to Jessica including risk of parkinsonian symptoms, Tardive Dyskinesia and metabolic syndrome related to treatment with antipsychotic medications and risk of suicidality and serotonin syndrome related to treatment with antidepressants. She verbalizes understanding and agreement for treatment.    Controlled Medication Discussion:     Not applicable    Psychotherapy Provided:     Individual psychotherapy provided: Yes  Counseling was provided during the session today for 16 minutes.  Medications, treatment progress and treatment plan reviewed with Jessica.  Goals discussed during in session: maintain control of anxiety, maintain improvement in depression, and maintain mood stability.   Discussed with Jessica coping with family issues, mother-in-law and father-in-law's illness, occasional anxiety, chronic mental illness, and difficulty with maintaining healthy weight.   Coping mechanisms including exercising, increasing energy, and increasing motivation reviewed with Jessica.   Supportive therapy provided.      Treatment Plan:    Completed and signed during the session: Yes - with Jessica    Note Share:    This note was shared with patient.    Visit Time    Visit Start Time: 2:29 PM  Visit Stop Time: 2:56 PM  Total Visit Duration:  27 minutes    Muriel Bello MD 01/25/24

## 2024-01-25 NOTE — BH TREATMENT PLAN
"TREATMENT PLAN (Medication Management Only)        [facility] - PSYCHIATRIC ASSOCIATES    Name/Date of Birth/MRN:  Jessica Zhou 63 y.o. 1960 MRN: [id number]  Date of Treatment Plan: January 25, 2024  Diagnosis/Diagnoses:   1. Bipolar I disorder, most recent episode depressed, in remission (HCC)    2. LOURDES (generalized anxiety disorder)    3. Personality disorder (HCC)    4. Long-term use of high-risk medication      Strengths/Personal Resources for Self-Care: \"I seem to be a good listener lately\".  Area/Areas of need (in own words): \"getting motivated\".  1. Long Term Goal:   maintain control of anxiety, maintain control of depression, maintain mood stability  Target Date: 4 months - 5/25/2024  Person/Persons responsible for completion of goal: Jessica  2.  Short Term Objective (s) - How will we reach this goal?:   A.  Provider new recommended medication/dosage changes and/or continue medication(s): continue current medications as prescribed (Prozac and Abilify).  B.  N/A.  C.  N/A.  Target Date: 4 months - 5/25/2024  Person/Persons Responsible for Completion of Goal: Jessica   Progress Towards Goals: stable  Treatment Modality: medication management every 4 months  Review due 180 days from date of this plan: 6 months - 7/25/2024  Expected length of service: maintenance unless revised  My Physician/PA/NP and I have developed this plan together and I agree to work on the goals and objectives. I understand the treatment goals that were developed for my treatment.  Electronic Signatures: on file (unless signed below)    Muriel Bello MD 01/25/24  "

## 2024-01-25 NOTE — BH CRISIS PLAN
Client Name: Jessica Zhou       Client YOB: 1960    Mannie Safety Plan      Creation Date: 1/25/24 Update Date: 1/25/24   Created By: Muriel Bello MD       Step 1: Warning Signs:   Warning Signs   depression            Step 2: Internal Coping Strategies:   Internal Coping Strategies   exercise            Step 3: People and social settings that provide distraction:   Name Contact Information   Sister Elaine in my cell phone   Friend Andreea in my home phone            Step 4: People whom I can ask for help during a crisis:      Name Contact Information    Dr. Bello [phone]      Step 5: Professionals or agencies I can contact during a crisis:      Clinican/Agency Name Phone Emergency Contact    [facility] [phone]       [facility] Emergency Department Emergency Department Phone Emergency Department Address    [facility] [phone]         Crisis Phone Numbers:   Suicide Prevention Lifeline: Call or Text  988 Crisis Text Line: Text HOME to 586-235   Please note: Some [facility] do not have a separate number for Child/Adolescent specific crisis. If your county is not listed under Child/Adolescent, please call the adult number for your county      Adult Crisis Numbers: Child/Adolescent Crisis Numbers   [facility]: [phone] [facility]: [phone]   [facility]: [phone] [facility]: [phone]   [facility]: [phone] [place], [place]: [phone]   [facility]: [phone] [place]: [phone]   [facility]: [phone]   [facility]: [phone]   [facility]: [phone]   [facility]: [phone] (daytime) [phone] (after hours, weekends, holidays)      Step 6: Making the environment safer (plan for lethal means safety):   Patient did not identify any lethal methods: Yes     Optional: What is most important to me and worth living for?   Family      [place]annie Safety Plan. Sugey Pearl and Isac Franz. Used with permission of the authors.

## 2024-01-29 ENCOUNTER — TELEPHONE (OUTPATIENT)
Dept: PSYCHIATRY | Facility: CLINIC | Age: 64
End: 2024-01-29

## 2024-01-29 NOTE — TELEPHONE ENCOUNTER
Patient is calling regarding cancelling an appointment.    Date/Time: 5/26/2024 3pm    Reason: another appt    Patient was rescheduled: YES [x] NO []  If yes, when was Patient reschedule for: 5/30/2024 3:30pm    Patient requesting call back to reschedule: YES [] NO [x]

## 2024-02-03 DIAGNOSIS — I10 BENIGN ESSENTIAL HYPERTENSION: ICD-10-CM

## 2024-02-03 RX ORDER — LISINOPRIL 10 MG/1
TABLET ORAL
Qty: 90 TABLET | Refills: 1 | Status: SHIPPED | OUTPATIENT
Start: 2024-02-03

## 2024-02-20 ENCOUNTER — OFFICE VISIT (OUTPATIENT)
Dept: OBGYN CLINIC | Facility: CLINIC | Age: 64
End: 2024-02-20
Payer: COMMERCIAL

## 2024-02-20 VITALS
SYSTOLIC BLOOD PRESSURE: 150 MMHG | HEIGHT: 63 IN | BODY MASS INDEX: 40.57 KG/M2 | DIASTOLIC BLOOD PRESSURE: 62 MMHG | WEIGHT: 229 LBS

## 2024-02-20 DIAGNOSIS — Z98.1 S/P CERVICAL SPINAL FUSION: Primary | ICD-10-CM

## 2024-02-20 PROCEDURE — 99213 OFFICE O/P EST LOW 20 MIN: CPT | Performed by: ORTHOPAEDIC SURGERY

## 2024-02-20 NOTE — PROGRESS NOTES
[facility] ORTHOPEDIC SPINE SURGERY  DR.AMIR REBEL MD  [address] [facility] [place] [address] [phone]    HISTORY OF PRESENT ILLNESS:    Jessica Zhou is a 63 y.o. female who presents for follow-up  of cervical spine. Patient was last seen in the office on 8/15/2023 where patient was advised that she would require neurological exam every six months to evaluate for myelopathy. Today patient reports she has occasional aches and pains in her neck usually first thing in the morning, which typically resolves after doing her exercises. Patient reports she continues to have episodic numbness and tingling in the first two digits of bilateral hands. Patient denies any frequent falls or dropping objects.       ALLERGIES:   Allergies   Allergen Reactions    Bee Venom Swelling     Local swelling at sting site    Biaxin [Clarithromycin] Itching    Depakote Er [Divalproex Sodium Er]      pancreatitis    Shellfish-Derived Products - Food Allergy Other (See Comments)     Ears and cheeks got red    Tanzeum [Albiglutide]      pancreatitis    Carbamazepine Other (See Comments)     Reaction Date:Unknown    Lamotrigine Rash       MEDICATIONS:    Current Outpatient Medications:     acetaminophen (TYLENOL) 500 mg tablet, Take 500 mg by mouth 2 (two) times a day, Disp: , Rfl:     ARIPiprazole (ABILIFY) 10 mg tablet, Take 1 tablet (10 mg total) by mouth every evening (Patient taking differently: Take 10 mg by mouth daily), Disp: 90 tablet, Rfl: 2    atorvastatin (LIPITOR) 80 mg tablet, Take 1 tablet (80 mg total) by mouth daily, Disp: 90 tablet, Rfl: 1    Cholecalciferol 4000 units CAPS, Take 1 capsule (4,000 Units total) by mouth daily, Disp: , Rfl:     FLUoxetine (PROzac) 40 MG capsule, Take 2 capsules (80 mg total) by mouth daily (Patient taking differently: Take 40 mg by mouth 2 (two) times a day), Disp: 180 capsule, Rfl: 2    glipiZIDE (GLUCOTROL) 5 mg tablet, TAKE BY MOUTH 1 TABLET WITH LUNCH AND TWO TABLETS WITH SUPPER  FOR DIABETES - FILL WHEN PATIENT REQUESTS, Disp: 270 tablet, Rfl: 1    glucose blood (OneTouch Ultra) test strip, Test blood sugar twice daily, Disp: 100 each, Rfl: 3    ibuprofen (MOTRIN) 200 mg tablet, Take 400 mg by mouth Every other day as needed when pain not alleviated by acetaminophen, Disp: , Rfl:     Lancets (onetouch ultrasoft) lancets, Test blood sugar twice daily, Disp: 100 each, Rfl: 3    lisinopril (ZESTRIL) 10 mg tablet, TAKE 1 TABLET BY MOUTH EVERY DAY, Disp: 90 tablet, Rfl: 1    Magnesium 400 MG TABS, Take 1 tablet by mouth in the morning, Disp: , Rfl:     metFORMIN (GLUCOPHAGE) 1000 MG tablet, TAKE 1 TABLET BY MOUTH TWICE A DAY WITH MEALS, Disp: 180 tablet, Rfl: 3    Multiple Vitamins-Minerals (multivitamin with minerals) tablet, Take 1 tablet by mouth daily, Disp: , Rfl:     omeprazole (PriLOSEC) 40 MG capsule, TAKE 1 CAPSULE BY MOUTH EVERY DAY, Disp: 90 capsule, Rfl: 1    pioglitazone (ACTOS) 30 mg tablet, TAKE 1 TABLET (30 MG TOTAL) BY MOUTH DAILY FOR DIABETES, Disp: 90 tablet, Rfl: 1     PAST MEDICAL HISTORY:   Past Medical History:   Diagnosis Date    Abdominal pain     started 7/21/16- found something on the pancreas    Abnormal weight loss     last assessed 26feb2014    Acute sinusitis     Allergic rhinitis     last assessed 08Jul2015    Anemia     had iron infusion    Asthma     Cardiac murmur     Carpal tunnel syndrome     Cellulitis     Cervical disc disease     Cervical myelopathy (HCC) 9/7/2022    Colon polyp     CPAP (continuous positive airway pressure) dependence     not using at this time    Degeneration of thoracic intervertebral disc     Degenerative arthritis of knee     Diabetes mellitus (HCC)     Diverticulosis     Esophagitis, reflux     last assessed 29Jan2014    Excessive sweating     Fatty liver     Frequent falls     Gait disturbance     GERD (gastroesophageal reflux disease)     Granuloma annulare     Head injury     Hyperlipidemia     Hypertension     Hypertensive heart  disease     Hypoglycemia     last assessed [id number]    Kidney stone     Leg length discrepancy     Leucocytosis     Lumbar stress fracture     Multiple thyroid nodules     Neuropathy     Obesity     last assessed [id number]    Paronychia of toe     Patellofemoral dysfunction     Pneumonia     Postmenopausal atrophic vaginitis     Prepatellar bursitis     Sebaceous cyst     last assessed [id number]    Sleep apnea     no CPAP used    Suicide attempt (HCC)     Tinea corporis     Type 2 diabetes mellitus (HCC)     cont meds and monitor BS;  last assessed 08Nov2016    Uncontrolled type 2 diabetes mellitus with hyperglycemia, with long-term current use of insulin ([facility]) 8/5/2019    Urinary frequency     Vertigo     Vitamin D deficiency     Dia-Parkinson-White syndrome        PAST SURGICAL HISTORY:  Past Surgical History:   Procedure Laterality Date    CERVICAL FUSION N/A 08/03/2021    Procedure: FUSION CERVICAL ANTERIOR W DISCECTOMY C4-5, C5-6, C6-7 with allograft and neuromonitoring  case length 3 hours;  Surgeon: Kodak Wright MD;  Location:  MAIN OR;  Service: Orthopedics    COLONOSCOPY      ESOPHAGOGASTRODUODENOSCOPY N/A 08/08/2016    Procedure: ESOPHAGOGASTRODUODENOSCOPY (EGD);  Surgeon: Trey Bernstein MD;  Location: [facility] GI LAB;  Service:     LUMBAR FUSION      MN ESOPHAGOGASTRODUODENOSCOPY TRANSORAL DIAGNOSTIC N/A 03/28/2019    Procedure: ESOPHAGOGASTRODUODENOSCOPY (EGD);  Surgeon: Trey Bernstein MD;  Location: [facility] GI LAB;  Service: Gastroenterology    MN NEUROPLASTY &/TRANSPOS MEDIAN NRV CARPAL TUNNE Right 8/9/2022    Procedure: RELEASE CARPAL TUNNEL;  Surgeon: Richie Borden MD;  [facility] MAIN OR;  Service: Orthopedics    TONSILLECTOMY      TOOTH EXTRACTION      wisdom teeth removed       SOCIAL HISTORY:  Social History     Tobacco Use   Smoking Status Never    Passive exposure: Never   Smokeless Tobacco Never          PHYSICAL EXAM:  63 y.o. female sitting comfortably on exam chair in no  apparent distress.   Ambulates with normal gait  Able to go up on toes and heels  Able to balance on one leg  No significant TTP over cervical, thoracic, or lumbar spine  5/5 motor strength with normal sensation in bilateral upper and lower extremities  Diminished deep tendon reflexes bilateral upper extremities   Brisk 2+ deep tendon reflexes bilateral lower extremities  Negative marcelino sign bilaterally  Normal rapid alternating movements upper and lower extremities      RADIOGRAPHIC STUDIES:  1. X-ray, C-spine, 10/27/2021:  Status post anterior cervical diskectomy and fusion at C4-5 C5-6 and C6-7.  No hardware complication.     2. MRI, C-spine, 6/16/2021:   Multilevel cervical degenerative disc disease.  Disc protrusion spinal cord compression involving multiple levels, at C6-7.     3. Xrays, C-spine, 1/24/2021: Status post anterior cervical diskectomy and fusion at C4-5 C5-6 and C6-7.  No hardware complication.  I compared these films with prior films on 01/24/2022 and so no significant difference between the films.  There was evidence of plate disease at the superior level C3-4.     4. Xrays, cervical spine, 8/08/2022:  Status post anterior cervical diskectomy fusion from C4 through C7.  I compared the films to prior x-rays.  There is evidence of plate disease at C3-4.  There is also evidence of pseudoarthrosis at C4-5 and C6-7.  There is no evidence of hardware complication.     5. Xrays, cervical spine, 2/13/2023:  Status post anterior cervical diskectomy fusion from C4 through C7.  There is evidence of plate disease at C3-4.  There is also evidence of pseudoarthrosis at C4-5 and C6-7.  There is no evidence of hardware complication.  No cnanges compared to prior X-rays.     6. Xrays, cervical spine, 8/15/2023: As per sensor cervical discectomy and fusion at C4-5, C5-6 and C6-7.  There is evidence of subsidence and plate disease affecting the C3-4 level.  The spine appears to be fused.         ASSESSMENT:  1.  S/P cervical spinal fusion      PLAN:  63 y.o. female with cervical myelopathy status post anterior cervical fusion with diskectomy of C4-5, C5-6, C6-7 performed by Dr. Wright on 8/3/2021.       Patient is doing well at this time. Patient was advised again today that she will require routine neurological exams every six months to evaluate for myelopathy. Patient was advised on signs to look out for on signs of myelopathy.     Patient will follow-up in 6 months for routine myelopathy check.      Scribe Attestation      I,:  Jenise Cabrera PA-C am acting as a scribe while in the presence of the attending physician.:       I,:  Boogie Cox MD personally performed the services described in this documentation    as scribed in my presence.:

## 2024-02-21 PROBLEM — B97.89 VIRAL LARYNGITIS: Status: RESOLVED | Noted: 2024-01-05 | Resolved: 2024-02-21

## 2024-02-21 PROBLEM — J04.0 VIRAL LARYNGITIS: Status: RESOLVED | Noted: 2024-01-05 | Resolved: 2024-02-21

## 2024-03-01 ENCOUNTER — RA CDI HCC (OUTPATIENT)
Dept: OTHER | Facility: HOSPITAL | Age: 64
End: 2024-03-01

## 2024-03-01 NOTE — PROGRESS NOTES
HCC coding opportunities          Chart Reviewed number of suggestions sent to Provider: 1     Patients Insurance        Commercial Insurance: [employer]       J45.909: Unspecified asthma, uncomplicated     Found in active problem list - please review and assess and document per MEAT if applicable for 2024

## 2024-03-11 LAB
25(OH)D3 SERPL-MCNC: 46 NG/ML (ref 30–100)
ALBUMIN SERPL-MCNC: 4.1 G/DL (ref 3.6–5.1)
ALBUMIN/CREAT UR: 29 MCG/MG CREAT
ALBUMIN/GLOB SERPL: 1.6 (CALC) (ref 1–2.5)
ALP SERPL-CCNC: 98 U/L (ref 37–153)
ALT SERPL-CCNC: 20 U/L (ref 6–29)
AST SERPL-CCNC: 22 U/L (ref 10–35)
BILIRUB SERPL-MCNC: 0.3 MG/DL (ref 0.2–1.2)
BUN SERPL-MCNC: 17 MG/DL (ref 7–25)
BUN/CREAT SERPL: ABNORMAL (CALC) (ref 6–22)
CALCIUM SERPL-MCNC: 9.1 MG/DL (ref 8.6–10.4)
CHLORIDE SERPL-SCNC: 102 MMOL/L (ref 98–110)
CHOLEST SERPL-MCNC: 133 MG/DL
CHOLEST/HDLC SERPL: 3 (CALC)
CO2 SERPL-SCNC: 27 MMOL/L (ref 20–32)
CREAT SERPL-MCNC: 0.61 MG/DL (ref 0.5–1.05)
CREAT UR-MCNC: 65 MG/DL (ref 20–275)
GFR/BSA.PRED SERPLBLD CYS-BASED-ARV: 101 ML/MIN/1.73M2
GLOBULIN SER CALC-MCNC: 2.6 G/DL (CALC) (ref 1.9–3.7)
GLUCOSE SERPL-MCNC: 146 MG/DL (ref 65–99)
HBA1C MFR BLD: 7.2 % OF TOTAL HGB
HDLC SERPL-MCNC: 44 MG/DL
LDLC SERPL CALC-MCNC: 56 MG/DL (CALC)
MAGNESIUM SERPL-MCNC: 1.4 MG/DL (ref 1.5–2.5)
MICROALBUMIN UR-MCNC: 1.9 MG/DL
NONHDLC SERPL-MCNC: 89 MG/DL (CALC)
POTASSIUM SERPL-SCNC: 5.1 MMOL/L (ref 3.5–5.3)
PROT SERPL-MCNC: 6.7 G/DL (ref 6.1–8.1)
SODIUM SERPL-SCNC: 140 MMOL/L (ref 135–146)
TRIGL SERPL-MCNC: 278 MG/DL

## 2024-03-18 ENCOUNTER — OFFICE VISIT (OUTPATIENT)
Dept: INTERNAL MEDICINE CLINIC | Facility: CLINIC | Age: 64
End: 2024-03-18
Payer: COMMERCIAL

## 2024-03-18 VITALS
BODY MASS INDEX: 40.43 KG/M2 | HEART RATE: 79 BPM | WEIGHT: 228.2 LBS | HEIGHT: 63 IN | DIASTOLIC BLOOD PRESSURE: 70 MMHG | RESPIRATION RATE: 16 BRPM | OXYGEN SATURATION: 96 % | SYSTOLIC BLOOD PRESSURE: 128 MMHG

## 2024-03-18 DIAGNOSIS — E66.09 CLASS 1 OBESITY DUE TO EXCESS CALORIES WITH SERIOUS COMORBIDITY AND BODY MASS INDEX (BMI) OF 32.0 TO 32.9 IN ADULT: ICD-10-CM

## 2024-03-18 DIAGNOSIS — I10 ESSENTIAL HYPERTENSION: ICD-10-CM

## 2024-03-18 DIAGNOSIS — R80.9 TYPE 2 DIABETES MELLITUS WITH MICROALBUMINURIA, WITHOUT LONG-TERM CURRENT USE OF INSULIN (HCC): Primary | ICD-10-CM

## 2024-03-18 DIAGNOSIS — E11.29 TYPE 2 DIABETES MELLITUS WITH MICROALBUMINURIA, WITHOUT LONG-TERM CURRENT USE OF INSULIN (HCC): Primary | ICD-10-CM

## 2024-03-18 DIAGNOSIS — R79.0 LOW MAGNESIUM LEVEL: ICD-10-CM

## 2024-03-18 DIAGNOSIS — M48.00 SPINAL STENOSIS, UNSPECIFIED SPINAL REGION: ICD-10-CM

## 2024-03-18 DIAGNOSIS — M77.10 LATERAL EPICONDYLITIS, UNSPECIFIED LATERALITY: ICD-10-CM

## 2024-03-18 PROCEDURE — 99214 OFFICE O/P EST MOD 30 MIN: CPT | Performed by: INTERNAL MEDICINE

## 2024-03-18 RX ORDER — CALCIUM CARBONATE/VITAMIN D3 500-10/5ML
400 LIQUID (ML) ORAL 2 TIMES DAILY
Qty: 60 TABLET | Refills: 0 | Status: SHIPPED | OUTPATIENT
Start: 2024-03-18

## 2024-03-18 NOTE — ASSESSMENT & PLAN NOTE
Recommend Voltaren gel 4 times daily x 7 days, tennis elbow strap exercises if symptoms not improving next 4 weeks patient to notify me for referral physical therapy for [facility]

## 2024-03-18 NOTE — ASSESSMENT & PLAN NOTE
Secondary to mild chronic diarrhea secondary to metformin at this point in time the patient would like to continue with metformin we will increase magnesium oxide to 400 mg twice daily and recheck a magnesium level in 2 weeks patient will meet with pharmacy to see if she is a suitable candidate for GLP-1 we might be able to get her off the metformin if she were to tolerate and qualify

## 2024-03-18 NOTE — PROGRESS NOTES
Assessment/Plan:    Type 2 diabetes mellitus with microalbuminuria, without long-term current use of insulin ([facility])    Lab Results   Component Value Date    HGBA1C 7.2 (H) 03/11/2024   I have counselled the pt to follow a healthy and balanced diet ,and recommend routine exercise.  I will be ordering diabetic laboratories including comprehensive metabolic panel, hemoglobin A1c, urine microalbumin, lipid panel.  Annual eye examination recommended foot examination completed    Essential hypertension  Hypertension - controlled, I have counseled patient following healthy balance diet, I would like the patient reduce sodium, exercise routinely, I would like the patient continued the med current medical regiment and we will continue to monitor.  Blood pressure 128/70    Class 1 obesity due to excess calories with serious comorbidity and body mass index (BMI) of 32.0 to 32.9 in adult  Obesity -I have counseled patient following healthy and balanced diet, I would like the patient to lose weight, I would like the patient exercise routinely; we will continue monitor the patient's progress.    Lateral epicondylitis, unspecified laterality  Recommend Voltaren gel 4 times daily x 7 days, tennis elbow strap exercises if symptoms not improving next 4 weeks patient to notify me for referral physical therapy for [facility]    Low magnesium level  Secondary to mild chronic diarrhea secondary to metformin at this point in time the patient would like to continue with metformin we will increase magnesium oxide to 400 mg twice daily and recheck a magnesium level in 2 weeks patient will meet with pharmacy to see if she is a suitable candidate for GLP-1 we might be able to get her off the metformin if she were to tolerate and qualify     Spinal stenosis  Chronic lower back pain recently aggravated when lifting heavy box no radicular symptoms start physical therapy counseled not to lift heavy boxes reviewed recent MRI showing spinal  stenosis         Problem List Items Addressed This Visit        Cardiovascular and Mediastinum    Essential hypertension     Hypertension - controlled, I have counseled patient following healthy balance diet, I would like the patient reduce sodium, exercise routinely, I would like the patient continued the med current medical regiment and we will continue to monitor.  Blood pressure 128/70            Endocrine    Type 2 diabetes mellitus with microalbuminuria, without long-term current use of insulin  - Primary       Lab Results   Component Value Date    HGBA1C 7.2 (H) 03/11/2024   I have counselled the pt to follow a healthy and balanced diet ,and recommend routine exercise.  I will be ordering diabetic laboratories including comprehensive metabolic panel, hemoglobin A1c, urine microalbumin, lipid panel.  Annual eye examination recommended foot examination completed         Relevant Orders    Hemoglobin A1C    Comprehensive metabolic panel    Lipid Panel with Direct LDL reflex    Hemoglobin A1c (w/out EAG) ([facility] ONLY)       Musculoskeletal and Integument    Lateral epicondylitis, unspecified laterality     Recommend Voltaren gel 4 times daily x 7 days, tennis elbow strap exercises if symptoms not improving next 4 weeks patient to notify me for referral physical therapy for [facility]         Relevant Medications    Diclofenac Sodium (VOLTAREN) 1 %    Other Relevant Orders    Tennis elbow strap       Orthopedic/Musculoskeletal    Spinal stenosis     Chronic lower back pain recently aggravated when lifting heavy box no radicular symptoms start physical therapy counseled not to lift heavy boxes reviewed recent MRI showing spinal stenosis         Relevant Orders    Ambulatory Referral to Physical Therapy       Other    Class 1 obesity due to excess calories with serious comorbidity and body mass index (BMI) of 32.0 to 32.9 in adult     Obesity -I have counseled patient following healthy and balanced diet, I  would like the patient to lose weight, I would like the patient exercise routinely; we will continue monitor the patient's progress.         Low magnesium level     Secondary to mild chronic diarrhea secondary to metformin at this point in time the patient would like to continue with metformin we will increase magnesium oxide to 400 mg twice daily and recheck a magnesium level in 2 weeks patient will meet with pharmacy to see if she is a suitable candidate for GLP-1 we might be able to get her off the metformin if she were to tolerate and qualify          Relevant Medications    Magnesium Oxide 400 MG CAPS    Other Relevant Orders    Magnesium   RTO in 4 months call if any problems        Subjective:      Patient ID: Jessica Zhou is a 63 y.o. female.  Narrative & Impression   MRI LUMBAR SPINE WITHOUT CONTRAST     INDICATION: R29.898: Other symptoms and signs involving the musculoskeletal system.   Leg weakness status post fall.  Back pain.     COMPARISON:  MRI lumbar spine dated January 10, 2006.     TECHNIQUE:  Sagittal T1, sagittal T2, sagittal inversion recovery, axial T1 and axial T2, coronal T2.    IMAGE QUALITY:  Diagnostic     FINDINGS:     VERTEBRAL BODIES:  There are 5 lumbar type vertebral bodies.  Normal alignment of the lumbar spine.  No spondylolysis or spondylolisthesis. No scoliosis.  No compression fracture.  Although the patient has a history of a left L3 transverse process   fracture on a prior plain film dated February 27, 2021, there is no evidence of a fracture on this study.  Normal marrow signal is identified within the visualized bony structures.  No discrete marrow lesion.     SACRUM:  Normal signal within the sacrum. No evidence of insufficiency or stress fracture.     DISTAL CORD AND CONUS:  Normal size and signal within the distal cord and conus.     PARASPINAL SOFT TISSUES:  Paraspinal soft tissues are unremarkable.     LOWER THORACIC DISC SPACES:  Normal disc height and signal.  No  disc herniation, canal stenosis or foraminal narrowing.     LUMBAR DISC SPACES:     L1-L2:  Normal.     L2-L3:  There is a diffuse disc bulge with mild to moderate bilateral facet arthropathy and ligamentum flavum hypertrophy causing moderate spinal canal stenosis and no significant neural foraminal narrowing.     L3-L4:  There is a diffuse disc bulge with moderate bilateral facet arthropathy and ligamentum flavum hypertrophy causing severe spinal canal stenosis and mild bilateral neural foraminal narrowing.     L4-L5:  There is a diffuse disc bulge with moderate bilateral facet arthropathy and ligamentum flavum hypertrophy causing moderate spinal canal stenosis and mild bilateral neural foraminal narrowing.     L5-S1:  There is a diffuse disc bulge with bilateral facet arthropathy causing mild spinal canal stenosis and mild bilateral neural foraminal narrowing.     IMPRESSION:     No acute osseous abnormality.     Moderate multilevel spondylotic degenerative changes of the lumbar spine as described above with severe spinal canal stenosis at L3/L4.  No high-grade neural foraminal narrowing.     HPI 63-year old female coming in for a follow up office visit regarding obesity, bipolar 1, hypertension, type 2 diabetes, cervical myelopathy and obesity; new complaints of lateral epicondylitis, low magnesium and lower back pain acute; patient reports he try to follow a balanced diet; the patient reports me compliant taking medications without untoward side effects the.  The patient is here to review his medical condition, update me on the medical condition and the patient reports me no hospitalizations and no ER visits. The pt reports 2 weeks ago couldn't  , with leaning left elbow pain , decrease extention, reports overextended with sleep ,  rom exercises ,   Jam lifted bin craft 25 lb felt a pull, then couldn't stand up straight , tingling down the leg and at times the legs give out no incontinance ,  no saddle  anestesia    The following portions of the patient's history were reviewed and updated as appropriate: allergies, current medications, past family history, past medical history, past social history, past surgical history and problem list.    Review of Systems   Constitutional:  Negative for activity change, appetite change and unexpected weight change.   HENT:  Negative for congestion and postnasal drip.    Eyes:  Negative for visual disturbance.   Respiratory:  Negative for cough and shortness of breath.    Cardiovascular:  Negative for chest pain.   Gastrointestinal:  Negative for abdominal pain, diarrhea, nausea and vomiting.   Neurological:  Negative for dizziness, light-headedness and headaches.   Hematological:  Negative for adenopathy.    Lateral elbow pain left elbow, lower back pain without radiculopathy no incontinence exquisite tenderness over the    Objective:    Return in about 4 months (around 7/18/2024).    No results found.      Allergies   Allergen Reactions   • Bee Venom Swelling     Local swelling at sting site   • Biaxin [Clarithromycin] Itching   • Depakote Er [Divalproex Sodium Er]      pancreatitis   • Shellfish-Derived Products - Food Allergy Other (See Comments)     Ears and cheeks got red   • Tanzeum [Albiglutide]      pancreatitis   • Carbamazepine Other (See Comments)     Reaction Date:Unknown   • Lamotrigine Rash       Past Medical History:   Diagnosis Date   • Abdominal pain     started 7/21/16- found something on the pancreas   • Abnormal weight loss     last assessed [id number]   • Acute sinusitis    • Allergic rhinitis     last assessed [id number]   • Anemia     had iron infusion   • Asthma    • Cardiac murmur    • Carpal tunnel syndrome    • Cellulitis    • Cervical disc disease    • Cervical myelopathy (HCC) 9/7/2022   • Colon polyp    • CPAP (continuous positive airway pressure) dependence     not using at this time   • Degeneration of thoracic intervertebral disc    • Degenerative  arthritis of knee    • Diabetes mellitus ([facility])    • Diverticulosis    • Esophagitis, reflux     last assessed 29Jan2014   • Excessive sweating    • Fatty liver    • Frequent falls    • Gait disturbance    • GERD (gastroesophageal reflux disease)    • Granuloma annulare    • Head injury    • Hyperlipidemia    • Hypertension    • Hypertensive heart disease    • Hypoglycemia     last assessed 08Feb2016   • Kidney stone    • Leg length discrepancy    • Leucocytosis    • Lumbar stress fracture    • Multiple thyroid nodules    • Neuropathy    • Obesity     last assessed 25Dec2016   • Paronychia of toe    • Patellofemoral dysfunction    • Pneumonia    • Postmenopausal atrophic vaginitis    • Prepatellar bursitis    • Sebaceous cyst     last assessed 21Feb2014   • Sleep apnea     no CPAP used   • Suicide attempt ([facility])    • Tinea corporis    • Type 2 diabetes mellitus ([facility])     cont meds and monitor BS;  last assessed 08Nov2016   • Uncontrolled type 2 diabetes mellitus with hyperglycemia, with long-term current use of insulin ([facility]) 8/5/2019   • Urinary frequency    • Vertigo    • Vitamin D deficiency    • Dia-Parkinson-White syndrome      Past Surgical History:   Procedure Laterality Date   • CERVICAL FUSION N/A 08/03/2021    Procedure: FUSION CERVICAL ANTERIOR W DISCECTOMY C4-5, C5-6, C6-7 with allograft and neuromonitoring  case length 3 hours;  Surgeon: Kodak Wright MD;  Location:  MAIN OR;  Service: Orthopedics   • COLONOSCOPY     • ESOPHAGOGASTRODUODENOSCOPY N/A 08/08/2016    Procedure: ESOPHAGOGASTRODUODENOSCOPY (EGD);  Surgeon: Trey Bernstein MD;  Location: [facility] GI LAB;  Service:    • LUMBAR FUSION     • SD ESOPHAGOGASTRODUODENOSCOPY TRANSORAL DIAGNOSTIC N/A 03/28/2019    Procedure: ESOPHAGOGASTRODUODENOSCOPY (EGD);  Surgeon: Trey Bernstein MD;  Location: [facility] GI LAB;  Service: Gastroenterology   • SD NEUROPLASTY &/TRANSPOS MEDIAN NRV CARPAL TUNNE Right 8/9/2022    Procedure: RELEASE CARPAL TUNNEL;   Surgeon: Richie Borden MD;  Location: [facility] MAIN OR;  Service: Orthopedics   • TONSILLECTOMY     • TOOTH EXTRACTION      wisdom teeth removed     Current Outpatient Medications on File Prior to Visit   Medication Sig Dispense Refill   • acetaminophen (TYLENOL) 500 mg tablet Take 500 mg by mouth 2 (two) times a day     • ARIPiprazole (ABILIFY) 10 mg tablet Take 1 tablet (10 mg total) by mouth every evening (Patient taking differently: Take 10 mg by mouth daily) 90 tablet 2   • atorvastatin (LIPITOR) 80 mg tablet Take 1 tablet (80 mg total) by mouth daily 90 tablet 1   • Cholecalciferol 4000 units CAPS Take 1 capsule (4,000 Units total) by mouth daily     • FLUoxetine (PROzac) 40 MG capsule Take 2 capsules (80 mg total) by mouth daily (Patient taking differently: Take 40 mg by mouth 2 (two) times a day) 180 capsule 2   • glipiZIDE (GLUCOTROL) 5 mg tablet TAKE BY MOUTH 1 TABLET WITH LUNCH AND TWO TABLETS WITH SUPPER FOR DIABETES - FILL WHEN PATIENT REQUESTS 270 tablet 1   • ibuprofen (MOTRIN) 200 mg tablet Take 400 mg by mouth Every other day as needed when pain not alleviated by acetaminophen     • lisinopril (ZESTRIL) 10 mg tablet TAKE 1 TABLET BY MOUTH EVERY DAY 90 tablet 1   • Magnesium 400 MG TABS Take 1 tablet by mouth in the morning     • metFORMIN (GLUCOPHAGE) 1000 MG tablet TAKE 1 TABLET BY MOUTH TWICE A DAY WITH MEALS 180 tablet 3   • Multiple Vitamins-Minerals (multivitamin with minerals) tablet Take 1 tablet by mouth daily     • omeprazole (PriLOSEC) 40 MG capsule TAKE 1 CAPSULE BY MOUTH EVERY DAY 90 capsule 1   • pioglitazone (ACTOS) 30 mg tablet TAKE 1 TABLET (30 MG TOTAL) BY MOUTH DAILY FOR DIABETES 90 tablet 1   • glucose blood (OneTouch Ultra) test strip Test blood sugar twice daily (Patient not taking: Reported on 2/20/2024) 100 each 3   • Lancets (onetouch ultrasoft) lancets Test blood sugar twice daily (Patient not taking: Reported on 2/20/2024) 100 each 3     No current facility-administered  medications on file prior to visit.     Family History   Problem Relation Age of Onset   • Cancer Mother         lung   • Heart disease Mother    • Coronary artery disease Mother         CABG   • Diabetes Mother    • Lung cancer Mother 68        smoker   • Stroke Mother    • Thyroid disease Mother    • Other Mother         tobacco use   • Parkinsonism Father    • Intestinal polyp Father    • Bipolar disorder Father    • Heart disease Father    • Prostate cancer Father 58   • Hypertension Sister    • Alcohol abuse Sister    • Thyroid disease unspecified Sister    • Diabetes Brother    • Depression Brother    • Heart disease Brother    • Heart disease Brother    • Seizures Brother    • Diabetes Maternal Grandmother    • Thyroid disease Maternal Grandmother    • Endometrial cancer Maternal Aunt         unknown age   • Suicide Attempts Paternal Aunt    • Depression Paternal Aunt    • Ovarian cancer Cousin 40        maternal   • Hypertension Family      Social History     Socioeconomic History   • Marital status: /Civil Union     Spouse name: Not on file   • Number of children: 0   • Years of education: 12   • Highest education level: 12th grade   Occupational History   • Occupation: retired   Tobacco Use   • Smoking status: Never     Passive exposure: Never   • Smokeless tobacco: Never   Vaping Use   • Vaping status: Never Used   Substance and Sexual Activity   • Alcohol use: Not Currently   • Drug use: No   • Sexual activity: Not Currently     Partners: Male   Other Topics Concern   • Not on file   Social History Narrative    Education: high school graduate    Learning Disabilities: none    Marital History:     Children: none    Living Arrangement: lives in home with     Occupational History: retired, 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[employer]    Functioning Relationships: good support system,  is supportive    Legal History: none     History: None         Caffeine use    Denied home environment domestic violence    Job physical requirements heavy lifting     Social Determinants of Health     Financial Resource Strain: Low Risk  (1/25/2024)    Overall Financial Resource Strain (CARDIA)    • Difficulty of Paying Living Expenses: Not hard at all   Food Insecurity: No Food Insecurity (1/25/2024)    Hunger Vital Sign    • Worried About Running Out of Food in the Last Year: Never true    • Ran Out of Food in the Last Year: Never true   Transportation Needs: No Transportation Needs (1/25/2024)    PRAPARE - Transportation    • Lack of Transportation (Medical): No    • Lack of Transportation (Non-Medical): No   Physical Activity: Inactive (1/25/2024)    Exercise Vital Sign    • Days of Exercise per Week: 0 days    • Minutes of Exercise per Session: 0 min   Stress: No Stress Concern Present (1/25/2024)    [employer] - Occupational Stress Questionnaire    • Feeling of Stress : Only a little   Social Connections: Moderately Isolated (1/25/2024)    Social Connection and Isolation Panel [NHANES]    • Frequency of Communication with Friends and Family: Once a week    • Frequency of Social Gatherings with Friends and Family: Once a week    • Attends Druze Services: Never    • Active Member of Clubs or Organizations: Yes    • Attends Club or Organization Meetings: More than 4 times per year    • Marital Status:    Intimate Partner Violence: Not At Risk (1/25/2024)    Humiliation, Afraid, Rape, and Kick questionnaire    • Fear of Current or Ex-Partner: No    • Emotionally Abused: No    • Physically Abused: No    • Sexually Abused: No   Housing Stability: Low Risk  (1/25/2024)    Housing Stability Vital Sign    • Unable to Pay for Housing in the Last Year: No    • Number of Places Lived in the Last Year: 1    • Unstable Housing in the Last Year: No     Vitals:    03/18/24 1019   BP: 128/70   Pulse: 79   Resp: 16   SpO2: 96%   Weight: 104 kg (228  "lb 3.2 oz)   Height: 5' 3\" (1.6 m)     Results for orders placed or performed in visit on 03/11/24   Lipid Panel with Direct LDL reflex   Result Value Ref Range    Total Cholesterol 133 <200 mg/dL    HDL 44 (L) > OR = 50 mg/dL    Triglycerides 278 (H) <150 mg/dL    LDL Calculated 56 mg/dL (calc)    Chol HDLC Ratio 3.0 <5.0 (calc)    Non-HDL Cholesterol 89 <130 mg/dL (calc)   Microalbumin, Random Urine (W/Creatinine)   Result Value Ref Range    Creatinine, Urine 65 20 - 275 mg/dL    Albumin,U,Random 1.9 See Note: mg/dL    Microalb/Creat Ratio 29 <30 mcg/mg creat   Magnesium   Result Value Ref Range    Magnesium, Serum 1.4 (L) 1.5 - 2.5 mg/dL   Comprehensive metabolic panel   Result Value Ref Range    Glucose, Random 146 (H) 65 - 99 mg/dL    BUN 17 7 - 25 mg/dL    Creatinine 0.61 0.50 - 1.05 mg/dL    eGFR 101 > OR = 60 mL/min/1.73m2    SL AMB BUN/CREATININE RATIO SEE NOTE: 6 - 22 (calc)    Sodium 140 135 - 146 mmol/L    Potassium 5.1 3.5 - 5.3 mmol/L    Chloride 102 98 - 110 mmol/L    CO2 27 20 - 32 mmol/L    Calcium 9.1 8.6 - 10.4 mg/dL    Protein, Total 6.7 6.1 - 8.1 g/dL    Albumin 4.1 3.6 - 5.1 g/dL    Globulin 2.6 1.9 - 3.7 g/dL (calc)    Albumin/Globulin Ratio 1.6 1.0 - 2.5 (calc)    TOTAL BILIRUBIN 0.3 0.2 - 1.2 mg/dL    Alkaline Phosphatase 98 37 - 153 U/L    AST 22 10 - 35 U/L    ALT 20 6 - 29 U/L   Vitamin D 25 hydroxy   Result Value Ref Range    Vitamin D, 25-Hydroxy, Serum 46 30 - 100 ng/mL   Hemoglobin A1c (w/out EAG)   Result Value Ref Range    Hemoglobin A1C 7.2 (H) <5.7 % of total Hgb     Weight (last 2 days)     Date/Time Weight    03/18/24 1019 104 (228.2)        Body mass index is 40.42 kg/m².  BP      Temp      Pulse     Resp      SpO2        Vitals:    03/18/24 1019   Weight: 104 kg (228 lb 3.2 oz)     Vitals:    03/18/24 1019   Weight: 104 kg (228 lb 3.2 oz)       /70   Pulse 79   Resp 16   Ht 5' 3\" (1.6 m)   Wt 104 kg (228 lb 3.2 oz)   SpO2 96%   BMI 40.42 kg/m²          Physical " Exam  Constitutional:       Appearance: She is well-developed.   HENT:      Head: Normocephalic.   Eyes:      General: No scleral icterus.        Right eye: No discharge.         Left eye: No discharge.      Conjunctiva/sclera: Conjunctivae normal.      Pupils: Pupils are equal, round, and reactive to light.   Cardiovascular:      Rate and Rhythm: Normal rate and regular rhythm.      Pulses: no weak pulses.           Dorsalis pedis pulses are 2+ on the right side and 2+ on the left side.        Posterior tibial pulses are 2+ on the right side and 2+ on the left side.      Heart sounds: Normal heart sounds. No murmur heard.     No friction rub. No gallop.   Pulmonary:      Effort: No respiratory distress.      Breath sounds: Normal breath sounds. No wheezing or rales.   Abdominal:      General: Bowel sounds are normal. There is no distension.      Palpations: Abdomen is soft. There is no mass.      Tenderness: There is no abdominal tenderness. There is no guarding or rebound.   Musculoskeletal:         General: No deformity.      Cervical back: Neck supple.   Feet:      Right foot:      Skin integrity: No ulcer, skin breakdown, erythema, warmth, callus or dry skin.      Left foot:      Skin integrity: No ulcer, skin breakdown, erythema, warmth, callus or dry skin.   Lymphadenopathy:      Cervical: No cervical adenopathy.   Neurological:      Mental Status: She is alert.      Coordination: Coordination normal.      Exquisite tenderness over the left epicondyle lateral elbow no effusion full range of motion of the elbow.  Examination of the lower back no spinous process tenderness no hypertonicity of the paravertebral musculature motor strength 5/5   Diabetic Foot Exam    Patient's shoes and socks removed.    Right Foot/Ankle   Right Foot Inspection  Skin Exam: skin normal and skin intact. No dry skin, no warmth, no callus, no erythema, no maceration, no abnormal color, no pre-ulcer, no ulcer and no callus.     Toe  Exam: ROM and strength within normal limits.     Sensory   Monofilament testing: intact    Vascular  The right DP pulse is 2+. The right PT pulse is 2+.     Left Foot/Ankle  Left Foot Inspection  Skin Exam: skin normal and skin intact. No dry skin, no warmth, no erythema, no maceration, normal color, no pre-ulcer, no ulcer and no callus.     Toe Exam: ROM and strength within normal limits.     Sensory   Monofilament testing: intact    Vascular  The left DP pulse is 2+. The left PT pulse is 2+.     Assign Risk Category  No deformity present  No loss of protective sensation  No weak pulses  Risk: 0

## 2024-03-18 NOTE — PATIENT INSTRUCTIONS

## 2024-03-18 NOTE — ASSESSMENT & PLAN NOTE
Lab Results   Component Value Date    HGBA1C 7.2 (H) 03/11/2024   I have counselled the pt to follow a healthy and balanced diet ,and recommend routine exercise.  I will be ordering diabetic laboratories including comprehensive metabolic panel, hemoglobin A1c, urine microalbumin, lipid panel.  Annual eye examination recommended foot examination completed

## 2024-03-18 NOTE — ASSESSMENT & PLAN NOTE
Chronic lower back pain recently aggravated when lifting heavy box no radicular symptoms start physical therapy counseled not to lift heavy boxes reviewed recent MRI showing spinal stenosis

## 2024-03-18 NOTE — ASSESSMENT & PLAN NOTE
Hypertension - controlled, I have counseled patient following healthy balance diet, I would like the patient reduce sodium, exercise routinely, I would like the patient continued the med current medical regiment and we will continue to monitor.  Blood pressure 128/70

## 2024-03-22 ENCOUNTER — EVALUATION (OUTPATIENT)
Dept: PHYSICAL THERAPY | Facility: CLINIC | Age: 64
End: 2024-03-22
Payer: COMMERCIAL

## 2024-03-22 DIAGNOSIS — M48.00 SPINAL STENOSIS, UNSPECIFIED SPINAL REGION: ICD-10-CM

## 2024-03-22 PROCEDURE — 97162 PT EVAL MOD COMPLEX 30 MIN: CPT | Performed by: PHYSICAL THERAPIST

## 2024-03-22 PROCEDURE — 97112 NEUROMUSCULAR REEDUCATION: CPT | Performed by: PHYSICAL THERAPIST

## 2024-03-22 NOTE — PROGRESS NOTES
PT Evaluation     Today's date: 3/22/2024  Patient name: Jessica Zhou  : 1960  MRN: [id number]  Referring provider: Adeel Hough DO  Dx:   Encounter Diagnosis     ICD-10-CM    1. Spinal stenosis, unspecified spinal region  M48.00 Ambulatory Referral to Physical Therapy                     Assessment  Assessment details: Pt presents with s/s consistent with lumbar stenosis flexion responder. She will benefit from skilled PT services to address her impairments in order to decrease pain and restore function.  Impairments: abnormal or restricted ROM, activity intolerance, impaired physical strength, lacks appropriate home exercise program, pain with function, weight-bearing intolerance, poor posture  and poor body mechanics  Understanding of Dx/Px/POC: good   Prognosis: good    Goals  STG - 2-4 wks  1) pt will centralize to L/S  2) pt will improve pain 50%    LTG - 4-8 wks  1) pt will normalize sitting and standing posture  2) pt will improve pain 75%    Plan  Planned therapy interventions: joint mobilization, manual therapy, abdominal trunk stabilization, body mechanics training, neuromuscular re-education, patient education, postural training, strengthening, stretching, therapeutic activities, therapeutic exercise, home exercise program, gait training, functional ROM exercises and flexibility  Frequency: 2x week  Duration in weeks: 8  Treatment plan discussed with: patient        Subjective Evaluation    History of Present Illness  Mechanism of injury: Pt is a 63 y.o. female with PMHx significant for asthma, anemia, depression, DM II, HTN, bipolar dx, s/p C4-7 fusion, s/p R CTS. She reports insidious onset of B LBP radiating into B post thigh to the knee 4 wks ago. She denies N/T, red flags.    Patient Goals  Patient goals for therapy: decreased pain, increased motion, increased strength and independence with ADLs/IADLs    Pain  Current pain rating: 3  At best pain rating: 3  At worst pain rating:  8  Location: B LB, buttock, post thigh  Quality: sharp, radiating and dull ache  Alleviating factors: sitting.  Aggravating factors: standing and walking (STS)  Progression: no change      Diagnostic Tests  No diagnostic tests performed  Treatments  No previous or current treatments        Objective     Concurrent Complaints  Negative for night pain, disturbed sleep, bladder dysfunction, bowel dysfunction, saddle (S4) numbness, history of cancer, history of trauma and infection    Postural Observations  Seated posture: poor  Standing posture: poor  Correction of posture: has no consistent effect      Neurological Testing     Sensation     Lumbar   Left   Intact: light touch    Right   Intact: light touch    Comments   Left light touch: L1-S1  Right light touch: L1-S1    Reflexes   Left   Patellar (L4): normal (2+)  Achilles (S1): trace (1+)    Right   Patellar (L4): normal (2+)  Achilles (S1): normal (2+)    Active Range of Motion     Lumbar   Flexion:  with pain Restriction level: minimal  Extension:  with pain Restriction level: moderate  Left lateral flexion:  with pain Restriction level: minimal  Right lateral flexion:  with pain Restriction level: minimal  Mechanical Assessment    Cervical      Thoracic      Lumbar    Standing flexion: repeated movements   Pain location:no change  Pain intensity: worse  Pain level: increased  Sitting flexion: repeated movements  Pain location: centralized  Pain intensity: better  Pain level: abolished  2x10  Standing extension: repeated movements  Pain location: peripheralized  Pain intensity: worse  Pain level: increased    Strength/Myotome Testing     Left Hip   Planes of Motion   Flexion: 4    Right Hip   Planes of Motion   Flexion: 4    Left Knee   Flexion: 4+  Extension: 4+    Right Knee   Flexion: 4+  Extension: 4    Left Ankle/Foot   Dorsiflexion: 4+  Plantar flexion: 4+  Great toe extension: 4    Right Ankle/Foot   Dorsiflexion: 4+  Plantar flexion: 4+  Great toe  extension: 4+    Tests     Lumbar     Left   Positive slump test.     Right   Negative slump test.              Precautions: PMHx: asthma, anemia, depression, DM II, HTN, bipolar dx, s/p C4-7 fusion, s/p R CTS  Dx: lumbar stenosis with a flexion DP    Manuals 3/22                                                                Neuro Re-Ed             Postural correction and awareness training 10 min            RFISit 3x10            RFIL             DLS marches             bridges             Partial crunches                          Ther Ex                                                                                                                     Ther Activity             STS with postural correction 1x5                         Gait Training             Gait training with postural correction                          Modalities

## 2024-03-25 ENCOUNTER — OFFICE VISIT (OUTPATIENT)
Dept: PHYSICAL THERAPY | Facility: CLINIC | Age: 64
End: 2024-03-25
Payer: COMMERCIAL

## 2024-03-25 DIAGNOSIS — M48.00 SPINAL STENOSIS, UNSPECIFIED SPINAL REGION: Primary | ICD-10-CM

## 2024-03-25 PROCEDURE — 97112 NEUROMUSCULAR REEDUCATION: CPT

## 2024-03-25 NOTE — PROGRESS NOTES
Daily Note     Today's date: 3/25/2024  Patient name: Jessica Zhou  : 1960  MRN: [id number]  Referring provider: Adeel Hough DO  Dx:   Encounter Diagnosis     ICD-10-CM    1. Spinal stenosis, unspecified spinal region  M48.00                      Subjective: Pt reports an episode of increased low back pain yesterday after being on her feet a lot. Pt reports no pain currently.       Objective: See treatment diary below      Assessment: Pt demonstrated fair postural awareness and required moderate vcs to correct. Bridges produced and temporarily increased low back pain. The remainder of TE program was tolerated without increasing pain.        Plan: Continue poc as per PT.      Precautions: PMHx: asthma, anemia, depression, DM II, HTN, bipolar dx, s/p C4-7 fusion, s/p R CTS  Dx: lumbar stenosis with a flexion DP    Manuals 3/22 3/25                                                               Neuro Re-Ed             Postural correction and awareness training 10 min 2 min           RFISit 3x10 3x10           RFIL  1x10           DLS marches  2x10           bridges  1x6*           Partial crunches  2x10                        Ther Ex                                                                                                                     Ther Activity             STS with postural correction 1x5 1x5                        Gait Training             Gait training with postural correction  1x250 ft                        Modalities

## 2024-03-28 ENCOUNTER — OFFICE VISIT (OUTPATIENT)
Dept: PHYSICAL THERAPY | Facility: CLINIC | Age: 64
End: 2024-03-28
Payer: COMMERCIAL

## 2024-03-28 DIAGNOSIS — M48.00 SPINAL STENOSIS, UNSPECIFIED SPINAL REGION: Primary | ICD-10-CM

## 2024-03-28 PROCEDURE — 97112 NEUROMUSCULAR REEDUCATION: CPT | Performed by: PHYSICAL THERAPIST

## 2024-03-28 NOTE — PROGRESS NOTES
Daily Note     Today's date: 3/28/2024  Patient name: Jessica Zhou  : 1960  MRN: [id number]  Referring provider: Adeel Hough DO  Dx:   Encounter Diagnosis     ICD-10-CM    1. Spinal stenosis, unspecified spinal region  M48.00                      Subjective: Pt reports 6/10 LBP with WB, especially upon STS today.    Objective: See treatment diary below    Assessment: Pt able to nearly abolish LBP upon STS following RFISit pt OP.    Plan: Cont. POC.     Precautions: PMHx: asthma, anemia, depression, DM II, HTN, bipolar dx, s/p C4-7 fusion, s/p R CTS  Dx: lumbar stenosis with a flexion DP    Manuals 3/22 3/25 3/28                                                              Neuro Re-Ed             Postural correction and awareness training 10 min 2 min 5 min          RFISit 3x10 3x10 3x10          RFISit pt OP   2x10          RFIL  1x10           DLS marches  2x10 B/  1x35          bridges  1x6*           Partial crunches  2x10 2x10 3x10                      Ther Ex             clamshells   R/  1x20                                                                                                     Ther Activity             STS with postural correction 1x5 1x5                        Gait Training             Gait training with postural correction  1x250 ft 250 ft                       Modalities

## 2024-04-01 ENCOUNTER — OFFICE VISIT (OUTPATIENT)
Dept: PHYSICAL THERAPY | Facility: CLINIC | Age: 64
End: 2024-04-01
Payer: COMMERCIAL

## 2024-04-01 DIAGNOSIS — M48.00 SPINAL STENOSIS, UNSPECIFIED SPINAL REGION: Primary | ICD-10-CM

## 2024-04-01 LAB — MAGNESIUM SERPL-MCNC: 1.4 MG/DL (ref 1.5–2.5)

## 2024-04-01 PROCEDURE — 97112 NEUROMUSCULAR REEDUCATION: CPT | Performed by: PHYSICAL THERAPIST

## 2024-04-01 NOTE — PROGRESS NOTES
Daily Note     Today's date: 2024  Patient name: Jessica Zhou  : 1960  MRN: [id number]  Referring provider: Adeel Hough DO  Dx:   Encounter Diagnosis     ICD-10-CM    1. Spinal stenosis, unspecified spinal region  M48.00                      Subjective: Pt reports improved LBP with use of OP during RFISit.    Objective: See treatment diary below    Assessment: Pt demonstrated mildly improved ambulation tolerance and core stability.    Plan: Cont. POC.     Precautions: PMHx: asthma, anemia, depression, DM II, HTN, bipolar dx, s/p C4-7 fusion, s/p R CTS  Dx: lumbar stenosis with a flexion DP    Manuals 3/22 3/25 3/28 4/1                                                             Neuro Re-Ed             Postural correction and awareness training 10 min 2 min 5 min 5 min         RFISit 3x10 3x10 3x10 1x10         RFISit pt OP   2x10 3x10         RFIL  1x10           DLS marches  2x10 B/  1x35 B/  1#  1x35         bridges  1x6*   1x10        Partial crunches  2x10 2x10 3x10                      Ther Ex             clamshells   R/  1x20                                                                                                     Ther Activity             STS with postural correction 1x5 1x5 1x5 1x5                      Gait Training             Gait training with postural correction  1x250 ft 250 ft 300 ft                      Modalities

## 2024-04-02 DIAGNOSIS — E83.42 HYPOMAGNESEMIA: Primary | ICD-10-CM

## 2024-04-03 ENCOUNTER — TELEPHONE (OUTPATIENT)
Dept: INTERNAL MEDICINE CLINIC | Facility: CLINIC | Age: 64
End: 2024-04-03

## 2024-04-03 NOTE — TELEPHONE ENCOUNTER
----- Message from Adeel Hough DO sent at 4/2/2024  6:46 PM EDT -----  Notify the patient low magnesium level 1.4 please have patient take magnesium oxide 400 mg twice a day recheck magnesium level in 2 weeks please have the pt follow up with me to discuss as scheduled orders are in the chart

## 2024-04-03 NOTE — TELEPHONE ENCOUNTER
Spoke with PT. She said she already takes Magnesium oxide 400 mg BID. Also, she said she will like to come to the office tomorrow Thurs 3/4 to  lab slip for the repeat mag level.     Please f/u with PT if she needs an appt with Dr Hough.    Thank You

## 2024-04-04 ENCOUNTER — OFFICE VISIT (OUTPATIENT)
Dept: PHYSICAL THERAPY | Facility: CLINIC | Age: 64
End: 2024-04-04
Payer: COMMERCIAL

## 2024-04-04 DIAGNOSIS — M48.00 SPINAL STENOSIS, UNSPECIFIED SPINAL REGION: Primary | ICD-10-CM

## 2024-04-04 PROCEDURE — 97112 NEUROMUSCULAR REEDUCATION: CPT | Performed by: PHYSICAL THERAPIST

## 2024-04-04 NOTE — PROGRESS NOTES
"Daily Note     Today's date: 2024  Patient name: Jessica Zhou  : 1960  MRN: [id number]  Referring provider: Adeel Hough DO  Dx:   Encounter Diagnosis     ICD-10-CM    1. Spinal stenosis, unspecified spinal region  M48.00                      Subjective: Pt reports 2/10 LBP at worst with STS or standing > 5 minutes.    Objective: See treatment diary below    Assessment: Pt demonstrated improved core stability, STS tolerance, and irritability.    Plan: Cont. POC.     Precautions: PMHx: asthma, anemia, depression, DM II, HTN, bipolar dx, s/p C4-7 fusion, s/p R CTS  Dx: lumbar stenosis with a flexion DP    Manuals 3/22 3/25 3/28 4/1 4/4                                                            Neuro Re-Ed             Postural correction and awareness training 10 min 2 min 5 min 5 min 5 min        RFISit 3x10 3x10 3x10 1x10 1x10        RFISit pt OP   2x10 3x10 1x20        RFIL  1x10           PPT     5\"/  1x20        DLS marches  2x10 B/  1x35 B/  1#  1x35 B/  1.5#  1x30        bridges  1x6*   2x10        Partial crunches  2x10 2x10 3x10 3x10                     Ther Ex             clamshells   R/  1x20  R/  3x10                                                                                                   Ther Activity             STS with postural correction 1x5 1x5 1x5 1x5 1x10                     Gait Training             Gait training with postural correction  1x250 ft 250 ft 300 ft 300 ft 400 ft                    Modalities                                            "

## 2024-04-10 DIAGNOSIS — R79.0 LOW MAGNESIUM LEVEL: ICD-10-CM

## 2024-04-10 RX ORDER — LANOLIN ALCOHOL/MO/W.PET/CERES
CREAM (GRAM) TOPICAL
Qty: 180 TABLET | Refills: 1 | Status: SHIPPED | OUTPATIENT
Start: 2024-04-10

## 2024-04-11 ENCOUNTER — OFFICE VISIT (OUTPATIENT)
Dept: PHYSICAL THERAPY | Facility: CLINIC | Age: 64
End: 2024-04-11
Payer: COMMERCIAL

## 2024-04-11 DIAGNOSIS — M48.00 SPINAL STENOSIS, UNSPECIFIED SPINAL REGION: Primary | ICD-10-CM

## 2024-04-11 PROCEDURE — 97112 NEUROMUSCULAR REEDUCATION: CPT

## 2024-04-11 NOTE — PROGRESS NOTES
"Daily Note     Today's date: 2024  Patient name: Jessica Zhou  : 1960  MRN: [id number]  Referring provider: Adeel Hough DO  Dx:   Encounter Diagnosis     ICD-10-CM    1. Spinal stenosis, unspecified spinal region  M48.00                      Subjective: Pt reports 1/10 low back pain at worst with standing since last visit.     Objective: See treatment diary below    Assessment: Pt demonstrated pain-free tolerance to bridging today and demonstrated increased tolerance to hip strengthening. Postural awareness is fair.    Plan: Cont. POC.     Precautions: PMHx: asthma, anemia, depression, DM II, HTN, bipolar dx, s/p C4-7 fusion, s/p R CTS  Dx: lumbar stenosis with a flexion DP    Manuals 3/22 3/25 3/28 4/1 4/4 4/11                                                           Neuro Re-Ed             Postural correction and awareness training 10 min 2 min 5 min 5 min 5 min 5 min       RFISit 3x10 3x10 3x10 1x10 1x10 1x10       RFISit pt OP   2x10 3x10 1x20 1x20       RFIL  1x10           PPT     5\"/  1x20 5\"/ 1x20       DLS marches  2x10 B/  1x35 B/  1#  1x35 B/  1.5#  1x30 B/ 1.5# 1x30       bridges  1x6*   2x10 3x10       Partial crunches  2x10 2x10 3x10 3x10 3x10                    Ther Ex             clamshells   R/  1x20  R/  3x10 G/  3x10                                                                                                  Ther Activity             STS with postural correction 1x5 1x5 1x5 1x5 1x10 1x10                     Gait Training             Gait training with postural correction  1x250 ft 250 ft 300 ft 300 ft 400 ft                    Modalities                                            "

## 2024-04-16 LAB — MAGNESIUM SERPL-MCNC: 1.5 MG/DL (ref 1.5–2.5)

## 2024-04-18 ENCOUNTER — OFFICE VISIT (OUTPATIENT)
Dept: PHYSICAL THERAPY | Facility: CLINIC | Age: 64
End: 2024-04-18
Payer: COMMERCIAL

## 2024-04-18 DIAGNOSIS — M48.00 SPINAL STENOSIS, UNSPECIFIED SPINAL REGION: Primary | ICD-10-CM

## 2024-04-18 PROCEDURE — 97112 NEUROMUSCULAR REEDUCATION: CPT | Performed by: PHYSICAL THERAPIST

## 2024-04-18 NOTE — PROGRESS NOTES
"Daily Note     Today's date: 2024  Patient name: Jessica Zhou  : 1960  MRN: [id number]  Referring provider: Adeel Hough DO  Dx:   Encounter Diagnosis     ICD-10-CM    1. Spinal stenosis, unspecified spinal region  M48.00                      Subjective: Pt reports midline LB achiness yesterday after sitting too long in a bad chair.    Objective: See treatment diary below    RFISit pt OP abolishes, better  LEONELA P,NW    Assessment: Pt demonstrated a good response to trial of LEONELA to determine d/c readiness..    Plan: Prepare for d/c NV.     Precautions: PMHx: asthma, anemia, depression, DM II, HTN, bipolar dx, s/p C4-7 fusion, s/p R CTS  Dx: lumbar stenosis with a flexion DP    Manuals 3/22 3/25 3/28 4/1 4/4 4/11 4/18                                                          Neuro Re-Ed             Postural correction and awareness training 10 min 2 min 5 min 5 min 5 min 5 min       RFISit 3x10 3x10 3x10 1x10 1x10 1x10       RFISit pt OP   2x10 3x10 1x20 1x20 1x20      RFIL  1x10           LEONELA       1x10      PPT     5\"/  1x20 5\"/ 1x20 5\"x20      DLS marches  2x10 B/  1x35 B/  1#  1x35 B/  1.5#  1x30 B/ 1.5# 1x30 B/  1.5#  1x35      bridges  1x6*   2x10 3x10 3x15      Partial crunches  2x10 2x10 3x10 3x10 3x10 3x10                   Ther Ex             clamshells   R/  1x20  R/  3x10 G/  3x10 G/  3x15                                                                                                 Ther Activity             STS with postural correction 1x5 1x5 1x5 1x5 1x10 1x10  1x15                   Gait Training             Gait training with postural correction  1x250 ft 250 ft 300 ft 300 ft 400 ft 700 ft                   Modalities                                            "

## 2024-04-25 ENCOUNTER — OFFICE VISIT (OUTPATIENT)
Dept: PHYSICAL THERAPY | Facility: CLINIC | Age: 64
End: 2024-04-25
Payer: COMMERCIAL

## 2024-04-25 DIAGNOSIS — M48.00 SPINAL STENOSIS, UNSPECIFIED SPINAL REGION: Primary | ICD-10-CM

## 2024-04-25 PROCEDURE — 97112 NEUROMUSCULAR REEDUCATION: CPT | Performed by: PHYSICAL THERAPIST

## 2024-04-25 NOTE — PROGRESS NOTES
"Daily Note     Today's date: 2024  Patient name: Jessica Zhou  : 1960  MRN: [id number]  Referring provider: Adeel Hough DO  Dx:   Encounter Diagnosis     ICD-10-CM    1. Spinal stenosis, unspecified spinal region  M48.00                      Subjective: Pt reports no pain since last visit.    Objective: See treatment diary below    LEONELASHERRI DEAN    Assessment: Pt demonstrates a resolution of pain and is appropriate for d/c from skilled PT services at this time..    Plan: D/C to [facility].     Precautions: PMHx: asthma, anemia, depression, DM II, HTN, bipolar dx, s/p C4-7 fusion, s/p R CTS  Dx: lumbar stenosis with a flexion DP    Manuals 3/22 3/25 3/28 4/1 4/4 4/11 4/18 4/25                                                         Neuro Re-Ed             Postural correction and awareness training 10 min 2 min 5 min 5 min 5 min 5 min       RFISit 3x10 3x10 3x10 1x10 1x10 1x10       RFISit pt OP   2x10 3x10 1x20 1x20 1x20 1x20     RFIL  1x10           LEONELA       1x10 1x10     PPT     5\"/  1x20 5\"/ 1x20 5\"x20 5\"x20     DLS marches  2x10 B/  1x35 B/  1#  1x35 B/  1.5#  1x30 B/ 1.5# 1x30 B/  1.5#  1x35 B/  2.5#  1x35     bridges  1x6*   2x10 3x10 3x15 3x15     Partial crunches  2x10 2x10 3x10 3x10 3x10 3x10 3x10                  Ther Ex             clamshells   R/  1x20  R/  3x10 G/  3x10 G/  3x15 G/  3x15                                                                                                Ther Activity             STS with postural correction 1x5 1x5 1x5 1x5 1x10 1x10  1x15 1x20                  Gait Training             Gait training with postural correction  1x250 ft 250 ft 300 ft 300 ft 400 ft 700 ft 700 ft                  Modalities                                            "

## 2024-04-28 DIAGNOSIS — R80.9 TYPE 2 DIABETES MELLITUS WITH MICROALBUMINURIA, WITHOUT LONG-TERM CURRENT USE OF INSULIN (HCC): ICD-10-CM

## 2024-04-28 DIAGNOSIS — E11.29 TYPE 2 DIABETES MELLITUS WITH MICROALBUMINURIA, WITHOUT LONG-TERM CURRENT USE OF INSULIN (HCC): ICD-10-CM

## 2024-04-28 RX ORDER — BLOOD SUGAR DIAGNOSTIC
STRIP MISCELLANEOUS
Qty: 200 STRIP | Refills: 1 | Status: SHIPPED | OUTPATIENT
Start: 2024-04-28

## 2024-05-21 NOTE — PSYCH
MEDICATION MANAGEMENT NOTE        [facility] - PSYCHIATRIC ASSOCIATES      Name and Date of Birth:  Jessica Zhou 63 y.o. 1960 MRN: [id number]    Insurance: Payor: BLUE CROSS / Plan: [facility] PLAN 361 / Product Type: Blue Fee for Service /     Date of Visit: May 30, 2024    Reason for Visit:   Chief Complaint   Patient presents with    Medication Management    Follow-up       SUBJECTIVE:    Jessica is seen today for a follow up for Bipolar Disorder and Generalized Anxiety Disorder. She continues to do fairly well since the last visit. She states that mood remains stable, denies any significant depressive symptoms or manic symptoms. 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 She attends Weight Watchers, but continues to have difficulty losing weight.    She denies any suicidal ideation, intent or plan at present; denies any homicidal ideation, intent or plan at present.    She has no auditory hallucinations, denies any visual hallucinations, has no delusional thoughts.    She denies any side effects from current psychiatric medications.    HPI ROS Appetite Changes and Sleep:     She reports normal sleep, adequate number of sleep hours (9 hours), normal appetite, no weight change, low energy    Current Rating Scores:     Current PHQ-9   PHQ-2/9 Depression Screening    Little interest or pleasure in doing things: 0 - not at all  Feeling down, depressed, or hopeless: 0 - not at all  Trouble falling or staying asleep, or sleeping too much: 0 - not at all  Feeling tired or having little energy: 1 - several days  Poor appetite or overeatin - several days  Feeling bad about yourself - or that you are a failure or have let yourself or your family down: 0 - not at all  Trouble  concentrating on things, such as reading the newspaper or watching television: 0 - not at all  Moving or speaking so slowly that other people could have noticed. Or the opposite - being so fidgety or restless that you have been moving around a lot more than usual: 0 - not at all  Thoughts that you would be better off dead, or of hurting yourself in some way: 0 - not at all  PHQ-9 Score: 2  PHQ-9 Interpretation: No or Minimal depression       Current PHQ-9 score is decreased from 3 at the last visit).    Review Of Systems:      Constitutional low energy   ENT negative   Cardiovascular negative   Respiratory negative   Gastrointestinal negative   Genitourinary negative   Musculoskeletal negative   Integumentary negative   Neurological negative   Endocrine negative   Other Symptoms none, all other systems are negative       Past Psychiatric History: (unchanged information from previous note copied and updated)    Past Inpatient Psychiatric Treatment:   One past inpatient psychiatric admission at [facility] years abo  Past Outpatient Psychiatric Treatment:    In outpatient treatment at [facility] for many years.  Past Suicide Attempts: yes, 2 attempts by overdose years ago  Past Violent Behavior: no  Past Psychiatric Medication Trials: Prozac, Depakote, Lamictal (rash), Tegretol, Abilify and Latuda    Traumatic History: (unchanged information from previous note copied and updated)    Abuse: no history of sexual abuse, no history of physical abuse  Other Traumatic Events: none     Past Medical History:    Past Medical History:   Diagnosis Date    Abdominal pain     started 7/21/16- found something on the pancreas    Abnormal weight loss     last assessed [id number]    Acute sinusitis     Allergic rhinitis     last assessed [id number]    Anemia     had iron infusion    Asthma     Cardiac murmur     Carpal tunnel syndrome     Cataracts, bilateral     Cellulitis     Cervical disc disease      Cervical myelopathy (HCC) 09/07/2022    Colon polyp     CPAP (continuous positive airway pressure) dependence     not using at this time    Degeneration of thoracic intervertebral disc     Degenerative arthritis of knee     Diabetes mellitus ([facility])     Diverticulosis     Esophagitis, reflux     last assessed 29Jan2014    Excessive sweating     Fatty liver     Frequent falls     Gait disturbance     GERD (gastroesophageal reflux disease)     Granuloma annulare     Head injury     Hyperlipidemia     Hypertension     Hypertensive heart disease     Hypoglycemia     last assessed 08Feb2016    Kidney stone     Leg length discrepancy     Leucocytosis     Lumbar stress fracture     Multiple thyroid nodules     Neuropathy     Obesity     last assessed 25Dec2016    Paronychia of toe     Patellofemoral dysfunction     Pneumonia     Postmenopausal atrophic vaginitis     Prepatellar bursitis     Sebaceous cyst     last assessed 21Feb2014    Sleep apnea     no CPAP used    Suicide attempt ([facility])     Tinea corporis     Type 2 diabetes mellitus ([facility])     cont meds and monitor BS;  last assessed 08Nov2016    Uncontrolled type 2 diabetes mellitus with hyperglycemia, with long-term current use of insulin ([facility]) 08/05/2019    Urinary frequency     Vertigo     Vitamin D deficiency     Dia-Parkinson-White syndrome         Past Surgical History:   Procedure Laterality Date    CERVICAL FUSION N/A 08/03/2021    Procedure: FUSION CERVICAL ANTERIOR W DISCECTOMY C4-5, C5-6, C6-7 with allograft and neuromonitoring  case length 3 hours;  Surgeon: Kodak Wright MD;  Location: [facility] MAIN [facility];  Service: Orthopedics    COLONOSCOPY      ESOPHAGOGASTRODUODENOSCOPY N/A 08/08/2016    Procedure: ESOPHAGOGASTRODUODENOSCOPY (EGD);  Surgeon: Trey Bernstein MD;  Location: [facility] GI LAB;  Service:     LUMBAR FUSION      AR ESOPHAGOGASTRODUODENOSCOPY TRANSORAL DIAGNOSTIC N/A 03/28/2019    Procedure: ESOPHAGOGASTRODUODENOSCOPY (EGD);  Surgeon: Trey Bernstein MD;   Location: [facility]  GI LAB;  Service: Gastroenterology    AZ NEUROPLASTY &/TRANSPOS MEDIAN NRV CARPAL TUNNE Right 8/9/2022    Procedure: RELEASE CARPAL TUNNEL;  Surgeon: Richie Borden MD;  Location: [facility] MAIN OR;  Service: Orthopedics    TONSILLECTOMY      TOOTH EXTRACTION      wisdom teeth removed     Allergies   Allergen Reactions    Bee Venom Swelling     Local swelling at sting site    Biaxin [Clarithromycin] Itching    Depakote Er [Divalproex Sodium Er]      pancreatitis    Shellfish-Derived Products - Food Allergy Other (See Comments)     Ears and cheeks got red    Tanzeum [Albiglutide]      pancreatitis    Carbamazepine Other (See Comments)     Reaction Date:Unknown    Lamotrigine Rash       Substance Abuse History:    Social History     Substance and Sexual Activity   Alcohol Use Not Currently     Social History     Substance and Sexual Activity   Drug Use No       Social History:    Social History     Socioeconomic History    Marital status: /Civil Union     Spouse name: Not on file    Number of children: 0    Years of education: 12    Highest education level: 12th grade   Occupational History    Occupation: retired   Tobacco Use    Smoking status: Never     Passive exposure: Never    Smokeless tobacco: Never   Vaping Use    Vaping status: Never Used   Substance and Sexual Activity    Alcohol use: Not Currently    Drug use: No    Sexual activity: Not Currently     Partners: Male   Other Topics Concern    Not on file   Social History Narrative    Education: high school graduate    Learning Disabilities: none    Marital History:     Children: none    Living Arrangement: lives in home with     Occupational History: retired, 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[employer]    Functioning Relationships: good support system,  is supportive    Legal History: none     History: None        Caffeine use    Denied home environment domestic violence    Job  physical requirements heavy lifting     Social Determinants of Health     Financial Resource Strain: Low Risk  (5/30/2024)    Overall Financial Resource Strain (CARDIA)     Difficulty of Paying Living Expenses: Not hard at all   Food Insecurity: No Food Insecurity (5/30/2024)    Hunger Vital Sign     Worried About Running Out of Food in the Last Year: Never true     Ran Out of Food in the Last Year: Never true   Transportation Needs: No Transportation Needs (5/30/2024)    PRAPARE - Transportation     Lack of Transportation (Medical): No     Lack of Transportation (Non-Medical): No   Physical Activity: Inactive (5/30/2024)    Exercise Vital Sign     Days of Exercise per Week: 0 days     Minutes of Exercise per Session: 0 min   Stress: No Stress Concern Present (5/30/2024)    [employer] [place] [employer] - Occupational Stress Questionnaire     Feeling of Stress : Only a little   Social Connections: Moderately Isolated (5/30/2024)    Social Connection and Isolation Panel [NHANES]     Frequency of Communication with Friends and Family: Once a week     Frequency of Social Gatherings with Friends and Family: Once a week     Attends Yarsani Services: Never     Active Member of Clubs or Organizations: Yes     Attends Club or Organization Meetings: More than 4 times per year     Marital Status:    Intimate Partner Violence: Not At Risk (5/30/2024)    Humiliation, Afraid, Rape, and Kick questionnaire     Fear of Current or Ex-Partner: No     Emotionally Abused: No     Physically Abused: No     Sexually Abused: No   Housing Stability: Low Risk  (5/30/2024)    Housing Stability Vital Sign     Unable to Pay for Housing in the Last Year: No     Number of Times Moved in the Last Year: 0     Homeless in the Last Year: No       Family Psychiatric History:     Family History   Problem Relation Age of Onset    Cancer Mother         lung    Heart disease Mother     Coronary artery disease Mother         CABG     "Diabetes Mother     Lung cancer Mother 68        smoker    Stroke Mother     Thyroid disease Mother     Other Mother         tobacco use    Parkinsonism Father     Intestinal polyp Father     Bipolar disorder Father     Heart disease Father     Prostate cancer Father 58    Hypertension Sister     Alcohol abuse Sister     Thyroid disease unspecified Sister     Diabetes Brother     Depression Brother     Heart disease Brother     Heart disease Brother     Seizures Brother     Diabetes Maternal Grandmother     Thyroid disease Maternal Grandmother     Endometrial cancer Maternal Aunt         unknown age    Suicide Attempts Paternal Aunt     Depression Paternal Aunt     Ovarian cancer Cousin 40        maternal    Hypertension Family        History Review: The following portions of the patient's history were reviewed and updated as appropriate: allergies, current medications, past family history, past medical history, past social history, past surgical history, and problem list.         OBJECTIVE:     Vital signs in last 24 hours:    Vitals:    05/30/24 1545   BP: 159/69   Pulse: 86   Weight: 103 kg (228 lb)   Height: 5' 3\" (1.6 m)       Mental Status Evaluation:    Appearance age appropriate, casually dressed   Behavior cooperative, calm   Speech normal rate, normal volume, normal pitch   Mood euthymic   Affect normal range and intensity, appropriate   Thought Processes organized, goal directed   Associations intact associations   Thought Content no overt delusions   Perceptual Disturbances: no auditory hallucinations, no visual hallucinations   Abnormal Thoughts  Risk Potential Suicidal ideation - None  Homicidal ideation - None  Potential for aggression - No   Orientation oriented to person, place, time/date, and situation   Memory recent and remote memory grossly intact   Consciousness alert and awake   Attention Span Concentration Span attention span and concentration are age appropriate   Intellect appears to be of " average intelligence   Insight intact   Judgement intact   Muscle Strength and  Gait normal muscle strength and normal muscle tone, normal balance, slow gait   Motor activity no abnormal movements   Language no difficulty naming common objects, no difficulty repeating a phrase, no difficulty writing a sentence   Fund of Knowledge adequate knowledge of current events  adequate fund of knowledge regarding past history  adequate fund of knowledge regarding vocabulary    Pain none   Pain Scale 0       Laboratory Results: I have personally reviewed all pertinent laboratory/tests results    Recent Labs (last 4 months):   Orders Only on 04/16/2024   Component Date Value    Magnesium, Serum 04/16/2024 1.5    Orders Only on 04/01/2024   Component Date Value    Magnesium, Serum 04/01/2024 1.4 (L)    Orders Only on 03/11/2024   Component Date Value    Total Cholesterol 03/11/2024 133     HDL 03/11/2024 44 (L)     Triglycerides 03/11/2024 278 (H)     LDL Calculated 03/11/2024 56     Chol HDLC Ratio 03/11/2024 3.0     Non-HDL Cholesterol 03/11/2024 89     Creatinine, Urine 03/11/2024 65     Albumin,U,Random 03/11/2024 1.9     Microalb/Creat Ratio 03/11/2024 29     Magnesium, Serum 03/11/2024 1.4 (L)     Glucose, Random 03/11/2024 146 (H)     BUN 03/11/2024 17     Creatinine 03/11/2024 0.61     eGFR 03/11/2024 101     SL AMB BUN/CREATININE RA* 03/11/2024 SEE NOTE:     Sodium 03/11/2024 140     Potassium 03/11/2024 5.1     Chloride 03/11/2024 102     CO2 03/11/2024 27     Calcium 03/11/2024 9.1     Protein, Total 03/11/2024 6.7     Albumin 03/11/2024 4.1     Globulin 03/11/2024 2.6     Albumin/Globulin Ratio 03/11/2024 1.6     TOTAL BILIRUBIN 03/11/2024 0.3     Alkaline Phosphatase 03/11/2024 98     AST 03/11/2024 22     ALT 03/11/2024 20     Vitamin D, 25-Hydroxy, S* 03/11/2024 46     Hemoglobin A1C 03/11/2024 7.2 (H)        Suicide/Homicide Risk Assessment:    Risk of Harm to Self:  Demographic risk factors include: ,  age: over 50 or older  Historical Risk Factors include: history of anxiety, history of mood disorder, history of suicide attempts  Recent Specific Risk Factors include: diagnosis of mood disorder  Protective Factors: no current suicidal ideation, being , compliant with medications, compliant with mental health treatment, responsibilities and duties to others, stable living environment, supportive family  Weapons: none. The following steps have been taken to ensure weapons are properly secured: not applicable  Based on today's assessment, Jessica presents the following risk of harm to self: none    Risk of Harm to Others:  The following ratings are based on assessment at the time of the interview  Based on today's assessment, Jessica presents the following risk of harm to others: none    The following interventions are recommended: no intervention changes needed    Assessment/Plan:       Diagnoses and all orders for this visit:    Bipolar I disorder, most recent episode depressed, in remission (HCC)  -     ARIPiprazole (ABILIFY) 10 mg tablet; Take 1 tablet (10 mg total) by mouth every evening  -     FLUoxetine (PROzac) 40 MG capsule; Take 1 capsule (40 mg total) by mouth 2 (two) times a day    LOURDES (generalized anxiety disorder)  -     FLUoxetine (PROzac) 40 MG capsule; Take 1 capsule (40 mg total) by mouth 2 (two) times a day    Long-term use of high-risk medication          Treatment Recommendations/Precautions:    Continue Prozac 40 mg twice a day to control depressive symptoms  Continue Abilify 10 mg daily to help with mood  Medication management every 4 months  Follows with family physician for glucose and lipid monitoring due to current therapy with antipsychotic medication  Follows with family physician for yearly physical exam, asthma, diabetes, hyperlipidemia, and hypertension  Aware of 24 hour and weekend coverage for urgent situations accessed by calling [facility] main practice  number  Monitor lipid profile and hemoglobin A1C yearly due to current therapy with antipsychotic medication - gets labs done with PCP  I am scheduling this patient out for greater than 3 months: Yes - Patient's stability of symptoms warrant this length of time or no significant changes to treatment plan    Medications Risks/Benefits      Risks, Benefits And Possible Side Effects Of Medications:    Risks, benefits, and possible side effects of medications explained to Jessica including risk of parkinsonian symptoms, Tardive Dyskinesia and metabolic syndrome related to treatment with antipsychotic medications and risk of suicidality and serotonin syndrome related to treatment with antidepressants. She verbalizes understanding and agreement for treatment.    Controlled Medication Discussion:     Not applicable    Psychotherapy Provided:     Individual psychotherapy provided: Yes  Counseling was provided during the session today for 11 minutes.  Medications, treatment progress and treatment plan reviewed with Jessica.  Goals discussed during in session: maintain control of anxiety, maintain control of depression, and maintain mood stability.   Discussed with Jessica coping with parents-in-law being in a nursing home, medical problems, occasional anxiety, chronic mental illness, and difficulty with maintaining healthy weight.   Coping techniques including crocheting, maintain healthy diet, reading, and relaxation while watching TV reviewed with Jessica.   Supportive therapy provided.      Treatment Plan:    Completed and signed during the session: Yes - with Jessica    Note Share:    This note was shared with patient.    Visit Time    Visit Start Time: 3:34 PM  Visit Stop Time: 4:05 PM  Total Visit Duration:  26 minutes    Muriel Bello MD 05/30/24

## 2024-05-29 ENCOUNTER — ESTABLISHED COMPREHENSIVE EXAM (OUTPATIENT)
Dept: URBAN - METROPOLITAN AREA CLINIC 6 | Facility: CLINIC | Age: 64
End: 2024-05-29

## 2024-05-29 DIAGNOSIS — H43.813: ICD-10-CM

## 2024-05-29 DIAGNOSIS — H04.123: ICD-10-CM

## 2024-05-29 DIAGNOSIS — E11.9: ICD-10-CM

## 2024-05-29 DIAGNOSIS — H25.813: ICD-10-CM

## 2024-05-29 LAB
LEFT EYE DIABETIC RETINOPATHY: NORMAL
RIGHT EYE DIABETIC RETINOPATHY: NORMAL

## 2024-05-29 PROCEDURE — 92014 COMPRE OPH EXAM EST PT 1/>: CPT

## 2024-05-29 ASSESSMENT — VISUAL ACUITY
OS_CC: 20/50-1
OD_GLARE: 20/60
OS_GLARE: 20/200
OD_CC: 20/30-2

## 2024-05-29 ASSESSMENT — TONOMETRY
OD_IOP_MMHG: 14
OS_IOP_MMHG: 13

## 2024-05-30 ENCOUNTER — OFFICE VISIT (OUTPATIENT)
Dept: PSYCHIATRY | Facility: CLINIC | Age: 64
End: 2024-05-30

## 2024-05-30 VITALS
HEART RATE: 86 BPM | WEIGHT: 228 LBS | SYSTOLIC BLOOD PRESSURE: 159 MMHG | DIASTOLIC BLOOD PRESSURE: 69 MMHG | HEIGHT: 63 IN | BODY MASS INDEX: 40.4 KG/M2

## 2024-05-30 DIAGNOSIS — F41.1 GAD (GENERALIZED ANXIETY DISORDER): Chronic | ICD-10-CM

## 2024-05-30 DIAGNOSIS — Z79.899 LONG-TERM USE OF HIGH-RISK MEDICATION: Chronic | ICD-10-CM

## 2024-05-30 DIAGNOSIS — F31.76 BIPOLAR I DISORDER, MOST RECENT EPISODE DEPRESSED, IN REMISSION (HCC): Primary | Chronic | ICD-10-CM

## 2024-05-30 RX ORDER — ARIPIPRAZOLE 10 MG/1
10 TABLET ORAL EVERY EVENING
Qty: 90 TABLET | Refills: 2 | Status: SHIPPED | OUTPATIENT
Start: 2024-05-30 | End: 2025-02-24

## 2024-05-30 RX ORDER — FLUOXETINE HYDROCHLORIDE 40 MG/1
40 CAPSULE ORAL 2 TIMES DAILY
Qty: 180 CAPSULE | Refills: 2 | Status: SHIPPED | OUTPATIENT
Start: 2024-05-30 | End: 2025-02-24

## 2024-05-30 NOTE — BH TREATMENT PLAN
"TREATMENT PLAN (Medication Management Only)        [facility] - PSYCHIATRIC ASSOCIATES    Name/Date of Birth/MRN:  Jessica Zhou 63 y.o. 1960 MRN: [id number]  Date of Treatment Plan: May 30, 2024  Diagnosis/Diagnoses:   1. Bipolar I disorder, most recent episode depressed, in remission (HCC)    2. LOURDES (generalized anxiety disorder)    3. Long-term use of high-risk medication      Strengths/Personal Resources for Self-Care: \"I am patient, I am a good listener, I try to solve problems\".  Area/Areas of need (in own words): \"my weight\".  1. Long Term Goal:   maintain control of anxiety, maintain control of depression, maintain mood stability  Target Date: 4 months - 9/30/2024  Person/Persons responsible for completion of goal: Jessica  2.  Short Term Objective (s) - How will we reach this goal?:   A.  Provider new recommended medication/dosage changes and/or continue medication(s): continue current medications as prescribed (Prozac and Abilify).  B.  N/A.  C.  N/A.  Target Date: 4 months - 9/30/2024  Person/Persons Responsible for Completion of Goal: Jessica   Progress Towards Goals: stable  Treatment Modality: medication management every 4 months  Review due 180 days from date of this plan: 6 months - 11/30/2024  Expected length of service: maintenance unless revised  My Physician/PA/NP and I have developed this plan together and I agree to work on the goals and objectives. I understand the treatment goals that were developed for my treatment.  Electronic Signatures: on file (unless signed below)    Muriel Bello MD 05/30/24  "

## 2024-06-04 DIAGNOSIS — E78.5 DYSLIPIDEMIA: ICD-10-CM

## 2024-06-04 RX ORDER — ATORVASTATIN CALCIUM 80 MG/1
80 TABLET, FILM COATED ORAL DAILY
Qty: 90 TABLET | Refills: 1 | Status: SHIPPED | OUTPATIENT
Start: 2024-06-04

## 2024-06-07 ENCOUNTER — TELEPHONE (OUTPATIENT)
Age: 64
End: 2024-06-07

## 2024-06-07 NOTE — TELEPHONE ENCOUNTER
Faxed over last office notes to [facility] at 8:36am to [phone]  Gracie Kilpatrick  No further action at this time

## 2024-06-10 ENCOUNTER — CLINICAL SUPPORT (OUTPATIENT)
Dept: INTERNAL MEDICINE CLINIC | Facility: CLINIC | Age: 64
End: 2024-06-10
Payer: COMMERCIAL

## 2024-06-10 DIAGNOSIS — E11.29 TYPE 2 DIABETES MELLITUS WITH MICROALBUMINURIA, WITHOUT LONG-TERM CURRENT USE OF INSULIN (HCC): Primary | ICD-10-CM

## 2024-06-10 DIAGNOSIS — R80.9 TYPE 2 DIABETES MELLITUS WITH MICROALBUMINURIA, WITHOUT LONG-TERM CURRENT USE OF INSULIN (HCC): Primary | ICD-10-CM

## 2024-06-10 LAB — SL AMB POCT HEMOGLOBIN AIC: 7.4 (ref ?–6.5)

## 2024-06-10 PROCEDURE — 83036 HEMOGLOBIN GLYCOSYLATED A1C: CPT | Performed by: PHARMACIST

## 2024-06-10 RX ORDER — METFORMIN HYDROCHLORIDE 500 MG/1
1000 TABLET, EXTENDED RELEASE ORAL 2 TIMES DAILY WITH MEALS
Qty: 360 TABLET | Refills: 3 | Status: SHIPPED | OUTPATIENT
Start: 2024-06-10

## 2024-06-10 NOTE — PROGRESS NOTES
[facility] Clinical Pharmacy Services  Alem Mason, Pharmacist    Assessment/ Plan     1. Type 2 diabetes mellitus with microalbuminuria, without long-term current use of insulin ([facility])  Assessment & Plan:    Lab Results   Component Value Date    HGBA1C 7.2 (H) 2024   POC A1C today 7.4  Current meds  Metformin 1000mg BID  Pioglitazone 30mg daily  Glipizide 5m tab AM, 2 tab PM  Change metformin to XR to mitigate diarrhea  Send Jardiance for price check to pharmacy - patient will let us know if she decides to pick it up or if she would like to pursue diet modification to reduce A1C   Provided copay card  Orders:  -     POCT hemoglobin A1c  -     metFORMIN (GLUCOPHAGE-XR) 500 mg 24 hr tablet; Take 2 tablets (1,000 mg total) by mouth 2 (two) times a day with meals  -     Empagliflozin 25 MG TABS; Take 1 tablet (25 mg total) by mouth daily      Follow-up: 3 months, scheduled    Subjective   Patient doing well, 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 She is frustrated with inability to lose weight, she is going to weight watchers.         Medication Adherence/ Tolerability/ Cost:  0 missed doses in last two week  acetaminophen  ARIPiprazole  atorvastatin  Cholecalciferol Caps  Empagliflozin Tabs  FLUoxetine  glipiZIDE  ibuprofen  lisinopril  magnesium Oxide Tabs  metFORMIN :diarrhea  multivitamin with minerals  omeprazole  OneTouch Ultra Strp  onetouch ultrasoft  pioglitazone      2. Lifestyle:   Last visit with dietician: unknown, spoke with [facility] in past  Previously attended Living Well with Diabetes Class: yes  Diet Recall:   Breakfast:   Lunch:   Dinner:   Snacks:   Beverages:   Physical Activity: limited, attends weight watchers at present    3. Home monitoring devices  Glucometer: Yes, Brand: onetouch ultra  Continuous Glucose Monitor: No, Brand: n/a  Blood Pressure monitor: No, Brand: n/a    Hypoglycemia: The patient had 0 episodes/symptoms of  hypoglycemia.   Hyperglycemia: The patient had 0 symptoms of hyperglycemia.     Objective       Blood Glucose Readings  The patient is currently checking blood glucose 2 times per day. Patient reports with SMBG logs.      ASCVD Risk:  The 10-year ASCVD risk score (Astrid MOSQUEDA, et al., 2019) is: 13.5%    Values used to calculate the score:      Age: 63 years      Sex: Female      Is Non- : No      Diabetic: Yes      Tobacco smoker: No      Systolic Blood Pressure: 159 mmHg      Is BP treated: Yes      HDL Cholesterol: 44 mg/dL      Total Cholesterol: 133 mg/dL     Vitals:  There were no vitals filed for this visit.    Labs:    Lab Results   Component Value Date    SODIUM 140 03/11/2024    K 5.1 03/11/2024    EGFR 101 03/11/2024    CREATININE 0.61 03/11/2024    GLUF 154 (H) 04/12/2022    [id number] 344 07/10/2016    MICROALBCRE 61 (H) 10/17/2018         Pharmacist Tracking Tool     Pharmacist Tracking Tool  Reason For Outreach: Embedded Pharmacist  Demographics:  Intervention Method: In Person  Type of Intervention: New  Topics Addressed: Diabetes  Pharmacologic Interventions: Medication Initiation, Prevent or Manage ALBERTO, and Med Rec  Non-Pharmacologic Interventions: Adherence addressed, Care coordination, Cost, Disease state education, Home Monitoring, Labs, Medication Monitoring, and Medication/Device education  Time:  Direct Patient Care:  40  mins  Care Coordination:  20  mins  Recommendation Recipient: Patient/Caregiver  Outcome: Accepted

## 2024-06-10 NOTE — ASSESSMENT & PLAN NOTE
Lab Results   Component Value Date    HGBA1C 7.2 (H) 2024   POC A1C today 7.4  Current meds  Metformin 1000mg BID  Pioglitazone 30mg daily  Glipizide 5m tab AM, 2 tab PM  Change metformin to XR to mitigate diarrhea  Send Jardiance for price check to pharmacy - patient will let us know if she decides to pick it up or if she would like to pursue diet modification to reduce A1C   Provided copay card

## 2024-06-10 NOTE — TELEPHONE ENCOUNTER
[facility] called again as they have not received the office notes requested last week. We may have faxed them to the wrong number, please fax them again to [phone].

## 2024-06-13 ENCOUNTER — RA CDI HCC (OUTPATIENT)
Dept: OTHER | Facility: HOSPITAL | Age: 64
End: 2024-06-13

## 2024-06-13 NOTE — PROGRESS NOTES
HCC coding opportunities          Chart Reviewed number of suggestions sent to Provider: 2     Patients Insurance     Medicare Insurance: [employer]vantage          J45.909  E66.01

## 2024-06-14 ENCOUNTER — ANNUAL EXAM (OUTPATIENT)
Dept: OBGYN CLINIC | Facility: CLINIC | Age: 64
End: 2024-06-14
Payer: COMMERCIAL

## 2024-06-14 VITALS — BODY MASS INDEX: 40.39 KG/M2 | SYSTOLIC BLOOD PRESSURE: 160 MMHG | DIASTOLIC BLOOD PRESSURE: 90 MMHG | WEIGHT: 228 LBS

## 2024-06-14 DIAGNOSIS — Z12.31 ENCOUNTER FOR SCREENING MAMMOGRAM FOR BREAST CANCER: ICD-10-CM

## 2024-06-14 DIAGNOSIS — N95.2 ATROPHIC VAGINITIS: ICD-10-CM

## 2024-06-14 DIAGNOSIS — Z01.419 PAP SMEAR, AS PART OF ROUTINE GYNECOLOGICAL EXAMINATION: ICD-10-CM

## 2024-06-14 DIAGNOSIS — Z01.419 ENCOUNTER FOR GYNECOLOGICAL EXAMINATION WITHOUT ABNORMAL FINDING: Primary | ICD-10-CM

## 2024-06-14 PROCEDURE — S0612 ANNUAL GYNECOLOGICAL EXAMINA: HCPCS | Performed by: OBSTETRICS & GYNECOLOGY

## 2024-06-14 NOTE — PROGRESS NOTES
Assessment/Plan:    No problem-specific Assessment & Plan notes found for this encounter.       Diagnoses and all orders for this visit:    Encounter for gynecological examination without abnormal finding    Pap smear, as part of routine gynecological examination  -     Thinprep Tis Pap Reflex HPV mRNA E6/E7    Encounter for screening mammogram for breast cancer  -     Mammo screening bilateral w 3d & cad; Future    Atrophic vaginitis          Normal gynecological physical examination.  Self-breast examination stressed.  Mammogram ordered.  Discussed regular exercise, healthy diet, importance of vitamin D and calcium supplements.  Discussed importance of sun block use during periods of prolonged sun exposure.  Patient will be seen in 1 year for routine gynecologic and medical examination.  Patient will call office for any problems, concerns, or issues which may arise during the interim.     Subjective:          HPI    Patient ID: Jessica Zhou is a 63 y.o. female who presents today for her annual gynecologic and medical examination    Menstrual status: Patient is postmenopausal and denies any vaginal bleeding at this time    Vasomotor symptoms: Denies any significant vasomotor symptoms    Patient reports normal appetite    Patient reports normal bowel and bladder habits    Patient denies any significant pelvic or abdominal pain    Patient denies any headaches, chest pain, shortness of breath fever shakes or chills    Patient denies any COVID 19 symptoms including cough or loss of taste or smell    COVID vaccine status: Aware COVID vaccination protocol    Medical problems: Followed for blood pressure, cholesterol and diabetes    Colonoscopy status: Up-to-date with screening colonoscopy    Mammogram status: Importance of self breast examination stressed to the patient and she is up-to-date with screening mammography.  Appropriate arrangements for her annual screening mammogram were placed into the EMR system at  today's visit.    The following portions of the patient's history were reviewed and updated as appropriate: allergies, current medications, past family history, past medical history, past social history, past surgical history and problem list.    Review of Systems   Constitutional: Negative.  Negative for appetite change, diaphoresis, fatigue, fever and unexpected weight change.   HENT: Negative.     Eyes: Negative.    Respiratory: Negative.     Cardiovascular: Negative.         Followed for blood pressure and cholesterol   Gastrointestinal: Negative.  Negative for abdominal pain, blood in stool, constipation, diarrhea, nausea and vomiting.   Endocrine: Negative.  Negative for cold intolerance and heat intolerance.        Followed for diabetes   Genitourinary: Negative.  Negative for dysuria, frequency, hematuria, urgency, vaginal bleeding, vaginal discharge and vaginal pain.   Musculoskeletal: Negative.    Skin: Negative.    Allergic/Immunologic: Negative.    Neurological: Negative.    Hematological: Negative.  Negative for adenopathy.   Psychiatric/Behavioral: Negative.           Objective:      /90   Wt 103 kg (228 lb)   BMI 40.39 kg/m²          Physical Exam  Constitutional:       General: She is not in acute distress.     Appearance: Normal appearance. She is well-developed. She is not diaphoretic.   HENT:      Head: Normocephalic and atraumatic.   Eyes:      Pupils: Pupils are equal, round, and reactive to light.   Cardiovascular:      Rate and Rhythm: Normal rate and regular rhythm.      Heart sounds: Normal heart sounds. No murmur heard.     No friction rub. No gallop.   Pulmonary:      Effort: Pulmonary effort is normal.      Breath sounds: Normal breath sounds.   Chest:   Breasts:     Breasts are symmetrical.      Right: No inverted nipple, mass, nipple discharge, skin change or tenderness.      Left: No inverted nipple, mass, nipple discharge, skin change or tenderness.   Abdominal:      General:  Bowel sounds are normal.      Palpations: Abdomen is soft.   Genitourinary:     General: Normal vulva.      Exam position: Supine.      Labia:         Right: No rash or lesion.         Left: No rash or lesion.       Urethra: No urethral swelling or urethral lesion.      Vagina: Normal. No vaginal discharge, erythema, tenderness or bleeding.      Cervix: No discharge or friability.      Uterus: Not enlarged and not tender.       Adnexa:         Right: No mass, tenderness or fullness.          Left: No mass, tenderness or fullness.        Rectum: Normal. Guaiac result negative.      Comments: Pelvic exam revealed mild atrophic vaginitis  Good pelvic support confirmed  Musculoskeletal:         General: Normal range of motion.      Cervical back: Normal range of motion and neck supple.   Lymphadenopathy:      Cervical: No cervical adenopathy.      Upper Body:      Right upper body: No supraclavicular adenopathy.      Left upper body: No supraclavicular adenopathy.   Skin:     General: Skin is warm and dry.      Findings: No rash.   Neurological:      General: No focal deficit present.      Mental Status: She is alert and oriented to person, place, and time.   Psychiatric:         Mood and Affect: Mood normal.         Speech: Speech normal.         Behavior: Behavior normal.         Thought Content: Thought content normal.         Judgment: Judgment normal.

## 2024-06-18 DIAGNOSIS — E11.9 TYPE 2 DIABETES MELLITUS WITHOUT COMPLICATION, WITHOUT LONG-TERM CURRENT USE OF INSULIN (HCC): ICD-10-CM

## 2024-06-18 LAB
CLINICAL INFO: NORMAL
CYTO CVX: NORMAL
DATE PREVIOUS BX: NORMAL
LMP START DATE: NORMAL
SL AMB PREV. PAP:: NORMAL
SPECIMEN SOURCE CVX/VAG CYTO: NORMAL
STAT OF ADQ CVX/VAG CYTO-IMP: NORMAL

## 2024-06-18 RX ORDER — GLIPIZIDE 5 MG/1
TABLET ORAL
Qty: 270 TABLET | Refills: 1 | Status: SHIPPED | OUTPATIENT
Start: 2024-06-18

## 2024-06-20 ENCOUNTER — PRE-OP CATARACT MEASUREMENTS (OUTPATIENT)
Dept: URBAN - METROPOLITAN AREA CLINIC 6 | Facility: CLINIC | Age: 64
End: 2024-06-20

## 2024-06-20 DIAGNOSIS — H04.123: ICD-10-CM

## 2024-06-20 DIAGNOSIS — H43.813: ICD-10-CM

## 2024-06-20 DIAGNOSIS — E11.9: ICD-10-CM

## 2024-06-20 DIAGNOSIS — H25.813: ICD-10-CM

## 2024-06-20 PROCEDURE — 92012 INTRM OPH EXAM EST PATIENT: CPT

## 2024-06-20 PROCEDURE — 92136 OPHTHALMIC BIOMETRY: CPT

## 2024-06-20 ASSESSMENT — KERATOMETRY
OS_AXISANGLE2_DEGREES: 90
OD_K2POWER_DIOPTERS: 46.25
OS_K2POWER_DIOPTERS: 46.75
OS_AXISANGLE_DEGREES: 180
OS_K1POWER_DIOPTERS: 45.50
OD_AXISANGLE_DEGREES: 171
OD_K1POWER_DIOPTERS: 45.50
OD_AXISANGLE2_DEGREES: 081

## 2024-06-20 ASSESSMENT — TONOMETRY
OS_IOP_MMHG: 13
OD_IOP_MMHG: 14

## 2024-06-20 ASSESSMENT — VISUAL ACUITY
OD_GLARE: 20/60
OS_CC: 20/40
OS_GLARE: 20/100
OD_CC: 20/25

## 2024-06-21 ENCOUNTER — CONSULT (OUTPATIENT)
Dept: INTERNAL MEDICINE CLINIC | Facility: CLINIC | Age: 64
End: 2024-06-21
Payer: COMMERCIAL

## 2024-06-21 VITALS
WEIGHT: 228 LBS | RESPIRATION RATE: 18 BRPM | HEART RATE: 79 BPM | HEIGHT: 63 IN | OXYGEN SATURATION: 97 % | BODY MASS INDEX: 40.4 KG/M2 | SYSTOLIC BLOOD PRESSURE: 150 MMHG | DIASTOLIC BLOOD PRESSURE: 90 MMHG

## 2024-06-21 DIAGNOSIS — Z01.818 PREOP EXAM FOR INTERNAL MEDICINE: Primary | ICD-10-CM

## 2024-06-21 PROCEDURE — 99213 OFFICE O/P EST LOW 20 MIN: CPT | Performed by: INTERNAL MEDICINE

## 2024-06-21 NOTE — PROGRESS NOTES
Assessment/Plan:    Preop exam for internal medicine  Patient is currently low risk and medically stable for left cataract surgical repair via  ophthalmologist Dr. Steven Renner forms have been completed and faxed back to the ophthalmology Center patient is to hold her diabetic labs the morning of the surgical intervention she will resume them when eating.  If there are any problems or concerns she will contact me RTO as scheduled call if any problems         Problem List Items Addressed This Visit        Surgery/Wound/Pain    Preop exam for internal medicine - Primary     Patient is currently low risk and medically stable for left cataract surgical repair via  ophthalmologist Dr. Steven Renner forms have been completed and faxed back to the ophthalmology Center patient is to hold her diabetic labs the morning of the surgical intervention she will resume them when eating.  If there are any problems or concerns she will contact me RTO as scheduled call if any problems              Subjective:      Patient ID: Jessica Zhou is a 63 y.o. female.    HPI preop evaluation requested for left cataract patient reports me blurry vision she is working with ophthalmologist Dr. Steven Renner who is requesting medical preop evaluation overall the patient is clinically stable doing very well no chest pain no shortness of breath no palpitation no recent cardiac problems no recent stroke symptoms clinically she is stable doing very well no exertional shortness of breath no chest pain today we did complete her paperwork necessary for the procedure she does know to hold her diabetic medicines the morning of the procedure    The following portions of the patient's history were reviewed and updated as appropriate: allergies, current medications, past family history, past medical history, past social history, past surgical history and problem list.    Review of Systems   Constitutional:  Negative for activity change,  appetite change and unexpected weight change.   HENT:  Negative for congestion.    Eyes:  Positive for visual disturbance.   Respiratory:  Negative for cough and shortness of breath.    Cardiovascular:  Negative for chest pain.   Gastrointestinal:  Negative for abdominal pain, diarrhea, nausea and vomiting.   Neurological:  Negative for dizziness, light-headedness and headaches.   Hematological:  Negative for adenopathy.         Objective:    Return for Next scheduled follow up.    No results found.      Allergies   Allergen Reactions   • Bee Venom Swelling     Local swelling at sting site   • Biaxin [Clarithromycin] Itching   • Depakote Er [Divalproex Sodium Er]      pancreatitis   • Shellfish-Derived Products - Food Allergy Other (See Comments)     Ears and cheeks got red   • Tanzeum [Albiglutide]      pancreatitis   • Carbamazepine Other (See Comments)     Reaction Date:Unknown   • Lamotrigine Rash       Past Medical History:   Diagnosis Date   • Abdominal pain     started 7/21/16- found something on the pancreas   • Abnormal weight loss     last assessed 26feb2014   • Acute sinusitis    • Allergic rhinitis     last assessed 08Jul2015   • Anemia     had iron infusion   • Asthma    • Cardiac murmur    • Carpal tunnel syndrome    • Cataracts, bilateral    • Cellulitis    • Cervical disc disease    • Cervical myelopathy (HCC) 09/07/2022   • Colon polyp    • CPAP (continuous positive airway pressure) dependence     not using at this time   • Degeneration of thoracic intervertebral disc    • Degenerative arthritis of knee    • Diabetes mellitus (HCC)    • Diverticulosis    • Esophagitis, reflux     last assessed 29Jan2014   • Excessive sweating    • Fatty liver    • Frequent falls    • Gait disturbance    • GERD (gastroesophageal reflux disease)    • Granuloma annulare    • Head injury    • Hyperlipidemia    • Hypertension    • Hypertensive heart disease    • Hypoglycemia     last assessed 08Feb2016   • Kidney stone     • Leg length discrepancy    • Leucocytosis    • Lumbar stress fracture    • Multiple thyroid nodules    • Neuropathy    • Obesity     last assessed [id number]   • Paronychia of toe    • Patellofemoral dysfunction    • Pneumonia    • Postmenopausal atrophic vaginitis    • Prepatellar bursitis    • Sebaceous cyst     last assessed [id number]   • Sleep apnea     no CPAP used   • Suicide attempt (formerly [facility])    • Tinea corporis    • Type 2 diabetes mellitus (formerly [facility])     cont meds and monitor BS;  last assessed 08Nov2016   • Uncontrolled type 2 diabetes mellitus with hyperglycemia, with long-term current use of insulin (formerly [facility]) 08/05/2019   • Urinary frequency    • Vertigo    • Vitamin D deficiency    • Dia-Parkinson-White syndrome      Past Surgical History:   Procedure Laterality Date   • CERVICAL FUSION N/A 08/03/2021    Procedure: FUSION CERVICAL ANTERIOR W DISCECTOMY C4-5, C5-6, C6-7 with allograft and neuromonitoring  case length 3 hours;  Surgeon: Kodak Wright MD;  Location:  MAIN OR;  Service: Orthopedics   • COLONOSCOPY     • ESOPHAGOGASTRODUODENOSCOPY N/A 08/08/2016    Procedure: ESOPHAGOGASTRODUODENOSCOPY (EGD);  Surgeon: Trey Bernstein MD;  Location: [facility] GI LAB;  Service:    • LUMBAR FUSION     • MI ESOPHAGOGASTRODUODENOSCOPY TRANSORAL DIAGNOSTIC N/A 03/28/2019    Procedure: ESOPHAGOGASTRODUODENOSCOPY (EGD);  Surgeon: Trey Bernstein MD;  Location: [facility] GI LAB;  Service: Gastroenterology   • MI NEUROPLASTY &/TRANSPOS MEDIAN NRV CARPAL TUNNE Right 8/9/2022    Procedure: RELEASE CARPAL TUNNEL;  Surgeon: Richie Borden MD;  Location: [facility] MAIN OR;  Service: Orthopedics   • TONSILLECTOMY     • TOOTH EXTRACTION      wisdom teeth removed     Current Outpatient Medications on File Prior to Visit   Medication Sig Dispense Refill   • acetaminophen (TYLENOL) 500 mg tablet Take 500 mg by mouth 2 (two) times a day     • ARIPiprazole (ABILIFY) 10 mg tablet Take 1 tablet (10 mg total) by mouth every evening 90 tablet  2   • atorvastatin (LIPITOR) 80 mg tablet TAKE 1 TABLET BY MOUTH EVERY DAY 90 tablet 1   • Cholecalciferol 4000 units CAPS Take 1 capsule (4,000 Units total) by mouth daily     • Empagliflozin 25 MG TABS Take 1 tablet (25 mg total) by mouth daily 30 tablet 5   • FLUoxetine (PROzac) 40 MG capsule Take 1 capsule (40 mg total) by mouth 2 (two) times a day (Patient taking differently: Take 60 mg by mouth 2 (two) times a day) 180 capsule 2   • glipiZIDE (GLUCOTROL) 5 mg tablet TAKE BY MOUTH 1 TABLET WITH LUNCH AND TWO TABLETS WITH SUPPER FOR DIABETES - FILL WHEN PATIENT REQUESTS 270 tablet 1   • ibuprofen (MOTRIN) 200 mg tablet Take 400 mg by mouth Every other day as needed when pain not alleviated by acetaminophen     • Lancets (onetouch ultrasoft) lancets Test blood sugar twice daily 100 each 3   • lisinopril (ZESTRIL) 10 mg tablet TAKE 1 TABLET BY MOUTH EVERY DAY 90 tablet 1   • magnesium Oxide (MAG-OX) 400 mg TABS TAKE 1 TABLET (400 MG TOTAL) BY MOUTH 2 TIMES A  tablet 1   • metFORMIN (GLUCOPHAGE-XR) 500 mg 24 hr tablet Take 2 tablets (1,000 mg total) by mouth 2 (two) times a day with meals 360 tablet 3   • Multiple Vitamins-Minerals (multivitamin with minerals) tablet Take 1 tablet by mouth daily     • omeprazole (PriLOSEC) 40 MG capsule TAKE 1 CAPSULE BY MOUTH EVERY DAY 90 capsule 1   • OneTouch Ultra test strip TEST BLOOD SUGAR TWICE DAILY 200 strip 1   • pioglitazone (ACTOS) 30 mg tablet TAKE 1 TABLET (30 MG TOTAL) BY MOUTH DAILY FOR DIABETES 90 tablet 1     No current facility-administered medications on file prior to visit.     Family History   Problem Relation Age of Onset   • Cancer Mother         lung   • Heart disease Mother    • Coronary artery disease Mother         CABG   • Diabetes Mother    • Lung cancer Mother 68        smoker   • Stroke Mother    • Thyroid disease Mother    • Other Mother         tobacco use   • Parkinsonism Father    • Intestinal polyp Father    • Bipolar disorder Father    •  Heart disease Father    • Prostate cancer Father 58   • Hypertension Sister    • Alcohol abuse Sister    • Thyroid disease unspecified Sister    • Diabetes Brother    • Depression Brother    • Heart disease Brother    • Heart disease Brother    • Seizures Brother    • Diabetes Maternal Grandmother    • Thyroid disease Maternal Grandmother    • Endometrial cancer Maternal Aunt         unknown age   • Suicide Attempts Paternal Aunt    • Depression Paternal Aunt    • Ovarian cancer Cousin 40        maternal   • Hypertension Family      Social History     Socioeconomic History   • Marital status: /Civil Union     Spouse name: Not on file   • Number of children: 0   • Years of education: 12   • Highest education level: 12th grade   Occupational History   • Occupation: retired   Tobacco Use   • Smoking status: Never     Passive exposure: Never   • Smokeless tobacco: Never   Vaping Use   • Vaping status: Never Used   Substance and Sexual Activity   • Alcohol use: Not Currently   • Drug use: No   • Sexual activity: Not Currently     Partners: Male   Other Topics Concern   • Not on file   Social History Narrative    Education: high school graduate    Learning Disabilities: none    Marital History:     Children: none    Living Arrangement: lives in home with     Occupational History: retired, 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[employer]    Functioning Relationships: good support system,  is supportive    Legal History: none     History: None        Caffeine use    Denied home environment domestic violence    Job physical requirements heavy lifting     Social Determinants of Health     Financial Resource Strain: Low Risk  (5/30/2024)    Overall Financial Resource Strain (CARDIA)    • Difficulty of Paying Living Expenses: Not hard at all   Food Insecurity: No Food Insecurity (5/30/2024)    Hunger Vital Sign    • Worried About Running Out of Food in the Last Year: Never  "true    • Ran Out of Food in the Last Year: Never true   Transportation Needs: No Transportation Needs (5/30/2024)    PRAPARE - Transportation    • Lack of Transportation (Medical): No    • Lack of Transportation (Non-Medical): No   Physical Activity: Inactive (5/30/2024)    Exercise Vital Sign    • Days of Exercise per Week: 0 days    • Minutes of Exercise per Session: 0 min   Stress: No Stress Concern Present (5/30/2024)    [place] [employer] - Occupational Stress Questionnaire    • Feeling of Stress : Only a little   Social Connections: Moderately Isolated (5/30/2024)    Social Connection and Isolation Panel [NHANES]    • Frequency of Communication with Friends and Family: Once a week    • Frequency of Social Gatherings with Friends and Family: Once a week    • Attends Taoist Services: Never    • Active Member of Clubs or Organizations: Yes    • Attends Club or Organization Meetings: More than 4 times per year    • Marital Status:    Intimate Partner Violence: Not At Risk (5/30/2024)    Humiliation, Afraid, Rape, and Kick questionnaire    • Fear of Current or Ex-Partner: No    • Emotionally Abused: No    • Physically Abused: No    • Sexually Abused: No   Housing Stability: Low Risk  (5/30/2024)    Housing Stability Vital Sign    • Unable to Pay for Housing in the Last Year: No    • Number of Times Moved in the Last Year: 0    • Homeless in the Last Year: No     Vitals:    06/21/24 1258   BP: 150/90   BP Location: Left arm   Patient Position: Sitting   Cuff Size: Standard   Pulse: 79   Resp: 18   SpO2: 97%   Weight: 103 kg (228 lb)   Height: 5' 3\" (1.6 m)     Results for orders placed or performed in visit on 06/14/24   Thinprep Tis Pap Reflex HPV mRNA E6/E7   Result Value Ref Range    SL AMB CLINICAL INFORMATION None given     LMP None given     Prev. PAP None given     Prev. BX None given     Source Cervix     Statement of Adequacy SATISFACTORY FOR EVALUATION     " "Interpretation/Result       Cytology Results: Negative for intraepithelial lesion or malignancy.    Comment      SL YASIR CYTOTECHNOLOGIST:      Comment       *Note: Due to a large number of results and/or encounters for the requested time period, some results have not been displayed. A complete set of results can be found in Results Review.     Weight (last 2 days)     Date/Time Weight    06/21/24 1258 103 (228)        Body mass index is 40.39 kg/m².  BP      Temp      Pulse     Resp      SpO2        Vitals:    06/21/24 1258   Weight: 103 kg (228 lb)     Vitals:    06/21/24 1258   Weight: 103 kg (228 lb)       /90 (BP Location: Left arm, Patient Position: Sitting, Cuff Size: Standard)   Pulse 79   Resp 18   Ht 5' 3\" (1.6 m)   Wt 103 kg (228 lb)   SpO2 97%   BMI 40.39 kg/m²     Bilateral cataract     Physical Exam  Vitals and nursing note reviewed.   Constitutional:       Appearance: She is well-developed.   HENT:      Head: Normocephalic.      Mouth/Throat:      Mouth: Oropharynx is clear and moist.   Eyes:      General: No scleral icterus.        Right eye: No discharge.         Left eye: No discharge.      Conjunctiva/sclera: Conjunctivae normal.      Pupils: Pupils are equal, round, and reactive to light.   Cardiovascular:      Rate and Rhythm: Normal rate and regular rhythm.      Heart sounds: Normal heart sounds. No murmur heard.     No friction rub. No gallop.   Pulmonary:      Effort: No respiratory distress.      Breath sounds: Normal breath sounds. No wheezing or rales.   Abdominal:      General: Bowel sounds are normal. There is no distension.      Palpations: Abdomen is soft. There is no mass.      Tenderness: There is no abdominal tenderness. There is no guarding or rebound.   Musculoskeletal:         General: No deformity or edema.      Cervical back: Neck supple.   Lymphadenopathy:      Cervical: No cervical adenopathy.   Neurological:      Mental Status: She is alert.      Coordination: " Coordination normal.

## 2024-06-23 DIAGNOSIS — E11.9 CONTROLLED TYPE 2 DIABETES MELLITUS WITHOUT COMPLICATION, WITHOUT LONG-TERM CURRENT USE OF INSULIN (HCC): ICD-10-CM

## 2024-06-23 PROBLEM — Z01.818 PREOP EXAM FOR INTERNAL MEDICINE: Status: ACTIVE | Noted: 2022-03-16

## 2024-06-23 RX ORDER — PIOGLITAZONEHYDROCHLORIDE 30 MG/1
30 TABLET ORAL DAILY
Qty: 90 TABLET | Refills: 1 | Status: SHIPPED | OUTPATIENT
Start: 2024-06-23

## 2024-06-23 NOTE — ASSESSMENT & PLAN NOTE
Patient is currently low risk and medically stable for left cataract surgical repair via  ophthalmologist Dr. Steven Renner forms have been completed and faxed back to the ophthalmology Center patient is to hold her diabetic labs the morning of the surgical intervention she will resume them when eating.  If there are any problems or concerns she will contact me RTO as scheduled call if any problems

## 2024-07-09 ENCOUNTER — SURGERY/PROCEDURE (OUTPATIENT)
Dept: URBAN - METROPOLITAN AREA SURGICAL CENTER 6 | Facility: SURGICAL CENTER | Age: 64
End: 2024-07-09

## 2024-07-09 DIAGNOSIS — H25.812: ICD-10-CM

## 2024-07-09 PROCEDURE — 66984 XCAPSL CTRC RMVL W/O ECP: CPT

## 2024-07-10 ENCOUNTER — 1 DAY POST-OP (OUTPATIENT)
Dept: URBAN - METROPOLITAN AREA CLINIC 6 | Facility: CLINIC | Age: 64
End: 2024-07-10

## 2024-07-10 DIAGNOSIS — Z96.1: ICD-10-CM

## 2024-07-10 PROCEDURE — 99024 POSTOP FOLLOW-UP VISIT: CPT

## 2024-07-10 ASSESSMENT — TONOMETRY
OS_IOP_MMHG: 13
OD_IOP_MMHG: 10

## 2024-07-10 ASSESSMENT — KERATOMETRY
OD_AXISANGLE_DEGREES: 171
OS_AXISANGLE_DEGREES: 180
OS_AXISANGLE2_DEGREES: 90
OD_K1POWER_DIOPTERS: 45.50
OS_K2POWER_DIOPTERS: 46.75
OD_K2POWER_DIOPTERS: 46.25
OD_AXISANGLE2_DEGREES: 081
OD_K2POWER_DIOPTERS: 46.25
OS_AXISANGLE_DEGREES: 180
OS_K2POWER_DIOPTERS: 46.75
OD_K1POWER_DIOPTERS: 45.50
OS_K1POWER_DIOPTERS: 45.50
OD_AXISANGLE_DEGREES: 171
OS_K1POWER_DIOPTERS: 45.50
OS_AXISANGLE2_DEGREES: 90
OD_AXISANGLE2_DEGREES: 081

## 2024-07-10 ASSESSMENT — VISUAL ACUITY: OS_SC: 20/20-2

## 2024-07-14 ENCOUNTER — TELEPHONE (OUTPATIENT)
Dept: GASTROENTEROLOGY | Facility: CLINIC | Age: 64
End: 2024-07-14

## 2024-07-14 DIAGNOSIS — R10.13 EPIGASTRIC PAIN: ICD-10-CM

## 2024-07-14 DIAGNOSIS — K29.80 DUODENITIS: ICD-10-CM

## 2024-07-15 RX ORDER — OMEPRAZOLE 40 MG/1
CAPSULE, DELAYED RELEASE ORAL
Qty: 90 CAPSULE | Refills: 1 | OUTPATIENT
Start: 2024-07-15

## 2024-07-16 ENCOUNTER — TELEPHONE (OUTPATIENT)
Dept: OTHER | Facility: OTHER | Age: 64
End: 2024-07-16

## 2024-07-16 LAB
ALBUMIN SERPL-MCNC: 4 G/DL (ref 3.6–5.1)
ALBUMIN/GLOB SERPL: 1.4 (CALC) (ref 1–2.5)
ALP SERPL-CCNC: 112 U/L (ref 37–153)
ALT SERPL-CCNC: 18 U/L (ref 6–29)
AST SERPL-CCNC: 23 U/L (ref 10–35)
BILIRUB SERPL-MCNC: 0.4 MG/DL (ref 0.2–1.2)
BUN SERPL-MCNC: 16 MG/DL (ref 7–25)
BUN/CREAT SERPL: ABNORMAL (CALC) (ref 6–22)
CALCIUM SERPL-MCNC: 9.4 MG/DL (ref 8.6–10.4)
CHLORIDE SERPL-SCNC: 100 MMOL/L (ref 98–110)
CHOLEST SERPL-MCNC: 121 MG/DL
CHOLEST/HDLC SERPL: 2.6 (CALC)
CO2 SERPL-SCNC: 30 MMOL/L (ref 20–32)
CREAT SERPL-MCNC: 0.65 MG/DL (ref 0.5–1.05)
GFR/BSA.PRED SERPLBLD CYS-BASED-ARV: 99 ML/MIN/1.73M2
GLOBULIN SER CALC-MCNC: 2.8 G/DL (CALC) (ref 1.9–3.7)
GLUCOSE SERPL-MCNC: 125 MG/DL (ref 65–99)
HBA1C MFR BLD: 8 % OF TOTAL HGB
HDLC SERPL-MCNC: 46 MG/DL
LDLC SERPL CALC-MCNC: 43 MG/DL (CALC)
NONHDLC SERPL-MCNC: 75 MG/DL (CALC)
POTASSIUM SERPL-SCNC: 5.7 MMOL/L (ref 3.5–5.3)
PROT SERPL-MCNC: 6.8 G/DL (ref 6.1–8.1)
SODIUM SERPL-SCNC: 139 MMOL/L (ref 135–146)
TRIGL SERPL-MCNC: 269 MG/DL

## 2024-07-16 NOTE — TELEPHONE ENCOUNTER
PT returned call to schedule and has booked our first available on 11.18.24. she says thank you for the courtesy refill.

## 2024-07-16 NOTE — TELEPHONE ENCOUNTER
Lab Result: Potassium 5.7   Date/Time Drawn: 07/16 @634am    Ordering Provider: Dr. Hough    Lab Personnel's Name: [facility]        Read back to the lab as documented above     [x]     Secure Chat message sent to on-call provider  [x]     Provider confirmed receipt of message     [x]

## 2024-07-17 ENCOUNTER — 1 WEEK POST-OP (OUTPATIENT)
Dept: URBAN - METROPOLITAN AREA CLINIC 6 | Facility: CLINIC | Age: 64
End: 2024-07-17

## 2024-07-17 DIAGNOSIS — Z96.1: ICD-10-CM

## 2024-07-17 DIAGNOSIS — H25.811: ICD-10-CM

## 2024-07-17 DIAGNOSIS — E87.5 HYPERKALEMIA: Primary | ICD-10-CM

## 2024-07-17 PROCEDURE — 92136 OPHTHALMIC BIOMETRY: CPT | Mod: 26,RT

## 2024-07-17 PROCEDURE — 99024 POSTOP FOLLOW-UP VISIT: CPT

## 2024-07-17 ASSESSMENT — VISUAL ACUITY
OS_SC: 20/40+1
OS_PH: 20/30+2

## 2024-07-17 ASSESSMENT — TONOMETRY
OS_IOP_MMHG: 13
OD_IOP_MMHG: 12

## 2024-07-17 ASSESSMENT — KERATOMETRY
OS_K2POWER_DIOPTERS: 46.75
OD_K2POWER_DIOPTERS: 46.25
OD_AXISANGLE2_DEGREES: 081
OD_AXISANGLE_DEGREES: 171
OS_K1POWER_DIOPTERS: 45.50
OS_AXISANGLE2_DEGREES: 90
OS_AXISANGLE_DEGREES: 180
OD_K1POWER_DIOPTERS: 45.50

## 2024-07-18 ENCOUNTER — TELEPHONE (OUTPATIENT)
Dept: INTERNAL MEDICINE CLINIC | Facility: CLINIC | Age: 64
End: 2024-07-18

## 2024-07-18 NOTE — TELEPHONE ENCOUNTER
Pt stopped in having right eye cataract surgery, has an apt on 7/23/24 for 4 month f/u. Can pre-op forms be filled out at 4 month f/u? Please advise.

## 2024-07-23 ENCOUNTER — CONSULT (OUTPATIENT)
Dept: INTERNAL MEDICINE CLINIC | Facility: CLINIC | Age: 64
End: 2024-07-23
Payer: COMMERCIAL

## 2024-07-23 VITALS
HEART RATE: 86 BPM | OXYGEN SATURATION: 99 % | WEIGHT: 225.2 LBS | HEIGHT: 63 IN | DIASTOLIC BLOOD PRESSURE: 70 MMHG | SYSTOLIC BLOOD PRESSURE: 122 MMHG | BODY MASS INDEX: 39.9 KG/M2

## 2024-07-23 DIAGNOSIS — I51.89 DIASTOLIC DYSFUNCTION: Primary | ICD-10-CM

## 2024-07-23 DIAGNOSIS — R80.9 TYPE 2 DIABETES MELLITUS WITH MICROALBUMINURIA, WITHOUT LONG-TERM CURRENT USE OF INSULIN (HCC): ICD-10-CM

## 2024-07-23 DIAGNOSIS — Z01.818 PREOP EXAM FOR INTERNAL MEDICINE: ICD-10-CM

## 2024-07-23 DIAGNOSIS — E11.29 TYPE 2 DIABETES MELLITUS WITH MICROALBUMINURIA, WITHOUT LONG-TERM CURRENT USE OF INSULIN (HCC): ICD-10-CM

## 2024-07-23 PROCEDURE — 99213 OFFICE O/P EST LOW 20 MIN: CPT | Performed by: INTERNAL MEDICINE

## 2024-07-23 NOTE — ASSESSMENT & PLAN NOTE
Preop evaluation requested by ophthalmologist Dr. Renner for right eye cataract the patient is clinically stable for this low risk procedure she will hold her diabetic medicines the morning of the surgical intervention and resume the medications when eating.  RTO as scheduled call if any problems

## 2024-07-23 NOTE — PROGRESS NOTES
72-year-old  female with known past medical history of chronic asthma, fibromyalgia, essential hypertension, sickle cell trait was admitted 07/08/2020 with chest pain, hypertensive urgency.  Differential diagnosis included chest pain due to hypertensive urgency versus anxiety versus ACS versus musculoskeletal.  Patient was admitted to telemetry.  Troponins were ordered x3, BNP was low.  Patient was restarted on her home medications and p.r.n. IV hydralazine.  Cardiology was consulted.  Stress echo was ordered for the morning.  Aspirin was started.  Patient remained asymptomatic throughout the hospital stay.  Troponins were negative x3.  Patient had stress echo done in the morning which was negative for reversible ischemia.  The ECG portion of the surgery was abnormal but not diagnostic for ischemia.  Her EF was 65% with normal left ventricular systolic and diastolic functions and normal right ventricular diet systolic function  Cardiologist Dr. ANIBAL Andrew came to see the patient but patient was in cardiac/stress test department undergoing her stress test.  I communicated with Dr. Andrew is nurse practitioner Liliam Argueta who reviewed patient's chart and recommended patient can be discharged home after optimal blood pressure control and patient can follow-up in outpatient setting with Dr. Bey in 2 weeks  New prescription of hydralazine 50 mg t.i.d. sent to her pharmacy along with nitroglycerin for p.r.n. use.  Aspirin 81 mg daily was started    Patient was seen and examined on the day of discharge.  For complete physical exam, see the note from earlier today   Presurgical Evaluation    Subjective:      Patient ID: Jessica Zhou is a 63 y.o. female.    Chief Complaint   Patient presents with   • Pre-op Exam     Cataracts- 8/6/24       HPI preop right cataract decreased vision right eye patient reports me doing well no chest pain no shortness of breath overall doing well she has had a left cataract repaired and tolerated well she has had improvements in her vision and now straight to eye.    The following portions of the patient's history were reviewed and updated as appropriate: allergies, current medications, past family history, past medical history, past social history, past surgical history, and problem list.    Procedure date: August 6, 2024    Surgeon:  Dr Renner  Planned procedure:  cataract  right  Diagnosis for procedure:  cataract with visual change    Prior anesthesia: Yes   MAC; Complications:  None / Tolerated well    CAD History: None   NOTE: Patient should see Cardiology if time available before surgery, and if appropriate.    Pulmonary History: Asthma    Renal history: None    Diabetes History:  Type 2  Controlled     Neurological History: None     On Immunosuppressant meds/biologics: No      Review of Systems   Constitutional:  Negative for activity change, appetite change and unexpected weight change.   HENT:  Negative for congestion and postnasal drip.    Eyes:  Positive for visual disturbance.   Respiratory:  Negative for cough and shortness of breath.    Cardiovascular:  Negative for chest pain.   Gastrointestinal:  Negative for abdominal pain, diarrhea, nausea and vomiting.   Neurological:  Negative for dizziness, light-headedness and headaches.   Hematological:  Negative for adenopathy.         Current Outpatient Medications   Medication Sig Dispense Refill   • acetaminophen (TYLENOL) 500 mg tablet Take 500 mg by mouth 2 (two) times a day     • ARIPiprazole (ABILIFY) 10 mg tablet Take 1 tablet (10 mg total) by mouth every evening 90 tablet 2   •  atorvastatin (LIPITOR) 80 mg tablet TAKE 1 TABLET BY MOUTH EVERY DAY 90 tablet 1   • Cholecalciferol 4000 units CAPS Take 1 capsule (4,000 Units total) by mouth daily     • Empagliflozin 25 MG TABS Take 1 tablet (25 mg total) by mouth daily 30 tablet 5   • FLUoxetine (PROzac) 40 MG capsule Take 1 capsule (40 mg total) by mouth 2 (two) times a day (Patient taking differently: Take 60 mg by mouth 2 (two) times a day) 180 capsule 2   • glipiZIDE (GLUCOTROL) 5 mg tablet TAKE BY MOUTH 1 TABLET WITH LUNCH AND TWO TABLETS WITH SUPPER FOR DIABETES - FILL WHEN PATIENT REQUESTS 270 tablet 1   • ibuprofen (MOTRIN) 200 mg tablet Take 400 mg by mouth Every other day as needed when pain not alleviated by acetaminophen     • Lancets (onetouch ultrasoft) lancets Test blood sugar twice daily 100 each 3   • lisinopril (ZESTRIL) 10 mg tablet TAKE 1 TABLET BY MOUTH EVERY DAY 90 tablet 1   • magnesium Oxide (MAG-OX) 400 mg TABS TAKE 1 TABLET (400 MG TOTAL) BY MOUTH 2 TIMES A  tablet 1   • metFORMIN (GLUCOPHAGE-XR) 500 mg 24 hr tablet Take 2 tablets (1,000 mg total) by mouth 2 (two) times a day with meals 360 tablet 3   • Multiple Vitamins-Minerals (multivitamin with minerals) tablet Take 1 tablet by mouth daily     • omeprazole (PriLOSEC) 40 MG capsule TAKE 1 CAPSULE BY MOUTH EVERY DAY 90 capsule 1   • OneTouch Ultra test strip TEST BLOOD SUGAR TWICE DAILY 200 strip 1   • pioglitazone (ACTOS) 30 mg tablet TAKE 1 TABLET (30 MG TOTAL) BY MOUTH DAILY FOR DIABETES 90 tablet 1     No current facility-administered medications for this visit.       Allergies on file:   Bee venom, Biaxin [clarithromycin], Depakote er [divalproex sodium er], Shellfish-derived products - food allergy, Tanzeum [albiglutide], Carbamazepine, and Lamotrigine    Patient Active Problem List   Diagnosis   • Anomalies of nails   • Asthma   • Essential hypertension   • Bipolar I disorder, most recent episode depressed, in remission (HCC)   • Cardiac murmur   •  Cervical disc disease   • Degeneration of thoracic intervertebral disc   • Degenerative arthritis of knee, bilateral   • Type 2 diabetes mellitus with microalbuminuria, without long-term current use of insulin (HCC)   • Diverticulosis   • Dyslipidemia   • Essential hypertriglyceridemia   • Fatty liver   • Gastroesophageal reflux disease   • Granuloma annulare   • Hypertensive heart disease   • Iron deficiency anemia, unspecified   • Left thyroid nodule   • Leg length discrepancy   • Multiple thyroid nodules   • Neuropathy   • Class 1 obesity due to excess calories with serious comorbidity and body mass index (BMI) of 32.0 to 32.9 in adult   • Obstructive sleep apnea   • Other acquired deformity of knee   • Postmenopausal atrophic vaginitis   • Prepatellar bursitis   • Vertigo   • Vitamin D deficiency   • Anomalous atrioventricular excitation   • LOURDES (generalized anxiety disorder)   • Duodenitis   • Epigastric pain   • Change in bowel habit   • Constipation   • BRBPR (bright red blood per rectum)   • Wellness examination   • Benign essential hypertension   • History of colon polyps   • Long-term use of high-risk medication   • Hip pain   • Right shoulder pain   • Cervical radiculopathy   • Left carpal tunnel syndrome   • Closed fracture of transverse process of cervical vertebra (HCC)   • Spinal stenosis   • Obesity (BMI 30.0-34.9)   • S/P cervical spinal fusion   • Acute pain of right knee   • Nocturnal leg cramps   • Preop exam for internal medicine   • Low iron   • Low magnesium level   • Family history of colonic polyps   • Kidney stone   • Carpal tunnel syndrome on right   • COVID-19   • Osgood-Schlatter's disease of both knees   • Lateral epicondylitis, unspecified laterality        Past Medical History:   Diagnosis Date   • Abdominal pain     started 7/21/16- found something on the pancreas   • Abnormal weight loss     last assessed [id number]   • Acute sinusitis    • Allergic rhinitis     last assessed  08Jul2015   • Anemia     had iron infusion   • Asthma    • Cardiac murmur    • Carpal tunnel syndrome    • Cataracts, bilateral    • Cellulitis    • Cervical disc disease    • Cervical myelopathy ([facility]) 09/07/2022   • Colon polyp    • CPAP (continuous positive airway pressure) dependence     not using at this time   • Degeneration of thoracic intervertebral disc    • Degenerative arthritis of knee    • Diabetes mellitus ([facility])    • Diverticulosis    • Esophagitis, reflux     last assessed 29Jan2014   • Excessive sweating    • Fatty liver    • Frequent falls    • Gait disturbance    • GERD (gastroesophageal reflux disease)    • Granuloma annulare    • Head injury    • Hyperlipidemia    • Hypertension    • Hypertensive heart disease    • Hypoglycemia     last assessed 08Feb2016   • Kidney stone    • Leg length discrepancy    • Leucocytosis    • Lumbar stress fracture    • Multiple thyroid nodules    • Neuropathy    • Obesity     last assessed 25Dec2016   • Paronychia of toe    • Patellofemoral dysfunction    • Pneumonia    • Postmenopausal atrophic vaginitis    • Prepatellar bursitis    • Sebaceous cyst     last assessed 21Feb2014   • Sleep apnea     no CPAP used   • Suicide attempt ([facility])    • Tinea corporis    • Type 2 diabetes mellitus ([facility])     cont meds and monitor BS;  last assessed 08Nov2016   • Uncontrolled type 2 diabetes mellitus with hyperglycemia, with long-term current use of insulin ([facility]) 08/05/2019   • Urinary frequency    • Vertigo    • Vitamin D deficiency    • Dia-Parkinson-White syndrome        Past Surgical History:   Procedure Laterality Date   • CATARACT EXTRACTION Left 07/2024   • CERVICAL FUSION N/A 08/03/2021    Procedure: FUSION CERVICAL ANTERIOR W DISCECTOMY C4-5, C5-6, C6-7 with allograft and neuromonitoring  case length 3 hours;  Surgeon: Kodak Wright MD;  Location: [facility];  Service: Orthopedics   • COLONOSCOPY     • ESOPHAGOGASTRODUODENOSCOPY N/A 08/08/2016    Procedure:  ESOPHAGOGASTRODUODENOSCOPY (EGD);  Surgeon: Trey Bernstein MD;  Location: [facility] GI LAB;  Service:    • LUMBAR FUSION     • PA ESOPHAGOGASTRODUODENOSCOPY TRANSORAL DIAGNOSTIC N/A 03/28/2019    Procedure: ESOPHAGOGASTRODUODENOSCOPY (EGD);  Surgeon: Trey Bernstein MD;  Location: [facility] GI LAB;  Service: Gastroenterology   • PA NEUROPLASTY &/TRANSPOS MEDIAN NRV CARPAL TUNNE Right 08/09/2022    Procedure: RELEASE CARPAL TUNNEL;  Surgeon: Richie Borden MD;  [facility]: [facility];  Service: Orthopedics   • SPINE SURGERY     • TONSILLECTOMY     • TOOTH EXTRACTION      wisdom teeth removed       Family History   Problem Relation Age of Onset   • Cancer Mother         lung   • Heart disease Mother    • Coronary artery disease Mother         CABG   • Diabetes Mother    • Lung cancer Mother 68        smoker   • Stroke Mother    • Thyroid disease Mother    • Other Mother         tobacco use   • COPD Mother    • Parkinsonism Father    • Intestinal polyp Father    • Bipolar disorder Father    • Heart disease Father    • Prostate cancer Father 58   • Hypertension Father    • Cancer Father    • Hypertension Sister    • Alcohol abuse Sister    • Thyroid disease unspecified Sister    • Arthritis Sister    • Diabetes Brother    • Depression Brother    • Heart disease Brother    • Hypertension Brother    • Arthritis Brother    • Heart disease Brother    • Seizures Brother    • Diabetes Maternal Grandmother    • Thyroid disease Maternal Grandmother    • Endometrial cancer Maternal Aunt         unknown age   • Suicide Attempts Paternal Aunt    • Depression Paternal Aunt    • Ovarian cancer Cousin 40        maternal   • Hypertension Family        Social History     Tobacco Use   • Smoking status: Never     Passive exposure: Never   • Smokeless tobacco: Never   Vaping Use   • Vaping status: Never Used   Substance Use Topics   • Alcohol use: Not Currently     Alcohol/week: 1.0 standard drink of alcohol     Types: 1 Glasses of wine  "per week   • Drug use: No       Objective:    Vitals:    07/23/24 1419   BP: 122/70   BP Location: Left arm   Patient Position: Sitting   Cuff Size: Large   Pulse: 86   SpO2: 99%   Weight: 102 kg (225 lb 3.2 oz)   Height: 5' 3\" (1.6 m)     Cataract right eye   Physical Exam  Vitals and nursing note reviewed.   Constitutional:       General: She is not in acute distress.     Appearance: Normal appearance. She is well-developed. She is obese. She is not ill-appearing, toxic-appearing or diaphoretic.   HENT:      Head: Normocephalic.      Mouth/Throat:      Mouth: Oropharynx is clear and moist.   Eyes:      General: No scleral icterus.        Right eye: No discharge.         Left eye: No discharge.      Conjunctiva/sclera: Conjunctivae normal.      Pupils: Pupils are equal, round, and reactive to light.   Cardiovascular:      Rate and Rhythm: Normal rate and regular rhythm.      Heart sounds: Normal heart sounds. No murmur heard.     No friction rub. No gallop.   Pulmonary:      Effort: No respiratory distress.      Breath sounds: Normal breath sounds. No wheezing or rales.   Abdominal:      General: Bowel sounds are normal. There is no distension.      Palpations: Abdomen is soft. There is no mass.      Tenderness: There is no abdominal tenderness. There is no guarding or rebound.   Musculoskeletal:         General: No deformity or edema.      Cervical back: Neck supple.   Lymphadenopathy:      Cervical: No cervical adenopathy.   Neurological:      Mental Status: She is alert.      Coordination: Coordination normal.           Preop labs/testing available and reviewed: no    eGFR   Date Value Ref Range Status   07/16/2024 99 > OR = 60 mL/min/1.73m2 Final             EKG no    Echo no    Stress test/cath no    PFT/Jean no    Functional capacity: Walking , 4-5 MPH               4 Mets   Pick the highest level patient can comfortably perform   4 mets or greater for surgery    RCRI  High Risk surgery?         1 Point  CAD " History:         1 Point   MI; Positive Stress Test; CP due to Mi;  Nitrate Usage to control Angina; Pathologic Q wave on EKG  CHF Active:         1 Point   Pulm Edema; Paroxysmal Nocturnal Dyspnea;  Bibasilar Rales (crackles);S3; CHF on CXR  Cerebrovascular Disease (TIA or CVA):     1 Point  DM on Insulin:        1 Point  Serum Creat >2.0 mg/dl:       1 Point          Total Points: 1     Scorin: Class I, Very Low Risk (0.4%)     1: Class II, Low risk (0.9%)     2: Class III Moderate (6.6%)     3: Class IV High (>11%)      BILL Risk:  GFR:   eGFR   Date Value Ref Range Status   2024 99 > OR = 60 mL/min/1.73m2 Final         For PCP:  If GFR>60, Hold ACE/ARB/Diuretic on the day of surgery, and NSAIDS 10 days before.    If GFR<45, Consider PRE and POST op Nephrology Consult.    If 46 <GFR> 59 : Has Patient had BILL in last 6 Months? no   If YES: Preop Nephrology consult   If No:  Post Op Nephrology consult.           Assessment/Plan:    Patient is medically optimized (cleared) for the planned procedure.    Further testing/evaluation is not required.    Postop concerns: no    Problem List Items Addressed This Visit        Endocrine    Type 2 diabetes mellitus with microalbuminuria, without long-term current use of insulin (HCC)    Relevant Orders    Hemoglobin A1C    Comprehensive metabolic panel    Lipid Panel with Direct LDL reflex    Albumin / creatinine urine ratio       Surgery/Wound/Pain    Preop exam for internal medicine     Preop evaluation requested by ophthalmologist Dr. Renner for right eye cataract the patient is clinically stable for this low risk procedure she will hold her diabetic medicines the morning of the surgical intervention and resume the medications when eating.  RTO as scheduled call if any problems        Other Visit Diagnoses     Diastolic dysfunction    -  Primary    Relevant Orders    Ambulatory Referral to Cardiology             Diagnoses and all orders for this  "visit:    Diastolic dysfunction  -     Ambulatory Referral to Cardiology; Future    Type 2 diabetes mellitus with microalbuminuria, without long-term current use of insulin (HCC)  -     Hemoglobin A1C; Future  -     Comprehensive metabolic panel; Future  -     Lipid Panel with Direct LDL reflex; Future  -     Albumin / creatinine urine ratio; Future    Preop exam for internal medicine          Pre-Surgery Instructions:   Medication Instructions   • acetaminophen (TYLENOL) 500 mg tablet Take morning of surgery   • ARIPiprazole (ABILIFY) 10 mg tablet Take morning of surgery   • atorvastatin (LIPITOR) 80 mg tablet Take morning of surgery   • Cholecalciferol 4000 units CAPS Take morning of surgery   • Empagliflozin 25 MG TABS Stop taking am of procedure days prior to surgery   • FLUoxetine (PROzac) 40 MG capsule Take morning of surgery   • glipiZIDE (GLUCOTROL) 5 mg tablet Stop taking am of the procedure days prior to surgery   • ibuprofen (MOTRIN) 200 mg tablet per anesthesia guidelines    • Lancets (onetouch ultrasoft) lancets per anesthesia guidelines    • lisinopril (ZESTRIL) 10 mg tablet Take morning of surgery   • magnesium Oxide (MAG-OX) 400 mg TABS Take morning of surgery   • metFORMIN (GLUCOPHAGE-XR) 500 mg 24 hr tablet Stop taking stop am of the procedure  days prior to surgery   • Multiple Vitamins-Minerals (multivitamin with minerals) tablet Take morning of surgery   • omeprazole (PriLOSEC) 40 MG capsule Take morning of surgery   • OneTouch Ultra test strip Take morning of surgery   • pioglitazone (ACTOS) 30 mg tablet Stop taking am of the procdure days prior to surgery        NOTE: Please use the above to review important meds for your specialty, the remainder \"per anesthesia Guidelines.\"    NOTE: Please place an Inbasket message for \"SOC\" pool for complicated patients.     "

## 2024-07-24 DIAGNOSIS — Z98.1 S/P CERVICAL SPINAL FUSION: ICD-10-CM

## 2024-07-24 NOTE — TELEPHONE ENCOUNTER
Refill must be reviewed and completed by the office or provider. The refill is unable to be approved or denied by the medication management team.      Please review to see if the refill is appropriate.   The original prescription was discontinued on 6/10/2024 by Alem Mason, Pharmacist for the following reason: Dose adjustment. Renewing this prescription may not be appropriate.

## 2024-07-30 DIAGNOSIS — I10 BENIGN ESSENTIAL HYPERTENSION: ICD-10-CM

## 2024-07-30 RX ORDER — LISINOPRIL 10 MG/1
TABLET ORAL
Qty: 90 TABLET | Refills: 1 | Status: SHIPPED | OUTPATIENT
Start: 2024-07-30

## 2024-08-07 ENCOUNTER — 1 DAY POST-OP (OUTPATIENT)
Dept: URBAN - METROPOLITAN AREA CLINIC 6 | Facility: CLINIC | Age: 64
End: 2024-08-07

## 2024-08-07 DIAGNOSIS — Z96.1: ICD-10-CM

## 2024-08-07 PROCEDURE — 99024 POSTOP FOLLOW-UP VISIT: CPT

## 2024-08-07 ASSESSMENT — TONOMETRY
OS_IOP_MMHG: 14
OD_IOP_MMHG: 13

## 2024-08-07 ASSESSMENT — VISUAL ACUITY
OS_SC: 20/25
OD_SC: 20/20

## 2024-08-07 ASSESSMENT — KERATOMETRY
OD_AXISANGLE_DEGREES: 171
OD_AXISANGLE2_DEGREES: 081
OS_K2POWER_DIOPTERS: 46.75
OD_K2POWER_DIOPTERS: 46.25
OD_K1POWER_DIOPTERS: 45.50
OS_K1POWER_DIOPTERS: 45.50
OS_AXISANGLE2_DEGREES: 90
OS_AXISANGLE_DEGREES: 180

## 2024-08-13 ENCOUNTER — 1 WEEK POST-OP (OUTPATIENT)
Dept: URBAN - METROPOLITAN AREA CLINIC 6 | Facility: CLINIC | Age: 64
End: 2024-08-13

## 2024-08-13 DIAGNOSIS — Z96.1: ICD-10-CM

## 2024-08-13 PROCEDURE — 99024 POSTOP FOLLOW-UP VISIT: CPT

## 2024-08-13 ASSESSMENT — VISUAL ACUITY
OD_SC: 20/25
OS_SC: 20/20-2
OU_SC: 20/25-1

## 2024-08-13 ASSESSMENT — KERATOMETRY
OD_K1POWER_DIOPTERS: 45.50
OS_K1POWER_DIOPTERS: 45.50
OS_AXISANGLE2_DEGREES: 90
OS_AXISANGLE_DEGREES: 180
OD_AXISANGLE2_DEGREES: 081
OD_K2POWER_DIOPTERS: 46.25
OS_K2POWER_DIOPTERS: 46.75
OD_AXISANGLE_DEGREES: 171

## 2024-08-13 ASSESSMENT — TONOMETRY
OS_IOP_MMHG: 11
OD_IOP_MMHG: 9

## 2024-08-14 ENCOUNTER — TELEPHONE (OUTPATIENT)
Dept: OBGYN CLINIC | Facility: CLINIC | Age: 64
End: 2024-08-14

## 2024-08-14 NOTE — TELEPHONE ENCOUNTER
LM to reschedule 8/20's appt with Dr. Cox as his will now be in the OR, left my direct number on message x2

## 2024-08-15 ENCOUNTER — TELEPHONE (OUTPATIENT)
Age: 64
End: 2024-08-15

## 2024-08-19 ENCOUNTER — OFFICE VISIT (OUTPATIENT)
Dept: OBGYN CLINIC | Facility: CLINIC | Age: 64
End: 2024-08-19
Payer: COMMERCIAL

## 2024-08-19 VITALS
WEIGHT: 225 LBS | SYSTOLIC BLOOD PRESSURE: 142 MMHG | BODY MASS INDEX: 39.87 KG/M2 | HEIGHT: 63 IN | DIASTOLIC BLOOD PRESSURE: 70 MMHG

## 2024-08-19 DIAGNOSIS — Z98.1 S/P CERVICAL SPINAL FUSION: Primary | ICD-10-CM

## 2024-08-19 PROCEDURE — 99213 OFFICE O/P EST LOW 20 MIN: CPT | Performed by: ORTHOPAEDIC SURGERY

## 2024-08-19 NOTE — PROGRESS NOTES
[facility] ORTHOPEDIC SPINE SURGERY  DR.AMIR REBEL MD  [address] [facility] [address]  [phone]    HISTORY OF PRESENT ILLNESS:    Jessica Zhou is a 63 y.o. female who presents for follow-up  of cervical spine with cervical myelopathy status post anterior cervical fusion with diskectomy of C4-5, C5-6, C6-7 performed by Dr. Wright on 8/3/2021. Patient reports to the office for myelopathy exam. The patient has stiffness first thing in the morning. She continues to perform her home exercise plan. The patient using Tylenol as needed for symptom management. The patient denies any clumsiness with her hands or feet along with dropping items. She does have right arm numbness when she lays down at nighttime. She reports the past few nights she has performed right arm range of motion which has improved her symptoms at bed time.        ALLERGIES:   Allergies   Allergen Reactions   • Bee Venom Swelling     Local swelling at sting site   • Biaxin [Clarithromycin] Itching   • Depakote Er [Divalproex Sodium Er]      pancreatitis   • Shellfish-Derived Products - Food Allergy Other (See Comments)     Ears and cheeks got red   • Tanzeum [Albiglutide]      pancreatitis   • Carbamazepine Other (See Comments)     Reaction Date:Unknown   • Lamotrigine Rash       MEDICATIONS:    Current Outpatient Medications:   •  acetaminophen (TYLENOL) 500 mg tablet, Take 500 mg by mouth 2 (two) times a day, Disp: , Rfl:   •  ARIPiprazole (ABILIFY) 10 mg tablet, Take 1 tablet (10 mg total) by mouth every evening, Disp: 90 tablet, Rfl: 2  •  atorvastatin (LIPITOR) 80 mg tablet, TAKE 1 TABLET BY MOUTH EVERY DAY, Disp: 90 tablet, Rfl: 1  •  Cholecalciferol 4000 units CAPS, Take 1 capsule (4,000 Units total) by mouth daily, Disp: , Rfl:   •  Empagliflozin 25 MG TABS, Take 1 tablet (25 mg total) by mouth daily, Disp: 30 tablet, Rfl: 5  •  FLUoxetine (PROzac) 40 MG capsule, Take 1 capsule (40 mg total) by mouth 2 (two) times a day  (Patient taking differently: Take 60 mg by mouth 2 (two) times a day), Disp: 180 capsule, Rfl: 2  •  glipiZIDE (GLUCOTROL) 5 mg tablet, TAKE BY MOUTH 1 TABLET WITH LUNCH AND TWO TABLETS WITH SUPPER FOR DIABETES - FILL WHEN PATIENT REQUESTS, Disp: 270 tablet, Rfl: 1  •  ibuprofen (MOTRIN) 200 mg tablet, Take 400 mg by mouth Every other day as needed when pain not alleviated by acetaminophen, Disp: , Rfl:   •  Lancets (onetouch ultrasoft) lancets, Test blood sugar twice daily, Disp: 100 each, Rfl: 3  •  lisinopril (ZESTRIL) 10 mg tablet, TAKE 1 TABLET BY MOUTH EVERY DAY, Disp: 90 tablet, Rfl: 1  •  magnesium Oxide (MAG-OX) 400 mg TABS, TAKE 1 TABLET (400 MG TOTAL) BY MOUTH 2 TIMES A DAY, Disp: 180 tablet, Rfl: 1  •  metFORMIN (GLUCOPHAGE) 1000 MG tablet, TAKE 1 TABLET BY MOUTH TWICE A DAY WITH FOOD, Disp: 180 tablet, Rfl: 3  •  metFORMIN (GLUCOPHAGE-XR) 500 mg 24 hr tablet, Take 2 tablets (1,000 mg total) by mouth 2 (two) times a day with meals, Disp: 360 tablet, Rfl: 3  •  Multiple Vitamins-Minerals (multivitamin with minerals) tablet, Take 1 tablet by mouth daily, Disp: , Rfl:   •  omeprazole (PriLOSEC) 40 MG capsule, TAKE 1 CAPSULE BY MOUTH EVERY DAY, Disp: 90 capsule, Rfl: 1  •  OneTouch Ultra test strip, TEST BLOOD SUGAR TWICE DAILY, Disp: 200 strip, Rfl: 1  •  pioglitazone (ACTOS) 30 mg tablet, TAKE 1 TABLET (30 MG TOTAL) BY MOUTH DAILY FOR DIABETES, Disp: 90 tablet, Rfl: 1     PAST MEDICAL HISTORY:   Past Medical History:   Diagnosis Date   • Abdominal pain     started 7/21/16- found something on the pancreas   • Abnormal weight loss     last assessed 26feb2014   • Acute sinusitis    • Allergic rhinitis     last assessed 08Jul2015   • Anemia     had iron infusion   • Asthma    • Cardiac murmur    • Carpal tunnel syndrome    • Cataracts, bilateral    • Cellulitis    • Cervical disc disease    • Cervical myelopathy (HCC) 09/07/2022   • Colon polyp    • CPAP (continuous positive airway pressure) dependence     not  using at this time   • Degeneration of thoracic intervertebral disc    • Degenerative arthritis of knee    • Diabetes mellitus ([facility])    • Diverticulosis    • Esophagitis, reflux     last assessed 29Jan2014   • Excessive sweating    • Fatty liver    • Frequent falls    • Gait disturbance    • GERD (gastroesophageal reflux disease)    • Granuloma annulare    • Head injury    • Hyperlipidemia    • Hypertension    • Hypertensive heart disease    • Hypoglycemia     last assessed 08Feb2016   • Kidney stone    • Leg length discrepancy    • Leucocytosis    • Lumbar stress fracture    • Multiple thyroid nodules    • Neuropathy    • Obesity     last assessed 25Dec2016   • Paronychia of toe    • Patellofemoral dysfunction    • Pneumonia    • Postmenopausal atrophic vaginitis    • Prepatellar bursitis    • Sebaceous cyst     last assessed 21Feb2014   • Sleep apnea     no CPAP used   • Suicide attempt ([facility])    • Tinea corporis    • Type 2 diabetes mellitus ([facility])     cont meds and monitor BS;  last assessed 08Nov2016   • Uncontrolled type 2 diabetes mellitus with hyperglycemia, with long-term current use of insulin ([facility]) 08/05/2019   • Urinary frequency    • Vertigo    • Vitamin D deficiency    • Dia-Parkinson-White syndrome        PAST SURGICAL HISTORY:  Past Surgical History:   Procedure Laterality Date   • CATARACT EXTRACTION Left 07/2024   • CERVICAL FUSION N/A 08/03/2021    Procedure: FUSION CERVICAL ANTERIOR W DISCECTOMY C4-5, C5-6, C6-7 with allograft and neuromonitoring  case length 3 hours;  Surgeon: Kodak Wright MD;  Location:  MAIN OR;  Service: Orthopedics   • COLONOSCOPY     • ESOPHAGOGASTRODUODENOSCOPY N/A 08/08/2016    Procedure: ESOPHAGOGASTRODUODENOSCOPY (EGD);  Surgeon: Trey Bernstein MD;  Location: [facility] GI LAB;  Service:    • LUMBAR FUSION     • [place] ESOPHAGOGASTRODUODENOSCOPY TRANSORAL DIAGNOSTIC N/A 03/28/2019    Procedure: ESOPHAGOGASTRODUODENOSCOPY (EGD);  Surgeon: Trey Bernstein MD;  Location:   [facility];  Service: Gastroenterology   • [place] NEUROPLASTY &/TRANSPOS MEDIAN NRV CARPAL TUNNE Right 08/09/2022    Procedure: RELEASE CARPAL TUNNEL;  Surgeon: Richie Borden MD;  Location: [facility] MAIN OR;  Service: Orthopedics   • SPINE SURGERY     • TONSILLECTOMY     • TOOTH EXTRACTION      wisdom teeth removed       SOCIAL HISTORY:  Social History     Tobacco Use   Smoking Status Never   • Passive exposure: Never   Smokeless Tobacco Never          PHYSICAL EXAM:  63 y.o. female sitting comfortably on exam chair in no apparent distress.   Patient ambulates with normal gait.     Sensation intact to light touch C5 through T1 dermatomes left upper extremity.   Sensation intact to light touch C5 through T1 dermatomes right upper extremity.      Left Motor: 5/5 biceps, 5/5 triceps, 5/5 wrist extension, 5/5 finger flexion, 5/5 finger abduction   Right Motor: 5/5 biceps, 5/5 triceps, 5/5 wrist extension, 5/5 finger flexion, 5/5 finger abduction     Symmetric deep tendon reflexes bilateral upper extremities    2+ biceps and brachioradialis  1+ triceps   Symmetric deep tendon reflexes bilateral lower extremities     2+ patellar tendons   1+ quadriceps   Absent ankles  Rapid alternating motions WNL     Jade's sign negative  Radial release sign positive   Clonus negative        RADIOGRAPHIC STUDIES:  X-ray, C-spine, 10/27/2021:  Status post anterior cervical diskectomy and fusion at C4-5 C5-6 and C6-7.  No hardware complication.  MRI, C-spine, 6/16/2021:   Multilevel cervical degenerative disc disease.  Disc protrusion spinal cord compression involving multiple levels, at C6-7.  Xrays, C-spine, 1/24/2021: Status post anterior cervical diskectomy and fusion at C4-5 C5-6 and C6-7.  No hardware complication.  I compared these films with prior films on 01/24/2022 and so no significant difference between the films.  There was evidence of plate disease at the superior level C3-4.Xrays, cervical spine, 8/08/2022:  Status post  anterior cervical diskectomy fusion from C4 through C7.  I compared the films to prior x-rays.  There is evidence of plate disease at C3-4. There is also evidence of pseudoarthrosis at C4-5 and C6-7.  There is no evidence of hardware complication.  Xrays, cervical spine, 2/13/2023:  Status post anterior cervical diskectomy fusion from C4 through C7.  There is evidence of plate disease at C3-4.  There is also evidence of pseudoarthrosis at C4-5 and C6-7.  There is no evidence of hardware complication.  No cnanges compared to prior X-rays.  Xrays, cervical spine, 8/15/2023: As per sensor cervical discectomy and fusion at C4-5, C5-6 and C6-7.  There is evidence of subsidence and plate disease affecting the C3-4 level.  The spine appears to be fused.         ASSESSMENT:  1. S/P cervical spinal fusion      PLAN:  63 y.o. female with cervical myelopathy status post anterior cervical fusion with diskectomy of C4-5, C5-6, C6-7 performed by Dr. Wright on 8/3/2021.       The patient is doing well overall. She is encouraged to continue with her home exercise plan as prescribed. The patient was reminded of neurological signs to watch for in regard to spinal cord compression. The patient was instructed to call if her symptoms change.     I will see the patient back in approximately 6 months for myelopathy exam.          Scribe Attestation    I,:  Sisi Shelton am acting as a scribe while in the presence of the attending physician.:       I,:  Boogie Cox MD personally performed the services described in this documentation    as scribed in my presence.:

## 2024-08-27 DIAGNOSIS — K29.80 DUODENITIS: ICD-10-CM

## 2024-08-27 DIAGNOSIS — R10.13 EPIGASTRIC PAIN: ICD-10-CM

## 2024-08-27 DIAGNOSIS — R79.0 LOW MAGNESIUM LEVEL: ICD-10-CM

## 2024-08-28 RX ORDER — OMEPRAZOLE 40 MG/1
CAPSULE, DELAYED RELEASE ORAL
Qty: 90 CAPSULE | Refills: 1 | Status: SHIPPED | OUTPATIENT
Start: 2024-08-28

## 2024-08-28 RX ORDER — LANOLIN ALCOHOL/MO/W.PET/CERES
400 CREAM (GRAM) TOPICAL 2 TIMES DAILY
Qty: 180 TABLET | Refills: 1 | Status: SHIPPED | OUTPATIENT
Start: 2024-08-28

## 2024-09-05 ENCOUNTER — RA CDI HCC (OUTPATIENT)
Dept: OTHER | Facility: HOSPITAL | Age: 64
End: 2024-09-05

## 2024-09-05 NOTE — PROGRESS NOTES
HCC coding opportunities          Chart Reviewed number of suggestions sent to Provider: 2     Patients Insurance        Commercial Insurance: [employer]         J45.909: Unspecified asthma, uncomplicated [[id number]]     E66.01: Morbid (severe) obesity due to excess calories [E66.01]

## 2024-09-16 ENCOUNTER — OFFICE VISIT (OUTPATIENT)
Dept: INTERNAL MEDICINE CLINIC | Facility: CLINIC | Age: 64
End: 2024-09-16
Payer: COMMERCIAL

## 2024-09-16 ENCOUNTER — CLINICAL SUPPORT (OUTPATIENT)
Dept: INTERNAL MEDICINE CLINIC | Facility: CLINIC | Age: 64
End: 2024-09-16
Payer: COMMERCIAL

## 2024-09-16 VITALS
SYSTOLIC BLOOD PRESSURE: 140 MMHG | BODY MASS INDEX: 39.87 KG/M2 | DIASTOLIC BLOOD PRESSURE: 62 MMHG | HEART RATE: 77 BPM | WEIGHT: 225 LBS | OXYGEN SATURATION: 96 % | HEIGHT: 63 IN

## 2024-09-16 DIAGNOSIS — M50.90 CERVICAL DISC DISEASE: ICD-10-CM

## 2024-09-16 DIAGNOSIS — R09.1 PLEURISY: ICD-10-CM

## 2024-09-16 DIAGNOSIS — R80.9 TYPE 2 DIABETES MELLITUS WITH MICROALBUMINURIA, WITHOUT LONG-TERM CURRENT USE OF INSULIN (HCC): Primary | ICD-10-CM

## 2024-09-16 DIAGNOSIS — E11.29 TYPE 2 DIABETES MELLITUS WITH MICROALBUMINURIA, WITHOUT LONG-TERM CURRENT USE OF INSULIN (HCC): Primary | ICD-10-CM

## 2024-09-16 DIAGNOSIS — M54.6 DISCOGENIC THORACIC PAIN: Primary | ICD-10-CM

## 2024-09-16 DIAGNOSIS — R29.898 ARM WEAKNESS: ICD-10-CM

## 2024-09-16 PROBLEM — M51.34 DISCOGENIC THORACIC PAIN: Status: ACTIVE | Noted: 2024-09-16

## 2024-09-16 PROCEDURE — 99214 OFFICE O/P EST MOD 30 MIN: CPT | Performed by: INTERNAL MEDICINE

## 2024-09-16 RX ORDER — BLOOD-GLUCOSE SENSOR
EACH MISCELLANEOUS
Qty: 2 EACH | Refills: 3 | Status: SHIPPED | OUTPATIENT
Start: 2024-09-16

## 2024-09-16 NOTE — PROGRESS NOTES
Ambulatory Visit  Name: Jessica Zhou      : 1960      MRN: [id number]  Encounter Provider: Adeel Hough DO  Encounter Date: 2024   Encounter [facility]: [facility]  ervical myelopathy status post anterior cervical fusion with diskectomy of C4-5, C5-6, C6-7 performed by Dr. Wright on 8/3/2021.     Assessment & Plan  Discogenic thoracic pain  Rule out compression fracture rule out thoracic disc disease/disc herniation-patient does have exquisite tenderness between the T4-T5 vertebrae patient reports me that the pain is severe in nature and worsened with deep breath.  She has on examination weakness of the right upper extremity as compared to the left mild also decreased reflex right upper extremity as compared to the left her pain is worsened with rotation and sidebending to the right.  Orders:    MRI thoracic spine wo contrast; Future    Arm weakness  Motor strength 4/5 right upper extremity as compared to left upper extremity also a decrease of reflex right upper extremity as compared to the left  Orders:    MRI thoracic spine wo contrast; Future    Pleurisy  Patient does report to me pain is worsened in her upper back with a deep breath she does have an high ASCVD risk score would like to check CT of the chest to rule out aneurysm  Orders:    CT chest wo contrast; Future    Cervical disc disease  Previous fusion was recently seen the spine specialist will check x-ray of cervical spine to ensure hardware is intact without loosening  Orders:    XR spine cervical complete 4 or 5 vw non injury; Future  I have spent a total time of 30 minutes in caring for this patient on the day of the visit/encounter including Instructions for management, Impressions, Counseling / Coordination of care, Documenting in the medical record, and Obtaining or reviewing history  .      History of Present Illness     HPI 64-year old female coming in for a follow up office visit regarding disc  "pain upper back, right upper extremity weakness, pleurisy cervical disc disease status post fusion; patient reports me symptoms of severe sever  back pain which is worsened by deep breath there was no injury she has noticed the pain will shoot down her right upper extremity there is mild weakness of the right upper extremity she has been to her spine specialist who is not out a reason for her symptoms she has not had any imaging beginning of sept,  unclear etiology ,doing exercises for the neck and the shoulder with laying  repositiona dn lay down better , worse with standin and at times the pain will shoot down the arm right, elbow outside throbbing pain \"like a knife in the back \" with turning the head it shoots down the arm  no weakness of the arms   No chest pain no shortness of breath  History obtained from : patient  Review of Systems   Constitutional:  Negative for activity change, appetite change and unexpected weight change.   HENT:  Negative for congestion and postnasal drip.    Eyes:  Negative for visual disturbance.   Respiratory:  Negative for cough and shortness of breath.         Pleuritic pain   Cardiovascular:  Negative for chest pain.   Gastrointestinal:  Negative for abdominal pain, diarrhea, nausea and vomiting.   Musculoskeletal:  Positive for back pain.   Neurological:  Positive for weakness. Negative for dizziness, light-headedness and headaches.   Hematological:  Negative for adenopathy.     Social History     Tobacco Use    Smoking status: Never     Passive exposure: Never    Smokeless tobacco: Never   Vaping Use    Vaping status: Never Used   Substance and Sexual Activity    Alcohol use: Not Currently     Alcohol/week: 1.0 standard drink of alcohol     Types: 1 Glasses of wine per week    Drug use: No    Sexual activity: Not Currently     Partners: Male     Birth control/protection: Male Sterilization         Objective     /62   Pulse 77   Ht 5' 3\" (1.6 m)   Wt 102 kg (225 lb)   " SpO2 96%   BMI 39.86 kg/m²   Examination of the upper back there is exquisite tenderness between the disc base of T4-T5 and with rotation of the upper back and sidebending to the right the patient does develop radicular symptoms the pain shoots down the right upper extremity.  Motor strength 4/5 right upper extremity as compared to the left upper extremity reflexes 1/2 right upper extremity as compared to the left upper extremity 2/2  Physical Exam  Vitals and nursing note reviewed.   Constitutional:       General: She is not in acute distress.     Appearance: Normal appearance. She is well-developed. She is obese. She is not ill-appearing, toxic-appearing or diaphoretic.   HENT:      Head: Normocephalic and atraumatic.      Right Ear: External ear normal.      Left Ear: External ear normal.      Nose: Nose normal.      Mouth/Throat:      Mouth: Oropharynx is clear and moist.   Eyes:      Pupils: Pupils are equal, round, and reactive to light.   Cardiovascular:      Rate and Rhythm: Normal rate and regular rhythm.      Heart sounds: Normal heart sounds. No murmur heard.  Pulmonary:      Effort: Pulmonary effort is normal.      Breath sounds: Normal breath sounds.   Abdominal:      General: There is no distension.      Palpations: Abdomen is soft.      Tenderness: There is no abdominal tenderness. There is no guarding.   Musculoskeletal:         General: No edema.   Psychiatric:         Mood and Affect: Mood and affect normal.

## 2024-09-16 NOTE — PROGRESS NOTES
[facility] Clinical Pharmacy Services  Alem Mason, Pharmacist    Assessment/ Plan     Assessment & Plan  Type 2 diabetes mellitus with microalbuminuria, without long-term current use of insulin ([facility])    Lab Results   Component Value Date    HGBA1C 8.0 (H) 07/16/2024   Discontinue metformin x 2 weeks   See if diarrhea resolves off medication  Continue glipizide 5mg AM, 10mg PM  Continue pioglitazone 30mg daily  Jardiance, insulin too expensive  GLP1RA hx pancreatitis  Provided sample CGM (Linda 3 plus) today and synced with phone   Will analyze cgm data in 2 weeks to make specific diet interventions and medication plans        Orders:    Continuous Glucose Sensor (FreeStyle Linda 3 Sensor) MISC; Use as directed to check glucose.        Education/training provided on:      Sensor site selection, rotation and sensor insertion    Attachment of the transmitter to the sensor, if applicable    Taping/securing of the sensor/transmitter, if applicable    Connection of the transmitter to the , if applicable    Difference between interstitial glucose readings and blood glucose readings    Understanding CGM data and trends    Possible interference of products such as acetaminophen, salicylic acid, hydroxyurea, and high dose vitamin C    Calibration including timing, frequency and importance of accurate meter/fingerstick technique, if applicable    Education to prevent overcorrection of high glucose  Treatment of hypoglycemia    Linda Personal CGM successfully placed on patient   Transmitter has been cleaned with alcohol  Skin cleaned with alcohol  Insertion site: left arm  Sensor inserted and started   Communication with phone verified  Connected to [employer]        Patient will return for data download and interpretation with at least 72 hours of data.     Follow-up: 2 weeks, scheduled    Subjective     Medication Adherence/ Tolerability/ Cost:  Patient denies side effects, no issues with cost, 0 missed doses in  last two week  acetaminophen  ARIPiprazole  atorvastatin  Cholecalciferol Caps  FLUoxetine  FreeStyle Linda 3 Sensor Misc  glipiZIDE  ibuprofen  lisinopril  magnesium Oxide Tabs  multivitamin with minerals  omeprazole  OneTouch Ultra Strp  onetouch ultrasoft  pioglitazone      2. Lifestyle:   Last visit with dietician: no  Previously attended Living Well with Diabetes Class: no  Diet Recall:   Breakfast:   Lunch:   Dinner:   Snacks:   Beverages:   Physical Activity: none    3. Home monitoring devices  Glucometer: Yes, Brand: unknown  Continuous Glucose Monitor: No, Brand: sending today for insurance verification  Blood Pressure monitor: No, Brand: n/a    Hypoglycemia: The patient had 0 episodes/symptoms of hypoglycemia.   Hyperglycemia: The patient had 0 symptoms of hyperglycemia.     Objective       Blood Glucose Readings  CGM Report scanned into chart - connected,awaiting data  The patient is currently checking blood glucose 1 times per day. Patient does not report with SMBG logs.    ASCVD Risk:  The ASCVD Risk score (Astrid MOSQUEDA, et al., 2019) failed to calculate for the following reasons:    The valid total cholesterol range is 130 to 320 mg/dL     Vitals:  There were no vitals filed for this visit.    Labs:    Lab Results   Component Value Date    SODIUM 139 07/16/2024    K 5.7 (H) 07/16/2024    EGFR 99 07/16/2024    CREATININE 0.65 07/16/2024    GLUF 154 (H) 04/12/2022    KDGVNPML15 344 07/10/2016    MICROALBCRE 61 (H) 10/17/2018     Pharmacist Tracking Tool     Pharmacist Tracking Tool  Reason For Outreach: Embedded Pharmacist  Demographics:  Intervention Method: In Person  Type of Intervention: Follow-Up  Topics Addressed: Diabetes  Pharmacologic Interventions: Prevent or Manage ALBERTO and Med Rec  Non-Pharmacologic Interventions: Care coordination, Chart update, Cost, Disease state education, Home Monitoring, Labs, Medication Monitoring, Medication/Device education, and Personal CGM  Time:  Direct Patient Care:   30  mins  Care Coordination:  30  mins  Recommendation Recipient: Patient/Caregiver  Outcome: Accepted

## 2024-09-16 NOTE — ASSESSMENT & PLAN NOTE
Rule out compression fracture rule out thoracic disc disease/disc herniation-patient does have exquisite tenderness between the T4-T5 vertebrae patient reports me that the pain is severe in nature and worsened with deep breath.  She has on examination weakness of the right upper extremity as compared to the left mild also decreased reflex right upper extremity as compared to the left her pain is worsened with rotation and sidebending to the right.  Orders:    MRI thoracic spine wo contrast; Future

## 2024-09-16 NOTE — ASSESSMENT & PLAN NOTE
Previous fusion was recently seen the spine specialist will check x-ray of cervical spine to ensure hardware is intact without loosening  Orders:    XR spine cervical complete 4 or 5 vw non injury; Future  I have spent a total time of 30 minutes in caring for this patient on the day of the visit/encounter including Instructions for management, Impressions, Counseling / Coordination of care, Documenting in the medical record, and Obtaining or reviewing history  .

## 2024-09-16 NOTE — ASSESSMENT & PLAN NOTE
Lab Results   Component Value Date    HGBA1C 8.0 (H) 07/16/2024   Discontinue metformin x 2 weeks   See if diarrhea resolves off medication  Continue glipizide 5mg AM, 10mg PM  Continue pioglitazone 30mg daily  Jardiance, insulin too expensive  GLP1RA hx pancreatitis  Provided sample CGM (Linda 3 plus) today and synced with phone   Will analyze cgm data in 2 weeks to make specific diet interventions and medication plans        Orders:    Continuous Glucose Sensor (FreeStyle Linda 3 Sensor) MISC; Use as directed to check glucose.

## 2024-09-16 NOTE — ASSESSMENT & PLAN NOTE
Motor strength 4/5 right upper extremity as compared to left upper extremity also a decrease of reflex right upper extremity as compared to the left  Orders:    MRI thoracic spine wo contrast; Future

## 2024-09-16 NOTE — ASSESSMENT & PLAN NOTE
Patient does report to me pain is worsened in her upper back with a deep breath she does have an high ASCVD risk score would like to check CT of the chest to rule out aneurysm  Orders:    CT chest wo contrast; Future

## 2024-10-02 ENCOUNTER — HOSPITAL ENCOUNTER (OUTPATIENT)
Dept: RADIOLOGY | Facility: HOSPITAL | Age: 64
End: 2024-10-02
Payer: COMMERCIAL

## 2024-10-02 ENCOUNTER — HOSPITAL ENCOUNTER (OUTPATIENT)
Dept: RADIOLOGY | Facility: HOSPITAL | Age: 64
Discharge: HOME | End: 2024-10-02
Payer: COMMERCIAL

## 2024-10-02 ENCOUNTER — HOSPITAL ENCOUNTER (OUTPATIENT)
Dept: MRI IMAGING | Facility: HOSPITAL | Age: 64
Discharge: HOME | End: 2024-10-02
Payer: COMMERCIAL

## 2024-10-02 ENCOUNTER — HOSPITAL ENCOUNTER (OUTPATIENT)
Dept: CT IMAGING | Facility: HOSPITAL | Age: 64
Discharge: HOME | End: 2024-10-02
Payer: COMMERCIAL

## 2024-10-02 DIAGNOSIS — M50.90 CERVICAL DISC DISEASE: ICD-10-CM

## 2024-10-02 DIAGNOSIS — R29.898 ARM WEAKNESS: ICD-10-CM

## 2024-10-02 DIAGNOSIS — M51.34 DISCOGENIC THORACIC PAIN: ICD-10-CM

## 2024-10-02 DIAGNOSIS — R09.1 PLEURISY: ICD-10-CM

## 2024-10-02 PROCEDURE — 71250 CT THORAX DX C-: CPT

## 2024-10-02 PROCEDURE — 72050 X-RAY EXAM NECK SPINE 4/5VWS: CPT

## 2024-10-02 PROCEDURE — 72146 MRI CHEST SPINE W/O DYE: CPT

## 2024-10-03 DIAGNOSIS — M54.9 UPPER BACK PAIN: Primary | ICD-10-CM

## 2024-10-04 ENCOUNTER — CLINICAL SUPPORT (OUTPATIENT)
Dept: INTERNAL MEDICINE CLINIC | Facility: CLINIC | Age: 64
End: 2024-10-04

## 2024-10-04 ENCOUNTER — TELEPHONE (OUTPATIENT)
Dept: INTERNAL MEDICINE CLINIC | Facility: CLINIC | Age: 64
End: 2024-10-04

## 2024-10-04 DIAGNOSIS — E11.29 TYPE 2 DIABETES MELLITUS WITH MICROALBUMINURIA, WITHOUT LONG-TERM CURRENT USE OF INSULIN (HCC): Primary | ICD-10-CM

## 2024-10-04 DIAGNOSIS — R80.9 TYPE 2 DIABETES MELLITUS WITH MICROALBUMINURIA, WITHOUT LONG-TERM CURRENT USE OF INSULIN (HCC): Primary | ICD-10-CM

## 2024-10-04 RX ORDER — METFORMIN HCL 500 MG
500 TABLET, EXTENDED RELEASE 24 HR ORAL 2 TIMES DAILY WITH MEALS
Start: 2024-10-04

## 2024-10-04 NOTE — PROGRESS NOTES
[facility] Clinical Pharmacy Services  Alem Mason, Pharmacist    Assessment/ Plan     Assessment & Plan  Type 2 diabetes mellitus with microalbuminuria, without long-term current use of insulin ([facility])    Lab Results   Component Value Date    HGBA1C 8.0 (H) 07/16/2024   Stopped metformin as instructed x 2 weeks, diarrhea with no improvement  Will restart metformin at 500mg BID x 2 weeks  Reapplied Linda 3 plus (previous sensor failed)  Continue glipizide 5mg AM, 10mg PM  Continue pioglitazone 30mg daily  Jardiance, insulin too expensive  GLP1RA hx pancreatitis               Follow-up: 2 weeks, scheduled    Subjective     Medication Adherence/ Tolerability/ Cost:  Patient denies side effects, no issues with cost, 0 missed doses in last two week  acetaminophen  ARIPiprazole  atorvastatin  Cholecalciferol Caps  FLUoxetine  FreeStyle Linda 3 Sensor Misc  glipiZIDE  ibuprofen  lisinopril  magnesium Oxide Tabs  multivitamin with minerals  omeprazole  OneTouch Ultra Strp  onetouch ultrasoft  pioglitazone      2. Lifestyle:   Last visit with dietician: no  Previously attended Living Well with Diabetes Class: no  Diet Recall:   Breakfast:   Lunch:   Dinner:   Snacks:   Beverages:   Physical Activity: none    3. Home monitoring devices  Glucometer: Yes, Brand: unknown  Continuous Glucose Monitor: No, Brand: sending today for insurance verification  Blood Pressure monitor: No, Brand: n/a    Hypoglycemia: The patient had 0 episodes/symptoms of hypoglycemia.   Hyperglycemia: The patient had 0 symptoms of hyperglycemia.     Objective       Blood Glucose Readings  CGM Report scanned into chart - connected,awaiting data  The patient is currently checking blood glucose 1 times per day. Patient does not report with SMBG logs.    ASCVD Risk:  The ASCVD Risk score (Astrid DK, et al., 2019) failed to calculate for the following reasons:    The valid total cholesterol range is 130 to 320 mg/dL     Vitals:  There were no vitals filed  for this visit.    Labs:    Lab Results   Component Value Date    SODIUM 139 07/16/2024    K 5.7 (H) 07/16/2024    EGFR 99 07/16/2024    CREATININE 0.65 07/16/2024    GLUF 154 (H) 04/12/2022    PDQDBDFU70 344 07/10/2016    MICROALBCRE 61 (H) 10/17/2018     Pharmacist Tracking Tool     Pharmacist Tracking Tool  Reason For Outreach: Embedded Pharmacist  Demographics:  Intervention Method: In Person  Type of Intervention: Follow-Up  Topics Addressed: Diabetes  Pharmacologic Interventions: Prevent or Manage ALBERTO and Med Rec  Non-Pharmacologic Interventions: Care coordination, Chart update, Cost, Disease state education, Home Monitoring, Labs, Medication Monitoring, Medication/Device education, and Personal CGM  Time:  Direct Patient Care:  30  mins  Care Coordination:  30  mins  Recommendation Recipient: Patient/Caregiver  Outcome: Accepted

## 2024-10-04 NOTE — ASSESSMENT & PLAN NOTE
Lab Results   Component Value Date    HGBA1C 8.0 (H) 07/16/2024   Stopped metformin as instructed x 2 weeks, diarrhea with no improvement  Will restart metformin at 500mg BID x 2 weeks  Reapplied Linda 3 plus (previous sensor failed)  Continue glipizide 5mg AM, 10mg PM  Continue pioglitazone 30mg daily  Jardiance, insulin too expensive  GLP1RA hx pancreatitis

## 2024-10-07 ENCOUNTER — TELEPHONE (OUTPATIENT)
Age: 64
End: 2024-10-07

## 2024-10-07 NOTE — TELEPHONE ENCOUNTER
Pt returning a call from Gail VÁSQUEZ  Office staff was not available at this time. Please contact pt and advise. Thank you for your kind assistance.

## 2024-10-07 NOTE — TELEPHONE ENCOUNTER
Patient called and made appointment with Dr. Hough for tomorrow.  As we were closing the call the patient asked me to leave Alem (pharmacist) a message.  She stated that the device on her arm for her sugar fell off and she will need a new once placed on her arm.  She was no sure if this is something that can be done while she is in the office tomorrow seeing Dr. Hough.  Spoke with Cynthia in office and she asked I route this to clerical in box    Please advise

## 2024-10-07 NOTE — PSYCH
MEDICATION MANAGEMENT NOTE        [facility]      Name and Date of Birth:  Jessica Zhou 64 y.o. 1960 MRN: [id number]    Insurance: Payor: BLUE CROSS / Plan: CAPITAL BC PLAN 361 / Product Type: Blue Fee for Service /     Date of Visit: October 16, 2024    Reason for Visit:   Chief Complaint   Patient presents with    Medication Management    Follow-up       Assessment & Plan  Bipolar I disorder, most recent episode depressed, in remission (HCC)  Mood is stable  Continue Prozac 40 mg twice a day to control depressive symptoms and anxiety  Continue Abilify 10 mg daily to help with mood       LOURDES (generalized anxiety disorder)  Increased anxiety symptoms due to multiple stressors  Continue Prozac 40 mg twice a day to control depressive symptoms and anxiety         Long-term use of high-risk medication  Follows with family physician for glucose and lipid monitoring due to current therapy with antipsychotic medication  Monitor lipid profile and hemoglobin A1C yearly due to current therapy with antipsychotic medication - gets labs done with PCP       Treatment Recommendations/Precautions:    Does not want any medication changes  Follows with family physician for yearly physical exam, asthma, diabetes, hyperlipidemia, hypertension, and lung nodule  Aware of 24 hour and weekend coverage for urgent situations accessed by calling [facility] main practice number  Medication management every 4 months  Crisis Plan was updated today during the session and copy of Crisis Plan was provided to Jessica  I am scheduling this patient out for greater than 3 months: Yes - Patient's stability of symptoms warrant this length of time or no significant changes to treatment plan    Medications Risks/Benefits:      Risks, Benefits And Possible Side Effects Of Medications:    Risks, benefits, and possible side effects of medications explained to Jessica including risk of  "parkinsonian symptoms, Tardive Dyskinesia and metabolic syndrome related to treatment with antipsychotic medications and risk of suicidality and serotonin syndrome related to treatment with antidepressants. She verbalizes understanding and agreement for treatment.    Controlled Medication Discussion:     Not applicable    SUBJECTIVE:    Jessica is seen today for a follow up for Bipolar Disorder and Generalized Anxiety Disorder. She has decompensated somewhat since the last visit. She states that mood continues to be stable and denies any significant depressive symptoms or manic symptoms, but she reports increased anxiety symptoms recently. Her brother and sister were hospitalized in ICU last month and her sister was just diagnosed with brain tumor with metastases. Sister is now in a nursing home for rehabilitation after radiation therapy \"she is going down the hill\". She continues to worry about her parents-in-law who are also in a nursing home. She states that she has to follow up with cardiology for ECHO \"my heart murmur is getting worse\". She will also need a PET scan due to a nodule that was found on her lungs. She has been trying to cope with her stressors by staying positive and participating actively in her [employer] life \"I now teach meghna class at [employer]\".    She denies any suicidal ideation, intent or plan at present; denies any homicidal ideation, intent or plan at present.    She has no auditory hallucinations, denies any visual hallucinations, has no delusional thoughts.    She reports diarrhea. Denies any other side effects from current psychiatric medications.    HPI ROS Appetite Changes and Sleep:     She reports normal sleep, adequate number of sleep hours (9 hours), normal appetite, recent weight loss (3 lbs), normal energy level    Current Rating Scores:     Current PHQ-9   PHQ-2/9 Depression Screening    Little interest or pleasure in doing things: 0 - not at all  Feeling down, depressed, or " hopeless: 0 - not at all  Trouble falling or staying asleep, or sleeping too much: 0 - not at all  Feeling tired or having little energy: 1 - several days  Poor appetite or overeatin - several days  Feeling bad about yourself - or that you are a failure or have let yourself or your family down: 0 - not at all  Trouble concentrating on things, such as reading the newspaper or watching television: 0 - not at all  Moving or speaking so slowly that other people could have noticed. Or the opposite - being so fidgety or restless that you have been moving around a lot more than usual: 0 - not at all  Thoughts that you would be better off dead, or of hurting yourself in some way: 0 - not at all  PHQ-9 Score: 2  PHQ-9 Interpretation: No or Minimal depression       Current PHQ-9 score is same as at the last visit).    Review Of Systems:      Constitutional recent weight loss (3 lbs)   ENT negative   Cardiovascular negative   Respiratory negative   Gastrointestinal diarrhea   Genitourinary negative   Musculoskeletal arm pain   Integumentary negative   Neurological negative   Endocrine negative   Other Symptoms none, all other systems are negative       Past Psychiatric History: (unchanged information from previous note copied and updated)    Past Inpatient Psychiatric Treatment:   One past inpatient psychiatric admission at [facility] years abo  Past Outpatient Psychiatric Treatment:    In outpatient treatment at [facility] for many years.  Past Suicide Attempts: yes, 2 attempts by overdose years ago  Past Violent Behavior: no  Past Psychiatric Medication Trials: Prozac, Depakote, Lamictal (rash), Tegretol, Abilify and Latuda    Traumatic History: (unchanged information from previous note copied and updated)    Abuse: no history of sexual abuse, no history of physical abuse  Other Traumatic Events: none     Past Medical History:    Past Medical History:   Diagnosis Date    Abdominal pain      started 7/21/16- found something on the pancreas    Abnormal weight loss     last assessed [id number]    Acute sinusitis     Allergic rhinitis     last assessed [id number]    Anemia     had iron infusion    Asthma     Cardiac murmur     Carpal tunnel syndrome     Cataracts, bilateral     Cellulitis     Cervical disc disease     Cervical myelopathy (HCC) 09/07/2022    Colon polyp     CPAP (continuous positive airway pressure) dependence     not using at this time    Degeneration of thoracic intervertebral disc     Degenerative arthritis of knee     Diabetes mellitus ([facility])     Diverticulosis     Esophagitis, reflux     last assessed [id number]    Excessive sweating     Fatty liver     Frequent falls     Gait disturbance     GERD (gastroesophageal reflux disease)     Granuloma annulare     Head injury     Heart murmur     Hyperlipidemia     Hypertension     Hypertensive heart disease     Hypoglycemia     last assessed [id number]    Kidney stone     Leg length discrepancy     Leucocytosis     Lumbar stress fracture     Multiple thyroid nodules     Neuropathy     Obesity     last assessed [id number]    Paronychia of toe     Patellofemoral dysfunction     Pneumonia     Postmenopausal atrophic vaginitis     Prepatellar bursitis     Sebaceous cyst     last assessed [id number]    Sleep apnea     no CPAP used    Suicide attempt ([facility])     Tinea corporis     Type 2 diabetes mellitus ([facility])     cont meds and monitor BS;  last assessed 08Nov2016    Uncontrolled type 2 diabetes mellitus with hyperglycemia, with long-term current use of insulin ([facility]) 08/05/2019    Urinary frequency     Vertigo     Vitamin D deficiency     Dia-Parkinson-White syndrome         Past Surgical History:   Procedure Laterality Date    CATARACT EXTRACTION Left 07/2024    CERVICAL FUSION N/A 08/03/2021    Procedure: FUSION CERVICAL ANTERIOR W DISCECTOMY C4-5, C5-6, C6-7 with allograft and neuromonitoring  case length 3 hours;  Surgeon: Kodak Wright MD;   Location: [facility];  Service: Orthopedics    COLONOSCOPY      ESOPHAGOGASTRODUODENOSCOPY N/A 08/08/2016    Procedure: ESOPHAGOGASTRODUODENOSCOPY (EGD);  Surgeon: Trey Bernstein MD;  Location: [facility] GI LAB;  Service:     LUMBAR FUSION      IA ESOPHAGOGASTRODUODENOSCOPY TRANSORAL DIAGNOSTIC N/A 03/28/2019    Procedure: ESOPHAGOGASTRODUODENOSCOPY (EGD);  Surgeon: Trey Bernstein MD;  Location: [facility]  GI LAB;  Service: Gastroenterology    IA NEUROPLASTY &/TRANSPOS MEDIAN NRV CARPAL TUNNE Right 08/09/2022    Procedure: RELEASE CARPAL TUNNEL;  Surgeon: Richie Borden MD;  [facility];  Service: Orthopedics    SPINE SURGERY      TONSILLECTOMY      TOOTH EXTRACTION      wisdom teeth removed     Allergies   Allergen Reactions    Bee Venom Swelling     Local swelling at sting site    Biaxin [Clarithromycin] Itching    Depakote Er [Divalproex Sodium Er]      pancreatitis    Shellfish-Derived Products - Food Allergy Other (See Comments)     Ears and cheeks got red    Tanzeum [Albiglutide]      pancreatitis    Carbamazepine Other (See Comments)     Reaction Date:Unknown    Lamotrigine Rash       Current Outpatient Medications:    Current Outpatient Medications   Medication Sig Dispense Refill    acetaminophen (TYLENOL) 500 mg tablet Take 500 mg by mouth 2 (two) times a day      ARIPiprazole (ABILIFY) 10 mg tablet Take 1 tablet (10 mg total) by mouth every evening 90 tablet 2    atorvastatin (LIPITOR) 80 mg tablet TAKE 1 TABLET BY MOUTH EVERY DAY 90 tablet 1    Cholecalciferol 4000 units CAPS Take 1 capsule (4,000 Units total) by mouth daily      Continuous Glucose Sensor (FreeStyle Linda 3 Sensor) MISC Use as directed to check glucose. 2 each 3    FLUoxetine (PROzac) 40 MG capsule Take 1 capsule (40 mg total) by mouth 2 (two) times a day 180 capsule 2    glipiZIDE (GLUCOTROL) 5 mg tablet TAKE BY MOUTH 1 TABLET WITH LUNCH AND TWO TABLETS WITH SUPPER FOR DIABETES - FILL WHEN PATIENT REQUESTS 270 tablet 1     ibuprofen (MOTRIN) 200 mg tablet Take 400 mg by mouth Every other day as needed when pain not alleviated by acetaminophen      lisinopril (ZESTRIL) 10 mg tablet Take 1 tablet (10 mg total) by mouth 2 (two) times a day 180 tablet 3    magnesium Oxide (MAG-OX) 400 mg TABS TAKE 1 TABLET BY MOUTH 2 TIMES A DAY. 180 tablet 1    metFORMIN (GLUCOPHAGE-XR) 500 mg 24 hr tablet Take 1 tablet (500 mg total) by mouth 2 (two) times a day with meals      Multiple Vitamins-Minerals (multivitamin with minerals) tablet Take 1 tablet by mouth daily      omeprazole (PriLOSEC) 40 MG capsule TAKE 1 CAPSULE BY MOUTH EVERY DAY 90 capsule 1    OneTouch Ultra test strip TEST BLOOD SUGAR TWICE DAILY 200 strip 1    pioglitazone (ACTOS) 30 mg tablet TAKE 1 TABLET (30 MG TOTAL) BY MOUTH DAILY FOR DIABETES 90 tablet 1    Lancets (onetouch ultrasoft) lancets Test blood sugar twice daily 100 each 3     No current facility-administered medications for this visit.       Substance Abuse History:    Social History     Substance and Sexual Activity   Alcohol Use Not Currently    Alcohol/week: 1.0 standard drink of alcohol    Types: 1 Glasses of wine per week     Social History     Substance and Sexual Activity   Drug Use No       Social History:    Social History     Socioeconomic History    Marital status: /Civil Union     Spouse name: Not on file    Number of children: 0    Years of education: 12    Highest education level: 12th grade   Occupational History    Occupation: retired   Tobacco Use    Smoking status: Never     Passive exposure: Never    Smokeless tobacco: Never   Vaping Use    Vaping status: Never Used   Substance and Sexual Activity    Alcohol use: Not Currently     Alcohol/week: 1.0 standard drink of alcohol     Types: 1 Glasses of wine per week    Drug use: No    Sexual activity: Not Currently     Partners: Male     Birth control/protection: Male Sterilization   Other Topics Concern    Not on file   Social History Narrative     Education: high school graduate    Learning Disabilities: none    Marital History:     Children: none    Living Arrangement: lives in home with     Occupational History: retired, 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[employer]    Functioning Relationships: good support system,  is supportive    Legal History: none     History: None        Caffeine use    Denied home environment domestic violence    Job physical requirements heavy lifting     Social Determinants of Health     Financial Resource Strain: Low Risk  (10/16/2024)    Overall Financial Resource Strain (CARDIA)     Difficulty of Paying Living Expenses: Not hard at all   Food Insecurity: No Food Insecurity (10/16/2024)    Hunger Vital Sign     Worried About Running Out of Food in the Last Year: Never true     Ran Out of Food in the Last Year: Never true   Transportation Needs: No Transportation Needs (10/16/2024)    PRAPARE - Transportation     Lack of Transportation (Medical): No     Lack of Transportation (Non-Medical): No   Physical Activity: Inactive (10/16/2024)    Exercise Vital Sign     Days of Exercise per Week: 0 days     Minutes of Exercise per Session: 0 min   Stress: No Stress Concern Present (10/16/2024)    [employer] - Occupational Stress Questionnaire     Feeling of Stress : Only a little   Social Connections: Moderately Isolated (10/16/2024)    Social Connection and Isolation Panel [NHANES]     Frequency of Communication with Friends and Family: Once a week     Frequency of Social Gatherings with Friends and Family: Once a week     Attends Sabianist Services: Never     Active Member of Clubs or Organizations: Yes     Attends Club or Organization Meetings: More than 4 times per year     Marital Status:    Intimate Partner Violence: Not At Risk (10/16/2024)    Humiliation, Afraid, Rape, and Kick questionnaire     Fear of Current or Ex-Partner: No     Emotionally  "Abused: No     Physically Abused: No     Sexually Abused: No   Housing Stability: Low Risk  (10/16/2024)    Housing Stability Vital Sign     Unable to Pay for Housing in the Last Year: No     Number of Times Moved in the Last Year: 0     Homeless in the Last Year: No       Family Psychiatric History:     Family History   Problem Relation Age of Onset    Cancer Mother         lung    Heart disease Mother     Coronary artery disease Mother         CABG    Diabetes Mother     Lung cancer Mother 68        smoker    Stroke Mother     Thyroid disease Mother     Other Mother         tobacco use    COPD Mother     Parkinsonism Father     Intestinal polyp Father     Bipolar disorder Father     Heart disease Father     Prostate cancer Father 58    Hypertension Father     Cancer Father     Hypertension Sister     Alcohol abuse Sister     Thyroid disease unspecified Sister     Arthritis Sister     Diabetes Brother     Depression Brother     Heart disease Brother     Hypertension Brother     Arthritis Brother     Heart disease Brother     Seizures Brother     Diabetes Maternal Grandmother     Thyroid disease Maternal Grandmother     Endometrial cancer Maternal Aunt         unknown age    Suicide Attempts Paternal Aunt     Depression Paternal Aunt     Ovarian cancer Cousin 40        maternal    Hypertension Family        History Review: The following portions of the patient's history were reviewed and updated as appropriate: allergies, current medications, past family history, past medical history, past social history, past surgical history, and problem list.         OBJECTIVE:     Vital signs in last 24 hours:    Vitals:    10/16/24 1502   BP: 137/72   Pulse: 86   Weight: 102 kg (225 lb)   Height: 5' 3\" (1.6 m)       Mental Status Evaluation:    Appearance age appropriate, casually dressed   Behavior cooperative, appears anxious   Speech normal rate, normal volume, normal pitch   Mood anxious   Affect constricted   Thought " Processes organized, goal directed   Associations intact associations   Thought Content no overt delusions   Perceptual Disturbances: no auditory hallucinations, no visual hallucinations   Abnormal Thoughts  Risk Potential Suicidal ideation - None  Homicidal ideation - None  Potential for aggression - No   Orientation oriented to person, place, time/date, and situation   Memory recent and remote memory grossly intact   Consciousness alert and awake   Attention Span Concentration Span attention span and concentration appear shorter than expected for age   Intellect appears to be of average intelligence   Insight intact   Judgement intact   Muscle Strength and  Gait normal muscle strength and normal muscle tone, normal gait and normal balance   Motor activity no abnormal movements   Language no difficulty naming common objects, no difficulty repeating a phrase, no difficulty writing a sentence   Fund of Knowledge adequate knowledge of current events  adequate fund of knowledge regarding past history  adequate fund of knowledge regarding vocabulary    Pain mild   Pain Scale 2       Laboratory Results: I have personally reviewed all pertinent laboratory/tests results    Recent Labs (last 9 months):   Annual Exam on 06/14/2024   Component Date Value    MARIA ISABEL COOLEY CLINICAL INFORMAT* 06/14/2024 None given     LMP 06/14/2024 None given     Prev. PAP 06/14/2024 None given     Prev. BX 06/14/2024 None given     Source 06/14/2024 Cervix     Statement of Adequacy 06/14/2024 SATISFACTORY FOR EVALUATION     Interpretation/Result 06/14/2024 Cytology Results: Negative for intraepithelial lesion or malignancy.     Comment 06/14/2024      MARIA ISABEL COOLEY CYTOTECHNOLOGIST: 06/14/2024      Comment 06/14/2024     Clinical Support on 06/10/2024   Component Date Value    Hemoglobin A1C 06/10/2024 7.4 (A)    Orders Only on 05/29/2024   Component Date Value    Right Eye Diabetic Retin* 05/29/2024 None     Left Eye Diabetic Retino* 05/29/2024 None     Orders Only on 04/16/2024   Component Date Value    Magnesium, Serum 04/16/2024 1.5    Orders Only on 04/01/2024   Component Date Value    Magnesium, Serum 04/01/2024 1.4 (L)    Office Visit on 03/18/2024   Component Date Value    Hemoglobin A1C 07/16/2024 8.0 (H)     Total Cholesterol 07/16/2024 121     HDL 07/16/2024 46 (L)     Triglycerides 07/16/2024 269 (H)     LDL Calculated 07/16/2024 43     Chol HDLC Ratio 07/16/2024 2.6     Non-HDL Cholesterol 07/16/2024 75     Glucose, Random 07/16/2024 125 (H)     BUN 07/16/2024 16     Creatinine 07/16/2024 0.65     eGFR 07/16/2024 99     SL AMB BUN/CREATININE RA* 07/16/2024 SEE NOTE:     Sodium 07/16/2024 139     Potassium 07/16/2024 5.7 (H)     Chloride 07/16/2024 100     CO2 07/16/2024 30     Calcium 07/16/2024 9.4     Protein, Total 07/16/2024 6.8     Albumin 07/16/2024 4.0     Globulin 07/16/2024 2.8     Albumin/Globulin Ratio 07/16/2024 1.4     TOTAL BILIRUBIN 07/16/2024 0.4     Alkaline Phosphatase 07/16/2024 112     AST 07/16/2024 23     ALT 07/16/2024 18    Orders Only on 03/11/2024   Component Date Value    Total Cholesterol 03/11/2024 133     HDL 03/11/2024 44 (L)     Triglycerides 03/11/2024 278 (H)     LDL Calculated 03/11/2024 56     Chol HDLC Ratio 03/11/2024 3.0     Non-HDL Cholesterol 03/11/2024 89     Creatinine, Urine 03/11/2024 65     Albumin,U,Random 03/11/2024 1.9     Microalb/Creat Ratio 03/11/2024 29     Magnesium, Serum 03/11/2024 1.4 (L)     Glucose, Random 03/11/2024 146 (H)     BUN 03/11/2024 17     Creatinine 03/11/2024 0.61     eGFR 03/11/2024 101     SL AMB BUN/CREATININE RA* 03/11/2024 SEE NOTE:     Sodium 03/11/2024 140     Potassium 03/11/2024 5.1     Chloride 03/11/2024 102     CO2 03/11/2024 27     Calcium 03/11/2024 9.1     Protein, Total 03/11/2024 6.7     Albumin 03/11/2024 4.1     Globulin 03/11/2024 2.6     Albumin/Globulin Ratio 03/11/2024 1.6     TOTAL BILIRUBIN 03/11/2024 0.3     Alkaline Phosphatase 03/11/2024 98     AST  03/11/2024 22     ALT 03/11/2024 20     Vitamin D, 25-Hydroxy, S* 03/11/2024 46     Hemoglobin A1C 03/11/2024 7.2 (H)        Suicide/Homicide Risk Assessment:    Risk of Harm to Self:  Demographic risk factors include: , age: over 50 or older  Historical Risk Factors include: history of anxiety, history of mood disorder, history of suicide attempts  Recent Specific Risk Factors include: diagnosis of mood disorder, current anxiety symptoms  Protective Factors: no current suicidal ideation, being , compliant with medications, compliant with mental health treatment, responsibilities and duties to others, stable living environment, supportive family  Weapons: none. The following steps have been taken to ensure weapons are properly secured: not applicable  Based on today's assessment, Jessica presents the following risk of harm to self: minimal    Risk of Harm to Others:  The following ratings are based on assessment at the time of the interview  Based on today's assessment, Jessica presents the following risk of harm to others: none    The following interventions are recommended: no intervention changes needed    Psychotherapy Provided:     Individual psychotherapy provided: Yes  Counseling was provided during the session today for 16 minutes.  Medications, treatment progress and treatment plan reviewed with Jessica.  Goals discussed during in session: improve control of anxiety, maintain control of depression, and maintain mood stability.   Discussed with Jessica coping with family issues, sister's illness, health issues, chronic anxiety, and chronic mental illness.   Coping strategies including Restorationist involvement, crocheting, keeping busy at home, and maintain positive attitude reviewed with Jessica.   Supportive therapy provided.      Treatment Plan:    Completed and signed during the session: Yes - with Jessica    Note Share:    This note was shared with patient.    Visit Time    Visit Start Time: 2:58 PM  Visit Stop  Time: 3:27 PM  Total Visit Duration:  29 minutes    Muriel Bello MD 10/16/24

## 2024-10-07 NOTE — TELEPHONE ENCOUNTER
Patient returning call to office.  Scheduled patient for tomorrow afternoon 10/8 at 3:00pm with Dr. Hough.

## 2024-10-08 ENCOUNTER — OFFICE VISIT (OUTPATIENT)
Dept: INTERNAL MEDICINE CLINIC | Facility: CLINIC | Age: 64
End: 2024-10-08
Payer: COMMERCIAL

## 2024-10-08 VITALS
RESPIRATION RATE: 20 BRPM | BODY MASS INDEX: 39.87 KG/M2 | OXYGEN SATURATION: 97 % | SYSTOLIC BLOOD PRESSURE: 124 MMHG | HEIGHT: 63 IN | DIASTOLIC BLOOD PRESSURE: 88 MMHG | WEIGHT: 225 LBS | HEART RATE: 79 BPM

## 2024-10-08 DIAGNOSIS — R91.1 PULMONARY NODULE: ICD-10-CM

## 2024-10-08 DIAGNOSIS — M54.12 CERVICAL RADICULOPATHY: Primary | ICD-10-CM

## 2024-10-08 DIAGNOSIS — I10 BENIGN ESSENTIAL HYPERTENSION: ICD-10-CM

## 2024-10-08 PROCEDURE — 99214 OFFICE O/P EST MOD 30 MIN: CPT | Performed by: INTERNAL MEDICINE

## 2024-10-08 NOTE — PROGRESS NOTES
Ambulatory Visit  Name: Jessica Zhou      : 1960      MRN: [id number]  Encounter Provider: Adeel Hough DO  Encounter Date: 10/8/2024   [facility]    Assessment & Plan  Cervical radiculopathy  Patient describes to me cervical radiculopathy she has pain and weakness of the right upper extremity no bowel or bladder incontinence there is a previous neck fusion patient will be seeing a spine specialist in the near future  Orders:    MRI cervical spine wo contrast; Future    Pulmonary nodule  CAT scan does reveal a pulmonary nodule patient is a non-smoker.  Grew up in a home with mom and dad heavy smokers sisters currently in the hospital with lung cancer; CAT scan did show a 9 mm left lobe pulmonary nodule radiology is recommending PET/CT for further evaluation I will also refer her to pulmonary  Orders:    NM PET CT skull base to mid thigh; Future    Ambulatory Referral to Pulmonology; Future    Benign essential hypertension  Hypertension - controlled, I have counseled patient following healthy balance diet, I would like the patient reduce sodium, exercise routinely, I would like the patient continued the med current medical regiment and we will continue to monitor.        I have spent a total time of 30 minutes in caring for this patient on the day of the visit/encounter including Diagnostic results, Risks and benefits of tx options, Instructions for management, Importance of tx compliance, Impressions, Counseling / Coordination of care, Documenting in the medical record, Reviewing / ordering tests, medicine, procedures  , and Obtaining or reviewing history  .   History of Present Illness     HPI 64-year old female coming in for a follow up office visit regarding cervical radiculopathy, pulmonary nodule, hypertension; the patient reports me compliant taking medications without untoward side effects the.  The patient is here to review his medical condition,  "update me on the medical condition and the patient reports me no hospitalizations and no ER visits.  No injuries no illnesses reported progressive weakness of the right upper extremity and pain that radiates from the neck to the right upper extremity.  Is here to review the MRI of the thoracic spine and CT of the chest showing a pulmonary nodule 9 mm.  The patient does have an appointment with the orthopedic spine specialist in the near future.  She reports she is not an active smoker and was not a smoker but lived for many years in a house with heavy smokers mom and dad patient does report to me her sister is currently in the hospital with lung cancer and brain cancer.  Social grossly smoker patient denies cough patient denies hemoptysis patient does report to me she is developing weakness of her bilateral upper extremities has noticed worsening in the last week in the right like a knife going into her back between her shoulder blades; which radiates down the right upper extremity proximally and also the outer aspect to about the outer elbow region she does noticed twitching of the nerves and notices a sensation of the skin as if a bug is on her arm\" she noticed that the weakness of the first 3 fingers numbness of the hand at this point   Chronic urge inc ,  no saddle anesthesia ,   History obtained from : patient  Review of Systems   Constitutional:  Negative for activity change, appetite change and unexpected weight change.   HENT:  Negative for congestion and postnasal drip.    Eyes:  Negative for visual disturbance.   Respiratory:  Negative for cough and shortness of breath.    Cardiovascular:  Negative for chest pain.   Gastrointestinal:  Negative for abdominal pain, diarrhea, nausea and vomiting.   Musculoskeletal:         Neck pain with radiation right upper extremity and weakness   Neurological:  Positive for weakness and numbness. Negative for dizziness, light-headedness and headaches.   Hematological:  " "Negative for adenopathy.     Social History     Tobacco Use    Smoking status: Never     Passive exposure: Never    Smokeless tobacco: Never   Vaping Use    Vaping status: Never Used   Substance and Sexual Activity    Alcohol use: Not Currently     Alcohol/week: 1.0 standard drink of alcohol     Types: 1 Glasses of wine per week    Drug use: No    Sexual activity: Not Currently     Partners: Male     Birth control/protection: Male Sterilization         Objective     /88 (BP Location: Left arm, Patient Position: Sitting, Cuff Size: Standard)   Pulse 79   Resp 20   Ht 5' 3\" (1.6 m)   Wt 102 kg (225 lb)   SpO2 97%   BMI 39.86 kg/m²   There is weakness of the right upper extremity 4/5 no atrophy no fasciculations left upper extremity 5/5  Physical Exam  Vitals and nursing note reviewed.   Constitutional:       General: She is not in acute distress.     Appearance: Normal appearance. She is well-developed. She is obese. She is not ill-appearing, toxic-appearing or diaphoretic.   HENT:      Head: Normocephalic and atraumatic.      Right Ear: External ear normal.      Left Ear: External ear normal.      Nose: Nose normal.      Mouth/Throat:      Mouth: Oropharynx is clear and moist.   Eyes:      Pupils: Pupils are equal, round, and reactive to light.   Cardiovascular:      Rate and Rhythm: Normal rate and regular rhythm.      Heart sounds: Normal heart sounds. No murmur heard.  Pulmonary:      Effort: Pulmonary effort is normal.      Breath sounds: Normal breath sounds.   Abdominal:      General: There is no distension.      Palpations: Abdomen is soft.      Tenderness: There is no abdominal tenderness. There is no guarding.   Musculoskeletal:         General: No edema.   Neurological:      Mental Status: She is alert.   Psychiatric:         Mood and Affect: Mood and affect normal.         "

## 2024-10-09 ENCOUNTER — CONSULT (OUTPATIENT)
Dept: CARDIOLOGY CLINIC | Facility: CLINIC | Age: 64
End: 2024-10-09
Payer: COMMERCIAL

## 2024-10-09 ENCOUNTER — TELEPHONE (OUTPATIENT)
Age: 64
End: 2024-10-09

## 2024-10-09 VITALS
BODY MASS INDEX: 39.85 KG/M2 | WEIGHT: 224.9 LBS | DIASTOLIC BLOOD PRESSURE: 64 MMHG | HEIGHT: 63 IN | OXYGEN SATURATION: 97 % | SYSTOLIC BLOOD PRESSURE: 136 MMHG | HEART RATE: 81 BPM

## 2024-10-09 DIAGNOSIS — E78.2 MIXED HYPERLIPIDEMIA: ICD-10-CM

## 2024-10-09 DIAGNOSIS — I11.9 HYPERTENSIVE HEART DISEASE WITHOUT HEART FAILURE: ICD-10-CM

## 2024-10-09 DIAGNOSIS — I10 ESSENTIAL HYPERTENSION: Primary | ICD-10-CM

## 2024-10-09 DIAGNOSIS — E11.29 TYPE 2 DIABETES MELLITUS WITH MICROALBUMINURIA, WITHOUT LONG-TERM CURRENT USE OF INSULIN (HCC): ICD-10-CM

## 2024-10-09 DIAGNOSIS — R01.1 SYSTOLIC EJECTION MURMUR: ICD-10-CM

## 2024-10-09 DIAGNOSIS — R80.9 TYPE 2 DIABETES MELLITUS WITH MICROALBUMINURIA, WITHOUT LONG-TERM CURRENT USE OF INSULIN (HCC): ICD-10-CM

## 2024-10-09 DIAGNOSIS — I10 BENIGN ESSENTIAL HYPERTENSION: ICD-10-CM

## 2024-10-09 PROCEDURE — 93000 ELECTROCARDIOGRAM COMPLETE: CPT | Performed by: INTERNAL MEDICINE

## 2024-10-09 PROCEDURE — 99244 OFF/OP CNSLTJ NEW/EST MOD 40: CPT | Performed by: INTERNAL MEDICINE

## 2024-10-09 RX ORDER — LISINOPRIL 10 MG/1
10 TABLET ORAL 2 TIMES DAILY
Qty: 180 TABLET | Refills: 3 | Status: SHIPPED | OUTPATIENT
Start: 2024-10-09

## 2024-10-09 NOTE — TELEPHONE ENCOUNTER
Incoming call for appointment:    Patient not available this month. States the earliest she could attend is November.   Scheduled per patient request.

## 2024-10-09 NOTE — PATIENT INSTRUCTIONS
"Patient Education     Low-sodium diet   The Basics   Written by the doctors and editors at [facility]   What is sodium? -- This is the main ingredient in table salt. It is also found in lots of foods. The body needs a very small amount of sodium to work normally, but most people eat much more sodium than their body needs.  Who should eat less sodium? -- Nearly everyone eats too much sodium. The average American takes in 3400 milligrams of sodium each day. Experts say that most people should have no more than 2300 milligrams a day.  Some people with certain health conditions should follow a low-sodium diet. Ask your doctor how much sodium you should have.  Why should I eat less sodium? -- Reducing the amount of sodium you eat can have lots of health benefits:   It can lower your blood pressure. This means that it can help lower your risk of stroke, heart attack, kidney damage, and lots of other health problems.   It can reduce the amount of fluid in your body, which means that your heart doesn't have to work as hard.   It can keep the kidneys from having to work too hard. This is especially important in people who have kidney disease.   It can reduce swelling in the ankles and belly, which can be uncomfortable and make it hard to move.   It can lower the chances of forming kidney stones.   It can help keep your bones strong.  Which foods have the most sodium? -- Processed foods have the most sodium. These foods usually come in cans, boxes, jars, and bags. They tend to have a lot of sodium even if they don't taste salty. In fact, many sweet foods have a lot of sodium in them. The only way to know for sure how much sodium is in a food is to check the label (figure 1).  Here are some examples of foods that often have too much sodium:   Canned soups   Rice and noodle mixes   Sauces, dressings, and condiments (such as ketchup and mustard)   Pre-made frozen meals (also called \"TV dinners\")   Deli meats, hot dogs, and " "cheeses   Smoked, cured, or pickled foods   Salted snack foods and nuts   Restaurant meals  What should I do to reduce the amount of sodium in my diet? -- Many people think that eating a low-sodium diet just means not adding salt to their food. But this is not true. Not adding salt at the table or when cooking will help a little. But almost all of the sodium you eat is already in the food you buy at the grocery store or at restaurants (figure 2).  Here are some tips to help you eat less sodium:   Avoid processed foods when possible. This is the most important thing you can do to eat less sodium. Processed foods include most foods that are sold in cans, boxes, jars, and bags.   Instead of buying pre-made, processed foods, buy fresh or fresh-frozen fruits and vegetables. (\"Fresh-frozen\" means that the food is frozen without anything added to it.)   Buy meats, fish, chicken, and turkey that are fresh instead of canned or sold at the deli counter. (Meats sold at the deli counter are high in sodium.)   Try to eat at restaurants less often.   When possible, try to make meals from scratch at home using fresh ingredients.   If you do buy canned or packaged foods, choose ones that are labeled \"sodium free\" or \"very low sodium\" (table 1). Or choose foods that have less than 400 milligrams of sodium in each serving. The amount of sodium in each serving appears on the nutrition label (figure 1).  The table has some examples of foods to avoid and foods to choose instead (table 2).  Whatever changes you make, make them slowly. Choose 1 thing to do differently, and do that for a while. If you can keep doing that change easily, add another change. For instance, if you usually eat green beans from a can, try buying fresh or fresh-frozen green beans and cooking them at home without adding salt. If that works for you, keep doing it. Then, choose another thing to change.  If you try making a change and it doesn't work right away, don't " "give up. See if you can reduce sodium in other ways. The important thing is to take small steps and to keep doing the changes that work for you.  Can I still eat out at restaurants sometimes? -- One of best ways to limit your sodium is to only eat out at restaurants every once in a while. When you do eat out, try to choose places that offer healthier choices and fresh ingredients.  No matter where you eat, when choosing your food:   Ask your  if your meal can be made without salt.   Avoid foods that come with sauces or dips.   Choose plain grilled meats or fish and steamed vegetables.   Ask for oil and vinegar for your salad, rather than dressing.   If a meal you really want has more sodium than you should have, consider saving half of it to eat another day.  What if food just does not taste as good without sodium? -- Starting a low-sodium diet can be hard. The good news is that your taste buds can get used to having less sodium. But you have to give them some time to adjust.  It can also help to try other ways to add flavor to your foods. Try things like herbs, spices, lemon juice, and vinegar.  What about salt substitutes? -- Flavoring your food with a salt substitute is a good way to reduce how much salt you eat. But check with your doctor or nurse before trying this. Some salt substitutes can be dangerous if you have certain health problems or take certain medicines.  Do medicines have sodium? -- Yes, some medicines contain sodium. If you are buying medicines you can get without a prescription, look to see how much sodium they have. Avoid products that have \"sodium carbonate\" or \"sodium bicarbonate\" unless your doctor prescribes them. (Sodium bicarbonate is baking soda.)  All topics are updated as new evidence becomes available and our peer review process is complete.  This topic retrieved from [employer] on: Mar 22, 2024.  Topic 76620 Version 14.0  Release: 32.2.4 - C32.80  © 2024 [employer] and/or its " "affiliates. All rights reserved.  figure 1: Food labels can be tricky     To figure out how much sodium you are eating, check the label to find out how much sodium is in 1 serving. If you are having more than 1 serving, multiply that amount by the number of servings you plan to eat. For instance, if you are going to eat this whole can of soup, you should multiply 850 by 2. That means that you will be having 1700 milligrams of sodium. That's more sodium than many people are supposed to have in 1 day.  Graphic 04533 Version 8.0  figure 2: Sources of sodium in your diet     Graphic 07842 Version 2.0  table 1: A guide to common nutrient claims and what they mean  Salt/sodium-free  Less than 5 mg of sodium per serving   Very low sodium  35 mg or less of sodium per serving   Low sodium  140 mg or less of sodium per serving   Reduced sodium  At least 25% less sodium than the regular product   Light or lite in sodium  At least 50% less sodium than the regular product   No salt added or unsalted  No salt is added during processing, but these products may not be salt/sodium-free unless stated   mg: milligram; %: percent.  Graphic 52418 Version 7.0  table 2: Ways to cut down on salt (sodium)  Avoid these foods  Try these foods instead    Cured and smoked foods like ashley, sausage, smoked fish and meats, hot dogs, ham, lunch meats, corned beef, and pickles Fresh turkey, chicken, and lean beef   Canned fish (tuna, sardines) Unsalted tuna or sardines   Canned meats Fresh unprocessed meats, vegetable protein, and fish  Frozen and canned meats, vegetable protein, and fish that are labeled \"low-sodium\"   Salted pretzels, crackers, potato chips, tortilla chips, and nuts Low-sodium and unsalted versions of these foods   Most cheeses Low-sodium cheeses (check label for actual sodium content)   Sauces (tomato and cream, etc), tomato juices Low-sodium versions of these foods, such as low-sodium tomato juice   Processed, instant, and " "convenience foods like frozen dinners, packaged meals, canned soups, and boxed pasta blends Cook and freeze your own low-sodium meals, soups, and broths    If you do need to use convenience or processed foods, read the labels. Choose items with 140 to 200 mg of sodium per serving.    For an entire convenience meal (frozen dinner), try to find options with less than 500 to 600 mg of sodium.    If you used canned foods, look for those labeled \"sodium-free.\" Or you can rinse the canned food under water to lower the sodium content.   Fast foods and foods prepared at restaurants (unless without cheese, sauces, or added salt) Fresh foods and foods with sauces on the side  Request that food be prepared without cheese or added salt   Graphic 72590 Version 10.0  Consumer Information Use and Disclaimer   Disclaimer: This generalized information is a limited summary of diagnosis, treatment, and/or medication information. It is not meant to be comprehensive and should be used as a tool to help the user understand and/or assess potential diagnostic and treatment options. It does NOT include all information about conditions, treatments, medications, side effects, or risks that may apply to a specific patient. It is not intended to be medical advice or a substitute for the medical advice, diagnosis, or treatment of a health care provider based on the health care provider's examination and assessment of a patient's specific and unique circumstances. Patients must speak with a health care provider for complete information about their health, medical questions, and treatment options, including any risks or benefits regarding use of medications. This information does not endorse any treatments or medications as safe, effective, or approved for treating a specific patient. [employer] and its affiliates disclaim any warranty or liability relating to this information or the use thereof.The use of this information is governed by the " Terms of Use, available at https://www.woltersLivestageuwer.com/en/know/clinical-effectiveness-terms. 2024© [employer] and its affiliates and/or licensors. All rights reserved.  Copyright   © 2024 [employer] and/or its affiliates. All rights reserved.

## 2024-10-09 NOTE — PROGRESS NOTES
Cardiology Consultation     Jessica Zhou  [id number]  1960  HEART & VASCULAR [facility] [place]  [address] [facility] [place] [address]    1. Essential hypertension  lisinopril (ZESTRIL) 10 mg tablet      2. Hypertensive heart disease without heart failure  Ambulatory Referral to Cardiology    POCT ECG    Echo complete w/ contrast if indicated      3. Type 2 diabetes mellitus with microalbuminuria, without long-term current use of insulin (HCC)        4. Mixed hyperlipidemia        5. Benign essential hypertension  Echo complete w/ contrast if indicated      6. Systolic ejection murmur  Echo complete w/ contrast if indicated          Discussion/Summary:    Hypertension and hypertensive heart disease - Jessica had evidence of grade 2 diastolic dysfunction with left atrial enlargement back in 2017.  We ordered an updated echocardiogram today.  She shows no signs of CHF.  I am going to increase her lisinopril to 10 mg daily for more ideal control of her blood pressure especially given her diabetes.    Mixed hyperlipidemia - She is on high intensity statin and has her blood work followed closely by her PCP.  Her last LDL was 43.  She does have elevated triglycerides, but not at the level I would treat.    Systolic ejection murmur - This is at the level of the aortic valve.  We ordered an updated echocardiogram.  If this does not show any significant changes we will see her back in 1 year.    Type 2 diabetes mellitus - She has this followed closely by her PCP.  Given her hypertensive heart disease and risk of CHF I would consider adding Jardiance and substituting out Actos.    HPI:    Mrs Zhou comes in for a consultation and to reestablish care with me given her risk factors for cardiovascular disease, history of hypertensive heart disease and recently her PCP detecting a louder murmur.  I met Jessica about 10 years ago in consultation for  exertional shortness of breath.  Given her risk factors she had undergone a exercise nuclear stress test that was unremarkable, but but this was not adequately perform stress test as she did not develop an adequate heart rate response. At that time I had her undergo a pharmacologic nuclear stress test and an echocardiogram. Both of these were unremarkable. She continued to have no perfusion defects.    Then in 2017 she came back to see me after she had an echocardiogram performed because one of her physicians heard a murmur. This demonstrated normal LV systolic function, grade 2 diastolic dysfunction and mild left atrial enlargement, but no valvular heart disease.  At that time she showed no signs of CHF and therefore we continued to treat her risk factors.  Her risk factors include hypertension, hyperlipidemia and type 2 diabetes mellitus.  She also has obstructive sleep apnea but does not wear CPAP by her choice.    Jessica is referred back to us by her PCP due to to both this murmur, which does appear louder than in the past and suggest reestablish care with us given her abnormal echocardiogram in 2017.  Fortunately Jessica continues to feel well from a cardiovascular standpoint.  She denies chest pain or any symptoms of angina.  No shortness of breath and she shows no signs of CHF or volume overload.  She denies palpitations, lightheadedness or syncope.  She has been battling with arthritis and particularly issues with her neck.      Patient Active Problem List   Diagnosis    Anomalies of nails    Asthma    Essential hypertension    Bipolar I disorder, most recent episode depressed, in remission (HCC)    Cardiac murmur    Cervical disc disease    Degeneration of thoracic intervertebral disc    Degenerative arthritis of knee, bilateral    Type 2 diabetes mellitus with microalbuminuria, without long-term current use of insulin (HCC)    Diverticulosis    Dyslipidemia    Mixed hyperlipidemia    Fatty liver     Gastroesophageal reflux disease    Granuloma annulare    Hypertensive heart disease    Iron deficiency anemia, unspecified    Left thyroid nodule    Leg length discrepancy    Multiple thyroid nodules    Neuropathy    Class 1 obesity due to excess calories with serious comorbidity and body mass index (BMI) of 32.0 to 32.9 in adult    Obstructive sleep apnea    Other acquired deformity of knee    Postmenopausal atrophic vaginitis    Prepatellar bursitis    Vertigo    Vitamin D deficiency    Anomalous atrioventricular excitation    LOURDES (generalized anxiety disorder)    Duodenitis    Epigastric pain    Change in bowel habit    Constipation    BRBPR (bright red blood per rectum)    Wellness examination    Benign essential hypertension    History of colon polyps    Long-term use of high-risk medication    Hip pain    Right shoulder pain    Cervical radiculopathy    Left carpal tunnel syndrome    Closed fracture of transverse process of cervical vertebra (HCC)    Spinal stenosis    Obesity (BMI 30.0-34.9)    S/P cervical spinal fusion    Acute pain of right knee    Nocturnal leg cramps    Preop exam for internal medicine    Low iron    Low magnesium level    Family history of colonic polyps    Kidney stone    Carpal tunnel syndrome on right    COVID-19    Osgood-Schlatter's disease of both knees    Lateral epicondylitis, unspecified laterality    Arm weakness    Discogenic thoracic pain    Pleurisy    Pulmonary nodule     Past Medical History:   Diagnosis Date    Abdominal pain     started 7/21/16- found something on the pancreas    Abnormal weight loss     last assessed [id number]    Acute sinusitis     Allergic rhinitis     last assessed [id number]    Anemia     had iron infusion    Asthma     Cardiac murmur     Carpal tunnel syndrome     Cataracts, bilateral     Cellulitis     Cervical disc disease     Cervical myelopathy (HCC) 09/07/2022    Colon polyp     CPAP (continuous positive airway pressure) dependence     not  using at this time    Degeneration of thoracic intervertebral disc     Degenerative arthritis of knee     Diabetes mellitus ([facility])     Diverticulosis     Esophagitis, reflux     last assessed 29Jan2014    Excessive sweating     Fatty liver     Frequent falls     Gait disturbance     GERD (gastroesophageal reflux disease)     Granuloma annulare     Head injury     Hyperlipidemia     Hypertension     Hypertensive heart disease     Hypoglycemia     last assessed 08Feb2016    Kidney stone     Leg length discrepancy     Leucocytosis     Lumbar stress fracture     Multiple thyroid nodules     Neuropathy     Obesity     last assessed 25Dec2016    Paronychia of toe     Patellofemoral dysfunction     Pneumonia     Postmenopausal atrophic vaginitis     Prepatellar bursitis     Sebaceous cyst     last assessed 21Feb2014    Sleep apnea     no CPAP used    Suicide attempt ([facility])     Tinea corporis     Type 2 diabetes mellitus ([facility])     cont meds and monitor BS;  last assessed 08Nov2016    Uncontrolled type 2 diabetes mellitus with hyperglycemia, with long-term current use of insulin ([facility]) 08/05/2019    Urinary frequency     Vertigo     Vitamin D deficiency     Dia-Parkinson-White syndrome      Social History     Socioeconomic History    Marital status: /Civil Union     Spouse name: Not on file    Number of children: 0    Years of education: 12    Highest education level: 12th grade   Occupational History    Occupation: retired   Tobacco Use    Smoking status: Never     Passive exposure: Never    Smokeless tobacco: Never   Vaping Use    Vaping status: Never Used   Substance and Sexual Activity    Alcohol use: Not Currently     Alcohol/week: 1.0 standard drink of alcohol     Types: 1 Glasses of wine per week    Drug use: No    Sexual activity: Not Currently     Partners: Male     Birth control/protection: Male Sterilization   Other Topics Concern    Not on file   Social History Narrative    Education: high school graduate     Learning Disabilities: none    Marital History:     Children: none    Living Arrangement: lives in home with     Occupational History: retired, 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[employer]    Functioning Relationships: good support system,  is supportive    Legal History: none     History: None        Caffeine use    Denied home environment domestic violence    Job physical requirements heavy lifting     Social Determinants of Health     Financial Resource Strain: Low Risk  (5/30/2024)    Overall Financial Resource Strain (CARDIA)     Difficulty of Paying Living Expenses: Not hard at all   Food Insecurity: No Food Insecurity (5/30/2024)    Hunger Vital Sign     Worried About Running Out of Food in the Last Year: Never true     Ran Out of Food in the Last Year: Never true   Transportation Needs: No Transportation Needs (5/30/2024)    PRAPARE - Transportation     Lack of Transportation (Medical): No     Lack of Transportation (Non-Medical): No   Physical Activity: Inactive (5/30/2024)    Exercise Vital Sign     Days of Exercise per Week: 0 days     Minutes of Exercise per Session: 0 min   Stress: No Stress Concern Present (5/30/2024)    [place] [employer] - Occupational Stress Questionnaire     Feeling of Stress : Only a little   Social Connections: Moderately Isolated (5/30/2024)    Social Connection and Isolation Panel [NHANES]     Frequency of Communication with Friends and Family: Once a week     Frequency of Social Gatherings with Friends and Family: Once a week     Attends Baptism Services: Never     Active Member of Clubs or Organizations: Yes     Attends Club or Organization Meetings: More than 4 times per year     Marital Status:    Intimate Partner Violence: Not At Risk (5/30/2024)    Humiliation, Afraid, Rape, and Kick questionnaire     Fear of Current or Ex-Partner: No     Emotionally Abused: No     Physically Abused: No      Sexually Abused: No   Housing Stability: Low Risk  (5/30/2024)    Housing Stability Vital Sign     Unable to Pay for Housing in the Last Year: No     Number of Times Moved in the Last Year: 0     Homeless in the Last Year: No      Family History   Problem Relation Age of Onset    Cancer Mother         lung    Heart disease Mother     Coronary artery disease Mother         CABG    Diabetes Mother     Lung cancer Mother 68        smoker    Stroke Mother     Thyroid disease Mother     Other Mother         tobacco use    COPD Mother     Parkinsonism Father     Intestinal polyp Father     Bipolar disorder Father     Heart disease Father     Prostate cancer Father 58    Hypertension Father     Cancer Father     Hypertension Sister     Alcohol abuse Sister     Thyroid disease unspecified Sister     Arthritis Sister     Diabetes Brother     Depression Brother     Heart disease Brother     Hypertension Brother     Arthritis Brother     Heart disease Brother     Seizures Brother     Diabetes Maternal Grandmother     Thyroid disease Maternal Grandmother     Endometrial cancer Maternal Aunt         unknown age    Suicide Attempts Paternal Aunt     Depression Paternal Aunt     Ovarian cancer Cousin 40        maternal    Hypertension Family      Past Surgical History:   Procedure Laterality Date    CATARACT EXTRACTION Left 07/2024    CERVICAL FUSION N/A 08/03/2021    Procedure: FUSION CERVICAL ANTERIOR W DISCECTOMY C4-5, C5-6, C6-7 with allograft and neuromonitoring  case length 3 hours;  Surgeon: Kodak Wright MD;  Location:  MAIN OR;  Service: Orthopedics    COLONOSCOPY      ESOPHAGOGASTRODUODENOSCOPY N/A 08/08/2016    Procedure: ESOPHAGOGASTRODUODENOSCOPY (EGD);  Surgeon: Trey Bernstein MD;  Location: [facility];  Service:     LUMBAR FUSION      [place] ESOPHAGOGASTRODUODENOSCOPY TRANSORAL DIAGNOSTIC N/A 03/28/2019    Procedure: ESOPHAGOGASTRODUODENOSCOPY (EGD);  Surgeon: Trey Bernstein MD;  Location: [facility];   Service: Gastroenterology    OR NEUROPLASTY &/TRANSPOS MEDIAN NRV JACI LANDON Right 08/09/2022    Procedure: RELEASE CARPAL TUNNEL;  Surgeon: Richie Borden MD;  [facility]: [facility] MA[facility] OR;  Service: Orthopedics    SPINE SURGERY      TONSILLECTOMY      TOOTH EXTRACTION      wisdom teeth removed       Current Outpatient Medications:     acetaminophen (TYLENOL) 500 mg tablet, Take 500 mg by mouth 2 (two) times a day, Disp: , Rfl:     ARIPiprazole (ABILIFY) 10 mg tablet, Take 1 tablet (10 mg total) by mouth every evening, Disp: 90 tablet, Rfl: 2    atorvastatin (LIPITOR) 80 mg tablet, TAKE 1 TABLET BY MOUTH EVERY DAY, Disp: 90 tablet, Rfl: 1    Cholecalciferol 4000 units CAPS, Take 1 capsule (4,000 Units total) by mouth daily, Disp: , Rfl:     Continuous Glucose Sensor (FreeStyle Linda 3 Sensor) MISC, Use as directed to check glucose., Disp: 2 each, Rfl: 3    FLUoxetine (PROzac) 40 MG capsule, Take 1 capsule (40 mg total) by mouth 2 (two) times a day, Disp: 180 capsule, Rfl: 2    glipiZIDE (GLUCOTROL) 5 mg tablet, TAKE BY MOUTH 1 TABLET WITH LUNCH AND TWO TABLETS WITH SUPPER FOR DIABETES - FILL WHEN PATIENT REQUESTS, Disp: 270 tablet, Rfl: 1    ibuprofen (MOTRIN) 200 mg tablet, Take 400 mg by mouth Every other day as needed when pain not alleviated by acetaminophen, Disp: , Rfl:     Lancets (onetouch ultrasoft) lancets, Test blood sugar twice daily, Disp: 100 each, Rfl: 3    lisinopril (ZESTRIL) 10 mg tablet, Take 1 tablet (10 mg total) by mouth 2 (two) times a day, Disp: 180 tablet, Rfl: 3    magnesium Oxide (MAG-OX) 400 mg TABS, TAKE 1 TABLET BY MOUTH 2 TIMES A DAY., Disp: 180 tablet, Rfl: 1    metFORMIN (GLUCOPHAGE-XR) 500 mg 24 hr tablet, Take 1 tablet (500 mg total) by mouth 2 (two) times a day with meals, Disp: , Rfl:     Multiple Vitamins-Minerals (multivitamin with minerals) tablet, Take 1 tablet by mouth daily, Disp: , Rfl:     omeprazole (PriLOSEC) 40 MG capsule, TAKE 1 CAPSULE BY MOUTH EVERY DAY, Disp:  "90 capsule, Rfl: 1    OneTouch Ultra test strip, TEST BLOOD SUGAR TWICE DAILY, Disp: 200 strip, Rfl: 1    pioglitazone (ACTOS) 30 mg tablet, TAKE 1 TABLET (30 MG TOTAL) BY MOUTH DAILY FOR DIABETES, Disp: 90 tablet, Rfl: 1  Allergies   Allergen Reactions    Bee Venom Swelling     Local swelling at sting site    Biaxin [Clarithromycin] Itching    Depakote Er [Divalproex Sodium Er]      pancreatitis    Shellfish-Derived Products - Food Allergy Other (See Comments)     Ears and cheeks got red    Tanzeum [Albiglutide]      pancreatitis    Carbamazepine Other (See Comments)     Reaction Date:Unknown    Lamotrigine Rash     Vitals:    10/09/24 0843   BP: 136/64   BP Location: Right arm   Patient Position: Sitting   Cuff Size: Large   Pulse: 81   SpO2: 97%   Weight: 102 kg (224 lb 14.4 oz)   Height: 5' 3\" (1.6 m)       Labs:  Lab Results   Component Value Date     09/06/2015    K 5.7 (H) 07/16/2024     07/16/2024    CO2 30 07/16/2024    BUN 16 07/16/2024    CREATININE 0.65 07/16/2024    CREATININE 0.71 04/12/2022    CREATININE 0.71 09/06/2015    GLUCOSE 120 09/06/2015    CALCIUM 9.4 07/16/2024     Lab Results   Component Value Date    WBC 8.5 11/17/2021    WBC 17.95 (H) 08/04/2021    WBC 11.90 (H) 11/18/2015    HGB 11.0 (L) 11/17/2021    HGB 10.2 (L) 08/04/2021    HGB 12.2 11/18/2015    HCT 35.1 11/17/2021    HCT 32.9 (L) 08/04/2021    HCT 40.9 11/18/2015    MCV 83.0 11/17/2021    MCV 86 08/04/2021    MCV 87 11/18/2015     11/17/2021     08/04/2021     (H) 11/18/2015     Lab Results   Component Value Date    CHOL 144 06/06/2015    TRIG 269 (H) 07/16/2024    HDL 46 (L) 07/16/2024    LDLDIRECT 79 12/04/2014     Imaging:  ECG today shows normal sinus rhythm with possible left atrial lodgment, otherwise normal ECG.    CT chest wo contrast  Result Date: 10/4/2024  Narrative: CT CHEST WITHOUT IV CONTRAST INDICATION: R09.1: Pleurisy. COMPARISON: 1/14/2014 TECHNIQUE: CT examination of the chest was " performed without intravenous contrast. Lack of IV contrast limits the sensitivity for pathology. Multiplanar 2D reformatted images were created from the source data. This examination, like all CT scans performed in the [facility], was performed utilizing techniques to minimize radiation dose exposure, including the use of iterative reconstruction and automated exposure control. Radiation dose length product (DLP) for this visit: 611 mGy-cm FINDINGS: LUNGS: There is a 2 mm subpleural pulmonary nodule in the right upper lobe (5/42). There is a 9 mm pulmonary nodule in the left lower lobe (5/65). This appears new when compared to prior. No tracheal or endobronchial lesion. PLEURA: Unremarkable. HEART/GREAT VESSELS: Mild coronary artery calcification. No thoracic aortic aneurysm. MEDIASTINUM AND RIK: Unremarkable. CHEST WALL AND LOWER NECK: Unremarkable. VISUALIZED STRUCTURES IN THE UPPER ABDOMEN: Unremarkable. OSSEOUS STRUCTURES: Spinal degenerative changes are noted. No acute fracture or destructive osseous lesion. Cervical spinal fusion hardware.     Impression: There is a 9 mm left lower lobe pulmonary nodule. Would recommend a PET/CT for further evaluation purposes. The study was marked in EPIC for significant notification. Workstation performed: [id number]   hest without contrast for further evaluation. Workstation performed: [id number]       Review of Systems:  Review of Systems   Constitutional: Negative.    HENT: Negative.     Eyes: Negative.    Respiratory: Negative.     Cardiovascular: Negative.    Gastrointestinal: Negative.    Musculoskeletal:  Positive for arthralgias and neck pain.   Skin: Negative.    Allergic/Immunologic: Negative.    Neurological: Negative.    Hematological: Negative.    Psychiatric/Behavioral: Negative.     All other systems reviewed and are negative.    Vitals:    10/09/24 0843   BP: 136/64   BP Location: Right arm   Patient Position: Sitting   Cuff Size: Large  "  Pulse: 81   SpO2: 97%   Weight: 102 kg (224 lb 14.4 oz)   Height: 5' 3\" (1.6 m)       Physical Exam  Constitutional:       Appearance: She is well-developed.   HENT:      Head: Normocephalic and atraumatic.   Eyes:      General: No scleral icterus.        Right eye: No discharge.         Left eye: No discharge.      Extraocular Movements: EOM normal.      Pupils: Pupils are equal, round, and reactive to light.   Neck:      Thyroid: No thyromegaly.      Vascular: No JVD.   Cardiovascular:      Rate and Rhythm: Normal rate and regular rhythm. No extrasystoles are present.     Pulses: Normal pulses. No decreased pulses.      Heart sounds: S1 normal and S2 normal. Murmur heard.      Systolic murmur is present with a grade of 3/6.      No friction rub. No gallop.   Pulmonary:      Effort: Pulmonary effort is normal. No respiratory distress.      Breath sounds: Normal breath sounds. No wheezing, rhonchi or rales.   Abdominal:      General: Bowel sounds are normal. There is no distension.      Palpations: Abdomen is soft.      Tenderness: There is no abdominal tenderness.   Musculoskeletal:         General: No tenderness, deformity or edema. Normal range of motion.      Cervical back: Normal range of motion and neck supple.      Right lower leg: No edema.      Left lower leg: No edema.   Skin:     General: Skin is warm and dry.      Findings: No rash.   Neurological:      Mental Status: She is alert and oriented to person, place, and time.      Cranial Nerves: No cranial nerve deficit.   Psychiatric:         Mood and Affect: Mood and affect normal.         Thought Content: Thought content normal.         Judgment: Judgment normal.       Counseling / Coordination of Care  Total office time spent today 40 minutes.  Greater than 50% of total time was spent with the patient and / or family counseling and / or coordination of care.    "

## 2024-10-14 ENCOUNTER — OFFICE VISIT (OUTPATIENT)
Age: 64
End: 2024-10-14
Payer: COMMERCIAL

## 2024-10-14 VITALS
SYSTOLIC BLOOD PRESSURE: 130 MMHG | DIASTOLIC BLOOD PRESSURE: 70 MMHG | HEIGHT: 63 IN | BODY MASS INDEX: 39.69 KG/M2 | WEIGHT: 224 LBS

## 2024-10-14 DIAGNOSIS — M54.9 UPPER BACK PAIN: ICD-10-CM

## 2024-10-14 DIAGNOSIS — Z98.1 S/P CERVICAL SPINAL FUSION: ICD-10-CM

## 2024-10-14 DIAGNOSIS — M54.12 RADICULOPATHY, CERVICAL REGION: Primary | ICD-10-CM

## 2024-10-14 PROCEDURE — 99214 OFFICE O/P EST MOD 30 MIN: CPT | Performed by: ORTHOPAEDIC SURGERY

## 2024-10-14 NOTE — PROGRESS NOTES
[facility] ORTHOPEDIC SPINE SURGERY  DR.AMIR REBEL MD  [address] [facility] [address]  [phone]    HISTORY OF PRESENT ILLNESS:    Jessica Zhou is a 64 y.o. female who presents for follow-up  of cervical spine with cervical myelopathy status post anterior cervical fusion with diskectomy of C4-5, C5-6, C6-7 performed by Dr. Wright on 8/3/2021. Patient reports to the office for myelopathy exam. The patient has stiffness first thing in the morning. She continues to perform her home exercise plan. The patient using Tylenol as needed for symptom management. The patient denies any clumsiness with her hands or feet along with dropping items. She does have right arm numbness when she lays down at nighttime. She reports the past few nights she has performed right arm range of motion which has improved her symptoms at bed time.      Interval history 10/14/2024:  The patient presents for follow up of cervical and thoracic spine.  She has had increase of symptoms over the past 4-6 weeks with no injury.  Today she complains of stabbing central thoracic pain with right arm pain to elbow and tingling and numbness to thumb.  She rates her pain at 4/10 sitting and 8/10 at its worse.  Certain arm movements aggravate while resting arm behind her alleviates.  She does use Tylenol for pain.  She denies cervical injections since her surgery.  Again, she is s/p anterior cervical fusion with diskectomy of C4-5, C5-6, C6-7 performed by Dr. Wright on 8/3/2021.  She is scheduled for cervical MRI 10/19/2024.        ALLERGIES:   Allergies   Allergen Reactions    Bee Venom Swelling     Local swelling at sting site    Biaxin [Clarithromycin] Itching    Depakote Er [Divalproex Sodium Er]      pancreatitis    Shellfish-Derived Products - Food Allergy Other (See Comments)     Ears and cheeks got red    Tanzeum [Albiglutide]      pancreatitis    Carbamazepine Other (See Comments)     Reaction Date:Unknown    Lamotrigine Rash        MEDICATIONS:    Current Outpatient Medications:     acetaminophen (TYLENOL) 500 mg tablet, Take 500 mg by mouth 2 (two) times a day, Disp: , Rfl:     ARIPiprazole (ABILIFY) 10 mg tablet, Take 1 tablet (10 mg total) by mouth every evening, Disp: 90 tablet, Rfl: 2    atorvastatin (LIPITOR) 80 mg tablet, TAKE 1 TABLET BY MOUTH EVERY DAY, Disp: 90 tablet, Rfl: 1    Cholecalciferol 4000 units CAPS, Take 1 capsule (4,000 Units total) by mouth daily, Disp: , Rfl:     Continuous Glucose Sensor (FreeStyle Linda 3 Sensor) MISC, Use as directed to check glucose., Disp: 2 each, Rfl: 3    FLUoxetine (PROzac) 40 MG capsule, Take 1 capsule (40 mg total) by mouth 2 (two) times a day, Disp: 180 capsule, Rfl: 2    glipiZIDE (GLUCOTROL) 5 mg tablet, TAKE BY MOUTH 1 TABLET WITH LUNCH AND TWO TABLETS WITH SUPPER FOR DIABETES - FILL WHEN PATIENT REQUESTS, Disp: 270 tablet, Rfl: 1    ibuprofen (MOTRIN) 200 mg tablet, Take 400 mg by mouth Every other day as needed when pain not alleviated by acetaminophen, Disp: , Rfl:     Lancets (onetouch ultrasoft) lancets, Test blood sugar twice daily, Disp: 100 each, Rfl: 3    lisinopril (ZESTRIL) 10 mg tablet, Take 1 tablet (10 mg total) by mouth 2 (two) times a day, Disp: 180 tablet, Rfl: 3    magnesium Oxide (MAG-OX) 400 mg TABS, TAKE 1 TABLET BY MOUTH 2 TIMES A DAY., Disp: 180 tablet, Rfl: 1    metFORMIN (GLUCOPHAGE-XR) 500 mg 24 hr tablet, Take 1 tablet (500 mg total) by mouth 2 (two) times a day with meals, Disp: , Rfl:     Multiple Vitamins-Minerals (multivitamin with minerals) tablet, Take 1 tablet by mouth daily, Disp: , Rfl:     omeprazole (PriLOSEC) 40 MG capsule, TAKE 1 CAPSULE BY MOUTH EVERY DAY, Disp: 90 capsule, Rfl: 1    OneTouch Ultra test strip, TEST BLOOD SUGAR TWICE DAILY, Disp: 200 strip, Rfl: 1    pioglitazone (ACTOS) 30 mg tablet, TAKE 1 TABLET (30 MG TOTAL) BY MOUTH DAILY FOR DIABETES, Disp: 90 tablet, Rfl: 1     PAST MEDICAL HISTORY:   Past Medical History:   Diagnosis  Date    Abdominal pain     started 7/21/16- found something on the pancreas    Abnormal weight loss     last assessed [id number]    Acute sinusitis     Allergic rhinitis     last assessed [id number]    Anemia     had iron infusion    Asthma     Cardiac murmur     Carpal tunnel syndrome     Cataracts, bilateral     Cellulitis     Cervical disc disease     Cervical myelopathy (HCC) 09/07/2022    Colon polyp     CPAP (continuous positive airway pressure) dependence     not using at this time    Degeneration of thoracic intervertebral disc     Degenerative arthritis of knee     Diabetes mellitus ([facility])     Diverticulosis     Esophagitis, reflux     last assessed 29Jan2014    Excessive sweating     Fatty liver     Frequent falls     Gait disturbance     GERD (gastroesophageal reflux disease)     Granuloma annulare     Head injury     Hyperlipidemia     Hypertension     Hypertensive heart disease     Hypoglycemia     last assessed [id number]    Kidney stone     Leg length discrepancy     Leucocytosis     Lumbar stress fracture     Multiple thyroid nodules     Neuropathy     Obesity     last assessed [id number]    Paronychia of toe     Patellofemoral dysfunction     Pneumonia     Postmenopausal atrophic vaginitis     Prepatellar bursitis     Sebaceous cyst     last assessed [id number]    Sleep apnea     no CPAP used    Suicide attempt ([facility])     Tinea corporis     Type 2 diabetes mellitus ([facility])     cont meds and monitor BS;  last assessed 08Nov2016    Uncontrolled type 2 diabetes mellitus with hyperglycemia, with long-term current use of insulin ([facility]) 08/05/2019    Urinary frequency     Vertigo     Vitamin D deficiency     Dia-Parkinson-White syndrome        PAST SURGICAL HISTORY:  Past Surgical History:   Procedure Laterality Date    CATARACT EXTRACTION Left 07/2024    CERVICAL FUSION N/A 08/03/2021    Procedure: FUSION CERVICAL ANTERIOR W DISCECTOMY C4-5, C5-6, C6-7 with allograft and neuromonitoring  case length 3 hours;   Surgeon: Kodak Wright MD;  Location:  MAIN OR;  Service: Orthopedics    COLONOSCOPY      ESOPHAGOGASTRODUODENOSCOPY N/A 08/08/2016    Procedure: ESOPHAGOGASTRODUODENOSCOPY (EGD);  Surgeon: Trey Bernstein MD;  Location: [facility] GI LAB;  Service:     LUMBAR FUSION      WA ESOPHAGOGASTRODUODENOSCOPY TRANSORAL DIAGNOSTIC N/A 03/28/2019    Procedure: ESOPHAGOGASTRODUODENOSCOPY (EGD);  Surgeon: Trey Bernstein MD;  Location: [facility] GI LAB;  Service: Gastroenterology    WA NEUROPLASTY &/TRANSPOS MEDIAN NRV CARPAL TUNNE Right 08/09/2022    Procedure: RELEASE CARPAL TUNNEL;  Surgeon: Richie Borden MD;  [facility]: [facility] MAIN OR;  Service: Orthopedics    SPINE SURGERY      TONSILLECTOMY      TOOTH EXTRACTION      wisdom teeth removed       SOCIAL HISTORY:  Social History     Tobacco Use   Smoking Status Never    Passive exposure: Never   Smokeless Tobacco Never          PHYSICAL EXAM:  64 y.o. female sitting comfortably on exam chair in no apparent distress.   Patient ambulates with normal gait.     Sensation intact to light touch C5 through T1 dermatomes left upper extremity.   Sensation intact to light touch C5 through T1 dermatomes right upper extremity.      Left Motor: 5/5 biceps, 5/5 triceps, 5/5 wrist extension, 5/5 finger flexion, 5/5 finger abduction   Right Motor: 5/5 biceps, 5/5 triceps, 5/5 wrist extension, 5/5 finger flexion, 5/5 finger abduction     Symmetric deep tendon reflexes bilateral upper extremities    2+ biceps and brachioradialis  1+ triceps   Symmetric deep tendon reflexes bilateral lower extremities     2+ patellar tendons   1+ quadriceps   Absent ankles  Rapid alternating motions WNL     Jade's sign negative  Radial release sign positive   Clonus negative        RADIOGRAPHIC STUDIES:  X-ray, C-spine, 10/27/2021:  Status post anterior cervical diskectomy and fusion at C4-5 C5-6 and C6-7.  No hardware complication.  MRI, C-spine, 6/16/2021:   Multilevel cervical degenerative disc disease.   Disc protrusion spinal cord compression involving multiple levels, at C6-7.  Xrays, C-spine, 1/24/2021: Status post anterior cervical diskectomy and fusion at C4-5 C5-6 and C6-7.  No hardware complication.  I compared these films with prior films on 01/24/2022 and so no significant difference between the films.  There was evidence of plate disease at the superior level C3-4.Xrays, cervical spine, 8/08/2022:  Status post anterior cervical diskectomy fusion from C4 through C7.  I compared the films to prior x-rays.  There is evidence of plate disease at C3-4. There is also evidence of pseudoarthrosis at C4-5 and C6-7.  There is no evidence of hardware complication.  Xrays, cervical spine, 2/13/2023:  Status post anterior cervical diskectomy fusion from C4 through C7.  There is evidence of plate disease at C3-4.  There is also evidence of pseudoarthrosis at C4-5 and C6-7.  There is no evidence of hardware complication.  No cnanges compared to prior X-rays.  Xrays, cervical spine, 8/15/2023: As per sensor cervical discectomy and fusion at C4-5, C5-6 and C6-7.  There is evidence of subsidence and plate disease affecting the C3-4 level.  The spine appears to be fused.         ASSESSMENT:  1. Radiculopathy, cervical region  -     Ambulatory referral to Physical Therapy; Future  2. Upper back pain  -     Ambulatory Referral to Orthopedic Surgery  -     Ambulatory referral to Physical Therapy; Future  3. S/P cervical spinal fusion  -     Ambulatory referral to Physical Therapy; Future      PLAN:  64 y.o. female with cervical myelopathy status post anterior cervical fusion with diskectomy of C4-5, C5-6, C6-7 performed by Dr. Wright on 8/3/2021.  She started having symptoms in between the shoulder blades in addition to radiation down the upper extremity.  Symptoms started approximate 4 to 6 weeks ago.  She was seen by a nurse practitioner and was referred for an MRI study.  MRI scan has been scheduled but has not yet been  completed.    On physical examination today, there was no evidence of acute radiculopathy.  Discussed the treatment options.  She is aware that there is a risk of a J segment degeneration given the multilevel cervical fusion and that does exist at C3-4 based on prior x-rays.  The degenerative changes at C3-4 is unlikely to be causing the symptoms today.  I do think it is important to complete the MRI study.  We also discussed the treatment options.  The role of physical therapy and pain management was discussed.  At this time she is not a surgical candidate.  We should start with the most conservative treatment option which in this case is physical therapy.    I will be glad to see the patient if her symptoms have not improved with physical therapy.  Will wait for the MRI study to rule out anatomic shoes.             Scribe Attestation      I,:  Valeriano Ibanez MA am acting as a scribe while in the presence of the attending physician.:       I,:  Boogie Cox MD personally performed the services described in this documentation    as scribed in my presence.:

## 2024-10-16 ENCOUNTER — OFFICE VISIT (OUTPATIENT)
Dept: PSYCHIATRY | Facility: CLINIC | Age: 64
End: 2024-10-16
Payer: COMMERCIAL

## 2024-10-16 VITALS
WEIGHT: 225 LBS | HEIGHT: 63 IN | BODY MASS INDEX: 39.87 KG/M2 | HEART RATE: 86 BPM | SYSTOLIC BLOOD PRESSURE: 137 MMHG | DIASTOLIC BLOOD PRESSURE: 72 MMHG

## 2024-10-16 DIAGNOSIS — F41.1 GAD (GENERALIZED ANXIETY DISORDER): Chronic | ICD-10-CM

## 2024-10-16 DIAGNOSIS — F31.76 BIPOLAR I DISORDER, MOST RECENT EPISODE DEPRESSED, IN REMISSION (HCC): Primary | Chronic | ICD-10-CM

## 2024-10-16 DIAGNOSIS — Z79.899 LONG-TERM USE OF HIGH-RISK MEDICATION: Chronic | ICD-10-CM

## 2024-10-16 PROCEDURE — 99214 OFFICE O/P EST MOD 30 MIN: CPT | Performed by: PSYCHIATRY & NEUROLOGY

## 2024-10-16 PROCEDURE — 90833 PSYTX W PT W E/M 30 MIN: CPT | Performed by: PSYCHIATRY & NEUROLOGY

## 2024-10-16 NOTE — ASSESSMENT & PLAN NOTE
Increased anxiety symptoms due to multiple stressors  Continue Prozac 40 mg twice a day to control depressive symptoms and anxiety

## 2024-10-16 NOTE — BH TREATMENT PLAN
"TREATMENT PLAN (Medication Management Only)        [facility]    Name/Date of Birth/MRN:  Jessica Zhou 64 y.o. 1960 MRN: [id number]  Date of Treatment Plan: October 16, 2024  Diagnosis/Diagnoses:   1. Bipolar I disorder, most recent episode depressed, in remission (HCC)    2. LOURDES (generalized anxiety disorder)    3. Long-term use of high-risk medication      Strengths/Personal Resources for Self-Care: \"I am good listener and I seem to do well under pressure\".  Area/Areas of need (in own words): \"getting over all those appointments\".  1. Long Term Goal:   alleviate anxiety, maintain control of depression, maintain mood stability  Target Date: 4 months - 2/16/2025  Person/Persons responsible for completion of goal: Jessica  2.  Short Term Objective (s) - How will we reach this goal?:   A.  Provider new recommended medication/dosage changes and/or continue medication(s): continue current medications as prescribed (Prozac and Abilify).  B.  N/A.  C.  N/A.  Target Date: 4 months - 2/16/2025  Person/Persons Responsible for Completion of Goal: Jessica   Progress Towards Goals: progressing  Treatment Modality: medication management every 4 months  Review due 180 days from date of this plan: 6 months - 4/16/2025  Expected length of service: maintenance unless revised  My Physician/PA/NP and I have developed this plan together and I agree to work on the goals and objectives. I understand the treatment goals that were developed for my treatment.  Electronic Signatures: on file (unless signed below)    Muriel Bello MD 10/16/24  "

## 2024-10-16 NOTE — ASSESSMENT & PLAN NOTE
Mood is stable  Continue Prozac 40 mg twice a day to control depressive symptoms and anxiety  Continue Abilify 10 mg daily to help with mood

## 2024-10-16 NOTE — BH CRISIS PLAN
Client Name: Jessica Zhou       Client YOB: 1960    Mannie Safety Plan      Creation Date: 1/25/24 Update Date: 10/16/24   Created By: Muriel Bello MD Last Updated By: Muriel Bello MD      Step 1: Warning Signs:   Warning Signs   depression            Step 2: Internal Coping Strategies:   Internal Coping Strategies   exercise            Step 3: People and social settings that provide distraction:   Name Contact Information   Sister Elaine in my cell phone   Friend Andreea in my cell phone    Steven in my cell phone            Step 4: People whom I can ask for help during a crisis:      Name Contact Information    Dr. Bello [phone]      Step 5: Professionals or agencies I can contact during a crisis:      Clinican/Agency Name Phone Emergency Contact    [facility] [phone]       [facility] Emergency Department Emergency Department Phone Emergency Department Address    [facility] [phone]         Crisis Phone Numbers:   Suicide Prevention Lifeline: Call or Text  197 Crisis Text Line: Text HOME to 012-830   Please note: Some [facility] do not have a separate number for Child/Adolescent specific crisis. If your county is not listed under Child/Adolescent, please call the adult number for your county      Adult Crisis Numbers: Child/Adolescent Crisis Numbers   [facility]: [phone] [facility]: [phone]   [facility]: [phone] [facility]: [phone]   [facility]: [phone] [place]: [phone]   [facility]: [phone] [place]: [phone]   [place]: [phone]   [facility]: [phone]   [facility]: [phone]   [facility]: [phone] (daytime) [phone] (after hours, weekends, holidays)      Step 6: Making the environment safer (plan for lethal means safety):   Patient did not identify any lethal methods: Yes      Optional: What is most important to me and worth living for?   Family     Mannie Safety Plan. Sugey Pearl and Isac Franz. Used with permission of the authors.

## 2024-10-19 ENCOUNTER — HOSPITAL ENCOUNTER (OUTPATIENT)
Dept: RADIOLOGY | Facility: HOSPITAL | Age: 64
Discharge: HOME | End: 2024-10-19
Payer: COMMERCIAL

## 2024-10-19 DIAGNOSIS — M54.12 CERVICAL RADICULOPATHY: ICD-10-CM

## 2024-10-19 PROCEDURE — 72141 MRI NECK SPINE W/O DYE: CPT

## 2024-10-22 ENCOUNTER — EVALUATION (OUTPATIENT)
Dept: PHYSICAL THERAPY | Facility: CLINIC | Age: 64
End: 2024-10-22
Payer: COMMERCIAL

## 2024-10-22 DIAGNOSIS — M54.12 RADICULOPATHY, CERVICAL REGION: ICD-10-CM

## 2024-10-22 DIAGNOSIS — Z98.1 S/P CERVICAL SPINAL FUSION: ICD-10-CM

## 2024-10-22 DIAGNOSIS — M54.9 UPPER BACK PAIN: Primary | ICD-10-CM

## 2024-10-22 PROCEDURE — 97112 NEUROMUSCULAR REEDUCATION: CPT | Performed by: PHYSICAL THERAPIST

## 2024-10-22 PROCEDURE — 97535 SELF CARE MNGMENT TRAINING: CPT | Performed by: PHYSICAL THERAPIST

## 2024-10-22 PROCEDURE — 97161 PT EVAL LOW COMPLEX 20 MIN: CPT | Performed by: PHYSICAL THERAPIST

## 2024-10-22 NOTE — PROGRESS NOTES
PT Evaluation     Today's date: 10/22/2024  Patient name: Jessica Zhou  : 1960  MRN: [id number]  Referring provider: Boogie Cox MD  Dx:   Encounter Diagnosis     ICD-10-CM    1. Upper back pain  M54.9 Ambulatory referral to Physical Therapy      2. Radiculopathy, cervical region  M54.12 Ambulatory referral to Physical Therapy      3. S/P cervical spinal fusion  Z98.1 Ambulatory referral to Physical Therapy                     Assessment  Impairments: abnormal or restricted ROM, activity intolerance, impaired physical strength, lacks appropriate home exercise program, pain with function and poor posture     Assessment details: Pt presents with s/s consistent with C7/T1 derangement with a ret DP.  Pt will benefit from PT to address impairments and restore function.    Goals  ST-4 weeks.  1.  Pt will decrease pain > 50%.  2.  Pt will centralize radicular sx.    LT-8 weeks.  1.  Pt will decrease pain > 75%.  2.  Pt will restore PLOF.    Plan    Planned therapy interventions: manual therapy, neuromuscular re-education, postural training, body mechanics training, strengthening, stretching, therapeutic activities, therapeutic exercise, functional ROM exercises, flexibility and home exercise program    Frequency: 2x week  Duration in weeks: 6        Subjective Evaluation    History of Present Illness  Mechanism of injury: Pt is a 64 yof c/o an insidious onset of cervical, thoracic and R UE radicular sx to the first 3 fingers that began 24.    Patient Goals  Patient goals for therapy: decreased edema, decreased pain, increased motion, increased strength, independence with ADLs/IADLs and return to sport/leisure activities    Pain  Current pain ratin  At best pain ratin  At worst pain rating: 10  Quality: dull ache and radiating  Alleviating factors: L SL.  Exacerbated by: sewing, slouching.  Progression: improved      Diagnostic Tests  X-ray: abnormal (10/2/24 stable C4-7 ACDF)  MRI studies:  abnormal (T/S 10/2/24 worse T8/9 mild canal stenosis, T5/6 small disc bulge,  C/s 10/14/24 C7/T1 mild-mod B neuroforaminal narrowing R>L mild spinal canal stenosis.)        Objective     Postural Observations  Seated posture: poor  Standing posture: poor  Correction of posture: has no consistent effect      Neurological Testing     Sensation   Cervical/Thoracic   Left   Intact: light touch    Right   Intact: light touch    Reflexes   Left   Biceps (C5/C6): normal (2+)    Right   Biceps (C5/C6): normal (2+)    Active Range of Motion   Cervical/Thoracic Spine       Cervical    Subcranial retraction:   Restriction level: moderate  Flexion:  Restriction level: minimal  Extension:  with pain Restriction level: moderate  Left rotation:  Restriction level: minimal  Right rotation:  Restriction level: minimal    Thoracic    Flexion:  WFL  Extension:  Restriction level: minimal  Left rotation:  Restriction level: minimal  Right rotation:  with pain Restriction level: minimal  Mechanical Assessment    Cervical    Seated retraction: repeated movements   Pain intensity: better  Pain level: decreased    Thoracic    Seated extension: repeated movements  Pain location: no change    Lumbar      Strength/Myotome Testing     Left Shoulder     Planes of Motion   Abduction: 4     Right Shoulder     Planes of Motion   Abduction: 4     Left Elbow   Flexion: 5  Extension: 5    Right Elbow   Flexion: 5  Extension: 5    Left Wrist/Hand   Wrist extension: 5  Wrist flexion: 5  Thumb extension: 5    Right Wrist/Hand   Wrist extension: 5  Wrist flexion: 5  Thumb extension: 5    Tests     Right Shoulder   Positive ULTT1 and ULTT4.              Precautions: C4-7 ACDF 8/3/21.    Dx: C7/T1 derangement with ret DP.    Manuals 10/22            Laser C7/T1  4000J                                                  Neuro Re-Ed             Posture correction/ ed 5 min 1 min           Posture correction  1x15           Seated c/s ret 3x10 3x10            Seated c/s ret with pt OP 1x10 3x10                                                  Ther Ex             TB Rows  León/ 3x10           B TB ER  Gre/ 3x10                                                                                         Ther Activity                                       Gait Training                                       Modalities

## 2024-10-24 ENCOUNTER — OFFICE VISIT (OUTPATIENT)
Dept: PHYSICAL THERAPY | Facility: CLINIC | Age: 64
End: 2024-10-24
Payer: COMMERCIAL

## 2024-10-24 DIAGNOSIS — M54.12 RADICULOPATHY, CERVICAL REGION: ICD-10-CM

## 2024-10-24 DIAGNOSIS — M54.9 UPPER BACK PAIN: Primary | ICD-10-CM

## 2024-10-24 DIAGNOSIS — Z98.1 S/P CERVICAL SPINAL FUSION: ICD-10-CM

## 2024-10-24 PROCEDURE — 97112 NEUROMUSCULAR REEDUCATION: CPT

## 2024-10-24 PROCEDURE — 97110 THERAPEUTIC EXERCISES: CPT

## 2024-10-24 NOTE — PROGRESS NOTES
Daily Note     Today's date: 10/24/2024  Patient name: Jessica Zhou  : 1960  MRN: [id number]  Referring provider: Boogie Cox MD  Dx:   Encounter Diagnosis     ICD-10-CM    1. Upper back pain  M54.9       2. Radiculopathy, cervical region  M54.12       3. S/P cervical spinal fusion  Z98.1                      Subjective: Pt reports 1/10 cv pain pre tx.       Objective: See treatment diary below      Assessment: Pt demonstrated decreased pain levels following cv retractions. Occasional cues were required for postural correction.       Plan: Continue poc as per PT.      Precautions: C4-7 ACDF 8/3/21.    Dx: C7/T1 derangement with ret DP.    Manuals 10/22 10/24           Laser C7/T1  4000J                                                  Neuro Re-Ed             Posture correction/ ed 5 min 1 min           Posture correction  1x15           Seated c/s ret 3x10 3x10           Seated c/s ret with pt OP 1x10 3x10                                                  Ther Ex             TB Rows  León/ 3x10           B TB ER  Gre/ 3x10                                                                                         Ther Activity                                       Gait Training                                       Modalities

## 2024-10-25 ENCOUNTER — CLINICAL SUPPORT (OUTPATIENT)
Dept: INTERNAL MEDICINE CLINIC | Facility: CLINIC | Age: 64
End: 2024-10-25

## 2024-10-25 DIAGNOSIS — R80.9 TYPE 2 DIABETES MELLITUS WITH MICROALBUMINURIA, WITHOUT LONG-TERM CURRENT USE OF INSULIN (HCC): Primary | ICD-10-CM

## 2024-10-25 DIAGNOSIS — E11.29 TYPE 2 DIABETES MELLITUS WITH MICROALBUMINURIA, WITHOUT LONG-TERM CURRENT USE OF INSULIN (HCC): Primary | ICD-10-CM

## 2024-10-25 NOTE — ASSESSMENT & PLAN NOTE
Lab Results   Component Value Date    HGBA1C 8.0 (H) 07/16/2024   Did not apply Linda due to imaging  Has PET scan upcoming 11/4, will wait to apply until after that  Continue metformin titration to increase to 1000mg BID  Large amount of stress in patient's life contributing to uncontrolled glucose  Offered support, counseling, patient has what she needs at the moment

## 2024-10-28 DIAGNOSIS — M54.12 CERVICAL RADICULOPATHY: Primary | ICD-10-CM

## 2024-10-29 ENCOUNTER — OFFICE VISIT (OUTPATIENT)
Dept: PHYSICAL THERAPY | Facility: CLINIC | Age: 64
End: 2024-10-29
Payer: COMMERCIAL

## 2024-10-29 DIAGNOSIS — E11.9 TYPE 2 DIABETES MELLITUS WITHOUT COMPLICATION, WITHOUT LONG-TERM CURRENT USE OF INSULIN (HCC): ICD-10-CM

## 2024-10-29 DIAGNOSIS — M54.12 RADICULOPATHY, CERVICAL REGION: ICD-10-CM

## 2024-10-29 DIAGNOSIS — E78.5 DYSLIPIDEMIA: ICD-10-CM

## 2024-10-29 DIAGNOSIS — E11.9 CONTROLLED TYPE 2 DIABETES MELLITUS WITHOUT COMPLICATION, WITHOUT LONG-TERM CURRENT USE OF INSULIN (HCC): ICD-10-CM

## 2024-10-29 DIAGNOSIS — Z98.1 S/P CERVICAL SPINAL FUSION: ICD-10-CM

## 2024-10-29 DIAGNOSIS — M54.9 UPPER BACK PAIN: Primary | ICD-10-CM

## 2024-10-29 PROCEDURE — 97112 NEUROMUSCULAR REEDUCATION: CPT | Performed by: PHYSICAL THERAPIST

## 2024-10-29 PROCEDURE — 97140 MANUAL THERAPY 1/> REGIONS: CPT | Performed by: PHYSICAL THERAPIST

## 2024-10-29 PROCEDURE — 97110 THERAPEUTIC EXERCISES: CPT | Performed by: PHYSICAL THERAPIST

## 2024-10-29 NOTE — PROGRESS NOTES
Daily Note     Today's date: 10/29/2024  Patient name: Jessica Zhou  : 1960  MRN: [id number]  Referring provider: Boogie Cox MD  Dx:   Encounter Diagnosis     ICD-10-CM    1. Upper back pain  M54.9       2. Radiculopathy, cervical region  M54.12       3. S/P cervical spinal fusion  Z98.1                      Subjective: Pt reports 1/10 cv pain pre tx.       Objective: See treatment diary below      Assessment: Pt demonstrated decreased pain levels following cv retractions. Occasional cues were required for postural correction.       Plan: Continue poc as per PT.      Precautions: C4-7 ACDF 8/3/21.    Dx: C7/T1 derangement with ret DP.    Manuals 10/22 10/24           Laser C7/T1  4000J                                                  Neuro Re-Ed             Posture correction/ ed 5 min 1 min           Posture correction  1x15           Seated c/s ret 3x10 3x10           Seated c/s ret with pt OP 1x10 3x10                                                  Ther Ex             TB Rows  León/ 3x10           B TB ER  Gre/ 3x10                                                                                         Ther Activity                                       Gait Training                                       Modalities

## 2024-10-29 NOTE — PROGRESS NOTES
Daily Note     Today's date: 10/29/2024  Patient name: Jessica Zhou  : 1960  MRN: [id number]  Referring provider: Boogie Cox MD  Dx:   Encounter Diagnosis     ICD-10-CM    1. Upper back pain  M54.9       2. Radiculopathy, cervical region  M54.12       3. S/P cervical spinal fusion  Z98.1                      Subjective: Pt reports 1/10 cv pain prior to treatment. Pt denies numbness/tingling in the arm today as well.       Objective: See treatment diary below      Assessment: Pt demonstrated decreased pain levels following cv retractions. Pt required cues for scap ret activation during rows that improved quality of motion. Pt would benefit from PT intervention to address current deficits, maximize ADLs/restore PLOF.        Plan: Continue poc as per PT.      Precautions: C4-7 ACDF 8/3/21.    Dx: C7/T1 derangement with ret DP.    Manuals 10/22 10/24 10/29          Laser C7/T1  4000J 4000J                                                 Neuro Re-Ed             Posture correction/ ed 5 min 1 min           Posture correction  1x15 1x15          Seated c/s ret 3x10 3x10 5x10          Seated c/s ret with pt OP 1x10 3x10 3x10                                                 Ther Ex             TB Rows  León/ 3x10 León 3x10          B TB ER  Gre/ 3x10 Gre/ 3x10                                                                                        Ther Activity                                       Gait Training                                       Modalities

## 2024-10-30 RX ORDER — GLIPIZIDE 5 MG/1
TABLET ORAL
Qty: 270 TABLET | Refills: 1 | Status: SHIPPED | OUTPATIENT
Start: 2024-10-30

## 2024-10-30 RX ORDER — ATORVASTATIN CALCIUM 80 MG/1
80 TABLET, FILM COATED ORAL DAILY
Qty: 90 TABLET | Refills: 1 | Status: SHIPPED | OUTPATIENT
Start: 2024-10-30

## 2024-10-30 RX ORDER — PIOGLITAZONEHYDROCHLORIDE 30 MG/1
30 TABLET ORAL DAILY
Qty: 90 TABLET | Refills: 1 | Status: SHIPPED | OUTPATIENT
Start: 2024-10-30

## 2024-10-31 ENCOUNTER — HOSPITAL ENCOUNTER (OUTPATIENT)
Dept: MAMMOGRAPHY | Facility: HOSPITAL | Age: 64
Discharge: HOME | End: 2024-10-31
Attending: OBSTETRICS & GYNECOLOGY
Payer: COMMERCIAL

## 2024-10-31 VITALS — WEIGHT: 225 LBS | HEIGHT: 63 IN | BODY MASS INDEX: 39.87 KG/M2

## 2024-10-31 DIAGNOSIS — Z12.31 ENCOUNTER FOR SCREENING MAMMOGRAM FOR BREAST CANCER: ICD-10-CM

## 2024-10-31 PROCEDURE — 77067 SCR MAMMO BI INCL CAD: CPT

## 2024-10-31 PROCEDURE — 77063 BREAST TOMOSYNTHESIS BI: CPT

## 2024-11-01 ENCOUNTER — OFFICE VISIT (OUTPATIENT)
Dept: PHYSICAL THERAPY | Facility: CLINIC | Age: 64
End: 2024-11-01
Payer: COMMERCIAL

## 2024-11-01 DIAGNOSIS — Z98.1 S/P CERVICAL SPINAL FUSION: ICD-10-CM

## 2024-11-01 DIAGNOSIS — M54.12 RADICULOPATHY, CERVICAL REGION: ICD-10-CM

## 2024-11-01 DIAGNOSIS — M54.9 UPPER BACK PAIN: Primary | ICD-10-CM

## 2024-11-01 PROCEDURE — 97110 THERAPEUTIC EXERCISES: CPT

## 2024-11-01 PROCEDURE — 97112 NEUROMUSCULAR REEDUCATION: CPT

## 2024-11-01 NOTE — PROGRESS NOTES
"Daily Note     Today's date: 2024  Patient name: Jessica Zhou  : 1960  MRN: [id number]  Referring provider: Boogie Cox MD  Dx:   Encounter Diagnosis     ICD-10-CM    1. Upper back pain  M54.9       2. Radiculopathy, cervical region  M54.12       3. S/P cervical spinal fusion  Z98.1           Start Time: 0800  Stop Time: 0840  Total time in clinic (min): 40 minutes    Subjective: Patient reports slight improvement with her R thumb, index and middle finger numbness. Notes cervical stiffness \"-2/10\" prior to therapy. However, she notes her exercises usual take away the stiffness.     Objective: See treatment diary below    Assessment: No stiffness following exercises listed below. Good form during scapular strengthening. Progress as able.     Plan: Continue poc as per PT.      Precautions: C4-7 ACDF 8/3/21.    Dx: C7/T1 derangement with ret DP.    Manuals 10/22 10/24 11/1          Laser C7/T1  4000J 4000J                                                 Neuro Re-Ed             Posture correction/ ed 5 min 1 min 1 min          Posture correction  1x15 1x15          Seated c/s ret 3x10 3x10 3x10          Seated c/s ret with pt OP 1x10 3x10 3x10                                                 Ther Ex             TB Rows  León/ 3x10 León/ 3x10          B TB ER  Gre/ 3x10 Gre/ 3x10                                                                                        Ther Activity                                       Gait Training                                       Modalities                                          "

## 2024-11-04 ENCOUNTER — HOSPITAL ENCOUNTER (OUTPATIENT)
Dept: RADIOLOGY | Age: 64
Discharge: HOME | End: 2024-11-04
Payer: COMMERCIAL

## 2024-11-04 DIAGNOSIS — R91.1 PULMONARY NODULE: ICD-10-CM

## 2024-11-04 LAB — GLUCOSE SERPL-MCNC: 244 MG/DL (ref 65–140)

## 2024-11-04 PROCEDURE — 82948 REAGENT STRIP/BLOOD GLUCOSE: CPT

## 2024-11-06 ENCOUNTER — HOSPITAL ENCOUNTER (OUTPATIENT)
Dept: NON INVASIVE DIAGNOSTICS | Facility: CLINIC | Age: 64
Discharge: HOME | End: 2024-11-06
Payer: COMMERCIAL

## 2024-11-06 ENCOUNTER — OFFICE VISIT (OUTPATIENT)
Dept: PHYSICAL THERAPY | Facility: CLINIC | Age: 64
End: 2024-11-06
Payer: COMMERCIAL

## 2024-11-06 VITALS
HEART RATE: 86 BPM | SYSTOLIC BLOOD PRESSURE: 137 MMHG | BODY MASS INDEX: 39.84 KG/M2 | HEIGHT: 63 IN | DIASTOLIC BLOOD PRESSURE: 72 MMHG | WEIGHT: 224.87 LBS

## 2024-11-06 DIAGNOSIS — R01.1 SYSTOLIC EJECTION MURMUR: ICD-10-CM

## 2024-11-06 DIAGNOSIS — Z98.1 S/P CERVICAL SPINAL FUSION: ICD-10-CM

## 2024-11-06 DIAGNOSIS — I10 BENIGN ESSENTIAL HYPERTENSION: ICD-10-CM

## 2024-11-06 DIAGNOSIS — M54.9 UPPER BACK PAIN: Primary | ICD-10-CM

## 2024-11-06 DIAGNOSIS — M54.12 RADICULOPATHY, CERVICAL REGION: ICD-10-CM

## 2024-11-06 DIAGNOSIS — I11.9 HYPERTENSIVE HEART DISEASE WITHOUT HEART FAILURE: ICD-10-CM

## 2024-11-06 LAB
AORTIC ROOT: 2.4 CM
AORTIC VALVE MEAN VELOCITY: 18.9 M/S
APICAL FOUR CHAMBER EJECTION FRACTION: 66 %
ASCENDING AORTA: 3.3 CM
AV AREA BY CONTINUOUS VTI: 1.4 CM2
AV AREA PEAK VELOCITY: 1.4 CM2
AV LVOT MEAN GRADIENT: 4 MMHG
AV LVOT PEAK GRADIENT: 7 MMHG
AV MEAN GRADIENT: 16 MMHG
AV PEAK GRADIENT: 28 MMHG
AV VALVE AREA: 1.44 CM2
AV VELOCITY RATIO: 0.5
BSA FOR ECHO PROCEDURE: 2.03 M2
DOP CALC AO PEAK VEL: 2.66 M/S
DOP CALC AO VTI: 53.42 CM
DOP CALC LVOT AREA: 2.83 CM2
DOP CALC LVOT CARDIAC INDEX: 2.9 L/MIN/M2
DOP CALC LVOT CARDIAC OUTPUT: 5.89 L/MIN
DOP CALC LVOT DIAMETER: 1.9 CM
DOP CALC LVOT PEAK VEL VTI: 27.06 CM
DOP CALC LVOT PEAK VEL: 1.34 M/S
DOP CALC LVOT STROKE INDEX: 39.4 ML/M2
DOP CALC LVOT STROKE VOLUME: 76.68
E WAVE DECELERATION TIME: 198 MS
E/A RATIO: 1.14
FRACTIONAL SHORTENING: 30 (ref 28–44)
INTERVENTRICULAR SEPTUM IN DIASTOLE (PARASTERNAL SHORT AXIS VIEW): 1.2 CM
INTERVENTRICULAR SEPTUM: 1.2 CM (ref 0.6–1.1)
LAAS-AP2: 18.3 CM2
LAAS-AP4: 16 CM2
LEFT ATRIUM SIZE: 3.7 CM
LEFT ATRIUM VOLUME (MOD BIPLANE): 52 ML
LEFT ATRIUM VOLUME INDEX (MOD BIPLANE): 25.6 ML/M2
LEFT INTERNAL DIMENSION IN SYSTOLE: 3.3 CM (ref 2.1–4)
LEFT VENTRICULAR INTERNAL DIMENSION IN DIASTOLE: 4.7 CM (ref 3.5–6)
LEFT VENTRICULAR POSTERIOR WALL IN END DIASTOLE: 1.2 CM
LEFT VENTRICULAR STROKE VOLUME: 56 ML
LVSV (TEICH): 56 ML
MV E'TISSUE VEL-LAT: 8 CM/S
MV E'TISSUE VEL-SEP: 7 CM/S
MV PEAK A VEL: 0.87 M/S
MV PEAK E VEL: 99 CM/S
MV STENOSIS PRESSURE HALF TIME: 57 MS
MV VALVE AREA P 1/2 METHOD: 3.86
RA PRESSURE ESTIMATED: 3 MMHG
RIGHT ATRIUM AREA SYSTOLE A4C: 14.7 CM2
RIGHT VENTRICLE ID DIMENSION: 3.1 CM
RV PSP: 37 MMHG
SL CV LEFT ATRIUM LENGTH A2C: 4.6 CM
SL CV LV EF: 65
SL CV PED ECHO LEFT VENTRICLE DIASTOLIC VOLUME (MOD BIPLANE) 2D: 101 ML
SL CV PED ECHO LEFT VENTRICLE SYSTOLIC VOLUME (MOD BIPLANE) 2D: 45 ML
TR MAX PG: 34 MMHG
TR PEAK VELOCITY: 2.9 M/S
TRICUSPID ANNULAR PLANE SYSTOLIC EXCURSION: 1.9 CM
TRICUSPID VALVE PEAK REGURGITATION VELOCITY: 2.91 M/S

## 2024-11-06 PROCEDURE — 93306 TTE W/DOPPLER COMPLETE: CPT

## 2024-11-06 PROCEDURE — 93306 TTE W/DOPPLER COMPLETE: CPT | Performed by: INTERNAL MEDICINE

## 2024-11-06 PROCEDURE — 97112 NEUROMUSCULAR REEDUCATION: CPT | Performed by: PHYSICAL THERAPIST

## 2024-11-06 PROCEDURE — 97110 THERAPEUTIC EXERCISES: CPT | Performed by: PHYSICAL THERAPIST

## 2024-11-06 NOTE — PROGRESS NOTES
Daily Note     Today's date: 2024  Patient name: Jessica Zhou  : 1960  MRN: [id number]  Referring provider: Boogie Cox MD  Dx:   Encounter Diagnosis     ICD-10-CM    1. Upper back pain  M54.9       2. Radiculopathy, cervical region  M54.12       3. S/P cervical spinal fusion  Z98.1                      Subjective: Patient reports no c/s pain just stiffness and improving R hand 1st, 2nd, and 3rd numbness.    Objective: See treatment diary below    Assessment: Pt is centralizing with seated ret.     Plan: Continue POC.     Precautions: C4-7 ACDF 8/3/21.    Dx: C7/T1 derangement with ret DP.    Manuals 10/22 10/24 11/1 11/6         Laser C7/T1  4000J 4000J 5000J                                                Neuro Re-Ed             Posture correction/ ed 5 min 1 min 1 min 1 min         Posture correction  1x15 1x15 1x15         Seated c/s ret 3x10 3x10 3x10 3x10         Seated c/s ret with pt OP 1x10 3x10 3x10 3x10         Medicine ball wall rolls: flex    2.2#/ 3x10 ea                                   Ther Ex             TB Rows  León/ 3x10 León/ 3x10 León/ 3x15         B TB ER  Gre/ 3x10 Gre/ 3x10 Gre/ 3x15                                                                                       Ther Activity                                       Gait Training                                       Modalities

## 2024-11-07 ENCOUNTER — HOSPITAL ENCOUNTER (OUTPATIENT)
Dept: RADIOLOGY | Age: 64
Discharge: HOME | End: 2024-11-07

## 2024-11-08 ENCOUNTER — OFFICE VISIT (OUTPATIENT)
Dept: PHYSICAL THERAPY | Facility: CLINIC | Age: 64
End: 2024-11-08
Payer: COMMERCIAL

## 2024-11-08 ENCOUNTER — CLINICAL SUPPORT (OUTPATIENT)
Dept: INTERNAL MEDICINE CLINIC | Facility: CLINIC | Age: 64
End: 2024-11-08

## 2024-11-08 DIAGNOSIS — Z98.1 S/P CERVICAL SPINAL FUSION: ICD-10-CM

## 2024-11-08 DIAGNOSIS — E11.29 TYPE 2 DIABETES MELLITUS WITH MICROALBUMINURIA, WITHOUT LONG-TERM CURRENT USE OF INSULIN (HCC): Primary | ICD-10-CM

## 2024-11-08 DIAGNOSIS — M54.12 RADICULOPATHY, CERVICAL REGION: ICD-10-CM

## 2024-11-08 DIAGNOSIS — R80.9 TYPE 2 DIABETES MELLITUS WITH MICROALBUMINURIA, WITHOUT LONG-TERM CURRENT USE OF INSULIN (HCC): Primary | ICD-10-CM

## 2024-11-08 DIAGNOSIS — M54.9 UPPER BACK PAIN: Primary | ICD-10-CM

## 2024-11-08 PROCEDURE — 97112 NEUROMUSCULAR REEDUCATION: CPT

## 2024-11-08 PROCEDURE — 97110 THERAPEUTIC EXERCISES: CPT

## 2024-11-08 NOTE — PROGRESS NOTES
[facility] Clinical Pharmacy Services  Alem Mason, Pharmacist    Assessment/ Plan     Assessment & Plan  Type 2 diabetes mellitus with microalbuminuria, without long-term current use of insulin ([facility])    Lab Results   Component Value Date    HGBA1C 8.0 (H) 07/16/2024   Patient was unable to have PET scan due to glucose > 200mg/dL  Will temporarily increase glipizide to 2 tabs with lunch and 2 tabs with dinner  Applied Dexcom sample today to monitor FBG to see if <200 is feasible with current medications  If not, will start basal insulin               Follow-up: next weds, review clarity    Subjective     Medication Adherence/ Tolerability/ Cost:  Patient denies side effects, no issues with cost, 0 missed doses in last two week  acetaminophen  ARIPiprazole  atorvastatin  Cholecalciferol Caps  FLUoxetine  FreeStyle Linda 3 Sensor Misc  glipiZIDE  ibuprofen  lisinopril  magnesium Oxide Tabs  metFORMIN  multivitamin with minerals  omeprazole  OneTouch Ultra Strp  onetouch ultrasoft  pioglitazone      2. Lifestyle:   Last visit with dietician: no  Previously attended Living Well with Diabetes Class: no  Diet Recall:   Breakfast:   Lunch:   Dinner:   Snacks:   Beverages:   Physical Activity: none    3. Home monitoring devices  Glucometer: Yes, Brand: unknown  Continuous Glucose Monitor: No, Brand: sending today for insurance verification  Blood Pressure monitor: No, Brand: n/a    Hypoglycemia: The patient had 0 episodes/symptoms of hypoglycemia.   Hyperglycemia: The patient had 0 symptoms of hyperglycemia.     Objective       Blood Glucose Readings  CGM Report scanned into chart - connected,awaiting data  The patient is currently checking blood glucose 1 times per day. Patient does not report with SMBG logs.    ASCVD Risk:  The ASCVD Risk score (Groveland DK, et al., 2019) failed to calculate for the following reasons:    The valid total cholesterol range is 130 to 320 mg/dL     Vitals:  There were no vitals filed for  this visit.    Labs:    Lab Results   Component Value Date    SODIUM 139 07/16/2024    K 5.7 (H) 07/16/2024    EGFR 99 07/16/2024    CREATININE 0.65 07/16/2024    GLUF 154 (H) 04/12/2022    [id number] 344 07/10/2016    MICROALBCRE 61 (H) 10/17/2018     Pharmacist Tracking Tool     Pharmacist Tracking Tool  Reason For Outreach: Embedded Pharmacist  Demographics:  Intervention Method: In Person  Type of Intervention: Follow-Up  Topics Addressed: Diabetes  Pharmacologic Interventions: Dose or Frequency Adjusted, Prevent or Manage ALBERTO, and Med Rec  Non-Pharmacologic Interventions: Care coordination, Chart update, Cost, Disease state education, Home Monitoring, Labs, Medication Monitoring, Medication/Device education, and Personal CGM  Time:  Direct Patient Care:  30  mins  Care Coordination:  30  mins  Recommendation Recipient: Patient/Caregiver  Outcome: Accepted

## 2024-11-08 NOTE — PROGRESS NOTES
Daily Note     Today's date: 2024  Patient name: Jessica Zhou  : 1960  MRN: [id number]  Referring provider: Boogie Cox MD  Dx:   Encounter Diagnosis     ICD-10-CM    1. Upper back pain  M54.9       2. Radiculopathy, cervical region  M54.12       3. S/P cervical spinal fusion  Z98.1           Start Time: 0900  Stop Time: 0940  Total time in clinic (min): 40 minutes    Subjective: Patient reports steady improvement with cervical stiffness and finger symptoms.     Objective: See treatment diary below    Assessment: Patient continues to demonstrate improvements with the current PT POC. Continue to slowly progress as able.     Plan: Continue POC.     Precautions: C4-7 ACDF 8/3/21.    Dx: C7/T1 derangement with ret DP.    Manuals 10/22 10/24 11/1 11/6 11/8        Laser C7/T1  4000J 4000J 5000J 5000J                                               Neuro Re-Ed             Posture correction/ ed 5 min 1 min 1 min 1 min 1 min        Posture correction  1x15 1x15 1x15 1x15        Seated c/s ret 3x10 3x10 3x10 3x10 3x10        Seated c/s ret with pt OP 1x10 3x10 3x10 3x10 3x10        Medicine ball wall rolls: flex    2.2#/ 3x10 ea 2.2#  3x10 ea                                  Ther Ex             TB Rows  León/ 3x10 León/ 3x10 León/ 3x15 León/  3x15        B TB ER  Gre/ 3x10 Gre/ 3x10 Gre/ 3x15 Gre/  3x15                                                                                      Ther Activity                                       Gait Training                                       Modalities

## 2024-11-08 NOTE — ASSESSMENT & PLAN NOTE
Lab Results   Component Value Date    HGBA1C 8.0 (H) 07/16/2024   Patient was unable to have PET scan due to glucose > 200mg/dL  Will temporarily increase glipizide to 2 tabs with lunch and 2 tabs with dinner  Applied Dexcom sample today to monitor FBG to see if <200 is feasible with current medications  If not, will start basal insulin

## 2024-11-12 ENCOUNTER — APPOINTMENT (OUTPATIENT)
Dept: PHYSICAL THERAPY | Facility: CLINIC | Age: 64
End: 2024-11-12
Payer: COMMERCIAL

## 2024-11-13 ENCOUNTER — OFFICE VISIT (OUTPATIENT)
Dept: PHYSICAL THERAPY | Facility: CLINIC | Age: 64
End: 2024-11-13
Payer: COMMERCIAL

## 2024-11-13 DIAGNOSIS — M54.12 RADICULOPATHY, CERVICAL REGION: ICD-10-CM

## 2024-11-13 DIAGNOSIS — Z98.1 S/P CERVICAL SPINAL FUSION: ICD-10-CM

## 2024-11-13 DIAGNOSIS — M54.9 UPPER BACK PAIN: Primary | ICD-10-CM

## 2024-11-13 PROCEDURE — 97112 NEUROMUSCULAR REEDUCATION: CPT

## 2024-11-13 PROCEDURE — 97110 THERAPEUTIC EXERCISES: CPT

## 2024-11-13 NOTE — PROGRESS NOTES
Daily Note     Today's date: 2024  Patient name: Jessica Zhou  : 1960  MRN: [id number]  Referring provider: Boogie Cox MD  Dx:   Encounter Diagnosis     ICD-10-CM    1. Upper back pain  M54.9       2. Radiculopathy, cervical region  M54.12       3. S/P cervical spinal fusion  Z98.1                      Subjective: Patient reports no cv symptoms, mild tingling in her right 1st, 2nd and 3rd fingers. Pt reports she is no longer dropping things from her right hand.    Objective: See treatment diary below    Assessment: Patient demonstrates increased strength and endurance. She is progressing well with no pain but will continue to benefit from PT to address continued radicular symptoms.     Plan: Continue POC.     Precautions: C4-7 ACDF 8/3/21.    Dx: C7/T1 derangement with ret DP.    Manuals 10/22 10/24 11/1 11/6 11/8 11/13       Laser C7/T1  4000J 4000J 5000J 5000J 5000J                                              Neuro Re-Ed             Posture correction/ ed 5 min 1 min 1 min 1 min 1 min 1 min       Posture correction  1x15 1x15 1x15 1x15 1x15       Seated c/s ret 3x10 3x10 3x10 3x10 3x10 3x10       Seated c/s ret with pt OP 1x10 3x10 3x10 3x10 3x10 3x10       Medicine ball wall rolls: flex    2.2#/ 3x10 ea 2.2#  3x10 ea 2.2#  3x12 ea                                 Ther Ex             TB Rows  León/ 3x10 León/ 3x10 León/ 3x15 León/  3x15 León/  3x15       B TB ER  Gre/ 3x10 Gre/ 3x10 Gre/ 3x15 Gre/  3x15 Gre/  3x15                                                                                     Ther Activity                                       Gait Training                                       Modalities

## 2024-11-14 ENCOUNTER — OFFICE VISIT (OUTPATIENT)
Dept: PHYSICAL THERAPY | Facility: CLINIC | Age: 64
End: 2024-11-14
Payer: COMMERCIAL

## 2024-11-14 DIAGNOSIS — Z98.1 S/P CERVICAL SPINAL FUSION: ICD-10-CM

## 2024-11-14 DIAGNOSIS — M54.9 UPPER BACK PAIN: Primary | ICD-10-CM

## 2024-11-14 DIAGNOSIS — M54.12 RADICULOPATHY, CERVICAL REGION: ICD-10-CM

## 2024-11-14 PROCEDURE — 97112 NEUROMUSCULAR REEDUCATION: CPT | Performed by: PHYSICAL THERAPIST

## 2024-11-14 PROCEDURE — 97110 THERAPEUTIC EXERCISES: CPT | Performed by: PHYSICAL THERAPIST

## 2024-11-14 PROCEDURE — 97140 MANUAL THERAPY 1/> REGIONS: CPT | Performed by: PHYSICAL THERAPIST

## 2024-11-14 NOTE — PROGRESS NOTES
Daily Note     Today's date: 2024  Patient name: Jessica Zhou  : 1960  MRN: [id number]  Referring provider: Boogie Cox MD  Dx:   Encounter Diagnosis     ICD-10-CM    1. Upper back pain  M54.9       2. Radiculopathy, cervical region  M54.12       3. S/P cervical spinal fusion  Z98.1                      Subjective: Patient reports mild c/s pain slowly improving first 3 finger paraesthesias.    Objective: See treatment diary below    Assessment: Patient abolished c/s pain with ret.      Plan: Continue POC.       Precautions: C4-7 ACDF 8/3/21.    Dx: C7/T1 derangement with ret DP.    Manuals 10/22 10/24 11/1 11/6 11/8 11/13 11/14      Laser C7/T1  4000J 4000J 5000J 5000J 5000J 5000J                                             Neuro Re-Ed             Posture correction/ ed 5 min 1 min 1 min 1 min 1 min 1 min       Posture correction  1x15 1x15 1x15 1x15 1x15 1x15      Seated c/s ret 3x10 3x10 3x10 3x10 3x10 3x10 3x10      Seated c/s ret with pt OP 1x10 3x10 3x10 3x10 3x10 3x10 3x10      Seated t/s ext pt OP       3x10      Medicine ball wall rolls: flex    2.2#/ 3x10 ea 2.2#  3x10 ea 2.2#  3x12 ea 2.2#/ 3x12 ea                                Ther Ex             TB Rows  León/ 3x10 León/ 3x10 León/ 3x15 León/  3x15 León/  3x15 Buzz/ 3x15      B TB ER  Gre/ 3x10 Gre/ 3x10 Gre/ 3x15 Gre/  3x15 Gre/  3x15 Gre/ 3x15                                                                                    Ther Activity                                       Gait Training                                       Modalities

## 2024-11-15 ENCOUNTER — HOSPITAL ENCOUNTER (OUTPATIENT)
Dept: RADIOLOGY | Age: 64
Discharge: HOME | End: 2024-11-15

## 2024-11-18 ENCOUNTER — OFFICE VISIT (OUTPATIENT)
Dept: GASTROENTEROLOGY | Facility: AMBULARY SURGERY CENTER | Age: 64
End: 2024-11-18
Payer: COMMERCIAL

## 2024-11-18 ENCOUNTER — CLINICAL SUPPORT (OUTPATIENT)
Dept: INTERNAL MEDICINE CLINIC | Facility: CLINIC | Age: 64
End: 2024-11-18

## 2024-11-18 ENCOUNTER — TELEPHONE (OUTPATIENT)
Dept: GASTROENTEROLOGY | Facility: AMBULARY SURGERY CENTER | Age: 64
End: 2024-11-18

## 2024-11-18 VITALS
HEIGHT: 63 IN | DIASTOLIC BLOOD PRESSURE: 78 MMHG | WEIGHT: 234 LBS | SYSTOLIC BLOOD PRESSURE: 150 MMHG | BODY MASS INDEX: 41.46 KG/M2

## 2024-11-18 DIAGNOSIS — Z86.0100 HISTORY OF COLON POLYPS: ICD-10-CM

## 2024-11-18 DIAGNOSIS — E11.29 TYPE 2 DIABETES MELLITUS WITH MICROALBUMINURIA, WITHOUT LONG-TERM CURRENT USE OF INSULIN (HCC): Primary | ICD-10-CM

## 2024-11-18 DIAGNOSIS — R80.9 TYPE 2 DIABETES MELLITUS WITH MICROALBUMINURIA, WITHOUT LONG-TERM CURRENT USE OF INSULIN (HCC): Primary | ICD-10-CM

## 2024-11-18 DIAGNOSIS — R19.7 DIARRHEA, UNSPECIFIED TYPE: Primary | ICD-10-CM

## 2024-11-18 DIAGNOSIS — K21.9 GASTROESOPHAGEAL REFLUX DISEASE WITHOUT ESOPHAGITIS: ICD-10-CM

## 2024-11-18 PROCEDURE — 99214 OFFICE O/P EST MOD 30 MIN: CPT | Performed by: INTERNAL MEDICINE

## 2024-11-18 RX ORDER — SODIUM CHLORIDE, SODIUM LACTATE, POTASSIUM CHLORIDE, CALCIUM CHLORIDE 600; 310; 30; 20 MG/100ML; MG/100ML; MG/100ML; MG/100ML
125 INJECTION, SOLUTION INTRAVENOUS CONTINUOUS
OUTPATIENT
Start: 2024-11-18

## 2024-11-18 RX ORDER — SODIUM PICOSULFATE, MAGNESIUM OXIDE, AND ANHYDROUS CITRIC ACID 12; 3.5; 1 G/175ML; G/175ML; MG/175ML
LIQUID ORAL
Qty: 350 ML | Refills: 0 | Status: SHIPPED | OUTPATIENT
Start: 2024-11-18

## 2024-11-18 NOTE — ASSESSMENT & PLAN NOTE
Patient with history of GERD and had upper endoscopy couple of years ago.    -Patient was explained about the lifestyle and dietary modifications.  Advised to avoid fatty foods, chocolates, caffeine, alcohol and any other triggering foods.  Avoid eating for at least 3 hours before going to bed.    -Discussed about long-term side effects from omeprazole but she could not stop before.

## 2024-11-18 NOTE — ASSESSMENT & PLAN NOTE
Lab Results   Component Value Date    HGBA1C 8.0 (H) 07/16/2024   Reviewed Dexcom Clarity report, patient still >200FBG on average, patient wishes to try to buckle down diet x 1 week and try one more time for PET with FBG <200 without adding basal insulin

## 2024-11-18 NOTE — ASSESSMENT & PLAN NOTE
Symptoms appear to be most likely secondary to diabetic autonomic neuropathy.  Rule out pancreatic insufficiency.  Also consider from lactose intolerance.  Rule out microscopic colitis    -Explained to patient in detail about different possible etiologies and given reassurance.    -Advised to avoid dairy products completely    -Check stool for fecal fat and pancreatic elastase    -May try probiotics    -Schedule for colonoscopy.    -High-fiber diet.    -Patient was given instructions about the colonoscopy prep.    -Patient was explained about the risks and benefits of the procedure. Risks including but not limited to bleeding, infection, perforation were explained in detail. Also explained about less than 100% sensitivity with the exam and other alternatives.

## 2024-11-18 NOTE — PROGRESS NOTES
[facility] Clinical Pharmacy Services  Alem Mason, Pharmacist    Assessment/ Plan     Assessment & Plan  Type 2 diabetes mellitus with microalbuminuria, without long-term current use of insulin ([facility])    Lab Results   Component Value Date    HGBA1C 8.0 (H) 07/16/2024   Reviewed Dexcom Clarity report, patient still >200FBG on average, patient wishes to try to buckle down diet x 1 week and try one more time for PET with FBG <200 without adding basal insulin               Follow-up: 1 month    Subjective     Medication Adherence/ Tolerability/ Cost:  Patient denies side effects, no issues with cost, 0 missed doses in last two week  acetaminophen  ARIPiprazole  atorvastatin  Cholecalciferol Caps  Clenpiq Soln  FLUoxetine  FreeStyle Linda 3 Sensor Misc  glipiZIDE  ibuprofen  lisinopril  magnesium Oxide Tabs  metFORMIN  multivitamin with minerals  omeprazole  OneTouch Ultra Strp  onetouch ultrasoft  pioglitazone      2. Lifestyle:   Last visit with dietician: no  Previously attended Living Well with Diabetes Class: no  Diet Recall:   Breakfast:   Lunch:   Dinner:   Snacks:   Beverages:   Physical Activity: none    3. Home monitoring devices  Glucometer: Yes, Brand: unknown  Continuous Glucose Monitor: No, Brand: sending today for insurance verification  Blood Pressure monitor: No, Brand: n/a    Hypoglycemia: The patient had 0 episodes/symptoms of hypoglycemia.   Hyperglycemia: The patient had 0 symptoms of hyperglycemia.     Objective       Blood Glucose Readings  CGM Report scanned into chart   The patient is currently checking blood glucose 1 times per day. Patient does not report with SMBG logs.    ASCVD Risk:  The ASCVD Risk score (Astrid DK, et al., 2019) failed to calculate for the following reasons:    The valid total cholesterol range is 130 to 320 mg/dL     Vitals:  There were no vitals filed for this visit.    Labs:    Lab Results   Component Value Date    SODIUM 139 07/16/2024    K 5.7 (H) 07/16/2024     EGFR 99 07/16/2024    CREATININE 0.65 07/16/2024    GLUF 154 (H) 04/12/2022    [id number] 344 07/10/2016    MICROALBCRE 61 (H) 10/17/2018     Pharmacist Tracking Tool     Pharmacist Tracking Tool  Reason For Outreach: Embedded Pharmacist  Demographics:  Intervention Method: In Person  Type of Intervention: Follow-Up  Topics Addressed: Diabetes  Pharmacologic Interventions: Prevent or Manage ALBERTO and Med Rec  Non-Pharmacologic Interventions: Care coordination, Chart update, Cost, Disease state education, Home Monitoring, Labs, Medication Monitoring, Medication/Device education, and Personal CGM  Time:  Direct Patient Care:  30  mins  Care Coordination:  30  mins  Recommendation Recipient: Patient/Caregiver  Outcome: Accepted

## 2024-11-18 NOTE — PROGRESS NOTES
Follow-up Note -  [facility]  Jessica VELÁZQUEZ Mikychanijustino 1960 female         ASSESSMENT & PLAN:    Diarrhea  Symptoms appear to be most likely secondary to diabetic autonomic neuropathy.  Rule out pancreatic insufficiency.  Also consider from lactose intolerance.  Rule out microscopic colitis    -Explained to patient in detail about different possible etiologies and given reassurance.    -Advised to avoid dairy products completely    -Check stool for fecal fat and pancreatic elastase    -May try probiotics    -Schedule for colonoscopy.    -High-fiber diet.    -Patient was given instructions about the colonoscopy prep.    -Patient was explained about the risks and benefits of the procedure. Risks including but not limited to bleeding, infection, perforation were explained in detail. Also explained about less than 100% sensitivity with the exam and other alternatives.    History of colon polyps  Personal history of colon polyps- patient is at increased risk for colon cancer screening.  Rule out colorectal lesions including polyps or malignancy.    -Colonoscopy    Gastroesophageal reflux disease  Patient with history of GERD and had upper endoscopy couple of years ago.    -Patient was explained about the lifestyle and dietary modifications.  Advised to avoid fatty foods, chocolates, caffeine, alcohol and any other triggering foods.  Avoid eating for at least 3 hours before going to bed.    -Discussed about long-term side effects from omeprazole but she could not stop before.      Reason: Diarrhea    HPI:  Ms. Lopez came in because of symptoms of diarrhea.  Complaining about 2-3 loose bowel movements in the morning.  Denies any blood or mucus in the stool.  She drinks almond milk but reports using cheese at night.  She tried stopping metformin and artificial sweeteners without any help.  No abdominal pain, nausea or vomiting.  Good appetite, no recent weight loss.  Denies any heartburn or acid reflux.  She  takes omeprazole regularly.  She tried switching to Pepcid before but had significant symptoms.    Last EGD and colonoscopies were in September 2022.  She had polyps removed.    Chaperon: Ms. Fisher    REVIEW OF SYSTEMS: Review of Systems   Constitutional:  Negative for activity change, appetite change, chills, diaphoresis, fatigue, fever and unexpected weight change.   HENT:  Negative for ear discharge, ear pain, facial swelling, hearing loss, nosebleeds, sore throat, tinnitus and voice change.    Eyes:  Negative for pain, discharge, redness, itching and visual disturbance.   Respiratory:  Negative for apnea, cough, chest tightness, shortness of breath and wheezing.    Cardiovascular:  Negative for chest pain and palpitations.   Gastrointestinal:         As noted in HPI   Endocrine: Negative for cold intolerance, heat intolerance and polyuria.   Genitourinary:  Positive for frequency. Negative for difficulty urinating, dysuria, flank pain, hematuria and urgency.   Musculoskeletal:  Negative for arthralgias, back pain, gait problem, joint swelling and myalgias.   Skin:  Negative for rash and wound.   Neurological:  Negative for dizziness, tremors, seizures, speech difficulty, light-headedness, numbness and headaches.   Hematological:  Negative for adenopathy. Does not bruise/bleed easily.   Psychiatric/Behavioral:  Negative for agitation, behavioral problems and confusion. The patient is not nervous/anxious.         Past Medical History:   Diagnosis Date    Abdominal pain     started 7/21/16- found something on the pancreas    Abnormal weight loss     last assessed [id number]    Acute sinusitis     Allergic rhinitis     last assessed [id number]    Anemia     had iron infusion    Asthma     Cardiac murmur     Carpal tunnel syndrome     Cataracts, bilateral     Cellulitis     Cervical disc disease     Cervical myelopathy (HCC) 09/07/2022    Colon polyp     CPAP (continuous positive airway pressure) dependence      not using at this time    Degeneration of thoracic intervertebral disc     Degenerative arthritis of knee     Diabetes mellitus (HCC)     Diverticulosis     Esophagitis, reflux     last assessed 29Jan2014    Excessive sweating     Fatty liver     Frequent falls     Gait disturbance     GERD (gastroesophageal reflux disease)     Granuloma annulare     Head injury     Heart murmur     Hyperlipidemia     Hypertension     Hypertensive heart disease     Hypoglycemia     last assessed 08Feb2016    Kidney stone     Leg length discrepancy     Leucocytosis     Lumbar stress fracture     Multiple thyroid nodules     Neuropathy     Obesity     last assessed 25Dec2016    Paronychia of toe     Patellofemoral dysfunction     Pneumonia     Postmenopausal atrophic vaginitis     Prepatellar bursitis     Sebaceous cyst     last assessed 21Feb2014    Sleep apnea     no CPAP used    Suicide attempt ([facility])     Tinea corporis     Type 2 diabetes mellitus ([facility])     cont meds and monitor BS;  last assessed 08Nov2016    Uncontrolled type 2 diabetes mellitus with hyperglycemia, with long-term current use of insulin ([facility]) 08/05/2019    Urinary frequency     Vertigo     Vitamin D deficiency     Dia-Parkinson-White syndrome       Past Surgical History:   Procedure Laterality Date    CATARACT EXTRACTION Left 07/2024    CERVICAL FUSION N/A 08/03/2021    Procedure: FUSION CERVICAL ANTERIOR W DISCECTOMY C4-5, C5-6, C6-7 with allograft and neuromonitoring  case length 3 hours;  Surgeon: Kodak Wright MD;  Location:  MAIN OR;  Service: Orthopedics    COLONOSCOPY      ESOPHAGOGASTRODUODENOSCOPY N/A 08/08/2016    Procedure: ESOPHAGOGASTRODUODENOSCOPY (EGD);  Surgeon: Trey Bernstein MD;  Location: [facility] GI LAB;  Service:     LUMBAR FUSION      FL ESOPHAGOGASTRODUODENOSCOPY TRANSORAL DIAGNOSTIC N/A 03/28/2019    Procedure: ESOPHAGOGASTRODUODENOSCOPY (EGD);  Surgeon: Trey Bernstein MD;  Location: [facility] GI LAB;  Service: Gastroenterology    FL  NEUROPLASTY &/TRANSPOS MEDIAN NRV CARPAL TUNNE Right 08/09/2022    Procedure: RELEASE CARPAL TUNNEL;  Surgeon: Richie Borden MD;  Location: [facility] MAIN OR;  Service: Orthopedics    SPINE SURGERY      TONSILLECTOMY      TOOTH EXTRACTION      wisdom teeth removed     Social History     Socioeconomic History    Marital status: /Civil Union     Spouse name: Not on file    Number of children: 0    Years of education: 12    Highest education level: 12th grade   Occupational History    Occupation: retired   Tobacco Use    Smoking status: Never     Passive exposure: Never    Smokeless tobacco: Never   Vaping Use    Vaping status: Never Used   Substance and Sexual Activity    Alcohol use: Not Currently     Alcohol/week: 1.0 standard drink of alcohol     Types: 1 Glasses of wine per week    Drug use: No    Sexual activity: Not Currently     Partners: Male     Birth control/protection: Male Sterilization   Other Topics Concern    Not on file   Social History Narrative    Education: high school graduate    Learning Disabilities: none    Marital History:     Children: none    Living Arrangement: lives in home with     Occupational History: retired, 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[employer], [place]    Functioning Relationships: good support system,  is supportive    Legal History: none     History: None        Caffeine use    Denied home environment domestic violence    Job physical requirements heavy lifting     Social Drivers of Health     Financial Resource Strain: Low Risk  (10/16/2024)    Overall Financial Resource Strain (CARDIA)     Difficulty of Paying Living Expenses: Not hard at all   Food Insecurity: No Food Insecurity (10/16/2024)    Nursing - Inadequate Food Risk Classification     Worried About Running Out of Food in the Last Year: Never true     Ran Out of Food in the Last Year: Never true     Ran Out of Food in the Last Year: Not on file   Transportation  Needs: No Transportation Needs (10/16/2024)    PRAPARE - Transportation     Lack of Transportation (Medical): No     Lack of Transportation (Non-Medical): No   Physical Activity: Inactive (10/16/2024)    Exercise Vital Sign     Days of Exercise per Week: 0 days     Minutes of Exercise per Session: 0 min   Stress: No Stress Concern Present (10/16/2024)    [place] [place] [employer] - Occupational Stress Questionnaire     Feeling of Stress : Only a little   Social Connections: Moderately Isolated (10/16/2024)    Social Connection and Isolation Panel [NHANES]     Frequency of Communication with Friends and Family: Once a week     Frequency of Social Gatherings with Friends and Family: Once a week     Attends Yazidi Services: Never     Active Member of Clubs or Organizations: Yes     Attends Club or Organization Meetings: More than 4 times per year     Marital Status:    Intimate Partner Violence: Not At Risk (10/16/2024)    Humiliation, Afraid, Rape, and Kick questionnaire     Fear of Current or Ex-Partner: No     Emotionally Abused: No     Physically Abused: No     Sexually Abused: No   Housing Stability: Low Risk  (10/16/2024)    Housing Stability Vital Sign     Unable to Pay for Housing in the Last Year: No     Number of Times Moved in the Last Year: 0     Homeless in the Last Year: No     Family History   Problem Relation Age of Onset    Cancer Mother         lung    Heart disease Mother     Coronary artery disease Mother         CABG    Diabetes Mother     Lung cancer Mother 68        smoker    Stroke Mother     Thyroid disease Mother     Other Mother         tobacco use    COPD Mother     Parkinsonism Father     Intestinal polyp Father     Bipolar disorder Father     Heart disease Father     Prostate cancer Father 58    Hypertension Father     Cancer Father     Hypertension Sister     Alcohol abuse Sister     Thyroid disease unspecified Sister     Arthritis Sister     Diabetes Maternal  Grandmother     Thyroid disease Maternal Grandmother     No Known Problems Maternal Grandfather     No Known Problems Paternal Grandmother     No Known Problems Paternal Grandfather     Diabetes Brother     Depression Brother     Heart disease Brother     Hypertension Brother     Arthritis Brother     Heart disease Brother     Seizures Brother     Endometrial cancer Maternal Aunt         unknown age    No Known Problems Maternal Aunt     No Known Problems Maternal Aunt     No Known Problems Maternal Aunt     Suicide Attempts Paternal Aunt     Depression Paternal Aunt     No Known Problems Paternal Aunt     No Known Problems Paternal Aunt     No Known Problems Paternal Aunt     Ovarian cancer Cousin 40        maternal     Bee venom, Biaxin [clarithromycin], Depakote er [divalproex sodium er], Shellfish-derived products - food allergy, Tanzeum [albiglutide], Carbamazepine, and Lamotrigine  Current Outpatient Medications   Medication Sig Dispense Refill    acetaminophen (TYLENOL) 500 mg tablet Take 500 mg by mouth 2 (two) times a day      ARIPiprazole (ABILIFY) 10 mg tablet Take 1 tablet (10 mg total) by mouth every evening 90 tablet 2    atorvastatin (LIPITOR) 80 mg tablet TAKE 1 TABLET BY MOUTH EVERY DAY 90 tablet 1    Cholecalciferol 4000 units CAPS Take 1 capsule (4,000 Units total) by mouth daily      Continuous Glucose Sensor (FreeStyle Linda 3 Sensor) MISC Use as directed to check glucose. 2 each 3    FLUoxetine (PROzac) 40 MG capsule Take 1 capsule (40 mg total) by mouth 2 (two) times a day 180 capsule 2    glipiZIDE (GLUCOTROL) 5 mg tablet TAKE BY MOUTH 1 TABLET WITH LUNCH AND TWO TABLETS WITH SUPPER FOR DIABETES - FILL WHEN PATIENT REQUESTS 270 tablet 1    ibuprofen (MOTRIN) 200 mg tablet Take 400 mg by mouth Every other day as needed when pain not alleviated by acetaminophen      Lancets (onetouch ultrasoft) lancets Test blood sugar twice daily 100 each 3    lisinopril (ZESTRIL) 10 mg tablet Take 1 tablet  "(10 mg total) by mouth 2 (two) times a day 180 tablet 3    magnesium Oxide (MAG-OX) 400 mg TABS TAKE 1 TABLET BY MOUTH 2 TIMES A DAY. 180 tablet 1    metFORMIN (GLUCOPHAGE-XR) 500 mg 24 hr tablet Take 1 tablet (500 mg total) by mouth 2 (two) times a day with meals      Multiple Vitamins-Minerals (multivitamin with minerals) tablet Take 1 tablet by mouth daily      omeprazole (PriLOSEC) 40 MG capsule TAKE 1 CAPSULE BY MOUTH EVERY DAY 90 capsule 1    OneTouch Ultra test strip TEST BLOOD SUGAR TWICE DAILY 200 strip 1    pioglitazone (ACTOS) 30 mg tablet TAKE 1 TABLET (30 MG TOTAL) BY MOUTH DAILY FOR DIABETES 90 tablet 1    sodium picosulfate, magnesium oxide, citric acid (Clenpiq) oral solution Take 175 mL (1 bottle) the evening before the colonoscopy, between 5 PM and 9 PM, followed by a second 175 mL bottle 5 hours before the colonoscopy. 350 mL 0     No current facility-administered medications for this visit.       Blood pressure 150/78, height 5' 3\" (1.6 m), weight 106 kg (234 lb), not currently breastfeeding.    PHYSICAL EXAM: Physical Exam  Constitutional:       Appearance: Normal appearance. She is well-developed.   HENT:      Head: Normocephalic and atraumatic.      Nose: Nose normal.   Eyes:      Conjunctiva/sclera: Conjunctivae normal.   Neck:      Thyroid: No thyromegaly.      Vascular: No JVD.      Trachea: No tracheal deviation.   Cardiovascular:      Rate and Rhythm: Normal rate and regular rhythm.      Heart sounds: Normal heart sounds. No murmur heard.     No friction rub. No gallop.   Pulmonary:      Effort: Pulmonary effort is normal. No respiratory distress.      Breath sounds: Normal breath sounds. No wheezing or rales.   Abdominal:      General: Bowel sounds are normal. There is no distension.      Palpations: Abdomen is soft. There is no mass.      Tenderness: There is no abdominal tenderness. There is no guarding.      Hernia: No hernia is present.   Musculoskeletal:         General: No " tenderness or deformity.      Cervical back: Neck supple.      Right lower leg: No edema.      Left lower leg: No edema.   Lymphadenopathy:      Cervical: No cervical adenopathy.   Skin:     General: Skin is warm and dry.      Findings: No erythema or rash.   Neurological:      Mental Status: She is alert and oriented to person, place, and time.   Psychiatric:         Mood and Affect: Mood normal.         Behavior: Behavior normal.         Thought Content: Thought content normal.          Lab Results   Component Value Date    WBC 8.5 11/17/2021    HGB 11.0 (L) 11/17/2021    HCT 35.1 11/17/2021    MCV 83.0 11/17/2021     11/17/2021     Lab Results   Component Value Date    GLUCOSE 120 09/06/2015    CALCIUM 9.4 07/16/2024     09/06/2015    K 5.7 (H) 07/16/2024    CO2 30 07/16/2024     07/16/2024    BUN 16 07/16/2024    CREATININE 0.65 07/16/2024     Lab Results   Component Value Date    ALT 18 07/16/2024    AST 23 07/16/2024    ALKPHOS 112 07/16/2024    BILITOT 0.30 09/06/2015     Lab Results   Component Value Date    INR 0.93 07/09/2021    PROTIME 12.5 07/09/2021       No results found.

## 2024-11-18 NOTE — TELEPHONE ENCOUNTER
Scheduled date of colonoscopy (as of today): 1/29/25  Physician performing colonoscopy: Dr Bernstein  [facility]  Bowel prep reviewed with patient: Clenpiq  Instructions reviewed with patient by: julian given at appt   Clearances: n/a  Diabetic - Glipizide, Metformin, Actose

## 2024-11-19 ENCOUNTER — OFFICE VISIT (OUTPATIENT)
Dept: PHYSICAL THERAPY | Facility: CLINIC | Age: 64
End: 2024-11-19
Payer: COMMERCIAL

## 2024-11-19 DIAGNOSIS — Z98.1 S/P CERVICAL SPINAL FUSION: ICD-10-CM

## 2024-11-19 DIAGNOSIS — M54.12 RADICULOPATHY, CERVICAL REGION: ICD-10-CM

## 2024-11-19 DIAGNOSIS — M54.9 UPPER BACK PAIN: Primary | ICD-10-CM

## 2024-11-19 PROCEDURE — 97110 THERAPEUTIC EXERCISES: CPT | Performed by: PHYSICAL THERAPIST

## 2024-11-19 PROCEDURE — 97140 MANUAL THERAPY 1/> REGIONS: CPT | Performed by: PHYSICAL THERAPIST

## 2024-11-19 PROCEDURE — 97112 NEUROMUSCULAR REEDUCATION: CPT | Performed by: PHYSICAL THERAPIST

## 2024-11-19 NOTE — PROGRESS NOTES
Daily Note     Today's date: 2024  Patient name: Jessica Zhou  : 1960  MRN: [id number]  Referring provider: Boogie Cox MD  Dx:   Encounter Diagnosis     ICD-10-CM    1. Upper back pain  M54.9       2. Radiculopathy, cervical region  M54.12       3. S/P cervical spinal fusion  Z98.1                      Subjective: Pt reports no neck pain, intermittent and improving R 1st, 2nd and 3rd finger numbness.    Objective: See treatment diary below    Assessment: Pt demonstrated improved c/s ROM in all planes, (+) ULTT A.    Plan: Continue per plan of care.      Precautions: C4-7 ACDF 8/3/21.    Dx: C7/T1 derangement with ret DP.    Manuals 10/22 10/24 11/1 11/6 11/8 11/13 11/14 11/19     Laser C7/T1  4000J 4000J 5000J 5000J 5000J 5000J 5000J                                            Neuro Re-Ed             Posture correction/ ed 5 min 1 min 1 min 1 min 1 min 1 min       Posture correction  1x15 1x15 1x15 1x15 1x15 1x15 1x20     Seated c/s ret 3x10 3x10 3x10 3x10 3x10 3x10 3x10 4x10     Seated c/s ret with pt OP 1x10 3x10 3x10 3x10 3x10 3x10 3x10 3x10     Seated t/s ext pt OP       3x10 3x10     Medicine ball wall rolls: flex    2.2#/ 3x10 ea 2.2#  3x10 ea 2.2#  3x12 ea 2.2#/ 3x12 ea 2.2#/ 3x12 ea                               Ther Ex             TB Rows  León/ 3x10 León/ 3x10 Lóen/ 3x15 León/  3x15 León/  3x15 Buzz/ 3x15 Buzz/ 3x15     B TB ER  Gre/ 3x10 Gre/ 3x10 Gre/ 3x15 Gre/  3x15 Gre/  3x15 Gre/ 3x15 León/ 3x10                                                                                   Ther Activity                                       Gait Training                                       Modalities

## 2024-11-21 ENCOUNTER — OFFICE VISIT (OUTPATIENT)
Dept: PHYSICAL THERAPY | Facility: CLINIC | Age: 64
End: 2024-11-21
Payer: COMMERCIAL

## 2024-11-21 ENCOUNTER — RA CDI HCC (OUTPATIENT)
Dept: OTHER | Facility: HOSPITAL | Age: 64
End: 2024-11-21

## 2024-11-21 DIAGNOSIS — M54.12 RADICULOPATHY, CERVICAL REGION: ICD-10-CM

## 2024-11-21 DIAGNOSIS — M54.9 UPPER BACK PAIN: Primary | ICD-10-CM

## 2024-11-21 PROCEDURE — 97140 MANUAL THERAPY 1/> REGIONS: CPT | Performed by: PHYSICAL THERAPIST

## 2024-11-21 PROCEDURE — 97112 NEUROMUSCULAR REEDUCATION: CPT | Performed by: PHYSICAL THERAPIST

## 2024-11-21 PROCEDURE — 97110 THERAPEUTIC EXERCISES: CPT | Performed by: PHYSICAL THERAPIST

## 2024-11-21 NOTE — PROGRESS NOTES
HCC coding opportunities          Chart Reviewed number of suggestions sent to Provider: 2     Patients Insurance        Commercial Insurance: [employer] Commercial [employer]       1) J45.909: Unspecified asthma, uncomplicated [[id number]]     Found in active problem list - please review and assess and document per MEAT if applicable for 2023       2) E66.01: Morbid (severe) obesity due to excess calories [E66.01]     Per CMS/ICD 10 coding guidelines, to be used when BMI >=40, with BMI code

## 2024-11-21 NOTE — PROGRESS NOTES
Daily Note     Today's date: 2024  Patient name: Jessica Zhou  : 1960  MRN: [id number]  Referring provider: Boogie Cox MD  Dx:   Encounter Diagnosis     ICD-10-CM    1. Upper back pain  M54.9       2. Radiculopathy, cervical region  M54.12                      Subjective: Pt reports min neck soreness, improving but constant, R 1st, 2nd and 3rd finger numbness.    Objective: See treatment diary below    Assessment: Pt demonstrated improved c/s ROM.    Plan: Continue per plan of care. RE nv for potential transition to HEP.     Precautions: C4-7 ACDF 8/3/21.    Dx: C7/T1 derangement with ret DP.    Manuals 10/22 10/24 11/1 11/6 11/8 11/13 11/14 11/19 11/21    Laser C7/T1  4000J 4000J 5000J 5000J 5000J 5000J 5000J 5000J                                           Neuro Re-Ed             Posture correction/ ed 5 min 1 min 1 min 1 min 1 min 1 min       Posture correction  1x15 1x15 1x15 1x15 1x15 1x15 1x20 1x20    Seated c/s ret 3x10 3x10 3x10 3x10 3x10 3x10 3x10 4x10 4x10    Seated c/s ret with pt OP 1x10 3x10 3x10 3x10 3x10 3x10 3x10 3x10 4x10    Seated t/s ext pt OP       3x10 3x10 4x10    Medicine ball wall rolls: flex    2.2#/ 3x10 ea 2.2#  3x10 ea 2.2#  3x12 ea 2.2#/ 3x12 ea 2.2#/ 3x12 ea 2.2#/ 3x12 ea                              Ther Ex             TB Rows  León/ 3x10 León/ 3x10 León/ 3x15 León/  3x15 León/  3x15 Buzz/ 3x15 Buzz/ 3x15 Buzz/ 3x15 Kailee/ 3x15   B TB ER  Gre/ 3x10 Gre/ 3x10 Gre/ 3x15 Gre/  3x15 Gre/  3x15 Gre/ 3x15 León/ 3x10 León/ 3x10                                                                                  Ther Activity                                       Gait Training                                       Modalities

## 2024-11-22 ENCOUNTER — OFFICE VISIT (OUTPATIENT)
Dept: INTERNAL MEDICINE CLINIC | Facility: CLINIC | Age: 64
End: 2024-11-22
Payer: COMMERCIAL

## 2024-11-22 VITALS
BODY MASS INDEX: 40.29 KG/M2 | HEART RATE: 70 BPM | HEIGHT: 63 IN | DIASTOLIC BLOOD PRESSURE: 72 MMHG | WEIGHT: 227.4 LBS | OXYGEN SATURATION: 99 % | SYSTOLIC BLOOD PRESSURE: 142 MMHG

## 2024-11-22 DIAGNOSIS — K52.9 CHRONIC DIARRHEA: ICD-10-CM

## 2024-11-22 DIAGNOSIS — E66.813 CLASS 3 SEVERE OBESITY DUE TO EXCESS CALORIES WITH SERIOUS COMORBIDITY AND BODY MASS INDEX (BMI) OF 40.0 TO 44.9 IN ADULT (HCC): ICD-10-CM

## 2024-11-22 DIAGNOSIS — J01.00 ACUTE NON-RECURRENT MAXILLARY SINUSITIS: Primary | ICD-10-CM

## 2024-11-22 DIAGNOSIS — M50.90 CERVICAL DISC DISEASE: ICD-10-CM

## 2024-11-22 DIAGNOSIS — M54.12 CERVICAL RADICULOPATHY: ICD-10-CM

## 2024-11-22 DIAGNOSIS — E66.01 CLASS 3 SEVERE OBESITY DUE TO EXCESS CALORIES WITH SERIOUS COMORBIDITY AND BODY MASS INDEX (BMI) OF 40.0 TO 44.9 IN ADULT (HCC): ICD-10-CM

## 2024-11-22 DIAGNOSIS — M48.10 DISH (DIFFUSE IDIOPATHIC SKELETAL HYPEROSTOSIS): ICD-10-CM

## 2024-11-22 PROBLEM — S12.9XXA CLOSED FRACTURE OF TRANSVERSE PROCESS OF CERVICAL VERTEBRA (HCC): Status: RESOLVED | Noted: 2021-04-20 | Resolved: 2024-11-22

## 2024-11-22 LAB
SARS-COV-2 AG UPPER RESP QL IA: NEGATIVE
SL AMB POCT RAPID FLU A: NEGATIVE
SL AMB POCT RAPID FLU B: NEGATIVE
VALID CONTROL: NORMAL

## 2024-11-22 PROCEDURE — 99214 OFFICE O/P EST MOD 30 MIN: CPT | Performed by: INTERNAL MEDICINE

## 2024-11-22 PROCEDURE — 87811 SARS-COV-2 COVID19 W/OPTIC: CPT | Performed by: INTERNAL MEDICINE

## 2024-11-22 PROCEDURE — 87804 INFLUENZA ASSAY W/OPTIC: CPT | Performed by: INTERNAL MEDICINE

## 2024-11-22 RX ORDER — AMOXICILLIN 500 MG/1
500 CAPSULE ORAL EVERY 8 HOURS SCHEDULED
Qty: 30 CAPSULE | Refills: 0 | Status: SHIPPED | OUTPATIENT
Start: 2024-11-22 | End: 2024-12-02

## 2024-11-22 NOTE — PROGRESS NOTES
Name: Jessica Zhou      : 1960      MRN: [id number]  Encounter Provider: Adeel Hough DO  Encounter Date: 2024   Enc[facility]  :  Assessment & Plan  Acute non-recurrent maxillary sinusitis  Symptoms are compatible with acute maxillary sinus infection Rx for amoxicillin 500 mg 1 capsule every 8 hours for 10 days provided patient does not tolerate nasal steroid spray I have recommended Ocean nasal spray as directed as needed along with steam and vaporizer call if any change, worse or symptoms do not jessica  Orders:    amoxicillin (AMOXIL) 500 mg capsule; Take 1 capsule (500 mg total) by mouth every 8 (eight) hours for 10 days    POCT Rapid Covid Ag    POCT rapid flu A and B    Covid/Flu- Office Collect Normal; Future    Chronic diarrhea  Patient is currently working with GI she will be undergoing a diagnostic colonoscopy in the near future I have also recommended a Giardia antigen  Orders:    Giardia antigen; Future    Cervical disc disease  Have reviewed the MRI of the cervical spine which does show anterior cervical discectomy/fusion C4-C7 also multiple levels of cervical spine spondylitic changes noting a C4 C5 myelomalacia, I did review this MRI in detail with the patient the patient is feeling much better after physical therapy and did not want to see a neurosurgical specialist at this point she has made significant improvements with physical therapy she has seen a orthopedic spine specialist       Cervical radiculopathy  Improving with physical therapy       Class 3 severe obesity due to excess calories with serious comorbidity and body mass index (BMI) of 40.0 to 44.9 in adult (HCC)  Obesity -I have counseled patient following healthy and balanced diet, I would like the patient to lose weight, I would like the patient exercise routinely; we will continue monitor the patient's progress.         DISH (diffuse idiopathic skeletal hyperostosis)  I  have reviewed the MRI of the thoracic spine in detail reports me her symptoms are improving with physical therapy she did not want to see a neurosurgeon to at this point       Return to office  4-6 months  call if any problems       History of Present Illness     HPI 64-year old female coming in for a follow up office visit regarding acute sinus infection, chronic diarrhea, cervical disc disease, cervical radiculopathy, severe obesity and DISH; the patient reports me compliant taking medications without untoward side effects the.  The patient is here to review his medical condition, update me on the medical condition and the patient reports me no hospitalizations and no ER visits.  Patient does report to me symptoms of sinus pain and pressure , stres with sister passing , chills  yellow mucous no exposure no tx at this point does report to me she is making good progress with physical therapy she does have a little bit of numbness in the digits of the right upper extremity patient has been having chronic diarrhea she has seen GI specialist Dr. Bernstein who does suspect a diabetic autonomic neuropathy of the bowels rule out pancreatic insufficiency the patient's prior been trying to do lactose-free probiotics without any major improvement she will be undergoing a colonoscopy in the near future.  Of note the patient has made significant progress with physical therapy at this point would like to hold off any surgical evaluation.  Review of Systems   Constitutional:  Negative for activity change, appetite change and unexpected weight change.   HENT:  Positive for congestion, postnasal drip, sinus pressure and sinus pain.    Eyes:  Negative for visual disturbance.   Respiratory:  Negative for cough and shortness of breath.    Cardiovascular:  Negative for chest pain.   Gastrointestinal:  Negative for abdominal pain, diarrhea, nausea and vomiting.   Neurological:  Negative for dizziness, light-headedness and headaches.  "  Hematological:  Negative for adenopathy.   Neck and arm pain improving with physical therapy  Social History     Tobacco Use    Smoking status: Never     Passive exposure: Never    Smokeless tobacco: Never   Vaping Use    Vaping status: Never Used   Substance and Sexual Activity    Alcohol use: Not Currently     Alcohol/week: 1.0 standard drink of alcohol     Types: 1 Glasses of wine per week    Drug use: No    Sexual activity: Not Currently     Partners: Male     Birth control/protection: Male Sterilization        Objective   /72 (BP Location: Left arm, Patient Position: Sitting, Cuff Size: Large)   Pulse 70   Ht 5' 3\" (1.6 m)   Wt 103 kg (227 lb 6.4 oz)   SpO2 99%   BMI 40.28 kg/m²   Bilateral maxillary sinus tenderness with palpation nasal congestion  Physical Exam  Vitals and nursing note reviewed.   Constitutional:       General: She is not in acute distress.     Appearance: Normal appearance. She is well-developed. She is obese. She is not ill-appearing, toxic-appearing or diaphoretic.   HENT:      Head: Normocephalic and atraumatic.      Right Ear: External ear normal.      Left Ear: External ear normal.      Nose: Nose normal.   Eyes:      Pupils: Pupils are equal, round, and reactive to light.   Cardiovascular:      Rate and Rhythm: Normal rate and regular rhythm.      Heart sounds: Normal heart sounds. No murmur heard.  Pulmonary:      Effort: Pulmonary effort is normal.      Breath sounds: Normal breath sounds.   Abdominal:      General: There is no distension.      Palpations: Abdomen is soft.      Tenderness: There is no abdominal tenderness. There is no guarding.   Neurological:      Mental Status: She is alert.         "

## 2024-11-22 NOTE — ASSESSMENT & PLAN NOTE
Have reviewed the MRI of the cervical spine which does show anterior cervical discectomy/fusion C4-C7 also multiple levels of cervical spine spondylitic changes noting a C4 C5 myelomalacia, I did review this MRI in detail with the patient the patient is feeling much better after physical therapy and did not want to see a neurosurgical specialist at this point she has made significant improvements with physical therapy she has seen a orthopedic spine specialist

## 2024-11-22 NOTE — ASSESSMENT & PLAN NOTE
I have reviewed the MRI of the thoracic spine in detail reports me her symptoms are improving with physical therapy she did not want to see a neurosurgeon to at this point

## 2024-11-25 ENCOUNTER — OFFICE VISIT (OUTPATIENT)
Dept: PHYSICAL THERAPY | Facility: CLINIC | Age: 64
End: 2024-11-25
Payer: COMMERCIAL

## 2024-11-25 DIAGNOSIS — M54.12 RADICULOPATHY, CERVICAL REGION: ICD-10-CM

## 2024-11-25 DIAGNOSIS — Z98.1 S/P CERVICAL SPINAL FUSION: ICD-10-CM

## 2024-11-25 DIAGNOSIS — M54.9 UPPER BACK PAIN: Primary | ICD-10-CM

## 2024-11-25 PROCEDURE — 97140 MANUAL THERAPY 1/> REGIONS: CPT

## 2024-11-25 PROCEDURE — 97110 THERAPEUTIC EXERCISES: CPT

## 2024-11-25 PROCEDURE — 97112 NEUROMUSCULAR REEDUCATION: CPT

## 2024-11-25 NOTE — PROGRESS NOTES
Daily Note     Today's date: 2024  Patient name: Jessica Zhou  : 1960  MRN: [id number]  Referring provider: Boogie Cox MD  Dx:   Encounter Diagnosis     ICD-10-CM    1. Upper back pain  M54.9       2. Radiculopathy, cervical region  M54.12       3. S/P cervical spinal fusion  Z98.1                      Subjective: Pt reports no cv pain, only stiffness in the morning. Pt reports continued right 1st, 2nd and 3rd finger numbness.    Objective: See treatment diary below    Assessment: Pt demonstrated increased tolerance to postural strengthening and good understanding of HEP.    Plan: Continue per plan of care. RE nv for potential transition to HEP.     Precautions: C4-7 ACDF 8/3/21.    Dx: C7/T1 derangement with ret DP.    Manuals 10/22 10/24 11/1 11/6 11/8 11/13 11/14 11/19 11/21 11/25   Laser C7/T1  4000J 4000J 5000J 5000J 5000J 5000J 5000J 5000J 5000J                                          Neuro Re-Ed             Posture correction/ ed 5 min 1 min 1 min 1 min 1 min 1 min       Posture correction  1x15 1x15 1x15 1x15 1x15 1x15 1x20 1x20 1x20   Seated c/s ret 3x10 3x10 3x10 3x10 3x10 3x10 3x10 4x10 4x10 4x10   Seated c/s ret with pt OP 1x10 3x10 3x10 3x10 3x10 3x10 3x10 3x10 4x10 4x10   Seated t/s ext pt OP       3x10 3x10 4x10 nv   Medicine ball wall rolls: flex    2.2#/ 3x10 ea 2.2#  3x10 ea 2.2#  3x12 ea 2.2#/ 3x12 ea 2.2#/ 3x12 ea 2.2#/ 3x12 ea 2.2#/ 3x12 ea                             Ther Ex             TB Rows  León/ 3x10 León/ 3x10 León/ 3x15 León/  3x15 León/  3x15 Buzz/ 3x15 Buzz/ 3x15 Buzz/ 3x15 Kailee/ 3x15   B TB ER  Gre/ 3x10 Gre/ 3x10 Gre/ 3x15 Gre/  3x15 Gre/  3x15 Gre/ 3x15 León/ 3x10 León/ 3x10 León/  3x10                                                                                 Ther Activity                                       Gait Training                                       Modalities

## 2024-11-27 ENCOUNTER — ESTABLISHED COMPREHENSIVE EXAM (OUTPATIENT)
Dept: URBAN - METROPOLITAN AREA CLINIC 6 | Facility: CLINIC | Age: 64
End: 2024-11-27

## 2024-11-27 ENCOUNTER — HOSPITAL ENCOUNTER (OUTPATIENT)
Dept: RADIOLOGY | Age: 64
Discharge: HOME | End: 2024-11-27
Payer: COMMERCIAL

## 2024-11-27 DIAGNOSIS — H26.493: ICD-10-CM

## 2024-11-27 DIAGNOSIS — E11.9: ICD-10-CM

## 2024-11-27 DIAGNOSIS — H04.123: ICD-10-CM

## 2024-11-27 DIAGNOSIS — H43.813: ICD-10-CM

## 2024-11-27 LAB
GLUCOSE SERPL-MCNC: 139 MG/DL (ref 65–140)
LEFT EYE DIABETIC RETINOPATHY: NORMAL
RIGHT EYE DIABETIC RETINOPATHY: NORMAL

## 2024-11-27 PROCEDURE — 82948 REAGENT STRIP/BLOOD GLUCOSE: CPT

## 2024-11-27 PROCEDURE — 92014 COMPRE OPH EXAM EST PT 1/>: CPT

## 2024-11-27 PROCEDURE — A9552 F18 FDG: HCPCS

## 2024-11-27 PROCEDURE — 78815 PET IMAGE W/CT SKULL-THIGH: CPT

## 2024-11-27 ASSESSMENT — TONOMETRY
OS_IOP_MMHG: 9
OD_IOP_MMHG: 8

## 2024-11-27 ASSESSMENT — KERATOMETRY
OS_AXISANGLE2_DEGREES: 90
OS_K1POWER_DIOPTERS: 45.50
OS_AXISANGLE_DEGREES: 180
OD_K1POWER_DIOPTERS: 45.50
OD_K2POWER_DIOPTERS: 46.25
OD_AXISANGLE_DEGREES: 171
OD_AXISANGLE2_DEGREES: 081
OS_K2POWER_DIOPTERS: 46.75

## 2024-11-27 ASSESSMENT — VISUAL ACUITY
OS_SC: 20/20-2
OD_SC: 20/20

## 2024-11-28 ENCOUNTER — RESULTS FOLLOW-UP (OUTPATIENT)
Dept: INTERNAL MEDICINE CLINIC | Facility: CLINIC | Age: 64
End: 2024-11-28

## 2024-12-02 ENCOUNTER — EVALUATION (OUTPATIENT)
Dept: PHYSICAL THERAPY | Facility: CLINIC | Age: 64
End: 2024-12-02
Payer: COMMERCIAL

## 2024-12-02 DIAGNOSIS — M54.9 UPPER BACK PAIN: Primary | ICD-10-CM

## 2024-12-02 DIAGNOSIS — Z98.1 S/P CERVICAL SPINAL FUSION: ICD-10-CM

## 2024-12-02 DIAGNOSIS — M54.12 RADICULOPATHY, CERVICAL REGION: ICD-10-CM

## 2024-12-02 PROCEDURE — 97110 THERAPEUTIC EXERCISES: CPT | Performed by: PHYSICAL THERAPIST

## 2024-12-02 PROCEDURE — 97112 NEUROMUSCULAR REEDUCATION: CPT | Performed by: PHYSICAL THERAPIST

## 2024-12-02 NOTE — PROGRESS NOTES
Daily Note     Today's date: 2024  Patient name: Jessica Zhou  : 1960  MRN: [id number]  Referring provider: Boogie Cox MD  Dx:   Encounter Diagnosis     ICD-10-CM    1. Upper back pain  M54.9       2. Radiculopathy, cervical region  M54.12       3. S/P cervical spinal fusion  Z98.1                      Subjective: Pt reports falling after catching the bottom of her foot on the carpet while walking down the hallway.  Pt reports hitting her thighs and stomach.  Pt reports mild c/s soreness, min R UE numbness.    Objective: See treatment diary below    Assessment: Pt demonstrated no gait abnormalities, normalized ROM and nerve tension.    Plan: D/c to HEP.     Precautions: C4-7 ACDF 8/3/21.    Dx: C7/T1 derangement with ret DP.    Manuals    Laser C7/T1          5000J                                          Neuro Re-Ed             Posture correction/ ed 1 min            Posture correction 1x20         1x20   Seated c/s ret 4x10         4x10   Seated c/s ret with pt OP 4x10         4x10   Seated t/s ext pt OP 4x10         nv   Medicine ball wall rolls: flex          2.2#/ 3x12 ea                             Ther Ex             TB Rows Kailee/ 3x15         Kailee/ 3x15   B TB ER León/ 3x12         León/  3x10                                                                                 Ther Activity                                       Gait Training                                       Modalities

## 2024-12-02 NOTE — PROGRESS NOTES
PT Discharge    Today's date: 2024  Patient name: Jessica Zhou  : 1960  MRN: [id number]  Referring provider: Boogie Cox MD  Dx:   Encounter Diagnosis     ICD-10-CM    1. Upper back pain  M54.9       2. Radiculopathy, cervical region  M54.12       3. S/P cervical spinal fusion  Z98.1                      Assessment  Impairments: abnormal or restricted ROM, activity intolerance, impaired physical strength, pain with function and poor posture     Assessment details: Pt demonstrated improved c/s ROM, pain, functional and radicular sx.  Pt is appropriate for d/c to HEP.    Goals  ST-4 weeks.  1.  Pt will decrease pain > 50%. met  2.  Pt will centralize radicular sx. met    LT-8 weeks.  1.  Pt will decrease pain > 75%. met  2.  Pt will restore PLOF. met    Plan    Planned therapy interventions: home exercise program        Subjective Evaluation    History of Present Illness  Mechanism of injury: Pt reports an 80% reduction in pain since IE.  Pt reports achiness first thing in the morning that resolves with HEP and minimal R UE parasthesias that is slowly improving.  Patient Goals  Patient goals for therapy: decreased edema, decreased pain, increased motion, increased strength, independence with ADLs/IADLs and return to sport/leisure activities    Pain  Current pain ratin  At best pain ratin  At worst pain ratin  Quality: dull ache and radiating  Progression: improved      Diagnostic Tests  X-ray: abnormal (10/2/24 stable C4-7 ACDF)  MRI studies: abnormal (T/S 10/2/24 worse T8/9 mild canal stenosis, T5/6 small disc bulge,  C/s 10/14/24 C7/T1 mild-mod B neuroforaminal narrowing R>L mild spinal canal stenosis.)      Objective     Postural Observations  Seated posture: fair  Standing posture: fair  Correction of posture: makes symptoms better      Neurological Testing     Sensation   Cervical/Thoracic   Left   Intact: light touch    Right   Intact: light touch    Reflexes   Left   Biceps  (C5/C6): normal (2+)    Right   Biceps (C5/C6): normal (2+)    Active Range of Motion   Cervical/Thoracic Spine       Cervical    Subcranial retraction:   Restriction level: minimal  Flexion:  Restriction level: minimal  Extension:  Restriction level: minimal  Left lateral flexion:  Restriction level: minimal  Right lateral flexion:  Restriction level minimal  Left rotation:  Restriction level: minimal  Right rotation:  Restriction level: minimal    Thoracic    Flexion:  WFL  Extension:  WFL  Left rotation:  Restriction level: minimal  Right rotation:  Restriction level: minimal  Mechanical Assessment    Cervical    Seated retraction: repeated movements   Pain intensity: better  Pain level: decreased    Thoracic    Seated extension: repeated movements  Pain location: no change    Lumbar      Strength/Myotome Testing     Left Shoulder     Planes of Motion   Abduction: 4     Right Shoulder     Planes of Motion   Abduction: 4     Left Elbow   Flexion: 5  Extension: 5    Right Elbow   Flexion: 5  Extension: 5    Left Wrist/Hand   Wrist extension: 5  Wrist flexion: 5  Thumb extension: 5    Right Wrist/Hand   Wrist extension: 5  Wrist flexion: 5  Thumb extension: 5    Tests     Right Shoulder   Negative ULTT1 and ULTT4.              Precautions: C4-7 ACDF 8/3/21.    Dx: C7/T1 derangement with ret DP.    Manuals 10/22            Laser C7/T1  4000J                                                  Neuro Re-Ed             Posture correction/ ed 5 min 1 min           Posture correction  1x15           Seated c/s ret 3x10 3x10           Seated c/s ret with pt OP 1x10 3x10                                                  Ther Ex             TB Rows  León/ 3x10           B TB ER  Gre/ 3x10                                                                                         Ther Activity                                       Gait Training                                       Modalities

## 2024-12-04 ENCOUNTER — CONSULT (OUTPATIENT)
Dept: PULMONOLOGY | Facility: CLINIC | Age: 64
End: 2024-12-04
Payer: COMMERCIAL

## 2024-12-04 VITALS
WEIGHT: 233 LBS | DIASTOLIC BLOOD PRESSURE: 68 MMHG | BODY MASS INDEX: 41.29 KG/M2 | OXYGEN SATURATION: 96 % | SYSTOLIC BLOOD PRESSURE: 144 MMHG | HEIGHT: 63 IN | TEMPERATURE: 97.5 F | HEART RATE: 68 BPM

## 2024-12-04 DIAGNOSIS — J30.2 SEASONAL ALLERGIC RHINITIS, UNSPECIFIED TRIGGER: ICD-10-CM

## 2024-12-04 DIAGNOSIS — G47.33 OSA (OBSTRUCTIVE SLEEP APNEA): ICD-10-CM

## 2024-12-04 DIAGNOSIS — R91.1 PULMONARY NODULE: Primary | ICD-10-CM

## 2024-12-04 PROCEDURE — 99244 OFF/OP CNSLTJ NEW/EST MOD 40: CPT | Performed by: INTERNAL MEDICINE

## 2024-12-04 NOTE — PROGRESS NOTES
Pulmonary Consultation   Jessica Zhou 64 y.o. female MRN: [id number]  12/4/2024      Reason for Consultation: Lung nodule    Requested by: Adeel Hough DO    Assessment/Plan:    Diagnoses and all orders for this visit:    Pulmonary nodule  Nodules in the left lower lobe, present since 2016.  Calcifications present in the nodule with low Hounsfield units and not PET avid, less concerning for malignancy.  Likely hamartoma, less likely carcinoid.  Will monitor with another CT of the chest in 1 year for growth, if no growth at that time can likely stop following.    -     Ambulatory Referral to Pulmonology  -     CT chest wo contrast; Future    KODY (obstructive sleep apnea)  Patient reports having a sleep study years ago, does have a CPAP is at the house that is greater than 5 years old.  She is currently noncompliant with CPAP.  She is willing to undergo another sleep study to qualify for new or machine to start properly treating her KODY.  Patient will need follow-up in 3 to 6 months depending on when she has her new machine    -     Ambulatory Referral to Sleep Medicine; Future    Seasonal allergic rhinitis, unspecified trigger  Patient reports some seasonal allergies.  Allergic to cats and dust mites.  Patient does have a cat at home, but is able to medicate her symptoms with over-the-counter antihistamines.  Patient encouraged to continue antihistamines and nasal sprays during the changes of seasons and when her pets bother her.    History of Present Illness   HPI:  Jessica Zhou is a 64 y.o. female with a past medical history of type 2 diabetes, hypertension, GERD, reported KODY noncompliant with CPAP, who presents today as a new consult for pulmonary nodule.    Patient has had a left lower lobe nodule since 2016, limited growth.  Calcifications present in the nodule, concerning for hematoma.    Patient reports being on an inhaler for asthma in the past. No respiratory symptoms now. Allergic to cats and  dust mites.    Patient has not been compliant with CPAP, but she is willing to undergo another sleep study for qualification.    No smoking history.  History of KODY and asthma.  Family history of lung cancer.  No travel.  Cat and dog.  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  No down pillows or comforters.    Labs personally reviewed.    Imaging personally reviewed.    PCP note reviewed.    Review of Systems   Constitutional:  Negative for chills.   HENT:  Positive for postnasal drip. Negative for rhinorrhea and sinus pain.    Respiratory:  Negative for cough.    Cardiovascular:  Negative for chest pain.   Gastrointestinal:  Negative for abdominal distention, abdominal pain, diarrhea and nausea.   Genitourinary:  Negative for dysuria.   Musculoskeletal:  Negative for arthralgias and myalgias.   Skin:  Negative for rash.   Neurological:  Negative for dizziness and headaches.   Psychiatric/Behavioral:  Negative for agitation and confusion.        Historical Information   Past Medical History:   Diagnosis Date    Abdominal pain     started 7/21/16- found something on the pancreas    Abnormal weight loss     last assessed [id number]    Acute sinusitis     Allergic rhinitis     last assessed [id number]    Anemia     had iron infusion    Asthma     Cardiac murmur     Carpal tunnel syndrome     Cataracts, bilateral     Cellulitis     Cervical disc disease     Cervical myelopathy (HCC) 09/07/2022    Colon polyp     CPAP (continuous positive airway pressure) dependence     not using at this time    Degeneration of thoracic intervertebral disc     Degenerative arthritis of knee     Diabetes mellitus (HCC)     Diverticulosis     Esophagitis, reflux     last assessed [id number]    Excessive sweating     Fatty liver     Frequent falls     Gait disturbance     GERD (gastroesophageal reflux disease)     Granuloma annulare     Head injury     Heart murmur     Hyperlipidemia     Hypertension     Hypertensive heart disease      Hypoglycemia     last assessed [id number]    Kidney stone     Leg length discrepancy     Leucocytosis     Lumbar stress fracture     Multiple thyroid nodules     Neuropathy     Obesity     last assessed [id number]    Paronychia of toe     Patellofemoral dysfunction     Pneumonia     Postmenopausal atrophic vaginitis     Prepatellar bursitis     Sebaceous cyst     last assessed [id number]    Sleep apnea     no CPAP used    Suicide attempt ([facility])     Tinea corporis     Type 2 diabetes mellitus ([facility])     cont meds and monitor BS;  last assessed 08Nov2016    Uncontrolled type 2 diabetes mellitus with hyperglycemia, with long-term current use of insulin ([facility]) 08/05/2019    Urinary frequency     Vertigo     Vitamin D deficiency     Dia-Parkinson-White syndrome      Past Surgical History:   Procedure Laterality Date    CATARACT EXTRACTION Left 07/2024    CERVICAL FUSION N/A 08/03/2021    Procedure: FUSION CERVICAL ANTERIOR W DISCECTOMY C4-5, C5-6, C6-7 with allograft and neuromonitoring  case length 3 hours;  Surgeon: Kodak Wright MD;  Location:  MAIN OR;  Service: Orthopedics    COLONOSCOPY      ESOPHAGOGASTRODUODENOSCOPY N/A 08/08/2016    Procedure: ESOPHAGOGASTRODUODENOSCOPY (EGD);  Surgeon: Trey Bernstein MD;  Location: [facility] GI LAB;  Service:     LUMBAR FUSION      GA ESOPHAGOGASTRODUODENOSCOPY TRANSORAL DIAGNOSTIC N/A 03/28/2019    Procedure: ESOPHAGOGASTRODUODENOSCOPY (EGD);  Surgeon: Trey Bernstein MD;  Location: [facility] GI LAB;  Service: Gastroenterology    GA NEUROPLASTY &/TRANSPOS MEDIAN NRV CARPAL TUNNE Right 08/09/2022    Procedure: RELEASE CARPAL TUNNEL;  Surgeon: Richie Borden MD;  [facility] MAIN OR;  Service: Orthopedics    SPINE SURGERY      TONSILLECTOMY      TOOTH EXTRACTION      wisdom teeth removed     Family History   Problem Relation Age of Onset    Cancer Mother         lung    Heart disease Mother     Coronary artery disease Mother         CABG    Diabetes Mother     Lung cancer  Mother 68        smoker    Stroke Mother     Thyroid disease Mother     COPD Mother     Parkinsonism Father     Intestinal polyp Father     Bipolar disorder Father     Heart disease Father     Prostate cancer Father 58    Hypertension Father     Cancer Father     Hypertension Sister     Alcohol abuse Sister     Thyroid disease unspecified Sister     Arthritis Sister     Diabetes Brother     Depression Brother     Heart disease Brother     Hypertension Brother     Arthritis Brother     Heart disease Brother     Seizures Brother     Diabetes Maternal Grandmother     Thyroid disease Maternal Grandmother     No Known Problems Maternal Grandfather     No Known Problems Paternal Grandmother     No Known Problems Paternal Grandfather     Endometrial cancer Maternal Aunt         unknown age    No Known Problems Maternal Aunt     No Known Problems Maternal Aunt     No Known Problems Maternal Aunt     Suicide Attempts Paternal Aunt     Depression Paternal Aunt     No Known Problems Paternal Aunt     No Known Problems Paternal Aunt     No Known Problems Paternal Aunt     Ovarian cancer Cousin 40        maternal       Occupational History: Nursing assistant    Social History: Non-smoker  Social History     Tobacco Use   Smoking Status Never    Passive exposure: Never   Smokeless Tobacco Never       Meds/Allergies     Current Outpatient Medications:     acetaminophen (TYLENOL) 500 mg tablet, Take 500 mg by mouth 2 (two) times a day, Disp: , Rfl:     ARIPiprazole (ABILIFY) 10 mg tablet, Take 1 tablet (10 mg total) by mouth every evening, Disp: 90 tablet, Rfl: 2    atorvastatin (LIPITOR) 80 mg tablet, TAKE 1 TABLET BY MOUTH EVERY DAY, Disp: 90 tablet, Rfl: 1    Cholecalciferol 4000 units CAPS, Take 1 capsule (4,000 Units total) by mouth daily, Disp: , Rfl:     Continuous Glucose Sensor (FreeStyle Linda 3 Sensor) MISC, Use as directed to check glucose., Disp: 2 each, Rfl: 3    FLUoxetine (PROzac) 40 MG capsule, Take 1 capsule (40  mg total) by mouth 2 (two) times a day, Disp: 180 capsule, Rfl: 2    glipiZIDE (GLUCOTROL) 5 mg tablet, TAKE BY MOUTH 1 TABLET WITH LUNCH AND TWO TABLETS WITH SUPPER FOR DIABETES - FILL WHEN PATIENT REQUESTS, Disp: 270 tablet, Rfl: 1    ibuprofen (MOTRIN) 200 mg tablet, Take 400 mg by mouth Every other day as needed when pain not alleviated by acetaminophen, Disp: , Rfl:     Lancets (onetouch ultrasoft) lancets, Test blood sugar twice daily, Disp: 100 each, Rfl: 3    lisinopril (ZESTRIL) 10 mg tablet, Take 1 tablet (10 mg total) by mouth 2 (two) times a day, Disp: 180 tablet, Rfl: 3    magnesium Oxide (MAG-OX) 400 mg TABS, TAKE 1 TABLET BY MOUTH 2 TIMES A DAY., Disp: 180 tablet, Rfl: 1    metFORMIN (GLUCOPHAGE-XR) 500 mg 24 hr tablet, Take 1 tablet (500 mg total) by mouth 2 (two) times a day with meals, Disp: , Rfl:     Multiple Vitamins-Minerals (multivitamin with minerals) tablet, Take 1 tablet by mouth daily, Disp: , Rfl:     omeprazole (PriLOSEC) 40 MG capsule, TAKE 1 CAPSULE BY MOUTH EVERY DAY, Disp: 90 capsule, Rfl: 1    OneTouch Ultra test strip, TEST BLOOD SUGAR TWICE DAILY, Disp: 200 strip, Rfl: 1    pioglitazone (ACTOS) 30 mg tablet, TAKE 1 TABLET (30 MG TOTAL) BY MOUTH DAILY FOR DIABETES, Disp: 90 tablet, Rfl: 1    sodium picosulfate, magnesium oxide, citric acid (Clenpiq) oral solution, Take 175 mL (1 bottle) the evening before the colonoscopy, between 5 PM and 9 PM, followed by a second 175 mL bottle 5 hours before the colonoscopy., Disp: 350 mL, Rfl: 0  Allergies   Allergen Reactions    Bee Venom Swelling     Local swelling at sting site    Biaxin [Clarithromycin] Itching    Depakote Er [Divalproex Sodium Er]      pancreatitis    Shellfish-Derived Products - Food Allergy Other (See Comments)     Ears and cheeks got red    Tanzeum [Albiglutide]      pancreatitis    Carbamazepine Other (See Comments)     Reaction Date:Unknown    Lamotrigine Rash       Vitals: Blood pressure 144/68, pulse 68, temperature  "97.5 °F (36.4 °C), temperature source Tympanic Core, height 5' 3\" (1.6 m), weight 106 kg (233 lb), SpO2 96%, not currently breastfeeding., Body mass index is 41.27 kg/m². Oxygen Therapy  SpO2: 96 %  Oxygen Therapy: None (Room air)    Physical Exam  Physical Exam  Vitals reviewed.   Constitutional:       Appearance: She is obese.   HENT:      Head: Normocephalic and atraumatic.      Right Ear: External ear normal.      Left Ear: External ear normal.      Nose: Nose normal.      Mouth/Throat:      Mouth: Mucous membranes are moist.      Pharynx: Oropharynx is clear.   Eyes:      Extraocular Movements: Extraocular movements intact.      Pupils: Pupils are equal, round, and reactive to light.   Cardiovascular:      Rate and Rhythm: Normal rate and regular rhythm.      Pulses: Normal pulses.      Heart sounds: Normal heart sounds.   Pulmonary:      Effort: Pulmonary effort is normal.      Breath sounds: Normal breath sounds.   Abdominal:      General: Abdomen is flat. Bowel sounds are normal. There is no distension.      Tenderness: There is no abdominal tenderness.   Musculoskeletal:      Right lower leg: No edema.      Left lower leg: No edema.   Skin:     General: Skin is warm and dry.      Capillary Refill: Capillary refill takes less than 2 seconds.   Neurological:      General: No focal deficit present.      Mental Status: She is oriented to person, place, and time.   Psychiatric:         Behavior: Behavior normal.         Labs: I have personally reviewed pertinent lab results.  Lab Results   Component Value Date    WBC 8.5 11/17/2021    HGB 11.0 (L) 11/17/2021    HCT 35.1 11/17/2021    MCV 83.0 11/17/2021     11/17/2021     Lab Results   Component Value Date    GLUCOSE 120 09/06/2015    CALCIUM 9.4 07/16/2024     09/06/2015    K 5.7 (H) 07/16/2024    CO2 30 07/16/2024     07/16/2024    BUN 16 07/16/2024    CREATININE 0.65 07/16/2024     No results found for: \"IGE\"  Lab Results   Component Value " "Date    ALT 18 07/16/2024    AST 23 07/16/2024    ALKPHOS 112 07/16/2024    BILITOT 0.30 09/06/2015     Preventive   Influenza vaccine: Needed  COVID-19 vaccination: Needed  Pneumonia vaccine: 2013  Lung Cancer screening: Not applicable      Imaging and other studies: Results Review Statement: I personally reviewed the following image studies in PACS and associated radiology reports: CT chest. My interpretation of the radiology images/reports is: 9 mm left lower lobe nodule.    NM PET CT skull base to mid thigh  Result Date: 11/27/2024  Impression: 9 mm left lower lobe pulmonary nodule is unchanged from chest CT and does not demonstrate significant FDG uptake. Given presence since 2016, this is almost certainly benign, likely hamartoma. To ensure ongoing long-term stability, noncontrast CT of chest recommended in 1 year. Resident: LUCIANA WEBER I, the attending radiologist, have reviewed the images and agree with the final report above. Workstation performed: [id number]     Pulmonary function testing: No results found for: \"FEV1\", \"FVC\", \"MOD2UIE\", \"TLC\", \"DLCO\"    EKG, Pathology, and Other Studies: Results Review Statement: I personally reviewed the following image studies in PACS and associated radiology reports: CT chest. My interpretation of the radiology images/reports is: 9 mm left lower lobe nodule.    Disclaimer: Portions of the record may have been created with voice recognition software. Occasional wrong word or \"sound a like\" substitutions may have occurred due to the inherent limitations of voice recognition software. Careful consideration should be taken to recognize, using context, where substitutions have occurred.    Al Landaverde DO, MS   Pulmonary & Critical Care Medicine Fellow PGY-V  [facility]    "

## 2024-12-06 ENCOUNTER — TRANSCRIBE ORDERS (OUTPATIENT)
Dept: SLEEP CENTER | Facility: CLINIC | Age: 64
End: 2024-12-06

## 2024-12-06 ENCOUNTER — VBI (OUTPATIENT)
Dept: ADMINISTRATIVE | Facility: OTHER | Age: 64
End: 2024-12-06

## 2024-12-06 DIAGNOSIS — G47.33 OSA (OBSTRUCTIVE SLEEP APNEA): Primary | ICD-10-CM

## 2024-12-06 NOTE — TELEPHONE ENCOUNTER
12/06/24 8:32 AM     Chart reviewed for Hemoglobin A1c ; nothing is submitted to the patient's insurance at this time.     Sugey Daley MA   [facility]

## 2024-12-16 ENCOUNTER — CLINICAL SUPPORT (OUTPATIENT)
Dept: INTERNAL MEDICINE CLINIC | Facility: CLINIC | Age: 64
End: 2024-12-16

## 2024-12-16 DIAGNOSIS — R80.9 TYPE 2 DIABETES MELLITUS WITH MICROALBUMINURIA, WITHOUT LONG-TERM CURRENT USE OF INSULIN (HCC): Primary | ICD-10-CM

## 2024-12-16 DIAGNOSIS — E11.29 TYPE 2 DIABETES MELLITUS WITH MICROALBUMINURIA, WITHOUT LONG-TERM CURRENT USE OF INSULIN (HCC): Primary | ICD-10-CM

## 2024-12-16 PROCEDURE — PBNCHG PB NO CHARGE PLACEHOLDER: Performed by: PHARMACIST

## 2024-12-16 RX ORDER — ACYCLOVIR 400 MG/1
1 TABLET ORAL
Qty: 9 EACH | Refills: 3 | Status: SHIPPED | OUTPATIENT
Start: 2024-12-16

## 2024-12-16 RX ORDER — TIRZEPATIDE 2.5 MG/.5ML
2.5 INJECTION, SOLUTION SUBCUTANEOUS WEEKLY
Qty: 2 ML | Refills: 0 | Status: SHIPPED | OUTPATIENT
Start: 2024-12-16

## 2024-12-16 NOTE — PROGRESS NOTES
[facility] Clinical Pharmacy Services  Alem Mason, Pharmacist    Assessment/ Plan     Assessment & Plan  Type 2 diabetes mellitus with microalbuminuria, without long-term current use of insulin ([facility])    Lab Results   Component Value Date    HGBA1C 8.0 (H) 07/16/2024   Declined POC A1C today  Reviewed most recent Dexcom Clarity report  Need additional medication   Many are cost prohibitive  Will send Dexcom and Mounjaro for price check  Orders:  •  Tirzepatide (Mounjaro) 2.5 MG/0.5ML SOAJ; Inject 2.5 mg under the skin once a week  •  Continuous Glucose Sensor (Dexcom G7 Sensor); Use 1 Device every 10 days          Follow-up: 1 month    Subjective     Medication Adherence/ Tolerability/ Cost:  Patient denies side effects, no issues with cost, 0 missed doses in last two week  acetaminophen  ARIPiprazole  atorvastatin  Cholecalciferol Caps  Clenpiq Soln  Dexcom G7 Sensor  FLUoxetine  glipiZIDE  ibuprofen  lisinopril  magnesium Oxide Tabs  metFORMIN  Mounjaro Soaj  multivitamin with minerals  omeprazole  OneTouch Ultra Strp  onetouch ultrasoft  pioglitazone      2. Lifestyle:   Last visit with dietician: no  Previously attended Living Well with Diabetes Class: no  Diet Recall:   Breakfast:   Lunch:   Dinner:   Snacks:   Beverages:   Physical Activity: none    3. Home monitoring devices  Glucometer: Yes, Brand: unknown  Continuous Glucose Monitor: No, Brand: sending today for insurance verification  Blood Pressure monitor: No, Brand: n/a    Hypoglycemia: The patient had 0 episodes/symptoms of hypoglycemia.   Hyperglycemia: The patient had 0 symptoms of hyperglycemia.     Objective       Blood Glucose Readings  CGM Report scanned into chart   The patient is currently checking blood glucose 1 times per day. Patient does not report with SMBG logs.    ASCVD Risk:  The ASCVD Risk score (Astrid MOSQUEDA, et al., 2019) failed to calculate for the following reasons:    The valid total cholesterol range is 130 to 320 mg/dL      Vitals:  There were no vitals filed for this visit.    Labs:    Lab Results   Component Value Date    SODIUM 139 07/16/2024    K 5.7 (H) 07/16/2024    EGFR 99 07/16/2024    CREATININE 0.65 07/16/2024    GLUF 154 (H) 04/12/2022    [id number] 344 07/10/2016    MICROALBCRE 61 (H) 10/17/2018     Pharmacist Tracking Tool     Pharmacist Tracking Tool  Reason For Outreach: Embedded Pharmacist  Demographics:  Intervention Method: In Person  Type of Intervention: Follow-Up  Topics Addressed: Diabetes  Pharmacologic Interventions: Prevent or Manage ALBERTO and Med Rec  Non-Pharmacologic Interventions: Care coordination, Chart update, Cost, Disease state education, Home Monitoring, Labs, Medication Monitoring, Medication/Device education, and Personal CGM  Time:  Direct Patient Care:  30  mins  Care Coordination:  30  mins  Recommendation Recipient: Patient/Caregiver  Outcome: Accepted

## 2024-12-16 NOTE — ASSESSMENT & PLAN NOTE
Lab Results   Component Value Date    HGBA1C 8.0 (H) 07/16/2024   Declined POC A1C today  Reviewed most recent Dexcom Clarity report  Need additional medication   Many are cost prohibitive  Will send Dexcom and Mounjaro for price check  Orders:  •  Tirzepatide (Mounjaro) 2.5 MG/0.5ML SOAJ; Inject 2.5 mg under the skin once a week  •  Continuous Glucose Sensor (Dexcom G7 Sensor); Use 1 Device every 10 days

## 2024-12-20 DIAGNOSIS — F41.1 GAD (GENERALIZED ANXIETY DISORDER): Chronic | ICD-10-CM

## 2024-12-20 DIAGNOSIS — F31.76 BIPOLAR I DISORDER, MOST RECENT EPISODE DEPRESSED, IN REMISSION (HCC): Chronic | ICD-10-CM

## 2024-12-20 DIAGNOSIS — R10.13 EPIGASTRIC PAIN: ICD-10-CM

## 2024-12-20 DIAGNOSIS — K29.80 DUODENITIS: ICD-10-CM

## 2024-12-20 RX ORDER — FLUOXETINE 40 MG/1
40 CAPSULE ORAL 2 TIMES DAILY
Qty: 180 CAPSULE | Refills: 0 | Status: SHIPPED | OUTPATIENT
Start: 2024-12-20

## 2024-12-23 RX ORDER — OMEPRAZOLE 40 MG/1
40 CAPSULE, DELAYED RELEASE ORAL DAILY
Qty: 90 CAPSULE | Refills: 1 | Status: SHIPPED | OUTPATIENT
Start: 2024-12-23

## 2024-12-27 ENCOUNTER — HOSPITAL ENCOUNTER (OUTPATIENT)
Dept: CT IMAGING | Facility: HOSPITAL | Age: 64
Discharge: HOME | End: 2024-12-27
Payer: COMMERCIAL

## 2024-12-27 DIAGNOSIS — R91.1 PULMONARY NODULE: ICD-10-CM

## 2024-12-27 PROCEDURE — 71250 CT THORAX DX C-: CPT

## 2024-12-31 ENCOUNTER — RA CDI HCC (OUTPATIENT)
Dept: OTHER | Facility: HOSPITAL | Age: 64
End: 2024-12-31

## 2024-12-31 NOTE — PROGRESS NOTES
HCC coding opportunities          Chart Reviewed number of suggestions sent to Provider: 1     Patients Insurance        Commercial Insurance: [employer]       E66.01: Morbid (severe) obesity due to excess calories [E66.01]      Per CMS/ICD 10 coding guidelines, to be used when BMI >=40, with BMI code

## 2025-01-06 ENCOUNTER — OFFICE VISIT (OUTPATIENT)
Dept: INTERNAL MEDICINE CLINIC | Facility: CLINIC | Age: 65
End: 2025-01-06

## 2025-01-06 ENCOUNTER — RESULTS FOLLOW-UP (OUTPATIENT)
Dept: CCU | Facility: HOSPITAL | Age: 65
End: 2025-01-06

## 2025-01-06 DIAGNOSIS — R80.9 TYPE 2 DIABETES MELLITUS WITH MICROALBUMINURIA, WITHOUT LONG-TERM CURRENT USE OF INSULIN (HCC): ICD-10-CM

## 2025-01-06 DIAGNOSIS — E11.29 TYPE 2 DIABETES MELLITUS WITH MICROALBUMINURIA, WITHOUT LONG-TERM CURRENT USE OF INSULIN (HCC): ICD-10-CM

## 2025-01-06 DIAGNOSIS — E11.9 TYPE 2 DIABETES MELLITUS WITHOUT COMPLICATION, WITHOUT LONG-TERM CURRENT USE OF INSULIN (HCC): ICD-10-CM

## 2025-01-06 PROCEDURE — PBNCHG PB NO CHARGE PLACEHOLDER: Performed by: PHARMACIST

## 2025-01-06 RX ORDER — GLIPIZIDE 5 MG/1
TABLET ORAL
Qty: 270 TABLET | Refills: 1 | Status: SHIPPED | OUTPATIENT
Start: 2025-01-06

## 2025-01-06 NOTE — PROGRESS NOTES
[facility] Clinical Pharmacy Services  Alem Mason, Pharmacist    Assessment/ Plan     Assessment & Plan  Type 2 diabetes mellitus without complication, without long-term current use of insulin ([facility])    Lab Results   Component Value Date    HGBA1C 8.0 (H) 07/16/2024       Orders:    glipiZIDE (GLUCOTROL) 5 mg tablet; Take by mouth 1 tablet with lunch and two tablets with supper for diabetes - fill when patient requests    Type 2 diabetes mellitus with microalbuminuria, without long-term current use of insulin ([facility])    Lab Results   Component Value Date    HGBA1C 8.0 (H) 07/16/2024   Dexcom and Mounjaro too expensive - patient did not , >$1000  Has high deductible plan  Jardiance also cost prohibitive  Last option is basal insulin                Follow-up: 1 month    Subjective     Medication Adherence/ Tolerability/ Cost:  Patient denies side effects, no issues with cost, 0 missed doses in last two week  acetaminophen  ARIPiprazole  atorvastatin  Cholecalciferol Caps  Clenpiq Soln  FLUoxetine  glipiZIDE  ibuprofen  lisinopril  magnesium Oxide Tabs  metFORMIN  multivitamin with minerals  omeprazole  OneTouch Ultra Strp  onetouch ultrasoft  pioglitazone      2. Lifestyle:   Last visit with dietician: no  Previously attended Living Well with Diabetes Class: no  Diet Recall:   Breakfast:   Lunch:   Dinner:   Snacks:   Beverages:   Physical Activity: none    3. Home monitoring devices  Glucometer: Yes, Brand: unknown  Continuous Glucose Monitor: No, Brand: sending today for insurance verification  Blood Pressure monitor: No, Brand: n/a    Hypoglycemia: The patient had 0 episodes/symptoms of hypoglycemia.   Hyperglycemia: The patient had 0 symptoms of hyperglycemia.     Objective       Blood Glucose Readings  CGM Report scanned into chart   The patient is currently checking blood glucose 1 times per day. Patient does not report with SMBG logs.    ASCVD Risk:  The ASCVD Risk score (Gypsy DK, et al., 2019)  failed to calculate for the following reasons:    The valid total cholesterol range is 130 to 320 mg/dL     Vitals:  There were no vitals filed for this visit.    Labs:    Lab Results   Component Value Date    SODIUM 139 07/16/2024    K 5.7 (H) 07/16/2024    EGFR 99 07/16/2024    CREATININE 0.65 07/16/2024    GLUF 154 (H) 04/12/2022    [id number] 344 07/10/2016    MICROALBCRE 61 (H) 10/17/2018     Pharmacist Tracking Tool     Pharmacist Tracking Tool  Reason For Outreach: Embedded Pharmacist  Demographics:  Intervention Method: In Person  Type of Intervention: Follow-Up  Topics Addressed: Diabetes  Pharmacologic Interventions: Prevent or Manage ALBERTO and Med Rec  Non-Pharmacologic Interventions: Care coordination, Chart update, Cost, Disease state education, Home Monitoring, Labs, Medication Monitoring, Medication/Device education, and Personal CGM  Time:  Direct Patient Care:  30  mins  Care Coordination:  30  mins  Recommendation Recipient: Patient/Caregiver  Outcome: Accepted

## 2025-01-06 NOTE — ASSESSMENT & PLAN NOTE
Lab Results   Component Value Date    HGBA1C 8.0 (H) 07/16/2024   Dexcom and Mounjaro too expensive - patient did not , >$1000  Has high deductible plan  Jardiance also cost prohibitive  Last option is basal insulin

## 2025-01-08 ENCOUNTER — TELEPHONE (OUTPATIENT)
Age: 65
End: 2025-01-08

## 2025-01-08 NOTE — TELEPHONE ENCOUNTER
Patient called requesting refill for pioglitazone 30 mg. Patient made aware medication was refilled on 10/30/24 for 90 with 1 refills to [facility] pharmacy. Patient instructed to contact the pharmacy to obtain refills of medication. Patient verbalized understanding.

## 2025-01-13 DIAGNOSIS — E78.5 DYSLIPIDEMIA: ICD-10-CM

## 2025-01-13 DIAGNOSIS — R80.9 TYPE 2 DIABETES MELLITUS WITH MICROALBUMINURIA, WITHOUT LONG-TERM CURRENT USE OF INSULIN (HCC): ICD-10-CM

## 2025-01-13 DIAGNOSIS — E11.29 TYPE 2 DIABETES MELLITUS WITH MICROALBUMINURIA, WITHOUT LONG-TERM CURRENT USE OF INSULIN (HCC): ICD-10-CM

## 2025-01-14 RX ORDER — LANCETS
EACH MISCELLANEOUS
Qty: 100 EACH | Refills: 1 | Status: SHIPPED | OUTPATIENT
Start: 2025-01-14

## 2025-01-14 RX ORDER — ATORVASTATIN CALCIUM 80 MG/1
80 TABLET, FILM COATED ORAL DAILY
Qty: 90 TABLET | Refills: 1 | OUTPATIENT
Start: 2025-01-14

## 2025-01-15 ENCOUNTER — ANESTHESIA (OUTPATIENT)
Dept: ANESTHESIOLOGY | Facility: HOSPITAL | Age: 65
End: 2025-01-15

## 2025-01-15 ENCOUNTER — ANESTHESIA EVENT (OUTPATIENT)
Dept: ANESTHESIOLOGY | Facility: HOSPITAL | Age: 65
End: 2025-01-15

## 2025-01-21 ENCOUNTER — RESULTS FOLLOW-UP (OUTPATIENT)
Dept: INTERNAL MEDICINE CLINIC | Facility: CLINIC | Age: 65
End: 2025-01-21

## 2025-01-21 LAB
ALBUMIN SERPL-MCNC: 4 G/DL (ref 3.6–5.1)
ALBUMIN/CREAT UR: 147 MG/G CREAT
ALBUMIN/GLOB SERPL: 1.2 (CALC) (ref 1–2.5)
ALP SERPL-CCNC: 125 U/L (ref 37–153)
ALT SERPL-CCNC: 23 U/L (ref 6–29)
AST SERPL-CCNC: 30 U/L (ref 10–35)
BILIRUB SERPL-MCNC: 0.4 MG/DL (ref 0.2–1.2)
BUN SERPL-MCNC: 10 MG/DL (ref 7–25)
BUN/CREAT SERPL: ABNORMAL (CALC) (ref 6–22)
CALCIUM SERPL-MCNC: 9.1 MG/DL (ref 8.6–10.4)
CHLORIDE SERPL-SCNC: 97 MMOL/L (ref 98–110)
CHOLEST SERPL-MCNC: 181 MG/DL
CHOLEST/HDLC SERPL: 3.7 (CALC)
CO2 SERPL-SCNC: 29 MMOL/L (ref 20–32)
CREAT SERPL-MCNC: 0.6 MG/DL (ref 0.5–1.05)
CREAT UR-MCNC: 181 MG/DL (ref 20–275)
GFR/BSA.PRED SERPLBLD CYS-BASED-ARV: 101 ML/MIN/1.73M2
GLOBULIN SER CALC-MCNC: 3.3 G/DL (CALC) (ref 1.9–3.7)
GLUCOSE SERPL-MCNC: 145 MG/DL (ref 65–99)
HBA1C MFR BLD: 9.3 % OF TOTAL HGB
HDLC SERPL-MCNC: 49 MG/DL
LDLC SERPL CALC-MCNC: 88 MG/DL (CALC)
MICROALBUMIN UR-MCNC: 26.6 MG/DL
NONHDLC SERPL-MCNC: 132 MG/DL (CALC)
POTASSIUM SERPL-SCNC: 4.5 MMOL/L (ref 3.5–5.3)
PROT SERPL-MCNC: 7.3 G/DL (ref 6.1–8.1)
SODIUM SERPL-SCNC: 135 MMOL/L (ref 135–146)
TRIGL SERPL-MCNC: 332 MG/DL

## 2025-01-24 ENCOUNTER — TELEPHONE (OUTPATIENT)
Dept: GASTROENTEROLOGY | Facility: CLINIC | Age: 65
End: 2025-01-24

## 2025-01-24 NOTE — TELEPHONE ENCOUNTER
lmom confirming pt's colonoscopy scheduled on 1/29/25 at [facility] with [facility] Bernstein.  Informed [facility] would be calling the day prior with the arrival time.  Informed of clear liquid diet day prior as well as the bowel cleansing preparation.  Informed would need a  the day of the procedure due to being under sedation. Informed pt that instructions were put in my chart for review. I asked pt to please call back if has any questions.

## 2025-01-29 ENCOUNTER — HOSPITAL ENCOUNTER (OUTPATIENT)
Dept: GASTROENTEROLOGY | Facility: AMBULARY SURGERY CENTER | Age: 65
Setting detail: OUTPATIENT SURGERY
Discharge: HOME | End: 2025-01-29
Attending: INTERNAL MEDICINE
Payer: COMMERCIAL

## 2025-01-29 ENCOUNTER — ANESTHESIA EVENT (OUTPATIENT)
Dept: GASTROENTEROLOGY | Facility: AMBULARY SURGERY CENTER | Age: 65
End: 2025-01-29
Payer: COMMERCIAL

## 2025-01-29 VITALS
OXYGEN SATURATION: 97 % | SYSTOLIC BLOOD PRESSURE: 141 MMHG | HEIGHT: 63 IN | RESPIRATION RATE: 16 BRPM | DIASTOLIC BLOOD PRESSURE: 64 MMHG | HEART RATE: 69 BPM | WEIGHT: 221 LBS | BODY MASS INDEX: 39.16 KG/M2 | TEMPERATURE: 97.5 F

## 2025-01-29 DIAGNOSIS — R19.7 DIARRHEA, UNSPECIFIED TYPE: ICD-10-CM

## 2025-01-29 DIAGNOSIS — Z86.0100 HISTORY OF COLON POLYPS: ICD-10-CM

## 2025-01-29 LAB — GLUCOSE SERPL-MCNC: 149 MG/DL (ref 65–140)

## 2025-01-29 PROCEDURE — 82948 REAGENT STRIP/BLOOD GLUCOSE: CPT

## 2025-01-29 PROCEDURE — 45380 COLONOSCOPY AND BIOPSY: CPT | Performed by: INTERNAL MEDICINE

## 2025-01-29 PROCEDURE — 88305 TISSUE EXAM BY PATHOLOGIST: CPT | Performed by: PATHOLOGY

## 2025-01-29 PROCEDURE — 45385 COLONOSCOPY W/LESION REMOVAL: CPT | Performed by: INTERNAL MEDICINE

## 2025-01-29 RX ORDER — SODIUM CHLORIDE, SODIUM LACTATE, POTASSIUM CHLORIDE, CALCIUM CHLORIDE 600; 310; 30; 20 MG/100ML; MG/100ML; MG/100ML; MG/100ML
125 INJECTION, SOLUTION INTRAVENOUS CONTINUOUS
Status: DISCONTINUED | OUTPATIENT
Start: 2025-01-29 | End: 2025-02-02 | Stop reason: HOSPADM

## 2025-01-29 RX ORDER — PROPOFOL 10 MG/ML
INJECTION, EMULSION INTRAVENOUS AS NEEDED
Status: DISCONTINUED | OUTPATIENT
Start: 2025-01-29 | End: 2025-01-29

## 2025-01-29 RX ORDER — SODIUM CHLORIDE, SODIUM LACTATE, POTASSIUM CHLORIDE, CALCIUM CHLORIDE 600; 310; 30; 20 MG/100ML; MG/100ML; MG/100ML; MG/100ML
INJECTION, SOLUTION INTRAVENOUS CONTINUOUS PRN
Status: DISCONTINUED | OUTPATIENT
Start: 2025-01-29 | End: 2025-01-29

## 2025-01-29 RX ADMIN — PROPOFOL 130 MG: 10 INJECTION, EMULSION INTRAVENOUS at 07:33

## 2025-01-29 RX ADMIN — SODIUM CHLORIDE, SODIUM LACTATE, POTASSIUM CHLORIDE, AND CALCIUM CHLORIDE: .6; .31; .03; .02 INJECTION, SOLUTION INTRAVENOUS at 07:30

## 2025-01-29 RX ADMIN — PROPOFOL 50 MG: 10 INJECTION, EMULSION INTRAVENOUS at 07:52

## 2025-01-29 RX ADMIN — PROPOFOL 50 MG: 10 INJECTION, EMULSION INTRAVENOUS at 07:47

## 2025-01-29 RX ADMIN — PROPOFOL 50 MG: 10 INJECTION, EMULSION INTRAVENOUS at 07:37

## 2025-01-29 RX ADMIN — Medication 40 MG: at 07:37

## 2025-01-29 RX ADMIN — PROPOFOL 50 MG: 10 INJECTION, EMULSION INTRAVENOUS at 07:42

## 2025-01-29 NOTE — ANESTHESIA POSTPROCEDURE EVALUATION
Post-Op Assessment Note    CV Status:  Stable  Pain Score: 0    Pain management: adequate       Mental Status:  Alert and awake   Hydration Status:  Euvolemic   PONV Controlled:  Controlled   Airway Patency:  Patent     Post Op Vitals Reviewed: Yes    No anethesia notable event occurred.    Staff: CRNA           Last Filed PACU Vitals:  Vitals Value Taken Time   Temp     Pulse     BP     Resp     SpO2         Modified Melita:     Vitals Value Taken Time   Activity 2 01/29/25 0757   Respiration 2 01/29/25 0757   Circulation 2 01/29/25 0757   Consciousness 1 01/29/25 0757   Oxygen Saturation 2 01/29/25 0757     Modified Melita Score: 9

## 2025-01-29 NOTE — H&P
History and Physical -  [facility]  Jessica Zhou 64 y.o. female MRN: [id number]                  HPI: Jessica Zhou is a 64 y.o. year old female who presents for history of diarrhea.  Personal history of polyps      REVIEW OF SYSTEMS: Per the HPI, and otherwise unremarkable.    Historical Information   Past Medical History:   Diagnosis Date    Abdominal pain     started 7/21/16- found something on the pancreas    Abnormal weight loss     last assessed 26feb2014    Acute sinusitis     Allergic rhinitis     last assessed 08Jul2015    Anemia     had iron infusion    Asthma     Cardiac murmur     Carpal tunnel syndrome     Cataracts, bilateral     Cellulitis     Cervical disc disease     Cervical myelopathy (HCC) 09/07/2022    Colon polyp     CPAP (continuous positive airway pressure) dependence     not using at this time    Degeneration of thoracic intervertebral disc     Degenerative arthritis of knee     Diabetes mellitus (HCC)     Diverticulosis     Esophagitis, reflux     last assessed 29Jan2014    Excessive sweating     Fatty liver     Frequent falls     Gait disturbance     GERD (gastroesophageal reflux disease)     Granuloma annulare     Head injury     Heart murmur     Hyperlipidemia     Hypertension     Hypertensive heart disease     Hypoglycemia     last assessed 08Feb2016    Kidney stone     Leg length discrepancy     Leucocytosis     Lumbar stress fracture     Multiple thyroid nodules     Neuropathy     Obesity     last assessed [id number]    Paronychia of toe     Patellofemoral dysfunction     Pneumonia     Postmenopausal atrophic vaginitis     Prepatellar bursitis     Sebaceous cyst     last assessed 21Feb2014    Sleep apnea     no CPAP used    Suicide attempt (HCC)     Tinea corporis     Type 2 diabetes mellitus ([facility])     cont meds and monitor BS;  last assessed 08Nov2016    Uncontrolled type 2 diabetes mellitus with hyperglycemia, with long-term current use of insulin ([facility])  08/05/2019    Urinary frequency     Vertigo     Vitamin D deficiency     Dia-Parkinson-White syndrome      Past Surgical History:   Procedure Laterality Date    CATARACT EXTRACTION Left 07/2024    CERVICAL FUSION N/A 08/03/2021    Procedure: FUSION CERVICAL ANTERIOR W DISCECTOMY C4-5, C5-6, C6-7 with allograft and neuromonitoring  case length 3 hours;  Surgeon: Kodak Wright MD;  Location:  MAIN OR;  Service: Orthopedics    COLONOSCOPY      ESOPHAGOGASTRODUODENOSCOPY N/A 08/08/2016    Procedure: ESOPHAGOGASTRODUODENOSCOPY (EGD);  Surgeon: Trey Bernstein MD;  Location: [facility] GI LAB;  Service:     LUMBAR FUSION      AK ESOPHAGOGASTRODUODENOSCOPY TRANSORAL DIAGNOSTIC N/A 03/28/2019    Procedure: ESOPHAGOGASTRODUODENOSCOPY (EGD);  Surgeon: Trey Bernstein MD;  Location: [facility] GI LAB;  Service: Gastroenterology    AK NEUROPLASTY &/TRANSPOS MEDIAN NRV CARPAL TUNNE Right 08/09/2022    Procedure: RELEASE CARPAL TUNNEL;  Surgeon: Richie Borden MD;  [facility]: [facility] MAIN OR;  Service: Orthopedics    SPINE SURGERY      TONSILLECTOMY      TOOTH EXTRACTION      wisdom teeth removed     Social History   Social History     Substance and Sexual Activity   Alcohol Use Not Currently    Alcohol/week: 1.0 standard drink of alcohol    Types: 1 Glasses of wine per week     Social History     Substance and Sexual Activity   Drug Use No     Social History     Tobacco Use   Smoking Status Never    Passive exposure: Never   Smokeless Tobacco Never     Family History   Problem Relation Age of Onset    Cancer Mother         lung    Heart disease Mother     Coronary artery disease Mother         CABG    Diabetes Mother     Lung cancer Mother 68        smoker    Stroke Mother     Thyroid disease Mother     COPD Mother     Parkinsonism Father     Intestinal polyp Father     Bipolar disorder Father     Heart disease Father     Prostate cancer Father 58    Hypertension Father     Cancer Father     Hypertension Sister     Alcohol abuse  Sister     Thyroid disease unspecified Sister     Arthritis Sister     Diabetes Brother     Depression Brother     Heart disease Brother     Hypertension Brother     Arthritis Brother     Heart disease Brother     Seizures Brother     Diabetes Maternal Grandmother     Thyroid disease Maternal Grandmother     No Known Problems Maternal Grandfather     No Known Problems Paternal Grandmother     No Known Problems Paternal Grandfather     Endometrial cancer Maternal Aunt         unknown age    No Known Problems Maternal Aunt     No Known Problems Maternal Aunt     No Known Problems Maternal Aunt     Suicide Attempts Paternal Aunt     Depression Paternal Aunt     No Known Problems Paternal Aunt     No Known Problems Paternal Aunt     No Known Problems Paternal Aunt     Ovarian cancer Cousin 40        maternal       Meds/Allergies       Current Outpatient Medications:     acetaminophen (TYLENOL) 500 mg tablet    ARIPiprazole (ABILIFY) 10 mg tablet    atorvastatin (LIPITOR) 80 mg tablet    Cholecalciferol 4000 units CAPS    FLUoxetine (PROzac) 40 MG capsule    glipiZIDE (GLUCOTROL) 5 mg tablet    ibuprofen (MOTRIN) 200 mg tablet    lisinopril (ZESTRIL) 10 mg tablet    magnesium Oxide (MAG-OX) 400 mg TABS    metFORMIN (GLUCOPHAGE-XR) 500 mg 24 hr tablet    Multiple Vitamins-Minerals (multivitamin with minerals) tablet    omeprazole (PriLOSEC) 40 MG capsule    pioglitazone (ACTOS) 30 mg tablet    Lancets (onetouch ultrasoft) lancets    OneTouch Ultra test strip    Current Facility-Administered Medications:     lactated ringers infusion, 125 mL/hr, Intravenous, Continuous    Allergies   Allergen Reactions    Bee Venom Swelling     Local swelling at sting site    Biaxin [Clarithromycin] Itching    Depakote Er [Divalproex Sodium Er]      pancreatitis    Shellfish-Derived Products - Food Allergy Other (See Comments)     Ears and cheeks got red    Tanzeum [Albiglutide]      pancreatitis    Carbamazepine Other (See Comments)      "Reaction Date:Unknown    Lamotrigine Rash       Objective     /67   Pulse 68   Temp 97.9 °F (36.6 °C) (Temporal)   Resp 18   Ht 5' 3\" (1.6 m)   Wt 100 kg (221 lb)   SpO2 96%   BMI 39.15 kg/m²       PHYSICAL EXAM    Gen: NAD  Head: NCAT  CV: RRR  CHEST: Clear  ABD: soft, NT/ND  EXT: no edema      ASSESSMENT/PLAN:  This is a 64 y.o. year old female here for colonoscopy, and she is stable and optimized for her procedure.        "

## 2025-01-29 NOTE — ANESTHESIA PREPROCEDURE EVALUATION
Procedure:  COLONOSCOPY    Relevant Problems   CARDIO   (+) Anomalous atrioventricular excitation   (+) Benign essential hypertension   (+) Cardiac murmur   (+) Essential hypertension   (+) Mixed hyperlipidemia      ENDO   (+) Type 2 diabetes mellitus with microalbuminuria, without long-term current use of insulin (HCC)      GI/HEPATIC   (+) BRBPR (bright red blood per rectum)   (+) Fatty liver   (+) Gastroesophageal reflux disease      /RENAL   (+) Kidney stone      HEMATOLOGY   (+) Iron deficiency anemia, unspecified      MUSCULOSKELETAL   (+) Degeneration of thoracic intervertebral disc   (+) Degenerative arthritis of knee, bilateral   (+) Discogenic thoracic pain   (+) Lateral epicondylitis, unspecified laterality      NEURO/PSYCH   (+) Arm weakness   (+) LOURDES (generalized anxiety disorder)      PULMONARY   (+) Asthma   (+) Obstructive sleep apnea        Physical Exam    Airway    Mallampati score: I  TM Distance: >3 FB  Neck ROM: full     Dental       Cardiovascular  Cardiovascular exam normal    Pulmonary  Pulmonary exam normal     Other Findings  post-pubertal.      Anesthesia Plan  ASA Score- 3     Anesthesia Type- IV sedation with anesthesia with ASA Monitors.         Additional Monitors:     Airway Plan:            Plan Factors-Exercise tolerance (METS): >4 METS.    Chart reviewed. EKG reviewed. Imaging results reviewed. Existing labs reviewed. Patient summary reviewed.                  Induction- intravenous.    Postoperative Plan- Plan for postoperative opioid use. Planned trial extubation        Informed Consent- Anesthetic plan and risks discussed with patient.  I personally reviewed this patient with the CRNA. Discussed and agreed on the Anesthesia Plan with the CRNA..      NPO Status:  No vitals data found for the desired time range.

## 2025-01-30 ENCOUNTER — RESULTS FOLLOW-UP (OUTPATIENT)
Dept: GASTROENTEROLOGY | Facility: CLINIC | Age: 65
End: 2025-01-30

## 2025-01-30 PROCEDURE — 88305 TISSUE EXAM BY PATHOLOGIST: CPT | Performed by: PATHOLOGY

## 2025-01-31 ENCOUNTER — TELEPHONE (OUTPATIENT)
Dept: INTERNAL MEDICINE CLINIC | Facility: CLINIC | Age: 65
End: 2025-01-31

## 2025-01-31 ENCOUNTER — OFFICE VISIT (OUTPATIENT)
Dept: INTERNAL MEDICINE CLINIC | Facility: CLINIC | Age: 65
End: 2025-01-31
Payer: COMMERCIAL

## 2025-01-31 VITALS
BODY MASS INDEX: 40.4 KG/M2 | HEART RATE: 77 BPM | HEIGHT: 63 IN | DIASTOLIC BLOOD PRESSURE: 70 MMHG | OXYGEN SATURATION: 99 % | WEIGHT: 228 LBS | SYSTOLIC BLOOD PRESSURE: 150 MMHG

## 2025-01-31 DIAGNOSIS — K76.0 FATTY LIVER: ICD-10-CM

## 2025-01-31 DIAGNOSIS — E66.813 CLASS 3 SEVERE OBESITY DUE TO EXCESS CALORIES WITH SERIOUS COMORBIDITY AND BODY MASS INDEX (BMI) OF 40.0 TO 44.9 IN ADULT (HCC): Primary | ICD-10-CM

## 2025-01-31 DIAGNOSIS — I10 BENIGN ESSENTIAL HYPERTENSION: ICD-10-CM

## 2025-01-31 DIAGNOSIS — R80.9 TYPE 2 DIABETES MELLITUS WITH MICROALBUMINURIA, WITHOUT LONG-TERM CURRENT USE OF INSULIN (HCC): ICD-10-CM

## 2025-01-31 DIAGNOSIS — J45.20 MILD INTERMITTENT ASTHMA WITHOUT COMPLICATION: ICD-10-CM

## 2025-01-31 DIAGNOSIS — E66.01 CLASS 3 SEVERE OBESITY DUE TO EXCESS CALORIES WITH SERIOUS COMORBIDITY AND BODY MASS INDEX (BMI) OF 40.0 TO 44.9 IN ADULT (HCC): Primary | ICD-10-CM

## 2025-01-31 DIAGNOSIS — F31.76 BIPOLAR I DISORDER, MOST RECENT EPISODE DEPRESSED, IN REMISSION (HCC): ICD-10-CM

## 2025-01-31 DIAGNOSIS — K52.9 CHRONIC DIARRHEA: ICD-10-CM

## 2025-01-31 DIAGNOSIS — Z00.00 WELLNESS EXAMINATION: ICD-10-CM

## 2025-01-31 DIAGNOSIS — E11.29 TYPE 2 DIABETES MELLITUS WITH MICROALBUMINURIA, WITHOUT LONG-TERM CURRENT USE OF INSULIN (HCC): ICD-10-CM

## 2025-01-31 PROCEDURE — 99214 OFFICE O/P EST MOD 30 MIN: CPT | Performed by: INTERNAL MEDICINE

## 2025-01-31 PROCEDURE — 99396 PREV VISIT EST AGE 40-64: CPT | Performed by: INTERNAL MEDICINE

## 2025-01-31 RX ORDER — METFORMIN HYDROCHLORIDE 500 MG/1
1000 TABLET, EXTENDED RELEASE ORAL 2 TIMES DAILY WITH MEALS
Start: 2025-01-31

## 2025-01-31 NOTE — PROGRESS NOTES
Adult Annual Physical  Name: Jessica Zhou      : 1960      MRN: [id number]  Encounter Provider: Adeel Hough DO  Encounter Date: 2025   Encounter department: [facility]    Assessment & Plan  Bipolar I disorder, most recent episode depressed, in remission (HCC)  Clinically stable and doing well continue the current medical regiment will continue monitor.,  Mood is stable follow-up with psychiatry continue Abilify 10 mg daily       Mild intermittent asthma without complication  Clinically stable and doing well continue the current medical regiment will continue monitor.       Class 3 severe obesity due to excess calories with serious comorbidity and body mass index (BMI) of 40.0 to 44.9 in adult (HCC)  Obesity -I have counseled patient following healthy and balanced diet, I would like the patient to lose weight, I would like the patient exercise routinely; we will continue monitor the patient's progress.       Wellness examination  Assessment and plan 1.  Health maintenance annual wellness examination overall the patient is clinically stable and doing well, we encouraged the patient to follow a healthy and balanced diet.  We recommend that the patient exercise routinely approximately 30 minutes 5 times per week .  We have reviewed the patient's vaccines and have made recommendations for updates if necessary consider shingles vaccine in    .  We will be ordering screening laboratories which are age appropriate.  Return to the office in    6-month call if any problems.       Fatty liver  I have counselled the pt to follow a healthy and balanced diet ,and recommend routine exercise.  Weight loss encouraged       Benign essential hypertension  Hypertension - controlled, I have counseled patient following healthy balance diet, I would like the patient reduce sodium, exercise routinely, I would like the patient continued the med current medical regiment and we will continue to  monitor.       Type 2 diabetes mellitus with microalbuminuria, without long-term current use of insulin (HCC)  Suboptimal control will add Januvia 50 mg once daily reduce carbohydrates and sweets go to gym routine exercise I will be ordering diabetic laboratories including comprehensive metabolic panel, hemoglobin A1c, urine microalbumin, lipid panel.  Patient is motivated to make lifestyle modifications  Lab Results   Component Value Date    HGBA1C 9.3 (H) 01/20/2025     Orders:  •  Comprehensive metabolic panel; Future  •  Albumin / creatinine urine ratio; Future  •  Lipid Panel with Direct LDL reflex; Future  •  Hemoglobin A1C; Future  •  metFORMIN (GLUCOPHAGE-XR) 500 mg 24 hr tablet; Take 2 tablets (1,000 mg total) by mouth 2 (two) times a day with meals  •  sitaGLIPtin (JANUVIA) 50 mg tablet; Take 1 tablet (50 mg total) by mouth daily    Chronic diarrhea  Will check stool culture ova and parasites continue follow-up with GI trying lactose-free diet, as needed Imodium  Orders:  •  Stool Enteric Bacterial Panel by PCR; Future  •  Ova and parasite examination; Future  RTO in 6 months call if any problems  Immunizations and preventive care screenings were discussed with patient today. Appropriate education was printed on patient's after visit summary.    Counseling:  Exercise: the importance of regular exercise/physical activity was discussed. Recommend exercise 3-5 times per week for at least 30 minutes.          History of Present Illness 64-year old male coming in for a follow up visit regarding bipolar 1, asthma, severe obesity, fatty liver, hypertension, type 2 diabetes and chronic diarrhea the patient reports me compliant taking medications without untoward side effects the.  The patient is here to review his medical condition, update me on the medical condition and the patient reports me no hospitalizations and no ER visits.  No injuries no illnesses she reports chronic diarrhea present with GI lactose-free  diet cut out artificial sweeteners had colonoscopy unrevealing.  Using Imodium as needed asthma stable mood is stable no injuries no illnesses reported diet is not as good as usual not exercising routinely.  Reviewed laboratories in detail    Adult Annual Physical:  Patient presents for annual physical.     Diet and Physical Activity:  - Diet/Nutrition: poor diet. carbohydrates  - Exercise: no formal exercise. active with cleaning out    Depression Screening:  - PHQ-2 Score: 0    General Health:  - Sleep: 7-8 hours of sleep on average.  - Hearing: normal hearing right ear and normal hearing left ear.  - Vision: no vision problems and goes for regular eye exams.  - Dental: regular dental visits and brushes teeth twice daily. had extraction    /GYN Health:  - Follows with GYN: yes.   - Menopause: postmenopausal.     Advanced Care Planning:  - Has an advanced directive?: yes    - Has a durable medical POA?: no      Review of Systems   Constitutional:  Negative for activity change, appetite change and unexpected weight change.   HENT:  Negative for congestion and postnasal drip.    Eyes:  Negative for visual disturbance.   Respiratory:  Negative for cough and shortness of breath.    Cardiovascular:  Negative for chest pain.   Gastrointestinal:  Negative for abdominal pain, diarrhea, nausea and vomiting.   Neurological:  Negative for dizziness, light-headedness and headaches.   Hematological:  Negative for adenopathy.     Social History     Tobacco Use   • Smoking status: Never     Passive exposure: Never   • Smokeless tobacco: Never   Vaping Use   • Vaping status: Never Used   Substance and Sexual Activity   • Alcohol use: Not Currently     Alcohol/week: 1.0 standard drink of alcohol     Types: 1 Glasses of wine per week   • Drug use: No   • Sexual activity: Not Currently     Partners: Male     Birth control/protection: Male Sterilization       Objective   /70 (BP Location: Left arm, Patient Position: Sitting,  "Cuff Size: Standard)   Pulse 77   Ht 5' 3\" (1.6 m)   Wt 103 kg (228 lb)   SpO2 99%   BMI 40.39 kg/m²     Physical Exam  Vitals and nursing note reviewed.   Constitutional:       General: She is not in acute distress.     Appearance: Normal appearance. She is well-developed. She is obese. She is not ill-appearing, toxic-appearing or diaphoretic.   HENT:      Head: Normocephalic and atraumatic.      Right Ear: External ear normal.      Left Ear: External ear normal.      Nose: Nose normal.   Eyes:      Pupils: Pupils are equal, round, and reactive to light.   Cardiovascular:      Rate and Rhythm: Normal rate and regular rhythm.      Heart sounds: Normal heart sounds. No murmur heard.  Pulmonary:      Effort: Pulmonary effort is normal.      Breath sounds: Normal breath sounds.   Abdominal:      General: There is no distension.      Palpations: Abdomen is soft.      Tenderness: There is no abdominal tenderness. There is no guarding.   Neurological:      Mental Status: She is alert.   Psychiatric:         Mood and Affect: Mood is not anxious or depressed.         Thought Content: Thought content does not include suicidal ideation.         "

## 2025-01-31 NOTE — ASSESSMENT & PLAN NOTE
Suboptimal control will add Januvia 50 mg once daily reduce carbohydrates and sweets go to gym routine exercise I will be ordering diabetic laboratories including comprehensive metabolic panel, hemoglobin A1c, urine microalbumin, lipid panel.  Patient is motivated to make lifestyle modifications  Lab Results   Component Value Date    HGBA1C 9.3 (H) 01/20/2025     Orders:  •  Comprehensive metabolic panel; Future  •  Albumin / creatinine urine ratio; Future  •  Lipid Panel with Direct LDL reflex; Future  •  Hemoglobin A1C; Future  •  metFORMIN (GLUCOPHAGE-XR) 500 mg 24 hr tablet; Take 2 tablets (1,000 mg total) by mouth 2 (two) times a day with meals  •  sitaGLIPtin (JANUVIA) 50 mg tablet; Take 1 tablet (50 mg total) by mouth daily

## 2025-01-31 NOTE — ASSESSMENT & PLAN NOTE
Assessment and plan 1.  Health maintenance annual wellness examination overall the patient is clinically stable and doing well, we encouraged the patient to follow a healthy and balanced diet.  We recommend that the patient exercise routinely approximately 30 minutes 5 times per week .  We have reviewed the patient's vaccines and have made recommendations for updates if necessary consider shingles vaccine in    .  We will be ordering screening laboratories which are age appropriate.  Return to the office in    6-month call if any problems.

## 2025-01-31 NOTE — ASSESSMENT & PLAN NOTE
I have counselled the pt to follow a healthy and balanced diet ,and recommend routine exercise.  Weight loss encouraged

## 2025-01-31 NOTE — ASSESSMENT & PLAN NOTE
Clinically stable and doing well continue the current medical regiment will continue monitor.,  Mood is stable follow-up with psychiatry continue Abilify 10 mg daily

## 2025-01-31 NOTE — TELEPHONE ENCOUNTER
Pt is aware that [facility] along with ova and parasites regarding the chronic diarrhea.she may proceed to the laboratory

## 2025-01-31 NOTE — PATIENT INSTRUCTIONS
"Patient Education     Type 2 diabetes   The Basics   Written by the doctors and editors at [facility]   What is type 2 diabetes? -- This is a disorder that disrupts the way the body uses sugar. It is sometimes called type 2 diabetes mellitus.  All of the cells in the body need sugar to work normally. Sugar gets into the cells with the help of a hormone called insulin. Insulin is made by the pancreas, an organ in the belly. If there is not enough insulin, or if cells in the body don't respond normally to insulin, sugar builds up in the blood. That is what happens to people with diabetes.  There are 2 different types of diabetes:   In type 1 diabetes, the pancreas makes little or no insulin.   In type 2 diabetes, the pancreas still makes some insulin, but the cells in the body stop responding normally. Eventually, the pancreas cannot make enough insulin to keep up.  Having excess body weight or obesity increases a person's risk of developing type 2 diabetes. But people without excess body weight can get diabetes, too.  What are the symptoms of type 2 diabetes? -- Type 2 diabetes usually causes no symptoms. When symptoms do happen, they include:   Needing to urinate often   Intense thirst   Blurry vision  Can diabetes lead to other health problems? -- Yes. Type 2 diabetes might not make you feel sick. But if it is not managed, it can lead to serious problems over time, such as:   Heart attacks   Strokes   Kidney disease   Vision problems (or even blindness)   Pain or loss of feeling in the hands and feet   Needing to have fingers, toes, or other body parts removed (amputated)  How do I know if I have type 2 diabetes? -- Your doctor or nurse can do a blood test. There are 2 tests that can be used for this. Both involve measuring the amount of sugar in your blood, called your \"blood sugar\" or \"blood glucose\":   One of the tests measures your blood sugar at the time the blood sample is taken. This test is done in the " "morning. You can't eat or drink anything except water for at least 8 hours before the test.   The other test shows what your average blood sugar has been for the past 2 to 3 months. This blood test is called \"hemoglobin A1C\" or just \"A1C.\" It can be checked at any time of the day, even if you have recently eaten.  How is type 2 diabetes treated? -- The goals of treatment are to manage your blood sugar and lower the risk of future problems that can happen in people with diabetes.  Treatment might include:   Lifestyle changes - This is an important part of managing diabetes. It includes eating healthy foods and getting plenty of physical activity.   Medicines - There are a few medicines that help lower blood sugar. Some people need to take pills that help the body make more insulin or that help insulin do its job. Others need insulin shots.  Depending on what medicines you take, you might need to check your blood sugar regularly at home. But not everyone with type 2 diabetes needs to do this. Your doctor or nurse will tell you if you should be checking your blood sugar, and when and how to do this.  Sometimes, people with type 2 diabetes also need medicines to help prevent problems caused by the disease. For instance, medicines used to lower blood pressure can reduce the chances of a heart attack or stroke.   General medical care - It's also important to take care of other areas of your health. This includes watching your blood pressure and cholesterol levels. You should also get certain vaccines, such as vaccines to protect against the flu and coronavirus disease 2019 (\"COVID-19\"). Some people also need a vaccine to prevent pneumonia.  Can type 2 diabetes be prevented? -- Yes. To lower your chances of getting type 2 diabetes, the most important thing you can do is eat a healthy diet and get plenty of physical activity. This can help you lose weight if you are overweight. But eating well and being active are also good " for your overall health. Even gentle activity, like walking, has benefits.  If you smoke, quitting can also lower your risk of type 2 diabetes. Quitting smoking can be difficult, but your doctor or nurse can help.  All topics are updated as new evidence becomes available and our peer review process is complete.  This topic retrieved from [employer] on: Apr 24, 2024.  Topic 71349 Version 23.0  Release: 32.3.2 - C32.113  © 2024 UpToDate, Inc. and/or its affiliates. All rights reserved.  Consumer Information Use and Disclaimer   Disclaimer: This generalized information is a limited summary of diagnosis, treatment, and/or medication information. It is not meant to be comprehensive and should be used as a tool to help the user understand and/or assess potential diagnostic and treatment options. It does NOT include all information about conditions, treatments, medications, side effects, or risks that may apply to a specific patient. It is not intended to be medical advice or a substitute for the medical advice, diagnosis, or treatment of a health care provider based on the health care provider's examination and assessment of a patient's specific and unique circumstances. Patients must speak with a health care provider for complete information about their health, medical questions, and treatment options, including any risks or benefits regarding use of medications. This information does not endorse any treatments or medications as safe, effective, or approved for treating a specific patient. UpToDate, Inc. and its affiliates disclaim any warranty or liability relating to this information or the use thereof.The use of this information is governed by the Terms of Use, available at https://www.wolterskluwer.com/en/know/clinical-effectiveness-terms. 2024© UpToDate, Inc. and its affiliates and/or licensors. All rights reserved.  Copyright   © 2024 UpToDate, Inc. and/or its affiliates. All rights reserved.

## 2025-02-07 ENCOUNTER — RESULTS FOLLOW-UP (OUTPATIENT)
Dept: INTERNAL MEDICINE CLINIC | Facility: CLINIC | Age: 65
End: 2025-02-07

## 2025-02-12 NOTE — PSYCH
MEDICATION MANAGEMENT NOTE        [facility]      Name and Date of Birth:  Jessica Zhou 64 y.o. 1960 MRN: [id number]    Insurance: Payor: BLUE CROSS / Plan: CAPITAL BC PLAN 361 / Product Type: Blue Fee for Service /     Date of Visit: February 18, 2025    Reason for Visit:   Chief Complaint   Patient presents with    Medication Management    Follow-up     Assessment & Plan  Bipolar I disorder, most recent episode depressed, in remission (HCC)  Increased depression and mood swings    Continue Prozac 40 mg bid to control depressive symptoms, mood swings and anxiety symptoms  Increase Abilify to 15 mg in the evening to help with mood  Orders:    ARIPiprazole (ABILIFY) 15 mg tablet; Take 1 tablet (15 mg total) by mouth every evening    FLUoxetine (PROzac) 40 MG capsule; Take 1 capsule (40 mg total) by mouth 2 (two) times a day    LOURDES (generalized anxiety disorder)  On and off anxiety    Continue Prozac 40 mg bid to control depressive symptoms, mood swings and anxiety symptoms  Orders:    FLUoxetine (PROzac) 40 MG capsule; Take 1 capsule (40 mg total) by mouth 2 (two) times a day    Long-term use of high-risk medication  Follows with family physician for glucose and lipid monitoring due to current therapy with antipsychotic medication  Monitor lipid profile and hemoglobin A1C yearly due to current therapy with antipsychotic medication - gets labs done with PCP          Treatment Recommendations/Precautions:    Follows with family physician for yearly physical exam, asthma, diabetes, hyperlipidemia, hypertension, and lung nodule  Aware of 24 hour and weekend coverage for urgent situations accessed by calling [facility] main practice number  Medication management every 2 months  I am scheduling this patient out for greater than 3 months: No    Medications Risks/Benefits:      Risks, Benefits And Possible Side Effects Of Medications:    Risks,  "benefits, and possible side effects of medications explained to Jessica including risk of parkinsonian symptoms, Tardive Dyskinesia and metabolic syndrome related to treatment with antipsychotic medications and risk of suicidality and serotonin syndrome related to treatment with antidepressants. She verbalizes understanding and agreement for treatment.    Controlled Medication Discussion:     Not applicable    SUBJECTIVE:    Jessiac is seen today for a follow up for Bipolar Disorder and Generalized Anxiety Disorder. She has decompensated somewhat for the last 2 weeks. She feels slightly more depressed and rates mood as 6 on a scale of 1 (best mood) to 10 (worst mood). She also noticed increased mood swings. She reports on and off anxiety symptoms. Her sister  in 2024 due to brain cancer \"it went fast\" - she feels that emotions that she has been experiencing recently are related to her sister's death \"it just hit me\". She reports decreased motivation and sometimes sleeping more. She also has been frustrated with recent health issues \"everything is up, my hemoglobin A1C, my weight, my appetite, my cholesterol\"    She denies any suicidal ideation, intent or plan at present; denies any homicidal ideation, intent or plan at present.    She has no auditory hallucinations, denies any visual hallucinations, has no delusional thoughts.    She denies any side effects from current psychiatric medications.    HPI ROS Appetite Changes and Sleep:     She reports disrupted sleep, increase in number of sleep hours (9 hours), increased appetite, recent weight gain (9 lbs), low energy    Current Rating Scores:     Current PHQ-9   PHQ-2/9 Depression Screening    Little interest or pleasure in doing things: 1 - several days  Feeling down, depressed, or hopeless: 2 - more than half the days  Trouble falling or staying asleep, or sleeping too much: 2 - more than half the days  Feeling tired or having little energy: 2 - more than " half the days  Poor appetite or overeating: 3 - nearly every day  Feeling bad about yourself - or that you are a failure or have let yourself or your family down: 0 - not at all  Trouble concentrating on things, such as reading the newspaper or watching television: 0 - not at all  Moving or speaking so slowly that other people could have noticed. Or the opposite - being so fidgety or restless that you have been moving around a lot more than usual: 0 - not at all  Thoughts that you would be better off dead, or of hurting yourself in some way: 0 - not at all  PHQ-9 Score: 10  PHQ-9 Interpretation: Moderate depression       Current PHQ-9 score is increased from 2 at the last visit).    Review Of Systems:      Constitutional low energy and recent weight gain (9 lbs)   ENT negative   Cardiovascular negative   Respiratory negative   Gastrointestinal negative   Genitourinary negative   Musculoskeletal negative   Integumentary negative   Neurological negative   Endocrine negative   Other Symptoms none, all other systems are negative       Past Psychiatric History: (unchanged information from previous note copied and updated)    Past Inpatient Psychiatric Treatment:   One past inpatient psychiatric admission at [facility] years abo  Past Outpatient Psychiatric Treatment:    In outpatient treatment at [facility] for many years.  Past Suicide Attempts: yes, 2 attempts by overdose years ago  Past Violent Behavior: no  Past Psychiatric Medication Trials: Prozac, Depakote, Lamictal (rash), Tegretol, Abilify and Latuda    Traumatic History: (unchanged information from previous note copied and updated)    Abuse: no history of sexual abuse, no history of physical abuse  Other Traumatic Events: none     Past Medical History:    Past Medical History:   Diagnosis Date    Abdominal pain     started 7/21/16- found something on the pancreas    Abnormal weight loss     last assessed [id number]    Acute  sinusitis     Allergic rhinitis     last assessed [id number]    Anemia     had iron infusion    Asthma     Cardiac murmur     Carpal tunnel syndrome     Cataracts, bilateral     Cellulitis     Cervical disc disease     Cervical myelopathy (HCC) 09/07/2022    Colon polyp     CPAP (continuous positive airway pressure) dependence     not using at this time    Degeneration of thoracic intervertebral disc     Degenerative arthritis of knee     Diabetes mellitus ([facility])     Diverticulosis     Esophagitis, reflux     last assessed 29Jan2014    Excessive sweating     Fatty liver     Frequent falls     Gait disturbance     GERD (gastroesophageal reflux disease)     Granuloma annulare     Head injury     Heart murmur     Hyperlipidemia     Hypertension     Hypertensive heart disease     Hypoglycemia     last assessed [id number]    Kidney stone     Leg length discrepancy     Leucocytosis     Lumbar stress fracture     Multiple thyroid nodules     Neuropathy     Obesity     last assessed [id number]    Paronychia of toe     Patellofemoral dysfunction     Pneumonia     Postmenopausal atrophic vaginitis     Prepatellar bursitis     Sebaceous cyst     last assessed [id number]    Sleep apnea     no CPAP used    Suicide attempt ([facility])     Tinea corporis     Type 2 diabetes mellitus ([facility])     cont meds and monitor BS;  last assessed 08Nov2016    Uncontrolled type 2 diabetes mellitus with hyperglycemia, with long-term current use of insulin ([facility]) 08/05/2019    Urinary frequency     Vertigo     Vitamin D deficiency     Dia-Parkinson-White syndrome         Past Surgical History:   Procedure Laterality Date    CATARACT EXTRACTION Left 07/2024    CERVICAL FUSION N/A 08/03/2021    Procedure: FUSION CERVICAL ANTERIOR W DISCECTOMY C4-5, C5-6, C6-7 with allograft and neuromonitoring  case length 3 hours;  Surgeon: Kodak Wright MD;  Location: [facility];  Service: Orthopedics    COLONOSCOPY      ESOPHAGOGASTRODUODENOSCOPY N/A 08/08/2016     Procedure: ESOPHAGOGASTRODUODENOSCOPY (EGD);  Surgeon: Trey Bernstein MD;  Location: [facility];  Service:     LUMBAR FUSION      NM ESOPHAGOGASTRODUODENOSCOPY TRANSORAL DIAGNOSTIC N/A 03/28/2019    Procedure: ESOPHAGOGASTRODUODENOSCOPY (EGD);  Surgeon: Trey Bernstein MD;  Location: [facility];  Service: Gastroenterology    NM NEUROPLASTY &/TRANSPOS MEDIAN NRV CARPAL TUNNE Right 08/09/2022    Procedure: RELEASE CARPAL TUNNEL;  Surgeon: Richie Borden MD;  [facility];  Service: Orthopedics    SPINE SURGERY      TONSILLECTOMY      TOOTH EXTRACTION      wisdom teeth removed     Allergies   Allergen Reactions    Bee Venom Swelling     Local swelling at sting site    Biaxin [Clarithromycin] Itching    Depakote Er [Divalproex Sodium Er]      pancreatitis    Shellfish-Derived Products - Food Allergy Other (See Comments)     Ears and cheeks got red    Tanzeum [Albiglutide]      pancreatitis    Carbamazepine Other (See Comments)     Reaction Date:Unknown    Lamotrigine Rash       Current Outpatient Medications:    Current Outpatient Medications   Medication Sig Dispense Refill    acetaminophen (TYLENOL) 500 mg tablet Take 500 mg by mouth 2 (two) times a day      ARIPiprazole (ABILIFY) 15 mg tablet Take 1 tablet (15 mg total) by mouth every evening 90 tablet 2    atorvastatin (LIPITOR) 80 mg tablet TAKE 1 TABLET BY MOUTH EVERY DAY 90 tablet 1    Cholecalciferol 4000 units CAPS Take 1 capsule (4,000 Units total) by mouth daily      FLUoxetine (PROzac) 40 MG capsule Take 1 capsule (40 mg total) by mouth 2 (two) times a day 180 capsule 2    glipiZIDE (GLUCOTROL) 5 mg tablet Take by mouth 1 tablet with lunch and two tablets with supper for diabetes - fill when patient requests 270 tablet 1    ibuprofen (MOTRIN) 200 mg tablet Take 400 mg by mouth Every other day as needed when pain not alleviated by acetaminophen      Lancets (onetouch ultrasoft) lancets Test blood sugar twice daily 100 each 1    lisinopril  (ZESTRIL) 10 mg tablet Take 1 tablet (10 mg total) by mouth 2 (two) times a day 180 tablet 3    magnesium Oxide (MAG-OX) 400 mg TABS TAKE 1 TABLET BY MOUTH 2 TIMES A DAY. 180 tablet 1    metFORMIN (GLUCOPHAGE-XR) 500 mg 24 hr tablet Take 2 tablets (1,000 mg total) by mouth 2 (two) times a day with meals      Multiple Vitamins-Minerals (multivitamin with minerals) tablet Take 1 tablet by mouth daily      omeprazole (PriLOSEC) 40 MG capsule TAKE 1 CAPSULE BY MOUTH EVERY DAY 90 capsule 1    OneTouch Ultra test strip TEST BLOOD SUGAR TWICE DAILY 200 strip 1    pioglitazone (ACTOS) 30 mg tablet TAKE 1 TABLET (30 MG TOTAL) BY MOUTH DAILY FOR DIABETES 90 tablet 1    sitaGLIPtin (JANUVIA) 50 mg tablet Take 1 tablet (50 mg total) by mouth daily 90 tablet 3    triamcinolone (KENALOG) 0.1 % oral topical paste APPLY TO AREA 3 TIMES A DAY       No current facility-administered medications for this visit.       Substance Abuse History:    Social History     Substance and Sexual Activity   Alcohol Use Not Currently    Alcohol/week: 1.0 standard drink of alcohol    Types: 1 Glasses of wine per week     Social History     Substance and Sexual Activity   Drug Use No       Social History:    Social History     Socioeconomic History    Marital status: /Civil Union     Spouse name: Not on file    Number of children: 0    Years of education: 12    Highest education level: 12th grade   Occupational History    Occupation: retired   Tobacco Use    Smoking status: Never     Passive exposure: Never    Smokeless tobacco: Never   Vaping Use    Vaping status: Never Used   Substance and Sexual Activity    Alcohol use: Not Currently     Alcohol/week: 1.0 standard drink of alcohol     Types: 1 Glasses of wine per week    Drug use: No    Sexual activity: Not Currently     Partners: Male     Birth control/protection: Male Sterilization   Other Topics Concern    Not on file   Social History Narrative    Education: high school graduate     Learning Disabilities: none    Marital History:     Children: none    Living Arrangement: lives in home with     Occupational History: retired, 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[employer]    Functioning Relationships: good support system,  is supportive    Legal History: none     History: None        Caffeine use    Denied home environment domestic violence    Job physical requirements heavy lifting     Social Drivers of Health     Financial Resource Strain: Low Risk  (2/18/2025)    Overall Financial Resource Strain (CARDIA)     Difficulty of Paying Living Expenses: Not hard at all   Food Insecurity: No Food Insecurity (2/18/2025)    Hunger Vital Sign     Worried About Running Out of Food in the Last Year: Never true     Ran Out of Food in the Last Year: Never true   Transportation Needs: No Transportation Needs (2/18/2025)    PRAPARE - Transportation     Lack of Transportation (Medical): No     Lack of Transportation (Non-Medical): No   Physical Activity: Inactive (2/18/2025)    Exercise Vital Sign     Days of Exercise per Week: 0 days     Minutes of Exercise per Session: 0 min   Stress: No Stress Concern Present (2/18/2025)    [place] [employer] - Occupational Stress Questionnaire     Feeling of Stress : Only a little   Social Connections: Moderately Isolated (2/18/2025)    Social Connection and Isolation Panel [NHANES]     Frequency of Communication with Friends and Family: Once a week     Frequency of Social Gatherings with Friends and Family: Once a week     Attends [employer] Services: Never     Active Member of Clubs or Organizations: Yes     Attends Club or Organization Meetings: More than 4 times per year     Marital Status:    Intimate Partner Violence: Not At Risk (2/18/2025)    Humiliation, Afraid, Rape, and Kick questionnaire     Fear of Current or Ex-Partner: No     Emotionally Abused: No     Physically Abused: No      Sexually Abused: No   Housing Stability: Low Risk  (2/18/2025)    Housing Stability Vital Sign     Unable to Pay for Housing in the Last Year: No     Number of Times Moved in the Last Year: 0     Homeless in the Last Year: No       Family Psychiatric History:     Family History   Problem Relation Age of Onset    Cancer Mother         lung    Heart disease Mother     Coronary artery disease Mother         CABG    Diabetes Mother     Lung cancer Mother 68        smoker    Stroke Mother     Thyroid disease Mother     COPD Mother     Parkinsonism Father     Intestinal polyp Father     Bipolar disorder Father     Heart disease Father     Prostate cancer Father 58    Hypertension Father     Cancer Father     Hypertension Sister     Alcohol abuse Sister     Thyroid disease unspecified Sister     Arthritis Sister     Diabetes Brother     Depression Brother     Heart disease Brother     Hypertension Brother     Arthritis Brother     Heart disease Brother     Seizures Brother     Diabetes Maternal Grandmother     Thyroid disease Maternal Grandmother     No Known Problems Maternal Grandfather     No Known Problems Paternal Grandmother     No Known Problems Paternal Grandfather     Endometrial cancer Maternal Aunt         unknown age    No Known Problems Maternal Aunt     No Known Problems Maternal Aunt     No Known Problems Maternal Aunt     Suicide Attempts Paternal Aunt     Depression Paternal Aunt     No Known Problems Paternal Aunt     No Known Problems Paternal Aunt     No Known Problems Paternal Aunt     Ovarian cancer Cousin 40        maternal       History Review: The following portions of the patient's history were reviewed and updated as appropriate: allergies, current medications, past family history, past medical history, past social history, past surgical history, and problem list.         OBJECTIVE:     Vital signs in last 24 hours:    Vitals:    02/18/25 1443   BP: 151/72   Pulse: 69   Weight: 106 kg (234 lb)  "  Height: 5' 3\" (1.6 m)       Mental Status Evaluation:    Appearance age appropriate, casually dressed   Behavior cooperative, appears anxious   Speech normal rate, normal volume, normal pitch   Mood depressed, anxious   Affect constricted   Thought Processes organized, goal directed   Associations intact associations   Thought Content no overt delusions   Perceptual Disturbances: no auditory hallucinations, no visual hallucinations   Abnormal Thoughts  Risk Potential Suicidal ideation - None  Homicidal ideation - None  Potential for aggression - No   Orientation oriented to person, place, time/date, and situation   Memory recent and remote memory grossly intact   Consciousness alert and awake   Attention Span Concentration Span attention span and concentration appear shorter than expected for age   Intellect appears to be of average intelligence   Insight intact   Judgement intact   Muscle Strength and  Gait normal muscle strength and normal muscle tone, normal gait and normal balance   Motor activity no abnormal movements   Language no difficulty naming common objects, no difficulty repeating a phrase, no difficulty writing a sentence   Fund of Knowledge adequate knowledge of current events  adequate fund of knowledge regarding past history  adequate fund of knowledge regarding vocabulary    Pain none   Pain Scale 0       Laboratory Results: I have personally reviewed all pertinent laboratory/tests results    Recent Labs (last 2 months):   Orders Only on 02/05/2025   Component Date Value    Campylobacter Spp. AG 02/05/2025      RUBEN/SHIG/CAMPY, CULTURE/* 02/05/2025      SAL/SHIG/CAMPY, CULTURE/* 02/05/2025      Ova and Parasites with G* 02/05/2025     Hospital Outpatient Visit on 01/29/2025   Component Date Value    POC Glucose 01/29/2025 149 (H)     Case Report 01/29/2025                      Value:Surgical Pathology Report                         Case: [id number]                                  Authorizing " Provider:  Mark Jenkins MD         Collected:           01/29/2025 0744              Ordering Location:     [facility]        Received:            01/29/2025 1215                                     [facility]                                                    Pathologist:           Rae Winters DO                                                     Specimens:   A) - Polyp, Colorectal, ascending colon, cold snare                                                 B) - Colon, random colon                                                                            C) - Terminal Ileum, terminal ileum                                                        Final Diagnosis 01/29/2025                      Value:A. Colon, Ascending (Polyp), Biopsy:  - Tubular adenoma.    B. Colon, Random, Biopsies:  - Colonic mucosa with prominent lymphoid aggregate.    C. Small Intestine, Terminal Ileum, Biopsy:  - Small intestinal mucosa with no specific pathologic change.      Note 01/29/2025                      Value:The endoscopic report was reviewed.      Additional Information 01/29/2025                      Value:All reported additional testing was performed with appropriately reactive controls.  These tests were developed and their performance characteristics determined by [facility] or appropriate performing facility, though some tests may be performed on tissues which have not been validated for performance characteristics (such as staining performed on alcohol exposed cell blocks and decalcified tissues).  Results should be interpreted with caution and in the context of the patients’ clinical condition. These tests may not be cleared or approved by the U.S. Food and Drug Administration, though the FDA has determined that such clearance or approval is not necessary. These tests are used for clinical purposes and they should not be regarded as investigational or for research.  "This laboratory has been approved by CLIA 88, designated as a high-complexity laboratory and is qualified to perform these tests.      Interpretation performed at [facility], [facility] [address]      Synoptic Checklist 01/29/2025                      Value:                            COLON/RECTUM POLYP FORM - GI - All Specimens                                                                                     :    Adenoma(s)      Gross Description 01/29/2025                      Value:A. The specimen is received in formalin, labeled with the patient's name and hospital number, and is designated \" ascending colon polyp\".  The specimen consists of a single tan soft tissue fragment measuring 0.7 cm in greatest dimension.  Entirely submitted. Screened cassette.  B. The specimen is received in formalin, labeled with the patient's name and hospital number, and is designated \" random colon\".  The specimen consists of 4 tan soft tissue fragments measuring in range of 0.1-0.3 cm in greatest dimension.  Entirely submitted. Screened cassette.  C. The specimen is received in formalin, labeled with the patient's name and hospital number, and is designated \" terminal ileum\".  The specimen consists of 2 tan soft tissue fragments measuring 0.2 and 0.3 cm in greatest dimension.  Entirely submitted. Screened cassette.    Note: The estimated total formalin fixation time based upon information provided by the submitting clinician and the standard processing schedule is under 72                           hours.    -KEmeigh     Orders Only on 01/20/2025   Component Date Value    Total Cholesterol 01/20/2025 181     HDL 01/20/2025 49 (L)     Triglycerides 01/20/2025 332 (H)     LDL Calculated 01/20/2025 88     Chol HDLC Ratio 01/20/2025 3.7     Non-HDL Cholesterol 01/20/2025 132 (H)     Creatinine, Urine 01/20/2025 181     Albumin,U,Random 01/20/2025 26.6     Microalb/Creat Ratio 01/20/2025 " 147 (H)     Glucose, Random 01/20/2025 145 (H)     BUN 01/20/2025 10     Creatinine 01/20/2025 0.60     eGFR 01/20/2025 101     SL AMB BUN/CREATININE RA* 01/20/2025 SEE NOTE:     Sodium 01/20/2025 135     Potassium 01/20/2025 4.5     Chloride 01/20/2025 97 (L)     CO2 01/20/2025 29     Calcium 01/20/2025 9.1     Protein, Total 01/20/2025 7.3     Albumin 01/20/2025 4.0     Globulin 01/20/2025 3.3     Albumin/Globulin Ratio 01/20/2025 1.2     TOTAL BILIRUBIN 01/20/2025 0.4     Alkaline Phosphatase 01/20/2025 125     AST 01/20/2025 30     ALT 01/20/2025 23     Hemoglobin A1C 01/20/2025 9.3 (H)        Suicide/Homicide Risk Assessment:    Risk of Harm to Self:  Demographic risk factors include: , age: over 50 or older  Historical Risk Factors include: history of anxiety, history of mood disorder, history of suicide attempts  Recent Specific Risk Factors include: diagnosis of mood disorder, current depressive symptoms, significant anxiety symptoms, recent losses (sister), health problems  Protective Factors: no current suicidal ideation, being , compliant with medications, compliant with mental health treatment, responsibilities and duties to others, stable living environment, supportive family  Weapons: none. The following steps have been taken to ensure weapons are properly secured: not applicable  Based on today's assessment, Jessica presents the following risk of harm to self: low    Risk of Harm to Others:  The following ratings are based on assessment at the time of the interview  Based on today's assessment, Jessica presents the following risk of harm to others: none    The following interventions are recommended: continue medication management    Psychotherapy Provided:     Individual psychotherapy provided: Yes  Counseling was provided during the session today for 18 minutes.  Medications, treatment progress and treatment plan reviewed with Jessica.  Medication changes discussed with Jessica.  Medication  education provided to Jessica.  Goals discussed during in session: improve control of anxiety, improve control of depression, and improve mood stability.   Discussed with Jessica coping with sister's death, health issues, and chronic mental illness.   Coping mechanisms including Spiritism involvement, crocheting, and reducing time spent worrying reviewed with Jessica.   Supportive therapy provided.      Treatment Plan:    Completed and signed during the session: Yes - with Jessica    Depression Follow-up Plan Completed: Yes    Note Share:    This note was shared with patient.    Visit Time    Visit Start Time: 2:33 PM  Visit Stop Time: 3:05 PM  Total Visit Duration:  32 minutes    Muriel Bello MD 02/18/25

## 2025-02-18 ENCOUNTER — OFFICE VISIT (OUTPATIENT)
Age: 65
End: 2025-02-18
Payer: COMMERCIAL

## 2025-02-18 ENCOUNTER — OFFICE VISIT (OUTPATIENT)
Dept: PSYCHIATRY | Facility: CLINIC | Age: 65
End: 2025-02-18
Payer: COMMERCIAL

## 2025-02-18 ENCOUNTER — APPOINTMENT (OUTPATIENT)
Age: 65
End: 2025-02-18
Payer: COMMERCIAL

## 2025-02-18 VITALS
WEIGHT: 234 LBS | HEIGHT: 63 IN | DIASTOLIC BLOOD PRESSURE: 72 MMHG | SYSTOLIC BLOOD PRESSURE: 151 MMHG | BODY MASS INDEX: 41.46 KG/M2 | HEART RATE: 69 BPM

## 2025-02-18 VITALS — WEIGHT: 228 LBS | HEIGHT: 63 IN | BODY MASS INDEX: 40.4 KG/M2

## 2025-02-18 DIAGNOSIS — M54.2 NECK PAIN, CHRONIC: ICD-10-CM

## 2025-02-18 DIAGNOSIS — Z79.899 LONG-TERM USE OF HIGH-RISK MEDICATION: Chronic | ICD-10-CM

## 2025-02-18 DIAGNOSIS — M54.12 RADICULOPATHY, CERVICAL REGION: ICD-10-CM

## 2025-02-18 DIAGNOSIS — G89.29 NECK PAIN, CHRONIC: ICD-10-CM

## 2025-02-18 DIAGNOSIS — F31.76 BIPOLAR I DISORDER, MOST RECENT EPISODE DEPRESSED, IN REMISSION (HCC): Primary | Chronic | ICD-10-CM

## 2025-02-18 DIAGNOSIS — M54.12 RADICULOPATHY, CERVICAL REGION: Primary | ICD-10-CM

## 2025-02-18 DIAGNOSIS — Z98.1 S/P CERVICAL SPINAL FUSION: ICD-10-CM

## 2025-02-18 DIAGNOSIS — F41.1 GAD (GENERALIZED ANXIETY DISORDER): Chronic | ICD-10-CM

## 2025-02-18 PROCEDURE — 99214 OFFICE O/P EST MOD 30 MIN: CPT | Performed by: ORTHOPAEDIC SURGERY

## 2025-02-18 PROCEDURE — 99214 OFFICE O/P EST MOD 30 MIN: CPT | Performed by: PSYCHIATRY & NEUROLOGY

## 2025-02-18 PROCEDURE — 90833 PSYTX W PT W E/M 30 MIN: CPT | Performed by: PSYCHIATRY & NEUROLOGY

## 2025-02-18 PROCEDURE — 72050 X-RAY EXAM NECK SPINE 4/5VWS: CPT

## 2025-02-18 RX ORDER — TRIAMCINOLONE ACETONIDE 0.1 %
PASTE (GRAM) DENTAL
COMMUNITY
Start: 2025-02-11

## 2025-02-18 RX ORDER — ARIPIPRAZOLE 15 MG/1
15 TABLET ORAL EVERY EVENING
Qty: 90 TABLET | Refills: 2 | Status: SHIPPED | OUTPATIENT
Start: 2025-02-18 | End: 2025-11-15

## 2025-02-18 RX ORDER — FLUOXETINE 40 MG/1
40 CAPSULE ORAL 2 TIMES DAILY
Qty: 180 CAPSULE | Refills: 2 | Status: SHIPPED | OUTPATIENT
Start: 2025-02-18 | End: 2025-11-15

## 2025-02-18 NOTE — ASSESSMENT & PLAN NOTE
Increased depression and mood swings    Continue Prozac 40 mg bid to control depressive symptoms, mood swings and anxiety symptoms  Increase Abilify to 15 mg in the evening to help with mood  Orders:    ARIPiprazole (ABILIFY) 15 mg tablet; Take 1 tablet (15 mg total) by mouth every evening    FLUoxetine (PROzac) 40 MG capsule; Take 1 capsule (40 mg total) by mouth 2 (two) times a day

## 2025-02-18 NOTE — BH TREATMENT PLAN
"TREATMENT PLAN (Medication Management Only)        [facility]    Name/Date of Birth/MRN:  Jessica Zhou 64 y.o. 1960 MRN: [id number]  Date of Treatment Plan: February 18, 2025  Diagnosis/Diagnoses:   1. Bipolar I disorder, most recent episode depressed, in remission (HCC)    2. LOURDES (generalized anxiety disorder)    3. Long-term use of high-risk medication      Strengths/Personal Resources for Self-Care: \"I am teaching crocheting\".  Area/Areas of need (in own words): \"my weigh and getting more motivated\".  1. Long Term Goal:   improve control of anxiety, improve control of depression, improve mood stability  Target Date: 2 months - 4/18/2025  Person/Persons responsible for completion of goal: Jessica  2.  Short Term Objective (s) - How will we reach this goal?:   A.  Provider new recommended medication/dosage changes and/or continue medication(s): increase Abilify, continue all other medications (Prozac).  B.  N/A.  C.  N/A.  Target Date: 2 months - 4/18/2025  Person/Persons Responsible for Completion of Goal: Jessica   Progress Towards Goals: regressed  Treatment Modality: medication management every 2 months  Review due 180 days from date of this plan: 6 months - 8/18/2025  Expected length of service: ongoing treatment unless revised  My Physician/PA/NP and I have developed this plan together and I agree to work on the goals and objectives. I understand the treatment goals that were developed for my treatment.  Electronic Signatures: on file (unless signed below)    Muriel Bello MD 02/18/25  "

## 2025-02-18 NOTE — ASSESSMENT & PLAN NOTE
On and off anxiety    Continue Prozac 40 mg bid to control depressive symptoms, mood swings and anxiety symptoms  Orders:    FLUoxetine (PROzac) 40 MG capsule; Take 1 capsule (40 mg total) by mouth 2 (two) times a day

## 2025-02-18 NOTE — PROGRESS NOTES
Name: Jessica Zhou      : 1960       MRN: [id number]   Encounter Provider: Boogie Cox MD   Encounter Date: 25  Encounter department: [facility] ORTHOPEDIC SPINE SURGERY      History of Present Illness    Jessica Zhou is a 64 y.o. female who presents for follow-up evaluation of her cervical spine. Patient is approximately 3 years and 6 months s/p anterior cervical fusion with diskectomy of C4-5, C5-6, C6-7 performed by Dr. Wright on 8/3/2021. Patient was last seen in the office on 10/14/2024, where an MRI of the cervical spine was ordered. At this time, patient reports that she experiences occasional stiffness in her neck in the morning, but it resolves after doing her home exercises. Denies any numbness, tingling, or weakness into either upper extremity. Endorses taking occasional ibuprofen for symptom management.          ALLERGIES:   Allergies   Allergen Reactions    Bee Venom Swelling     Local swelling at sting site    Biaxin [Clarithromycin] Itching    Depakote Er [Divalproex Sodium Er]      pancreatitis    Shellfish-Derived Products - Food Allergy Other (See Comments)     Ears and cheeks got red    Tanzeum [Albiglutide]      pancreatitis    Carbamazepine Other (See Comments)     Reaction Date:Unknown    Lamotrigine Rash       MEDICATIONS:    Current Outpatient Medications:     acetaminophen (TYLENOL) 500 mg tablet, Take 500 mg by mouth 2 (two) times a day, Disp: , Rfl:     ARIPiprazole (ABILIFY) 10 mg tablet, Take 1 tablet (10 mg total) by mouth every evening, Disp: 90 tablet, Rfl: 2    atorvastatin (LIPITOR) 80 mg tablet, TAKE 1 TABLET BY MOUTH EVERY DAY, Disp: 90 tablet, Rfl: 1    Cholecalciferol 4000 units CAPS, Take 1 capsule (4,000 Units total) by mouth daily, Disp: , Rfl:     FLUoxetine (PROzac) 40 MG capsule, TAKE 1 CAPSULE BY MOUTH TWICE A DAY, Disp: 180 capsule, Rfl: 0    glipiZIDE (GLUCOTROL) 5 mg tablet, Take by mouth 1 tablet  with lunch and two tablets with supper for diabetes - fill when patient requests, Disp: 270 tablet, Rfl: 1    ibuprofen (MOTRIN) 200 mg tablet, Take 400 mg by mouth Every other day as needed when pain not alleviated by acetaminophen, Disp: , Rfl:     Lancets (onetouch ultrasoft) lancets, Test blood sugar twice daily, Disp: 100 each, Rfl: 1    lisinopril (ZESTRIL) 10 mg tablet, Take 1 tablet (10 mg total) by mouth 2 (two) times a day, Disp: 180 tablet, Rfl: 3    magnesium Oxide (MAG-OX) 400 mg TABS, TAKE 1 TABLET BY MOUTH 2 TIMES A DAY., Disp: 180 tablet, Rfl: 1    metFORMIN (GLUCOPHAGE-XR) 500 mg 24 hr tablet, Take 2 tablets (1,000 mg total) by mouth 2 (two) times a day with meals, Disp: , Rfl:     Multiple Vitamins-Minerals (multivitamin with minerals) tablet, Take 1 tablet by mouth daily, Disp: , Rfl:     omeprazole (PriLOSEC) 40 MG capsule, TAKE 1 CAPSULE BY MOUTH EVERY DAY, Disp: 90 capsule, Rfl: 1    OneTouch Ultra test strip, TEST BLOOD SUGAR TWICE DAILY, Disp: 200 strip, Rfl: 1    pioglitazone (ACTOS) 30 mg tablet, TAKE 1 TABLET (30 MG TOTAL) BY MOUTH DAILY FOR DIABETES, Disp: 90 tablet, Rfl: 1    sitaGLIPtin (JANUVIA) 50 mg tablet, Take 1 tablet (50 mg total) by mouth daily, Disp: 90 tablet, Rfl: 3    triamcinolone (KENALOG) 0.1 % oral topical paste, APPLY TO AREA 3 TIMES A DAY, Disp: , Rfl:      PAST MEDICAL HISTORY:   Past Medical History:   Diagnosis Date    Abdominal pain     started 7/21/16- found something on the pancreas    Abnormal weight loss     last assessed [id number]    Acute sinusitis     Allergic rhinitis     last assessed [id number]    Anemia     had iron infusion    Asthma     Cardiac murmur     Carpal tunnel syndrome     Cataracts, bilateral     Cellulitis     Cervical disc disease     Cervical myelopathy (HCC) 09/07/2022    Colon polyp     CPAP (continuous positive airway pressure) dependence     not using at this time    Degeneration of thoracic intervertebral disc     Degenerative  arthritis of knee     Diabetes mellitus (HCC)     Diverticulosis     Esophagitis, reflux     last assessed 29Jan2014    Excessive sweating     Fatty liver     Frequent falls     Gait disturbance     GERD (gastroesophageal reflux disease)     Granuloma annulare     Head injury     Heart murmur     Hyperlipidemia     Hypertension     Hypertensive heart disease     Hypoglycemia     last assessed 08Feb2016    Kidney stone     Leg length discrepancy     Leucocytosis     Lumbar stress fracture     Multiple thyroid nodules     Neuropathy     Obesity     last assessed 25Dec2016    Paronychia of toe     Patellofemoral dysfunction     Pneumonia     Postmenopausal atrophic vaginitis     Prepatellar bursitis     Sebaceous cyst     last assessed 21Feb2014    Sleep apnea     no CPAP used    Suicide attempt ([facility])     Tinea corporis     Type 2 diabetes mellitus ([facility])     cont meds and monitor BS;  last assessed 08Nov2016    Uncontrolled type 2 diabetes mellitus with hyperglycemia, with long-term current use of insulin ([facility]) 08/05/2019    Urinary frequency     Vertigo     Vitamin D deficiency     Dia-Parkinson-White syndrome        PAST SURGICAL HISTORY:  Past Surgical History:   Procedure Laterality Date    CATARACT EXTRACTION Left 07/2024    CERVICAL FUSION N/A 08/03/2021    Procedure: FUSION CERVICAL ANTERIOR W DISCECTOMY C4-5, C5-6, C6-7 with allograft and neuromonitoring  case length 3 hours;  Surgeon: Kodak Wright MD;  Location: [facility];  Service: Orthopedics    COLONOSCOPY      ESOPHAGOGASTRODUODENOSCOPY N/A 08/08/2016    Procedure: ESOPHAGOGASTRODUODENOSCOPY (EGD);  Surgeon: Trey Bernstein MD;  Location: [facility] GI LAB;  Service:     LUMBAR FUSION      ID ESOPHAGOGASTRODUODENOSCOPY TRANSORAL DIAGNOSTIC N/A 03/28/2019    Procedure: ESOPHAGOGASTRODUODENOSCOPY (EGD);  Surgeon: Trey Bernstein MD;  Location: [facility] GI LAB;  Service: Gastroenterology    ID NEUROPLASTY &/TRANSPOS MEDIAN NRV CARPAL TUNNE Right 08/09/2022     Procedure: RELEASE CARPAL TUNNEL;  Surgeon: Richie Borden MD;  [facility] MAIN OR;  Service: Orthopedics    SPINE SURGERY      TONSILLECTOMY      TOOTH EXTRACTION      wisdom teeth removed       SOCIAL HISTORY:  Social History     Tobacco Use   Smoking Status Never    Passive exposure: Never   Smokeless Tobacco Never        Physical Exam    64 y.o. female sitting comfortably on exam chair in no apparent distress.   Ambulates with normal gait  Patient is able to go up on toes and on heels   Patient is  able to balance on 1 leg   Strength RUE:  finger abduction 5/5,  strength 5/5, wrist flexion 5/5, wrist extension 5/5 , elbow flexion 5/5, elbow extension 5/5, shoulder external rotation 5/5  Strength LUE:  finger abduction 5/5,  strength 5/5, wrist flexion 5/5, wrist extension 5/5 , elbow flexion 5/5, elbow extension 5/5, shoulder external rotation 5/5  Sensation is intact and equal bilaterally in upper extremities   Reflexes:    R knee 2+, R achilles absent   L knee 2+, L achilles absent   R triceps 1+, R biceps 1+, R brachioradialis 1+   L triceps 1+, L biceps 1+, L brachioradialis 1+  Jade's: positive on right   Non-TTP over spinous processes and paraspinal muscles        RADIOGRAPHIC STUDIES:  X-ray, C-spine, 10/27/2021:  Status post anterior cervical diskectomy and fusion at C4-5 C5-6 and C6-7.  No hardware complication.  MRI, C-spine, 6/16/2021:   Multilevel cervical degenerative disc disease.  Disc protrusion spinal cord compression involving multiple levels, at C6-7.  Xrays, C-spine, 1/24/2021: Status post anterior cervical diskectomy and fusion at C4-5 C5-6 and C6-7.  No hardware complication.  I compared these films with prior films on 01/24/2022 and so no significant difference between the films.  There was evidence of plate disease at the superior level C3-4.Xrays, cervical spine, 8/08/2022:  Status post anterior cervical diskectomy fusion from C4 through C7.  I compared the films to  prior x-rays.  There is evidence of plate disease at C3-4. There is also evidence of pseudoarthrosis at C4-5 and C6-7.  There is no evidence of hardware complication.  Xrays, cervical spine, 2/13/2023:  Status post anterior cervical diskectomy fusion from C4 through C7.  There is evidence of plate disease at C3-4.  There is also evidence of pseudoarthrosis at C4-5 and C6-7.  There is no evidence of hardware complication.  No cnanges compared to prior X-rays.  Xrays, cervical spine, 8/15/2023: As per sensor cervical discectomy and fusion at C4-5, C5-6 and C6-7.  There is evidence of subsidence and plate disease affecting the C3-4 level.  The spine appears to be fused.  MRI, cervical spine, 10/19/2024: Status post prior cervical fusion from C4-C7.  Severe degenerative changes at C3-4.  There is evidence of persistent stenosis at C4-5, C5-6 and C6-7 with spinal cord compression with mild deformity.  There is evidence of myelomalacia at C4-5.  X-rays, cervical spine, 2/18/2025: Solid fusion at C4-5 and C5-6.  Plate disease at C3-4.  Pseudoarthrosis C6-7.    Assessment & Plan  Radiculopathy, cervical region    Orders:    XR spine cervical complete 4 or 5 vw non injury; Future    Neck pain, chronic    Orders:    XR spine cervical complete 4 or 5 vw non injury; Future    S/P cervical spinal fusion    Orders:    XR spine cervical complete 4 or 5 vw non injury; Future           PLAN:  64 y.o. female with cervical myelopathy, who is approximately 3 years and 6 months status post anterior cervical fusion with diskectomy of C4-5, C5-6, C6-7 performed by Dr. Wright on 8/3/2021      MRI images of the cervical spine, taken 10/19/2024, and x-ray images of the cervical spine, taken today, were reviewed and discussed with the patient during today's visit. Discussed that the MRI images show evidence of spinal cord compression and myelomalacia at C4-5. Discussed that x-ray images show failure to heal at the bottom level of the fusion,  C6-7.     Potential options for treatment were discussed, including conservative, symptomatic management and surgical intervention, in the form of revision ACDF. At this time, it was recommend that the patient be closely monitored for any changes in function. Should patient experience any changes in her walking or dexterity, it was recommended that she return to office at her earliest convenience, as surgical intervention may be indicated at that time. Discussed the need for a repeat MRI study in 1 year to evaluation for progression of spinal cord damage.     Patient is to return in 3 months for re-evaluation.      Scribe Attestation      I,:  Mahi Martines PA-C am acting as a scribe while in the presence of the attending physician.:       I,:  Boogie Cox MD personally performed the services described in this documentation    as scribed in my presence.:

## 2025-02-20 DIAGNOSIS — R10.13 EPIGASTRIC PAIN: ICD-10-CM

## 2025-02-20 DIAGNOSIS — K29.80 DUODENITIS: ICD-10-CM

## 2025-02-20 RX ORDER — OMEPRAZOLE 40 MG/1
40 CAPSULE, DELAYED RELEASE ORAL DAILY
Qty: 90 CAPSULE | Refills: 1 | Status: SHIPPED | OUTPATIENT
Start: 2025-02-20

## 2025-02-20 NOTE — TELEPHONE ENCOUNTER
Patient returned call and is aware of the resutls.  Also wanted to let us know that her diarrhea has stopped as she figured out it was the Roslyn milk causing the loose stools

## 2025-02-28 ENCOUNTER — RA CDI HCC (OUTPATIENT)
Dept: OTHER | Facility: HOSPITAL | Age: 65
End: 2025-02-28

## 2025-02-28 NOTE — PROGRESS NOTES
HCC coding opportunities       Chart reviewed, no opportunity found: CHART REVIEWED, NO OPPORTUNITY FOUND        Patients Insurance        Commercial Insurance: [employer] Commercial [employer]       This is a reminder to assess ((resolve/update/ assess)  all HCC (risk adjustment) codes for the year 2025 as patients SERA scores reset to zero with the New year.    Also, just a reminder to please review and assess all other chronic conditions for 2025.

## 2025-03-03 ENCOUNTER — OFFICE VISIT (OUTPATIENT)
Dept: INTERNAL MEDICINE CLINIC | Facility: CLINIC | Age: 65
End: 2025-03-03
Payer: COMMERCIAL

## 2025-03-03 ENCOUNTER — TELEPHONE (OUTPATIENT)
Age: 65
End: 2025-03-03

## 2025-03-03 VITALS
SYSTOLIC BLOOD PRESSURE: 140 MMHG | WEIGHT: 293 LBS | BODY MASS INDEX: 52.26 KG/M2 | DIASTOLIC BLOOD PRESSURE: 78 MMHG | HEART RATE: 63 BPM | OXYGEN SATURATION: 96 %

## 2025-03-03 DIAGNOSIS — R80.9 TYPE 2 DIABETES MELLITUS WITH MICROALBUMINURIA, WITHOUT LONG-TERM CURRENT USE OF INSULIN (HCC): Primary | ICD-10-CM

## 2025-03-03 DIAGNOSIS — H92.01 RIGHT EAR PAIN: Primary | ICD-10-CM

## 2025-03-03 DIAGNOSIS — H60.501 ACUTE OTITIS EXTERNA OF RIGHT EAR, UNSPECIFIED TYPE: ICD-10-CM

## 2025-03-03 DIAGNOSIS — E11.29 TYPE 2 DIABETES MELLITUS WITH MICROALBUMINURIA, WITHOUT LONG-TERM CURRENT USE OF INSULIN (HCC): Primary | ICD-10-CM

## 2025-03-03 PROBLEM — H92.09 EAR PAIN: Status: ACTIVE | Noted: 2025-03-03

## 2025-03-03 PROCEDURE — PBNCHG PB NO CHARGE PLACEHOLDER: Performed by: PHARMACIST

## 2025-03-03 PROCEDURE — 99213 OFFICE O/P EST LOW 20 MIN: CPT

## 2025-03-03 RX ORDER — OFLOXACIN 3 MG/ML
5 SOLUTION AURICULAR (OTIC) 2 TIMES DAILY
Qty: 14 ML | Refills: 1 | Status: SHIPPED | OUTPATIENT
Start: 2025-03-03 | End: 2025-03-17

## 2025-03-03 NOTE — PROGRESS NOTES
Name: Jessica Zhou      : 1960      MRN: [id number]  Encounter Provider: Olga Lidia Ching MD  Encounter Date: 3/3/2025   Encounter department: [facility]  :  Assessment & Plan  Right ear pain  Ear pain for 1.5 weeks after removing wax from ear after shower      Orders:    ofloxacin (FLOXIN) 0.3 % otic solution; Administer 5 drops to the right ear 2 (two) times a day for 14 days    Acute otitis externa of right ear, unspecified type                History of Present Illness   This is 64-year-old female who presented for acute worsening right-sided ear pain after removing the cerumen via Q-tip 1.5 weeks ago after taking shower.  No ear drainage/fever/mastoid swelling.    Earache   There is pain in the right ear. This is a new problem. The current episode started 1 to 4 weeks ago. The problem occurs constantly. The problem has been gradually worsening. There has been no fever. The pain is at a severity of 7/10. The pain is moderate. Pertinent negatives include no hearing loss. She has tried nothing for the symptoms.     Review of Systems   Constitutional: Negative.    HENT:  Positive for ear pain. Negative for facial swelling, hearing loss and mouth sores.    Respiratory: Negative.     Cardiovascular: Negative.    Gastrointestinal: Negative.    Genitourinary: Negative.    Neurological: Negative.    Psychiatric/Behavioral: Negative.         Objective   /78 (BP Location: Left leg)   Pulse 63   Wt 134 kg (295 lb)   SpO2 96%   BMI 52.26 kg/m²      Physical Exam  Constitutional:       General: She is not in acute distress.     Appearance: Normal appearance. She is obese. She is not ill-appearing, toxic-appearing or diaphoretic.   HENT:      Head:      Comments: Otitis externa  Tender tragus  Tympanic membrane unremarkable with preserved cone of light  Tympanic membrane unremarkable without signs of protrusion/perforation/edema/erythema  Cardiovascular:      Rate and Rhythm:  Normal rate and regular rhythm.      Pulses: Normal pulses.      Heart sounds: Normal heart sounds. No murmur heard.     No friction rub. No gallop.   Pulmonary:      Effort: Pulmonary effort is normal. No respiratory distress.      Breath sounds: Normal breath sounds. No stridor. No wheezing, rhonchi or rales.   Chest:      Chest wall: No tenderness.   Musculoskeletal:      Right lower leg: No edema.      Left lower leg: No edema.   Neurological:      General: No focal deficit present.      Mental Status: She is alert and oriented to person, place, and time. Mental status is at baseline.   Psychiatric:         Mood and Affect: Mood normal.         Behavior: Behavior normal.         Thought Content: Thought content normal.         Judgment: Judgment normal.

## 2025-03-03 NOTE — ASSESSMENT & PLAN NOTE
Lab Results   Component Value Date    HGBA1C 9.3 (H) 01/20/2025   Started Januvia per PCP early February -very expensive but patient paid - has high deductible plan  Tolerating well   No SMBG to monitor efficacy  Diarrhea improved with no almond milk  Next A1C at PCP 4/29

## 2025-03-03 NOTE — TELEPHONE ENCOUNTER
This message is for Alem Mason. Please forward.    Jessica is having an issue with her glucose monitor that Alem installed. It isn't taking the password and she needs assistance. Please call her back at [phone].

## 2025-03-03 NOTE — PROGRESS NOTES
[facility]  Alem Mason, Pharmacist    Assessment/ Plan     Assessment & Plan  Type 2 diabetes mellitus with microalbuminuria, without long-term current use of insulin ([facility])    Lab Results   Component Value Date    HGBA1C 9.3 (H) 01/20/2025   Started Januvia per PCP early February -very expensive but patient paid - has high deductible plan  Tolerating well   No SMBG to monitor efficacy  Diarrhea improved with no almond milk  Next A1C at PCP 4/29               Follow-up: 1 month    Subjective     Medication Adherence/ Tolerability/ Cost:  Patient denies side effects, no issues with cost, 0 missed doses in last two week  acetaminophen  ARIPiprazole  atorvastatin  Cholecalciferol Caps  FLUoxetine  glipiZIDE  ibuprofen  lisinopril  magnesium Oxide Tabs  metFORMIN  multivitamin with minerals  omeprazole  OneTouch Ultra Strp  onetouch ultrasoft  pioglitazone  sitaGLIPtin  triamcinolone      2. Lifestyle:   Last visit with dietician: no  Previously attended Living Well with Diabetes Class: no  Diet Recall:   Breakfast:   Lunch:   Dinner:   Snacks:   Beverages:   Physical Activity: none    3. Home monitoring devices  Glucometer: Yes, Brand: unknown  Continuous Glucose Monitor: No, Brand: sending today for insurance verification  Blood Pressure monitor: No, Brand: n/a    Hypoglycemia: The patient had 0 episodes/symptoms of hypoglycemia.   Hyperglycemia: The patient had 0 symptoms of hyperglycemia.     Objective       Blood Glucose Readings  CGM Report scanned into chart   The patient is currently checking blood glucose 1 times per day. Patient does not report with SMBG logs.    ASCVD Risk:  The 10-year ASCVD risk score (Astrid MOSQUEDA, et al., 2019) is: 15.5%    Values used to calculate the score:      Age: 64 years      Sex: Female      Is Non- : No      Diabetic: Yes      Tobacco smoker: No      Systolic Blood Pressure: 151 mmHg      Is BP treated: Yes      HDL  Cholesterol: 49 mg/dL      Total Cholesterol: 181 mg/dL     Vitals:  There were no vitals filed for this visit.    Labs:    Lab Results   Component Value Date    SODIUM 135 01/20/2025    K 4.5 01/20/2025    EGFR 101 01/20/2025    CREATININE 0.60 01/20/2025    GLUF 154 (H) 04/12/2022    [id number] 344 07/10/2016    MICROALBCRE 61 (H) 10/17/2018     Pharmacist Tracking Tool     Pharmacist Tracking Tool  Reason For Outreach: Embedded Pharmacist  Demographics:  Intervention Method: In Person  Type of Intervention: Follow-Up  Topics Addressed: Diabetes  Pharmacologic Interventions: Prevent or Manage ALBERTO and Med Rec  Non-Pharmacologic Interventions: Care coordination, Chart update, Cost, Disease state education, Home Monitoring, Labs, Medication Monitoring, Medication/Device education, and Personal CGM  Time:  Direct Patient Care:  30  mins  Care Coordination:  30  mins  Recommendation Recipient: Patient/Caregiver  Outcome: Accepted

## 2025-03-03 NOTE — ASSESSMENT & PLAN NOTE
Ear pain for 1.5 weeks after removing wax from ear after shower      Orders:    ofloxacin (FLOXIN) 0.3 % otic solution; Administer 5 drops to the right ear 2 (two) times a day for 14 days

## 2025-03-18 DIAGNOSIS — E11.29 TYPE 2 DIABETES MELLITUS WITH MICROALBUMINURIA, WITHOUT LONG-TERM CURRENT USE OF INSULIN (HCC): ICD-10-CM

## 2025-03-18 DIAGNOSIS — R80.9 TYPE 2 DIABETES MELLITUS WITH MICROALBUMINURIA, WITHOUT LONG-TERM CURRENT USE OF INSULIN (HCC): ICD-10-CM

## 2025-03-18 NOTE — TELEPHONE ENCOUNTER
Reason for call:   [x] Refill   [] Prior Auth  [] Other:     Office:   [x] PCP/Provider - Adeel Hough DO   [] Specialty/Provider -     Medication: metFORMIN (GLUCOPHAGE-XR) 500 mg 24 hr tablet / Take 2 tablets (1,000 mg total) by mouth 2 (two) times a day with meals     Pharmacy: [facility]/pharmacy #[id number] - [place], [place] - [address]   Does the patient have enough for 3 days?   [x] Yes   [] No - Send as HP to POD

## 2025-03-19 RX ORDER — METFORMIN HYDROCHLORIDE 500 MG/1
1000 TABLET, EXTENDED RELEASE ORAL 2 TIMES DAILY WITH MEALS
Qty: 360 TABLET | Refills: 1 | Status: SHIPPED | OUTPATIENT
Start: 2025-03-19

## 2025-03-31 ENCOUNTER — OFFICE VISIT (OUTPATIENT)
Dept: INTERNAL MEDICINE CLINIC | Facility: CLINIC | Age: 65
End: 2025-03-31

## 2025-03-31 DIAGNOSIS — R80.9 TYPE 2 DIABETES MELLITUS WITH MICROALBUMINURIA, WITHOUT LONG-TERM CURRENT USE OF INSULIN (HCC): Primary | ICD-10-CM

## 2025-03-31 DIAGNOSIS — E11.29 TYPE 2 DIABETES MELLITUS WITH MICROALBUMINURIA, WITHOUT LONG-TERM CURRENT USE OF INSULIN (HCC): Primary | ICD-10-CM

## 2025-03-31 PROCEDURE — PBNCHG PB NO CHARGE PLACEHOLDER: Performed by: PHARMACIST

## 2025-03-31 NOTE — PROGRESS NOTES
[facility] Clinical Pharmacy Services  Alem Mason, Pharmacist    Assessment/ Plan     Assessment & Plan  Type 2 diabetes mellitus with microalbuminuria, without long-term current use of insulin ([facility])    Lab Results   Component Value Date    HGBA1C 9.3 (H) 01/20/2025   Patient doing well, wore sample Linda x 15 days - showed improvement with EAG correlated to A1C 7.8  This is with diet changes plus Januvia  Paid $900 for Januvia - has high deductible plan, will see if [employer] through her employer can reduce cost to have ongoing therapy  Seeing PCP in approx. 1 month with bloodwork  Continue metformin, glipizide januvia, pioglitazone               Follow-up: 1 month    Subjective     Medication Adherence/ Tolerability/ Cost:  Patient denies side effects, no issues with cost, 0 missed doses in last two week  acetaminophen  ARIPiprazole  atorvastatin  Cholecalciferol Caps  FLUoxetine  glipiZIDE  ibuprofen  lisinopril  magnesium Oxide Tabs  metFORMIN  multivitamin with minerals  omeprazole  OneTouch Ultra Strp  onetouch ultrasoft  pioglitazone  sitaGLIPtin  triamcinolone      2. Lifestyle:   Last visit with dietician: no  Previously attended Living Well with Diabetes Class: no  Diet Recall:   Breakfast:   Lunch:   Dinner:   Snacks:   Beverages:   Physical Activity: none    3. Home monitoring devices  Glucometer: Yes, Brand: unknown  Continuous Glucose Monitor: No, Brand: sending today for insurance verification  Blood Pressure monitor: No, Brand: n/a    Hypoglycemia: The patient had 0 episodes/symptoms of hypoglycemia.   Hyperglycemia: The patient had 0 symptoms of hyperglycemia.     Objective       Blood Glucose Readings  CGM Report scanned into chart   The patient is currently checking blood glucose 1 times per day. Patient does not report with SMBG logs.    ASCVD Risk:  The 10-year ASCVD risk score (Astrid DK, et al., 2019) is: 13.4%    Values used to calculate the score:      Age: 64 years      Sex: Female       Is Non- : No      Diabetic: Yes      Tobacco smoker: No      Systolic Blood Pressure: 140 mmHg      Is BP treated: Yes      HDL Cholesterol: 49 mg/dL      Total Cholesterol: 181 mg/dL     Vitals:  There were no vitals filed for this visit.    Labs:    Lab Results   Component Value Date    SODIUM 135 01/20/2025    K 4.5 01/20/2025    EGFR 101 01/20/2025    CREATININE 0.60 01/20/2025    GLUF 154 (H) 04/12/2022    [id number] 344 07/10/2016    MICROALBCRE 61 (H) 10/17/2018     Pharmacist Tracking Tool     Pharmacist Tracking Tool  Reason For Outreach: Embedded Pharmacist  Demographics:  Intervention Method: In Person  Type of Intervention: Follow-Up  Topics Addressed: Diabetes  Pharmacologic Interventions: Prevent or Manage ALBERTO and Med Rec  Non-Pharmacologic Interventions: Care coordination, Chart update, Cost, Disease state education, Home Monitoring, Labs, Medication Monitoring, Medication/Device education, and Personal CGM  Time:  Direct Patient Care:  30  mins  Care Coordination:  30  mins  Recommendation Recipient: Patient/Caregiver  Outcome: Accepted

## 2025-03-31 NOTE — Clinical Note
Can you print this hardcopy RX to be faxed to [facility]? I can't print it or it will show me as the authorizing provider. Thank you!

## 2025-03-31 NOTE — ASSESSMENT & PLAN NOTE
Lab Results   Component Value Date    HGBA1C 9.3 (H) 01/20/2025   Patient doing well, wore sample Linda x 15 days - showed improvement with EAG correlated to A1C 7.8  This is with diet changes plus Januvia  Paid $900 for Januvia - has high deductible plan, will see if [facility] through her employer can reduce cost to have ongoing therapy  Seeing PCP in approx. 1 month with bloodwork  Continue metformin, glipizide januvia, pioglitazone

## 2025-04-01 DIAGNOSIS — E11.29 TYPE 2 DIABETES MELLITUS WITH MICROALBUMINURIA, WITHOUT LONG-TERM CURRENT USE OF INSULIN (HCC): ICD-10-CM

## 2025-04-01 DIAGNOSIS — R80.9 TYPE 2 DIABETES MELLITUS WITH MICROALBUMINURIA, WITHOUT LONG-TERM CURRENT USE OF INSULIN (HCC): ICD-10-CM

## 2025-04-08 DIAGNOSIS — R80.9 TYPE 2 DIABETES MELLITUS WITH MICROALBUMINURIA, WITHOUT LONG-TERM CURRENT USE OF INSULIN (HCC): ICD-10-CM

## 2025-04-08 DIAGNOSIS — E11.29 TYPE 2 DIABETES MELLITUS WITH MICROALBUMINURIA, WITHOUT LONG-TERM CURRENT USE OF INSULIN (HCC): ICD-10-CM

## 2025-04-11 ENCOUNTER — TELEPHONE (OUTPATIENT)
Dept: INTERNAL MEDICINE CLINIC | Facility: CLINIC | Age: 65
End: 2025-04-11
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
## 2025-04-29 ENCOUNTER — OFFICE VISIT (OUTPATIENT)
Dept: INTERNAL MEDICINE CLINIC | Facility: CLINIC | Age: 65
End: 2025-04-29
Payer: COMMERCIAL

## 2025-04-29 VITALS
HEART RATE: 81 BPM | SYSTOLIC BLOOD PRESSURE: 140 MMHG | BODY MASS INDEX: 41.13 KG/M2 | OXYGEN SATURATION: 97 % | DIASTOLIC BLOOD PRESSURE: 70 MMHG | TEMPERATURE: 97.4 F | WEIGHT: 232.2 LBS

## 2025-04-29 DIAGNOSIS — E11.29 TYPE 2 DIABETES MELLITUS WITH MICROALBUMINURIA, WITHOUT LONG-TERM CURRENT USE OF INSULIN (HCC): ICD-10-CM

## 2025-04-29 DIAGNOSIS — R80.9 TYPE 2 DIABETES MELLITUS WITH MICROALBUMINURIA, WITHOUT LONG-TERM CURRENT USE OF INSULIN (HCC): ICD-10-CM

## 2025-04-29 DIAGNOSIS — M48.07 SPINAL STENOSIS OF LUMBOSACRAL REGION: ICD-10-CM

## 2025-04-29 DIAGNOSIS — I10 ESSENTIAL HYPERTENSION: ICD-10-CM

## 2025-04-29 DIAGNOSIS — E78.5 DYSLIPIDEMIA: ICD-10-CM

## 2025-04-29 DIAGNOSIS — M79.10 MYALGIA: ICD-10-CM

## 2025-04-29 DIAGNOSIS — I73.9 CLAUDICATION (HCC): Primary | ICD-10-CM

## 2025-04-29 PROCEDURE — 99214 OFFICE O/P EST MOD 30 MIN: CPT | Performed by: INTERNAL MEDICINE

## 2025-04-29 NOTE — ASSESSMENT & PLAN NOTE
Lab Results   Component Value Date    HGBA1C 9.3 (H) 01/20/2025     I have counselled the pt to follow a healthy and balanced diet ,and recommend routine exercise.  Patient will be going for laboratories in the near future

## 2025-04-29 NOTE — ASSESSMENT & PLAN NOTE
She can stop the statin for 1 week to see if the muscle achiness improves if there is no improvement restart current dose of statin if there is improvement she should notify me to have the statin changed

## 2025-04-29 NOTE — ASSESSMENT & PLAN NOTE
I have recommended she follows up with orthopedic spine specialist she has an appointment coming up

## 2025-04-29 NOTE — PROGRESS NOTES
Diabetic Foot Exam    Patient's shoes and socks removed.    Right Foot/Ankle   Right Foot Inspection  Skin Exam: skin normal and skin intact. No dry skin, no warmth, no callus, no erythema, no maceration, no abnormal color, no pre-ulcer, no ulcer and no callus.     Toe Exam: ROM and strength within normal limits.     Sensory   Vibration: intact  Proprioception: intact  Monofilament testing: intact    Vascular  Capillary refills: < 3 seconds  The right DP pulse is 2+. The right PT pulse is 2+.     Left Foot/Ankle  Left Foot Inspection  Skin Exam: skin normal and skin intact. No dry skin, no warmth, no erythema, no maceration, normal color, no pre-ulcer, no ulcer and no callus.     Toe Exam: ROM and strength within normal limits.     Sensory   Vibration: intact  Proprioception: intact  Monofilament testing: intact    Vascular  Capillary refills: < 3 seconds  The left DP pulse is 2+. The left PT pulse is 2+.     Assign Risk Category  No deformity present  No loss of protective sensation  No weak pulses  Risk: 0  Name: Jessica L Mikychanijustino      : 1960      MRN: [id number]  Encounter Provider: Adeel Hough DO  Encounter Date: 2025   [facility]  :  Assessment & Plan  Claudication (Formerly [facility])  Rule out peripheral artery disease versus pseudoclaudication from spinal stenosis will check arterial Doppler I recommended she follow-up with neurosurgery  Orders:  •  VAS ARTERIAL DUPLEX- LOWER LIMB BILATERAL; Future    Myalgia  Reports any muscle cramping of the arms and legs check magnesium vitamin D and CPK  last more daily  Orders:  •  Magnesium; Future  •  Vitamin D 25 hydroxy; Future  •  CK; Future    Type 2 diabetes mellitus with microalbuminuria, without long-term current use of insulin (Formerly [facility])    Lab Results   Component Value Date    HGBA1C 9.3 (H) 2025     I have counselled the pt to follow a healthy and balanced diet ,and recommend routine exercise.  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       Dyslipidemia  She can stop the statin for 1 week to see if the muscle achiness improves if there is no improvement restart current dose of statin if there is improvement she should notify me to have the statin changed       Return to office 4  months  call if any problems       History of Present Illness   HPI 64-year old female coming in for a follow up office visit regarding medication, myalgias, type 2 diabetes, spinal gnosis lumbar spine, hypertension and lipidemia; the patient reports me compliant taking medications without untoward side effects the.  The patient is here to review his medical condition, update me on the medical condition and the patient reports me no hospitalizations and no ER visits.  Patient is referred to me symptoms of cramping of the arms and the legs , mood better increase of abilify the patient does report to me that when she walks she develops leg pain and fatigue \"like they will fall off\" and rest improves the symptoms.  She also reports vague cramping in the arms and legs over the knee cramps in the legs are primarily at nighttime.  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   HENT:  Negative for congestion and postnasal drip.    Eyes:  Negative for visual disturbance. "   Respiratory:  Negative for cough and shortness of breath.    Cardiovascular:  Negative for chest pain.   Gastrointestinal:  Negative for abdominal pain, diarrhea, nausea and vomiting.   Neurological:  Negative for dizziness, light-headedness and headaches.   Hematological:  Negative for adenopathy.   Back pain, muscle aching arms and legs like fatigue with ambulation no bowel no bladder incontinence no weakness    Objective   /70 (BP Location: Left arm, Patient Position: Sitting, Cuff Size: Standard)   Pulse 81   Temp (!) 97.4 °F (36.3 °C) (Tympanic)   Wt 105 kg (232 lb 3.2 oz)   SpO2 97%   BMI 41.13 kg/m²      Physical Exam  Constitutional:       Appearance: She is well-developed.   HENT:      Head: Normocephalic.   Eyes:      General: No scleral icterus.        Right eye: No discharge.         Left eye: No discharge.      Conjunctiva/sclera: Conjunctivae normal.      Pupils: Pupils are equal, round, and reactive to light.   Cardiovascular:      Rate and Rhythm: Normal rate and regular rhythm.      Pulses: no weak pulses.           Dorsalis pedis pulses are 2+ on the right side and 2+ on the left side.        Posterior tibial pulses are 2+ on the right side and 2+ on the left side.      Heart sounds: Normal heart sounds. No murmur heard.     No friction rub. No gallop.   Pulmonary:      Effort: No respiratory distress.      Breath sounds: Normal breath sounds. No wheezing or rales.   Abdominal:      General: Bowel sounds are normal. There is no distension.      Palpations: Abdomen is soft. There is no mass.      Tenderness: There is no abdominal tenderness. There is no guarding or rebound.   Musculoskeletal:         General: No deformity.      Cervical back: Neck supple.   Feet:      Right foot:      Skin integrity: No ulcer, skin breakdown, erythema, warmth, callus or dry skin.      Left foot:      Skin integrity: No ulcer, skin breakdown, erythema, warmth, callus or dry skin.   Lymphadenopathy:       Cervical: No cervical adenopathy.   Neurological:      Mental Status: She is alert.      Coordination: Coordination normal.     Negative edema no calf tenderness distal pulses 2 or 2  Administrative Statements   I have spent a total time of 30 minutes in caring for this patient on the day of the visit/encounter including Diagnostic results, Risks and benefits of tx options, Impressions, Counseling / Coordination of care, Documenting in the medical record, Reviewing/placing orders in the medical record (including tests, medications, and/or procedures), and Obtaining or reviewing history  .

## 2025-05-01 ENCOUNTER — OFFICE VISIT (OUTPATIENT)
Dept: PSYCHIATRY | Facility: CLINIC | Age: 65
End: 2025-05-01
Payer: COMMERCIAL

## 2025-05-01 VITALS
BODY MASS INDEX: 41.46 KG/M2 | HEIGHT: 63 IN | DIASTOLIC BLOOD PRESSURE: 70 MMHG | SYSTOLIC BLOOD PRESSURE: 153 MMHG | HEART RATE: 83 BPM | WEIGHT: 234 LBS

## 2025-05-01 DIAGNOSIS — F31.76 BIPOLAR I DISORDER, MOST RECENT EPISODE DEPRESSED, IN REMISSION (HCC): Primary | Chronic | ICD-10-CM

## 2025-05-01 DIAGNOSIS — Z79.899 LONG-TERM USE OF HIGH-RISK MEDICATION: Chronic | ICD-10-CM

## 2025-05-01 DIAGNOSIS — F41.1 GAD (GENERALIZED ANXIETY DISORDER): Chronic | ICD-10-CM

## 2025-05-01 PROCEDURE — 90833 PSYTX W PT W E/M 30 MIN: CPT | Performed by: PSYCHIATRY & NEUROLOGY

## 2025-05-01 PROCEDURE — 99214 OFFICE O/P EST MOD 30 MIN: CPT | Performed by: PSYCHIATRY & NEUROLOGY

## 2025-05-01 NOTE — ASSESSMENT & PLAN NOTE
Continue Prozac 40 mg twice a day to control depressive symptoms and anxiety symptoms    Anxiety is more controlled

## 2025-05-01 NOTE — ASSESSMENT & PLAN NOTE
Mood is improved    Continue Prozac 40 mg twice a day to control depressive symptoms and anxiety symptoms  Continue Abilify 15 mg every evening to help with mood

## 2025-05-02 ENCOUNTER — TELEPHONE (OUTPATIENT)
Age: 65
End: 2025-05-02

## 2025-05-02 ENCOUNTER — HOSPITAL ENCOUNTER (OUTPATIENT)
Dept: NON INVASIVE DIAGNOSTICS | Facility: CLINIC | Age: 65
Discharge: HOME | End: 2025-05-02
Attending: INTERNAL MEDICINE
Payer: COMMERCIAL

## 2025-05-02 ENCOUNTER — RESULTS FOLLOW-UP (OUTPATIENT)
Dept: INTERNAL MEDICINE CLINIC | Facility: CLINIC | Age: 65
End: 2025-05-02

## 2025-05-02 DIAGNOSIS — I73.9 CLAUDICATION (HCC): ICD-10-CM

## 2025-05-02 DIAGNOSIS — R79.0 LOW MAGNESIUM LEVEL: ICD-10-CM

## 2025-05-02 DIAGNOSIS — E11.9 CONTROLLED TYPE 2 DIABETES MELLITUS WITHOUT COMPLICATION, WITHOUT LONG-TERM CURRENT USE OF INSULIN (HCC): ICD-10-CM

## 2025-05-02 DIAGNOSIS — E78.5 DYSLIPIDEMIA: ICD-10-CM

## 2025-05-02 DIAGNOSIS — E11.9 TYPE 2 DIABETES MELLITUS WITHOUT COMPLICATION, WITHOUT LONG-TERM CURRENT USE OF INSULIN (HCC): ICD-10-CM

## 2025-05-02 DIAGNOSIS — I10 ESSENTIAL HYPERTENSION: ICD-10-CM

## 2025-05-02 LAB
25(OH)D3 SERPL-MCNC: 40 NG/ML (ref 30–100)
ALBUMIN SERPL-MCNC: 4.3 G/DL (ref 3.6–5.1)
ALBUMIN/CREAT UR: 35 MG/G CREAT
ALBUMIN/GLOB SERPL: 1.3 (CALC) (ref 1–2.5)
ALP SERPL-CCNC: 112 U/L (ref 37–153)
ALT SERPL-CCNC: 20 U/L (ref 6–29)
AST SERPL-CCNC: 28 U/L (ref 10–35)
BILIRUB SERPL-MCNC: 0.4 MG/DL (ref 0.2–1.2)
BUN SERPL-MCNC: 17 MG/DL (ref 7–25)
BUN/CREAT SERPL: ABNORMAL (CALC) (ref 6–22)
CALCIUM SERPL-MCNC: 9.4 MG/DL (ref 8.6–10.4)
CHLORIDE SERPL-SCNC: 99 MMOL/L (ref 98–110)
CHOLEST SERPL-MCNC: 157 MG/DL
CHOLEST/HDLC SERPL: 3 (CALC)
CK SERPL-CCNC: 57 U/L (ref 20–243)
CO2 SERPL-SCNC: 31 MMOL/L (ref 20–32)
CREAT SERPL-MCNC: 0.64 MG/DL (ref 0.5–1.05)
CREAT UR-MCNC: 77 MG/DL (ref 20–275)
GFR/BSA.PRED SERPLBLD CYS-BASED-ARV: 99 ML/MIN/1.73M2
GLOBULIN SER CALC-MCNC: 3.2 G/DL (CALC) (ref 1.9–3.7)
GLUCOSE SERPL-MCNC: 151 MG/DL (ref 65–99)
HBA1C MFR BLD: 8.2 %
HDLC SERPL-MCNC: 53 MG/DL
LDLC SERPL CALC-MCNC: 70 MG/DL (CALC)
MAGNESIUM SERPL-MCNC: 1.4 MG/DL (ref 1.5–2.5)
MICROALBUMIN UR-MCNC: 2.7 MG/DL
NONHDLC SERPL-MCNC: 104 MG/DL (CALC)
POTASSIUM SERPL-SCNC: 4.7 MMOL/L (ref 3.5–5.3)
PROT SERPL-MCNC: 7.5 G/DL (ref 6.1–8.1)
SODIUM SERPL-SCNC: 140 MMOL/L (ref 135–146)
TRIGL SERPL-MCNC: 259 MG/DL

## 2025-05-02 PROCEDURE — 93925 LOWER EXTREMITY STUDY: CPT

## 2025-05-02 PROCEDURE — 93923 UPR/LXTR ART STDY 3+ LVLS: CPT

## 2025-05-02 RX ORDER — ATORVASTATIN CALCIUM 80 MG/1
80 TABLET, FILM COATED ORAL DAILY
Qty: 90 TABLET | Refills: 1 | Status: SHIPPED | OUTPATIENT
Start: 2025-05-02

## 2025-05-02 RX ORDER — LANOLIN ALCOHOL/MO/W.PET/CERES
400 CREAM (GRAM) TOPICAL 2 TIMES DAILY
Qty: 180 TABLET | Refills: 1 | Status: SHIPPED | OUTPATIENT
Start: 2025-05-02

## 2025-05-02 RX ORDER — GLIPIZIDE 5 MG/1
TABLET ORAL
Qty: 270 TABLET | Refills: 1 | Status: SHIPPED | OUTPATIENT
Start: 2025-05-02

## 2025-05-02 RX ORDER — LISINOPRIL 10 MG/1
10 TABLET ORAL 2 TIMES DAILY
Qty: 180 TABLET | Refills: 1 | Status: SHIPPED | OUTPATIENT
Start: 2025-05-02

## 2025-05-02 RX ORDER — PIOGLITAZONE 30 MG/1
30 TABLET ORAL DAILY
Qty: 90 TABLET | Refills: 1 | Status: SHIPPED | OUTPATIENT
Start: 2025-05-02

## 2025-05-02 NOTE — TELEPHONE ENCOUNTER
Jessica is calling to notify both Dr. Hough and Alem, Pharmacist about her Jenuvia Medication. She states that she was denied for this medication through [employer] because she is no longer eligible and it would cost her $300 per month.     Please be advised, thank you.

## 2025-05-03 PROCEDURE — 93922 UPR/L XTREMITY ART 2 LEVELS: CPT | Performed by: SURGERY

## 2025-05-03 PROCEDURE — 93925 LOWER EXTREMITY STUDY: CPT | Performed by: SURGERY

## 2025-05-04 ENCOUNTER — RESULTS FOLLOW-UP (OUTPATIENT)
Dept: INTERNAL MEDICINE CLINIC | Facility: CLINIC | Age: 65
End: 2025-05-04

## 2025-05-05 ENCOUNTER — TELEPHONE (OUTPATIENT)
Age: 65
End: 2025-05-05

## 2025-05-05 DIAGNOSIS — R80.9 TYPE 2 DIABETES MELLITUS WITH MICROALBUMINURIA, WITHOUT LONG-TERM CURRENT USE OF INSULIN (HCC): ICD-10-CM

## 2025-05-05 DIAGNOSIS — E11.29 TYPE 2 DIABETES MELLITUS WITH MICROALBUMINURIA, WITHOUT LONG-TERM CURRENT USE OF INSULIN (HCC): ICD-10-CM

## 2025-05-05 NOTE — TELEPHONE ENCOUNTER
Spoke to the patient. She states that she would be able to take the Januvia for another 3 months but not sure if she can continue after that. She has a follow up with Alem and will discuss further then. She is asking for the Januvia to be refilled for 3 months and sent to [facility]. Please advise ASAP, she is out of the medication.

## 2025-05-05 NOTE — TELEPHONE ENCOUNTER
PA for MAG-OX 400MG SUBMITTED to [employer]    via      [x][employer]-Case ID #     [x]PA sent as URGENT    All office notes, labs and other pertaining documents and studies sent. Clinical questions answered. Awaiting determination from insurance company.     Turnaround time for your insurance to make a decision on your Prior Authorization can take 7-21 business days.

## 2025-05-06 NOTE — TELEPHONE ENCOUNTER
PA for MAG-OX 400MG DENIED    NOT COVERED UNDER PHARMACY BENEFIT - AVAILABLE OTC    Reason:(Screenshot if applicable)        Message sent to office clinical pool Yes    Denial letter scanned into Media Yes    We can gladly do an appeal but the process can take about 30-60 days to provide determination. If an appeal is truly warranted please have Provider send clinical documentation to the PA department to support the appeal.     **Please follow up with your patient regarding denial and next steps**

## 2025-05-20 ENCOUNTER — OFFICE VISIT (OUTPATIENT)
Age: 65
End: 2025-05-20
Payer: COMMERCIAL

## 2025-05-20 VITALS — BODY MASS INDEX: 41.46 KG/M2 | HEIGHT: 63 IN | WEIGHT: 234 LBS

## 2025-05-20 DIAGNOSIS — E11.29 TYPE 2 DIABETES MELLITUS WITH MICROALBUMINURIA, WITHOUT LONG-TERM CURRENT USE OF INSULIN (HCC): ICD-10-CM

## 2025-05-20 DIAGNOSIS — R80.9 TYPE 2 DIABETES MELLITUS WITH MICROALBUMINURIA, WITHOUT LONG-TERM CURRENT USE OF INSULIN (HCC): ICD-10-CM

## 2025-05-20 DIAGNOSIS — G95.9 CERVICAL MYELOPATHY (HCC): ICD-10-CM

## 2025-05-20 DIAGNOSIS — Z98.1 S/P CERVICAL SPINAL FUSION: Primary | ICD-10-CM

## 2025-05-20 PROCEDURE — 99214 OFFICE O/P EST MOD 30 MIN: CPT | Performed by: ORTHOPAEDIC SURGERY
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
## 2025-05-21 ENCOUNTER — HOSPITAL ENCOUNTER (OUTPATIENT)
Dept: MRI IMAGING | Facility: HOSPITAL | Age: 65
Discharge: HOME | End: 2025-05-21
Payer: COMMERCIAL

## 2025-05-21 DIAGNOSIS — G95.9 CERVICAL MYELOPATHY (HCC): ICD-10-CM

## 2025-05-21 DIAGNOSIS — Z98.1 S/P CERVICAL SPINAL FUSION: ICD-10-CM

## 2025-05-21 PROCEDURE — 72141 MRI NECK SPINE W/O DYE: CPT

## 2025-05-21 RX ORDER — LANCETS 33 GAUGE
EACH MISCELLANEOUS 2 TIMES DAILY
Qty: 100 EACH | Refills: 1 | Status: SHIPPED | OUTPATIENT
Start: 2025-05-21

## 2025-06-03 ENCOUNTER — OFFICE VISIT (OUTPATIENT)
Age: 65
End: 2025-06-03
Payer: COMMERCIAL

## 2025-06-03 VITALS — WEIGHT: 234 LBS | HEIGHT: 63 IN | BODY MASS INDEX: 41.46 KG/M2

## 2025-06-03 DIAGNOSIS — Z98.1 S/P CERVICAL SPINAL FUSION: Primary | ICD-10-CM

## 2025-06-03 DIAGNOSIS — G95.9 CERVICAL MYELOPATHY (HCC): ICD-10-CM

## 2025-06-03 PROCEDURE — 99214 OFFICE O/P EST MOD 30 MIN: CPT | Performed by: ORTHOPAEDIC SURGERY
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
## 2025-06-04 ENCOUNTER — OFFICE VISIT (OUTPATIENT)
Dept: INTERNAL MEDICINE CLINIC | Facility: CLINIC | Age: 65
End: 2025-06-04

## 2025-06-04 DIAGNOSIS — R80.9 TYPE 2 DIABETES MELLITUS WITH MICROALBUMINURIA, WITHOUT LONG-TERM CURRENT USE OF INSULIN (HCC): Primary | ICD-10-CM

## 2025-06-04 DIAGNOSIS — E11.29 TYPE 2 DIABETES MELLITUS WITH MICROALBUMINURIA, WITHOUT LONG-TERM CURRENT USE OF INSULIN (HCC): Primary | ICD-10-CM

## 2025-06-04 PROCEDURE — PBNCHG PB NO CHARGE PLACEHOLDER: Performed by: PHARMACIST

## 2025-06-04 NOTE — PROGRESS NOTES
[facility] Clinical Pharmacy Services  Alem Mason, Pharmacist    Assessment/ Plan     Assessment & Plan  Type 2 diabetes mellitus with microalbuminuria, without long-term current use of insulin ([facility])    Lab Results   Component Value Date    HGBA1C 8.2 (H) 05/01/2025   A1C improved from last time   Continue current medications  Metformin  Januvia  Glipizide  Pioglitazone  Januvia still expensive, CAN RX denied, has two bottles remaining, A1C at next PCP visit and determine januvia plan moving forward  If dc januvia may need to add basal insulin to maintain A1C <8    Orders:    Hemoglobin A1C; Future          Follow-up: pcp in August     Subjective     Medication Adherence/ Tolerability/ Cost:  Patient denies side effects, no issues with cost, 0 missed doses in last two week  acetaminophen  ARIPiprazole  atorvastatin  Cholecalciferol Caps  FLUoxetine  glipiZIDE  ibuprofen  lisinopril  magnesium Oxide Tabs  metFORMIN  multivitamin with minerals  omeprazole  OneTouch Delica Plus [id number] Misc  OneTouch Ultra Strp  pioglitazone  sitaGLIPtin  triamcinolone      2. Lifestyle:   Last visit with dietician: no  Previously attended Living Well with Diabetes Class: no  Diet Recall:   Breakfast:   Lunch:   Dinner:   Snacks:   Beverages:   Physical Activity: none    3. Home monitoring devices  Glucometer: Yes, Brand: unknown  Continuous Glucose Monitor: No, Brand: sending today for insurance verification  Blood Pressure monitor: No, Brand: n/a    Hypoglycemia: The patient had 0 episodes/symptoms of hypoglycemia.   Hyperglycemia: The patient had 0 symptoms of hyperglycemia.     Objective       Blood Glucose Readings  CGM Report scanned into chart   The patient is currently checking blood glucose 1 times per day. Patient does not report with SMBG logs.    ASCVD Risk:  The 10-year ASCVD risk score (Astrid MOSQUEDA, et al., 2019) is: 14%    Values used to calculate the score:      Age: 64 years      Clincally relevant sex: Female       Is Non- : No      Diabetic: Yes      Tobacco smoker: No      Systolic Blood Pressure: 153 mmHg      Is BP treated: Yes      HDL Cholesterol: 53 mg/dL      Total Cholesterol: 157 mg/dL     Vitals:  There were no vitals filed for this visit.    Labs:    Lab Results   Component Value Date    SODIUM 140 05/01/2025    K 4.7 05/01/2025    EGFR 99 05/01/2025    CREATININE 0.64 05/01/2025    GLUF 154 (H) 04/12/2022    [id number] 344 07/10/2016    MICROALBCRE 61 (H) 10/17/2018     Pharmacist Tracking Tool     Pharmacist Tracking Tool  Reason For Outreach: Embedded Pharmacist  Demographics:  Intervention Method: In Person  Type of Intervention: Follow-Up  Topics Addressed: Diabetes  Pharmacologic Interventions: Prevent or Manage ALBERTO and Med Rec  Non-Pharmacologic Interventions: Care coordination, Chart update, Cost, Disease state education, Home Monitoring, Labs, Medication Monitoring, Medication/Device education, and Personal CGM  Time:  Direct Patient Care: 30 mins  Care Coordination: 30 mins  Recommendation Recipient: Patient/Caregiver  Outcome: Accepted

## 2025-06-04 NOTE — ASSESSMENT & PLAN NOTE
Lab Results   Component Value Date    HGBA1C 8.2 (H) 05/01/2025   A1C improved from last time   Continue current medications  Metformin  Januvia  Glipizide  Pioglitazone  Januvia still expensive, CAN RX denied, has two bottles remaining, A1C at next PCP visit and determine januvia plan moving forward  If dc januvia may need to add basal insulin to maintain A1C <8    Orders:    Hemoglobin A1C; Future

## 2025-06-17 ENCOUNTER — ANNUAL EXAM (OUTPATIENT)
Dept: OBGYN CLINIC | Facility: CLINIC | Age: 65
End: 2025-06-17
Payer: COMMERCIAL

## 2025-06-17 VITALS
SYSTOLIC BLOOD PRESSURE: 130 MMHG | BODY MASS INDEX: 41.25 KG/M2 | HEIGHT: 63 IN | WEIGHT: 232.8 LBS | DIASTOLIC BLOOD PRESSURE: 72 MMHG

## 2025-06-17 DIAGNOSIS — Z01.419 PAP SMEAR, AS PART OF ROUTINE GYNECOLOGICAL EXAMINATION: ICD-10-CM

## 2025-06-17 DIAGNOSIS — Z01.419 ENCOUNTER FOR GYNECOLOGICAL EXAMINATION WITHOUT ABNORMAL FINDING: Primary | ICD-10-CM

## 2025-06-17 DIAGNOSIS — N95.2 ATROPHIC VAGINITIS: ICD-10-CM

## 2025-06-17 DIAGNOSIS — Z12.31 ENCOUNTER FOR SCREENING MAMMOGRAM FOR BREAST CANCER: ICD-10-CM

## 2025-06-17 PROCEDURE — S0612 ANNUAL GYNECOLOGICAL EXAMINA: HCPCS | Performed by: OBSTETRICS & GYNECOLOGY

## 2025-06-17 PROCEDURE — G0145 SCR C/V CYTO,THINLAYER,RESCR: HCPCS | Performed by: OBSTETRICS & GYNECOLOGY

## 2025-06-17 PROCEDURE — G0476 HPV COMBO ASSAY CA SCREEN: HCPCS | Performed by: OBSTETRICS & GYNECOLOGY

## 2025-06-17 NOTE — PROGRESS NOTES
Assessment/Plan:    No problem-specific Assessment & Plan notes found for this encounter.       Diagnoses and all orders for this visit:    Encounter for gynecological examination without abnormal finding  -     Liquid-based pap, screening    Pap smear, as part of routine gynecological examination  -     Liquid-based pap, screening    Encounter for screening mammogram for breast cancer  -     Mammo screening bilateral w 3d and cad; Future    Atrophic vaginitis          Normal gynecological physical examination.  Self-breast examination stressed.  Mammogram ordered.  Discussed regular exercise, healthy diet, importance of vitamin D and calcium supplements.  Discussed importance of sun block use during periods of prolonged sun exposure.  Patient will be seen in 1 year for routine gynecologic and medical examination.  Patient will call office for any problems, concerns, or issues which may arise during the interim.     Subjective:      Jessica Zhou is a 63 yo F with PMH sig for hypertension, hyperlipidemia, type 2 diabetes mellitus, obesity, bipolar 1 disorder and anxiety presenting to the office for her yearly exam. Pt reports that she feels well and has no current complaints. UTD on mammograms, pap, and colonoscopies. BP, blood sugar, and cholesterol are monitored regularly by her PCP.     Pt denies vaginal bleeding, spotting, discharge, abdominal pain, pelvic pain, diarrhea, constipation, dysuria, hematuria, hematochezia, chest pain, sob, fevers, chills, breast tenderness or masses.    Informed pt that her physical exam revealed no abnormalities.     Follow up in one year for next annual exam.     Call office with any questions, comments, or concerns in the interim.       Gynecologic Exam  She reports no pelvic pain or vaginal discharge. Pertinent negatives include no chills, constipation, diarrhea, dysuria, fever, flank pain, hematuria, nausea or vomiting.       Patient ID: Jessica Zhou is a 64 y.o. female  who presents today for her annual gynecologic and medical examination    Menstrual status: Postmenopausal    Vasomotor symptoms: None    Patient reports normal appetite    Patient reports normal bowel and bladder habits    Patient denies any significant pelvic or abdominal pain    Patient denies any headaches, chest pain, shortness of breath fever shakes or chills    Patient denies any COVID 19 symptoms including cough or loss of taste or smell    COVID vaccine status: Patient aware of COVID vaccination protocols.     Medical problems: Hypertension, hyperlipidemia, type 2 diabetes mellitus, obesity, anxiety and bipolar 1 disorder    Colonoscopy status: UTD on colonoscopies. Last screening 2025.    Mammogram status: Patient does regular self breast exams and is up-to-date with screening mammography. Appropriate arrangements for her annual screening mammogram are placed into the EMR system at this visit.      The following portions of the patient's history were reviewed and updated as appropriate: allergies, current medications, past family history, past medical history, past social history, past surgical history and problem list.    Review of Systems   Constitutional: Negative.  Negative for appetite change, chills, diaphoresis, fatigue, fever and unexpected weight change.   HENT: Negative.     Eyes: Negative.    Respiratory: Negative.  Negative for shortness of breath.    Cardiovascular: Negative.  Negative for chest pain, palpitations and leg swelling.        Followed for hypertension, hyperlipidemia, heart murmur   Gastrointestinal: Negative.  Negative for constipation, diarrhea, nausea and vomiting.   Endocrine: Negative.  Negative for cold intolerance and heat intolerance.        Followed for type 2 diabetes mellitus   Genitourinary:  Negative for decreased urine volume, difficulty urinating, dyspareunia, dysuria, flank pain, genital sores, hematuria, pelvic pain, vaginal bleeding, vaginal discharge and vaginal  "pain.   Musculoskeletal: Negative.    Skin: Negative.    Allergic/Immunologic: Negative.    Neurological: Negative.    Psychiatric/Behavioral:          Followed for anxiety and bipolar 1 disorder   All other systems reviewed and are negative.        Objective:      /72   Ht 5' 3\" (1.6 m)   Wt 106 kg (232 lb 12.8 oz)   BMI 41.24 kg/m²          Physical Exam  Vitals reviewed. Exam conducted with a chaperone present.   Constitutional:       General: She is not in acute distress.     Appearance: Normal appearance. She is obese.   HENT:      Head: Normocephalic and atraumatic.     Eyes:      Pupils: Pupils are equal, round, and reactive to light.       Cardiovascular:      Rate and Rhythm: Normal rate and regular rhythm.      Heart sounds: Murmur heard.   Pulmonary:      Effort: Pulmonary effort is normal. No respiratory distress.      Breath sounds: Normal breath sounds.   Chest:   Breasts:     Breasts are symmetrical.      Right: Normal. No inverted nipple, mass, nipple discharge or tenderness.      Left: Normal. No inverted nipple, mass, nipple discharge or tenderness.   Abdominal:      Palpations: Abdomen is soft.      Tenderness: There is no abdominal tenderness.   Genitourinary:     General: Normal vulva.      Exam position: Supine.      Labia:         Right: No rash or lesion.         Left: No rash or lesion.       Vagina: Normal. No vaginal discharge, erythema, tenderness or lesions.      Cervix: Normal. No discharge, friability or lesion.      Uterus: Normal. Not enlarged and not tender.       Adnexa: Right adnexa normal and left adnexa normal.        Right: No mass, tenderness or fullness.          Left: No mass, tenderness or fullness.        Rectum: Normal. Guaiac result negative.      Comments: Mild atrophic changes noted on exam. Good pelvic support confirmed.    Musculoskeletal:         General: No swelling. Normal range of motion.      Cervical back: Neck supple.   Lymphadenopathy:      Cervical: " No cervical adenopathy.      Upper Body:      Right upper body: No supraclavicular adenopathy.      Left upper body: No supraclavicular adenopathy.     Skin:     General: Skin is warm and dry.     Neurological:      General: No focal deficit present.      Mental Status: She is alert.     Psychiatric:         Mood and Affect: Mood normal.         Behavior: Behavior normal.         Thought Content: Thought content normal.         Judgment: Judgment normal.
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
## 2025-06-23 LAB
LAB AP GYN PRIMARY INTERPRETATION: NORMAL
Lab: NORMAL

## 2025-06-25 ENCOUNTER — OFFICE VISIT (OUTPATIENT)
Dept: PULMONOLOGY | Facility: CLINIC | Age: 65
End: 2025-06-25
Payer: COMMERCIAL

## 2025-06-25 VITALS
WEIGHT: 233 LBS | SYSTOLIC BLOOD PRESSURE: 134 MMHG | DIASTOLIC BLOOD PRESSURE: 66 MMHG | BODY MASS INDEX: 41.29 KG/M2 | HEART RATE: 84 BPM | OXYGEN SATURATION: 94 % | HEIGHT: 63 IN | TEMPERATURE: 99.1 F

## 2025-06-25 DIAGNOSIS — G47.33 OBSTRUCTIVE SLEEP APNEA: ICD-10-CM

## 2025-06-25 DIAGNOSIS — R91.1 PULMONARY NODULE: Primary | ICD-10-CM

## 2025-06-25 DIAGNOSIS — J30.2 SEASONAL ALLERGIC RHINITIS, UNSPECIFIED TRIGGER: ICD-10-CM

## 2025-06-25 PROCEDURE — 99214 OFFICE O/P EST MOD 30 MIN: CPT | Performed by: INTERNAL MEDICINE

## 2025-06-25 NOTE — PROGRESS NOTES
Follow-up  Visit - Pulmonary Medicine   Name: Jessica Zhou      : 1960      MRN: [id number]  Encounter Provider: Al Landaverde DO  Encounter Date: 2025   Encounter department: [facility] [place]  :  Assessment & Plan  Pulmonary nodule  Nodules in the left lower lobe, right upper lobe, and right lower lobe, present since 2016. Calcifications present in the nodule with low Hounsfield units and not PET avid, less concerning for malignancy. Likely hamartoma, less likely carcinoid. Recent CT showed stabilization. Will obtain CT one year from the previous.  Orders:    CT chest without contrast; Future    Obstructive sleep apnea  Patient reports having a sleep study years ago, does have a CPAP is at the house that is greater than 5 years old. She is currently noncompliant with CPAP. She is willing to undergo another sleep study to qualify for new or machine to start properly treating her KODY. Patient will need follow-up in 3 to 6 months depending on when she has her new machine.       Seasonal allergic rhinitis, unspecified trigger  Patient reports some seasonal allergies. Allergic to cats and dust mites. Patient does have a cat at home, but is able to medicate her symptoms with over-the-counter antihistamines. Patient encouraged to continue antihistamines and nasal sprays during the changes of seasons and when her pets bother her.          Return if symptoms worsen or fail to improve.    History of Present Illness   Jessica Zhou is a 64 y.o. female with a past medical history of type 2 diabetes, hypertension, GERD, reported KODY noncompliant with CPAP, who presents today as a follow-up for pulmonary nodule.     Patient has had a left lower lobe nodule since 2016, limited growth.  Calcifications present in the nodule, concerning for hematoma.     Patient reports being on an inhaler for asthma in the past. No respiratory symptoms now. Allergic to cats and dust mites.     Patient  "has not been compliant with CPAP, she did not undergo a repeat study yet.     No smoking history.  History of KODY and asthma.  Family history of lung cancer.  No travel.  Cat and dog.  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  No down pillows or comforters.     Labs personally reviewed.     Imaging personally reviewed.     PCP note reviewed.    Review of Systems   Constitutional:  Negative for chills and fatigue.   HENT:  Negative for congestion, sinus pressure, sinus pain and sneezing.    Respiratory:  Negative for cough and shortness of breath.    Cardiovascular:  Negative for chest pain.   Gastrointestinal:  Negative for abdominal distention and abdominal pain.   Genitourinary:  Negative for dysuria.   Musculoskeletal:  Negative for gait problem.   Skin:  Negative for rash.   Neurological:  Negative for dizziness and headaches.   Psychiatric/Behavioral:  Negative for agitation and confusion.        Aside from what is mentioned in the HPI, ROS is otherwise negative     Medical History Reviewed by provider this encounter:     .    Objective   /66 (BP Location: Left arm, Patient Position: Sitting, Cuff Size: Large)   Pulse 84   Temp 99.1 °F (37.3 °C) (Tympanic Core)   Ht 5' 3\" (1.6 m)   Wt 106 kg (233 lb)   SpO2 94%   BMI 41.27 kg/m²     Physical Exam  Vitals reviewed.   Constitutional:       General: She is not in acute distress.     Appearance: She is obese.   HENT:      Head: Normocephalic and atraumatic.      Right Ear: External ear normal.      Left Ear: External ear normal.      Nose: Nose normal.      Mouth/Throat:      Mouth: Mucous membranes are moist.      Pharynx: Oropharynx is clear.     Eyes:      Extraocular Movements: Extraocular movements intact.      Pupils: Pupils are equal, round, and reactive to light.       Cardiovascular:      Rate and Rhythm: Normal rate and regular rhythm.      Pulses: Normal pulses.   Pulmonary:      Effort: Pulmonary effort is normal.      Comments: " "Decreased breath sounds B/L  Abdominal:      Palpations: Abdomen is soft.      Tenderness: There is no abdominal tenderness.     Musculoskeletal:      Cervical back: Normal range of motion.      Right lower leg: No edema.      Left lower leg: No edema.     Skin:     General: Skin is warm and dry.      Capillary Refill: Capillary refill takes less than 2 seconds.     Neurological:      General: No focal deficit present.      Mental Status: She is alert and oriented to person, place, and time.     Psychiatric:         Mood and Affect: Mood normal.         Behavior: Behavior normal.       Diagnostic Data:  Labs: I personally reviewed the most recent laboratory data pertinent to today's visit.    Radiology results:  Radiology Results Review: I have reviewed radiology reports from 12/2024 including: CT chest.    PFT/spirometry results:  No results found for: \"FEV1\", \"FVC\", \"VIX1NUH\", \"TLC\", \"DLCO\"     Administrative Statements   I have spent a total time of 45 minutes in caring for this patient on the day of the visit/encounter including Diagnostic results, Risks and benefits of tx options, Impressions, and Obtaining or reviewing history  .    Al Landaverde D.O.  PGY-5, Pulmonary/Critical Care  [facility]        "

## 2025-06-25 NOTE — ASSESSMENT & PLAN NOTE
Nodules in the left lower lobe, right upper lobe, and right lower lobe, present since 2016. Calcifications present in the nodule with low Hounsfield units and not PET avid, less concerning for malignancy. Likely hamartoma, less likely carcinoid. Recent CT showed stabilization. Will obtain CT one year from the previous.  Orders:    CT chest without contrast; Future

## 2025-06-25 NOTE — ASSESSMENT & PLAN NOTE
Patient reports having a sleep study years ago, does have a CPAP is at the house that is greater than 5 years old. She is currently noncompliant with CPAP. She is willing to undergo another sleep study to qualify for new or machine to start properly treating her KODY. Patient will need follow-up in 3 to 6 months depending on when she has her new machine.

## 2025-06-30 ENCOUNTER — OFFICE VISIT (OUTPATIENT)
Dept: PSYCHIATRY | Facility: CLINIC | Age: 65
End: 2025-06-30
Payer: COMMERCIAL

## 2025-06-30 VITALS
BODY MASS INDEX: 41.29 KG/M2 | HEART RATE: 83 BPM | HEIGHT: 63 IN | WEIGHT: 233 LBS | DIASTOLIC BLOOD PRESSURE: 69 MMHG | SYSTOLIC BLOOD PRESSURE: 146 MMHG

## 2025-06-30 DIAGNOSIS — F41.1 GAD (GENERALIZED ANXIETY DISORDER): Chronic | ICD-10-CM

## 2025-06-30 DIAGNOSIS — F31.76 BIPOLAR I DISORDER, MOST RECENT EPISODE DEPRESSED, IN REMISSION (HCC): Primary | Chronic | ICD-10-CM

## 2025-06-30 DIAGNOSIS — Z79.899 LONG-TERM USE OF HIGH-RISK MEDICATION: Chronic | ICD-10-CM

## 2025-06-30 PROCEDURE — 90833 PSYTX W PT W E/M 30 MIN: CPT | Performed by: PSYCHIATRY & NEUROLOGY

## 2025-06-30 PROCEDURE — 99214 OFFICE O/P EST MOD 30 MIN: CPT | Performed by: PSYCHIATRY & NEUROLOGY

## 2025-06-30 NOTE — ASSESSMENT & PLAN NOTE
Follows with family physician for glucose and lipid monitoring due to current therapy with antipsychotic medication  Monitor lipid profile and hemoglobin A1C yearly due to current therapy with antipsychotic medication - gets labs done with PCP  Recheck CBC/diff - last CBC/diff was done in 2021  Orders:    CBC and differential; Future

## 2025-06-30 NOTE — ASSESSMENT & PLAN NOTE
Anxiety is controlled    Continue Prozac 40 mg twice a day to control depressive symptoms and anxiety symptoms

## 2025-06-30 NOTE — BH TREATMENT PLAN
"TREATMENT PLAN (Medication Management Only)        [facility]    Name/Date of Birth/MRN:  Jessica Zhou 64 y.o. 1960 MRN: [id number]  Date of Treatment Plan: June 30, 2025  Diagnosis/Diagnoses:   1. Bipolar I disorder, most recent episode depressed, in remission (HCC)    2. LOURDES (generalized anxiety disorder)    3. Long-term use of high-risk medication      Strengths/Personal Resources for Self-Care: \"I turned out to be a good listener; I don't get upset over things anymore\".  Area/Areas of need (in own words): \"my weight\".  1. Long Term Goal:   maintain control of anxiety, maintain mood stability  Target Date: 3 months - 9/30/2025  Person/Persons responsible for completion of goal: Jessica  2.  Short Term Objective (s) - How will we reach this goal?:   A.  Provider new recommended medication/dosage changes and/or continue medication(s): continue current medications as prescribed (Prozac and Abilify).  B.  N/A.  C.  N/A.  Target Date: 3 months - 9/30/2025  Person/Persons Responsible for Completion of Goal: Jessica   Progress Towards Goals: stable  Treatment Modality: medication management every 3 months  Review due 180 days from date of this plan: 6 months - 12/30/2025  Expected length of service: maintenance unless revised  My Physician/PA/NP and I have developed this plan together and I agree to work on the goals and objectives. I understand the treatment goals that were developed for my treatment.  Electronic Signatures: on file (unless signed below)    Muriel Bello MD 06/30/25  "

## 2025-06-30 NOTE — ASSESSMENT & PLAN NOTE
Mood has been stable    Continue Prozac 40 mg twice a day to control depressive symptoms and anxiety symptoms  Continue Abilify 15 mg every evening to help with mood  Orders:    CBC and differential; Future

## 2025-07-04 DIAGNOSIS — E11.9 CONTROLLED TYPE 2 DIABETES MELLITUS WITHOUT COMPLICATION, WITHOUT LONG-TERM CURRENT USE OF INSULIN (HCC): ICD-10-CM

## 2025-07-06 RX ORDER — PIOGLITAZONE 30 MG/1
30 TABLET ORAL DAILY
Qty: 90 TABLET | Refills: 2 | OUTPATIENT
Start: 2025-07-06

## 2025-07-07 DIAGNOSIS — E11.29 TYPE 2 DIABETES MELLITUS WITH MICROALBUMINURIA, WITHOUT LONG-TERM CURRENT USE OF INSULIN (HCC): ICD-10-CM

## 2025-07-07 DIAGNOSIS — R80.9 TYPE 2 DIABETES MELLITUS WITH MICROALBUMINURIA, WITHOUT LONG-TERM CURRENT USE OF INSULIN (HCC): ICD-10-CM

## 2025-07-09 RX ORDER — METFORMIN HYDROCHLORIDE 500 MG/1
1000 TABLET, EXTENDED RELEASE ORAL 2 TIMES DAILY WITH MEALS
Qty: 360 TABLET | Refills: 1 | Status: SHIPPED | OUTPATIENT
Start: 2025-07-09

## 2025-07-14 DIAGNOSIS — E78.5 DYSLIPIDEMIA: ICD-10-CM

## 2025-07-14 DIAGNOSIS — E11.9 TYPE 2 DIABETES MELLITUS WITHOUT COMPLICATION, WITHOUT LONG-TERM CURRENT USE OF INSULIN (HCC): ICD-10-CM

## 2025-07-14 RX ORDER — ATORVASTATIN CALCIUM 80 MG/1
80 TABLET, FILM COATED ORAL DAILY
Qty: 90 TABLET | Refills: 1 | Status: SHIPPED | OUTPATIENT
Start: 2025-07-14

## 2025-07-14 RX ORDER — GLIPIZIDE 5 MG/1
TABLET ORAL
Qty: 270 TABLET | Refills: 1 | Status: SHIPPED | OUTPATIENT
Start: 2025-07-14

## 2025-07-14 NOTE — TELEPHONE ENCOUNTER
Reason for call:   [x] Refill   [] Prior Auth  [x] Other: Pharmacy states they need new rx scripts, not dups    Office:   [x] PCP/Provider -   [] Specialty/Provider -                 atorvastatin (LIPITOR) 80 mg tablet 80 mg, Oral, Daily             glipiZIDE (GLUCOTROL) 5 mg tablet                 Quantity: 90    Pharmacy: [facility]    Local Pharmacy   Does the patient have enough for 3 days?   [] Yes   [x] No - Send as HP to POD    Mail Away Pharmacy   Does the patient have enough for 10 days?   [] Yes   [] No - Send as HP to POD

## 2025-07-18 DIAGNOSIS — R39.9 UTI SYMPTOMS: Primary | ICD-10-CM

## 2025-07-24 ENCOUNTER — OFFICE VISIT (OUTPATIENT)
Dept: INTERNAL MEDICINE CLINIC | Facility: CLINIC | Age: 65
End: 2025-07-24
Payer: COMMERCIAL

## 2025-07-24 VITALS
DIASTOLIC BLOOD PRESSURE: 60 MMHG | HEART RATE: 76 BPM | SYSTOLIC BLOOD PRESSURE: 126 MMHG | WEIGHT: 234 LBS | OXYGEN SATURATION: 95 % | BODY MASS INDEX: 41.45 KG/M2

## 2025-07-24 DIAGNOSIS — E11.9 TYPE 2 DIABETES MELLITUS WITHOUT COMPLICATION, WITHOUT LONG-TERM CURRENT USE OF INSULIN (HCC): ICD-10-CM

## 2025-07-24 DIAGNOSIS — N30.00 ACUTE CYSTITIS WITHOUT HEMATURIA: Primary | ICD-10-CM

## 2025-07-24 PROCEDURE — 99214 OFFICE O/P EST MOD 30 MIN: CPT

## 2025-07-24 RX ORDER — SULFAMETHOXAZOLE AND TRIMETHOPRIM 800; 160 MG/1; MG/1
1 TABLET ORAL EVERY 12 HOURS SCHEDULED
Qty: 10 TABLET | Refills: 0 | Status: SHIPPED | OUTPATIENT
Start: 2025-07-24 | End: 2025-07-29

## 2025-07-24 NOTE — PROGRESS NOTES
Name: Jessica Zhou      : 1960      MRN: [id number]  Encounter Provider: Valeria Miller MD  Encounter Date: 2025   Enc[facility] department: [facility]  :  Assessment & Plan  Acute cystitis without hematuria  Presents today with lower abdominal pressure and frequency of urination last 10 days  Urinalysis shows nitrate positive, leukocyte positive with many bacteria  Culture pending  Last culture in  shows positive Klebsiella with pansensitive  Not complicated UTI    Plan:  Send prescription for Bactrim 800-160 12 hourly for 5 days  Will follow-up on culture result  Discussed if symptoms get worse, severe abdominal pain, back pain, blood in urine to call us or go to the emergency  Discussed to drink plenty of fluid  Discussed to maintain hygiene in that area and did not wipe from back to front but wife from front to back instead  Will follow-up during her scheduled appointment    Orders:    sulfamethoxazole-trimethoprim (BACTRIM DS) 800-160 mg per tablet; Take 1 tablet by mouth every 12 (twelve) hours for 5 days    Type 2 diabetes mellitus without complication, without long-term current use of insulin ([facility])    Lab Results   Component Value Date    HGBA1C 9.2 (H) 2025   Has type 2 diabetes mellitus currently on glipizide 5 mg daily, metformin 1000 mg twice daily, pioglitazone 30 mg daily  Has been on Januvia 50 mg daily but cannot afford it as it is expensive  Has not been doing any exercise, watching her diet  Has been on Abilify    Plan:  Discussed to modify her diet, restriction to carbs and eat more protein and vegetables  Discussed to continue with fitness program  Informed refusal to have referral for nutritional service as she had that before which did not work for her  Ambulatory referral to clinical pharmacy to manage her antidiabetic medications discussed about the weight reduction  To self to have any weight reduction medication because of expense but will think  about it later  If continues to have gaining weight we will consider to stop Abilify or see a psychiatrist    Orders:    Ambulatory referral to clinical pharmacy; Future           History of Present Illness   Jessica Zhou is a 64-year-old female with PMH of diabetes mellitus type 2, hypertension, hyperlipidemia, who presents with a complaint of lower abdominal pressure, urinary frequency.  Her lower abdominal pressure started 10 days ago and recently is having frequency and urine.  She had urinalysis done which is showing nitrate positive, leukocyte positive, many bacteria.  Culture is still pending.  Denies any fever.  Mentioned that she has been drinking enough fluid.  Also her blood work shows hemoglobin A1c 9.2 which was 8.2 before.  She has been compliant with her medications but not compliant with carbohydrate restricted diet, exercise.  But she is motivated to start over again.    Urinary Tract Infection   The current episode started 1 to 4 weeks ago. Associated symptoms include frequency. Pertinent negatives include no chills, hematuria or vomiting.     Review of Systems   Constitutional:  Negative for chills and fever.   HENT:  Negative for ear pain and sore throat.    Eyes:  Negative for pain and visual disturbance.   Respiratory:  Negative for cough and shortness of breath.    Cardiovascular:  Negative for chest pain and palpitations.   Gastrointestinal:  Negative for abdominal pain and vomiting.   Genitourinary:  Positive for frequency and pelvic pain. Negative for dysuria and hematuria.   Musculoskeletal:  Negative for arthralgias and back pain.   Skin:  Negative for color change and rash.   Neurological:  Negative for seizures and syncope.   All other systems reviewed and are negative.      Objective   /60 (BP Location: Left arm, Patient Position: Sitting, Cuff Size: Standard)   Pulse 76   Wt 106 kg (234 lb)   SpO2 95%   BMI 41.45 kg/m²      Physical Exam  Vitals and nursing note  reviewed.   Constitutional:       General: She is not in acute distress.     Appearance: She is well-developed.   HENT:      Head: Normocephalic and atraumatic.     Eyes:      Conjunctiva/sclera: Conjunctivae normal.       Cardiovascular:      Rate and Rhythm: Normal rate and regular rhythm.      Heart sounds: No murmur heard.  Pulmonary:      Effort: Pulmonary effort is normal. No respiratory distress.      Breath sounds: Normal breath sounds.   Abdominal:      Palpations: Abdomen is soft.      Tenderness: There is no abdominal tenderness.     Musculoskeletal:         General: No swelling.      Cervical back: Neck supple.     Skin:     General: Skin is warm and dry.      Capillary Refill: Capillary refill takes less than 2 seconds.     Neurological:      Mental Status: She is alert.     Psychiatric:         Mood and Affect: Mood normal.       Administrative Statements   I have spent a total time of 20 minutes in caring for this patient on the day of the visit/encounter including Diagnostic results, Prognosis, Risks and benefits of tx options, Instructions for management, Patient and family education, Importance of tx compliance, Risk factor reductions, Impressions, Counseling / Coordination of care, Documenting in the medical record, Reviewing/placing orders in the medical record (including tests, medications, and/or procedures), Obtaining or reviewing history  , and Communicating with other healthcare professionals .

## 2025-07-25 LAB
APPEARANCE UR: CLEAR
BACTERIA UR QL AUTO: ABNORMAL /HPF
BILIRUB UR QL STRIP: NEGATIVE
COLOR UR: YELLOW
GLUCOSE UR QL STRIP: NEGATIVE
HBA1C MFR BLD: 9.2 %
HGB UR QL STRIP: NEGATIVE
HYALINE CASTS #/AREA URNS LPF: ABNORMAL /LPF
KETONES UR QL STRIP: NEGATIVE
LEUKOCYTE ESTERASE UR QL STRIP: ABNORMAL
NITRITE UR QL STRIP: POSITIVE
PH UR STRIP: 5.5 [PH] (ref 5–8)
PROT UR QL STRIP: NEGATIVE
RBC #/AREA URNS HPF: ABNORMAL /HPF
SP GR UR STRIP: 1.01 (ref 1–1.03)
SQUAMOUS #/AREA URNS HPF: ABNORMAL /HPF
WBC #/AREA URNS HPF: ABNORMAL /HPF

## 2025-07-25 NOTE — PROGRESS NOTES
I left a message on the patient's voicemail she did not  the phone to call back regarding urine culture;which is positive for E. coli it is resistant to Bactrim, I would like to change her antibiotic to nitrofurantoin I have left her message so she can call back we will await her phone call back so we can discuss this with the patient

## 2025-07-28 DIAGNOSIS — N39.0 URINARY TRACT INFECTION WITHOUT HEMATURIA, SITE UNSPECIFIED: Primary | ICD-10-CM

## 2025-07-28 RX ORDER — NITROFURANTOIN 25; 75 MG/1; MG/1
100 CAPSULE ORAL 2 TIMES DAILY
Qty: 10 CAPSULE | Refills: 0 | Status: SHIPPED | OUTPATIENT
Start: 2025-07-28 | End: 2025-08-02

## 2025-08-05 ENCOUNTER — TELEPHONE (OUTPATIENT)
Dept: INTERNAL MEDICINE CLINIC | Facility: CLINIC | Age: 65
End: 2025-08-05

## 2025-08-05 ENCOUNTER — HOSPITAL ENCOUNTER (OUTPATIENT)
Dept: SLEEP CENTER | Facility: CLINIC | Age: 65
Discharge: HOME | End: 2025-08-05
Attending: STUDENT IN AN ORGANIZED HEALTH CARE EDUCATION/TRAINING PROGRAM
Payer: COMMERCIAL

## 2025-08-05 DIAGNOSIS — G47.33 OSA (OBSTRUCTIVE SLEEP APNEA): ICD-10-CM

## 2025-08-05 PROCEDURE — G0399 HOME SLEEP TEST/TYPE 3 PORTA: HCPCS | Performed by: INTERNAL MEDICINE

## 2025-08-05 PROCEDURE — G0399 HOME SLEEP TEST/TYPE 3 PORTA: HCPCS

## 2025-08-07 DIAGNOSIS — K29.80 DUODENITIS: ICD-10-CM

## 2025-08-07 DIAGNOSIS — R10.13 EPIGASTRIC PAIN: ICD-10-CM

## 2025-08-07 RX ORDER — OMEPRAZOLE 40 MG/1
40 CAPSULE, DELAYED RELEASE ORAL DAILY
Qty: 90 CAPSULE | Refills: 0 | Status: SHIPPED | OUTPATIENT
Start: 2025-08-07

## 2025-08-14 ENCOUNTER — TELEPHONE (OUTPATIENT)
Dept: PULMONOLOGY | Facility: CLINIC | Age: 65
End: 2025-08-14

## 2025-08-14 ENCOUNTER — TELEPHONE (OUTPATIENT)
Dept: SLEEP CENTER | Facility: CLINIC | Age: 65
End: 2025-08-14

## 2025-08-14 ENCOUNTER — TELEPHONE (OUTPATIENT)
Dept: FAMILY MEDICINE CLINIC | Facility: CLINIC | Age: 65
End: 2025-08-14

## (undated) DEVICE — GLOVE INDICATOR PI UNDERGLOVE SZ 8.5 BLUE

## (undated) DEVICE — CUFF TOURNIQUET 18 X 4 IN QUICK CONNECT DISP 1 BLADDER

## (undated) DEVICE — SUT VICRYL 3-0 SH 27 IN J416H

## (undated) DEVICE — LIGHT HANDLE COVER SLEEVE DISP BLUE STELLAR

## (undated) DEVICE — DRAPE C-ARM X-RAY

## (undated) DEVICE — HEMOSTATIC MATRIX SURGIFLO 8ML W/THROMBIN

## (undated) DEVICE — NEEDLE SPINAL18G X 3.5 IN QUINCKE

## (undated) DEVICE — VARIABLE, SELF-DRILLING SCREW
Type: IMPLANTABLE DEVICE | Site: SPINE CERVICAL | Status: NON-FUNCTIONAL
Brand: REFLEX HYBRID

## (undated) DEVICE — INTENDED FOR TISSUE SEPARATION, AND OTHER PROCEDURES THAT REQUIRE A SHARP SURGICAL BLADE TO PUNCTURE OR CUT.: Brand: BARD-PARKER SAFETY BLADES SIZE 15, STERILE

## (undated) DEVICE — GLOVE SRG BIOGEL 6.5

## (undated) DEVICE — STERILE BETHLEHEM PLASTIC HAND: Brand: CARDINAL HEALTH

## (undated) DEVICE — DRAPE SHEET THREE QUARTER

## (undated) DEVICE — INTENDED FOR TISSUE SEPARATION, AND OTHER PROCEDURES THAT REQUIRE A SHARP SURGICAL BLADE TO PUNCTURE OR CUT.: Brand: BARD-PARKER ® CARBON RIB-BACK BLADES

## (undated) DEVICE — DISPOSABLE EQUIPMENT COVER: Brand: SMALL TOWEL DRAPE

## (undated) DEVICE — TRAY FOLEY 16FR URIMETER SURESTEP

## (undated) DEVICE — ACE WRAP 3 IN STERILE

## (undated) DEVICE — TOOL 14MH30 LEGEND 14CM 3MM: Brand: MIDAS REX ™

## (undated) DEVICE — DRAPE C-ARMOUR

## (undated) DEVICE — IMPERVIOUS STOCKINETTE: Brand: DEROYAL

## (undated) DEVICE — GLOVE SRG BIOGEL 8.5

## (undated) DEVICE — STRETCH BANDAGE: Brand: CURITY

## (undated) DEVICE — GLOVE INDICATOR PI UNDERGLOVE SZ 7 BLUE

## (undated) DEVICE — SUT VICRYL 2-0 CT-2 27 IN J269H

## (undated) DEVICE — LIGACLIP MCA MULTIPLE CLIP APPLIERS, 20 MEDIUM CLIPS: Brand: LIGACLIP

## (undated) DEVICE — GAUZE SPONGES,USP TYPE VII GAUZE, 12 PLY: Brand: CURITY

## (undated) DEVICE — MONITORING SPINAL IMPULSE CASE FEE

## (undated) DEVICE — DRESSING MEPILEX AG BORDER 4 X 4 IN

## (undated) DEVICE — ADHESIVE SKIN HIGH VISCOSITY EXOFIN 1ML

## (undated) DEVICE — ROSEBUD DISSECTORS: Brand: DEROYAL

## (undated) DEVICE — KNIFE LIGHT 10,PK: Brand: KNIFELIGHT

## (undated) DEVICE — MINI BLADE ROUND TIP SHARP ON ONE SIDE

## (undated) DEVICE — PROXIMATE SKIN STAPLERS (35 WIDE) CONTAINS 35 STAINLESS STEEL STAPLES (FIXED HEAD): Brand: PROXIMATE

## (undated) DEVICE — GLOVE INDICATOR PI UNDERGLOVE SZ 6.5 BLUE

## (undated) DEVICE — CHLORAPREP HI-LITE 26ML ORANGE

## (undated) DEVICE — BETHLEHEM UNIVERSAL SPINE, KIT: Brand: CARDINAL HEALTH

## (undated) DEVICE — X-RAY DETECTABLE SPONGES,16 PLY: Brand: VISTEC

## (undated) DEVICE — MINOR PROCEDURE DRAPE: Brand: CONVERTORS

## (undated) DEVICE — ELECTRODE BLADE MOD E-Z CLEAN 2.5IN 6.4CM -0012M

## (undated) DEVICE — SWABSTCK, BENZOIN TINCTURE, 1/PK, STRL: Brand: APLICARE

## (undated) DEVICE — LIGACLIP MCA MULTIPLE CLIP APPLIERS, 20 LARGE CLIPS: Brand: LIGACLIP

## (undated) DEVICE — DRAPE SHEET X-LG

## (undated) DEVICE — GLOVE SRG BIOGEL 7

## (undated) DEVICE — SUPPLY FEE STD

## (undated) DEVICE — OCCLUSIVE GAUZE STRIP,3% BISMUTH TRIBROMOPHENATE IN PETROLATUM BLEND: Brand: XEROFORM

## (undated) DEVICE — SUT MONOCRYL 3-0 PS-2 18 IN Y497G

## (undated) DEVICE — SPONGE PVP SCRUB WING STERILE

## (undated) DEVICE — THE EXACTO COLD SNARE IS INTENDED TO BE USED WITHOUT DIATHERMIC ENERGY FOR THE ENDOSCOPIC RESECTION OF POLYP TISSUE IN THE GASTROINTESTINAL TRACT.: Brand: EXACTO

## (undated) DEVICE — PENCIL ELECTROSURG E-Z CLEAN -0035H